# Patient Record
Sex: MALE | Race: WHITE | NOT HISPANIC OR LATINO | Employment: OTHER | URBAN - METROPOLITAN AREA
[De-identification: names, ages, dates, MRNs, and addresses within clinical notes are randomized per-mention and may not be internally consistent; named-entity substitution may affect disease eponyms.]

---

## 2017-01-12 ENCOUNTER — ANTI-COAG VISIT (OUTPATIENT)
Dept: CARDIOLOGY | Facility: CLINIC | Age: 77
End: 2017-01-12
Payer: MEDICARE

## 2017-01-12 DIAGNOSIS — Z79.01 LONG-TERM (CURRENT) USE OF ANTICOAGULANTS: Primary | ICD-10-CM

## 2017-01-12 DIAGNOSIS — I74.10 THROMBUS OF AORTA: ICD-10-CM

## 2017-01-12 LAB
CTP QC/QA: NORMAL
INR PPP: 1.3 (ref 2–3)

## 2017-01-12 PROCEDURE — 85610 PROTHROMBIN TIME: CPT | Mod: QW,S$GLB,,

## 2017-01-12 PROCEDURE — 99211 OFF/OP EST MAY X REQ PHY/QHP: CPT | Mod: 25,S$GLB,,

## 2017-01-12 NOTE — PROGRESS NOTES
INR low. Pt denies changes. INR was trending high so dose was lowered and now INR too low. I suspect overall health improvement and hence needing to increase back to maintenance dose. Recheck INR in 2 weeks

## 2017-01-26 ENCOUNTER — ANTI-COAG VISIT (OUTPATIENT)
Dept: CARDIOLOGY | Facility: CLINIC | Age: 77
End: 2017-01-26
Payer: MEDICARE

## 2017-01-26 DIAGNOSIS — I74.10 THROMBUS OF AORTA: ICD-10-CM

## 2017-01-26 DIAGNOSIS — Z79.01 LONG-TERM (CURRENT) USE OF ANTICOAGULANTS: Primary | ICD-10-CM

## 2017-01-26 LAB
CTP QC/QA: YES
INR PPP: 1.2 (ref 2–3)

## 2017-01-26 PROCEDURE — 85610 PROTHROMBIN TIME: CPT | Mod: QW,S$GLB,,

## 2017-01-26 PROCEDURE — 99211 OFF/OP EST MAY X REQ PHY/QHP: CPT | Mod: 25,S$GLB,,

## 2017-01-26 NOTE — PROGRESS NOTES
INR low again. Pt missed a dose 2 days ago. He possibly missed more doses. He had liver over a week ago. We discussed diet. We will boost dose today then resume maintenance dose. Repeat INR next week

## 2017-02-02 ENCOUNTER — ANTI-COAG VISIT (OUTPATIENT)
Dept: CARDIOLOGY | Facility: CLINIC | Age: 77
End: 2017-02-02
Payer: MEDICARE

## 2017-02-02 DIAGNOSIS — I74.10 THROMBUS OF AORTA: ICD-10-CM

## 2017-02-02 DIAGNOSIS — Z79.01 LONG-TERM (CURRENT) USE OF ANTICOAGULANTS: Primary | ICD-10-CM

## 2017-02-02 LAB — INR PPP: 2.1 (ref 2–3)

## 2017-02-02 PROCEDURE — 85610 PROTHROMBIN TIME: CPT | Mod: QW,S$GLB,,

## 2017-02-02 NOTE — PROGRESS NOTES
INR good. Pt confirmed dose and compliance. INR was low last week due to compliance. He reports he has figured out a plan to keep him on track. No other changes. No s/sx of bleeding. No bruising.  We will continue on maintenance dose. Repeat INR in 2 weeks.

## 2017-02-08 ENCOUNTER — TELEPHONE (OUTPATIENT)
Dept: NEUROLOGY | Facility: CLINIC | Age: 77
End: 2017-02-08

## 2017-02-08 NOTE — TELEPHONE ENCOUNTER
----- Message from Tamiko Alvares sent at 2/8/2017  9:42 AM CST -----  Contact: [phone]  Pt is stating he has a appointment for today but its not scheduled Please call pt at your earliest convenience.  Thanks !

## 2017-02-08 NOTE — TELEPHONE ENCOUNTER
"Called to notify patient that he does not have a follow up appointment scheduled. He was last seen in Nov and needs a 3 Month F/U. I offered to schedule an appointment for him tomorrow as he has an appointment at 2:00 with Dr Mackenzie. He declined the appointment because he stated, "I have to be in [place] tomorrow at 1:00 for a [place] meeting." I will schedule a 3 Month follow up for him for a future date and mail, ok per patient.   "

## 2017-02-09 ENCOUNTER — OFFICE VISIT (OUTPATIENT)
Dept: CARDIOLOGY | Facility: CLINIC | Age: 77
End: 2017-02-09
Payer: MEDICARE

## 2017-02-09 VITALS
HEIGHT: 67 IN | BODY MASS INDEX: 24.48 KG/M2 | DIASTOLIC BLOOD PRESSURE: 59 MMHG | WEIGHT: 156 LBS | OXYGEN SATURATION: 98 % | HEART RATE: 80 BPM | SYSTOLIC BLOOD PRESSURE: 119 MMHG

## 2017-02-09 DIAGNOSIS — G20.A1 PARKINSON'S DISEASE: ICD-10-CM

## 2017-02-09 DIAGNOSIS — Z79.01 ON CONTINUOUS ORAL ANTICOAGULATION: ICD-10-CM

## 2017-02-09 DIAGNOSIS — I73.9 PAD (PERIPHERAL ARTERY DISEASE): Primary | ICD-10-CM

## 2017-02-09 DIAGNOSIS — R53.81 DEBILITY: ICD-10-CM

## 2017-02-09 DIAGNOSIS — I74.10 THROMBUS OF AORTA: ICD-10-CM

## 2017-02-09 DIAGNOSIS — E78.5 DYSLIPIDEMIA: ICD-10-CM

## 2017-02-09 PROCEDURE — 1159F MED LIST DOCD IN RCRD: CPT | Mod: S$GLB,,, | Performed by: INTERNAL MEDICINE

## 2017-02-09 PROCEDURE — 99999 PR PBB SHADOW E&M-EST. PATIENT-LVL III: CPT | Mod: PBBFAC,,, | Performed by: INTERNAL MEDICINE

## 2017-02-09 PROCEDURE — 1160F RVW MEDS BY RX/DR IN RCRD: CPT | Mod: S$GLB,,, | Performed by: INTERNAL MEDICINE

## 2017-02-09 PROCEDURE — 1126F AMNT PAIN NOTED NONE PRSNT: CPT | Mod: S$GLB,,, | Performed by: INTERNAL MEDICINE

## 2017-02-09 PROCEDURE — 99214 OFFICE O/P EST MOD 30 MIN: CPT | Mod: S$GLB,,, | Performed by: INTERNAL MEDICINE

## 2017-02-09 PROCEDURE — 1157F ADVNC CARE PLAN IN RCRD: CPT | Mod: S$GLB,,, | Performed by: INTERNAL MEDICINE

## 2017-02-09 RX ORDER — PRAMIPEXOLE DIHYDROCHLORIDE 1 MG/1
1 TABLET ORAL 4 TIMES DAILY
Status: ON HOLD | COMMUNITY
Start: 2016-11-17 | End: 2017-08-03 | Stop reason: HOSPADM

## 2017-02-09 RX ORDER — METHYLPREDNISOLONE 4 MG/1
TABLET ORAL
Refills: 0 | COMMUNITY
Start: 2016-12-12 | End: 2017-02-23

## 2017-02-09 RX ORDER — MELOXICAM 7.5 MG/1
TABLET ORAL
Refills: 0 | COMMUNITY
Start: 2017-01-20 | End: 2017-04-17

## 2017-02-09 NOTE — PROGRESS NOTES
Subjective:    Patient ID:  Tenzin Ortiz is a 76 y.o. male who presents for follow-up of Follow-up      HPI   previous history:  Here for follow-up of stent of the lower extremity.  Just today he's noticed a significant difference.  He had some mild residual pain post procedure.  He still gets some numbness and tingling through his feet associated with his neuropathy.  He saw neurology yesterday and is being followed up by Dr. Allred.  He denies any worsening cardiopulmonary complaints.  We discussed no restrictions and promoted exercise at this point.  We'll try to get him into cardiac rehabilitation.    Today:  Here for follow-up of peripheral vascular disease.  He says his legs feel good but he still deals with issues from his Parkinson's.  He said he went to one day of cardiac rehabilitation and just felt bad and never returned.  He's amenable to calling him again in trying to reinitiate the program.  He denies any worsening cardiopulmonary complaints.  He's not expressing chest pain, shortness of breath or palpitations.  He denies any PND, orthopnea or lower edema.  He does have follow-up with neurology next month.  Otherwise is been in his usual state of health.      Review of Systems   Constitution: Negative.   HENT: Negative.    Eyes: Negative.    Cardiovascular: Negative for chest pain, dyspnea on exertion, irregular heartbeat, leg swelling, near-syncope, orthopnea, palpitations, paroxysmal nocturnal dyspnea and syncope.   Respiratory: Negative for shortness of breath.    Skin: Negative.    Musculoskeletal: Negative.    Gastrointestinal: Negative for abdominal pain, constipation and diarrhea.   Genitourinary: Negative for dysuria.   Neurological: Positive for disturbances in coordination, focal weakness, loss of balance, numbness and paresthesias. Negative for dizziness.   Psychiatric/Behavioral: Negative.         Objective:    Physical Exam   Constitutional: He is oriented to person, place, and time. He  appears well-developed and well-nourished. No distress.   HENT:   Head: Normocephalic and atraumatic.   Eyes: Conjunctivae and EOM are normal. Pupils are equal, round, and reactive to light.   Neck: Normal range of motion. Neck supple. No thyromegaly present.   Cardiovascular: Normal rate, regular rhythm and normal heart sounds.    No murmur heard.  Pulmonary/Chest: Effort normal and breath sounds normal. No respiratory distress. He has no wheezes. He has no rales. He exhibits no tenderness.   Abdominal: Soft. Bowel sounds are normal.   Musculoskeletal: He exhibits no edema.   Neurological: He is alert and oriented to person, place, and time.   Skin: Skin is warm and dry.   Psychiatric: He has a normal mood and affect. His behavior is normal.       BIA:   ABIs mildly decreased.  Right ankle-brachial index 0.88  Left ankle-brachial index 0.76    Assessment:       1. PAD (peripheral artery disease)    2. Thrombus of aorta    3. Parkinson's disease    4. Dyslipidemia    5. Debility    6. On continuous oral anticoagulation         Plan:       -Continue medical therapy  -Referred to cardiac rehabilitation, states he will try and resume      Return to clinic in 6 months

## 2017-02-09 NOTE — MR AVS SNAPSHOT
[facility]  [address] [facility] [address] [place] [address]  Phone: [phone]                  Tenzin Ortiz   2017 2:00 PM   Office Visit    Description:  Male : 1940   Provider:  Anjel Mackenzie MD   Department:  [facility]           Reason for Visit     Follow-up           Diagnoses this Visit        Comments    PAD (peripheral artery disease)    -  Primary     Thrombus of aorta         Parkinson's disease         Dyslipidemia         Debility         On continuous oral anticoagulation                To Do List           Future Appointments        Provider Department Dept Phone    2017 1:30 PM Eva Waldron, PharmD [facility] [phone]    3/15/2017 3:40 PM Bhargav Allred MD [facility] [place] [facility] [phone]      Goals (5 Years of Data)     None      Follow-Up and Disposition     Return in about 6 months (around 2017).    Follow-up and Disposition History      [facility]     [facility] Care [facility] -  Assistance  Registered nurses in the [facility] provide clinical advisement, health education, appointment booking, and other advisory services.  Call for this free service at [phone].             Medications           Message regarding Medications     Verify the changes and/or additions to your medication regime listed below are the same as discussed with your clinician today.  If any of these changes or additions are incorrect, please notify your healthcare provider.        STOP taking these medications     cephALEXin (KEFLEX) 500 MG capsule TK 1 C PO Q 8 H FOR 7 DAYS           Verify that the below list of medications is an accurate representation of the medications you are currently taking.  If none reported, the list may be blank. If incorrect, please contact your healthcare provider. Carry this list with you in case of emergency.           Current Medications     acetaminophen (TYLENOL) 325 MG tablet Take 650 mg  "by mouth every 6 (six) hours as needed for Pain.    amlodipine (NORVASC) 10 MG tablet TAKE 1 TABLET BY MOUTH EVERY DAY    atorvastatin (LIPITOR) 10 MG tablet Take 10 mg by mouth once daily.    carbidopa-levodopa (PARCOPA)  mg per disintegrating tablet DISSOLVE 2 TS PO QID PRN    carbidopa-levodopa  mg (SINEMET CR)  mg TbSR Take 1 tablet by mouth 5 (five) times daily.    clobetasol 0.05% (TEMOVATE) 0.05 % Oint POWER EXT AA BID    clopidogrel (PLAVIX) 75 mg tablet TAKE 1 TABLET BY MOUTH ONCE DAILY    gabapentin (NEURONTIN) 300 MG capsule TAKE 1 CAPSULE BY MOUTH AT NIGHT FOR 1 WEEK, INCREASE 1 CAPSULE A WEEK TILL TAKING 1 CAPSULE THREE TIMES DAILY THEREAFTER    pramipexole (MIRAPEX) 1 MG tablet     trazodone (DESYREL) 50 MG tablet TK 1/2 T PO HS  U PRN TO REST ATN    warfarin (COUMADIN) 3 MG tablet TAKE ONE BY MOUTH EVERY DAY OR AS DIRECTED BY COUMADIN CLINIC    meloxicam (MOBIC) 7.5 MG tablet TK 1 T PO D    methylPREDNISolone (MEDROL DOSEPACK) 4 mg tablet TK UTD           Clinical Reference Information           Your Vitals Were     BP Pulse Height Weight SpO2 BMI    119/59 (BP Location: Left arm, Patient Position: Sitting, BP Method: Automatic) 80 5' 7" (1.702 m) 70.8 kg (156 lb) 98% 24.43 kg/m2      Blood Pressure          Most Recent Value    BP  (!)  119/59      Allergies as of 2/9/2017     Amantadine Analogues    Mirapex [Pramipexole]    Neupro [Rotigotine]      Immunizations Administered on Date of Encounter - 2/9/2017     None      [facility] Sign-Up     Activating your [facility] account is as easy as 1-2-3!     1) Visit [email or web address], select Sign Up Now, enter this activation code and your date of birth, then select Next.  [email or web address]  Expires: 3/26/2017  2:09 PM      2) Create a username and password to use when you visit [facility] in the future and select a security question in case you lose your password and select Next.    3) Enter your e-mail address and click Sign Up!    Additional " Information  If you have questions, please e-mail [email or web address] or call [phone] to talk to our [facility] staff. Remember, [facility] is NOT to be used for urgent needs. For medical emergencies, dial 911.         Language Assistance Services     ATTENTION: Language assistance services are available, free of charge. Please call [phone].      ATENCIÓN: Si habla español, tiene a martínez disposición servicios gratuitos de asistencia lingüística. Llame al [phone].     [facility] Ý: N?u b?n nói Ti?ng Vi?t, có các d?ch v? h? tr? ngôn ng? mi?n phí dành cho b?n. G?i s? [phone].         [facility] complies with applicable Federal civil rights laws and does not discriminate on the basis of race, color, national origin, age, disability, or sex.

## 2017-02-16 ENCOUNTER — ANTI-COAG VISIT (OUTPATIENT)
Dept: CARDIOLOGY | Facility: CLINIC | Age: 77
End: 2017-02-16
Payer: MEDICARE

## 2017-02-16 DIAGNOSIS — I74.10 THROMBUS OF AORTA: ICD-10-CM

## 2017-02-16 DIAGNOSIS — Z79.01 LONG-TERM (CURRENT) USE OF ANTICOAGULANTS: Primary | ICD-10-CM

## 2017-02-16 LAB — INR PPP: 2.2 (ref 2–3)

## 2017-02-16 PROCEDURE — 99211 OFF/OP EST MAY X REQ PHY/QHP: CPT | Mod: 25,S$GLB,,

## 2017-02-16 PROCEDURE — 85610 PROTHROMBIN TIME: CPT | Mod: QW,S$GLB,,

## 2017-02-16 NOTE — PROGRESS NOTES
INR is good. Pt confirmed dose and compliance. He now has a routine to make sure he doesn't miss pills. He has a rash and saw dermatology today. He was told he was being called in a steroid. He has not picked it up yet. Pt advised of potential interaction with steroids. He will call today or tomorrow with what he was prescribed so that we can adjust dose if indicated. I tentatively planned for repeat INR next week thinking that it will be an interacting med. If it is not, we can r/s to next month since his INR/dose has been stable.

## 2017-02-20 NOTE — PROGRESS NOTES
Pt called to report that today will be starting Methylprednisolone-4mg #21 tablets (tapering dose: 6 x 1d, 5 x 1d, 4 x 1d, 3 x 1d, 2 x1d, 1 x1d), next INR is due 2/23, cb # [phone]

## 2017-02-20 NOTE — PROGRESS NOTES
See previous note, Patient to start medrol later today. I will begin to make adjustments but further adjustments will be made based on INR already scheduled 2/23

## 2017-02-23 ENCOUNTER — ANTI-COAG VISIT (OUTPATIENT)
Dept: CARDIOLOGY | Facility: CLINIC | Age: 77
End: 2017-02-23
Payer: MEDICARE

## 2017-02-23 DIAGNOSIS — I74.10 THROMBUS OF AORTA: ICD-10-CM

## 2017-02-23 DIAGNOSIS — Z79.01 LONG-TERM (CURRENT) USE OF ANTICOAGULANTS: Primary | ICD-10-CM

## 2017-02-23 LAB — INR PPP: 2 (ref 2–3)

## 2017-02-23 PROCEDURE — 85610 PROTHROMBIN TIME: CPT | Mod: QW,S$GLB,,

## 2017-02-23 PROCEDURE — 99211 OFF/OP EST MAY X REQ PHY/QHP: CPT | Mod: 25,S$GLB,,

## 2017-02-23 NOTE — PROGRESS NOTES
INR good. Pt reports he took 3mg Tu. He was supposed to take 1.5mg Tue for DDI with medrol. However, he forgot and took his normal dose. Pt also reports that he did not start the medrol until yesterday. Hence, we are not seeing the interaction today. Plus, its good that he took his normal dose 2 days ago. We will plan for interaction and have him hold a dose on Saturday. Otherwise, pt will continue on maintenance dose. Repeat INR in 3 weeks.

## 2017-03-16 ENCOUNTER — ANTI-COAG VISIT (OUTPATIENT)
Dept: CARDIOLOGY | Facility: CLINIC | Age: 77
End: 2017-03-16
Payer: MEDICARE

## 2017-03-16 DIAGNOSIS — I74.10 THROMBUS OF AORTA: ICD-10-CM

## 2017-03-16 DIAGNOSIS — Z79.01 LONG-TERM (CURRENT) USE OF ANTICOAGULANTS: Primary | ICD-10-CM

## 2017-03-16 LAB — INR PPP: 2.6 (ref 2–3)

## 2017-03-16 PROCEDURE — 85610 PROTHROMBIN TIME: CPT | Mod: QW,S$GLB,,

## 2017-03-16 NOTE — PROGRESS NOTES
INR good. Pt confirmed dose and compliance. He is having sciatic nerve pain. No new meds. No plans for any procedures. No s/sx of bleeding. Maintain current dose and repeat INR next month. Pt advised to call with any changes or procedures.

## 2017-04-03 ENCOUNTER — OFFICE VISIT (OUTPATIENT)
Dept: FAMILY MEDICINE | Facility: CLINIC | Age: 77
End: 2017-04-03
Payer: MEDICARE

## 2017-04-03 VITALS
TEMPERATURE: 97 F | HEART RATE: 82 BPM | DIASTOLIC BLOOD PRESSURE: 62 MMHG | OXYGEN SATURATION: 97 % | WEIGHT: 158.75 LBS | RESPIRATION RATE: 18 BRPM | SYSTOLIC BLOOD PRESSURE: 120 MMHG | HEIGHT: 67 IN | BODY MASS INDEX: 24.92 KG/M2

## 2017-04-03 DIAGNOSIS — M54.32 LEFT SIDED SCIATICA: ICD-10-CM

## 2017-04-03 DIAGNOSIS — D63.8 ANEMIA OF CHRONIC DISEASE: ICD-10-CM

## 2017-04-03 DIAGNOSIS — I74.10 THROMBUS OF AORTA: ICD-10-CM

## 2017-04-03 DIAGNOSIS — E44.1 MILD MALNUTRITION: Chronic | ICD-10-CM

## 2017-04-03 DIAGNOSIS — Z00.00 VISIT FOR WELL MAN HEALTH CHECK: Primary | ICD-10-CM

## 2017-04-03 DIAGNOSIS — G20.A1 PD (PARKINSON'S DISEASE): ICD-10-CM

## 2017-04-03 DIAGNOSIS — I73.9 PAD (PERIPHERAL ARTERY DISEASE): ICD-10-CM

## 2017-04-03 PROCEDURE — 99397 PER PM REEVAL EST PAT 65+ YR: CPT | Mod: S$GLB,,, | Performed by: FAMILY MEDICINE

## 2017-04-03 PROCEDURE — 99999 PR PBB SHADOW E&M-EST. PATIENT-LVL III: CPT | Mod: PBBFAC,,, | Performed by: FAMILY MEDICINE

## 2017-04-03 RX ORDER — FLUOCINONIDE 0.5 MG/G
OINTMENT TOPICAL
Refills: 1 | Status: ON HOLD | COMMUNITY
Start: 2017-02-16 | End: 2017-08-03 | Stop reason: HOSPADM

## 2017-04-03 RX ORDER — TRAMADOL HYDROCHLORIDE 50 MG/1
TABLET ORAL
Refills: 0 | COMMUNITY
Start: 2017-03-30 | End: 2017-04-10

## 2017-04-03 RX ORDER — METHYLPREDNISOLONE 4 MG/1
TABLET ORAL
Qty: 1 PACKAGE | Refills: 0 | Status: SHIPPED | OUTPATIENT
Start: 2017-04-03 | End: 2017-04-10

## 2017-04-03 NOTE — MR AVS SNAPSHOT
[facility]  [address]  [place] [address]  Phone: [phone]                  Tenzin Ortiz   4/3/2017 11:00 AM   Office Visit    Description:  Male : 1940   Provider:  Efrain Chavez MD   Department:  [facility]           Reason for Visit     Establish Care     Hip Pain     Leg Pain           Diagnoses this Visit        Comments    Visit for well man health check    -  Primary     Mild malnutrition         Anemia of chronic disease         PD (Parkinson's disease)         PAD (peripheral artery disease)         Left sided sciatica         Thrombus of aorta                To Do List           Future Appointments        Provider Department Dept Phone    2017 1:30 PM Eva Waldron, PharmD [facility] [phone]      Goals (5 Years of Data)     None      Follow-Up and Disposition     Return in about 3 months (around 7/3/2017), or if symptoms worsen or fail to improve.       These Medications        Disp Refills Start End    methylPREDNISolone (MEDROL DOSEPACK) 4 mg tablet 1 Package 0 4/3/2017     use as directed    Pharmacy: [facility] [address] [place] [address] [address] AT [facility] [address] Ph #: [phone]         [facility] On Call     [facility] On Call Nurse Care Line -  Assistance  Unless otherwise directed by your provider, please contact [facility] On[facility], our nurse care line that is available for  assistance.     Registered nurses in the [facility] On Call Center provide: appointment scheduling, clinical advisement, health education, and other advisory services.  Call: [phone] (toll free)               Medications           Message regarding Medications     Verify the changes and/or additions to your medication regime listed below are the same as discussed with your clinician today.  If any of these changes or additions are incorrect, please notify your healthcare provider.        START taking  "these NEW medications        Refills    methylPREDNISolone (MEDROL DOSEPACK) 4 mg tablet 0    Sig: use as directed    Class: Normal           Verify that the below list of medications is an accurate representation of the medications you are currently taking.  If none reported, the list may be blank. If incorrect, please contact your healthcare provider. Carry this list with you in case of emergency.           Current Medications     amlodipine (NORVASC) 10 MG tablet TAKE 1 TABLET BY MOUTH EVERY DAY    atorvastatin (LIPITOR) 10 MG tablet Take 10 mg by mouth once daily.    carbidopa-levodopa (PARCOPA)  mg per disintegrating tablet DISSOLVE 2 TS PO QID PRN    carbidopa-levodopa  mg (SINEMET CR)  mg TbSR Take 1 tablet by mouth 5 (five) times daily.    clopidogrel (PLAVIX) 75 mg tablet TAKE 1 TABLET BY MOUTH ONCE DAILY    fluocinonide (LIDEX) 0.05 % ointment POWER AA BID    gabapentin (NEURONTIN) 300 MG capsule TAKE 1 CAPSULE BY MOUTH AT NIGHT FOR 1 WEEK, INCREASE 1 CAPSULE A WEEK TILL TAKING 1 CAPSULE THREE TIMES DAILY THEREAFTER    meloxicam (MOBIC) 7.5 MG tablet TK 1 T PO D    pramipexole (MIRAPEX) 1 MG tablet     tramadol (ULTRAM) 50 mg tablet TK ONE T  PO Q 6 H PRN    trazodone (DESYREL) 50 MG tablet TK 1/2 T PO HS  U PRN TO REST ATN    warfarin (COUMADIN) 3 MG tablet TAKE ONE BY MOUTH EVERY DAY OR AS DIRECTED BY COUMADIN CLINIC    methylPREDNISolone (MEDROL DOSEPACK) 4 mg tablet use as directed           Clinical Reference Information           Your Vitals Were     BP Pulse Temp Resp Height Weight    120/62 (BP Location: Left arm, Patient Position: Sitting, BP Method: Manual) 82 97.4 °F (36.3 °C) (Oral) 18 5' 7" (1.702 m) 72 kg (158 lb 11.7 oz)    SpO2 BMI             97% 24.86 kg/m2         Blood Pressure          Most Recent Value    BP  120/62      Allergies as of 4/3/2017     Amantadine Analogues    Mirapex [Pramipexole]    Neupro [Rotigotine]      Immunizations Administered on Date of Encounter " - 4/3/2017     None      Orders Placed During Today's Visit     Future Labs/Procedures Expected by Expires    CBC auto differential  4/3/2017 4/3/2018    Comprehensive metabolic panel  4/3/2017 6/2/2018    Lipid panel  4/3/2017 6/2/2018    Urinalysis  4/3/2017 6/2/2018      [facility] Sign-Up     Activating your [facility] account is as easy as 1-2-3!     1) Visit [email or web address], select Sign Up Now, enter this activation code and your date of birth, then select Next.  [email or web address]  Expires: 5/18/2017 11:18 AM      2) Create a username and password to use when you visit [facility] in the future and select a security question in case you lose your password and select Next.    3) Enter your e-mail address and click Sign Up!    Additional Information  If you have questions, please e-mail [email or web address] or call [phone] to talk to our [facility] staff. Remember, [facility] is NOT to be used for urgent needs. For medical emergencies, dial 911.         Language Assistance Services     ATTENTION: Language assistance services are available, free of charge. Please call [phone].      ATENCIÓN: Si habla español, tiene a martínez disposición servicios gratuitos de asistencia lingüística. Llame al [phone].     [facility] Ý: N?u b?n nói Ti?ng Vi?t, có các d?ch v? h? tr? ngôn ng? mi?n phí dành cho b?n. G?i s? [phone].         [facility] complies with applicable Federal civil rights laws and does not discriminate on the basis of race, color, national origin, age, disability, or sex.

## 2017-04-03 NOTE — PROGRESS NOTES
"Well Man VISIT      CHIEF COMPLAINT  Chief Complaint   Patient presents with    Establish Care    Hip Pain     left    Leg Pain     left       HPI  Tenzin Ortiz is a 76 y.o. male who presents for physical and left leg pain.     Social Factors  Tobacco use: No  Ready to Quit: No  Alcohol: No  Intimate partner violence screening  "Do you feel safe in your current relationship?" Yes   "Have you ever been in a relationship in which your partner frightened you or hurt you?" No  Living Will/POA: No  Regular Exercise: No    Depression  Over the past two weeks, have you felt down, depressed, or hopeless? No  Over the past two weeks, have you felt little interest or pleasure in doing things? No    Reproductive Health  STD screening in last year: declined  HIV screening: declined    Screen for Chronic Disease  CHD Risk Factors: dyslipidemia and male gender  Estimated body mass index is 24.86 kg/(m^2) as calculated from the following:    Height as of this encounter: 5' 7" (1.702 m).    Weight as of this encounter: 72 kg (158 lb 11.7 oz).  Dyslipidemia screening needed: order 4/3/17  T2DM screening needed: order 4/3/17  Colonoscopy needed: declined  PSA needed: n/a  AAA screening needed:n/a  Screen men 35 years and older, and men 20 to 34 years of age who have cardiovascular risk factors for dyslipidemia  Begin screening colonoscopies at 50 years of age in men of average risk, and continue until 75 years of age; offer fecal occult blood testing every year, flexible sigmoidoscopy every five years combined with fecal occult blood testing every three years, or colonoscopy every 10 years   The American Urological Association recommends offering PSA testing and digital rectal examination to well-informed men beginning at 40 years of age and continuing until life expectancy is less than 10 years  Screen once with ultrasonography in men 65 to 75 years of age if they have a family history or have smoked at [id number] cigarettes " "in their lifetime  Screen men with a sustained blood pressure greater than 135/80 mm Hg for T2DM      Immunizations  delayed    ALLERGIES and MEDS were verified.   PMHx, PSHx, FHx, SOCIALHx were updated as pertinent.    REVIEW OF SYSTEMS  Review of Systems   Constitutional: Negative.    HENT: Negative.    Eyes: Negative.    Respiratory: Negative.    Cardiovascular: Negative.    Gastrointestinal: Negative.    Genitourinary: Negative.    Musculoskeletal: Positive for joint pain and myalgias. Negative for back pain and neck pain.   Skin: Negative.    Neurological:        +tremors         PHYSICAL EXAM  VITAL SIGNS: /62 (BP Location: Left arm, Patient Position: Sitting, BP Method: Manual)  Pulse 82  Temp 97.4 °F (36.3 °C) (Oral)   Resp 18  Ht 5' 7" (1.702 m)  Wt 72 kg (158 lb 11.7 oz)  SpO2 97%  BMI 24.86 kg/m2  GEN: Well developed, Well nourished, No acute distress.  HENT: Normocephalic, Atraumatic, Bilateral external ears normal, Nose normal, Oropharynx moist, No oral exudates.   Eyes: PERRL, EOMI, Conjunctiva normal, No discharge.   Neck: Supple, No tenderness.  Lymphatic: No cervical or supraclavicular lymphadenopathy noted.   Cardiovascular: Normal heart rate, Normal rhythm, No murmurs, No rubs, No gallops.   Thorax & Lungs: Normal breath sounds, No respiratory distress, No wheezing.  Abdomen: Soft, No tenderness, Bowel sounds normal.  Genital: deferred  Skin: Warm, Dry, No erythema, No rash.   Extremities: No edema, No tenderness.       ASSESSMENT/PLAN    Tenzin was seen today for establish care, hip pain and leg pain.    Diagnoses and all orders for this visit:    Visit for well man health check  -     Urinalysis; Future    Mild malnutrition  -     Comprehensive metabolic panel; Future  -     CBC auto differential; Future    Anemia of chronic disease  -     CBC auto differential; Future    PD (Parkinson's disease)    PAD (peripheral artery disease)  -     Lipid panel; Future    Left sided sciatica  -   "   Comprehensive metabolic panel; Future  -     methylPREDNISolone (MEDROL DOSEPACK) 4 mg tablet; use as directed    Thrombus of aorta  -     Lipid panel; Future    Other orders  -     Cancel: PSA, Screening; Future         FOLLOW UP: 3 months or sooner if needed    Medrol dose pack for sciatica symptoms as pain is burning/stabbing in nature   Patient to call if symptoms fail to improve    Efrain Chavez MD

## 2017-04-04 NOTE — PROGRESS NOTES
Pt called today 4/4/17 to inform us that on today he will start (Methylprednisone 4mg dose romelia) 4/4/17(6 tablets), 4/5 (5 tablets), 4/6/(4tablets), 4/7/(3 tablets), 4/8 (2 tablets), 4/9/(1) tablet. Please advise.

## 2017-04-05 ENCOUNTER — LAB VISIT (OUTPATIENT)
Dept: LAB | Facility: HOSPITAL | Age: 77
End: 2017-04-05
Attending: FAMILY MEDICINE
Payer: MEDICARE

## 2017-04-05 DIAGNOSIS — Z00.00 VISIT FOR WELL MAN HEALTH CHECK: ICD-10-CM

## 2017-04-05 LAB
BILIRUB UR QL STRIP: NEGATIVE
CLARITY UR: CLEAR
COLOR UR: YELLOW
GLUCOSE UR QL STRIP: NEGATIVE
HGB UR QL STRIP: NEGATIVE
KETONES UR QL STRIP: ABNORMAL
LEUKOCYTE ESTERASE UR QL STRIP: NEGATIVE
MICROSCOPIC COMMENT: NORMAL
NITRITE UR QL STRIP: NEGATIVE
PH UR STRIP: 6 [PH] (ref 5–8)
PROT UR QL STRIP: NEGATIVE
RBC #/AREA URNS HPF: 2 /HPF (ref 0–4)
SP GR UR STRIP: 1.02 (ref 1–1.03)
URN SPEC COLLECT METH UR: ABNORMAL
UROBILINOGEN UR STRIP-ACNC: NEGATIVE EU/DL

## 2017-04-05 PROCEDURE — 81000 URINALYSIS NONAUTO W/SCOPE: CPT

## 2017-04-06 ENCOUNTER — ANTI-COAG VISIT (OUTPATIENT)
Dept: CARDIOLOGY | Facility: CLINIC | Age: 77
End: 2017-04-06
Payer: MEDICARE

## 2017-04-06 ENCOUNTER — TELEPHONE (OUTPATIENT)
Dept: FAMILY MEDICINE | Facility: CLINIC | Age: 77
End: 2017-04-06

## 2017-04-06 DIAGNOSIS — I74.10 THROMBUS OF AORTA: ICD-10-CM

## 2017-04-06 DIAGNOSIS — Z79.01 LONG-TERM (CURRENT) USE OF ANTICOAGULANTS: Primary | ICD-10-CM

## 2017-04-06 LAB — INR PPP: 2.3 (ref 2–3)

## 2017-04-06 PROCEDURE — 99211 OFF/OP EST MAY X REQ PHY/QHP: CPT | Mod: 25,S$GLB,,

## 2017-04-06 PROCEDURE — 85610 PROTHROMBIN TIME: CPT | Mod: QW,S$GLB,,

## 2017-04-06 NOTE — TELEPHONE ENCOUNTER
----- Message from Efrain Chavez MD sent at 4/6/2017  8:49 AM CDT -----  Please let patient know that his labs look good.  His potassium is mildly elevated, but not significantly.  Otherwise he is doing well.    Thanks,  Dr. Chavez

## 2017-04-06 NOTE — PROGRESS NOTES
INR ok. Pt started medrol 2 days ago. We expect interaction after 3 days of DDI. We will plan for pt to hold a dose tomorrow to keep INR from increasing. Otherwise, pt will continue on maintenance dose. Repeat INR in 3 weeks. Pt advised to call if meds change again.

## 2017-04-10 ENCOUNTER — OFFICE VISIT (OUTPATIENT)
Dept: FAMILY MEDICINE | Facility: CLINIC | Age: 77
End: 2017-04-10
Payer: MEDICARE

## 2017-04-10 ENCOUNTER — APPOINTMENT (OUTPATIENT)
Dept: RADIOLOGY | Facility: HOSPITAL | Age: 77
End: 2017-04-10
Attending: FAMILY MEDICINE
Payer: MEDICARE

## 2017-04-10 VITALS
BODY MASS INDEX: 24.92 KG/M2 | HEIGHT: 67 IN | WEIGHT: 158.75 LBS | DIASTOLIC BLOOD PRESSURE: 58 MMHG | RESPIRATION RATE: 20 BRPM | TEMPERATURE: 98 F | HEART RATE: 74 BPM | SYSTOLIC BLOOD PRESSURE: 114 MMHG | OXYGEN SATURATION: 99 %

## 2017-04-10 DIAGNOSIS — M54.32 LEFT SIDED SCIATICA: Primary | ICD-10-CM

## 2017-04-10 DIAGNOSIS — M54.32 LEFT SIDED SCIATICA: ICD-10-CM

## 2017-04-10 PROCEDURE — 1160F RVW MEDS BY RX/DR IN RCRD: CPT | Mod: S$GLB,,, | Performed by: FAMILY MEDICINE

## 2017-04-10 PROCEDURE — 99999 PR PBB SHADOW E&M-EST. PATIENT-LVL IV: CPT | Mod: PBBFAC,,, | Performed by: FAMILY MEDICINE

## 2017-04-10 PROCEDURE — 72100 X-RAY EXAM L-S SPINE 2/3 VWS: CPT | Mod: TC,PN

## 2017-04-10 PROCEDURE — 99214 OFFICE O/P EST MOD 30 MIN: CPT | Mod: 25,S$GLB,, | Performed by: FAMILY MEDICINE

## 2017-04-10 PROCEDURE — 72100 X-RAY EXAM L-S SPINE 2/3 VWS: CPT | Mod: 26,,, | Performed by: RADIOLOGY

## 2017-04-10 PROCEDURE — 1159F MED LIST DOCD IN RCRD: CPT | Mod: S$GLB,,, | Performed by: FAMILY MEDICINE

## 2017-04-10 PROCEDURE — 96372 THER/PROPH/DIAG INJ SC/IM: CPT | Mod: S$GLB,,, | Performed by: FAMILY MEDICINE

## 2017-04-10 PROCEDURE — 1157F ADVNC CARE PLAN IN RCRD: CPT | Mod: S$GLB,,, | Performed by: FAMILY MEDICINE

## 2017-04-10 PROCEDURE — 1125F AMNT PAIN NOTED PAIN PRSNT: CPT | Mod: S$GLB,,, | Performed by: FAMILY MEDICINE

## 2017-04-10 RX ORDER — DEXAMETHASONE SODIUM PHOSPHATE 4 MG/ML
4 INJECTION, SOLUTION INTRA-ARTICULAR; INTRALESIONAL; INTRAMUSCULAR; INTRAVENOUS; SOFT TISSUE
Status: DISCONTINUED | OUTPATIENT
Start: 2017-04-10 | End: 2017-04-10

## 2017-04-10 RX ORDER — DEXAMETHASONE SODIUM PHOSPHATE 4 MG/ML
8 INJECTION, SOLUTION INTRA-ARTICULAR; INTRALESIONAL; INTRAMUSCULAR; INTRAVENOUS; SOFT TISSUE
Status: COMPLETED | OUTPATIENT
Start: 2017-04-10 | End: 2017-04-10

## 2017-04-10 RX ORDER — TRAMADOL HYDROCHLORIDE 50 MG/1
50 TABLET ORAL
Qty: 10 TABLET | Refills: 0 | Status: SHIPPED | OUTPATIENT
Start: 2017-04-10 | End: 2017-04-17

## 2017-04-10 RX ADMIN — DEXAMETHASONE SODIUM PHOSPHATE 8 MG: 4 INJECTION, SOLUTION INTRA-ARTICULAR; INTRALESIONAL; INTRAMUSCULAR; INTRAVENOUS; SOFT TISSUE at 10:04

## 2017-04-10 NOTE — MR AVS SNAPSHOT
[facility]  [address]  [place] [address]  Phone: [phone]                  Tenzin Ortiz   4/10/2017 9:00 AM   Office Visit    Description:  Male : 1940   Provider:  Efrain Chavez MD   Department:  [facility]           Reason for Visit     Hip Pain           Diagnoses this Visit        Comments    Left sided sciatica    -  Primary            To Do List           Future Appointments        Provider Department Dept Phone    2017 10:15 AM Efrain Chavez MD [facility] [phone]    2017 1:30 PM Eva Waldron, PharmD [place] [facility] [phone]      Goals (5 Years of Data)     None      Follow-Up and Disposition     Return if symptoms worsen or fail to improve.       These Medications        Disp Refills Start End    tramadol (ULTRAM) 50 mg tablet 10 tablet 0 4/10/2017 2017    Take 1 tablet (50 mg total) by mouth every 24 hours as needed for Pain. - Oral    Pharmacy: [facility] [address] [facility] AT [facility] [address] Ph #: [phone]         [facility] On Call     [facility] On Call Nurse Care Line -  Assistance  Unless otherwise directed by your provider, please contact [facility], our nurse care line that is available for  assistance.     Registered nurses in the [facility] On Call Center provide: appointment scheduling, clinical advisement, health education, and other advisory services.  Call: [phone] (toll free)               Medications           Message regarding Medications     Verify the changes and/or additions to your medication regime listed below are the same as discussed with your clinician today.  If any of these changes or additions are incorrect, please notify your healthcare provider.        START taking these NEW medications        Refills    tramadol (ULTRAM) 50 mg tablet 0    Sig: Take 1 tablet (50 mg total) by mouth every 24 hours as  "needed for Pain.    Class: Print    Route: Oral      These medications were administered today        Dose Freq    dexamethasone injection 4 mg 4 mg Clinic/HOD 1 time    Sig: Inject 1 mL (4 mg total) into the vein one time.    Class: Normal    Route: Intravenous      STOP taking these medications     methylPREDNISolone (MEDROL DOSEPACK) 4 mg tablet use as directed           Verify that the below list of medications is an accurate representation of the medications you are currently taking.  If none reported, the list may be blank. If incorrect, please contact your healthcare provider. Carry this list with you in case of emergency.           Current Medications     amlodipine (NORVASC) 10 MG tablet TAKE 1 TABLET BY MOUTH EVERY DAY    atorvastatin (LIPITOR) 10 MG tablet Take 10 mg by mouth once daily.    carbidopa-levodopa (PARCOPA)  mg per disintegrating tablet DISSOLVE 2 TS PO QID PRN    carbidopa-levodopa  mg (SINEMET CR)  mg TbSR Take 1 tablet by mouth 5 (five) times daily.    clopidogrel (PLAVIX) 75 mg tablet TAKE 1 TABLET BY MOUTH ONCE DAILY    fluocinonide (LIDEX) 0.05 % ointment POWER AA BID    gabapentin (NEURONTIN) 300 MG capsule TAKE 1 CAPSULE BY MOUTH AT NIGHT FOR 1 WEEK, INCREASE 1 CAPSULE A WEEK TILL TAKING 1 CAPSULE THREE TIMES DAILY THEREAFTER    meloxicam (MOBIC) 7.5 MG tablet TK 1 T PO D    pramipexole (MIRAPEX) 1 MG tablet     trazodone (DESYREL) 50 MG tablet TK 1/2 T PO HS  U PRN TO REST ATN    warfarin (COUMADIN) 3 MG tablet TAKE ONE BY MOUTH EVERY DAY OR AS DIRECTED BY COUMADIN CLINIC    tramadol (ULTRAM) 50 mg tablet Take 1 tablet (50 mg total) by mouth every 24 hours as needed for Pain.           Clinical Reference Information           Your Vitals Were     BP Pulse Temp Resp Height Weight    114/58 (BP Location: Left arm, Patient Position: Sitting, BP Method: Manual) 74 97.6 °F (36.4 °C) (Oral) 20 5' 7" (1.702 m) 72 kg (158 lb 11.7 oz)    SpO2 BMI             99% 24.86 kg/m2    "      Blood Pressure          Most Recent Value    BP  (!)  114/58      Allergies as of 4/10/2017     Amantadine Analogues    Mirapex [Pramipexole]    Neupro [Rotigotine]      Immunizations Administered on Date of Encounter - 4/10/2017     None      [facility] Sign-Up     Activating your [facility] account is as easy as 1-2-3!     1) Visit [email or web address], select Sign Up Now, enter this activation code and your date of birth, then select Next.  [email or web address]  Expires: 5/18/2017 11:18 AM      2) Create a username and password to use when you visit [facility] in the future and select a security question in case you lose your password and select Next.    3) Enter your e-mail address and click Sign Up!    Additional Information  If you have questions, please e-mail [email or web address] or call [phone] to talk to our [facility] staff. Remember, [facility] is NOT to be used for urgent needs. For medical emergencies, dial 911.         Language Assistance Services     ATTENTION: Language assistance services are available, free of charge. Please call [phone].      ATENCIÓN: Si habla jenniferañol, tiene a martínez disposición servicios gratuitos de asistencia lingüística. Llame al [phone].     CHÚ Ý: N?u b?n nói Ti?ng Vi?t, có các d?ch v? h? tr? ngôn ng? mi?n phí dành cho b?n. G?i s? [phone].         [facility] complies with applicable Federal civil rights laws and does not discriminate on the basis of race, color, national origin, age, disability, or sex.

## 2017-04-10 NOTE — PROGRESS NOTES
Patient tolerate Decadron 8 mg IM injection . Patient advise to wait 15 min after injection  for assessment of any posssible side effects.

## 2017-04-10 NOTE — PROGRESS NOTES
Routine Office Visit    Patient Name: Tenzin Ortiz    : 1940  MRN: [id number]    Subjective:  Tenzin is a 76 y.o. male who presents today for:    1. Left leg pain  Patient presenting with persistent left leg pain that radiates from the hip to the ankle.  He states that he cannot really describe the pain, but that it gets so intense he cannot walk.  There have been no falls.  He denies any history of back pain.  He states that he was told at [facility] told him his pain was from arthritis of the back.  The pain is not worsened or improved with any changes in position or movements.  This pain has been off and on for several months.     Past Medical History  Past Medical History:   Diagnosis Date    Anticoagulant long-term use     Hypertension     Impulse control disorder     gambling on mirapex; also possible dopamine dysregulation syndrome    PD (Parkinson's disease)     tremor-predominant    PVD (peripheral vascular disease) with claudication     poor circulation both legs       Past Surgical History  History reviewed. No pertinent surgical history.    Family History  Family History   Problem Relation Age of Onset    No Known Problems Mother     No Known Problems Father     Parkinsonism Neg Hx        Social History  Social History     Social History    Marital status: Single     Spouse name: N/A    Number of children: N/A    Years of education: N/A     Occupational History    Not on file.     Social History Main Topics    Smoking status: Former Smoker     Years: 10.00    Smokeless tobacco: Never Used      Comment: Quit 40 years ago    Alcohol use No    Drug use: No    Sexual activity: Not Currently     Other Topics Concern    Not on file     Social History Narrative       Current Medications  Current Outpatient Prescriptions on File Prior to Visit   Medication Sig Dispense Refill    amlodipine (NORVASC) 10 MG tablet TAKE 1 TABLET BY MOUTH EVERY DAY 90 tablet 11     "atorvastatin (LIPITOR) 10 MG tablet Take 10 mg by mouth once daily.      carbidopa-levodopa (PARCOPA)  mg per disintegrating tablet DISSOLVE 2 TS PO QID PRN  2    carbidopa-levodopa  mg (SINEMET CR)  mg TbSR Take 1 tablet by mouth 5 (five) times daily.      clopidogrel (PLAVIX) 75 mg tablet TAKE 1 TABLET BY MOUTH ONCE DAILY 90 tablet 11    fluocinonide (LIDEX) 0.05 % ointment POWER AA BID  1    gabapentin (NEURONTIN) 300 MG capsule TAKE 1 CAPSULE BY MOUTH AT NIGHT FOR 1 WEEK, INCREASE 1 CAPSULE A WEEK TILL TAKING 1 CAPSULE THREE TIMES DAILY THEREAFTER 270 capsule 11    meloxicam (MOBIC) 7.5 MG tablet TK 1 T PO D  0    pramipexole (MIRAPEX) 1 MG tablet       trazodone (DESYREL) 50 MG tablet TK 1/2 T PO HS  U PRN TO REST ATN  4    warfarin (COUMADIN) 3 MG tablet TAKE ONE BY MOUTH EVERY DAY OR AS DIRECTED BY COUMADIN CLINIC 90 tablet 11    [DISCONTINUED] tramadol (ULTRAM) 50 mg tablet TK ONE T  PO Q 6 H PRN  0    [DISCONTINUED] methylPREDNISolone (MEDROL DOSEPACK) 4 mg tablet use as directed 1 Package 0     No current facility-administered medications on file prior to visit.        Allergies   Review of patient's allergies indicates:   Allergen Reactions    Amantadine analogues      Caused confusion    Mirapex [pramipexole]      Pathologic gambling    Neupro [rotigotine]      Pathologic gambling         Review of Systems (Pertinent positives)  Review of Systems   Constitutional: Negative.    HENT: Negative.    Eyes: Negative.    Respiratory: Negative.    Cardiovascular: Negative.    Gastrointestinal: Negative.    Musculoskeletal: Positive for myalgias. Negative for back pain.   Skin: Negative.    Neurological: Positive for tremors.         BP (!) 114/58 (BP Location: Left arm, Patient Position: Sitting, BP Method: Manual)  Pulse 74  Temp 97.6 °F (36.4 °C) (Oral)   Resp 20  Ht 5' 7" (1.702 m)  Wt 72 kg (158 lb 11.7 oz)  SpO2 99%  BMI 24.86 kg/m2    GENERAL APPEARANCE: in no apparent " distress and well developed and well nourished  HEENT: PERRL, EOMI, Sclera clear, anicteric, Oropharynx clear, no lesions, Neck supple with midline trachea  NECK: normal, supple, no adenopathy, thyroid normal in size  RESPIRATORY: appears well, vitals normal, no respiratory distress, acyanotic, normal RR, chest clear, no wheezing, crepitations, rhonchi, normal symmetric air entry  HEART: regular rate and rhythm, S1, S2 normal, no murmur, click, rub or gallop.    ABDOMEN: abdomen is soft without tenderness, no masses, no hernias, no organomegaly, no rebound, no guarding. Suprapubic tenderness absent. No CVA tenderness.  MS:  No pain on palpation of left hip or lower back; no pain with extension or flexion of hip  SKIN: no rashes, no wounds, no other lesions  PSYCH: Alert, oriented x 3, thought content appropriate, speech normal, pleasant and cooperative, good eye contact, well groomed    Assessment/Plan:  Tenzin Ortiz is a 76 y.o. male who presents today for :    Tenzin was seen today for hip pain.    Diagnoses and all orders for this visit:    Left sided sciatica  -     Discontinue: dexamethasone injection 4 mg; Inject 1 mL (4 mg total) into the vein one time.  -     tramadol (ULTRAM) 50 mg tablet; Take 1 tablet (50 mg total) by mouth every 24 hours as needed for Pain.  -     X-Ray Lumbar Spine Ap And Lateral; Future  -     Ambulatory Referral to Pain Clinic  -     dexamethasone injection 8 mg; Inject 2 mLs (8 mg total) into the muscle one time.        1.  Patient reports steroid injection made pain go away for 30 days, so will do another  2.  Refer to pain management for eval  3.  Tramadol at night for pain control  4.  Patient told not to drive until he knows how medicaiton will effect him  5.  Follow up as needed    Efrain Chavez MD

## 2017-04-12 ENCOUNTER — OFFICE VISIT (OUTPATIENT)
Dept: FAMILY MEDICINE | Facility: CLINIC | Age: 77
End: 2017-04-12
Payer: MEDICARE

## 2017-04-12 ENCOUNTER — TELEPHONE (OUTPATIENT)
Dept: FAMILY MEDICINE | Facility: CLINIC | Age: 77
End: 2017-04-12

## 2017-04-12 VITALS
BODY MASS INDEX: 25.12 KG/M2 | TEMPERATURE: 97 F | HEART RATE: 67 BPM | DIASTOLIC BLOOD PRESSURE: 50 MMHG | OXYGEN SATURATION: 98 % | SYSTOLIC BLOOD PRESSURE: 100 MMHG | HEIGHT: 67 IN | WEIGHT: 160.06 LBS

## 2017-04-12 DIAGNOSIS — M54.32 LEFT SIDED SCIATICA: Primary | ICD-10-CM

## 2017-04-12 PROCEDURE — 99999 PR PBB SHADOW E&M-EST. PATIENT-LVL III: CPT | Mod: PBBFAC,,, | Performed by: FAMILY MEDICINE

## 2017-04-12 PROCEDURE — 1160F RVW MEDS BY RX/DR IN RCRD: CPT | Mod: S$GLB,,, | Performed by: FAMILY MEDICINE

## 2017-04-12 PROCEDURE — 1125F AMNT PAIN NOTED PAIN PRSNT: CPT | Mod: S$GLB,,, | Performed by: FAMILY MEDICINE

## 2017-04-12 PROCEDURE — 1159F MED LIST DOCD IN RCRD: CPT | Mod: S$GLB,,, | Performed by: FAMILY MEDICINE

## 2017-04-12 PROCEDURE — 99214 OFFICE O/P EST MOD 30 MIN: CPT | Mod: S$GLB,,, | Performed by: FAMILY MEDICINE

## 2017-04-12 PROCEDURE — 1157F ADVNC CARE PLAN IN RCRD: CPT | Mod: S$GLB,,, | Performed by: FAMILY MEDICINE

## 2017-04-12 RX ORDER — CYCLOBENZAPRINE HCL 10 MG
10 TABLET ORAL NIGHTLY
Qty: 15 TABLET | Refills: 0 | Status: SHIPPED | OUTPATIENT
Start: 2017-04-12 | End: 2017-04-17

## 2017-04-12 NOTE — PROGRESS NOTES
"Routine Office Visit    Patient Name: Tenzin Ortiz    : 1940  MRN: [id number]    Subjective:  Tenzin is a 76 y.o. male who presents today for:    1. Left leg pain  Patient is complaining of recurring left leg pain that did not resolve with steroid injection like it did before.  The pain is sharp and occurs in various areas of the left leg.  There has been no weakness in the legs.  He states that he is having spasms in various muscles and feels like the muscles are going to "explode".  He states that the pain is burning on occasion as well.      Past Medical History  Past Medical History:   Diagnosis Date    Anticoagulant long-term use     Hypertension     Impulse control disorder     gambling on mirapex; also possible dopamine dysregulation syndrome    PD (Parkinson's disease)     tremor-predominant    PVD (peripheral vascular disease) with claudication     poor circulation both legs       Past Surgical History  History reviewed. No pertinent surgical history.    Family History  Family History   Problem Relation Age of Onset    No Known Problems Mother     No Known Problems Father     Parkinsonism Neg Hx        Social History  Social History     Social History    Marital status: Single     Spouse name: N/A    Number of children: N/A    Years of education: N/A     Occupational History    Not on file.     Social History Main Topics    Smoking status: Former Smoker     Years: 10.00    Smokeless tobacco: Never Used      Comment: Quit 40 years ago    Alcohol use No    Drug use: No    Sexual activity: Not Currently     Other Topics Concern    Not on file     Social History Narrative       Current Medications  Current Outpatient Prescriptions on File Prior to Visit   Medication Sig Dispense Refill    amlodipine (NORVASC) 10 MG tablet TAKE 1 TABLET BY MOUTH EVERY DAY 90 tablet 11    atorvastatin (LIPITOR) 10 MG tablet Take 10 mg by mouth once daily.      carbidopa-levodopa (PARCOPA)  " "mg per disintegrating tablet DISSOLVE 2 TS PO QID PRN  2    carbidopa-levodopa  mg (SINEMET CR)  mg TbSR Take 1 tablet by mouth 5 (five) times daily.      clopidogrel (PLAVIX) 75 mg tablet TAKE 1 TABLET BY MOUTH ONCE DAILY 90 tablet 11    fluocinonide (LIDEX) 0.05 % ointment POWER AA BID  1    gabapentin (NEURONTIN) 300 MG capsule TAKE 1 CAPSULE BY MOUTH AT NIGHT FOR 1 WEEK, INCREASE 1 CAPSULE A WEEK TILL TAKING 1 CAPSULE THREE TIMES DAILY THEREAFTER 270 capsule 11    meloxicam (MOBIC) 7.5 MG tablet TK 1 T PO D  0    pramipexole (MIRAPEX) 1 MG tablet       tramadol (ULTRAM) 50 mg tablet Take 1 tablet (50 mg total) by mouth every 24 hours as needed for Pain. 10 tablet 0    trazodone (DESYREL) 50 MG tablet TK 1/2 T PO HS  U PRN TO REST ATN  4    warfarin (COUMADIN) 3 MG tablet TAKE ONE BY MOUTH EVERY DAY OR AS DIRECTED BY COUMADIN CLINIC 90 tablet 11     No current facility-administered medications on file prior to visit.        Allergies   Review of patient's allergies indicates:   Allergen Reactions    Amantadine analogues      Caused confusion    Mirapex [pramipexole]      Pathologic gambling    Neupro [rotigotine]      Pathologic gambling         Review of Systems (Pertinent positives)  Review of Systems   Constitutional: Negative.    HENT: Negative.    Eyes: Negative.    Respiratory: Negative.    Cardiovascular: Negative.    Gastrointestinal: Negative.    Genitourinary: Negative.    Musculoskeletal: Positive for back pain and myalgias.   Skin: Negative.    Neurological: Positive for tremors. Negative for focal weakness.         BP (!) 100/50 (BP Location: Left arm, Patient Position: Sitting, BP Method: Manual)  Pulse 67  Temp 97.3 °F (36.3 °C) (Oral)   Ht 5' 7" (1.702 m)  Wt 72.6 kg (160 lb 0.9 oz)  SpO2 98%  BMI 25.07 kg/m2    GENERAL APPEARANCE: in no apparent distress and well developed and well nourished  HEENT: PERRL, EOMI, Sclera clear, anicteric, Oropharynx clear, no lesions, " Neck supple with midline trachea  NECK: normal, supple, no adenopathy, thyroid normal in size  RESPIRATORY: appears well, vitals normal, no respiratory distress, acyanotic, normal RR, chest clear, no wheezing, crepitations, rhonchi, normal symmetric air entry  HEART: regular rate and rhythm, S1, S2 normal, no murmur, click, rub or gallop.    ABDOMEN: abdomen is soft without tenderness, no masses, no hernias, no organomegaly, no rebound, no guarding. Suprapubic tenderness absent. No CVA tenderness.  NEUROLOGIC: normal without focal findings, CN II-XII are intact.  Resting tremor present; +straight leg raise on left  Extremities: warm/well perfused.  No abnormal hair patterns.  No clubbing, cyanosis or edema.  no muscular tenderness noted, full range of motion without pain.  no joint tenderness, deformity or swelling noted.    SKIN: no rashes, no wounds, no other lesions  PSYCH: Alert, oriented x 3, thought content appropriate, speech normal, pleasant and cooperative, good eye contact, well groomed    Assessment/Plan:  Tenzin Ortiz is a 76 y.o. male who presents today for :    Tenzin was seen today for leg pain.    Diagnoses and all orders for this visit:    Left sided sciatica  -     cyclobenzaprine (FLEXERIL) 10 MG tablet; Take 1 tablet (10 mg total) by mouth every evening.  -     Ambulatory referral to Pain Clinic    1.  Flexeril at night for spasms  2.  No NSAIDS due to being on coumadin and hx of heart disease  3.  Refer to pain management for eval  4.  Due to Parkinsons, MRI is not an option  5.  Patient to follow up as needed    Efrain Chavez MD

## 2017-04-12 NOTE — TELEPHONE ENCOUNTER
----- Message from Efrain Chavez MD sent at 4/12/2017  7:35 AM CDT -----  Please let patient know that he does have some arthritis in the lower spine/pelvic area that could be causing the pain.  If no improvement soon, will likely need to see pain management to discuss alternatives.    Thanks,  Dr. Chavez

## 2017-04-12 NOTE — MR AVS SNAPSHOT
[facility]  [address]  [place] [address]  Phone: [phone]                  Tenzin Ortiz   2017 11:30 AM   Office Visit    Description:  Male : 1940   Provider:  Efrain Chavez MD   Department:  [facility]           Reason for Visit     Leg Pain           Diagnoses this Visit        Comments    Left sided sciatica    -  Primary            To Do List           Future Appointments        Provider Department Dept Phone    2017 11:30 AM Efrain Chavez MD [facility] [phone]    2017 10:15 AM Efrain Chavez MD [facility] [phone]    2017 1:30 PM Shayna RuckerD [place] - Coumadin [phone]      Goals (5 Years of Data)     None      Follow-Up and Disposition     Return if symptoms worsen or fail to improve.       These Medications        Disp Refills Start End    cyclobenzaprine (FLEXERIL) 10 MG tablet 15 tablet 0 2017    Take 1 tablet (10 mg total) by mouth every evening. - Oral    [facility] [address] [place] [address] [address] [facility] Ph #: [phone]         [facility] On Call     [facility] On Call Nurse Care Line - 24 Assistance  Unless otherwise directed by your provider, please contact [facility], our nurse care line that is available for  assistance.     Registered nurses in the [facility] On Call Center provide: appointment scheduling, clinical advisement, health education, and other advisory services.  Call: [phone] (toll free)               Medications           Message regarding Medications     Verify the changes and/or additions to your medication regime listed below are the same as discussed with your clinician today.  If any of these changes or additions are incorrect, please notify your healthcare provider.        START taking these NEW medications        Refills     "cyclobenzaprine (FLEXERIL) 10 MG tablet 0    Sig: Take 1 tablet (10 mg total) by mouth every evening.    Class: Normal    Route: Oral           Verify that the below list of medications is an accurate representation of the medications you are currently taking.  If none reported, the list may be blank. If incorrect, please contact your healthcare provider. Carry this list with you in case of emergency.           Current Medications     amlodipine (NORVASC) 10 MG tablet TAKE 1 TABLET BY MOUTH EVERY DAY    atorvastatin (LIPITOR) 10 MG tablet Take 10 mg by mouth once daily.    carbidopa-levodopa (PARCOPA)  mg per disintegrating tablet DISSOLVE 2 TS PO QID PRN    carbidopa-levodopa  mg (SINEMET CR)  mg TbSR Take 1 tablet by mouth 5 (five) times daily.    clopidogrel (PLAVIX) 75 mg tablet TAKE 1 TABLET BY MOUTH ONCE DAILY    fluocinonide (LIDEX) 0.05 % ointment POWER AA BID    gabapentin (NEURONTIN) 300 MG capsule TAKE 1 CAPSULE BY MOUTH AT NIGHT FOR 1 WEEK, INCREASE 1 CAPSULE A WEEK TILL TAKING 1 CAPSULE THREE TIMES DAILY THEREAFTER    meloxicam (MOBIC) 7.5 MG tablet TK 1 T PO D    pramipexole (MIRAPEX) 1 MG tablet     tramadol (ULTRAM) 50 mg tablet Take 1 tablet (50 mg total) by mouth every 24 hours as needed for Pain.    trazodone (DESYREL) 50 MG tablet TK 1/2 T PO HS  U PRN TO REST ATN    warfarin (COUMADIN) 3 MG tablet TAKE ONE BY MOUTH EVERY DAY OR AS DIRECTED BY COUMADIN CLINIC    cyclobenzaprine (FLEXERIL) 10 MG tablet Take 1 tablet (10 mg total) by mouth every evening.           Clinical Reference Information           Your Vitals Were     BP Pulse Temp Height Weight SpO2    100/50 (BP Location: Left arm, Patient Position: Sitting, BP Method: Manual) 67 97.3 °F (36.3 °C) (Oral) 5' 7" (1.702 m) 72.6 kg (160 lb 0.9 oz) 98%    BMI                25.07 kg/m2          Blood Pressure          Most Recent Value    BP  (!)  100/50      Allergies as of 4/12/2017     Amantadine Analogues    Mirapex " [Pramipexole]    Neupro [Rotigotine]      Immunizations Administered on Date of Encounter - 4/12/2017     None      Orders Placed During Today's Visit      Normal Orders This Visit    Ambulatory referral to Pain Clinic       [facility] Sign[facility]Up     Activating your [facility] account is as easy as 1-2-3!     1) Visit [email or web address], select Sign Up Now, enter this activation code and your date of birth, then select Next.  [email or web address]  Expires: 5/18/2017 11:18 AM      2) Create a username and password to use when you visit [facility] in the future and select a security question in case you lose your password and select Next.    3) Enter your e-mail address and click Sign Up!    Additional Information  If you have questions, please e-mail [email or web address] or call [phone] to talk to our [facility] staff. Remember, [facility] is NOT to be used for urgent needs. For medical emergencies, dial 911.         Language Assistance Services     ATTENTION: Language assistance services are available, free of charge. Please call [phone].      ATENCIÓN: Si habla español, tiene a martínez disposición servicios gratuitos de asistencia lingüística. Llame al [phone].     CHÚ Ý: N?u b?n nói Ti?ng Vi?t, có các d?ch v? h? tr? ngôn ng? mi?n phí dành cho b?n. G?i s? [phone].         [facility] complies with applicable Federal civil rights laws and does not discriminate on the basis of race, color, national origin, age, disability, or sex.

## 2017-04-13 NOTE — TELEPHONE ENCOUNTER
----- Message from Raffi Zamora sent at 4/13/2017 10:12 AM CDT -----  Contact: Self  Pt returned call. Pt can be reached @ [phone].

## 2017-04-13 NOTE — TELEPHONE ENCOUNTER
"Mr. Ortiz states " I am in more pain than yesterday, today is worse- I've taken the muscle relaxer, it just made me sleep and I woke up in worst pain."     Patient states pain is a 10 - would like to know what else does MD.     Please advise.   "

## 2017-04-13 NOTE — TELEPHONE ENCOUNTER
----- Message from Maddison Carrizales sent at 4/13/2017  8:14 AM CDT -----  Contact: self  Pt was told per Dr Philip to call office if leg pain worsen. Pt states leg hurts worst than yesterday. Please call [phone]. Thanks

## 2017-04-13 NOTE — TELEPHONE ENCOUNTER
Called pt to confirm appt. For Mon.pt.states he was told he could be seen today. I informed the pt. didn't have anything open and offered him the walk-in clinic on [address]. Pt was upset because he could not see  today, i explained to him again that he had nothing open and he could go to the urgent care if he was having pain, he refused urgent care after I had given him the address and time. He states he will keep his appt.for Monday.

## 2017-04-17 ENCOUNTER — OFFICE VISIT (OUTPATIENT)
Dept: FAMILY MEDICINE | Facility: CLINIC | Age: 77
End: 2017-04-17
Payer: MEDICARE

## 2017-04-17 VITALS
HEIGHT: 67 IN | SYSTOLIC BLOOD PRESSURE: 106 MMHG | WEIGHT: 164 LBS | OXYGEN SATURATION: 96 % | TEMPERATURE: 98 F | BODY MASS INDEX: 25.74 KG/M2 | RESPIRATION RATE: 20 BRPM | DIASTOLIC BLOOD PRESSURE: 54 MMHG | HEART RATE: 77 BPM

## 2017-04-17 DIAGNOSIS — G20.A1 PD (PARKINSON'S DISEASE): ICD-10-CM

## 2017-04-17 DIAGNOSIS — M54.32 SCIATICA, LEFT SIDE: Primary | ICD-10-CM

## 2017-04-17 PROCEDURE — 1126F AMNT PAIN NOTED NONE PRSNT: CPT | Mod: S$GLB,,, | Performed by: FAMILY MEDICINE

## 2017-04-17 PROCEDURE — 1159F MED LIST DOCD IN RCRD: CPT | Mod: S$GLB,,, | Performed by: FAMILY MEDICINE

## 2017-04-17 PROCEDURE — 99214 OFFICE O/P EST MOD 30 MIN: CPT | Mod: 25,S$GLB,, | Performed by: FAMILY MEDICINE

## 2017-04-17 PROCEDURE — 96372 THER/PROPH/DIAG INJ SC/IM: CPT | Mod: S$GLB,,, | Performed by: FAMILY MEDICINE

## 2017-04-17 PROCEDURE — 1160F RVW MEDS BY RX/DR IN RCRD: CPT | Mod: S$GLB,,, | Performed by: FAMILY MEDICINE

## 2017-04-17 PROCEDURE — 99999 PR PBB SHADOW E&M-EST. PATIENT-LVL III: CPT | Mod: PBBFAC,,, | Performed by: FAMILY MEDICINE

## 2017-04-17 RX ORDER — KETOROLAC TROMETHAMINE 30 MG/ML
30 INJECTION, SOLUTION INTRAMUSCULAR; INTRAVENOUS
Status: DISCONTINUED | OUTPATIENT
Start: 2017-04-17 | End: 2017-04-17

## 2017-04-17 RX ORDER — DEXTROMETHORPHAN HYDROBROMIDE, GUAIFENESIN 5; 100 MG/5ML; MG/5ML
650 LIQUID ORAL EVERY 8 HOURS
COMMUNITY
End: 2017-08-31 | Stop reason: ALTCHOICE

## 2017-04-17 RX ADMIN — KETOROLAC TROMETHAMINE 30 MG: 30 INJECTION, SOLUTION INTRAMUSCULAR; INTRAVENOUS at 11:04

## 2017-04-17 NOTE — PROGRESS NOTES
Pt tolerated injection of toradol 30mg to left ventrogluteal without difficulty; no adverse reaction noted

## 2017-04-17 NOTE — MR AVS SNAPSHOT
[facility]  [address]  [place] [address]  Phone: [phone]                  Tenzin Ortiz   2017 10:15 AM   Office Visit    Description:  Male : 1940   Provider:  Efrain Chavez MD   Department:  [facility]           Reason for Visit     Follow-up           Diagnoses this Visit        Comments    Sciatica, left side    -  Primary     PD (Parkinson's disease)                To Do List           Future Appointments        Provider Department Dept Phone    2017 8:40 AM Bhargav Allred MD [facility] [place] [facility] [phone]    2017 1:30 PM Eva Waldron, PharmD [place] - Coumadin [phone]      Goals (5 Years of Data)     None      Follow-Up and Disposition     Return if symptoms worsen or fail to improve.      [facility] On Call     [facility] On Call Nurse Care Line -  Assistance  Unless otherwise directed by your provider, please contact [facility], our nurse care line that is available for  assistance.     Registered nurses in the [facility] On Call Center provide: appointment scheduling, clinical advisement, health education, and other advisory services.  Call: [phone] (toll free)               Medications           Message regarding Medications     Verify the changes and/or additions to your medication regime listed below are the same as discussed with your clinician today.  If any of these changes or additions are incorrect, please notify your healthcare provider.        These medications were administered today        Dose Freq    ketorolac injection 30 mg 30 mg Clinic/HOD 1 time    Sig: Inject 30 mg into the muscle one time.    Class: Normal    Route: Intramuscular      STOP taking these medications     tramadol (ULTRAM) 50 mg tablet Take 1 tablet (50 mg total) by mouth every 24 hours as needed for Pain.    cyclobenzaprine (FLEXERIL) 10 MG tablet Take 1 tablet (10 mg total) by mouth every evening.    meloxicam (MOBIC)  "7.5 MG tablet TK 1 T PO D           Verify that the below list of medications is an accurate representation of the medications you are currently taking.  If none reported, the list may be blank. If incorrect, please contact your healthcare provider. Carry this list with you in case of emergency.           Current Medications     acetaminophen (TYLENOL ARTHRITIS PAIN) 650 MG TbSR Take 650 mg by mouth every 8 (eight) hours.    amlodipine (NORVASC) 10 MG tablet TAKE 1 TABLET BY MOUTH EVERY DAY    atorvastatin (LIPITOR) 10 MG tablet Take 10 mg by mouth once daily.    carbidopa-levodopa (PARCOPA)  mg per disintegrating tablet DISSOLVE 2 TS PO QID PRN    carbidopa-levodopa  mg (SINEMET CR)  mg TbSR Take 1 tablet by mouth 5 (five) times daily.    clopidogrel (PLAVIX) 75 mg tablet TAKE 1 TABLET BY MOUTH ONCE DAILY    fluocinonide (LIDEX) 0.05 % ointment POWER AA BID    pramipexole (MIRAPEX) 1 MG tablet     trazodone (DESYREL) 50 MG tablet TK 1/2 T PO HS  U PRN TO REST ATN    warfarin (COUMADIN) 3 MG tablet TAKE ONE BY MOUTH EVERY DAY OR AS DIRECTED BY COUMADIN CLINIC    gabapentin (NEURONTIN) 300 MG capsule TAKE 1 CAPSULE BY MOUTH AT NIGHT FOR 1 WEEK, INCREASE 1 CAPSULE A WEEK TILL TAKING 1 CAPSULE THREE TIMES DAILY THEREAFTER           Clinical Reference Information           Your Vitals Were     BP Pulse Temp Resp Height Weight    106/54 (BP Location: Right arm, Patient Position: Sitting, BP Method: Manual) 77 97.6 °F (36.4 °C) (Oral) 20 5' 7" (1.702 m) 74.4 kg (164 lb)    SpO2 BMI             96% 25.69 kg/m2         Blood Pressure          Most Recent Value    BP  (!)  106/54      Allergies as of 4/17/2017     Amantadine Analogues    Mirapex [Pramipexole]    Neupro [Rotigotine]      Immunizations Administered on Date of Encounter - 4/17/2017     None      [facility] Sign-Up     Activating your [facility] account is as easy as 1-2-3!     1) Visit [email or web address], select Sign Up Now, enter this activation code " and your date of birth, then select Next.  [email or web address]  Expires: 5/18/2017 11:18 AM      2) Create a username and password to use when you visit [facility] in the future and select a security question in case you lose your password and select Next.    3) Enter your e-mail address and click Sign Up!    Additional Information  If you have questions, please e-mail [email or web address] or call [phone] to talk to our [facility] staff. Remember, [facility] is NOT to be used for urgent needs. For medical emergencies, dial 911.         Instructions    1.  No meloxicam today  2.  Restart meloxicam tomorrow  3.  Keep appointment with neurology at 840 tomorrow       Language Assistance Services     ATTENTION: Language assistance services are available, free of charge. Please call [phone].      ATENCIÓN: Si habla español, tiene a martínez disposición servicios gratuitos de asistencia lingüística. Llame al [phone].     [facility] Ý: N?u b?n nói Ti?ng Vi?t, có các d?ch v? h? tr? ngôn ng? mi?n phí dành cho b?n. G?i s? [phone].         [facility] complies with applicable Federal civil rights laws and does not discriminate on the basis of race, color, national origin, age, disability, or sex.

## 2017-04-17 NOTE — PATIENT INSTRUCTIONS
1.  No meloxicam today  2.  Restart meloxicam tomorrow  3.  Keep appointment with neurology at 840 tomorrow

## 2017-04-18 ENCOUNTER — OFFICE VISIT (OUTPATIENT)
Dept: NEUROLOGY | Facility: CLINIC | Age: 77
End: 2017-04-18
Payer: MEDICARE

## 2017-04-18 VITALS
HEIGHT: 67 IN | DIASTOLIC BLOOD PRESSURE: 60 MMHG | SYSTOLIC BLOOD PRESSURE: 140 MMHG | HEART RATE: 92 BPM | WEIGHT: 160.69 LBS | BODY MASS INDEX: 25.22 KG/M2

## 2017-04-18 DIAGNOSIS — M54.17 LUMBOSACRAL RADICULOPATHY: Primary | ICD-10-CM

## 2017-04-18 PROCEDURE — 99999 PR PBB SHADOW E&M-EST. PATIENT-LVL III: CPT | Mod: PBBFAC,,, | Performed by: NEUROLOGICAL SURGERY

## 2017-04-18 PROCEDURE — 99215 OFFICE O/P EST HI 40 MIN: CPT | Mod: S$GLB,,, | Performed by: NEUROLOGICAL SURGERY

## 2017-04-18 PROCEDURE — 1159F MED LIST DOCD IN RCRD: CPT | Mod: S$GLB,,, | Performed by: NEUROLOGICAL SURGERY

## 2017-04-18 PROCEDURE — 1160F RVW MEDS BY RX/DR IN RCRD: CPT | Mod: S$GLB,,, | Performed by: NEUROLOGICAL SURGERY

## 2017-04-18 PROCEDURE — 1126F AMNT PAIN NOTED NONE PRSNT: CPT | Mod: S$GLB,,, | Performed by: NEUROLOGICAL SURGERY

## 2017-04-18 RX ORDER — CYCLOBENZAPRINE HCL 10 MG
10 TABLET ORAL 3 TIMES DAILY PRN
Status: ON HOLD | COMMUNITY
End: 2017-08-02

## 2017-04-18 RX ORDER — GABAPENTIN 300 MG/1
600 CAPSULE ORAL NIGHTLY
Qty: 60 CAPSULE | Refills: 11 | Status: ON HOLD | OUTPATIENT
Start: 2017-04-18 | End: 2017-08-02

## 2017-04-18 RX ORDER — TRAMADOL HYDROCHLORIDE 50 MG/1
50 TABLET ORAL EVERY 6 HOURS PRN
Status: ON HOLD | COMMUNITY
End: 2017-08-03 | Stop reason: HOSPADM

## 2017-04-18 NOTE — PROGRESS NOTES
Chief Complaint   Patient presents with    Neurologic Problem     Parkinsonlaina Ortiz is a 76 y.o. male with a history of multiple medical diagnoses as listed below that presents for evaluation of pain in the feet. He has a history of PAD that has been evaluated and treated by Dr. Mackenzie. He has had stents placed in his legs to help with his blood flow. Since the stents were played he says that he has not longer been experiencing the cramping that is in the lower legs and has been able to do better with his walking. He says that he has continued to have pins and needles sensations along the bottoms of his feet that begin immediately when he stands ups and places pressure on his feet in the morning. He says that physical activity has made his symptoms much more prominent and after her tries to get some rest the sensations tend to subside. When he is walking the pains extend from the soles of the feet to the hips on both sides however when he rests it is isolated to his feet. He has not found that anything has made the symptoms better that he has tried. He has never tried gabapentin. He has been stable with his PD saying that he take carbidopa about eight times per day as he feels that he needs it. He feels that he has been stable with regards to his PD.    Interval History  04/18/2017  Since he was last seen in clinic his biggest complaint has been his pain level in his leg. He says that he has been having a sharp and shooting pain form the level of the back, down through his buttock, and into the leg and foot on the left side. He has been taking muscle relaxants and pain medications but has not found that either have been very helpful in reducing his level of pain. He has not had any difficulty with his arms. He has not had the same degree of pain in the other leg. He has been taking all his medications as directed. There is nothing that he can think of that would lead to him having this pain. His PD is  stable with the exception of some visual hallucinations from Mirapex.    PAST MEDICAL HISTORY:  Past Medical History:   Diagnosis Date    Anticoagulant long-term use     Hypertension     Impulse control disorder 2012    gambling on mirapex; also possible dopamine dysregulation syndrome    PD (Parkinson's disease) 1999    tremor-predominant    PVD (peripheral vascular disease) with claudication     poor circulation both legs       PAST SURGICAL HISTORY:  History reviewed. No pertinent surgical history.    SOCIAL HISTORY:  Social History     Social History    Marital status: Single     Spouse name: N/A    Number of children: N/A    Years of education: N/A     Occupational History    Not on file.     Social History Main Topics    Smoking status: Former Smoker     Years: 10.00    Smokeless tobacco: Never Used      Comment: Quit 40 years ago    Alcohol use No    Drug use: No    Sexual activity: Not Currently     Other Topics Concern    Not on file     Social History Narrative       FAMILY HISTORY:  Family History   Problem Relation Age of Onset    No Known Problems Mother     No Known Problems Father     Parkinsonism Neg Hx        ALLERGIES AND MEDICATIONS: updated and reviewed.  Review of patient's allergies indicates:   Allergen Reactions    Amantadine analogues      Caused confusion    Mirapex [pramipexole]      Pathologic gambling    Neupro [rotigotine]      Pathologic gambling       Current Outpatient Prescriptions   Medication Sig Dispense Refill    cyclobenzaprine (FLEXERIL) 10 MG tablet Take 10 mg by mouth 3 (three) times daily as needed for Muscle spasms.      tramadol (ULTRAM) 50 mg tablet Take 50 mg by mouth every 6 (six) hours as needed for Pain.      acetaminophen (TYLENOL ARTHRITIS PAIN) 650 MG TbSR Take 650 mg by mouth every 8 (eight) hours.      amlodipine (NORVASC) 10 MG tablet TAKE 1 TABLET BY MOUTH EVERY DAY 90 tablet 11    atorvastatin (LIPITOR) 10 MG tablet Take 10 mg by  mouth once daily.      carbidopa-levodopa (PARCOPA)  mg per disintegrating tablet DISSOLVE 2 TS PO QID PRN  2    carbidopa-levodopa  mg (SINEMET CR)  mg TbSR Take 1 tablet by mouth 5 (five) times daily.      clopidogrel (PLAVIX) 75 mg tablet TAKE 1 TABLET BY MOUTH ONCE DAILY 90 tablet 11    fluocinonide (LIDEX) 0.05 % ointment POWER AA BID  1    gabapentin (NEURONTIN) 300 MG capsule Take 2 capsules (600 mg total) by mouth every evening. 60 capsule 11    pramipexole (MIRAPEX) 1 MG tablet       trazodone (DESYREL) 50 MG tablet TK 1/2 T PO HS  U PRN TO REST ATN  4    warfarin (COUMADIN) 3 MG tablet TAKE ONE BY MOUTH EVERY DAY OR AS DIRECTED BY COUMADIN CLINIC 90 tablet 11     No current facility-administered medications for this visit.        Review of Systems   Constitutional: Negative for activity change, fatigue and unexpected weight change.   HENT: Negative for trouble swallowing and voice change.    Eyes: Negative for photophobia, pain and visual disturbance.   Respiratory: Negative for apnea and shortness of breath.    Cardiovascular: Negative for chest pain and palpitations.   Gastrointestinal: Negative for constipation, nausea and vomiting.   Genitourinary: Negative for difficulty urinating.   Musculoskeletal: Positive for gait problem and myalgias. Negative for arthralgias, back pain and neck pain.   Skin: Negative for color change and rash.   Neurological: Positive for tremors and numbness. Negative for dizziness, seizures, syncope, speech difficulty, weakness, light-headedness and headaches.   Psychiatric/Behavioral: Negative for agitation, behavioral problems and confusion.       Neurologic Exam     Mental Status   Oriented to person, place, and time.   Registration: recalls 3 of 3 objects.   Attention: normal. Concentration: normal.   Speech: speech is normal   Level of consciousness: alert  Knowledge: good.     Cranial Nerves     CN II   Visual fields full to confrontation.   Right  visual field deficit: none  Left visual field deficit: none     CN III, IV, VI   Pupils are equal, round, and reactive to light.  Extraocular motions are normal.   Right pupil: Size: 3 mm. Shape: regular. Accommodation: intact.   Left pupil: Size: 3 mm. Shape: regular. Accommodation: intact.   CN III: no CN III palsy  CN VI: no CN VI palsy  Nystagmus: none   Diplopia: none  Ophthalmoparesis: none  Upgaze: normal  Downgaze: normal  Conjugate gaze: present    CN V   Facial sensation intact.   Right facial sensation deficit: none  Left facial sensation deficit: none    CN VII   Facial expression full, symmetric.   Right facial weakness: none  Left facial weakness: none    CN VIII   CN VIII normal.     CN IX, X   CN IX normal.   CN X normal.   Palate: symmetric    CN XI   CN XI normal.   Right sternocleidomastoid strength: normal  Left sternocleidomastoid strength: normal  Right trapezius strength: normal  Left trapezius strength: normal    CN XII   CN XII normal.   Tongue deviation: none    Motor Exam   Muscle bulk: normal  Overall muscle tone: normal  Right arm tone: normal  Left arm tone: normal  Right leg tone: normal  Left leg tone: normal    Strength   Strength 5/5 throughout.     Sensory Exam   Right arm light touch: normal  Left arm light touch: normal  Right leg light touch: normal  Left leg light touch: normal  Right arm vibration: normal  Left arm vibration: normal  Right leg vibration: normal  Left leg vibration: normal  Right arm proprioception: normal  Left arm proprioception: normal  Right leg proprioception: normal  Left leg proprioception: normal  Right arm pinprick: normal  Left arm pinprick: normal  Right leg pinprick: normal  Left leg pinprick: normal    Gait, Coordination, and Reflexes     Gait  Gait: normal    Coordination   Romberg: negative  Finger to nose coordination: normal  Heel to shin coordination: normal  Tandem walking coordination: normal    Tremor   Resting tremor: absent    Reflexes  "  Right brachioradialis: 2+  Left brachioradialis: 2+  Right biceps: 2+  Left biceps: 2+  Right triceps: 2+  Left triceps: 2+  Right patellar: 2+  Left patellar: 2+  Right achilles: 2+  Left achilles: 2+  Right plantar: normal  Left plantar: normal      Physical Exam   Constitutional: He is oriented to person, place, and time.   Eyes: EOM are normal. Pupils are equal, round, and reactive to light.   Neurological: He is oriented to person, place, and time. He has normal strength. He has a normal Finger-Nose-Finger Test, a normal Heel to Shin Test, a normal Romberg Test and a normal Tandem Gait Test. Gait normal.   Reflex Scores:       Tricep reflexes are 2+ on the right side and 2+ on the left side.       Bicep reflexes are 2+ on the right side and 2+ on the left side.       Brachioradialis reflexes are 2+ on the right side and 2+ on the left side.       Patellar reflexes are 2+ on the right side and 2+ on the left side.       Achilles reflexes are 2+ on the right side and 2+ on the left side.  Psychiatric: His speech is normal.       Vitals:    04/18/17 0856   BP: (!) 140/60   BP Location: Left arm   Patient Position: Sitting   BP Method: Manual   Pulse: 92   Weight: 72.9 kg (160 lb 11.5 oz)   Height: 5' 7" (1.702 m)       Assessment & Plan:  Lumbosacral radiculopathy  -     Ambulatory referral to Pain Clinic  -     CT Lumbar Spine Without Contrast; Future; Expected date: 4/18/17  -     gabapentin (NEURONTIN) 300 MG capsule; Take 2 capsules (600 mg total) by mouth every evening.  Dispense: 60 capsule; Refill: 11        Health Maintenance reviewed.    Follow-up: Return in about 3 months (around 7/18/2017).  More than 50% of this 45 minute encounter was spent in counseling and coordinating care.      "

## 2017-04-18 NOTE — LETTER
April 18, 2017      Efrain Chavez MD  [address] [facility] [address]           [place]- Neurology [address] [facility] [address]  [facility] [address]  Phone: [phone]  Fax: [phone]          Patient: Tenzin Ortiz   MR Number: [id number]   YOB: 1940   Date of Visit: 4/18/2017       Dear Dr. Efrain URIARTE Page:    Thank you for referring Tenzin Ortiz to me for evaluation. Attached you will find relevant portions of my assessment and plan of care.    If you have questions, please do not hesitate to call me. I look forward to following Tenzin Ortiz along with you.    Sincerely,    Bhargav Allred MD    Enclosure  CC:  No Recipients    If you would like to receive this communication electronically, please contact [email or web address] or [phone] to request more information on GET [employer] Mobile Link access.    For providers and/or their staff who would like to refer a patient to [facility], please contact us through our one-stop-shop provider referral line, [facility], at [phone].    If you feel you have received this communication in error or would no longer like to receive these types of communications, please e-mail [email or web address]

## 2017-04-18 NOTE — MR AVS SNAPSHOT
[facility]  [address] [facility] [address]  [place] [address]  Phone: [phone]  Fax: [phone]                  Tenzin Ortiz   2017 8:40 AM   Office Visit    Description:  Male : 1940   Provider:  Bhargav Allred MD   Department:  [facility] [place] Neurology           Reason for Visit     Neurologic Problem           Diagnoses this Visit        Comments    Lumbosacral radiculopathy    -  Primary            To Do List           Future Appointments        Provider Department Dept Phone    2017 1:30 PM Eva Waldron, PharmD [place] - Coumadin [phone]      Goals (5 Years of Data)     None       These Medications        Disp Refills Start End    gabapentin (NEURONTIN) 300 MG capsule 60 capsule 11 2017    Take 2 capsules (600 mg total) by mouth every evening. - Oral    Pharmacy: [facility] [id number] [place], [place] [address] AT [facility] Ph #: [phone]         [facility]     [facility] Nurse Care Line -  Assistance  Unless otherwise directed by your provider, please contact [facility], our nurse care line that is available for  assistance.     Registered nurses in the [facility] Center provide: appointment scheduling, clinical advisement, health education, and other advisory services.  Call: [phone] (toll free)               Medications           Message regarding Medications     Verify the changes and/or additions to your medication regime listed below are the same as discussed with your clinician today.  If any of these changes or additions are incorrect, please notify your healthcare provider.        START taking these NEW medications        Refills    gabapentin (NEURONTIN) 300 MG capsule 11    Sig: Take 2 capsules (600 mg total) by mouth every evening.    Class: Normal    Route: Oral      STOP taking these medications     ketorolac injection 30 mg            Verify that the below list  "of medications is an accurate representation of the medications you are currently taking.  If none reported, the list may be blank. If incorrect, please contact your healthcare provider. Carry this list with you in case of emergency.           Current Medications     cyclobenzaprine (FLEXERIL) 10 MG tablet Take 10 mg by mouth 3 (three) times daily as needed for Muscle spasms.    tramadol (ULTRAM) 50 mg tablet Take 50 mg by mouth every 6 (six) hours as needed for Pain.    acetaminophen (TYLENOL ARTHRITIS PAIN) 650 MG TbSR Take 650 mg by mouth every 8 (eight) hours.    amlodipine (NORVASC) 10 MG tablet TAKE 1 TABLET BY MOUTH EVERY DAY    atorvastatin (LIPITOR) 10 MG tablet Take 10 mg by mouth once daily.    carbidopa-levodopa (PARCOPA)  mg per disintegrating tablet DISSOLVE 2 TS PO QID PRN    carbidopa-levodopa  mg (SINEMET CR)  mg TbSR Take 1 tablet by mouth 5 (five) times daily.    clopidogrel (PLAVIX) 75 mg tablet TAKE 1 TABLET BY MOUTH ONCE DAILY    fluocinonide (LIDEX) 0.05 % ointment POWER AA BID    gabapentin (NEURONTIN) 300 MG capsule Take 2 capsules (600 mg total) by mouth every evening.    pramipexole (MIRAPEX) 1 MG tablet     trazodone (DESYREL) 50 MG tablet TK 1/2 T PO HS  U PRN TO REST ATN    warfarin (COUMADIN) 3 MG tablet TAKE ONE BY MOUTH EVERY DAY OR AS DIRECTED BY COUMADIN CLINIC           Clinical Reference Information           Your Vitals Were     BP Pulse Height Weight BMI    140/60 (BP Location: Left arm, Patient Position: Sitting, BP Method: Manual) 92 5' 7" (1.702 m) 72.9 kg (160 lb 11.5 oz) 25.17 kg/m2      Blood Pressure          Most Recent Value    BP  (!)  140/60      Allergies as of 4/18/2017     Amantadine Analogues    Mirapex [Pramipexole]    Neupro [Rotigotine]      Immunizations Administered on Date of Encounter - 4/18/2017     None      Orders Placed During Today's Visit      Normal Orders This Visit    Ambulatory referral to Pain Clinic     Future Labs/Procedures " Expected by Expires    CT Lumbar Spine Without Contrast  4/18/2017 4/19/2018      [facility] Sign-Up     Activating your [facility] account is as easy as 1-2-3!     1) Visit [email or web address], select Sign Up Now, enter this activation code and your date of birth, then select Next.  [email or web address]  Expires: 5/18/2017 11:18 AM      2) Create a username and password to use when you visit [facility] in the future and select a security question in case you lose your password and select Next.    3) Enter your e-mail address and click Sign Up!    Additional Information  If you have questions, please e-mail [email or web address] or call [phone] to talk to our [facility] staff. Remember, [facility] is NOT to be used for urgent needs. For medical emergencies, dial 911.         Language Assistance Services     ATTENTION: Language assistance services are available, free of charge. Please call [phone].      ATENCIÓN: Si habla español, tiene a martíenz disposición servicios gratuitos de asistencia lingüística. Llame al [phone].     CHÚ Ý: N?u b?n nói Ti?ng Vi?t, có các d?ch v? h? tr? ngôn ng? mi?n phí dành cho b?n. G?i s? [phone].         [facility] complies with applicable Federal civil rights laws and does not discriminate on the basis of race, color, national origin, age, disability, or sex.

## 2017-04-20 ENCOUNTER — HOSPITAL ENCOUNTER (OUTPATIENT)
Dept: RADIOLOGY | Facility: HOSPITAL | Age: 77
Discharge: HOME | End: 2017-04-20
Attending: NEUROLOGICAL SURGERY
Payer: MEDICARE

## 2017-04-20 DIAGNOSIS — M54.17 LUMBOSACRAL RADICULOPATHY: ICD-10-CM

## 2017-04-20 PROCEDURE — 72131 CT LUMBAR SPINE W/O DYE: CPT | Mod: TC

## 2017-04-20 PROCEDURE — 72131 CT LUMBAR SPINE W/O DYE: CPT | Mod: 26,,, | Performed by: RADIOLOGY

## 2017-04-20 NOTE — PROGRESS NOTES
Routine Office Visit    Patient Name: Tenzin Ortiz    : 1940  MRN: [id number]    Subjective:  Tenzin is a 76 y.o. male who presents today for:    1. Left leg/hip pain  Patient presenting today with continued left leg/hip pain that is not improved with the use of tramadol or flexeril.  He states that he is not sleeping at night due to the pain.  He has been taking his PD medication, but fort he past 2 days his dyskinensia has been increased and his friend has been reporting him to have hallucinations where he thinks people are there that are not.  Patient admits that this is true. Patient also states that he sees fire hydrants changing colors when no one else does.  He states that he was suppose to see his neurologist last week, but the appointment was canceled by the provider for an unknown reason.  Patient states that his main concern is the pain in his leg.      Past Medical History  Past Medical History:   Diagnosis Date    Anticoagulant long-term use     Hypertension     Impulse control disorder     gambling on mirapex; also possible dopamine dysregulation syndrome    PD (Parkinson's disease)     tremor-predominant    PVD (peripheral vascular disease) with claudication     poor circulation both legs       Past Surgical History  History reviewed. No pertinent surgical history.    Family History  Family History   Problem Relation Age of Onset    No Known Problems Mother     No Known Problems Father     Parkinsonism Neg Hx        Social History  Social History     Social History    Marital status: Single     Spouse name: N/A    Number of children: N/A    Years of education: N/A     Occupational History    Not on file.     Social History Main Topics    Smoking status: Former Smoker     Years: 10.00    Smokeless tobacco: Never Used      Comment: Quit 40 years ago    Alcohol use No    Drug use: No    Sexual activity: Not Currently     Other Topics Concern    Not on file     Social History  "Narrative       Current Medications  Current Outpatient Prescriptions on File Prior to Visit   Medication Sig Dispense Refill    amlodipine (NORVASC) 10 MG tablet TAKE 1 TABLET BY MOUTH EVERY DAY 90 tablet 11    atorvastatin (LIPITOR) 10 MG tablet Take 10 mg by mouth once daily.      carbidopa-levodopa (PARCOPA)  mg per disintegrating tablet DISSOLVE 2 TS PO QID PRN  2    carbidopa-levodopa  mg (SINEMET CR)  mg TbSR Take 1 tablet by mouth 5 (five) times daily.      clopidogrel (PLAVIX) 75 mg tablet TAKE 1 TABLET BY MOUTH ONCE DAILY 90 tablet 11    fluocinonide (LIDEX) 0.05 % ointment POWER AA BID  1    pramipexole (MIRAPEX) 1 MG tablet       trazodone (DESYREL) 50 MG tablet TK 1/2 T PO HS  U PRN TO REST ATN  4    warfarin (COUMADIN) 3 MG tablet TAKE ONE BY MOUTH EVERY DAY OR AS DIRECTED BY COUMADIN CLINIC 90 tablet 11     No current facility-administered medications on file prior to visit.        Allergies   Review of patient's allergies indicates:   Allergen Reactions    Amantadine analogues      Caused confusion    Mirapex [pramipexole]      Pathologic gambling    Neupro [rotigotine]      Pathologic gambling         Review of Systems (Pertinent positives)  Review of Systems   Constitutional: Negative.    HENT: Negative.    Eyes: Negative.    Respiratory: Negative.    Cardiovascular: Negative.    Gastrointestinal: Negative.    Genitourinary: Negative.    Musculoskeletal: Positive for joint pain and myalgias.   Skin: Negative.    Neurological: Positive for tremors.   Psychiatric/Behavioral: Positive for hallucinations.         BP (!) 106/54 (BP Location: Right arm, Patient Position: Sitting, BP Method: Manual)  Pulse 77  Temp 97.6 °F (36.4 °C) (Oral)   Resp 20  Ht 5' 7" (1.702 m)  Wt 74.4 kg (164 lb)  SpO2 96%  BMI 25.69 kg/m2    GENERAL APPEARANCE: in no apparent distress and well developed and well nourished  HEENT: PERRL, EOMI, Sclera clear, anicteric, Oropharynx clear, no " lesions, Neck supple with midline trachea  NECK: normal, supple, no adenopathy, thyroid normal in size  RESPIRATORY: appears well, vitals normal, no respiratory distress, acyanotic, normal RR, chest clear, no wheezing, crepitations, rhonchi, normal symmetric air entry  HEART: regular rate and rhythm, S1, S2 normal, no murmur, click, rub or gallop.    ABDOMEN: abdomen is soft without tenderness, no masses, no hernias, no organomegaly, no rebound, no guarding. Suprapubic tenderness absent. No CVA tenderness.  NEUROLOGIC: normal without focal findings, CN II-XII are intact.  +dyskinesia that is worse today than at previous visits.  Extremities: warm/well perfused.  No abnormal hair patterns.  No pain on palpation of LLE today.  He states that the pain has improved as of now, but is intermittent and severe at its worse  SKIN: no rashes, no wounds, no other lesions  PSYCH: Alert, oriented x 3, thought content appropriate, speech normal, pleasant and cooperative, good eye contact, well groomed,     Assessment/Plan:  Tenzin Ortiz is a 76 y.o. male who presents today for :    Tenzin was seen today for follow-up.    Diagnoses and all orders for this visit:    Sciatica, left side  -     Discontinue: ketorolac injection 30 mg; Inject 30 mg into the muscle one time.    PD (Parkinson's disease)      1.  toradol injection for pain  2.  He is to see neurology tomorrow morning for evaluation of PD  3.  Patients friend is staying with him and was told to call 911 or take him to the ED should symptoms worsen  4.  Patient to hold this afternoons dose of PD medication as hallucinations could be caused by too much dopamine  5.  Patient not to drive until sympoms have improved  6.  Follow up as needed    Efrain Chavez MD

## 2017-04-25 ENCOUNTER — OFFICE VISIT (OUTPATIENT)
Dept: NEUROLOGY | Facility: CLINIC | Age: 77
End: 2017-04-25
Payer: MEDICARE

## 2017-04-25 VITALS
HEART RATE: 71 BPM | BODY MASS INDEX: 25.96 KG/M2 | SYSTOLIC BLOOD PRESSURE: 121 MMHG | WEIGHT: 165.38 LBS | HEIGHT: 67 IN | DIASTOLIC BLOOD PRESSURE: 58 MMHG

## 2017-04-25 DIAGNOSIS — M51.26 HERNIATED LUMBAR DISC WITHOUT MYELOPATHY: ICD-10-CM

## 2017-04-25 DIAGNOSIS — G20.A1 PD (PARKINSON'S DISEASE): Primary | ICD-10-CM

## 2017-04-25 PROCEDURE — 1160F RVW MEDS BY RX/DR IN RCRD: CPT | Mod: S$GLB,,, | Performed by: NEUROLOGICAL SURGERY

## 2017-04-25 PROCEDURE — 99999 PR PBB SHADOW E&M-EST. PATIENT-LVL III: CPT | Mod: PBBFAC,,, | Performed by: NEUROLOGICAL SURGERY

## 2017-04-25 PROCEDURE — 1126F AMNT PAIN NOTED NONE PRSNT: CPT | Mod: S$GLB,,, | Performed by: NEUROLOGICAL SURGERY

## 2017-04-25 PROCEDURE — 1159F MED LIST DOCD IN RCRD: CPT | Mod: S$GLB,,, | Performed by: NEUROLOGICAL SURGERY

## 2017-04-25 PROCEDURE — 99214 OFFICE O/P EST MOD 30 MIN: CPT | Mod: S$GLB,,, | Performed by: NEUROLOGICAL SURGERY

## 2017-04-25 NOTE — PROGRESS NOTES
Chief Complaint   Patient presents with    CT results        Tenzin Ortiz is a 76 y.o. male with a history of multiple medical diagnoses as listed below that presents for evaluation of pain in the feet. He has a history of PAD that has been evaluated and treated by Dr. Mackenzie. He has had stents placed in his legs to help with his blood flow. Since the stents were played he says that he has not longer been experiencing the cramping that is in the lower legs and has been able to do better with his walking. He says that he has continued to have pins and needles sensations along the bottoms of his feet that begin immediately when he stands ups and places pressure on his feet in the morning. He says that physical activity has made his symptoms much more prominent and after her tries to get some rest the sensations tend to subside. When he is walking the pains extend from the soles of the feet to the hips on both sides however when he rests it is isolated to his feet. He has not found that anything has made the symptoms better that he has tried. He has never tried gabapentin. He has been stable with his PD saying that he take carbidopa about eight times per day as he feels that he needs it. He feels that he has been stable with regards to his PD.    Interval History  04/18/2017  Since he was last seen in clinic his biggest complaint has been his pain level in his leg. He says that he has been having a sharp and shooting pain form the level of the back, down through his buttock, and into the leg and foot on the left side. He has been taking muscle relaxants and pain medications but has not found that either have been very helpful in reducing his level of pain. He has not had any difficulty with his arms. He has not had the same degree of pain in the other leg. He has been taking all his medications as directed. There is nothing that he can think of that would lead to him having this pain. His PD is stable with the  exception of some visual hallucinations from Mirapex.    04/25/2017  His pain in the back and into the leg has been about the same since he was last seen in clinic. He says that he has been having good and bad days with regards to the pain and he has been taking tylenol as needed to help him to control the pain. He feels that tylenol has been effective in treating the pain.    PAST MEDICAL HISTORY:  Past Medical History:   Diagnosis Date    Anticoagulant long-term use     Hypertension     Impulse control disorder 2012    gambling on mirapex; also possible dopamine dysregulation syndrome    PD (Parkinson's disease) 1999    tremor-predominant    PVD (peripheral vascular disease) with claudication     poor circulation both legs       PAST SURGICAL HISTORY:  History reviewed. No pertinent surgical history.    SOCIAL HISTORY:  Social History     Social History    Marital status: Single     Spouse name: N/A    Number of children: N/A    Years of education: N/A     Occupational History    Not on file.     Social History Main Topics    Smoking status: Former Smoker     Years: 10.00    Smokeless tobacco: Never Used      Comment: Quit 40 years ago    Alcohol use No    Drug use: No    Sexual activity: Not Currently     Other Topics Concern    Not on file     Social History Narrative       FAMILY HISTORY:  Family History   Problem Relation Age of Onset    No Known Problems Mother     No Known Problems Father     Parkinsonism Neg Hx        ALLERGIES AND MEDICATIONS: updated and reviewed.  Review of patient's allergies indicates:   Allergen Reactions    Amantadine analogues      Caused confusion    Mirapex [pramipexole]      Pathologic gambling    Neupro [rotigotine]      Pathologic gambling       Current Outpatient Prescriptions   Medication Sig Dispense Refill    acetaminophen (TYLENOL ARTHRITIS PAIN) 650 MG TbSR Take 650 mg by mouth every 8 (eight) hours.      amlodipine (NORVASC) 10 MG tablet TAKE 1  TABLET BY MOUTH EVERY DAY 90 tablet 11    atorvastatin (LIPITOR) 10 MG tablet Take 10 mg by mouth once daily.      carbidopa-levodopa (PARCOPA)  mg per disintegrating tablet DISSOLVE 2 TS PO QID PRN  2    carbidopa-levodopa  mg (SINEMET CR)  mg TbSR Take 1 tablet by mouth 5 (five) times daily.      clopidogrel (PLAVIX) 75 mg tablet TAKE 1 TABLET BY MOUTH ONCE DAILY 90 tablet 11    cyclobenzaprine (FLEXERIL) 10 MG tablet Take 10 mg by mouth 3 (three) times daily as needed for Muscle spasms.      fluocinonide (LIDEX) 0.05 % ointment POWER AA BID  1    gabapentin (NEURONTIN) 300 MG capsule Take 2 capsules (600 mg total) by mouth every evening. 60 capsule 11    pramipexole (MIRAPEX) 1 MG tablet       tramadol (ULTRAM) 50 mg tablet Take 50 mg by mouth every 6 (six) hours as needed for Pain.      trazodone (DESYREL) 50 MG tablet TK 1/2 T PO HS  U PRN TO REST ATN  4    warfarin (COUMADIN) 3 MG tablet TAKE ONE BY MOUTH EVERY DAY OR AS DIRECTED BY COUMADIN CLINIC 90 tablet 11     No current facility-administered medications for this visit.        Review of Systems   Constitutional: Negative for activity change, fatigue and unexpected weight change.   HENT: Negative for trouble swallowing and voice change.    Eyes: Negative for photophobia, pain and visual disturbance.   Respiratory: Negative for apnea and shortness of breath.    Cardiovascular: Negative for chest pain and palpitations.   Gastrointestinal: Negative for constipation, nausea and vomiting.   Genitourinary: Negative for difficulty urinating.   Musculoskeletal: Positive for gait problem and myalgias. Negative for arthralgias, back pain and neck pain.   Skin: Negative for color change and rash.   Neurological: Positive for tremors and numbness. Negative for dizziness, seizures, syncope, speech difficulty, weakness, light-headedness and headaches.   Psychiatric/Behavioral: Negative for agitation, behavioral problems and confusion.        Neurologic Exam     Mental Status   Oriented to person, place, and time.   Registration: recalls 3 of 3 objects.   Attention: normal. Concentration: normal.   Speech: speech is normal   Level of consciousness: alert  Knowledge: good.     Cranial Nerves     CN II   Visual fields full to confrontation.   Right visual field deficit: none  Left visual field deficit: none     CN III, IV, VI   Pupils are equal, round, and reactive to light.  Extraocular motions are normal.   Right pupil: Size: 3 mm. Shape: regular. Accommodation: intact.   Left pupil: Size: 3 mm. Shape: regular. Accommodation: intact.   CN III: no CN III palsy  CN VI: no CN VI palsy  Nystagmus: none   Diplopia: none  Ophthalmoparesis: none  Upgaze: normal  Downgaze: normal  Conjugate gaze: present    CN V   Facial sensation intact.   Right facial sensation deficit: none  Left facial sensation deficit: none    CN VII   Facial expression full, symmetric.   Right facial weakness: none  Left facial weakness: none    CN VIII   CN VIII normal.     CN IX, X   CN IX normal.   CN X normal.   Palate: symmetric    CN XI   CN XI normal.   Right sternocleidomastoid strength: normal  Left sternocleidomastoid strength: normal  Right trapezius strength: normal  Left trapezius strength: normal    CN XII   CN XII normal.   Tongue deviation: none    Motor Exam   Muscle bulk: normal  Overall muscle tone: normal  Right arm tone: normal  Left arm tone: normal  Right leg tone: normal  Left leg tone: normal    Strength   Strength 5/5 throughout.     Sensory Exam   Right arm light touch: normal  Left arm light touch: normal  Right leg light touch: normal  Left leg light touch: normal  Right arm vibration: normal  Left arm vibration: normal  Right leg vibration: normal  Left leg vibration: normal  Right arm proprioception: normal  Left arm proprioception: normal  Right leg proprioception: normal  Left leg proprioception: normal  Right arm pinprick: normal  Left arm pinprick:  "normal  Right leg pinprick: normal  Left leg pinprick: normal    Gait, Coordination, and Reflexes     Gait  Gait: normal    Coordination   Romberg: negative  Finger to nose coordination: normal  Heel to shin coordination: normal  Tandem walking coordination: normal    Tremor   Resting tremor: absent    Reflexes   Right brachioradialis: 2+  Left brachioradialis: 2+  Right biceps: 2+  Left biceps: 2+  Right triceps: 2+  Left triceps: 2+  Right patellar: 2+  Left patellar: 2+  Right achilles: 2+  Left achilles: 2+  Right plantar: normal  Left plantar: normal      Physical Exam   Constitutional: He is oriented to person, place, and time.   Eyes: EOM are normal. Pupils are equal, round, and reactive to light.   Neurological: He is oriented to person, place, and time. He has normal strength. He has a normal Finger-Nose-Finger Test, a normal Heel to Shin Test, a normal Romberg Test and a normal Tandem Gait Test. Gait normal.   Reflex Scores:       Tricep reflexes are 2+ on the right side and 2+ on the left side.       Bicep reflexes are 2+ on the right side and 2+ on the left side.       Brachioradialis reflexes are 2+ on the right side and 2+ on the left side.       Patellar reflexes are 2+ on the right side and 2+ on the left side.       Achilles reflexes are 2+ on the right side and 2+ on the left side.  Psychiatric: His speech is normal.       Vitals:    04/25/17 1137   BP: (!) 121/58   BP Location: Left arm   Patient Position: Sitting   BP Method: Manual   Pulse: 71   Weight: 75 kg (165 lb 5.5 oz)   Height: 5' 7" (1.702 m)     CT lumbar spine personally reviewed: degenerative changes in the lumbar spine with narrowing near the exit of the L5 nerve root    Assessment & Plan:  Problem List Items Addressed This Visit     PD (Parkinson's disease) - Primary    Overview     Right tremor-predominant classic type.         Herniated lumbar disc without myelopathy    Current Assessment & Plan     Continue symptomatic treatment " with Tylenol               Health Maintenance reviewed.    Follow-up: Return in about 3 months (around 7/25/2017).  More than 50% of this 25 minute encounter was spent in counseling and coordinating care.

## 2017-04-27 ENCOUNTER — ANTI-COAG VISIT (OUTPATIENT)
Dept: CARDIOLOGY | Facility: CLINIC | Age: 77
End: 2017-04-27
Payer: MEDICARE

## 2017-04-27 DIAGNOSIS — I74.10 THROMBUS OF AORTA: ICD-10-CM

## 2017-04-27 DIAGNOSIS — Z79.01 LONG-TERM (CURRENT) USE OF ANTICOAGULANTS: Primary | ICD-10-CM

## 2017-04-27 LAB — INR PPP: 1.5 (ref 2–3)

## 2017-04-27 PROCEDURE — 99211 OFF/OP EST MAY X REQ PHY/QHP: CPT | Mod: 25,S$GLB,,

## 2017-04-27 PROCEDURE — 85610 PROTHROMBIN TIME: CPT | Mod: QW,S$GLB,,

## 2017-04-27 NOTE — PROGRESS NOTES
INR is low. Pt confirmed dose. He possibly missed a dose recently. Denies any other changes. We will boost dose today then resume maintenance dose. repeat INR in 2 weeks.

## 2017-05-11 ENCOUNTER — ANTI-COAG VISIT (OUTPATIENT)
Dept: CARDIOLOGY | Facility: CLINIC | Age: 77
End: 2017-05-11
Payer: MEDICARE

## 2017-05-11 DIAGNOSIS — I74.10 THROMBUS OF AORTA: ICD-10-CM

## 2017-05-11 DIAGNOSIS — Z79.01 LONG-TERM (CURRENT) USE OF ANTICOAGULANTS: Primary | ICD-10-CM

## 2017-05-11 LAB — INR PPP: 1.4 (ref 2–3)

## 2017-05-11 PROCEDURE — 99211 OFF/OP EST MAY X REQ PHY/QHP: CPT | Mod: 25,S$GLB,,

## 2017-05-11 PROCEDURE — 85610 PROTHROMBIN TIME: CPT | Mod: QW,S$GLB,,

## 2017-05-11 NOTE — PROGRESS NOTES
INR is low again. Pt does not think he missed any doses recently. No changes in diet, meds, or health. No s/sx of bleeding. Increase dose and repeat INR next week.

## 2017-05-15 ENCOUNTER — TELEPHONE (OUTPATIENT)
Dept: NEUROLOGY | Facility: CLINIC | Age: 77
End: 2017-05-15

## 2017-05-15 NOTE — TELEPHONE ENCOUNTER
----- Message from Reema Carmichael sent at 5/15/2017  8:26 AM CDT -----  Contact: Self  Pt states he's having trouble walking and would like to speak with someone.      [phone]

## 2017-05-15 NOTE — TELEPHONE ENCOUNTER
Patient states his feet feel like they are planted to the floor. He woke up at 7:30 went to the kitchen got a glass of orange juice and could walk back to the living room then all of a sudden he can't walk again. This has been going on all weekend. I suggested he go to the ER and he asked what good is that going to do. I explained to him that they could run the proper tests to see what's going on  But he wants to speak to Dr Allred first.

## 2017-05-17 ENCOUNTER — TELEPHONE (OUTPATIENT)
Dept: CARDIOLOGY | Facility: CLINIC | Age: 77
End: 2017-05-17

## 2017-05-18 ENCOUNTER — ANTI-COAG VISIT (OUTPATIENT)
Dept: CARDIOLOGY | Facility: CLINIC | Age: 77
End: 2017-05-18
Payer: MEDICARE

## 2017-05-18 ENCOUNTER — TELEPHONE (OUTPATIENT)
Dept: NEUROLOGY | Facility: CLINIC | Age: 77
End: 2017-05-18

## 2017-05-18 ENCOUNTER — OFFICE VISIT (OUTPATIENT)
Dept: FAMILY MEDICINE | Facility: CLINIC | Age: 77
End: 2017-05-18
Payer: MEDICARE

## 2017-05-18 VITALS
TEMPERATURE: 98 F | SYSTOLIC BLOOD PRESSURE: 126 MMHG | BODY MASS INDEX: 25.85 KG/M2 | RESPIRATION RATE: 16 BRPM | HEART RATE: 74 BPM | HEIGHT: 67 IN | DIASTOLIC BLOOD PRESSURE: 58 MMHG | WEIGHT: 164.69 LBS | OXYGEN SATURATION: 96 %

## 2017-05-18 DIAGNOSIS — R26.81 GAIT INSTABILITY: Primary | ICD-10-CM

## 2017-05-18 DIAGNOSIS — M79.605 LEFT LEG PAIN: Primary | ICD-10-CM

## 2017-05-18 DIAGNOSIS — Z79.01 LONG-TERM (CURRENT) USE OF ANTICOAGULANTS: Primary | ICD-10-CM

## 2017-05-18 DIAGNOSIS — I73.9 PAD (PERIPHERAL ARTERY DISEASE): ICD-10-CM

## 2017-05-18 DIAGNOSIS — I74.10 THROMBUS OF AORTA: ICD-10-CM

## 2017-05-18 LAB — INR PPP: 1.9 (ref 2–3)

## 2017-05-18 PROCEDURE — 99211 OFF/OP EST MAY X REQ PHY/QHP: CPT | Mod: 25,S$GLB,,

## 2017-05-18 PROCEDURE — 99999 PR PBB SHADOW E&M-EST. PATIENT-LVL III: CPT | Mod: PBBFAC,,, | Performed by: FAMILY MEDICINE

## 2017-05-18 PROCEDURE — 1159F MED LIST DOCD IN RCRD: CPT | Mod: S$GLB,,, | Performed by: FAMILY MEDICINE

## 2017-05-18 PROCEDURE — 1160F RVW MEDS BY RX/DR IN RCRD: CPT | Mod: S$GLB,,, | Performed by: FAMILY MEDICINE

## 2017-05-18 PROCEDURE — 99214 OFFICE O/P EST MOD 30 MIN: CPT | Mod: S$GLB,,, | Performed by: FAMILY MEDICINE

## 2017-05-18 PROCEDURE — 1126F AMNT PAIN NOTED NONE PRSNT: CPT | Mod: S$GLB,,, | Performed by: FAMILY MEDICINE

## 2017-05-18 PROCEDURE — 85610 PROTHROMBIN TIME: CPT | Mod: QW,S$GLB,,

## 2017-05-18 NOTE — PROGRESS NOTES
Patient here for close follow up of recent low INR and dose change. INR improved but is still a little low. He denies missed doses and comfirmed correct dose. He reports gabapentin dose decreased. He is taking an over the counter joint supplement. He cannot recall the name. We will give this dose another week and reevaluate. INR is only slightly below range and he is already due for a bigger dose today.

## 2017-05-18 NOTE — TELEPHONE ENCOUNTER
----- Message from Reema Carmichael sent at 5/18/2017 11:19 AM CDT -----  Contact: Self  Pt states he hasn't heard anything from anyone about him being set up for physical therapy.    [phone]

## 2017-05-18 NOTE — PROGRESS NOTES
"Routine Office Visit    Patient Name: Tenzin Ortiz    : 1940  MRN: [id number]    Subjective:  Tenzin is a 76 y.o. male who presents today for:    1. Left leg pain  Patient presenting with recurring pain in his feet and left leg.  States that the pain is burning in nature and was "terrible" yesterday, but has resolved today.  States he had vascular procedures on both legs several years ago due to pain and that resolved his problem.  He called his cardiologist yesterday to see if this pain was due to the same problem and was told no (per patient) and to follow up with his "general practitioner".  Patient denies any falls or trauma to the feet or left leg.  He does have the diagnosis of neuropathy as well for which he takes neurontin at night.      Past Medical History  Past Medical History:   Diagnosis Date    Anticoagulant long-term use     Hypertension     Impulse control disorder     gambling on mirapex; also possible dopamine dysregulation syndrome    PD (Parkinson's disease)     tremor-predominant    PVD (peripheral vascular disease) with claudication     poor circulation both legs       Past Surgical History  History reviewed. No pertinent surgical history.    Family History  Family History   Problem Relation Age of Onset    No Known Problems Mother     No Known Problems Father     Parkinsonism Neg Hx        Social History  Social History     Social History    Marital status: Single     Spouse name: N/A    Number of children: N/A    Years of education: N/A     Occupational History    Not on file.     Social History Main Topics    Smoking status: Former Smoker     Years: 10.00    Smokeless tobacco: Never Used      Comment: Quit 40 years ago    Alcohol use No    Drug use: No    Sexual activity: Not Currently     Other Topics Concern    Not on file     Social History Narrative       Current Medications  Current Outpatient Prescriptions on File Prior to Visit   Medication Sig Dispense " "Refill    acetaminophen (TYLENOL ARTHRITIS PAIN) 650 MG TbSR Take 650 mg by mouth every 8 (eight) hours.      amlodipine (NORVASC) 10 MG tablet TAKE 1 TABLET BY MOUTH EVERY DAY 90 tablet 11    atorvastatin (LIPITOR) 10 MG tablet Take 10 mg by mouth once daily.      carbidopa-levodopa (PARCOPA)  mg per disintegrating tablet DISSOLVE 2 TS PO QID PRN  2    carbidopa-levodopa  mg (SINEMET CR)  mg TbSR Take 1 tablet by mouth 5 (five) times daily.      clopidogrel (PLAVIX) 75 mg tablet TAKE 1 TABLET BY MOUTH ONCE DAILY 90 tablet 11    cyclobenzaprine (FLEXERIL) 10 MG tablet Take 10 mg by mouth 3 (three) times daily as needed for Muscle spasms.      fluocinonide (LIDEX) 0.05 % ointment POWER AA BID  1    gabapentin (NEURONTIN) 300 MG capsule Take 2 capsules (600 mg total) by mouth every evening. 60 capsule 11    pramipexole (MIRAPEX) 1 MG tablet       tramadol (ULTRAM) 50 mg tablet Take 50 mg by mouth every 6 (six) hours as needed for Pain.      trazodone (DESYREL) 50 MG tablet TK 1/2 T PO HS  U PRN TO REST ATN  4    warfarin (COUMADIN) 3 MG tablet TAKE ONE BY MOUTH EVERY DAY OR AS DIRECTED BY COUMADIN CLINIC 90 tablet 11     No current facility-administered medications on file prior to visit.        Allergies   Review of patient's allergies indicates:   Allergen Reactions    Amantadine analogues      Caused confusion    Mirapex [pramipexole]      Pathologic gambling    Neupro [rotigotine]      Pathologic gambling         Review of Systems (Pertinent positives)  Review of Systems   Constitutional: Negative.    HENT: Negative.    Eyes: Negative.    Respiratory: Negative.    Cardiovascular: Negative.    Gastrointestinal: Negative.    Genitourinary: Negative.    Musculoskeletal: Positive for myalgias.   Skin: Negative.          BP (!) 126/58 (BP Location: Left arm, Patient Position: Sitting, BP Method: Manual)  Pulse 74  Temp 98.1 °F (36.7 °C) (Oral)   Resp 16  Ht 5' 7" (1.702 m)  Wt 74.7 " kg (164 lb 10.9 oz)  SpO2 96%  BMI 25.79 kg/m2    GENERAL APPEARANCE: in no apparent distress and well developed and well nourished  HEENT: PERRL, EOMI, Sclera clear, anicteric, Oropharynx clear, no lesions, Neck supple with midline trachea  NECK: normal, supple, no adenopathy, thyroid normal in size  RESPIRATORY: appears well, vitals normal, no respiratory distress, acyanotic, normal RR, chest clear, no wheezing, crepitations, rhonchi, normal symmetric air entry  HEART: regular rate and rhythm, S1, S2 normal, no murmur, click, rub or gallop.    ABDOMEN: abdomen is soft without tenderness, no masses, no hernias, no organomegaly, no rebound, no guarding. Suprapubic tenderness absent. No CVA tenderness.  NEUROLOGIC: normal without focal findings, CN II-XII are intact. Normal gait; involuntary movements noted throughout body, but mostly in bilateral upper extremities  Extremities: warm/well perfused.  No abnormal hair patterns.  No clubbing, cyanosis or edema.  no muscular tenderness noted, full range of motion without pain.  no joint tenderness, deformity or swelling noted.   SKIN: no rashes, no wounds, no other lesions  PSYCH: Alert, oriented x 3, thought content appropriate, speech normal, pleasant and cooperative, good eye contact, well groomed    Assessment/Plan:  Tenzin Ortiz is a 76 y.o. male who presents today for :    Tenzin was seen today for leg pain.    Diagnoses and all orders for this visit:    Left leg pain    PAD (peripheral artery disease)      1.  Patient to continue all medications as prescribed  2.  Continue neurontin  3.  He is to notify me if pain recurs and will get US with BIA  4.  Patient to follow up as scheduled    Efrain Chavez MD

## 2017-05-18 NOTE — MR AVS SNAPSHOT
[facility]  [address]  [place] [address]  Phone: [phone]                  Tenzin Ortiz   2017 10:00 AM   Office Visit    Description:  Male : 1940   Provider:  Efrain Chavez MD   Department:  [facility]           Reason for Visit     Leg Pain           Diagnoses this Visit        Comments    Left leg pain    -  Primary     PAD (peripheral artery disease)                To Do List           Future Appointments        Provider Department Dept Phone    2017 1:45 PM Eva Waldron, PharmD [facility] [phone]      Goals (5 Years of Data)     None      Follow-Up and Disposition     Return if symptoms worsen or fail to improve.      [facility]     [facility] On Call Nurse Care Line -  Assistance  Unless otherwise directed by your provider, please contact [facility], our nurse care line that is available for  assistance.     Registered nurses in the [facility] On Call Center provide: appointment scheduling, clinical advisement, health education, and other advisory services.  Call: [phone] (toll free)               Medications           Message regarding Medications     Verify the changes and/or additions to your medication regime listed below are the same as discussed with your clinician today.  If any of these changes or additions are incorrect, please notify your healthcare provider.             Verify that the below list of medications is an accurate representation of the medications you are currently taking.  If none reported, the list may be blank. If incorrect, please contact your healthcare provider. Carry this list with you in case of emergency.           Current Medications     acetaminophen (TYLENOL ARTHRITIS PAIN) 650 MG TbSR Take 650 mg by mouth every 8 (eight) hours.    amlodipine (NORVASC) 10 MG tablet TAKE 1 TABLET BY MOUTH EVERY DAY    atorvastatin (LIPITOR) 10 MG tablet Take 10 mg by mouth once daily.  "   carbidopa-levodopa (PARCOPA)  mg per disintegrating tablet DISSOLVE 2 TS PO QID PRN    carbidopa-levodopa  mg (SINEMET CR)  mg TbSR Take 1 tablet by mouth 5 (five) times daily.    clopidogrel (PLAVIX) 75 mg tablet TAKE 1 TABLET BY MOUTH ONCE DAILY    cyclobenzaprine (FLEXERIL) 10 MG tablet Take 10 mg by mouth 3 (three) times daily as needed for Muscle spasms.    fluocinonide (LIDEX) 0.05 % ointment POWER AA BID    gabapentin (NEURONTIN) 300 MG capsule Take 2 capsules (600 mg total) by mouth every evening.    pramipexole (MIRAPEX) 1 MG tablet     tramadol (ULTRAM) 50 mg tablet Take 50 mg by mouth every 6 (six) hours as needed for Pain.    trazodone (DESYREL) 50 MG tablet TK 1/2 T PO HS  U PRN TO REST ATN    warfarin (COUMADIN) 3 MG tablet TAKE ONE BY MOUTH EVERY DAY OR AS DIRECTED BY COUMADIN CLINIC           Clinical Reference Information           Your Vitals Were     BP Pulse Temp Resp Height Weight    126/58 (BP Location: Left arm, Patient Position: Sitting, BP Method: Manual) 74 98.1 °F (36.7 °C) (Oral) 16 5' 7" (1.702 m) 74.7 kg (164 lb 10.9 oz)    SpO2 BMI             96% 25.79 kg/m2         Blood Pressure          Most Recent Value    BP  (!)  126/58      Allergies as of 5/18/2017     Amantadine Analogues    Mirapex [Pramipexole]    Neupro [Rotigotine]      Immunizations Administered on Date of Encounter - 5/18/2017     None      [facility] Sign-Up     Activating your [facility] account is as easy as 1-2-3!     1) Visit [email or web address], select Sign Up Now, enter this activation code and your date of birth, then select Next.  [email or web address]  Expires: 7/2/2017 11:46 AM      2) Create a username and password to use when you visit [facility] in the future and select a security question in case you lose your password and select Next.    3) Enter your e-mail address and click Sign Up!    Additional Information  If you have questions, please e-mail [email or web address] or call [phone] to talk " to our [facility] staff. Remember, [facility] is NOT to be used for urgent needs. For medical emergencies, dial 911.         Language Assistance Services     ATTENTION: Language assistance services are available, free of charge. Please call [phone].      ATENCIÓN: Si habla marylou, tiene a martínez disposición servicios gratuitos de asistencia lingüística. Llame al [phone].     CHÚ Ý: N?u b?n nói Ti?ng Vi?t, có các d?ch v? h? tr? ngôn ng? mi?n phí dành cho b?n. G?i s? [phone].         [facility] complies with applicable Federal civil rights laws and does not discriminate on the basis of race, color, national origin, age, disability, or sex.

## 2017-05-19 RX ORDER — AMLODIPINE BESYLATE 10 MG/1
TABLET ORAL
Qty: 30 TABLET | Refills: 0 | Status: SHIPPED | OUTPATIENT
Start: 2017-05-19 | End: 2017-06-22 | Stop reason: SDUPTHER

## 2017-05-20 ENCOUNTER — HOSPITAL ENCOUNTER (EMERGENCY)
Facility: HOSPITAL | Age: 77
Discharge: HOME | End: 2017-05-20
Attending: EMERGENCY MEDICINE
Payer: MEDICARE

## 2017-05-20 VITALS
OXYGEN SATURATION: 98 % | DIASTOLIC BLOOD PRESSURE: 60 MMHG | RESPIRATION RATE: 18 BRPM | TEMPERATURE: 98 F | HEART RATE: 88 BPM | HEIGHT: 67 IN | WEIGHT: 164 LBS | BODY MASS INDEX: 25.74 KG/M2 | SYSTOLIC BLOOD PRESSURE: 160 MMHG

## 2017-05-20 DIAGNOSIS — R13.19 ESOPHAGEAL DYSPHAGIA: Primary | ICD-10-CM

## 2017-05-20 PROCEDURE — 99283 EMERGENCY DEPT VISIT LOW MDM: CPT

## 2017-05-20 NOTE — ED PROVIDER NOTES
"Encounter Date: 5/20/2017    SCRIBE #1 NOTE: I, VaughnRenata Leach, am scribing for, and in the presence of,  Tenzin Alcala MD. I have scribed the following portions of the note - Other sections scribed: HPI, ROS.       History     Chief Complaint   Patient presents with    Dysphagia     States everything he tries to eat or drink comes back up when it goes down his throat, like there is a blockage. States he was sent here from Urgent Care.     Review of patient's allergies indicates:   Allergen Reactions    Amantadine analogues      Caused confusion    Mirapex [pramipexole]      Pathologic gambling    Neupro [rotigotine]      Pathologic gambling       HPI Comments: CC: Dysphagia    77 y/o male with HTN, Parkinson's Disease, PVD, and impulse control disorder presents to the ED for emergent evaluation of acute onset dysphagia that started at 11AM today. Symptoms are moderate. The patient's wife states that he presented to [facility] for similar symptoms where he was advised to present to the ED. The patient's wife states that he ate a banana, smoothie, and eggs this morning and subsequently experienced the dysphagia. His wife states "saliva kept coming out of his mouth." The patient states that he has blockages in his chest. The patient denies hx of blockages in his esophagus. The patient denies fever, chills, HA, CP, otalgia, cough, or arm or leg trouble. No attempted treatment reported. No alleviating factors or other symptoms reported.    The history is provided by the patient and the spouse. No  was used.     Past Medical History:   Diagnosis Date    Anticoagulant long-term use     Hypertension     Impulse control disorder 2012    gambling on mirapex; also possible dopamine dysregulation syndrome    PD (Parkinson's disease) 1999    tremor-predominant    PVD (peripheral vascular disease) with claudication     poor circulation both legs     History reviewed. No pertinent surgical " history.  Family History   Problem Relation Age of Onset    No Known Problems Mother     No Known Problems Father     Parkinsonism Neg Hx      Social History   Substance Use Topics    Smoking status: Former Smoker     Years: 10.00    Smokeless tobacco: Never Used      Comment: Quit 40 years ago    Alcohol use No     Review of Systems   Constitutional: Negative for chills and fever.   HENT: Positive for trouble swallowing. Negative for ear pain, rhinorrhea and sore throat.    Eyes: Negative for redness.   Respiratory: Negative for cough and shortness of breath.    Cardiovascular: Negative for chest pain.   Gastrointestinal: Negative for abdominal pain, diarrhea, nausea and vomiting.   Genitourinary: Negative for difficulty urinating and dysuria.   Musculoskeletal: Negative for arthralgias and myalgias.   Skin: Negative for rash.   Neurological: Negative for headaches.       Physical Exam   Initial Vitals   BP Pulse Resp Temp SpO2   05/20/17 1551 05/20/17 1551 05/20/17 1551 05/20/17 1551 05/20/17 1551   180/78 91 18 98.2 °F (36.8 °C) 96 %     Physical Exam  The patient was examined specifically for the following:   General:No significant distress, Good color, Warm and dry. Head and neck:Scalp atraumatic, Neck supple. Neurological:Appropriate conversation, Gross motor deficits. Eyes:Conjugate gaze, Clear corneas. ENT: No epistaxis. Cardiac: Regular rate and rhythm, Grossly normal heart tones. Pulmonary: Wheezing, Rales. Gastrointestinal: Abdominal tenderness, Abdominal distention. Musculoskeletal: Extremity deformity, Apparent pain with range of motion of the joints. Skin: Rash.   The findings on examination were normal except for the following: The patient has tremor from Parkinson's disease.  He was able to drink 2 glasses of water, 8 ounces each during the physical examination.  There was no regurgitation.  ED Course   Procedures  Labs Reviewed - No data to display      Medical decision making: Given the  above, this patient complains that he could not swallow or drink anything.  All of this started at 11:00 this morning.  The patient was able to drink water during the physical examination.  I will attempt discharge him to follow-up with gastroenterology on Monday.  I doubt esophageal obstruction at this time.  I've considered that the patient's trouble swallowing may be a dysmotility syndrome possibly related to his Parkinson's disease.  I will discussed case with gastroenterology prior to discharge to arrange hard follow-up for Monday.  The patient appears to be otherwise well.  I discussed this case with , who is in agreement with the disposition.                  Scribe Attestation:   Scribe #1: I performed the above scribed service and the documentation accurately describes the services I performed. I attest to the accuracy of the note.    Attending Attestation:           Physician Attestation for Scribe:  Physician Attestation Statement for Scribe #1: I, Tenzin Alcala MD, reviewed documentation, as scribed by Edis Leach in my presence, and it is both accurate and complete.                 ED Course     Clinical Impression:   The encounter diagnosis was Esophageal dysphagia.          Tenzin Humphrey MD  05/21/17 3187

## 2017-05-20 NOTE — ED TRIAGE NOTES
76 y.o male presents to the ED w/ spouse by personal transport. The patient reports this AM he woke up and had breakfast and shortly after felt like he couldn't swallow anything. Patient reports saliva was difficult to swallow. Patient reports he has had this problem previously, mostly when eating rice. The patient reports he has not eaten or drank anything since. Reports going to urgent care pta, and told to come to ED for further evaluation. AAOX4, no distress noted. Patient is on cardiac monitor, blood pressure cuff and pulse ox.

## 2017-05-20 NOTE — ED AVS SNAPSHOT
[facility]  [address] [place] [address]               Tenzin WALLIS Diana   2017  6:05 PM   ED    Description:  Male : 1940   Department:  [facility]           Your Care was Coordinated By:     Provider Role From To    Tenzin Humphrey MD Attending Provider 17 2080 --      Reason for Visit     Dysphagia           Diagnoses this Visit        Comments    Esophageal dysphagia    -  Primary       ED Disposition     None           To Do List           Follow-up Information     Follow up with Mario Enriquez MD In 1 day.    Specialty:  Gastroenterology    Why:  Call 8:30 AM, Monday morning, tell them that you're in the emergency room.  Your ER doctor spoke with Dr. Enriquez    Contact information:    [address] [place] [address]  [address]  [facility]  [place] [address]  [phone]        [facility]     [facility] Care [facility] - 24/ Assistance  Unless otherwise directed by your provider, please contact [facility], our nurse care line that is available for  assistance.     Registered nurses in the [facility] provide: appointment scheduling, clinical advisement, health education, and other advisory services.  Call: [phone] (toll free)               Medications           Message regarding Medications     Verify the changes and/or additions to your medication regime listed below are the same as discussed with your clinician today.  If any of these changes or additions are incorrect, please notify your healthcare provider.             Verify that the below list of medications is an accurate representation of the medications you are currently taking.  If none reported, the list may be blank. If incorrect, please contact your healthcare provider. Carry this list with you in case of emergency.           Current Medications     acetaminophen (TYLENOL ARTHRITIS PAIN) 650 MG TbSR Take 650 mg  "by mouth every 8 (eight) hours.    amlodipine (NORVASC) 10 MG tablet TAKE 1 TABLET BY MOUTH EVERY DAY    amlodipine (NORVASC) 10 MG tablet TAKE 1 TABLET BY MOUTH EVERY DAY    atorvastatin (LIPITOR) 10 MG tablet Take 10 mg by mouth once daily.    carbidopa-levodopa (PARCOPA)  mg per disintegrating tablet DISSOLVE 2 TS PO QID PRN    carbidopa-levodopa  mg (SINEMET CR)  mg TbSR Take 1 tablet by mouth 5 (five) times daily.    clopidogrel (PLAVIX) 75 mg tablet TAKE 1 TABLET BY MOUTH ONCE DAILY    cyclobenzaprine (FLEXERIL) 10 MG tablet Take 10 mg by mouth 3 (three) times daily as needed for Muscle spasms.    fluocinonide (LIDEX) 0.05 % ointment POWER AA BID    gabapentin (NEURONTIN) 300 MG capsule Take 2 capsules (600 mg total) by mouth every evening.    pramipexole (MIRAPEX) 1 MG tablet     tramadol (ULTRAM) 50 mg tablet Take 50 mg by mouth every 6 (six) hours as needed for Pain.    trazodone (DESYREL) 50 MG tablet TK 1/2 T PO HS  U PRN TO REST ATN    warfarin (COUMADIN) 3 MG tablet TAKE ONE BY MOUTH EVERY DAY OR AS DIRECTED BY COUMADIN CLINIC           Clinical Reference Information           Your Vitals Were     BP Pulse Temp Resp Height Weight    180/78 (BP Location: Left arm, Patient Position: Sitting) 91 98.2 °F (36.8 °C) (Oral) 18 5' 7" (1.702 m) 74.4 kg (164 lb)    SpO2 BMI             96% 25.69 kg/m2         Allergies as of 5/20/2017        Reactions    Amantadine Analogues     Caused confusion    Mirapex [Pramipexole]     Pathologic gambling    Neupro [Rotigotine]     Pathologic gambling      Immunizations Administered on Date of Encounter - 5/20/2017     None      ED Micro, Lab, POCT     None      ED Imaging Orders     None        Discharge Instructions       Liquid diet.  Please follow-up with the gastroenterologist Monday morning at 8:30.  Please call the number above.  [place]  that she were in the emergency room and that you're ER doctor spoke with , about a Monday morning " appointment.  Return immediately if you get worse or if new problems develop.  Rest.    Your Scheduled Appointments     May 25, 2017 10:00 AM CDT   New Physical Therapy Patient with Suzi Pierson, JULY   [facility]    [address] [facility], [address]  [place] [address]   [phone]            May 25, 2017 11:30 AM CDT   Anticoagulation with Eva Waldron, Thomas   [facility] ([facility])    [address]  [place] [address] [phone]              [facility] Sign-Up     Activating your [facility] account is as easy as 1-2-3!     1) Visit [email or web address], select Sign Up Now, enter this activation code and your date of birth, then select Next.  [email or web address]  Expires: 7/2/2017 11:46 AM      2) Create a username and password to use when you visit [facility] in the future and select a security question in case you lose your password and select Next.    3) Enter your e-mail address and click Sign Up!    Additional Information  If you have questions, please e-mail [email or web address] or call [phone] to talk to our [facility] staff. Remember, [facility] is NOT to be used for urgent needs. For medical emergencies, dial 911.         Smoking Cessation     If you would like to quit smoking:   You may be eligible for free services if you are a [place] resident and started smoking cigarettes before September 1, 1988.  Call the Smoking Cessation Trust (SCT) toll free at (652) 371-1863 or (581) 779-5398.   Call 5-924-QUIT-NOW if you do not meet the above criteria.   Contact us via email: [email or web address]   View our website for more information: [email or web address]         [facility] complies with applicable Federal civil rights laws and does not discriminate on the basis of race, color, national origin, age, disability, or sex.        Language Assistance Services     ATTENTION: Language assistance services are  available, free of charge. Please call [phone].      ATENCIÓN: Si habla español, tiene a martínez disposición servicios gratuitos de asistencia lingüística. Llame al [phone].     CHÚ Ý: N?u b?n nói Ti?ng Vi?t, có các d?ch v? h? tr? ngôn ng? mi?n phí dành cho b?n. G?i s? [phone].

## 2017-05-21 NOTE — DISCHARGE INSTRUCTIONS
Liquid diet.  Please follow-up with the gastroenterologist Monday morning at 8:30.  Please call the number above.  [place]  that she were in the emergency room and that you're ER doctor spoke with , about a Monday morning appointment.  Return immediately if you get worse or if new problems develop.  Rest.

## 2017-05-25 ENCOUNTER — CLINICAL SUPPORT (OUTPATIENT)
Dept: REHABILITATION | Facility: HOSPITAL | Age: 77
End: 2017-05-25
Attending: NEUROLOGICAL SURGERY
Payer: MEDICARE

## 2017-05-25 ENCOUNTER — ANTI-COAG VISIT (OUTPATIENT)
Dept: CARDIOLOGY | Facility: CLINIC | Age: 77
End: 2017-05-25
Payer: MEDICARE

## 2017-05-25 DIAGNOSIS — R26.89 IMPAIRMENT OF BALANCE: ICD-10-CM

## 2017-05-25 DIAGNOSIS — I74.10 THROMBUS OF AORTA: ICD-10-CM

## 2017-05-25 DIAGNOSIS — G20.A1 PD (PARKINSON'S DISEASE): Primary | ICD-10-CM

## 2017-05-25 DIAGNOSIS — R26.81 GAIT INSTABILITY: ICD-10-CM

## 2017-05-25 DIAGNOSIS — Z79.01 LONG-TERM (CURRENT) USE OF ANTICOAGULANTS: Primary | ICD-10-CM

## 2017-05-25 LAB — INR PPP: 2.3 (ref 2–3)

## 2017-05-25 PROCEDURE — 99211 OFF/OP EST MAY X REQ PHY/QHP: CPT | Mod: 25,S$GLB,,

## 2017-05-25 PROCEDURE — 85610 PROTHROMBIN TIME: CPT | Mod: QW,S$GLB,,

## 2017-05-25 PROCEDURE — G8978 MOBILITY CURRENT STATUS: HCPCS | Mod: CK,PN | Performed by: PHYSICAL THERAPIST

## 2017-05-25 PROCEDURE — 97110 THERAPEUTIC EXERCISES: CPT | Mod: PN | Performed by: PHYSICAL THERAPIST

## 2017-05-25 PROCEDURE — G8979 MOBILITY GOAL STATUS: HCPCS | Mod: CJ,PN | Performed by: PHYSICAL THERAPIST

## 2017-05-25 PROCEDURE — 97162 PT EVAL MOD COMPLEX 30 MIN: CPT | Mod: PN | Performed by: PHYSICAL THERAPIST

## 2017-05-25 NOTE — PROGRESS NOTES
"  TIME RECORD    Date: 05/25/2017    Start Time:  1000  Stop Time:  1100    OUTPATIENT PHYSICAL THERAPY   PATIENT EVALUATION  Primary Diagnosis: R26.81 (ICD-10-CM) - Gait instability  Treatment Diagnosis: decreased functional mobility, abnormal muscle tone, impairment of balance, muscle weakness  Past Medical History:   Diagnosis Date    Anticoagulant long-term use     Hypertension     Impulse control disorder 2012    gambling on mirapex; also possible dopamine dysregulation syndrome    PD (Parkinson's disease) 1999    tremor-predominant    PVD (peripheral vascular disease) with claudication     poor circulation both legs   No past surgical history on file.    Precautions: falls  Prior Therapy: no  Medications: Tenzin Ortiz has a current medication list which includes the following prescription(s): acetaminophen, amlodipine, amlodipine, atorvastatin, carbidopa-levodopa, carbidopa-levodopa  mg, clopidogrel, cyclobenzaprine, fluocinonide, gabapentin, pramipexole, tramadol, trazodone, and warfarin.  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 Difficulty with walking, turning, stairs  Patient Therapy Goals: to be able to walk better    Subjective     Tenzin Ortiz states difficulty walking. When he gets to a doorway or changing directions, his legs will "just stop". Pt with history of back pain, PAD and PD. He also reports pain in B knees with weight bearing. Pt denies any falls, numbness, tingling. Pt was recommended to have PT by heart doctor 6 months ago. He attempted to start therapy twice, but felt too bad to do it.     Pain:  Location: B knees, feet, and thoracic region   Description: Aching  Activities Which Increase Pain: Sitting and Walking  Activities Which " "Decrease Pain: rest  Pain Scale: 0/10 at best 0/10 now  10/10 at worst    Objective     Posture: forward flexed posture with B knee flexion  Palpation: no TTP  Sensation: BLE light touch sensation grossly intact all dermatomes  DTRs:   patellar 2+ B  Achilles 2+ B  Negative babinski or clonus   Tone:   Hip/knee: 1 L, 1+ R  Ankle: 3 B  Range of Motion/Strength:     Lumbar Spine Active ROM, * Indicates pain with movement    Flexion:50%  Extension:25%  Left Side Bend:50%  Right Side Bend:50%  Rotation B: 50%    MMT   Left  Right    Hip:  Flexion   5/5  5/5  Abduction  4/5  4/5  Adduction  5/5  5/5  External Rotation 5/5  5/5  Internal rotation 4+/5  4/5    Knee:  Flexion   5/5  5/5  Extension  4+/5  4+/5    Ankle:  Dorsiflexion  5/5  5/5  Plantar flexion  5/5  5/5    Neuromuscular/ Balance assessment:  TUG: 10 sec  SLS: 6 sec R, 3 sec L  Tandem stance: 30 sec L, 20 sec R (staggered stance, assistance to get into position)  Gait speed: 3.33 ft/sec (<1.8 ft./sec = risk for recurrent falls)  DGI: 14 (see scanned attachment, < 19/24 is predictive of falls in the elderly)  Romberg balance: negative      Flexibility: Hamstring length at 90/90 R:-40 deg, L: -45 deg  Gastroc flexibility: poor  Gait: Without AD  Analysis: shuffled gait  Bed Mobility:Independent  Transfers: Independent   Special test:  SLR: negative  Other:   FOTO: 59% limited  Treatment: Physical Therapist educated patient in a home exercise program and issued handout. Patient demonstrating good understanding of education provided and able to return demonstration of correct technique. Patient given the following exercises: hamstring stretch in supine with strap 30" x 3, standing gastroc stretch 1 min x 3    Assessment       Initial Assessment: Tenzin is a 76 year old male referred to physical therapy for diagnosis of gait instability with pertinent history of Parkinsons disease. Patient has the following impairments upon initial evaluation: shuffled gait, " decreased static/dynamic balance, high fall risk as indicated by DGI, decreased BLE strength, decreased LE flexibility, increased LE tone, and decreased functional mobility. Patient to benefit from skilled physical therapy to address above deficits and maximize functional independence. Patient appears motivated to improve condition and is a good candidate for physical therapy. Patient has verbalized understanding of plan of care and set goals. Patient has no identified cultural, spiritual, or educational needs that would impair learning.     History  Co-morbidities and personal factors that may impact the plan of care Examination  Body Structures and Functions, activity limitations and participation restrictions that may impact the plan of care Clinical Presentation   Decision Making/ Complexity Score   Co-morbidities:     PD  PAD        Personal Factors:    Body Regions: legs, trunk    Body Systems:     Musculoskeletal:  Gait abnormality   Posture  Decreased ROM  weakness    Neuromuscular:  Impairment of balance  Abnormal tone    Activity limitations: walking      Participation Restrictions:          evolving    Moderate     Current Status: -MJ (mobility 40-60%)    Rehab Potiential: good    Short Term Goals (4 Weeks):   1. Patient to have improved single limb balance of 10 sec or greater for decreased risk for falls  2. Patient's bilateral hamstring flexibility to improve 10 degrees or greater for improved posture and gait  3. Patient's BLE strength to be 5/5 for improved functional ability to lift  Long Term Goals (8 Weeks):   1. Goal Status: -BK (mobility 20-40%)  2. Patient to be independent with home exercise program for improved self management of condition  3. Patient to improve score on DGI by 3 points or greater for decreased risk for falls    Plan     Certification Period: 5/25/2017 to 8/18/2017  Recommended Treatment Plan: 2-3 times per week for 12 weeks: Gait Training, Manual Therapy,  Neuromuscular Re-ed, Patient Education, Self Care, Therapeutic Activites and Therapeutic Exercise  Other Recommendations: Patient to start home program and told to contact therapist if there are any questions or concerns that arise prior to next scheduled visit    Therapist: Suzi Pierson, PT

## 2017-05-25 NOTE — PLAN OF CARE
"  TIME RECORD    Date: 05/25/2017    Start Time:  1000  Stop Time:  1100    OUTPATIENT PHYSICAL THERAPY   PATIENT EVALUATION  Primary Diagnosis: R26.81 (ICD-10-CM) - Gait instability  Treatment Diagnosis: decreased functional mobility, abnormal muscle tone, impairment of balance, muscle weakness  Past Medical History:   Diagnosis Date    Anticoagulant long-term use     Hypertension     Impulse control disorder 2012    gambling on mirapex; also possible dopamine dysregulation syndrome    PD (Parkinson's disease) 1999    tremor-predominant    PVD (peripheral vascular disease) with claudication     poor circulation both legs   No past surgical history on file.    Precautions: falls  Prior Therapy: no  Medications: Tenzin Ortiz has a current medication list which includes the following prescription(s): acetaminophen, amlodipine, amlodipine, atorvastatin, carbidopa-levodopa, carbidopa-levodopa  mg, clopidogrel, cyclobenzaprine, fluocinonide, gabapentin, pramipexole, tramadol, trazodone, and warfarin.  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 Difficulty with walking, turning, stairs  Patient Therapy Goals: to be able to walk better    Subjective     Tenzin Ortiz states difficulty walking. When he gets to a doorway or changing directions, his legs will "just stop". Pt with history of back pain, PAD and PD. He also reports pain in B knees with weight bearing. Pt denies any falls, numbness, tingling. Pt was recommended to have PT by heart doctor 6 months ago. He attempted to start therapy twice, but felt too bad to do it.     Pain:  Location: B knees, feet, and thoracic region   Description: Aching  Activities Which Increase Pain: Sitting and Walking  Activities Which " "Decrease Pain: rest  Pain Scale: 0/10 at best 0/10 now  10/10 at worst    Objective     Posture: forward flexed posture with B knee flexion  Palpation: no TTP  Sensation: BLE light touch sensation grossly intact all dermatomes  DTRs:   patellar 2+ B  Achilles 2+ B  Negative babinski or clonus   Tone:   Hip/knee: 1 L, 1+ R  Ankle: 3 B  Range of Motion/Strength:     Lumbar Spine Active ROM, * Indicates pain with movement    Flexion:50%  Extension:25%  Left Side Bend:50%  Right Side Bend:50%  Rotation B: 50%    MMT   Left  Right    Hip:  Flexion   5/5  5/5  Abduction  4/5  4/5  Adduction  5/5  5/5  External Rotation 5/5  5/5  Internal rotation 4+/5  4/5    Knee:  Flexion   5/5  5/5  Extension  4+/5  4+/5    Ankle:  Dorsiflexion  5/5  5/5  Plantar flexion  5/5  5/5    Neuromuscular/ Balance assessment:  TUG: 10 sec  SLS: 6 sec R, 3 sec L  Tandem stance: 30 sec L, 20 sec R (staggered stance, assistance to get into position)  Gait speed: 3.33 ft/sec (<1.8 ft./sec = risk for recurrent falls)  DGI: 14 (see scanned attachment, < 19/24 is predictive of falls in the elderly)  Romberg balance: negative      Flexibility: Hamstring length at 90/90 R:-40 deg, L: -45 deg  Gastroc flexibility: poor  Gait: Without AD  Analysis: shuffled gait  Bed Mobility:Independent  Transfers: Independent   Special test:  SLR: negative  Other:   FOTO: 59% limited  Treatment: Physical Therapist educated patient in a home exercise program and issued handout. Patient demonstrating good understanding of education provided and able to return demonstration of correct technique. Patient given the following exercises: hamstring stretch in supine with strap 30" x 3, standing gastroc stretch 1 min x 3    Assessment       Initial Assessment: Tenzin is a 76 year old male referred to physical therapy for diagnosis of gait instability with pertinent history of Parkinsons disease. Patient has the following impairments upon initial evaluation: shuffled gait, " decreased static/dynamic balance, high fall risk as indicated by DGI, decreased BLE strength, decreased LE flexibility, increased LE tone, and decreased functional mobility. Patient to benefit from skilled physical therapy to address above deficits and maximize functional independence. Patient appears motivated to improve condition and is a good candidate for physical therapy. Patient has verbalized understanding of plan of care and set goals. Patient has no identified cultural, spiritual, or educational needs that would impair learning.     History  Co-morbidities and personal factors that may impact the plan of care Examination  Body Structures and Functions, activity limitations and participation restrictions that may impact the plan of care Clinical Presentation   Decision Making/ Complexity Score   Co-morbidities:     PD  PAD        Personal Factors:    Body Regions: legs, trunk    Body Systems:     Musculoskeletal:  Gait abnormality   Posture  Decreased ROM  weakness    Neuromuscular:  Impairment of balance  Abnormal tone    Activity limitations: walking      Participation Restrictions:          evolving    Moderate     Current Status: -TN (mobility 40-60%)    Rehab Potiential: good    Short Term Goals (4 Weeks):   1. Patient to have improved single limb balance of 10 sec or greater for decreased risk for falls  2. Patient's bilateral hamstring flexibility to improve 10 degrees or greater for improved posture and gait  3. Patient's BLE strength to be 5/5 for improved functional ability to lift  Long Term Goals (8 Weeks):   1. Goal Status: -AN (mobility 20-40%)  2. Patient to be independent with home exercise program for improved self management of condition  3. Patient to improve score on DGI by 3 points or greater for decreased risk for falls    Plan     Certification Period: 5/25/2017 to 8/18/2017  Recommended Treatment Plan: 2-3 times per week for 12 weeks: Gait Training, Manual Therapy,  Neuromuscular Re-ed, Patient Education, Self Care, Therapeutic Activites and Therapeutic Exercise  Other Recommendations: Patient to start home program and told to contact therapist if there are any questions or concerns that arise prior to next scheduled visit    Therapist: Suzi Pierson, PT

## 2017-05-25 NOTE — PROGRESS NOTES
Patient here for close follow up of recent low INRs and new dose. INR good. Patient confirmed dose and compliance. He denies changes in meds or diet. He has started PT. No signs or symptoms of bleeding. We will continue on current dose. Repeat INR in 2 weeks.

## 2017-05-29 ENCOUNTER — TELEPHONE (OUTPATIENT)
Dept: OBSTETRICS AND GYNECOLOGY | Facility: CLINIC | Age: 77
End: 2017-05-29

## 2017-06-05 ENCOUNTER — CLINICAL SUPPORT (OUTPATIENT)
Dept: REHABILITATION | Facility: HOSPITAL | Age: 77
End: 2017-06-05
Attending: NEUROLOGICAL SURGERY
Payer: MEDICARE

## 2017-06-05 DIAGNOSIS — G20.A1 PD (PARKINSON'S DISEASE): Primary | ICD-10-CM

## 2017-06-05 DIAGNOSIS — R26.89 IMPAIRMENT OF BALANCE: ICD-10-CM

## 2017-06-05 DIAGNOSIS — R26.81 GAIT INSTABILITY: ICD-10-CM

## 2017-06-05 PROCEDURE — 97112 NEUROMUSCULAR REEDUCATION: CPT | Mod: PN

## 2017-06-05 PROCEDURE — 97110 THERAPEUTIC EXERCISES: CPT | Mod: PN

## 2017-06-05 NOTE — PROGRESS NOTES
Name: Tenzin Ortiz  Clinic Number: [id number]  Date of Treatment: 06/05/2017   Diagnosis:   Encounter Diagnoses   Name Primary?    PD (Parkinson's disease) Yes    Gait instability     Impairment of balance        Time in: 0733  Time Out: 0830  Total Treatment Time: 57' ( 1:1 with PTA for 30' of treatment session)     Visit # 2    Subjective:    Tenzin reports that he is doing okay today.  Patient states that his biggest problem is the freezing to the floor he experiences when walking.  Pt states that he can deal with all the other symptoms of parkinson's however that is the one that bothers him the most.  Patient reports their pain to be 0/10 on a 0-10 scale with 0 being no pain and 10 being the worst pain imaginable.    Objective    Tenzin received therapeutic exercises to develop strength, endurance, ROM, flexibility, posture and core stabilization for 57 minutes including:     Nustep x 3' 30''  Supine HSS with Strap  3 x 30''  Bridges 1 x 10 active, 1 x 10 x 3'' hold at top   Hooklying Hip abduction 2' x 3'' hold with GTB     Seated floor touch into BUE OH flexion into pulsing bicep curls( 10 pulses )  1 x 5  Seated ALt UE reach with trunk rotaion 1 x 5 ea side   Seated hip flexion/ abduction with UE Horizontal abd x 10   Seated knee flexion with BUE extension into trunk flexion x 10   Seated alt arm swings trunk rocking with LE in tandem x 10 ea   Seated chair yoga side angle pose x 5         Written Home Exercises Provided: Seated chair yoga side angle pose, Seated ALt UE reach with trunk rotation ,  Seated hip flexion/ abduction with UE Horizontal Abd  Pt demo good understanding of the education provided. Tenzin demonstrated good return demonstration of activities.     Assessment:   Mr. Dave tolerated treatment session well.  Patient with good tolerance to exercises, however due to Parkinson's he has difficulty with neuromuscular control of extremities.  Pt educated on the progression of therapy from seated to  standing exercises as well as incorporating balance and strengthening.  Pt will continue to benefit from skilled PT intervention. Medical Necessity is demonstrated by:  Fall Risk, Pain limits function of effected part for some activities, Unable to participate fully in daily activities, Requires skilled supervision to complete and progress HEP and Weakness.    Patient is making good progress towards established goals.     Short Term Goals (4 Weeks):   1. Patient to have improved single limb balance of 10 sec or greater for decreased risk for falls  2. Patient's bilateral hamstring flexibility to improve 10 degrees or greater for improved posture and gait  3. Patient's BLE strength to be 5/5 for improved functional ability to lift  Long Term Goals (8 Weeks):   1. Goal Status: -GW (mobility 20-40%)  2. Patient to be independent with home exercise program for improved self management of condition  3. Patient to improve score on DGI by 3 points or greater for decreased risk for falls      Plan:  Continue with established Plan of Care towards PT goals.       Morena Wan, PTA

## 2017-06-08 ENCOUNTER — ANTI-COAG VISIT (OUTPATIENT)
Dept: CARDIOLOGY | Facility: CLINIC | Age: 77
End: 2017-06-08
Payer: MEDICARE

## 2017-06-08 ENCOUNTER — CLINICAL SUPPORT (OUTPATIENT)
Dept: REHABILITATION | Facility: HOSPITAL | Age: 77
End: 2017-06-08
Attending: NEUROLOGICAL SURGERY
Payer: MEDICARE

## 2017-06-08 DIAGNOSIS — Z79.01 LONG-TERM (CURRENT) USE OF ANTICOAGULANTS: Primary | ICD-10-CM

## 2017-06-08 DIAGNOSIS — I74.10 THROMBUS OF AORTA: ICD-10-CM

## 2017-06-08 DIAGNOSIS — R26.89 IMPAIRMENT OF BALANCE: ICD-10-CM

## 2017-06-08 DIAGNOSIS — G20.A1 PD (PARKINSON'S DISEASE): Primary | ICD-10-CM

## 2017-06-08 DIAGNOSIS — R26.81 GAIT INSTABILITY: ICD-10-CM

## 2017-06-08 LAB — INR PPP: 1.5 (ref 2–3)

## 2017-06-08 PROCEDURE — 99211 OFF/OP EST MAY X REQ PHY/QHP: CPT | Mod: 25,S$GLB,,

## 2017-06-08 PROCEDURE — 97110 THERAPEUTIC EXERCISES: CPT | Mod: PN

## 2017-06-08 PROCEDURE — 85610 PROTHROMBIN TIME: CPT | Mod: QW,S$GLB,,

## 2017-06-08 PROCEDURE — 97112 NEUROMUSCULAR REEDUCATION: CPT | Mod: PN

## 2017-06-08 NOTE — PROGRESS NOTES
INR low. Patient took dose incorrectly. He took the correct dose for half of the first week then went back to 3mg daily. I explained the dose to patient and he expressed understanding. No other changes reported. No signs or symptoms of bleeding. We will resume correct dose. Repeat INR in 2 weeks.

## 2017-06-08 NOTE — PROGRESS NOTES
"Name: Tenzin Ortiz  [facility] Number: [id number]  Date of Treatment: 06/08/2017   Diagnosis:   Encounter Diagnoses   Name Primary?    PD (Parkinson's disease) Yes    Gait instability     Impairment of balance        Time in: 1200  Time Out: 1252  Total Treatment Time: 52 ( 1:1 with PTA for 42' of treatment session)     Visit # 3    Subjective:    Tenzin reports that he is having increased episodes today. States " I got zapped."  Patient states that he is having increased tremors today.   Patient reports their pain to be 0/10 on a 0-10 scale with 0 being no pain and 10 being the worst pain imaginable.    Objective    Tenzin received therapeutic exercises to develop strength, endurance, ROM, flexibility, posture and core stabilization for 52 minutes including:     Nustep x 4'  Supine HSS with Strap  3 x 30''  Bridges 1 x 10 active, 1 x 10 x 3'' hold at top   Hooklying Hip abduction 2' x 3'' hold with GTB     Seated floor touch into BUE OH flexion into pulsing bicep curls( 5 pulses )  1 x 10  Seated ALt UE reach with trunk rotaion 1 x 10 ea side   Seated hip flexion/ abduction with UE Horizontal abd x 10   Seated knee flexion with BUE extension into trunk flexion x 10   Seated alt arm swings trunk rocking with LE in tandem x 10 ea   Seated chair yoga side angle pose x 10    Standing Fwd Step x 20 ea LE ( cueing to increase step height)   Standing Side step with abduction x 20 ea LE     NBOS with EC 2 x 30''     Written Home Exercises Reviewed.   Pt demo good understanding of the education provided. Tenzin demonstrated good return demonstration of activities.     Assessment:   Mr. Dave tolerated treatment session farily well.  Patient with good tolerance to exercises, however due to Parkinson's he has difficulty with neuromuscular control of extremities particularly on the right side.  Patient with good response to progression of standing exercises, however required CGA to maintain balance with dual task activities.  Pt will " continue to benefit from skilled PT intervention. Medical Necessity is demonstrated by:  Fall Risk, Pain limits function of effected part for some activities, Unable to participate fully in daily activities, Requires skilled supervision to complete and progress HEP and Weakness.    Patient is making good progress towards established goals.     Short Term Goals (4 Weeks):   1. Patient to have improved single limb balance of 10 sec or greater for decreased risk for falls  2. Patient's bilateral hamstring flexibility to improve 10 degrees or greater for improved posture and gait  3. Patient's BLE strength to be 5/5 for improved functional ability to lift  Long Term Goals (8 Weeks):   1. Goal Status: -[place] (mobility 20-40%)  2. Patient to be independent with home exercise program for improved self management of condition  3. Patient to improve score on DGI by 3 points or greater for decreased risk for falls      Plan:  Continue with established Plan of Care towards PT goals.       Morena Wan, PTA

## 2017-06-12 ENCOUNTER — TELEPHONE (OUTPATIENT)
Dept: NEUROLOGY | Facility: CLINIC | Age: 77
End: 2017-06-12

## 2017-06-12 ENCOUNTER — OFFICE VISIT (OUTPATIENT)
Dept: NEUROLOGY | Facility: CLINIC | Age: 77
End: 2017-06-12
Payer: MEDICARE

## 2017-06-12 ENCOUNTER — TELEPHONE (OUTPATIENT)
Dept: REHABILITATION | Facility: HOSPITAL | Age: 77
End: 2017-06-12

## 2017-06-12 VITALS
BODY MASS INDEX: 25.47 KG/M2 | HEIGHT: 67 IN | HEART RATE: 72 BPM | DIASTOLIC BLOOD PRESSURE: 50 MMHG | WEIGHT: 162.25 LBS | SYSTOLIC BLOOD PRESSURE: 100 MMHG

## 2017-06-12 DIAGNOSIS — G24.9 DYSKINESIA: ICD-10-CM

## 2017-06-12 DIAGNOSIS — G20.A1 PD (PARKINSON'S DISEASE): Primary | ICD-10-CM

## 2017-06-12 PROCEDURE — 99999 PR PBB SHADOW E&M-EST. PATIENT-LVL III: CPT | Mod: PBBFAC,,, | Performed by: NEUROLOGICAL SURGERY

## 2017-06-12 PROCEDURE — 99214 OFFICE O/P EST MOD 30 MIN: CPT | Mod: S$GLB,,, | Performed by: NEUROLOGICAL SURGERY

## 2017-06-12 PROCEDURE — 1126F AMNT PAIN NOTED NONE PRSNT: CPT | Mod: S$GLB,,, | Performed by: NEUROLOGICAL SURGERY

## 2017-06-12 PROCEDURE — 1159F MED LIST DOCD IN RCRD: CPT | Mod: S$GLB,,, | Performed by: NEUROLOGICAL SURGERY

## 2017-06-12 NOTE — TELEPHONE ENCOUNTER
----- Message from Chelsey Flores sent at 6/12/2017  6:45 AM CDT -----  Contact: self  Patient states experiencing alot of shaking unable to walk.  Patient states need appointment today   Please call pt at [phone]

## 2017-06-13 PROBLEM — G24.9 DYSKINESIA: Status: ACTIVE | Noted: 2017-06-13

## 2017-06-14 ENCOUNTER — CLINICAL SUPPORT (OUTPATIENT)
Dept: REHABILITATION | Facility: HOSPITAL | Age: 77
End: 2017-06-14
Attending: NEUROLOGICAL SURGERY
Payer: MEDICARE

## 2017-06-14 DIAGNOSIS — G20.A1 PD (PARKINSON'S DISEASE): Primary | ICD-10-CM

## 2017-06-14 DIAGNOSIS — R26.89 IMPAIRMENT OF BALANCE: ICD-10-CM

## 2017-06-14 DIAGNOSIS — R26.81 GAIT INSTABILITY: ICD-10-CM

## 2017-06-14 PROCEDURE — 97110 THERAPEUTIC EXERCISES: CPT | Mod: PN

## 2017-06-14 PROCEDURE — 97112 NEUROMUSCULAR REEDUCATION: CPT | Mod: PN

## 2017-06-14 NOTE — PROGRESS NOTES
"Name: Tenzin Ortiz  Clinic Number: [id number]  Date of Treatment: 06/14/2017   Diagnosis:   Encounter Diagnoses   Name Primary?    PD (Parkinson's disease) Yes    Gait instability     Impairment of balance        Time in: 0810  Time Out: 0905  Total Treatment Time: 55' ( 1:1 with PTA for 55' of treatment session)     Visit # 4    Subjective:    Tenzin reports that he has been having a hard time dealing with "the cloud hanging over my head."   Pt reports that he was not able to come in on Monday because he was having a rough time.  Patient reports their pain to be 0/10 on a 0-10 scale with 0 being no pain and 10 being the worst pain imaginable.    Objective    Tenzin received therapeutic exercises to develop strength, endurance, ROM, flexibility, posture and core stabilization for 55 minutes including:     Nustep x 7'  Supine HSS with Strap  2 x 30''  Bridges 1 x 10 active, 1 x 10 x 3'' hold at top   Hooklying Hip abduction 2' x 3'' hold with GTB     Seated floor touch into BUE OH flexion into pulsing bicep curls( 10 pulses )  1 x 10  ( Floor to ceiling )   Seated ALt UE reach with trunk rotation 1 x 10 ea side   ( side to side)   Seated hip flexion/ abduction with UE Horizontal abd x 10   + Supine Side<>SIde x 10 ea side  + Supine Fwd step and reach x 10 ea side       Seated knee flexion with BUE extension into trunk flexion x 10   Seated alt arm swings trunk rocking with LE in tandem x 10 ea   Seated chair yoga side angle pose x 10    Standing Fwd Step x 10 ea LE ( cueing to increase step height)   Standing Side step with abduction x 20 ea LE   Standing Forward Rock and reach x 15 ea LE    NBOS with EC 2 x 30''     + Tandem stance with HT 2 x 30'' ea LE   + Modified SLS 2 x 30'' ea LE  + NBOS with HT x 2'   + Dynamic gait in // bars , change speed and head turns, abrupt stops  X 5 laps        Written Home Exercises Reviewed.   Pt demo good understanding of the education provided. Tenzin demonstrated good return " demonstration of activities.     Assessment:   Mr. Dave tolerated treatment session well today.  Patient with good tolerance to exercises, however due to Parkinson's he has difficulty with neuromuscular control of extremities particularly on the right side.  Patient with good response to progression of supine and  standing exercises, however required CGA to maintain balance with dual task activities.  Pt demonstrated 2 LOB while performing static and dynamic balance.  Pt displays good balance and ability to change gait speeds in parallel bars, with 1 episode of use bilateral upper extremities to regain balance with turns.  Pt educated on the importance of performing HEP everyday to progress with discharge goals.  Pt will continue to benefit from skilled PT intervention. Medical Necessity is demonstrated by:  Fall Risk, Pain limits function of effected part for some activities, Unable to participate fully in daily activities, Requires skilled supervision to complete and progress HEP and Weakness.    Patient is making good progress towards established goals.     Short Term Goals (4 Weeks):   1. Patient to have improved single limb balance of 10 sec or greater for decreased risk for falls  2. Patient's bilateral hamstring flexibility to improve 10 degrees or greater for improved posture and gait  3. Patient's BLE strength to be 5/5 for improved functional ability to lift  Long Term Goals (8 Weeks):   1. Goal Status: -ZS (mobility 20-40%)  2. Patient to be independent with home exercise program for improved self management of condition  3. Patient to improve score on DGI by 3 points or greater for decreased risk for falls      Plan:  Continue with established Plan of Care towards PT goals.       Morena Wan, PTA

## 2017-06-14 NOTE — PROGRESS NOTES
Chief Complaint   Patient presents with    Tremors        Tenzin Ortiz is a 76 y.o. male with a history of multiple medical diagnoses as listed below that presents for evaluation of pain in the feet. He has a history of PAD that has been evaluated and treated by Dr. Mackenzie. He has had stents placed in his legs to help with his blood flow. Since the stents were played he says that he has not longer been experiencing the cramping that is in the lower legs and has been able to do better with his walking. He says that he has continued to have pins and needles sensations along the bottoms of his feet that begin immediately when he stands ups and places pressure on his feet in the morning. He says that physical activity has made his symptoms much more prominent and after her tries to get some rest the sensations tend to subside. When he is walking the pains extend from the soles of the feet to the hips on both sides however when he rests it is isolated to his feet. He has not found that anything has made the symptoms better that he has tried. He has never tried gabapentin. He has been stable with his PD saying that he take carbidopa about eight times per day as he feels that he needs it. He feels that he has been stable with regards to his PD.    Interval History  04/18/2017  Since he was last seen in clinic his biggest complaint has been his pain level in his leg. He says that he has been having a sharp and shooting pain form the level of the back, down through his buttock, and into the leg and foot on the left side. He has been taking muscle relaxants and pain medications but has not found that either have been very helpful in reducing his level of pain. He has not had any difficulty with his arms. He has not had the same degree of pain in the other leg. He has been taking all his medications as directed. There is nothing that he can think of that would lead to him having this pain. His PD is stable with the exception  of some visual hallucinations from Mirapex.    04/25/2017  His pain in the back and into the leg has been about the same since he was last seen in clinic. He says that he has been having good and bad days with regards to the pain and he has been taking tylenol as needed to help him to control the pain. He feels that tylenol has been effective in treating the pain.    06/12/2017  He presents to clinic for evaluation of uncontrollable movement alternated with periods of feeling frozen. He says that he has been taking carbidopa/levidopa but has been taking the medication every few hours as he felt it was necessary. He admits that he did not adhere to a particular schedule and has been taking more medication at times and at other times he has been taking more of the medication.    PAST MEDICAL HISTORY:  Past Medical History:   Diagnosis Date    Anticoagulant long-term use     Hypertension     Impulse control disorder 2012    gambling on mirapex; also possible dopamine dysregulation syndrome    PD (Parkinson's disease) 1999    tremor-predominant    PVD (peripheral vascular disease) with claudication     poor circulation both legs       PAST SURGICAL HISTORY:  No past surgical history on file.    SOCIAL HISTORY:  Social History     Social History    Marital status: Single     Spouse name: N/A    Number of children: N/A    Years of education: N/A     Occupational History    Not on file.     Social History Main Topics    Smoking status: Former Smoker     Years: 10.00    Smokeless tobacco: Never Used      Comment: Quit 40 years ago    Alcohol use No    Drug use: No    Sexual activity: Not Currently     Other Topics Concern    Not on file     Social History Narrative    No narrative on file       FAMILY HISTORY:  Family History   Problem Relation Age of Onset    No Known Problems Mother     No Known Problems Father     Parkinsonism Neg Hx        ALLERGIES AND MEDICATIONS: updated and reviewed.  Review of  patient's allergies indicates:   Allergen Reactions    Amantadine analogues      Caused confusion    Mirapex [pramipexole]      Pathologic gambling    Neupro [rotigotine]      Pathologic gambling       Current Outpatient Prescriptions   Medication Sig Dispense Refill    acetaminophen (TYLENOL ARTHRITIS PAIN) 650 MG TbSR Take 650 mg by mouth every 8 (eight) hours.      amlodipine (NORVASC) 10 MG tablet TAKE 1 TABLET BY MOUTH EVERY DAY 90 tablet 11    amlodipine (NORVASC) 10 MG tablet TAKE 1 TABLET BY MOUTH EVERY DAY 30 tablet 0    atorvastatin (LIPITOR) 10 MG tablet Take 10 mg by mouth once daily.      carbidopa-levodopa (PARCOPA)  mg per disintegrating tablet DISSOLVE 2 TS PO QID PRN  2    carbidopa-levodopa  mg (SINEMET CR)  mg TbSR Take 1 tablet by mouth 5 (five) times daily.      clopidogrel (PLAVIX) 75 mg tablet TAKE 1 TABLET BY MOUTH ONCE DAILY 90 tablet 11    cyclobenzaprine (FLEXERIL) 10 MG tablet Take 10 mg by mouth 3 (three) times daily as needed for Muscle spasms.      fluocinonide (LIDEX) 0.05 % ointment POWER AA BID  1    gabapentin (NEURONTIN) 300 MG capsule Take 2 capsules (600 mg total) by mouth every evening. 60 capsule 11    pramipexole (MIRAPEX) 1 MG tablet       tramadol (ULTRAM) 50 mg tablet Take 50 mg by mouth every 6 (six) hours as needed for Pain.      trazodone (DESYREL) 50 MG tablet TK 1/2 T PO HS  U PRN TO REST ATN  4    warfarin (COUMADIN) 3 MG tablet TAKE ONE BY MOUTH EVERY DAY OR AS DIRECTED BY COUMADIN CLINIC 90 tablet 11     No current facility-administered medications for this visit.        Review of Systems   Constitutional: Negative for activity change, fatigue and unexpected weight change.   HENT: Negative for trouble swallowing and voice change.    Eyes: Negative for photophobia, pain and visual disturbance.   Respiratory: Negative for apnea and shortness of breath.    Cardiovascular: Negative for chest pain and palpitations.   Gastrointestinal:  Negative for constipation, nausea and vomiting.   Genitourinary: Negative for difficulty urinating.   Musculoskeletal: Positive for gait problem and myalgias. Negative for arthralgias, back pain and neck pain.   Skin: Negative for color change and rash.   Neurological: Positive for tremors and numbness. Negative for dizziness, seizures, syncope, speech difficulty, weakness, light-headedness and headaches.   Psychiatric/Behavioral: Negative for agitation, behavioral problems and confusion.       Neurologic Exam     Mental Status   Oriented to person, place, and time.   Registration: recalls 3 of 3 objects.   Attention: normal. Concentration: normal.   Speech: speech is normal   Level of consciousness: alert  Knowledge: good.     Cranial Nerves     CN II   Visual fields full to confrontation.   Right visual field deficit: none  Left visual field deficit: none     CN III, IV, VI   Pupils are equal, round, and reactive to light.  Extraocular motions are normal.   Right pupil: Size: 3 mm. Shape: regular. Accommodation: intact.   Left pupil: Size: 3 mm. Shape: regular. Accommodation: intact.   CN III: no CN III palsy  CN VI: no CN VI palsy  Nystagmus: none   Diplopia: none  Ophthalmoparesis: none  Upgaze: normal  Downgaze: normal  Conjugate gaze: present    CN V   Facial sensation intact.   Right facial sensation deficit: none  Left facial sensation deficit: none    CN VII   Facial expression full, symmetric.   Right facial weakness: none  Left facial weakness: none    CN VIII   CN VIII normal.     CN IX, X   CN IX normal.   CN X normal.   Palate: symmetric    CN XI   CN XI normal.   Right sternocleidomastoid strength: normal  Left sternocleidomastoid strength: normal  Right trapezius strength: normal  Left trapezius strength: normal    CN XII   CN XII normal.   Tongue deviation: none    Motor Exam   Muscle bulk: normal  Overall muscle tone: normal  Right arm tone: normal  Left arm tone: normal  Right leg tone:  normal  Left leg tone: normal    Strength   Strength 5/5 throughout.     Sensory Exam   Right arm light touch: normal  Left arm light touch: normal  Right leg light touch: normal  Left leg light touch: normal  Right arm vibration: normal  Left arm vibration: normal  Right leg vibration: normal  Left leg vibration: normal  Right arm proprioception: normal  Left arm proprioception: normal  Right leg proprioception: normal  Left leg proprioception: normal  Right arm pinprick: normal  Left arm pinprick: normal  Right leg pinprick: normal  Left leg pinprick: normal    Gait, Coordination, and Reflexes     Gait  Gait: normal    Coordination   Romberg: negative  Finger to nose coordination: normal  Heel to shin coordination: normal  Tandem walking coordination: normal    Tremor   Resting tremor: absent    Reflexes   Right brachioradialis: 2+  Left brachioradialis: 2+  Right biceps: 2+  Left biceps: 2+  Right triceps: 2+  Left triceps: 2+  Right patellar: 2+  Left patellar: 2+  Right achilles: 2+  Left achilles: 2+  Right plantar: normal  Left plantar: normal      Physical Exam   Constitutional: He is oriented to person, place, and time.   Eyes: EOM are normal. Pupils are equal, round, and reactive to light.   Neurological: He is oriented to person, place, and time. He has normal strength. He has a normal Finger-Nose-Finger Test, a normal Heel to Shin Test, a normal Romberg Test and a normal Tandem Gait Test. Gait normal.   Reflex Scores:       Tricep reflexes are 2+ on the right side and 2+ on the left side.       Bicep reflexes are 2+ on the right side and 2+ on the left side.       Brachioradialis reflexes are 2+ on the right side and 2+ on the left side.       Patellar reflexes are 2+ on the right side and 2+ on the left side.       Achilles reflexes are 2+ on the right side and 2+ on the left side.  Psychiatric: His speech is normal.       Vitals:    06/12/17 1338   BP: (!) 100/50   BP Location: Left arm   Patient  "Position: Sitting   BP Method: Manual   Pulse: 72   Weight: 73.6 kg (162 lb 4.1 oz)   Height: 5' 7" (1.702 m)     CT lumbar spine personally reviewed: degenerative changes in the lumbar spine with narrowing near the exit of the L5 nerve root    Assessment & Plan:  Problem List Items Addressed This Visit     PD (Parkinson's disease) - Primary    Overview     Right tremor-predominant classic type.         Dyskinesia      Other Visit Diagnoses    None.       Follow-up: Return in about 3 months (around 9/12/2017).  More than 50% of this 25 minute encounter was spent in counseling and coordinating care.      "

## 2017-06-19 ENCOUNTER — CLINICAL SUPPORT (OUTPATIENT)
Dept: REHABILITATION | Facility: HOSPITAL | Age: 77
End: 2017-06-19
Attending: NEUROLOGICAL SURGERY
Payer: MEDICARE

## 2017-06-19 DIAGNOSIS — G20.A1 PD (PARKINSON'S DISEASE): Primary | ICD-10-CM

## 2017-06-19 DIAGNOSIS — R26.89 IMPAIRMENT OF BALANCE: ICD-10-CM

## 2017-06-19 DIAGNOSIS — R26.81 GAIT INSTABILITY: ICD-10-CM

## 2017-06-19 PROCEDURE — 97110 THERAPEUTIC EXERCISES: CPT | Mod: PN

## 2017-06-19 PROCEDURE — 97112 NEUROMUSCULAR REEDUCATION: CPT | Mod: PN

## 2017-06-19 NOTE — PROGRESS NOTES
Name: Tenzin Ortiz  Clinic Number: [id number]  Date of Treatment: 06/19/2017   Diagnosis:   Encounter Diagnoses   Name Primary?    PD (Parkinson's disease) Yes    Gait instability     Impairment of balance        Time in: 0910  Time Out: 1004  Total Treatment Time: 54' ( 1:1 with PTA for 54' of treatment session)     Visit # 4    Subjective:    Tenzin reports that he is doing better today.  Patient states that he didn't have a good day on Friday.  Pt reports that he is a few minutes late because he fell asleep in his car in the parking lot.  Patient reports their pain to be 0/10 on a 0-10 scale with 0 being no pain and 10 being the worst pain imaginable.  Pt reports minimal compliance with HEP.     Objective    Tenzin received therapeutic exercises to develop strength, endurance, ROM, flexibility, posture and core stabilization for 54 minutes including:     + Upright bike 5'   Nustep x 7'  Supine HSS with Strap  2 x 30''  Bridges  2 x 10 x 3'' hold at top   Hooklying Hip abduction 2' x 3'' hold with GTB     Seated floor touch into BUE OH flexion into pulsing bicep curls ( 10 pulses )  1 x 10  ( Floor to ceiling )   Seated ALt UE reach with trunk rotation 1 x 10 ea side   ( side to side)   Seated hip flexion/ abduction with UE Horizontal abd x 10   Supine Side<>Side x 10 ea side  Supine Fwd step and reach x 10 ea side     Standing Fwd Step x 10 ea LE ( cueing to increase step height)   Standing Side step with abduction x 20 ea LE   Standing Forward Rock and reach x 15 ea LE  Increased difficulty with LLE lead     NBOS with EC 2 x 30''   Tandem stance with HT 2 x 30'' ea LE   Modified SLS 2 x 30'' ea LE  NBOS with HT x 2'   Dynamic gait in // bars , change speed and head turns, abrupt stops  X 2 laps        Written Home Exercises Reviewed.   Pt demo good understanding of the education provided. Tenzin demonstrated good return demonstration of activities.     Assessment:   Mr. Dave tolerated treatment session well today.   Patient with good tolerance to exercises, however due to Parkinson's he has difficulty with neuromuscular control of extremities particularly on the right side.  Patient with improved tolerance to exercises today, being able to demonstrate increased amplitude with seated and supine movements.  Patient with increased difficulty performing standing exercises especially with activities that required the LLE to lead.  Pt re-educated on the importance of performing HEP everyday to progress with discharge goals.  Pt will continue to benefit from skilled PT intervention. Medical Necessity is demonstrated by:  Fall Risk, Pain limits function of effected part for some activities, Unable to participate fully in daily activities, Requires skilled supervision to complete and progress HEP and Weakness.    Patient is making good progress towards established goals.     Short Term Goals (4 Weeks):   1. Patient to have improved single limb balance of 10 sec or greater for decreased risk for falls  2. Patient's bilateral hamstring flexibility to improve 10 degrees or greater for improved posture and gait  3. Patient's BLE strength to be 5/5 for improved functional ability to lift  Long Term Goals (8 Weeks):   1. Goal Status: -HA (mobility 20-40%)  2. Patient to be independent with home exercise program for improved self management of condition  3. Patient to improve score on DGI by 3 points or greater for decreased risk for falls      Plan:  Continue with established Plan of Care towards PT goals.       Morena Wan, PTA

## 2017-06-21 ENCOUNTER — ANTI-COAG VISIT (OUTPATIENT)
Dept: CARDIOLOGY | Facility: CLINIC | Age: 77
End: 2017-06-21
Payer: MEDICARE

## 2017-06-21 ENCOUNTER — CLINICAL SUPPORT (OUTPATIENT)
Dept: REHABILITATION | Facility: HOSPITAL | Age: 77
End: 2017-06-21
Attending: NEUROLOGICAL SURGERY
Payer: MEDICARE

## 2017-06-21 DIAGNOSIS — R26.89 IMPAIRMENT OF BALANCE: ICD-10-CM

## 2017-06-21 DIAGNOSIS — G20.A1 PD (PARKINSON'S DISEASE): Primary | ICD-10-CM

## 2017-06-21 DIAGNOSIS — Z79.01 LONG-TERM (CURRENT) USE OF ANTICOAGULANTS: Primary | ICD-10-CM

## 2017-06-21 DIAGNOSIS — R26.81 GAIT INSTABILITY: ICD-10-CM

## 2017-06-21 DIAGNOSIS — I74.10 THROMBUS OF AORTA: ICD-10-CM

## 2017-06-21 LAB — INR PPP: 1.7 (ref 2–3)

## 2017-06-21 PROCEDURE — 97112 NEUROMUSCULAR REEDUCATION: CPT | Mod: PN

## 2017-06-21 PROCEDURE — 97110 THERAPEUTIC EXERCISES: CPT | Mod: PN

## 2017-06-21 PROCEDURE — 85610 PROTHROMBIN TIME: CPT | Mod: QW,S$GLB,,

## 2017-06-21 PROCEDURE — 99211 OFF/OP EST MAY X REQ PHY/QHP: CPT | Mod: 25,S$GLB,,

## 2017-06-21 NOTE — PROGRESS NOTES
Name: Tenzin Ortiz  Clinic Number: [id number]  Date of Treatment: 06/21/2017   Diagnosis:   Encounter Diagnoses   Name Primary?    PD (Parkinson's disease) Yes    Gait instability     Impairment of balance        Time in: 0800  Time Out: 0900  Total Treatment Time: 60'  ( 1:1 with PTA for duration of treatment session)     Visit # 5    Subjective:    Tenzin reports that he is having increased shoulder and back pain today from picking things up around his house yesterday.  Patient states that he woke up with a 20/10 pain today in his low back.   Patient reports their pain to be 5/10 in his right shoulder and 0/10 pain in his back.     Objective    Tenzin received therapeutic exercises to develop strength, endurance, ROM, flexibility, posture and core stabilization for 60 minutes including:      Upright bike 5'   Nustep x 7'  Supine HSS with Strap  2 x 30''  Bridges  2 x 10 x 3'' hold at top   + LTR x 2'   Hooklying Hip abduction 2' x 3'' hold with BTB     Seated floor touch into BUE OH flexion into pulsing bicep curls ( 10 pulses )  1 x 10  ( Floor to ceiling )   Seated ALt UE reach with trunk rotation 1 x 10 ea side   ( side to side)   Seated hip flexion/ abduction with UE Horizontal abd x 10   Supine Side<>Side x 10 ea side  ( without UE d/t pain)   Supine Fwd step and reach x 10 ea side ( without UE d/t pain)       Standing Fwd Step x 10 ea LE ( cueing to increase step height)   Standing Side step with UE abduction x 20 ea LE   Standing Forward Rock and reach x 15 ea LE  Increased difficulty with LLE lead       + sit<>stand 2 x 10   + Hurdles fwd Side stepping x 3 laps ea   + Cone taps with heels x 15 ea LE   NBOS with EC 1 x 30'' , 1 x 30'' on airex   Tandem stance with HT 2 x 30'' ea LE   Modified SLS 2 x 30'' ea LE  SLS 2 x 15''   NBOS with HT x 2'   Dynamic gait in // bars , change speed and head turns, abrupt stops  X 2 laps        Written Home Exercises Reviewed.   Pt demo good understanding of the education  valeriy Dave demonstrated good return demonstration of activities.     Assessment:   Mr. Dave tolerated treatment session fairly today.  Pt with decreased tolerance to exercises due to complaints of increased pain.  Patient with modified treatment session to just use of lower extremities.  Pt required cueing to increase forward weight shift with sit<>stands.   Patient demonstrated fair<>good balance with standing static balance activities with occasional use of UE to maintain balance.  Patient with improvements in right lower extremity neuromuscular control being able to perform heel taps with cone.  Pt re-educated on the importance of performing HEP everyday to progress with discharge goals.  Pt will continue to benefit from skilled PT intervention. Medical Necessity is demonstrated by:  Fall Risk, Pain limits function of effected part for some activities, Unable to participate fully in daily activities, Requires skilled supervision to complete and progress HEP and Weakness.    Patient is making good progress towards established goals.     Short Term Goals (4 Weeks):   1. Patient to have improved single limb balance of 10 sec or greater for decreased risk for falls  2. Patient's bilateral hamstring flexibility to improve 10 degrees or greater for improved posture and gait  3. Patient's BLE strength to be 5/5 for improved functional ability to lift  Long Term Goals (8 Weeks):   1. Goal Status: -UJ (mobility 20-40%)  2. Patient to be independent with home exercise program for improved self management of condition  3. Patient to improve score on DGI by 3 points or greater for decreased risk for falls      Plan:  Continue with established Plan of Care towards PT goals.       Morena Wan, PTA

## 2017-06-21 NOTE — PROGRESS NOTES
INR low due to missed dose(s). Patient changed his routine and missed a dose or 2. Patient advised to take coumadin in the evening and use a pillbox to aid in compliance. He denies changes in diet,  medications or health. No signs or symptoms of bleeding. Boost dose today then resume prescribed dose. Repeat INR 7/3

## 2017-06-22 DIAGNOSIS — I74.10 THROMBUS OF AORTA: ICD-10-CM

## 2017-06-22 DIAGNOSIS — Z79.01 LONG-TERM (CURRENT) USE OF ANTICOAGULANTS: ICD-10-CM

## 2017-06-22 RX ORDER — AMLODIPINE BESYLATE 10 MG/1
TABLET ORAL
Qty: 30 TABLET | Refills: 0 | Status: SHIPPED | OUTPATIENT
Start: 2017-06-22 | End: 2017-07-27 | Stop reason: SDUPTHER

## 2017-06-22 RX ORDER — WARFARIN 3 MG/1
TABLET ORAL
Qty: 30 TABLET | Refills: 0 | Status: SHIPPED | OUTPATIENT
Start: 2017-06-22 | End: 2017-07-11 | Stop reason: SDUPTHER

## 2017-06-26 ENCOUNTER — CLINICAL SUPPORT (OUTPATIENT)
Dept: REHABILITATION | Facility: HOSPITAL | Age: 77
End: 2017-06-26
Attending: NEUROLOGICAL SURGERY
Payer: MEDICARE

## 2017-06-26 DIAGNOSIS — R26.81 GAIT INSTABILITY: ICD-10-CM

## 2017-06-26 DIAGNOSIS — G20.A1 PD (PARKINSON'S DISEASE): Primary | ICD-10-CM

## 2017-06-26 DIAGNOSIS — R26.89 IMPAIRMENT OF BALANCE: ICD-10-CM

## 2017-06-26 PROCEDURE — G8978 MOBILITY CURRENT STATUS: HCPCS | Mod: CL,PN

## 2017-06-26 PROCEDURE — G8979 MOBILITY GOAL STATUS: HCPCS | Mod: CK,PN

## 2017-06-26 PROCEDURE — 97110 THERAPEUTIC EXERCISES: CPT | Mod: PN

## 2017-06-26 NOTE — PROGRESS NOTES
Name: Tenzin Ortiz  [facility] Number: [id number]  Date of Treatment: 06/26/2017   Diagnosis:   Encounter Diagnoses   Name Primary?    PD (Parkinson's disease) Yes    Gait instability     Impairment of balance        Time in: 0700  Time Out: 0800  Total Treatment Time: 60' (1:1 with PT for 30' of treatment session)     Visit # 6    Subjective:    Tenzin reports no new complaints and that his back is feeling better. He reports his L ankle is swollen today and he isn't feeling great because he has been up since 3 AM. Patient reports their pain to be 0/10 in his right shoulder and 0/10 pain in his back.     Objective    Taken 6/26/17:  Flexibility: Hamstring length at 90/90 R:-20 deg, L: -25 deg  SLS: R 4 sec, L 5 sec  MMT   Left  Right    Hip:  Flexion   4/5  4+/5  Abduction  2+/5  4/5  Adduction  3+/5  4/5  Ext   3/5  3/5    Knee:  Flexion   5/5  5/5  Extension  5/5  5/5    Ankle:  Dorsiflexion  4/5*pain 4/5  Plantar flexion  4-/5*pain 5/5    Tenzin received therapeutic exercises to develop strength, endurance, ROM, flexibility, posture and core stabilization for 60 minutes including:     Upright bike 7'   Nustep x 7'  Supine HSS with Strap  2 x 30''  Bridges  2 x 10 x 3'' hold at top   LTR x 2'   +Supine clams w BTB x 3'  Hooklying Hip abduction 2' x 3'' hold with BTB   HL hip adduction with ball squeeze 2' x 3'' hold     Seated floor touch into BUE OH flexion into pulsing bicep curls ( 10 pulses )  1 x 10  ( Floor to ceiling )   Seated ALt UE reach with trunk rotation 1 x 10 ea side   ( side to side)   Seated hip flexion/ abduction with UE Horizontal abd x 10   Supine Side<>Side x 10 ea side  ( without UE d/t pain)   Supine Fwd step and reach x 10 ea side ( without UE d/t pain)       Standing Fwd Step x 10 ea LE ( cueing to increase step height)   Standing Side step with UE abduction x 20 ea LE   Standing Forward Rock and reach x 15 ea LE  Increased difficulty with LLE lead     sit<>stand 2 x 10   Hurdles fwd Side  stepping x 3 laps ea   Cone taps with heels x 15 ea LE   NBOS with EC 1 x 30'' , 1 x 30'' on airex   Tandem stance with HT 2 x 30'' ea LE   Modified SLS 2 x 30'' ea LE  SLS 2 x 15''   NBOS with HT x 2'   Dynamic gait in // bars , change speed and head turns, abrupt stops  X 2 laps      Written Home Exercises Reviewed.   Pt demo good understanding of the education provided. Tenzin demonstrated good return demonstration of activities.     Functional Limitation Report- G-CODE:  CMS Impairment/Limitation/Restriction for FOTO Lower Leg (w/o Knee) Survey  Status Limitation G-Code CMS Severity Modifier  Intake 41% 59%  Predicted 48% 52% Goal Status+ CK - At least 40 percent but less than 60 percent  6/26/2017 37% 63% Current Status CL - At least 60 percent but less than 80 percent  +Based on FOTO predicted change score     Assessment:   Mr. Dave was re-assessed today with 1/3 STGs being met indicating improvements in B hamstring flexibility since start of care. Pt demonstrates decreased B LE strength compared to initial evaluation but inter-rater reliability and pt's lack of sleep today should be considered. He continues to be challenged with B LE strengthening and demonstrates impaired single leg static balance.     He had fair tolerance to treatment today due tiredness and decreased sleep. Pt with decreased tolerance for balance compared to past treatments. He presents with increase in tremors possibly contributing to balance deficits and fatigue. Will continue to work towards improving strength and balance.     Pt will continue to benefit from skilled PT intervention. Medical Necessity is demonstrated by:  Fall Risk, Pain limits function of effected part for some activities, Unable to participate fully in daily activities, Requires skilled supervision to complete and progress HEP and Weakness.    Patient is making good progress towards established goals.     Short Term Goals (4 Weeks):   1. Patient to have improved single  limb balance of 10 sec or greater for decreased risk for falls- in progress  2. Patient's bilateral hamstring flexibility to improve 10 degrees or greater for improved posture and gait- met 6/26/17  3. Patient's BLE strength to be 5/5 for improved functional ability to lift- in progress  Long Term Goals (8 Weeks):   1. Goal Status: -AY (mobility 20-40%)  2. Patient to be independent with home exercise program for improved self management of condition  3. Patient to improve score on DGI by 3 points or greater for decreased risk for falls    Plan:  Continue with established Plan of Care towards PT goals.

## 2017-06-27 ENCOUNTER — OFFICE VISIT (OUTPATIENT)
Dept: CARDIOLOGY | Facility: CLINIC | Age: 77
End: 2017-06-27
Payer: MEDICARE

## 2017-06-27 VITALS
OXYGEN SATURATION: 97 % | HEIGHT: 67 IN | WEIGHT: 162 LBS | HEART RATE: 79 BPM | SYSTOLIC BLOOD PRESSURE: 115 MMHG | DIASTOLIC BLOOD PRESSURE: 56 MMHG | BODY MASS INDEX: 25.43 KG/M2

## 2017-06-27 DIAGNOSIS — G20.A1 PARKINSON'S DISEASE: ICD-10-CM

## 2017-06-27 DIAGNOSIS — E78.5 DYSLIPIDEMIA: ICD-10-CM

## 2017-06-27 DIAGNOSIS — I73.9 PAD (PERIPHERAL ARTERY DISEASE): Primary | ICD-10-CM

## 2017-06-27 DIAGNOSIS — R53.81 DEBILITY: ICD-10-CM

## 2017-06-27 DIAGNOSIS — Z79.01 LONG-TERM (CURRENT) USE OF ANTICOAGULANTS: ICD-10-CM

## 2017-06-27 DIAGNOSIS — I74.10 THROMBUS OF AORTA: ICD-10-CM

## 2017-06-27 DIAGNOSIS — Z79.01 ON CONTINUOUS ORAL ANTICOAGULATION: ICD-10-CM

## 2017-06-27 PROCEDURE — 99214 OFFICE O/P EST MOD 30 MIN: CPT | Mod: S$GLB,,, | Performed by: INTERNAL MEDICINE

## 2017-06-27 PROCEDURE — 1159F MED LIST DOCD IN RCRD: CPT | Mod: S$GLB,,, | Performed by: INTERNAL MEDICINE

## 2017-06-27 PROCEDURE — 99999 PR PBB SHADOW E&M-EST. PATIENT-LVL III: CPT | Mod: PBBFAC,,, | Performed by: INTERNAL MEDICINE

## 2017-06-27 PROCEDURE — 1125F AMNT PAIN NOTED PAIN PRSNT: CPT | Mod: S$GLB,,, | Performed by: INTERNAL MEDICINE

## 2017-06-27 NOTE — PROGRESS NOTES
Subjective:    Patient ID:  Tenzin Ortiz is a 76 y.o. male who presents for follow-up of Follow-up      HPI   previous history:  Here for follow-up of peripheral vascular disease.  He says his legs feel good but he still deals with issues from his Parkinson's.  He said he went to one day of cardiac rehabilitation and just felt bad and never returned.  He's amenable to calling him again in trying to reinitiate the program.  He denies any worsening cardiopulmonary complaints.  He's not expressing chest pain, shortness of breath or palpitations.  He denies any PND, orthopnea or lower edema.  He does have follow-up with neurology next month.  Otherwise is been in his usual state of health.    Today:  Here for follow-up of peripheral vascular disease.  He says his feet are burning him again.  He has significant signs of worsening Parkinson's in clinic today.  He says he got followed up with neurology locally.  He says he has appointment with neurology at main [place] but not for 2 months.  He's questionably noncompliant with some of his medicines.  He says only taking 2 pills per day.  He denies any worsening cardiopulmonary complaints.  He says his had no acute increase in severity post procedure of his typical claudication.        Review of Systems   Constitution: Negative.   HENT: Negative.    Eyes: Negative.    Cardiovascular: Negative for chest pain, dyspnea on exertion, irregular heartbeat, leg swelling, near-syncope, orthopnea, palpitations, paroxysmal nocturnal dyspnea and syncope.   Respiratory: Negative for shortness of breath.    Skin: Negative.    Musculoskeletal: Negative.    Gastrointestinal: Negative for abdominal pain, constipation and diarrhea.   Genitourinary: Negative for dysuria.   Neurological: Positive for disturbances in coordination, focal weakness, loss of balance, numbness and paresthesias. Negative for dizziness.   Psychiatric/Behavioral: Negative.         Objective:    Physical Exam    Constitutional: He is oriented to person, place, and time. He appears well-developed and well-nourished. No distress.   HENT:   Head: Normocephalic and atraumatic.   Eyes: Conjunctivae and EOM are normal. Pupils are equal, round, and reactive to light.   Neck: Normal range of motion. Neck supple. No thyromegaly present.   Cardiovascular: Normal rate, regular rhythm and normal heart sounds.    No murmur heard.  Pulmonary/Chest: Effort normal and breath sounds normal. No respiratory distress. He has no wheezes. He has no rales. He exhibits no tenderness.   Abdominal: Soft. Bowel sounds are normal.   Musculoskeletal: He exhibits no edema.   Neurological: He is alert and oriented to person, place, and time. He displays abnormal reflex. He exhibits abnormal muscle tone. Coordination abnormal.   Skin: Skin is warm and dry.   Psychiatric: He has a normal mood and affect. His behavior is normal.       BIA:   ABIs mildly decreased.  Right ankle-brachial index 0.88  Left ankle-brachial index 0.76    Assessment:       1. PAD (peripheral artery disease)    2. Parkinson's disease    3. Long-term (current) use of anticoagulants    4. Thrombus of aorta    5. Dyslipidemia    6. Debility    7. On continuous oral anticoagulation         Plan:       -Continue medical therapy  -Patient as well as component of neuropathy post revascularization of aortoiliac bifurcation  -On Coumadin for OAC for aortic thrombus  -Referred to cardiac rehabilitation, states he will try and resume  -We'll try and get him an appointment with neurology locally at [facility]    Return to clinic as scheduled

## 2017-06-29 ENCOUNTER — CLINICAL SUPPORT (OUTPATIENT)
Dept: REHABILITATION | Facility: HOSPITAL | Age: 77
End: 2017-06-29
Attending: NEUROLOGICAL SURGERY
Payer: MEDICARE

## 2017-06-29 DIAGNOSIS — G20.A1 PD (PARKINSON'S DISEASE): Primary | ICD-10-CM

## 2017-06-29 DIAGNOSIS — R26.81 GAIT INSTABILITY: ICD-10-CM

## 2017-06-29 DIAGNOSIS — R26.89 IMPAIRMENT OF BALANCE: ICD-10-CM

## 2017-06-29 PROCEDURE — 97110 THERAPEUTIC EXERCISES: CPT | Mod: PN | Performed by: PHYSICAL THERAPIST

## 2017-06-29 PROCEDURE — 97112 NEUROMUSCULAR REEDUCATION: CPT | Mod: 59,PN | Performed by: PHYSICAL THERAPIST

## 2017-06-29 NOTE — PROGRESS NOTES
Name: Tenzin Ortiz  Clinic Number: [id number]  Date of Treatment: 06/29/2017   Diagnosis:   Encounter Diagnoses   Name Primary?    PD (Parkinson's disease) Yes    Gait instability     Impairment of balance        Time in: 1400  Time Out: 1455  Total Treatment Time: 55' (1:1 with PT for entire duration of treatment session)     Visit # 7    Subjective:    Tenzin reports of no new complaints and that he feels like he is getting more flexible with HS stretches not hurting as bad. Patient reports their pain to be 0/10 in his right shoulder and 0/10 pain in his back.     Objective    Tenzin received therapeutic exercises/neuromuscular re-education to develop strength, endurance, ROM, flexibility, posture and core stabilization for 55 minutes including:     Upright bike 5'   Nustep x 7'  Supine HSS with Strap  2 x 30''  Bridges  2 x 10 x 3'' hold at top   LTR x 2'   +Supine clams w BTB x 3'  Hooklying Hip abduction 2' x 3'' hold with BTB   HL hip adduction with ball squeeze 2' x 3'' hold     Seated floor touch into BUE OH flexion into pulsing bicep curls ( 10 pulses )  1 x 10  ( Floor to ceiling )   Seated ALt UE reach with trunk rotation 1 x 10 ea side   ( side to side)   Seated hip flexion/ abduction with UE Horizontal abd x 10   Supine Side<>Side x 10 ea side  ( without UE d/t pain)   Supine Fwd step and reach x 10 ea side ( without UE d/t pain)   +cross arm punches into dynadiscs       Standing Fwd Step x 10 ea LE ( cueing to increase step height)   Standing Side step with UE abduction x 20 ea LE   Standing Forward Rock and reach x 15 ea LE  Increased difficulty with LLE lead     sit<>stand 2 x 10   Hurdles fwd Side stepping x 3 laps ea   Cone taps with heels x 15 ea LE   NBOS with EC 1 x 30'' , 1 x 30'' on airex   Tandem stance with HT 2 x 30'' ea LE   Modified SLS 2 x 30'' ea LE  SLS 2 x 15''   NBOS with HT x 2'   Dynamic gait in // bars , change speed and head turns, abrupt stops  X 2 laps      Written Home Exercises  Reviewed.   Pt demo good understanding of the education provided. Tenzin demonstrated good return demonstration of activities.     Assessment:   Mr. Dave tolerated treatment session well today. Pt able to progress through session with decreased fatigue compared to last session. Pt with difficulty coordinating alternating upper and lower extremity movements. Pt required verbal cueing through exercises for proper sequencing and form.  Pt demonstrated dynamic gait in parallel bars with minimal episodes of LOB. Pt able to self correct LOB with unilateral UE support.     Pt will continue to benefit from skilled PT intervention. Medical Necessity is demonstrated by:  Fall Risk, Pain limits function of effected part for some activities, Unable to participate fully in daily activities, Requires skilled supervision to complete and progress HEP and Weakness.    Patient is making good progress towards established goals.     Short Term Goals (4 Weeks):   1. Patient to have improved single limb balance of 10 sec or greater for decreased risk for falls- in progress  2. Patient's bilateral hamstring flexibility to improve 10 degrees or greater for improved posture and gait- met 6/26/17  3. Patient's BLE strength to be 5/5 for improved functional ability to lift- in progress  Long Term Goals (8 Weeks):   1. Goal Status: -FR (mobility 20-40%)  2. Patient to be independent with home exercise program for improved self management of condition  3. Patient to improve score on DGI by 3 points or greater for decreased risk for falls    Plan:  Continue with established Plan of Care towards PT goals.     Suzi Pierson, PT

## 2017-07-03 ENCOUNTER — CLINICAL SUPPORT (OUTPATIENT)
Dept: REHABILITATION | Facility: HOSPITAL | Age: 77
End: 2017-07-03
Attending: NEUROLOGICAL SURGERY
Payer: MEDICARE

## 2017-07-03 DIAGNOSIS — G20.A1 PD (PARKINSON'S DISEASE): Primary | ICD-10-CM

## 2017-07-03 DIAGNOSIS — R26.89 IMPAIRMENT OF BALANCE: ICD-10-CM

## 2017-07-03 DIAGNOSIS — R26.81 GAIT INSTABILITY: ICD-10-CM

## 2017-07-03 PROCEDURE — 97110 THERAPEUTIC EXERCISES: CPT | Mod: PN | Performed by: PHYSICAL THERAPIST

## 2017-07-03 PROCEDURE — 97112 NEUROMUSCULAR REEDUCATION: CPT | Mod: PN | Performed by: PHYSICAL THERAPIST

## 2017-07-03 NOTE — PROGRESS NOTES
Name: Tenzin Ortiz  Clinic Number: [id number]  Date of Treatment: 07/03/2017   Diagnosis:   Encounter Diagnoses   Name Primary?    PD (Parkinson's disease) Yes    Gait instability     Impairment of balance        Time in: 1010  Time Out: 1105  Total Treatment Time: 55' (1:1 with PT for 40 minutes of treatment session)     Visit # 8    Subjective:    Tenzin reports increased pain in his legs and not being able to walk following last treatment session. He thinks it might be the hamstring stretch. Patient reports their pain to be 0/10 in his right shoulder and 0/10 pain in his back. Pt reports having to take his carbidopa-levodopa every 3 hours and it has been 4 hours since taking it this morning. Pt has his medication on him today and took it mid treatment session.     Objective    Tenzin received therapeutic exercises/neuromuscular re-education to develop strength, endurance, ROM, flexibility, posture and core stabilization for 55 minutes including:     Upright bike 5'   Nustep x 7'  Supine HSS with Strap  2 x 30''  Bridges  2 x 10 x 3'' hold at top   LTR x 2'   +Supine clams w BTB x 3'  Hooklying Hip abduction 2' x 3'' hold with BTB   HL hip adduction with ball squeeze 2' x 3'' hold     Seated floor touch into BUE OH flexion into pulsing bicep curls ( 10 pulses )  1 x 10  ( Floor to ceiling )   Seated ALt UE reach with trunk rotation 1 x 10 ea side   ( side to side)   Seated hip flexion/ abduction with UE Horizontal abd x 10   Supine Side<>Side x 10 ea side  ( without UE d/t pain)   Supine Fwd step and reach x 10 ea side ( without UE d/t pain)   +cross arm punches into dynadiscs     Standing Fwd Step x 10 ea LE ( cueing to increase step height)   Standing Side step with UE abduction x 20 ea LE   Standing Forward Rock and reach x 15 ea LE  Increased difficulty with LLE lead   Matrix Hip ABD 2x10 35#, 1x10 30# (decreased weight due to fatigue)  Matrix Hip ADD 2x10 25#  Matrix Seated shoulder rows matrix 2x10  10#    sit<>stand 2 x 10   Hurdles fwd Side stepping x 3 laps ea   Cone taps with heels x 15 ea LE   NBOS with EC 1 x 30'' , 1 x 30'' on airex   Tandem stance with HT 2 x 30'' ea LE   Modified SLS 2 x 30'' ea LE  SLS 2 x 15''   NBOS with HT x 2'   Dynamic gait on turf , change speed and turns, abrupt stops  X 2 laps      Written Home Exercises Reviewed.   Pt demo good understanding of the education provided. Tenzin demonstrated good return demonstration of activities.     Assessment:   Mr. Dave tolerated treatment session well today. Pt improved with coordinating contralateral UE swing during forward rocking exercise compared to last session. Pt with improvements in dynamic gait training with abrupt stops without a festinating gait pattern following medication. Pt responded well to introduction of matrix machine exercises requiring minimal verbal cueing for proper form and technique.   Pt will continue to benefit from skilled PT intervention. Medical Necessity is demonstrated by:  Fall Risk, Pain limits function of effected part for some activities, Unable to participate fully in daily activities, Requires skilled supervision to complete and progress HEP and Weakness.    Patient is making good progress towards established goals.     Short Term Goals (4 Weeks):   1. Patient to have improved single limb balance of 10 sec or greater for decreased risk for falls- in progress  2. Patient's bilateral hamstring flexibility to improve 10 degrees or greater for improved posture and gait- met 6/26/17  3. Patient's BLE strength to be 5/5 for improved functional ability to lift- in progress  Long Term Goals (8 Weeks):   1. Goal Status: -EI (mobility 20-40%)  2. Patient to be independent with home exercise program for improved self management of condition  3. Patient to improve score on DGI by 3 points or greater for decreased risk for falls    Plan:  Continue with established Plan of Care towards PT goals.     Suzi  Dangelo, PT

## 2017-07-06 ENCOUNTER — ANTI-COAG VISIT (OUTPATIENT)
Dept: CARDIOLOGY | Facility: CLINIC | Age: 77
End: 2017-07-06
Payer: MEDICARE

## 2017-07-06 DIAGNOSIS — Z79.01 LONG-TERM (CURRENT) USE OF ANTICOAGULANTS: Primary | ICD-10-CM

## 2017-07-06 DIAGNOSIS — I74.10 THROMBUS OF AORTA: ICD-10-CM

## 2017-07-06 LAB — INR PPP: 2 (ref 2–3)

## 2017-07-06 PROCEDURE — 85610 PROTHROMBIN TIME: CPT | Mod: QW,S$GLB,,

## 2017-07-06 PROCEDURE — 99211 OFF/OP EST MAY X REQ PHY/QHP: CPT | Mod: 25,S$GLB,,

## 2017-07-06 NOTE — PROGRESS NOTES
Patient here for follow up of recent low INR. INR good today. He denies changes in meds, health or diet. No signs or symptoms of bleeding. Continue maintenance dose and Recheck INR in 2 weeks

## 2017-07-10 ENCOUNTER — TELEPHONE (OUTPATIENT)
Dept: REHABILITATION | Facility: HOSPITAL | Age: 77
End: 2017-07-10

## 2017-07-11 DIAGNOSIS — I74.10 THROMBUS OF AORTA: ICD-10-CM

## 2017-07-11 DIAGNOSIS — Z79.01 LONG-TERM (CURRENT) USE OF ANTICOAGULANTS: ICD-10-CM

## 2017-07-11 RX ORDER — WARFARIN 3 MG/1
TABLET ORAL
Qty: 30 TABLET | Refills: 0 | Status: SHIPPED | OUTPATIENT
Start: 2017-07-11 | End: 2017-08-11 | Stop reason: SDUPTHER

## 2017-07-12 ENCOUNTER — TELEPHONE (OUTPATIENT)
Dept: REHABILITATION | Facility: HOSPITAL | Age: 77
End: 2017-07-12

## 2017-07-13 ENCOUNTER — HOSPITAL ENCOUNTER (OUTPATIENT)
Dept: RADIOLOGY | Facility: HOSPITAL | Age: 77
Discharge: HOME | End: 2017-07-13
Attending: PODIATRIST
Payer: MEDICARE

## 2017-07-13 ENCOUNTER — OFFICE VISIT (OUTPATIENT)
Dept: PODIATRY | Facility: CLINIC | Age: 77
End: 2017-07-13
Payer: MEDICARE

## 2017-07-13 ENCOUNTER — OFFICE VISIT (OUTPATIENT)
Dept: OPTOMETRY | Facility: CLINIC | Age: 77
End: 2017-07-13
Payer: MEDICARE

## 2017-07-13 VITALS
HEIGHT: 67 IN | DIASTOLIC BLOOD PRESSURE: 52 MMHG | BODY MASS INDEX: 25.43 KG/M2 | SYSTOLIC BLOOD PRESSURE: 120 MMHG | WEIGHT: 162 LBS

## 2017-07-13 DIAGNOSIS — M25.475 SWELLING OF FOOT JOINT, LEFT: ICD-10-CM

## 2017-07-13 DIAGNOSIS — S93.409A INVERSION SPRAIN OF ANKLE, UNSPECIFIED LATERALITY, INITIAL ENCOUNTER: ICD-10-CM

## 2017-07-13 DIAGNOSIS — M79.671 BILATERAL FOOT PAIN: ICD-10-CM

## 2017-07-13 DIAGNOSIS — M79.672 BILATERAL FOOT PAIN: Primary | ICD-10-CM

## 2017-07-13 DIAGNOSIS — S93.602A FOOT SPRAIN, LEFT, INITIAL ENCOUNTER: ICD-10-CM

## 2017-07-13 DIAGNOSIS — H26.493 POSTERIOR CAPSULE OPACIFICATION, BILATERAL: Primary | ICD-10-CM

## 2017-07-13 DIAGNOSIS — M79.671 BILATERAL FOOT PAIN: Primary | ICD-10-CM

## 2017-07-13 DIAGNOSIS — M79.672 BILATERAL FOOT PAIN: ICD-10-CM

## 2017-07-13 DIAGNOSIS — H52.7 REFRACTIVE ERROR: ICD-10-CM

## 2017-07-13 PROCEDURE — 73630 X-RAY EXAM OF FOOT: CPT | Mod: 26,50,, | Performed by: RADIOLOGY

## 2017-07-13 PROCEDURE — 99204 OFFICE O/P NEW MOD 45 MIN: CPT | Mod: S$GLB,,, | Performed by: PODIATRIST

## 2017-07-13 PROCEDURE — 92004 COMPRE OPH EXAM NEW PT 1/>: CPT | Mod: S$GLB,,, | Performed by: OPTOMETRIST

## 2017-07-13 PROCEDURE — 1159F MED LIST DOCD IN RCRD: CPT | Mod: S$GLB,,, | Performed by: PODIATRIST

## 2017-07-13 PROCEDURE — 99999 PR PBB SHADOW E&M-EST. PATIENT-LVL III: CPT | Mod: PBBFAC,,, | Performed by: PODIATRIST

## 2017-07-13 PROCEDURE — 73630 X-RAY EXAM OF FOOT: CPT | Mod: 50,TC,PO

## 2017-07-13 PROCEDURE — 1125F AMNT PAIN NOTED PAIN PRSNT: CPT | Mod: S$GLB,,, | Performed by: PODIATRIST

## 2017-07-13 PROCEDURE — 92015 DETERMINE REFRACTIVE STATE: CPT | Mod: S$GLB,,, | Performed by: OPTOMETRIST

## 2017-07-13 PROCEDURE — 99999 PR PBB SHADOW E&M-EST. PATIENT-LVL II: CPT | Mod: PBBFAC,,, | Performed by: OPTOMETRIST

## 2017-07-13 NOTE — PATIENT INSTRUCTIONS
Foot Sprain    A sprain is a stretching or tearing of the ligaments that hold a joint together. There are no broken bones. Sprains generally take from 3-6 weeks to heal. A sprain may be treated with a splint, walking cast, or special boot. Mild sprains may not need any additional support.  Home care  The following guidelines will help you care for your injury at home:  · Keep your leg elevated when sitting or lying down. This is very important during the first 48 hours to reduce swelling. Stay off the injured foot as much as possible until you can walk on it without pain. If needed, you may use crutches during the first week for this purpose. Crutches can be rented at many pharmacies or surgical/orthopedic supply stores.  · You may be given a cast shoe to wear to prevent movement in your foot. If not, you can use a sandal or any shoe that does not put pressure on the injured area until the swelling and pain go away. If using a sandal, be careful not to hit your foot against anything, since another injury could make the sprain worse.  · Apply an ice pack over the injured area for 15 to 20 minutes every 3 to 6 hours. You should do this for the first 24 to 48 hours. You can make an ice pack by filling a plastic bag that seals at the top with ice cubes and then wrapping it with a thin towel. Continue to use ice packs for relief of pain and swelling as needed. As the ice melts, avoid getting any wrap, splint, or cast wet. After 48 hours, apply heat from a warm shower or bath for 20 minutes several times daily. Alternating ice and heat may also be helpful.  · You may use over-the-counter pain medicine to control pain, unless another medicine was prescribed. If you have chronic liver or kidney disease or ever had a stomach ulcer or GI bleeding, talk with your healthcare provider before using these medicines.  · If you were given a splint or cast, keep it dry. Bathe with your splint or cast well out of the water,  protected with 2 large plastic bags, rubber-banded at the top end. If a fiberglass splint or cast gets wet, you can dry it with a hair dryer.  · You may return to sports after healing, when you can run without pain.  Follow-up care  Follow up with your healthcare provider as directed. Sometimes fractures dont show up on the first X-ray. Bruises and sprains can sometimes hurt as much as a fracture. These injuries can take time to heal completely. If your symptoms dont improve or they get worse, talk with your healthcare provider. You may need a repeat X-ray.  When to seek medical advice  Call your healthcare provider right away if any of these occur:  · The plaster cast or splint gets wet or soft  · The fiberglass cast or splint gets wet and does not dry for 24 hours  · Pain or swelling increases, or redness appears  · A bad odor comes from within the cast  · Fever of 100.4°F (38°C) or above lasting for 24 to 48 hours  · Toes on the injured foot become cold, blue, numb, or tingly  Date Last Reviewed: 11/20/2015  © 6365-8570 [employer]. [address] [place] [address]. All rights reserved. This information is not intended as a substitute for professional medical care. Always follow your healthcare professional's instructions.        Understanding Ankle Sprain    The ankle is the joint where the leg and foot meet. Bones are held in place by connective tissue called ligaments. When ankle ligaments are stretched to the point of pain and injury, it is called an ankle sprain. A sprain can tear the ligaments. These tears can be very small but still cause pain. Ankle sprains can be mild or severe.  What causes an ankle sprain?  A sprain may occur when you twist your ankle or bend it too far. This can happen when you stumble or fall. Things that can make an ankle sprain more likely include:  · Having had an ankle sprain before  · Playing sports that involve running and jumping. Or playing contact  sports such as football or hockey.  · Wearing shoes that dont support your feet and ankles well  · Having ankles with poor strength and flexibility  Symptoms of an ankle sprain  Symptoms may include:  · Pain or soreness in the ankle  · Swelling  · Redness or bruising  · Not being able to walk or put weight on the affected foot  · Reduced range of motion in the ankle  · A popping or tearing feeling at the time the sprain occurs  · An abnormal or dislocated look to the ankle  · Instability or too much range of motion in the ankle  Treatment for an ankle sprain  Treatment focuses on reducing pain and swelling, and avoiding further injury. Treatments may include:  · Resting the ankle. Avoid putting weight on it. This may mean using crutches until the sprain heals.  · Prescription or over-the-counter pain medicines. These help reduce swelling and pain.  · Cold packs. These help reduce pain and swelling.  · Raising your ankle above your heart. This helps reduce swelling.  · Wrapping the ankle with an elastic bandage or ankle brace. This helps reduce swelling and gives some support to the ankle. In rare cases, you may need a cast or boot.  · Stretching and other exercises. These improve flexibility and strength.  · Heat packs. These may be recommended before doing ankle exercises.  Possible complications of an ankle sprain  An ankle that has been weakened by a sprain can be more likely to have repeated sprains afterward. Doing exercises to strengthen your ankle and improve balance can reduce your risk for repeated sprains. Other possible complications are long-term (chronic) pain or an ankle that remains unstable.  When to call your healthcare provider  Call your healthcare provider right away if you have any of these:  · Fever of 100.4°F (38°C) or higher, or as directed  · Pain, numbness, discoloration, or coldness in the foot or toes  · Pain that gets worse  · Symptoms that dont get better, or get worse  · New symptoms    Date Last Reviewed: 3/10/2016  © 7313-6271 [employer], [employer]. [address] [place] [address], [place] [address]. All rights reserved. This information is not intended as a substitute for professional medical care. Always follow your healthcare professional's instructions.        Treating Ankle Sprains  Treatment will depend on how bad your sprain is. For a severe sprain, healing may take 3 months or more.  Right after your injury: Use R.I.C.E.  · Rest: At first, keep weight off the ankle as much as you can. You may be given crutches to help you walk without putting weight on the ankle.  · Ice: Put an ice pack on the ankle for 15 minutes. Remove the pack and wait at least 30 minutes. Repeat for up to 3 days. This helps reduce swelling.  · Compression: To reduce swelling and keep the joint stable, you may need to wrap the ankle with an elastic bandage. For more severe sprains, you may need an ankle brace or a cast.  · Elevation: To reduce swelling, keep your ankle raised above your heart when you sit or lie down.  Medicine  Your healthcare provider may suggest oral non-steroidal anti-inflammatory medicine (NSAIDs), such as ibuprofen. This relieves the pain and helps reduce any swelling. Be sure to take your medicine as directed.  Contrast baths  After 3 days, soak your ankle in warm water for 30 seconds, then in cool water for 30 seconds. Go back and forth for 5 minutes. Doing this every 2 hours will help keep the swelling down.  Exercises    After about 2 to 3 weeks, you may be given exercises to strengthen the ligaments and muscles in the ankle. Doing these exercises will help prevent another ankle sprain. Exercises may include standing on your toes and then on your heels and doing ankle curls.  Ankle curls  · Sit on the edge of a sturdy table or lie on your back.  · Pull your toes toward you. Then point them away from you. Repeat for 2 to 3 minutes.   Date Last Reviewed: 9/28/2015  © 7852-2151 [employer]. [address] [place] [address], [place] [address]. All rights reserved. This information is not intended as a substitute for professional medical care. Always follow your healthcare professional's instructions.

## 2017-07-13 NOTE — PROGRESS NOTES
Subjective:      Patient ID: Tenzin Ortiz is a 76 y.o. male.    Chief Complaint: Foot Pain (both feet pcp  Page 1-23-32)    Tenzin Ortiz is a 76 y.o. male who presents to the podiatry clinic  with complaint of diffuse foot pain, edith. Description: moderate Nature: burning, pins and needles. Onset of the symptoms was several months ago. Precipitating event: none known.  History of injury: no Current symptoms include: ability to bear weight, but with some pain. Aggravating factors: any weight bearing. Alleviating factors: rest Symptoms have gradually worsened. Patient has had prior foot problems. Evaluation to date: none. Treatment to date: none. Patients rates pain 9/10 on pain scale.    Patient relates history of bilateral angiogram    Current shoe gear: supportive sandals      Patient Active Problem List   Diagnosis    PD (Parkinson's disease)    Panic    Thrombus of aorta    Anemia of chronic disease    Microcytic hypochromic anemia    Mild malnutrition    Debility    Current non-adherence to medical treatment    Long-term (current) use of anticoagulants    PAD (peripheral artery disease)    Herniated lumbar disc without myelopathy    Dyskinesia       Current Outpatient Prescriptions on File Prior to Visit   Medication Sig Dispense Refill    acetaminophen (TYLENOL ARTHRITIS PAIN) 650 MG TbSR Take 650 mg by mouth every 8 (eight) hours.      amlodipine (NORVASC) 10 MG tablet TAKE 1 TABLET BY MOUTH EVERY DAY 30 tablet 0    atorvastatin (LIPITOR) 10 MG tablet Take 10 mg by mouth once daily.      carbidopa-levodopa (PARCOPA)  mg per disintegrating tablet DISSOLVE 2 TS PO QID PRN  2    carbidopa-levodopa  mg (SINEMET CR)  mg TbSR Take 1 tablet by mouth 5 (five) times daily.      clopidogrel (PLAVIX) 75 mg tablet TAKE 1 TABLET BY MOUTH ONCE DAILY 90 tablet 11    cyclobenzaprine (FLEXERIL) 10 MG tablet Take 10 mg by mouth 3 (three) times daily as needed for Muscle spasms.       fluocinonide (LIDEX) 0.05 % ointment POWER AA BID  1    gabapentin (NEURONTIN) 300 MG capsule Take 2 capsules (600 mg total) by mouth every evening. 60 capsule 11    pramipexole (MIRAPEX) 1 MG tablet       tramadol (ULTRAM) 50 mg tablet Take 50 mg by mouth every 6 (six) hours as needed for Pain.      trazodone (DESYREL) 50 MG tablet TK 1/2 T PO HS  U PRN TO REST ATN  4    warfarin (COUMADIN) 3 MG tablet TAKE ONE BY MOUTH EVERY DAY OR AS DIRECTED BY COUMADIN CLINIC 90 tablet 11    warfarin (COUMADIN) 3 MG tablet TAKE ONE BY MOUTH EVERY DAY OR AS DIRECTED BY COUMADIN CLINIC 30 tablet 0     No current facility-administered medications on file prior to visit.        Review of patient's allergies indicates:   Allergen Reactions    Amantadine analogues      Caused confusion    Mirapex [pramipexole]      Pathologic gambling    Neupro [rotigotine]      Pathologic gambling         Past Surgical History:   Procedure Laterality Date    CATARACT EXTRACTION Bilateral 1 yr        Family History   Problem Relation Age of Onset    No Known Problems Mother     No Known Problems Father     No Known Problems Sister     No Known Problems Brother     No Known Problems Maternal Aunt     No Known Problems Maternal Uncle     No Known Problems Paternal Aunt     No Known Problems Paternal Uncle     No Known Problems Maternal Grandmother     No Known Problems Maternal Grandfather     No Known Problems Paternal Grandmother     No Known Problems Paternal Grandfather     Parkinsonism Neg Hx     Amblyopia Neg Hx     Blindness Neg Hx     Cancer Neg Hx     Cataracts Neg Hx     Diabetes Neg Hx     Glaucoma Neg Hx     Hypertension Neg Hx     Macular degeneration Neg Hx     Retinal detachment Neg Hx     Strabismus Neg Hx     Stroke Neg Hx     Thyroid disease Neg Hx        Social History     Social History    Marital status: Single     Spouse name: N/A    Number of children: N/A    Years of education: N/A  "    Occupational History    Not on file.     Social History Main Topics    Smoking status: Former Smoker     Years: 10.00    Smokeless tobacco: Never Used      Comment: Quit 40 years ago    Alcohol use No    Drug use: No    Sexual activity: Not Currently     Other Topics Concern    Not on file     Social History Narrative    No narrative on file           Review of Systems   Constitution: Negative for chills, fever and weakness.   Cardiovascular: Negative for claudication and leg swelling.   Respiratory: Negative for cough and shortness of breath.    Skin: Negative for dry skin, itching, nail changes and rash.   Musculoskeletal: Positive for back pain, joint pain and myalgias. Negative for falls, joint swelling and muscle weakness.   Gastrointestinal: Negative for diarrhea, nausea and vomiting.   Neurological: Positive for numbness, paresthesias and tremors.        Parkinsons   Psychiatric/Behavioral: Negative for altered mental status and hallucinations.           Objective:       Vitals:    07/13/17 1539   BP: (!) 120/52   Weight: 73.5 kg (162 lb)   Height: 5' 7" (1.702 m)   PainSc: 10-Worst pain ever   PainLoc: Foot       Physical Exam   Constitutional:  Non-toxic appearance. He does not have a sickly appearance. No distress.   Pt. is well-developed, well-nourished, appears stated age, in no acute distress, alert and oriented x 3. No evidence of depression, anxiety, or agitation. Calm, cooperative, and communicative. Appropriate interactions and affect.   Cardiovascular:   Pulses:       Dorsalis pedis pulses are 1+ on the right side, and 1+ on the left side.        Posterior tibial pulses are 1+ on the right side, and 1+ on the left side.        Pulmonary/Chest: No respiratory distress.   Musculoskeletal:        Right ankle: He exhibits swelling. Tenderness. Lateral malleolus and AITFL tenderness found. No medial malleolus, no CF ligament and no posterior TFL tenderness found. Achilles tendon exhibits no " pain, no defect and normal Chaudhari's test results.        Left ankle: He exhibits swelling. Tenderness. AITFL, CF ligament and posterior TFL tenderness found. No lateral malleolus and no medial malleolus tenderness found. Achilles tendon exhibits no pain, no defect and normal Chaudhari's test results.        Right foot: There is tenderness (dorsal lateral midfoot). There is no bony tenderness.        Left foot: There is tenderness (+++ medial midfoot and sub 2nd MTPJ) and swelling. There is no bony tenderness.   Lymphadenopathy:   No lymphatic streaking    Negative lymphadenopathy bilateral popliteal fossa and tarsal tunnel.     Neurological: He displays tremor. A sensory deficit is present.    [place] 5.07 monofilamant testing is diminished Vaughn feet. Decreased/absent vibratory sensation bilateral feet to 128Hz tuning fork.      Paresthesias, and hyperesthesia bilateral feet with no clearly identified trigger or source.           Skin: Skin is warm, dry and intact. No abrasion, no bruising, no laceration, no lesion and no rash noted. He is not diaphoretic. No cyanosis or erythema. No pallor. Nails show no clubbing.   Psychiatric: His mood appears not anxious. His affect is not inappropriate. His speech is not slurred. He is not combative. He is communicative. He is attentive.   Nursing note reviewed.      Tenderness right lateral ankle and lateral dorsal foot  Tenderness left lateral andkl and sub 2nd MTPJ and ++dorsal laterakl foot; lis franc      Assessment:       Encounter Diagnoses   Name Primary?    Bilateral foot pain Yes    Swelling of foot joint, left     Inversion sprain of ankle, unspecified laterality, initial encounter     Foot sprain, left, initial encounter          Plan:       Tenzin was seen today for foot pain.    Diagnoses and all orders for this visit:    Bilateral foot pain  -     X-Ray Foot Complete Bilateral; Future    Swelling of foot joint, left  -     X-Ray Foot Complete  Bilateral; Future    Inversion sprain of ankle, unspecified laterality, initial encounter  -     X-Ray Foot Complete Bilateral; Future    Foot sprain, left, initial encounter      I counseled the patient on his conditions, their implications and medical management.    Personally interpreted and reviewed x-ray with patient. No fracture noted however I do see diastasis at the lis francs joint to the left foot.    Discussed foot and ankle sprains and importance of immobilization for healing as well as the lengthy course of treatment for complete resolution.    1. CAM boot for immobilization to the LLE recommended OTC ankle brace to the RLE  2. Patient instructed to rest affected limb, avoid excessive WB and use crutch or walker assistance if needed.  3. Instructions on elevation to reduce pain and swelling. When sleeping, place a pillow under the injured leg. When sitting, support the injured leg so it is level with your waist.   4. Patient instructed on adequate icing techniques. Patient should ice the affected area at least once per day x 10 minutes for 10 days . I advised the  patient that extra icing would also be beneficial to ensure adequate anti inflammatory effect   5. Recommended OTC pain cream  6. RTC in 6-9 weeks, sooner PRN

## 2017-07-13 NOTE — LETTER
July 13, 2017      [place] [id number] [facility] [id number]           [facility]  [address] [place]  [place] [address]  Phone: [phone]  Fax: [phone]          Patient: Tenzin Ortiz   MR Number: [id number]   YOB: 1940   Date of Visit: 7/13/2017       Dear [facility]:    Thank you for referring Tenzin Ortiz to me for evaluation. Attached you will find relevant portions of my assessment and plan of care.    If you have questions, please do not hesitate to call me. I look forward to following Tenzin Ortiz along with you.    Sincerely,    Nisha Mtz, OD    Enclosure  CC:  No Recipients    If you would like to receive this communication electronically, please contact [email or web address] or [phone] to request more information on [employer] Link access.    For providers and/or their staff who would like to refer a patient to [facility], please contact us through our one-stop-shop provider referral line, Russell Garcia, at [phone].    If you feel you have received this communication in error or would no longer like to receive these types of communications, please e-mail [email or web address]

## 2017-07-13 NOTE — PROGRESS NOTES
Subjective:       Patient ID: Tenzin Ortiz is a 76 y.o. male      Chief Complaint   Patient presents with    Concerns About Ocular Health     History of Present Illness  HPI     Dls: 1 yr     Pt c/o blurry vision at distance and near ou. Pt wears bifocal glasses. Pt   states no tearing no itching no burning no pain no ha's no floaters.     Eye meds:   otc gtts ou prn        Assessment/Plan:     1. Posterior capsule opacification, bilateral  NVS. Monitor.    2. Refractive error  After further discussion with pt, he is having difficulty seeing up close because his glasses are sitting too low on his face and he is not able to see through the bifocal portion for reading. Discussed with pt that he should have glasses adjusted so that when reading, he is looking below the line and not above where his distance rx is, which makes reading blurry.    Educated patient on refractive error and discussed lens options. Dispensed updated spectacle Rx. Educated about adaptation period to new specs.    Eyeglass Final Rx     Eyeglass Final Rx       Sphere Cylinder Axis Add    Right -0.25 +0.75 105 +2.75    Left +1.00 +0.75 170 +2.75    Type:  Bifocal    Expiration Date:  7/14/2018                  Return in about 1 year (around 7/13/2018) for Annual eye exam.

## 2017-07-18 ENCOUNTER — CLINICAL SUPPORT (OUTPATIENT)
Dept: REHABILITATION | Facility: HOSPITAL | Age: 77
End: 2017-07-18
Attending: NEUROLOGICAL SURGERY
Payer: MEDICARE

## 2017-07-18 DIAGNOSIS — G20.A1 PD (PARKINSON'S DISEASE): Primary | ICD-10-CM

## 2017-07-18 DIAGNOSIS — R26.81 GAIT INSTABILITY: ICD-10-CM

## 2017-07-18 DIAGNOSIS — R26.89 IMPAIRMENT OF BALANCE: ICD-10-CM

## 2017-07-18 PROCEDURE — 97110 THERAPEUTIC EXERCISES: CPT | Mod: PN

## 2017-07-18 PROCEDURE — G8979 MOBILITY GOAL STATUS: HCPCS | Mod: CK,PN | Performed by: PHYSICAL THERAPIST

## 2017-07-18 PROCEDURE — G8978 MOBILITY CURRENT STATUS: HCPCS | Mod: CL,PN | Performed by: PHYSICAL THERAPIST

## 2017-07-18 NOTE — PROGRESS NOTES
Name: Tenzin Ortiz  Clinic Number: [id number]  Date of Treatment: 07/18/2017   Diagnosis:   Encounter Diagnoses   Name Primary?    PD (Parkinson's disease) Yes    Gait instability     Impairment of balance        Time in: 0730  Time Out: 0823  Total Treatment Time: 53'  (1:1 with PTA for 40 minutes of treatment session)     Visit # 10    Subjective:    Tenzin reports that he has a fracture in his left foot.  Patient states that he has had a problem with his foot for about 8 months.  Pt states he has to be in his boot for 6 wks per MD.  Patient reports his pain to be 0/10.        Objective  Pt presents to clinic with an Aircast boot donned.     Tenzin received therapeutic exercises/neuromuscular re-education to develop strength, endurance, ROM, flexibility, posture and core stabilization for 53 minutes including:     Upright bike 8'   Bridges  2 x 10 x 3'' hold at top   LTR x 2'   Supine clams w BTB x 3'  HL hip adduction with ball squeeze 2' x 3'' hold     Seated floor touch into BUE OH flexion into pulsing bicep curls ( 10 pulses )  1 x 10  ( Floor to ceiling )   Seated ALt UE reach with trunk rotation 1 x 10 ea side   ( side to side)   Seated hip flexion/ abduction with UE Horizontal abd x 10   Seated Fwd step and reach x 10     cross arm punches into dynadiscs     Matrix Hip ABD 3 x10 30#,   Matrix Hip ADD 3x10 30#  Matrix Seated shoulder rows matrix 2x10 10#      Not Performed:   Nustep x 7'  Supine HSS with Strap  2 x 30''  Standing Fwd Step x 10 ea LE ( cueing to increase step height)   Standing Side step with UE abduction x 20 ea LE   Standing Forward Rock and reach x 15 ea LE  Increased difficulty with LLE lead   sit<>stand 2 x 10   Hurdles fwd Side stepping x 3 laps ea   Cone taps with heels x 15 ea LE   NBOS with EC 1 x 30'' , 1 x 30'' on airex   Tandem stance with HT 2 x 30'' ea LE   Modified SLS 2 x 30'' ea LE  SLS 2 x 15''   NBOS with HT x 2'   Dynamic gait on turf , change speed and turns, abrupt stops  X  2 laps      Written Home Exercises Reviewed.   Pt demo good understanding of the education provided. Tenzin demonstrated good return demonstration of activities.     Assessment:   Pt treatment session modified to supine and seated exercises per MD request of limited weightbearing due to his foot injury.  Patient tolerated session well with slight improvements noted in his right upper/lower extremity neuromuscular control.  Pt will continue to benefit from skilled PT intervention. Medical Necessity is demonstrated by:  Fall Risk, Pain limits function of effected part for some activities, Unable to participate fully in daily activities, Requires skilled supervision to complete and progress HEP and Weakness.    Addendum by PT:  Intake 41% 59%  Predicted 48% 52% Goal Status+ CK - At least 40 percent but less than 60 percent  6/26/2017 37% 63% Current Status CL - At least 60 percent but less than 80 percent    Patient is making good progress towards established goals.     Short Term Goals (4 Weeks):   1. Patient to have improved single limb balance of 10 sec or greater for decreased risk for falls- in progress  2. Patient's bilateral hamstring flexibility to improve 10 degrees or greater for improved posture and gait- met 6/26/17  3. Patient's BLE strength to be 5/5 for improved functional ability to lift- in progress  Long Term Goals (8 Weeks):   1. Goal Status: -BL (mobility 20-40%)  2. Patient to be independent with home exercise program for improved self management of condition  3. Patient to improve score on DGI by 3 points or greater for decreased risk for falls    Plan:  Continue with established Plan of Care towards PT goals.     Morena Wan, PTA    Suzi Pierson, PT

## 2017-07-20 ENCOUNTER — CLINICAL SUPPORT (OUTPATIENT)
Dept: REHABILITATION | Facility: HOSPITAL | Age: 77
End: 2017-07-20
Attending: NEUROLOGICAL SURGERY
Payer: MEDICARE

## 2017-07-20 DIAGNOSIS — R26.81 GAIT INSTABILITY: ICD-10-CM

## 2017-07-20 DIAGNOSIS — R26.89 IMPAIRMENT OF BALANCE: ICD-10-CM

## 2017-07-20 DIAGNOSIS — G20.A1 PD (PARKINSON'S DISEASE): Primary | ICD-10-CM

## 2017-07-20 PROCEDURE — 97110 THERAPEUTIC EXERCISES: CPT | Mod: PN | Performed by: PHYSICAL THERAPIST

## 2017-07-20 NOTE — PROGRESS NOTES
Name: Tenzin Ortiz  Clinic Number: [id number]  Date of Treatment: 07/20/2017   Diagnosis:   Encounter Diagnoses   Name Primary?    PD (Parkinson's disease) Yes    Gait instability     Impairment of balance      Time in:1000  Time Out: 1050  Total Treatment Time: 50'  (1:1 with PT for 40 minutes of treatment session)     Visit # 11    Subjective:    Tenzin reports no increased pain in foot following last treatment and interested in doing more of the machines.  Patient reports his pain to be 0/10.        Objective  Pt presents to clinic with an Aircast boot donned.     Tenzin received therapeutic exercises/neuromuscular re-education to develop strength, endurance, ROM, flexibility, posture and core stabilization for 50 minutes including:     Upright bike 8'   Bridges  2 x 10 x 3'' hold at top   LTR x 2'   Supine clams w BTB x 3'  HL hip adduction with ball squeeze 2' x 3'' hold   Seated biceps curls #4 3 x 10    Seated floor touch into BUE OH flexion into pulsing bicep curls ( 10 pulses )  1 x 10  ( Floor to ceiling )   Seated ALt UE reach with trunk rotation 1 x 10 ea side   ( side to side)   Seated hip flexion/ abduction with UE Horizontal abd x 10   Seated Fwd step and reach x 10   Seated alt shoulder flexion and knee extension 20 x ea    cross arm punches into dynadiscs     Matrix Hip ABD 3 x10 30#,   Matrix Hip ADD 3x10 30#  Matrix Seated shoulder rows matrix 2x10 10#  Matrix Seated back extensions 2 x 10 20#  Matrix Seated Trunk Rotation 2 x 10 20#    Not Performed:   Nustep x 7'  Supine HSS with Strap  2 x 30''    Written Home Exercises Reviewed.   Pt demo good understanding of the education provided. Tenzin demonstrated good return demonstration of activities.     Assessment:   Tenzin tolerated treatment session well and able to progress resisted core strengthening without provocation of pain. Pt requiring verbal cues for tempo and eccentric control on weight machines. Pt will continue to benefit from skilled PT  intervention. Medical Necessity is demonstrated by:  Fall Risk, Pain limits function of effected part for some activities, Unable to participate fully in daily activities, Requires skilled supervision to complete and progress HEP and Weakness.    Patient is making good progress towards established goals.     Short Term Goals (4 Weeks):   1. Patient to have improved single limb balance of 10 sec or greater for decreased risk for falls- in progress  2. Patient's bilateral hamstring flexibility to improve 10 degrees or greater for improved posture and gait- met 6/26/17  3. Patient's BLE strength to be 5/5 for improved functional ability to lift- in progress  Long Term Goals (8 Weeks):   1. Goal Status: -NV (mobility 20-40%)  2. Patient to be independent with home exercise program for improved self management of condition  3. Patient to improve score on DGI by 3 points or greater for decreased risk for falls    Plan:  Continue with established Plan of Care towards PT goals.     Suzi Pierson, PT

## 2017-07-24 ENCOUNTER — ANTI-COAG VISIT (OUTPATIENT)
Dept: CARDIOLOGY | Facility: CLINIC | Age: 77
End: 2017-07-24
Payer: MEDICARE

## 2017-07-24 DIAGNOSIS — I74.10 THROMBUS OF AORTA: ICD-10-CM

## 2017-07-24 DIAGNOSIS — Z79.01 LONG-TERM (CURRENT) USE OF ANTICOAGULANTS: Primary | ICD-10-CM

## 2017-07-24 LAB
CTP QC/QA: YES
INR PPP: 1.9 (ref 2–3)

## 2017-07-24 PROCEDURE — 85610 PROTHROMBIN TIME: CPT | Mod: QW,S$GLB,,

## 2017-07-24 PROCEDURE — 99211 OFF/OP EST MAY X REQ PHY/QHP: CPT | Mod: 25,S$GLB,,

## 2017-07-24 NOTE — PROGRESS NOTES
INR is trending low. Patient denies changes in meds or diet. He reports being diagnosed with a broken foot. He feels it happened months ago but only recently had the Xray to prove it. He is wearing a boot today. He denies any other changes. No signs or symptoms of bleeding. We will increase dose and Recheck INR in 2 weeks

## 2017-07-25 ENCOUNTER — CLINICAL SUPPORT (OUTPATIENT)
Dept: REHABILITATION | Facility: HOSPITAL | Age: 77
End: 2017-07-25
Attending: NEUROLOGICAL SURGERY
Payer: MEDICARE

## 2017-07-25 DIAGNOSIS — R26.81 GAIT INSTABILITY: ICD-10-CM

## 2017-07-25 DIAGNOSIS — G20.A1 PD (PARKINSON'S DISEASE): Primary | ICD-10-CM

## 2017-07-25 DIAGNOSIS — R26.89 IMPAIRMENT OF BALANCE: ICD-10-CM

## 2017-07-25 PROCEDURE — 97110 THERAPEUTIC EXERCISES: CPT | Mod: PN | Performed by: PHYSICAL THERAPIST

## 2017-07-25 NOTE — PROGRESS NOTES
Name: Tenzin Ortiz  Clinic Number: [id number]  Date of Treatment: 07/25/2017   Diagnosis:   Encounter Diagnoses   Name Primary?    PD (Parkinson's disease) Yes    Gait instability     Impairment of balance      Time in:1000  Time Out: 1050  Total Treatment Time: 50'  (1:1 with PT for 40 minutes of treatment session)     Visit # 12    Subjective:    Tenzin reports that he likes doing the machines and has no pain following treatment. Patient reports his pain to be 0/10.      FOTO: 45 (41 on eval)    Objective    SLS: unable to test 2/2 walking boot and WB'ing precautions     MMT   Left  Right    Hip:  Flexion   4/5  4+/5  Abduction  3+/5  4/5  Adduction  3+/5  4/5  Ext   3+/5  4-/5    Knee:  Flexion   5/5  5/5  Extension  5/5  5/5    Ankle:  Dorsiflexion  NT  4/5  Plantar flexion  NT  5/5    PT/PTA face to face conference performed during today's treatment session. Patient's goals and POC updated today.     Pt presents to clinic with an Aircast boot floresita    Tenzin received therapeutic exercises/neuromuscular re-education to develop strength, endurance, ROM, flexibility, posture and core stabilization for 50 minutes including:     Upright bike 8'   Bridges  2 x 10 x 3'' hold at top   LTR x 2'   Supine clams w BTB x 3'  HL hip adduction with ball squeeze 2' x 3'' hold   Seated biceps curls #4 3 x 10    Seated floor touch into BUE OH flexion into pulsing bicep curls ( 10 pulses )  1 x 10  ( Floor to ceiling )   Seated ALt UE reach with trunk rotation 1 x 10 ea side   ( side to side)   Seated hip flexion/ abduction with UE Horizontal abd x 10   Seated Fwd step and reach x 10   Seated alt shoulder flexion and knee extension 20 x ea    cross arm punches into dynadiscs     Matrix Hip ABD 3 x10 30#,   Matrix Hip ADD 3x10 30#  Matrix Seated shoulder rows matrix 2x10 10#  Matrix Seated back extensions 2 x 10 30#  Matrix Seated Trunk Rotation 2 x 10 20#    Not Performed:   Nustep x 7'  Supine HSS with Strap  2 x 30''    Written  Home Exercises Reviewed.   Pt demo good understanding of the education provided. Tenzin demonstrated good return demonstration of activities.     Assessment:   Tenzin tolerated treatment well with month reassessment performed this session. Pt reporting decreased disability as noted by recent FOTO. Pt continues to have decreased strength in B hips since eval and performance variable depending on when pt last took his medication. Pt's progress also limited 2/2 recent L foot injury and decreased weight bearing recommended by MD. Pt will continue to benefit from skilled PT intervention including resisted strengthening BLE to progress towards remaining goals. Medical Necessity is demonstrated by: Fall Risk, Pain limits function of effected part for some activities, Unable to participate fully in daily activities, Requires skilled supervision to complete and progress HEP and Weakness.     Patient is making good progress towards established goals.     Short Term Goals (4 Weeks):   1. Patient to have improved single limb balance of 10 sec or greater for decreased risk for falls- in progress  2. Patient's bilateral hamstring flexibility to improve 10 degrees or greater for improved posture and gait- met 6/26/17  3. Patient's BLE strength to be 5/5 for improved functional ability to lift- in progress  Long Term Goals (8 Weeks):   1. Goal Status: -EP (mobility 20-40%)  2. Patient to be independent with home exercise program for improved self management of condition  3. Patient to improve score on DGI by 3 points or greater for decreased risk for falls    Plan:  Certification Period: 5/25/2017 to 8/18/2017  Continue with established Plan of Care towards PT goals.     Suzi Pierson, PT

## 2017-07-27 ENCOUNTER — CLINICAL SUPPORT (OUTPATIENT)
Dept: REHABILITATION | Facility: HOSPITAL | Age: 77
End: 2017-07-27
Attending: NEUROLOGICAL SURGERY
Payer: MEDICARE

## 2017-07-27 DIAGNOSIS — R26.89 IMPAIRMENT OF BALANCE: ICD-10-CM

## 2017-07-27 DIAGNOSIS — R26.81 GAIT INSTABILITY: ICD-10-CM

## 2017-07-27 DIAGNOSIS — G20.A1 PD (PARKINSON'S DISEASE): Primary | ICD-10-CM

## 2017-07-27 PROCEDURE — 97110 THERAPEUTIC EXERCISES: CPT | Mod: PN | Performed by: PHYSICAL THERAPIST

## 2017-07-27 RX ORDER — AMLODIPINE BESYLATE 10 MG/1
TABLET ORAL
Qty: 30 TABLET | Refills: 0 | Status: SHIPPED | OUTPATIENT
Start: 2017-07-27 | End: 2017-08-31 | Stop reason: SDUPTHER

## 2017-07-27 NOTE — PROGRESS NOTES
"Name: Tenzin Ortiz  Clinic Number: [id number]  Date of Treatment: 07/27/2017   Diagnosis:   Encounter Diagnoses   Name Primary?    PD (Parkinson's disease) Yes    Gait instability     Impairment of balance      Time in:1030  Time Out: 1040  Total Treatment Time: 40'  (1:1 with PT for 25 minutes of treatment session)     Visit # 13    Subjective:    Tenzin reports being due to take his medication soon. Patient reports his pain to be 0/10.      Objective    Pt presents to clinic with an Aircast boot donned    Tenzin received therapeutic exercises/neuromuscular re-education to develop strength, endurance, ROM, flexibility, posture and core stabilization for 50 minutes including:     Upright bike 8'   Bridges  2 x 10 x 3'' hold at top   LTR x 2'   Supine clams w BTB x 3'  HL hip adduction with ball squeeze 2' x 3'' hold   Seated biceps curls #4 3 x 10    Seated floor touch into BUE OH flexion into pulsing bicep curls ( 10 pulses )  1 x 10  ( Floor to ceiling )   Seated ALt UE reach with trunk rotation 1 x 10 ea side   ( side to side)   Seated hip flexion/ abduction with UE Horizontal abd x 10   Seated Fwd step and reach x 10   Seated alt shoulder flexion and knee extension 20 x ea    cross arm punches into dynadiscs     Matrix Hip ABD 3 x10 35#   Matrix Hip ADD 3x10 35#  Matrix Seated shoulder rows matrix 2x10 25#  Matrix Seated back extensions 2 x 10 34#  Matrix Seated Trunk Rotation 2 x 10 22#    Written Home Exercises Reviewed.   Pt demo good understanding of the education provided. Tenzin demonstrated good return demonstration of activities.     Assessment:   Tenzin tolerated treatment session fairly today. He was able to increase resistance with Matrix machines with minimal difficulty. However, pt requested to end treatment session early reporting being "pooped" and increased tremors noted. Pt will continue to benefit from skilled PT intervention as tolerated to progress towards remaining goals. Medical Necessity is " demonstrated by: Fall Risk, Pain limits function of effected part for some activities, Unable to participate fully in daily activities, Requires skilled supervision to complete and progress HEP and Weakness.     Patient is making good progress towards established goals.     Short Term Goals (4 Weeks):   1. Patient to have improved single limb balance of 10 sec or greater for decreased risk for falls- in progress  2. Patient's bilateral hamstring flexibility to improve 10 degrees or greater for improved posture and gait- met 6/26/17  3. Patient's BLE strength to be 5/5 for improved functional ability to lift- in progress  Long Term Goals (8 Weeks):   1. Goal Status: -BC (mobility 20-40%)  2. Patient to be independent with home exercise program for improved self management of condition  3. Patient to improve score on DGI by 3 points or greater for decreased risk for falls    Plan:  Certification Period: 5/25/2017 to 8/18/2017  Continue with established Plan of Care towards PT goals.     Suzi Pierson, PT

## 2017-08-01 ENCOUNTER — HOSPITAL ENCOUNTER (INPATIENT)
Facility: HOSPITAL | Age: 77
LOS: 8 days | Discharge: NURSING, REHAB OR LONG-TERM CARE | DRG: 917 | End: 2017-08-09
Attending: EMERGENCY MEDICINE | Admitting: INTERNAL MEDICINE
Payer: MEDICARE

## 2017-08-01 DIAGNOSIS — I21.3 ST ELEVATION MYOCARDIAL INFARCTION (STEMI), UNSPECIFIED ARTERY: ICD-10-CM

## 2017-08-01 DIAGNOSIS — R53.81 DEBILITY: Chronic | ICD-10-CM

## 2017-08-01 DIAGNOSIS — I10 ESSENTIAL HYPERTENSION: Chronic | ICD-10-CM

## 2017-08-01 DIAGNOSIS — G93.40 ACUTE ENCEPHALOPATHY: Primary | ICD-10-CM

## 2017-08-01 DIAGNOSIS — Z91.199 CURRENT NON-ADHERENCE TO MEDICAL TREATMENT: Chronic | ICD-10-CM

## 2017-08-01 DIAGNOSIS — I25.10 CAD (CORONARY ARTERY DISEASE): ICD-10-CM

## 2017-08-01 DIAGNOSIS — R53.1 WEAKNESS: ICD-10-CM

## 2017-08-01 DIAGNOSIS — R07.9 CHEST PAIN: ICD-10-CM

## 2017-08-01 DIAGNOSIS — G20.A1 PARKINSON DISEASE: Chronic | ICD-10-CM

## 2017-08-01 DIAGNOSIS — R94.31 ECG ABNORMALITY: ICD-10-CM

## 2017-08-01 DIAGNOSIS — Z79.01 CHRONIC ANTICOAGULATION: Chronic | ICD-10-CM

## 2017-08-01 DIAGNOSIS — I73.9 PAD (PERIPHERAL ARTERY DISEASE): Chronic | ICD-10-CM

## 2017-08-01 DIAGNOSIS — E44.1 MILD MALNUTRITION: Chronic | ICD-10-CM

## 2017-08-01 DIAGNOSIS — E78.5 HYPERLIPIDEMIA, UNSPECIFIED HYPERLIPIDEMIA TYPE: Chronic | ICD-10-CM

## 2017-08-01 DIAGNOSIS — R41.82 ALTERED MENTAL STATUS, UNSPECIFIED ALTERED MENTAL STATUS TYPE: ICD-10-CM

## 2017-08-01 DIAGNOSIS — I74.10 THROMBUS OF AORTA: Chronic | ICD-10-CM

## 2017-08-01 DIAGNOSIS — D63.8 ANEMIA OF CHRONIC DISEASE: ICD-10-CM

## 2017-08-01 DIAGNOSIS — Z79.01 ANTICOAGULATED ON COUMADIN: ICD-10-CM

## 2017-08-01 LAB
ALBUMIN SERPL BCP-MCNC: 3.9 G/DL
ALP SERPL-CCNC: 65 U/L
ALT SERPL W/O P-5'-P-CCNC: 11 U/L
AMMONIA PLAS-SCNC: 29 UMOL/L
AMPHET+METHAMPHET UR QL: NORMAL
ANION GAP SERPL CALC-SCNC: 12 MMOL/L
AST SERPL-CCNC: 15 U/L
BACTERIA #/AREA URNS HPF: NORMAL /HPF
BARBITURATES UR QL SCN>200 NG/ML: NEGATIVE
BASOPHILS # BLD AUTO: 0 K/UL
BASOPHILS NFR BLD: 0 %
BENZODIAZ UR QL SCN>200 NG/ML: NEGATIVE
BILIRUB SERPL-MCNC: 0.4 MG/DL
BILIRUB UR QL STRIP: NEGATIVE
BNP SERPL-MCNC: 18 PG/ML
BUN SERPL-MCNC: 27 MG/DL
BZE UR QL SCN: NEGATIVE
CALCIUM SERPL-MCNC: 9.6 MG/DL
CANNABINOIDS UR QL SCN: NEGATIVE
CHLORIDE SERPL-SCNC: 107 MMOL/L
CLARITY UR: ABNORMAL
CO2 SERPL-SCNC: 21 MMOL/L
COLOR UR: YELLOW
CREAT SERPL-MCNC: 1.5 MG/DL
CREAT UR-MCNC: 197.9 MG/DL
DIFFERENTIAL METHOD: ABNORMAL
EOSINOPHIL # BLD AUTO: 0 K/UL
EOSINOPHIL NFR BLD: 0.1 %
ERYTHROCYTE [DISTWIDTH] IN BLOOD BY AUTOMATED COUNT: 15.6 %
EST. GFR  (AFRICAN AMERICAN): 52 ML/MIN/1.73 M^2
EST. GFR  (NON AFRICAN AMERICAN): 45 ML/MIN/1.73 M^2
GLUCOSE SERPL-MCNC: 125 MG/DL
GLUCOSE UR QL STRIP: NEGATIVE
HCT VFR BLD AUTO: 45.4 %
HGB BLD-MCNC: 14.5 G/DL
HGB UR QL STRIP: NEGATIVE
HYALINE CASTS #/AREA URNS LPF: 0 /LPF
INR PPP: 2.2
KETONES UR QL STRIP: ABNORMAL
LACTATE SERPL-SCNC: 2.4 MMOL/L
LEUKOCYTE ESTERASE UR QL STRIP: NEGATIVE
LYMPHOCYTES # BLD AUTO: 0.7 K/UL
LYMPHOCYTES NFR BLD: 8.4 %
MAGNESIUM SERPL-MCNC: 2.2 MG/DL
MCH RBC QN AUTO: 26.3 PG
MCHC RBC AUTO-ENTMCNC: 31.9 G/DL
MCV RBC AUTO: 82 FL
METHADONE UR QL SCN>300 NG/ML: NEGATIVE
MICROSCOPIC COMMENT: NORMAL
MONOCYTES # BLD AUTO: 0.4 K/UL
MONOCYTES NFR BLD: 4.1 %
NEUTROPHILS # BLD AUTO: 7.4 K/UL
NEUTROPHILS NFR BLD: 87.4 %
NITRITE UR QL STRIP: NEGATIVE
OPIATES UR QL SCN: NEGATIVE
PCP UR QL SCN>25 NG/ML: NEGATIVE
PH UR STRIP: 5 [PH] (ref 5–8)
PLATELET # BLD AUTO: 241 K/UL
PMV BLD AUTO: 9.3 FL
POTASSIUM SERPL-SCNC: 4.8 MMOL/L
PROT SERPL-MCNC: 7.3 G/DL
PROT UR QL STRIP: ABNORMAL
PROTHROMBIN TIME: 22.1 SEC
RBC # BLD AUTO: 5.51 M/UL
RBC #/AREA URNS HPF: 2 /HPF (ref 0–4)
SODIUM SERPL-SCNC: 140 MMOL/L
SP GR UR STRIP: 1.02 (ref 1–1.03)
SQUAMOUS #/AREA URNS HPF: 2 /HPF
TOXICOLOGY INFORMATION: NORMAL
TROPONIN I SERPL DL<=0.01 NG/ML-MCNC: <0.006 NG/ML
URN SPEC COLLECT METH UR: ABNORMAL
UROBILINOGEN UR STRIP-ACNC: ABNORMAL EU/DL
WBC # BLD AUTO: 8.49 K/UL
WBC #/AREA URNS HPF: 1 /HPF (ref 0–5)

## 2017-08-01 PROCEDURE — 83605 ASSAY OF LACTIC ACID: CPT

## 2017-08-01 PROCEDURE — 85610 PROTHROMBIN TIME: CPT

## 2017-08-01 PROCEDURE — 83880 ASSAY OF NATRIURETIC PEPTIDE: CPT

## 2017-08-01 PROCEDURE — A4216 STERILE WATER/SALINE, 10 ML: HCPCS

## 2017-08-01 PROCEDURE — 99285 EMERGENCY DEPT VISIT HI MDM: CPT | Mod: 25

## 2017-08-01 PROCEDURE — 85025 COMPLETE CBC W/AUTO DIFF WBC: CPT

## 2017-08-01 PROCEDURE — 80307 DRUG TEST PRSMV CHEM ANLYZR: CPT

## 2017-08-01 PROCEDURE — 51702 INSERT TEMP BLADDER CATH: CPT

## 2017-08-01 PROCEDURE — 63600175 PHARM REV CODE 636 W HCPCS

## 2017-08-01 PROCEDURE — 25500020 PHARM REV CODE 255: Performed by: EMERGENCY MEDICINE

## 2017-08-01 PROCEDURE — 80053 COMPREHEN METABOLIC PANEL: CPT

## 2017-08-01 PROCEDURE — 82140 ASSAY OF AMMONIA: CPT

## 2017-08-01 PROCEDURE — 25000003 PHARM REV CODE 250: Performed by: EMERGENCY MEDICINE

## 2017-08-01 PROCEDURE — 83735 ASSAY OF MAGNESIUM: CPT

## 2017-08-01 PROCEDURE — 25000003 PHARM REV CODE 250

## 2017-08-01 PROCEDURE — 20000000 HC ICU ROOM

## 2017-08-01 PROCEDURE — 84484 ASSAY OF TROPONIN QUANT: CPT

## 2017-08-01 PROCEDURE — 93005 ELECTROCARDIOGRAM TRACING: CPT

## 2017-08-01 PROCEDURE — 81000 URINALYSIS NONAUTO W/SCOPE: CPT

## 2017-08-01 RX ORDER — RASAGILINE 1 MG/1
1 TABLET ORAL DAILY
Status: ON HOLD | COMMUNITY
End: 2017-08-03 | Stop reason: HOSPADM

## 2017-08-01 RX ORDER — SODIUM CHLORIDE 9 MG/ML
INJECTION, SOLUTION INTRAVENOUS CONTINUOUS
Status: DISCONTINUED | OUTPATIENT
Start: 2017-08-01 | End: 2017-08-02

## 2017-08-01 RX ADMIN — IOHEXOL 100 ML: 350 INJECTION, SOLUTION INTRAVENOUS at 08:08

## 2017-08-01 RX ADMIN — SODIUM CHLORIDE: 0.9 INJECTION, SOLUTION INTRAVENOUS at 11:08

## 2017-08-01 NOTE — LETTER
August 3, 2017    Tenzin Ortiz  [address] [place] [address] [address] [place] [address]  Phone: [phone] To whom it may concern:    Mr. Tenzin Ortiz was admitted to the ICU on 8/01/2017 and still remains there. Please excuse Ms. Citlaly Seaman ([phone]) as she is his primary caregiver and has been at his bedside since. If you have any questions or concerns, please don't hesitate to call.    Sincerely,        Melisa Doyle RN

## 2017-08-01 NOTE — ED PROVIDER NOTES
"Encounter Date: 8/1/2017    SCRIBE #1 NOTE: I, Joaquin Fritz, am scribing for, and in the presence of,  Ismael Huerta MD. I have scribed the following portions of the note - Other sections scribed: HPI and ROS.       History     Chief Complaint   Patient presents with    Altered Mental Status     Pt brought to ED for evaluation of change in mental status. Girlfriend spoke to him this morning. Now pt is moaning and responds to tactile stimulation. No verbal responses.     CC: Altered Mental Status    HPI: Pt is a 76 y.o. M with hx of Parkinson's, impulse control disorder, anticoagulant long-term use, HTN, and PVD who presents to ED for evaluation of altered mental status with associated nightly frequency. Per EMS, pt's girlfriend noticed that pt looked "sick" for past 2 days. Today at 2 pm, pt stopped answering phone calls. Girlfriend returned home and noted that pt was moaning and verbally non-responsive. EMS also reports that pt has purposeful movement (e.g. grabbing at his penis) and dislikes abdominal/chest palpation. En route, EMS gave pt 0.5 mg of Narcan "just to be safe." They noticed what looked like dried Pepto Bismol on his lips. EMS also reports that pt repeatedly makes "gargling" noises but has yet to vomit. EMS denies further symptoms or any alleviating factors. Hx is otherwise limited due to acuity of condition.      The history is provided by the EMS personnel. No  was used.     Review of patient's allergies indicates:   Allergen Reactions    Amantadine analogues      Caused confusion    Mirapex [pramipexole]      Pathologic gambling    Neupro [rotigotine]      Pathologic gambling       Past Medical History:   Diagnosis Date    Anticoagulant long-term use     Hypertension     Impulse control disorder 2012    gambling on mirapex; also possible dopamine dysregulation syndrome    PD (Parkinson's disease) 1999    tremor-predominant    PVD (peripheral vascular disease) with " claudication     poor circulation both legs     Past Surgical History:   Procedure Laterality Date    CATARACT EXTRACTION Bilateral 1 yr      Family History   Problem Relation Age of Onset    No Known Problems Mother     No Known Problems Father     No Known Problems Sister     No Known Problems Brother     No Known Problems Maternal Aunt     No Known Problems Maternal Uncle     No Known Problems Paternal Aunt     No Known Problems Paternal Uncle     No Known Problems Maternal Grandmother     No Known Problems Maternal Grandfather     No Known Problems Paternal Grandmother     No Known Problems Paternal Grandfather     Parkinsonism Neg Hx     Amblyopia Neg Hx     Blindness Neg Hx     Cancer Neg Hx     Cataracts Neg Hx     Diabetes Neg Hx     Glaucoma Neg Hx     Hypertension Neg Hx     Macular degeneration Neg Hx     Retinal detachment Neg Hx     Strabismus Neg Hx     Stroke Neg Hx     Thyroid disease Neg Hx      Social History   Substance Use Topics    Smoking status: Former Smoker     Years: 10.00    Smokeless tobacco: Never Used      Comment: Quit 40 years ago    Alcohol use No     Review of Systems   Constitutional: Negative for chills and fever.   HENT: Negative for sore throat.    Respiratory: Negative for shortness of breath.    Cardiovascular: Negative for chest pain.   Gastrointestinal: Negative for nausea.   Genitourinary: Negative for dysuria.   Musculoskeletal: Negative for back pain.   Skin: Negative for rash.   Neurological: Negative for weakness and headaches.   Hematological: Does not bruise/bleed easily.   Psychiatric/Behavioral:        (+) verbally non-responsive  (+) moaning       Physical Exam     Initial Vitals [08/01/17 1842]   BP Pulse Resp Temp SpO2   139/61 74 18 -- 97 %      MAP       87         Physical Exam    Nursing note and vitals reviewed.  HENT:   Head: Atraumatic.   Eyes:   Eyes closed   Neck: Normal range of motion.   Cardiovascular: Exam reveals no  gallop and no friction rub.    No murmur heard.  Pulmonary/Chest: Breath sounds normal. No respiratory distress. He has no wheezes. He has no rales.   Abdominal: Soft. Bowel sounds are normal. He exhibits no distension. There is no tenderness.   Musculoskeletal: Normal range of motion. He exhibits no edema.   Neurological:   Minimally responsive, moving extremities spontaneously.  Localizes to painful stimuli.  Moaning incomprehensible sounds.  GCS 8   Skin: Skin is warm and dry.   Psychiatric: He has a normal mood and affect.         ED Course   Critical Care  Date/Time: 8/1/2017 10:03 PM  Performed by: MALLORY WHITTEN.  Authorized by: MALLORY WHITTEN   Direct patient critical care time: 55 minutes  Ordering / reviewing critical care time: 10 minutes  Documentation critical care time: 10 minutes  Consulting other physicians critical care time: 15 minutes  Total critical care time (exclusive of procedural time) : 90 minutes  Critical care was necessary to treat or prevent imminent or life-threatening deterioration of the following conditions: circulatory failure, cardiac failure, CNS failure or compromise, respiratory failure, shock, metabolic crisis, renal failure and dehydration.  Critical care was time spent personally by me on the following activities: development of treatment plan with patient or surrogate, discussions with consultants, evaluation of patient's response to treatment, examination of patient, ordering and performing treatments and interventions, ordering and review of laboratory studies, ordering and review of radiographic studies, obtaining history from patient or surrogate, re-evaluation of patient's condition, pulse oximetry and review of old charts.        Labs Reviewed   CBC W/ AUTO DIFFERENTIAL - Abnormal; Notable for the following:        Result Value    MCH 26.3 (*)     MCHC 31.9 (*)     RDW 15.6 (*)     Lymph # 0.7 (*)     Gran% 87.4 (*)     Lymph% 8.4 (*)     All other components within  normal limits   COMPREHENSIVE METABOLIC PANEL - Abnormal; Notable for the following:     CO2 21 (*)     Glucose 125 (*)     BUN, Bld 27 (*)     Creatinine 1.5 (*)     eGFR if  52 (*)     eGFR if non  45 (*)     All other components within normal limits   PROTIME-INR - Abnormal; Notable for the following:     Prothrombin Time 22.1 (*)     INR 2.2 (*)     All other components within normal limits   URINALYSIS - Abnormal; Notable for the following:     Appearance, UA Cloudy (*)     Protein, UA 2+ (*)     Ketones, UA Trace (*)     Urobilinogen, UA 4.0-6.0 (*)     All other components within normal limits   LACTIC ACID, PLASMA - Abnormal; Notable for the following:     Lactate (Lactic Acid) 2.4 (*)     All other components within normal limits   TROPONIN I   B-TYPE NATRIURETIC PEPTIDE   MAGNESIUM   AMMONIA   DRUG SCREEN PANEL, URINE EMERGENCY   URINALYSIS MICROSCOPIC             Medical Decision Making:   Initial Assessment:   76 year old male brought in by EMS for altered mental status.  Apparently he has been not feeling well over the past few days and then today his girlfriend noted that his mental status was significantly altered.  Upon arrival, he is minimally responsive.  He does respond to painful stimuli.  His speech is very slurred and garbled.  When you touch his abdomen or chest, he complains of pain.  Initial EKG performed in the ED reviewed and interpreted myself showed no evidence of acute ischemia.  While patient was having labs drawn and chest x-ray performed, I noticed on his telemetry see elevation in his inferior leads.  Repeat EKG reviewed and interpreted myself shows evidence of inferior STEMI.  Emergent code STEMI activated.  Approximately 1-2 minutes after STEMI was seen on telemetry, repeat EKG was performed and his ST segments have normalized.  His INR is 2.2.  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  His cranium is 1.5.  Head CT shows no acute  intracranial process.  Abdominal CT shows no acute intracranial process. Dr. Sevilla is at the bedside evaluating patient.  At this time there is no immediate plan to take patient to the Cath Lab given uncertainty of ischemic changes, per cardiology.  CTA of the chest abdomen pelvis shows no evidence of dissection.  Etiology of his altered mental status at this time is not clear.  Discussed case again with cardiology,  Dr. Sevilla.  He will follow along.  Troponins trended.                  Scribe Attestation:   Scribe #1: I performed the above scribed service and the documentation accurately describes the services I performed. I attest to the accuracy of the note.    Attending Attestation:           Physician Attestation for Scribe:  Physician Attestation Statement for Scribe #1: I, Ismael Huerta MD, reviewed documentation, as scribed by Joaquin Fritz in my presence, and it is both accurate and complete.                 ED Course     Clinical Impression:   The primary encounter diagnosis was ST elevation myocardial infarction (STEMI), unspecified artery. Diagnoses of Chest pain, Weakness, Altered mental status, unspecified altered mental status type, and Anticoagulated on Coumadin were also pertinent to this visit.                           Ismael Huerta MD  08/01/17 9912

## 2017-08-01 NOTE — ED TRIAGE NOTES
Pt here via [facility] for confusion and decreased LOC. Pt just mumbling in bed. VSS, . When EMS arrived they said pt had scant dark vomit on pt's shoulder.

## 2017-08-02 PROBLEM — Z79.01 CHRONIC ANTICOAGULATION: Chronic | Status: ACTIVE | Noted: 2017-08-02

## 2017-08-02 PROBLEM — R94.31 ECG ABNORMALITY: Chronic | Status: ACTIVE | Noted: 2017-08-01

## 2017-08-02 LAB
ALBUMIN SERPL BCP-MCNC: 3.5 G/DL
ALP SERPL-CCNC: 59 U/L
ALT SERPL W/O P-5'-P-CCNC: 12 U/L
ANION GAP SERPL CALC-SCNC: 10 MMOL/L
AST SERPL-CCNC: 17 U/L
BASOPHILS # BLD AUTO: 0 K/UL
BASOPHILS NFR BLD: 0 %
BILIRUB SERPL-MCNC: 0.3 MG/DL
BUN SERPL-MCNC: 27 MG/DL
CALCIUM SERPL-MCNC: 9.1 MG/DL
CHLORIDE SERPL-SCNC: 108 MMOL/L
CO2 SERPL-SCNC: 24 MMOL/L
CREAT SERPL-MCNC: 1.4 MG/DL
DIASTOLIC DYSFUNCTION: NO
DIFFERENTIAL METHOD: ABNORMAL
EOSINOPHIL # BLD AUTO: 0 K/UL
EOSINOPHIL NFR BLD: 0.1 %
ERYTHROCYTE [DISTWIDTH] IN BLOOD BY AUTOMATED COUNT: 15.4 %
EST. GFR  (AFRICAN AMERICAN): 56 ML/MIN/1.73 M^2
EST. GFR  (NON AFRICAN AMERICAN): 49 ML/MIN/1.73 M^2
ESTIMATED PA SYSTOLIC PRESSURE: 12
GLOBAL PERICARDIAL EFFUSION: NORMAL
GLUCOSE SERPL-MCNC: 124 MG/DL
HCT VFR BLD AUTO: 41.6 %
HGB BLD-MCNC: 13.6 G/DL
LYMPHOCYTES # BLD AUTO: 1.1 K/UL
LYMPHOCYTES NFR BLD: 12.8 %
MCH RBC QN AUTO: 26.7 PG
MCHC RBC AUTO-ENTMCNC: 32.7 G/DL
MCV RBC AUTO: 82 FL
MONOCYTES # BLD AUTO: 0.6 K/UL
MONOCYTES NFR BLD: 7.2 %
NEUTROPHILS # BLD AUTO: 6.6 K/UL
NEUTROPHILS NFR BLD: 79.9 %
PLATELET # BLD AUTO: 218 K/UL
PMV BLD AUTO: 8.9 FL
POTASSIUM SERPL-SCNC: 5.1 MMOL/L
PROT SERPL-MCNC: 6.5 G/DL
RBC # BLD AUTO: 5.1 M/UL
RETIRED EF AND QEF - SEE NOTES: 55 (ref 55–65)
SODIUM SERPL-SCNC: 142 MMOL/L
TRICUSPID VALVE REGURGITATION: NORMAL
TROPONIN I SERPL DL<=0.01 NG/ML-MCNC: 0.01 NG/ML
TROPONIN I SERPL DL<=0.01 NG/ML-MCNC: 0.02 NG/ML
WBC # BLD AUTO: 8.31 K/UL

## 2017-08-02 PROCEDURE — 63600175 PHARM REV CODE 636 W HCPCS: Performed by: INTERNAL MEDICINE

## 2017-08-02 PROCEDURE — 84484 ASSAY OF TROPONIN QUANT: CPT

## 2017-08-02 PROCEDURE — 27000221 HC OXYGEN, UP TO 24 HOURS

## 2017-08-02 PROCEDURE — 25000003 PHARM REV CODE 250: Performed by: INTERNAL MEDICINE

## 2017-08-02 PROCEDURE — 99291 CRITICAL CARE FIRST HOUR: CPT | Mod: ,,, | Performed by: INTERNAL MEDICINE

## 2017-08-02 PROCEDURE — 20000000 HC ICU ROOM

## 2017-08-02 PROCEDURE — 93306 TTE W/DOPPLER COMPLETE: CPT | Mod: 26,,, | Performed by: INTERNAL MEDICINE

## 2017-08-02 PROCEDURE — 84484 ASSAY OF TROPONIN QUANT: CPT | Mod: 91

## 2017-08-02 PROCEDURE — 85025 COMPLETE CBC W/AUTO DIFF WBC: CPT

## 2017-08-02 PROCEDURE — 80053 COMPREHEN METABOLIC PANEL: CPT

## 2017-08-02 PROCEDURE — 93306 TTE W/DOPPLER COMPLETE: CPT

## 2017-08-02 PROCEDURE — 36415 COLL VENOUS BLD VENIPUNCTURE: CPT

## 2017-08-02 PROCEDURE — 99222 1ST HOSP IP/OBS MODERATE 55: CPT | Mod: ,,, | Performed by: PSYCHIATRY & NEUROLOGY

## 2017-08-02 RX ORDER — CYCLOBENZAPRINE HCL 10 MG
10 TABLET ORAL 3 TIMES DAILY PRN
Status: DISCONTINUED | OUTPATIENT
Start: 2017-08-02 | End: 2017-08-10 | Stop reason: HOSPADM

## 2017-08-02 RX ORDER — NALOXONE HCL 0.4 MG/ML
0.4 VIAL (ML) INJECTION ONCE
Status: COMPLETED | OUTPATIENT
Start: 2017-08-02 | End: 2017-08-02

## 2017-08-02 RX ORDER — QUETIAPINE FUMARATE 25 MG/1
25 TABLET, FILM COATED ORAL NIGHTLY
Status: DISCONTINUED | OUTPATIENT
Start: 2017-08-02 | End: 2017-08-02

## 2017-08-02 RX ORDER — CARBIDOPA AND LEVODOPA 25; 100 MG/1; MG/1
1 TABLET ORAL 4 TIMES DAILY
Status: DISCONTINUED | OUTPATIENT
Start: 2017-08-02 | End: 2017-08-10 | Stop reason: HOSPADM

## 2017-08-02 RX ORDER — GABAPENTIN 600 MG/1
600 TABLET ORAL 2 TIMES DAILY
Status: ON HOLD | COMMUNITY
End: 2017-08-03 | Stop reason: HOSPADM

## 2017-08-02 RX ORDER — ACETAMINOPHEN 325 MG/1
650 TABLET ORAL EVERY 4 HOURS PRN
Status: DISCONTINUED | OUTPATIENT
Start: 2017-08-02 | End: 2017-08-10 | Stop reason: HOSPADM

## 2017-08-02 RX ORDER — GABAPENTIN 300 MG/1
600 CAPSULE ORAL NIGHTLY
Status: DISCONTINUED | OUTPATIENT
Start: 2017-08-02 | End: 2017-08-10 | Stop reason: HOSPADM

## 2017-08-02 RX ORDER — PRAMIPEXOLE DIHYDROCHLORIDE 1 MG/1
1 TABLET ORAL 2 TIMES DAILY
Status: DISCONTINUED | OUTPATIENT
Start: 2017-08-02 | End: 2017-08-02

## 2017-08-02 RX ORDER — ATORVASTATIN CALCIUM 10 MG/1
10 TABLET, FILM COATED ORAL DAILY
Status: DISCONTINUED | OUTPATIENT
Start: 2017-08-02 | End: 2017-08-10 | Stop reason: HOSPADM

## 2017-08-02 RX ORDER — AMLODIPINE BESYLATE 5 MG/1
10 TABLET ORAL DAILY
Status: DISCONTINUED | OUTPATIENT
Start: 2017-08-02 | End: 2017-08-10 | Stop reason: HOSPADM

## 2017-08-02 RX ORDER — WARFARIN 3 MG/1
3 TABLET ORAL DAILY
Status: DISCONTINUED | OUTPATIENT
Start: 2017-08-02 | End: 2017-08-03 | Stop reason: SDUPTHER

## 2017-08-02 RX ORDER — OLANZAPINE 10 MG/2ML
5 INJECTION, POWDER, FOR SOLUTION INTRAMUSCULAR EVERY 8 HOURS PRN
Status: DISCONTINUED | OUTPATIENT
Start: 2017-08-02 | End: 2017-08-02

## 2017-08-02 RX ORDER — HYDRALAZINE HYDROCHLORIDE 20 MG/ML
10 INJECTION INTRAMUSCULAR; INTRAVENOUS EVERY 6 HOURS PRN
Status: COMPLETED | OUTPATIENT
Start: 2017-08-02 | End: 2017-08-06

## 2017-08-02 RX ORDER — CLOPIDOGREL BISULFATE 75 MG/1
75 TABLET ORAL DAILY
Status: DISCONTINUED | OUTPATIENT
Start: 2017-08-02 | End: 2017-08-02

## 2017-08-02 RX ADMIN — HYDRALAZINE HYDROCHLORIDE 10 MG: 20 INJECTION INTRAMUSCULAR; INTRAVENOUS at 04:08

## 2017-08-02 RX ADMIN — CARBIDOPA AND LEVODOPA 1 TABLET: 25; 100 TABLET ORAL at 05:08

## 2017-08-02 RX ADMIN — WARFARIN SODIUM 3 MG: 3 TABLET ORAL at 04:08

## 2017-08-02 RX ADMIN — AMLODIPINE BESYLATE 10 MG: 5 TABLET ORAL at 12:08

## 2017-08-02 RX ADMIN — ATORVASTATIN CALCIUM 10 MG: 10 TABLET, FILM COATED ORAL at 11:08

## 2017-08-02 RX ADMIN — CARBIDOPA AND LEVODOPA 1 TABLET: 25; 100 TABLET ORAL at 11:08

## 2017-08-02 RX ADMIN — NALOXONE HYDROCHLORIDE 0.4 MG: 0.4 INJECTION, SOLUTION INTRAMUSCULAR; INTRAVENOUS; SUBCUTANEOUS at 01:08

## 2017-08-02 RX ADMIN — GABAPENTIN 600 MG: 300 CAPSULE ORAL at 10:08

## 2017-08-02 RX ADMIN — CARBIDOPA AND LEVODOPA 1 TABLET: 25; 100 TABLET ORAL at 12:08

## 2017-08-02 NOTE — PLAN OF CARE
Problem: Fall Risk (Adult)  Intervention: Monitor/Assist with Self Care   17   Activity   Activity Assistance Provided assistance, 1 person     Intervention: Reduce Risk/Promote Restraint Free Environment   17   Safety Interventions   Environmental Safety Modification --  clutter free environment maintained;lighting adjusted;room near unit station;room organization consistent;assistive device/personal items within reach   Prevent  Drop/Fall   Safety/Security Measures --  bed alarm set   Safety Interventions   Safety Precautions emergency equipment at bedside --        Goal: Identify Related Risk Factors and Signs and Symptoms  Related risk factors and signs and symptoms are identified upon initiation of Human Response Clinical Practice Guideline (CPG)   Outcome: Ongoing (interventions implemented as appropriate)   17   Fall Risk   Related Risk Factors (Fall Risk) confusion/agitation   Signs and Symptoms (Fall Risk) presence of risk factors     Goal: Absence of Falls  Patient will demonstrate the desired outcomes by discharge/transition of care.   Outcome: Ongoing (interventions implemented as appropriate)   17   Fall Risk (Adult)   Absence of Falls making progress toward outcome       Problem: Patient Care Overview  Goal: Plan of Care Review   17   Coping/Psychosocial   Plan Of Care Reviewed With patient

## 2017-08-02 NOTE — PLAN OF CARE
Problem: Patient Care Overview  Goal: Plan of Care Review  Outcome: Ongoing (interventions implemented as appropriate)  Pt has remained afebrile today. Pt has been awaiting for transfer to tele. Pt oriented at times and then confused at times. Pt with SR. Pt has no skin breakdown noted. Pt has remained safe and free of injury today.

## 2017-08-02 NOTE — ASSESSMENT & PLAN NOTE
Etiology is yet to be determined.  CT-head was without acute intracranial abnormalities.  I have reviewed the chest X-ray and it reveals no focal infiltrates or pleural effusions.  Patient is without fevers and meningismus.  Urinalysis was benign; serum glucose was 125 mg/dL; electrolytes are stable; there is no evidence of uremia; and ammonia level was normal.  There is no evidence of thyroidal disease; skin is intact; and there are no rashes.  Patient is hemodynamically-stable and there has not been recent medication changes.  Clinical suspicion of CVA or subclinical seizures are low.  Will plan to perform neurological testing every 4 hours with frequent re-orientation.  Will also minimize medications and place fall precautions.  I'm curious if this is medication induced.

## 2017-08-02 NOTE — CONSULTS
[facility]  Adult Nutrition  Consult Note    SUMMARY     Recommendations  Recommendation/Intervention:   1.) When appropriate per MD, recommend Cardiac diet, texture per SLP    2.) If enteral nutrition required recommend Isosource 1.5 @ 20 mls/hr advancing by 10 mls Q6 hrs to goal rate 50 mls/hr continuous providing 1800 kcals/day, 82 g protein/day, 211 g CHO/day, and 934 mls water/day. Hold TF x4 hrs for residuals >250 mls. Flush 150 mls free water Q4 hrs or per MD.     Goals: 1.) diet will advance within 48 hrs.   Nutrition Goal Status: new  Communication of RD Recs:  (sticky note)    1. ST elevation myocardial infarction (STEMI), unspecified artery    2. Chest pain    3. Weakness    4. Altered mental status, unspecified altered mental status type    5. Anticoagulated on Coumadin    6. CAD (coronary artery disease)      Past Medical History:   Diagnosis Date    Anticoagulant long-term use     Hypertension     Impulse control disorder 2012    gambling on mirapex; also possible dopamine dysregulation syndrome    PD (Parkinson's disease) 1999    tremor-predominant    PVD (peripheral vascular disease) with claudication     poor circulation both legs       Reason for Assessment  Reason for Assessment: nurse/nurse practitioner consult  Interdisciplinary Rounds: attended  General Information Comments: Admits with acute encephalopathy. Spoke with RN, patient not alert enought for RD visit. Remains NPO. Per chart review, weight loss noted since May of this year.       Nutrition Prescription Ordered  Current Diet Order: NPO      Evaluation of Received Nutrients/Fluid Intake  IV Fluid (mL): 1800  % Intake of Estimated Energy Needs: 0 - 25 %  % Meal Intake: NPO     Nutrition Risk Screen  Nutrition Risk Screen: no indicators present    Nutrition/Diet History  Food Preferences: TAWANDA. NKFA.   Factors Affecting Nutritional Intake: NPO, impaired cognitive status/motor  "control    Labs/Tests/Procedures/Meds  Diagnostic Test/Procedure Review: reviewed, pertinent  Pertinent Labs Reviewed: reviewed, pertinent  BMP  Lab Results   Component Value Date     2017    K 5.1 2017     2017    CO2 24 2017    BUN 27 (H) 2017    CREATININE 1.4 2017    CALCIUM 9.1 2017    ANIONGAP 10 2017    ESTGFRAFRICA 56 (A) 2017    EGFRNONAA 49 (A) 2017     Lab Results   Component Value Date    ALBUMIN 3.5 2017     Lab Results   Component Value Date    CALCIUM 9.1 2017     No results for input(s): POCTGLUCOSE in the last 24 hours.  Lab Results   Component Value Date    CHOL 150 2017     Lab Results   Component Value Date    HDL 37 (L) 2017     Lab Results   Component Value Date    LDLCALC 93.2 2017     Lab Results   Component Value Date    TRIG 99 2017     Lab Results   Component Value Date    CHOLHDL 24.7 2017         Pertinent Medications Reviewed: reviewed  Scheduled Meds:   Continuous Infusions:   sodium chloride 0.9% 75 mL/hr at 17 0600         Physical Findings  Overall Physical Appearance: lethargic (MST score: 2)  Skin: intact (Mike score 17)    Anthropometrics  Temp: 98 °F (36.7 °C)  Height: 5' 7"  Weight Method: Bed Scale  Weight: 68.7 kg (151 lb 7.3 oz)  Ideal Body Weight (IBW), Male: 148 lb  % Ideal Body Weight, Male (lb): 102.34 lb  BMI (Calculated): 23.8  BMI Grade: 18.5-24.9 - normal  Usual Body Weight (UBW), k.5 kg  % Usual Body Weight: 92.41  % Weight Change From Usual Weight: 7.59 %    Estimated/Assessed Needs  Weight Used For Calorie Calculations: 68.7 kg (151 lb 7.3 oz)   Energy Calorie Requirements (kcal): 7239-4171 kcals/day  Energy Need Method: [place]-St Jeor (x1.2-1.3)  RMR ([place]-St. Jeor Equation): 1380.63  Weight Used For Protein Calculations: 68.7 kg (151 lb 7.3 oz)  1.2 gm Protein (gm): 82.61 and 2.0 gm Protein (gm): 137.69  Fluid Need Method: RDA " Method (or per MD)  RDA Method (mL): 4763      Assessment and Plan    Mild malnutrition    Related to (etiology):   Decreased ability to consume sufficient energy    Signs and Symptoms (as evidenced by):   1.) 7% weight loss in 2 months     Interventions/Recommendations (treatment strategy):  1.) Advance diet when appropriate per SLP/MD    Nutrition Diagnosis Status:   New              Monitor and Evaluation  Food and Nutrient Intake: energy intake, food and beverage intake, enteral nutrition intake  Food and Nutrient Adminstration: diet order, enteral and parenteral nutrition administration  Anthropometric Measurements: weight, weight change, body mass index  Biochemical Data, Medical Tests and Procedures: electrolyte and renal panel, glucose/endocrine profile, lipid profile  Nutrition-Focused Physical Findings: overall appearance    Nutrition Risk  Level of Risk:  (x2 weekly)    Nutrition Follow-Up  RD Follow-up?: Yes    Discharge Planning: unable to determine at this time

## 2017-08-02 NOTE — CONSULTS
[facility] [place]  [place]urology  Consult Note    Patient Name: Tenzin Ortiz  MRN: [id number]  Admission Date: 8/1/2017  Hospital Length of Stay: 1 days  Code Status: Full Code   Attending Provider: Cami Crow MD   Consulting Provider: Johny Aldrich MD  Primary Care Physician: Efrain Chavez MD  Principal Problem:ECG abnormality    Consults  Subjective:     Chief Complaint/Reason for consult     HPI: 77 y/o male with medical Hx as listed below brought to ED for AMS. Pt tells me he doesn't know what happened but per notes his significant other has been calling him and he wouldn't answer. When she arrived pt was moaning, not responding to her. Overnight pt remained non-verbal; not following commands.     Past Medical History:   Diagnosis Date    Anticoagulant long-term use     Hypertension     Impulse control disorder 2012    gambling on mirapex; also possible dopamine dysregulation syndrome    PD (Parkinson's disease) 1999    tremor-predominant    PVD (peripheral vascular disease) with claudication     poor circulation both legs       Past Surgical History:   Procedure Laterality Date    CATARACT EXTRACTION Bilateral 1 yr        Review of patient's allergies indicates:   Allergen Reactions    Amantadine analogues      Caused confusion    Mirapex [pramipexole]      Pathologic gambling    Neupro [rotigotine]      Pathologic gambling         Current Neurological Medications:    No current facility-administered medications on file prior to encounter.      Current Outpatient Prescriptions on File Prior to Encounter   Medication Sig    acetaminophen (TYLENOL ARTHRITIS PAIN) 650 MG TbSR Take 650 mg by mouth every 8 (eight) hours.    amlodipine (NORVASC) 10 MG tablet TAKE 1 TABLET BY MOUTH EVERY DAY    atorvastatin (LIPITOR) 10 MG tablet Take 10 mg by mouth once daily.    carbidopa-levodopa (PARCOPA)  mg per disintegrating tablet DISSOLVE 2 TS PO QID PRN    clopidogrel (PLAVIX) 75 mg  tablet TAKE 1 TABLET BY MOUTH ONCE DAILY    cyclobenzaprine (FLEXERIL) 10 MG tablet Take 10 mg by mouth 3 (three) times daily as needed for Muscle spasms.    fluocinonide (LIDEX) 0.05 % ointment POWER AA BID    gabapentin (NEURONTIN) 300 MG capsule Take 2 capsules (600 mg total) by mouth every evening.    pramipexole (MIRAPEX) 1 MG tablet     tramadol (ULTRAM) 50 mg tablet Take 50 mg by mouth every 6 (six) hours as needed for Pain.    trazodone (DESYREL) 50 MG tablet TK 1/2 T PO HS  U PRN TO REST ATN    warfarin (COUMADIN) 3 MG tablet TAKE ONE BY MOUTH EVERY DAY OR AS DIRECTED BY COUMADIN CLINIC      Family History     Problem Relation (Age of Onset)    No Known Problems Mother, Father, Sister, Brother, Maternal Aunt, Maternal Uncle, Paternal Aunt, Paternal Uncle, Maternal Grandmother, Maternal Grandfather, Paternal Grandmother, Paternal Grandfather        Social History Main Topics    Smoking status: Former Smoker     Years: 10.00    Smokeless tobacco: Never Used      Comment: Quit 40 years ago    Alcohol use No    Drug use: No    Sexual activity: Not Currently     Review of Systems   Constitutional: Negative for fever.   Gastrointestinal: Negative for abdominal pain.   Neurological: Negative for headaches.     ROS is limited due to somnolence      Objective:     Vital Signs (Most Recent):  Temp: 98.3 °F (36.8 °C) (08/02/17 0315)  Pulse: 78 (08/02/17 0600)  Resp: 16 (08/02/17 0600)  BP: (!) 156/69 (08/02/17 0600)  SpO2: 96 % (08/02/17 0600) Vital Signs (24h Range):  Temp:  [98.3 °F (36.8 °C)-98.8 °F (37.1 °C)] 98.3 °F (36.8 °C)  Pulse:  [58-84] 78  Resp:  [14-25] 16  SpO2:  [94 %-99 %] 96 %  BP: (130-163)/() 156/69     Weight: 68.7 kg (151 lb 7.3 oz)  Body mass index is 23.72 kg/m².    Physical Exam  Constitutional: well-developed; no distress  Head: normocephalic  Eyes: conjunctivae/corneas clear.  Nose: no discharge, no epistaxis  Heart: regular rate and rhythm.  Abdomen: no pain to  palpation  Extremities: no edema  Neurologic: Mental status: somnolent; initially didn't arouse to verbal stimuli but upon noxious stimulation of extremities pt arouses  Follow commands; oriented to person, place (knows he is in a hospital), tells me today is 2017; knows his   Cranial nerves: pupils are round at 3 mm and reactive to light; no nystagmus; no facial weakness; tongue protrudes midline SCM/trap 5/5  Strength: elevates UE/LE's against gravity; no drift  Tremor in UE's worse on action       Significant Labs:   CBC:   Recent Labs  Lab 178 17  0115   WBC 8.49 8.31   HGB 14.5 13.6*   HCT 45.4 41.6    218     CMP:   Recent Labs  Lab 17  0114   * 124*    142   K 4.8 5.1    108   CO2 21* 24   BUN 27* 27*   CREATININE 1.5* 1.4   CALCIUM 9.6 9.1   MG 2.2  --    PROT 7.3 6.5   ALBUMIN 3.9 3.5   BILITOT 0.4 0.3   ALKPHOS 65 59   AST 15 17   ALT 11 12   ANIONGAP 12 10   EGFRNONAA 45* 49*       Significant Imaging:  CT head  Impression        No acute intracranial abnormality.      Electronically signed by: DANNY HENNING MD  Date: 17  Time: 19:38        Assessment and Plan:     77 y/o male consulted for AMS    1. AMS: acute encephalopathy that could be related to medication given his multiple reported PD meds as is home meds lists Sinemet, Azilect, Mirapex; also gabapentin, tramadol, and trazodone.   Event though he is lethargic he is oriented and following commands which seems to be an improvement. Head CT shows no acute abnormalities. Except for abnroaml renal function no other metabolic abnormalities that may be causing his state.   -Will hold PD and potentially sedating meds meds for now and ask his significant other to bring the bottles of his current meds to have the correct doses.        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 I will follow-up with patient. Please contact us if you have any additional questions.    Johny Aldrich MD  Neurology  [facility]

## 2017-08-02 NOTE — SUBJECTIVE & OBJECTIVE
Past Medical History:   Diagnosis Date    Anticoagulant long-term use     Hypertension     Impulse control disorder 2012    gambling on mirapex; also possible dopamine dysregulation syndrome    PD (Parkinson's disease) 1999    tremor-predominant    PVD (peripheral vascular disease) with claudication     poor circulation both legs       Past Surgical History:   Procedure Laterality Date    CATARACT EXTRACTION Bilateral 1 yr        Review of patient's allergies indicates:   Allergen Reactions    Amantadine analogues      Caused confusion    Mirapex [pramipexole]      Pathologic gambling    Neupro [rotigotine]      Pathologic gambling         No current facility-administered medications on file prior to encounter.      Current Outpatient Prescriptions on File Prior to Encounter   Medication Sig    acetaminophen (TYLENOL ARTHRITIS PAIN) 650 MG TbSR Take 650 mg by mouth every 8 (eight) hours.    amlodipine (NORVASC) 10 MG tablet TAKE 1 TABLET BY MOUTH EVERY DAY    atorvastatin (LIPITOR) 10 MG tablet Take 10 mg by mouth once daily.    carbidopa-levodopa (PARCOPA)  mg per disintegrating tablet DISSOLVE 2 TS PO QID PRN    clopidogrel (PLAVIX) 75 mg tablet TAKE 1 TABLET BY MOUTH ONCE DAILY    cyclobenzaprine (FLEXERIL) 10 MG tablet Take 10 mg by mouth 3 (three) times daily as needed for Muscle spasms.    fluocinonide (LIDEX) 0.05 % ointment POWER AA BID    gabapentin (NEURONTIN) 300 MG capsule Take 2 capsules (600 mg total) by mouth every evening.    pramipexole (MIRAPEX) 1 MG tablet     tramadol (ULTRAM) 50 mg tablet Take 50 mg by mouth every 6 (six) hours as needed for Pain.    trazodone (DESYREL) 50 MG tablet TK 1/2 T PO HS  U PRN TO REST ATN    warfarin (COUMADIN) 3 MG tablet TAKE ONE BY MOUTH EVERY DAY OR AS DIRECTED BY COUMADIN CLINIC     Family History     Problem Relation (Age of Onset)    No Known Problems Mother, Father, Sister, Brother, Maternal Aunt, Maternal Uncle, Paternal Aunt,  Paternal Uncle, Maternal Grandmother, Maternal Grandfather, Paternal Grandmother, Paternal Grandfather        Social History Main Topics    Smoking status: Former Smoker     Years: 10.00    Smokeless tobacco: Never Used      Comment: Quit 40 years ago    Alcohol use No    Drug use: No    Sexual activity: Not Currently     Review of Systems   Unable to perform ROS: mental status change     Objective:     Vital Signs (Most Recent):  Temp: 98.3 °F (36.8 °C) (08/02/17 0315)  Pulse: 78 (08/02/17 0600)  Resp: 16 (08/02/17 0600)  BP: (!) 156/69 (08/02/17 0600)  SpO2: 96 % (08/02/17 0600) Vital Signs (24h Range):  Temp:  [98.3 °F (36.8 °C)-98.8 °F (37.1 °C)] 98.3 °F (36.8 °C)  Pulse:  [58-84] 78  Resp:  [14-25] 16  SpO2:  [94 %-99 %] 96 %  BP: (130-163)/() 156/69     Weight: 68.7 kg (151 lb 7.3 oz)  Body mass index is 23.72 kg/m².    SpO2: 96 %  O2 Device (Oxygen Therapy): nasal cannula      Intake/Output Summary (Last 24 hours) at 08/02/17 0854  Last data filed at 08/02/17 0600   Gross per 24 hour   Intake           466.25 ml   Output              345 ml   Net           121.25 ml       Lines/Drains/Airways     Drain                 Urethral Catheter 08/01/17 1920 Latex 16 Fr. less than 1 day          Peripheral Intravenous Line                 Peripheral IV - Single Lumen 08/01/17 1843 Right Forearm less than 1 day         Peripheral IV - Single Lumen 08/01/17 2257 Left Hand less than 1 day                Physical Exam   Constitutional: He appears well-developed and well-nourished. No distress.   Currently resting comfortably but nonresponsive to verbal and tactile stimulation   HENT:   Head: Normocephalic and atraumatic.   Eyes: Conjunctivae and EOM are normal. Pupils are equal, round, and reactive to light.   Neck: Normal range of motion. Neck supple. No thyromegaly present.   Cardiovascular: Normal rate, regular rhythm and normal heart sounds.    No murmur heard.  Pulmonary/Chest: Effort normal and breath  sounds normal. No respiratory distress. He has no wheezes. He has no rales. He exhibits no tenderness.   Abdominal: Soft. Bowel sounds are normal.   Musculoskeletal: He exhibits no edema.   Neurological:   Unable to assess   Skin: Skin is warm and dry.   Psychiatric:   Unable to assess       Significant Labs:   CMP   Recent Labs  Lab 08/01/17 1908 08/02/17 0114    142   K 4.8 5.1    108   CO2 21* 24   * 124*   BUN 27* 27*   CREATININE 1.5* 1.4   CALCIUM 9.6 9.1   PROT 7.3 6.5   ALBUMIN 3.9 3.5   BILITOT 0.4 0.3   ALKPHOS 65 59   AST 15 17   ALT 11 12   ANIONGAP 12 10   ESTGFRAFRICA 52* 56*   EGFRNONAA 45* 49*   , CBC   Recent Labs  Lab 08/01/17 1908 08/02/17 0115   WBC 8.49 8.31   HGB 14.5 13.6*   HCT 45.4 41.6    218   , INR   Recent Labs  Lab 08/01/17 1908   INR 2.2*   , Lipid Panel No results for input(s): CHOL, HDL, LDLCALC, TRIG, CHOLHDL in the last 48 hours. and Troponin   Recent Labs  Lab 08/01/17 1908 08/02/17 0115 08/02/17  0708   TROPONINI <0.006 0.013 0.024       Significant Imaging: Echocardiogram:   2D echo with color flow doppler:   Results for orders placed or performed during the hospital encounter of 04/02/16   2D echo with color flow doppler   Result Value Ref Range    EF 55 55 - 65    Mitral Valve Regurgitation TRIVIAL     Diastolic Dysfunction No     Est. PA Systolic Pressure 37.16     Pericardial Effusion NONE     Mitral Valve Mobility NORMAL     Tricuspid Valve Regurgitation TRIVIAL      NST: 4-16  Impression: NORMAL MYOCARDIAL PERFUSION  1. The perfusion scan is free of evidence for myocardial ischemia or injury.   2. There is a mild intensity fixed defect in the inferobasilar wall of the left ventricle, secondary to diaphragm attenuation.   3. Resting wall motion is physiologic.   4. Resting LV function is normal.   5. The ventricular volumes are normal at rest and stress.   6. The extracardiac distribution of radioactivity is normal.

## 2017-08-02 NOTE — PLAN OF CARE
Problem: Nutrition, Imbalanced: Inadequate Oral Intake (Adult)  Goal: Identify Related Risk Factors and Signs and Symptoms  Related risk factors and signs and symptoms are identified upon initiation of Human Response Clinical Practice Guideline (CPG)  Outcome: Ongoing (interventions implemented as appropriate)  Recommendations  Recommendation/Intervention:   1.) When appropriate per MD, recommend Cardiac diet, texture per SLP     2.) If enteral nutrition required recommend Isosource 1.5 @ 20 mls/hr advancing by 10 mls Q6 hrs to goal rate 50 mls/hr continuous providing 1800 kcals/day, 82 g protein/day, 211 g CHO/day, and 934 mls water/day. Hold TF x4 hrs for residuals >250 mls. Flush 150 mls free water Q4 hrs or per MD.      Goals: 1.) diet will advance within 48 hrs.   Nutrition Goal Status: new  Communication of AERLY Recs:  (sticky note)

## 2017-08-02 NOTE — PROGRESS NOTES
2315: Patient arrive to ICU via stretcher with KARINA Rendon. Patient is placed on continuous cardiac monitoring and pulse ox, 2L NC with oxygen saturation of 96% on the monitor, and vital signs obtained. Patient is disoriented x4, only response is moaning, unable to follow commands. Dr. Leach notified of patient's arrival to unit.  No distress or discomfort noted. Admission complete. Safety precautions maintained. Will continue to monitor.     0407: Spoke with Dr. Leach, inform MD that patient has elevated BP of 163/109. No PRN medications order for elevated BP. MD verbalize understanding and acknowledgment, see new orders.

## 2017-08-02 NOTE — ASSESSMENT & PLAN NOTE
Stable; CT-A chest with resolution of the clot; will continue his home regimen of warfarin with daily INR.

## 2017-08-02 NOTE — HPI
"76 y.o. M with hx of Parkinson's, impulse control disorder, anticoagulant long-term use, HTN, and PVD who presents to ED for evaluation of altered mental status with associated nightly frequency. Per EMS, pt's girlfriend noticed that pt looked "sick" for past 2 days. Today at 2 pm, pt stopped answering phone calls. Girlfriend returned home and noted that pt was moaning and verbally non-responsive. EMS also reports that pt has purposeful movement (e.g. grabbing at his penis) and dislikes abdominal/chest palpation. En route, EMS gave pt 0.5 mg of Narcan "just to be safe." They noticed what looked like dried Pepto Bismol on his lips. EMS also reports that pt repeatedly makes "gargling" noises but has yet to vomit. EMS denies further symptoms or any alleviating factors. Hx is otherwise limited due to acuity of condition.    Patient is known to me from previous PAD intervention.  He was recently seen in clinic for follow-up and it stopped taking all his Parkinson meds.  He was having severe repercussions from this.  He was barely functional at the time that I saw him.  He presented to the ED last night altered mental status.  He had some equivocal EKG changes and STEMI activation was performed.  He was seen by Dr. Lowe who declined to take him to the catheter lab.  He previously had ischemic workup April 2016 which was within normal limits.  He currently is nonresponsive and unable to provide any adequate history.  "

## 2017-08-02 NOTE — ASSESSMENT & PLAN NOTE
Related to (etiology):   Decreased ability to consume sufficient energy    Signs and Symptoms (as evidenced by):   1.) 7% weight loss in 2 months     Interventions/Recommendations (treatment strategy):  1.) Advance diet when appropriate per SLP/MD    Nutrition Diagnosis Status:   New

## 2017-08-02 NOTE — HPI
Mr. Tenzin Ortiz is a 77 yo man with essential hypertension, hyperlipidemia (LDL 93.2 Apr 2017), peripheral vascular disease, Parkinson disease, and history of aortic thrombus on chronic anticoagulation who presented to [facility] ED with complaints of confusion for two days.  His girlfriend notes that he appeared ill for the past two days and has been mumbling at home.  She attempted to call him today but he wasn't answering his phone calls.  When she arrived to his home he was nonverbal and was moaning.  EMS was activated and noted that he had purposeful movement.  He was administered some naloxone without improvement of his mentation.  Further history is limited at this time.

## 2017-08-02 NOTE — CONSULTS
Consult Note  Cardiology    Reason for Consult: abnormal EKG    SUBJECTIVE:     History of Present Illness:  Patient is a 76 y.o. male with a history of HTN, Dyslipidemia, Thrombus of the aorta on long term anticoagulation.  He has extensive Peripheral vascular disease with high grade stenosis of the mid and distal abdominal aorta extending into the bilateral iliac arteries and celiac trunk.  The left SFA is totally occluded with arterial supply from the left profunda. Patient had echo showing normal left ventricular systolic function with ef 55-60% and nuclear stress test without evidence of ischemia or infarct.  He presents after 4 days of feeling unwell with weakness and nausea.  His girlfriend states she spoke with him on the phone this morning and he wasn't going to leave the house.  She later states he didn't answer the phone and when she checked on him he was in an altered state.  She states that he was nonverbal and lethargic with only occasional moaning . He responds to painful stimuli only and is unable to give history or specific complaints.  His EKG shows NSR.  During his evaluation he was found to have transient ST elevation on telemetry.  Findings corrected on repeat evaluation within 1 min.  Hemodynamically he has remained stable.  He has remained in a NSR without tachycardia or reciprocal changes.  No changes in respiratory status.  Cardiac enzymes negative.      moaning  altered    Review of patient's allergies indicates:   Allergen Reactions    Amantadine analogues      Caused confusion    Mirapex [pramipexole]      Pathologic gambling    Neupro [rotigotine]      Pathologic gambling         Past Medical History:   Diagnosis Date    Anticoagulant long-term use     Hypertension     Impulse control disorder 2012    gambling on mirapex; also possible dopamine dysregulation syndrome    PD (Parkinson's disease) 1999    tremor-predominant    PVD (peripheral vascular disease) with claudication      poor circulation both legs     Past Surgical History:   Procedure Laterality Date    CATARACT EXTRACTION Bilateral 1 yr      Family History   Problem Relation Age of Onset    No Known Problems Mother     No Known Problems Father     No Known Problems Sister     No Known Problems Brother     No Known Problems Maternal Aunt     No Known Problems Maternal Uncle     No Known Problems Paternal Aunt     No Known Problems Paternal Uncle     No Known Problems Maternal Grandmother     No Known Problems Maternal Grandfather     No Known Problems Paternal Grandmother     No Known Problems Paternal Grandfather     Parkinsonism Neg Hx     Amblyopia Neg Hx     Blindness Neg Hx     Cancer Neg Hx     Cataracts Neg Hx     Diabetes Neg Hx     Glaucoma Neg Hx     Hypertension Neg Hx     Macular degeneration Neg Hx     Retinal detachment Neg Hx     Strabismus Neg Hx     Stroke Neg Hx     Thyroid disease Neg Hx      Social History   Substance Use Topics    Smoking status: Former Smoker     Years: 10.00    Smokeless tobacco: Never Used      Comment: Quit 40 years ago    Alcohol use No      OBJECTIVE:     Vital Signs (Most Recent)  Temp: 98.8 °F (37.1 °C) (08/01/17 1904)  Pulse: 80 (08/01/17 1922)  Resp: 20 (08/01/17 1922)  BP: (!) 140/70 (08/01/17 1922)  SpO2: 99 % (08/01/17 1922)    Vital Signs Range (Last 24H):  Temp:  [98.8 °F (37.1 °C)]   Pulse:  [74-82]   Resp:  [18-20]   BP: (139-151)/(61-74)   SpO2:  [95 %-99 %]     Physical Exam:  Lungs:  diminished breath sounds bibasilar  Heart: regular rate and rhythm  Extremities: extremities normal, atraumatic, no cyanosis or edema    Laboratory:  Chemistry:  Lab Results   Component Value Date     08/01/2017    K 4.8 08/01/2017     08/01/2017    CO2 21 (L) 08/01/2017    BUN 27 (H) 08/01/2017    CREATININE 1.5 (H) 08/01/2017    CALCIUM 9.6 08/01/2017    BNP 18 08/01/2017     Cardiac Markers (Last 3):  Lab Results   Component Value Date    TROPONINI  <0.006 08/01/2017    TROPONINI <0.006 11/28/2015     CBC:   Lab Results   Component Value Date    WBC 8.49 08/01/2017    HGB 14.5 08/01/2017    HCT 45.4 08/01/2017    MCV 82 08/01/2017     08/01/2017     Lipids:  Lab Results   Component Value Date    CHOL 150 04/05/2017    TRIG 99 04/05/2017    HDL 37 (L) 04/05/2017     Coagulation:   Lab Results   Component Value Date    INR 2.2 (H) 08/01/2017    APTT 43.6 (H) 11/30/2015       ASSESSMENT/PLAN:     Patient Active Problem List   Diagnosis    PD (Parkinson's disease)    Panic    Thrombus of aorta    Anemia of chronic disease    Microcytic hypochromic anemia    Mild malnutrition    Debility    Current non-adherence to medical treatment    Long-term (current) use of anticoagulants    PAD (peripheral artery disease)    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  Initial cenz negative despite 4 days of symptoms. Changes lasting less than 1 min without reciprocal changes or associated changes in heart rate.  Patient on full dose anticoagulation with therapeutic INR.   At this time given his elevated INR on coumadin,elevated Crt,  extensive PVD and altered mental status believe risk of further investigation outweighs benefits. No ongoing evidence of ACS, continue observation and medical therapy.  Etiology of Altered mental status remains to be clarified. Discussed treatment plan with girlfriend and sister.

## 2017-08-02 NOTE — ASSESSMENT & PLAN NOTE
He was initially with a normal cardiac telemetry strip but noted to have ST-elevation soon after.  His initial EKG was significant for normal sinus rhythm with a 1st degree AV block.  The second EMG revealed inferior ST-segment elevations.  A repeat immediately after that showed that it had resolved back to normal.  A Code STEMI was initially activated but was cancelled given the normalization.  Cardiology, Dr. Leonid Sevilla, was consulted and recommended against emergent Cath Lab activation given the normalization and therapeutic INR.  Will monitor in the ICU on cardiac monitor and await further recommendations from Cardiology.

## 2017-08-02 NOTE — H&P
[facility] Hospital Medicine  History & Physical    Patient Name: Tenzin Ortiz  MRN: [id number]  Admission Date: 8/1/2017  Attending Physician: Cami Crow MD   Primary Care Provider: Efrain Chavez MD         Patient information was obtained from ER records.     Subjective:     Principal Problem:ECG abnormality    Chief Complaint: Confusion today.    HPI: Mr. Tenzin Ortiz is a 76 y.o. 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  His girlfriend notes that he appeared ill for the past two days and has been mumbling at home.  She attempted to call him today but he wasn't answering his phone calls.  When she arrived to his home he was nonverbal and was moaning.  EMS was activated and noted that he had purposeful movement.  He was administered some naloxone without improvement of his mentation.  Further history is limited at this time.    Chart Review:  Previous Hospitalizations  Date Hospital Diagnosis   Nov 2016 [facility]- Right lower extremity angiogram with stent placement   Sept 16, 2016 [facility] Left SFA occlusion s/p stent   Aug 2016 [facility]- Aortofemoral runoff with stent of right common iliac artery   Apr 2016 C- Warfarin toxicity    Nov 2015 [facility]- Sepsis due to UTI     Outpatient Follow-Up  Date of Visit Physician Service   Jul 2017 Asya Hodges DPM Podiatry    Jun 2017 Anjel Mackenzie MD Cardiology    Jun 2017 Bhargav Allred MD Neurology    May 2017 Efrain Chavez MD Primary Care   Nov 2016 Lalita Doyle NP Urology      Past Medical History:   Diagnosis Date    Anticoagulant long-term use     Hypertension     Impulse control disorder 2012    gambling on mirapex; also possible dopamine dysregulation syndrome    PD (Parkinson's disease) 1999    tremor-predominant    PVD (peripheral  vascular disease) with claudication     poor circulation both legs       Past Surgical History:   Procedure Laterality Date    CATARACT EXTRACTION Bilateral 1 yr        Review of patient's allergies indicates:   Allergen Reactions    Amantadine analogues      Caused confusion    Mirapex [pramipexole]      Pathologic gambling    Neupro [rotigotine]      Pathologic gambling         No current facility-administered medications on file prior to encounter.      Current Outpatient Prescriptions on File Prior to Encounter   Medication Sig    acetaminophen (TYLENOL ARTHRITIS PAIN) 650 MG TbSR Take 650 mg by mouth every 8 (eight) hours.    amlodipine (NORVASC) 10 MG tablet TAKE 1 TABLET BY MOUTH EVERY DAY    atorvastatin (LIPITOR) 10 MG tablet Take 10 mg by mouth once daily.    carbidopa-levodopa (PARCOPA)  mg per disintegrating tablet DISSOLVE 2 TS PO QID PRN    clopidogrel (PLAVIX) 75 mg tablet TAKE 1 TABLET BY MOUTH ONCE DAILY    cyclobenzaprine (FLEXERIL) 10 MG tablet Take 10 mg by mouth 3 (three) times daily as needed for Muscle spasms.    fluocinonide (LIDEX) 0.05 % ointment POWER AA BID    gabapentin (NEURONTIN) 300 MG capsule Take 2 capsules (600 mg total) by mouth every evening.    pramipexole (MIRAPEX) 1 MG tablet     tramadol (ULTRAM) 50 mg tablet Take 50 mg by mouth every 6 (six) hours as needed for Pain.    trazodone (DESYREL) 50 MG tablet TK 1/2 T PO HS  U PRN TO REST ATN    warfarin (COUMADIN) 3 MG tablet TAKE ONE BY MOUTH EVERY DAY OR AS DIRECTED BY COUMADIN CLINIC     Family History     Problem Relation (Age of Onset)    No Known Problems Mother, Father, Sister, Brother, Maternal Aunt, Maternal Uncle, Paternal Aunt, Paternal Uncle, Maternal Grandmother, Maternal Grandfather, Paternal Grandmother, Paternal Grandfather        Social History Main Topics    Smoking status: Former Smoker     Years: 10.00    Smokeless tobacco: Never Used      Comment: Quit 40 years ago    Alcohol use No     Drug use: No    Sexual activity: Not Currently     Review of Systems   Unable to perform ROS: Patient nonverbal     Objective:     Vital Signs (Most Recent):  Temp: 98.3 °F (36.8 °C) (08/01/17 2315)  Pulse: 73 (08/02/17 0000)  Resp: 14 (08/02/17 0000)  BP: 139/64 (08/02/17 0000)  SpO2: 96 % (08/02/17 0000) Vital Signs (24h Range):  Temp:  [98.3 °F (36.8 °C)-98.8 °F (37.1 °C)] 98.3 °F (36.8 °C)  Pulse:  [58-84] 73  Resp:  [14-20] 14  SpO2:  [95 %-99 %] 96 %  BP: (130-160)/(61-80) 139/64     Weight: 68.7 kg (151 lb 7.3 oz)  Body mass index is 23.72 kg/m².    Physical Exam   Constitutional: He appears well-developed and well-nourished. No distress.   HENT:   Head: Normocephalic and atraumatic.   Right Ear: External ear normal.   Left Ear: External ear normal.   Nose: Nose normal.   Eyes: Pupils are equal, round, and reactive to light. Right eye exhibits no discharge. Left eye exhibits no discharge.   Neck: Normal range of motion.   Cardiovascular: Normal rate, regular rhythm, normal heart sounds and intact distal pulses.  Exam reveals no gallop and no friction rub.    No murmur heard.  Pulmonary/Chest: Effort normal and breath sounds normal. No respiratory distress. He has no wheezes. He has no rales. He exhibits no tenderness.   Abdominal: Soft. Bowel sounds are normal. He exhibits no distension. There is no tenderness. There is no rebound and no guarding.   Musculoskeletal: Normal range of motion. He exhibits no edema.   Neurological:   Patient is protecting airway but does not open eyes, does withdraw appropriately to pain   Skin: Skin is warm and dry. Capillary refill takes less than 2 seconds. He is not diaphoretic. No erythema.   Nursing note and vitals reviewed.       Significant Labs: All pertinent labs within the past 24 hours have been reviewed.    Significant Imaging: I have reviewed and interpreted all pertinent imaging results/findings within the past 24 hours.    Assessment/Plan:     * ECG abnormality     He was initially with a normal cardiac telemetry strip but noted to have ST-elevation soon after.  His initial EKG was significant for normal sinus rhythm with a 1st degree AV block.  The second EMG revealed inferior ST-segment elevations.  A repeat immediately after that showed that it had resolved back to normal.  A Code STEMI was initially activated but was cancelled given the normalization.  Cardiology, Dr. Leonid Sevilla, was consulted and recommended against emergent Cath Lab activation given the normalization and therapeutic INR.  Will monitor in the ICU on cardiac monitor and await further recommendations from Cardiology.        Acute encephalopathy    Etiology is yet to be determined.  CT-head was without acute intracranial abnormalities.  I have reviewed the chest X-ray and it reveals no focal infiltrates or pleural effusions.  Patient is without fevers and meningismus.  Urinalysis was benign; serum glucose was 125 mg/dL; electrolytes are stable; there is no evidence of uremia; and ammonia level was normal.  There is no evidence of thyroidal disease; skin is intact; and there are no rashes.  Patient is hemodynamically-stable and there has not been recent medication changes.  Clinical suspicion of CVA or subclinical seizures are low.  Will plan to perform neurological testing every 4 hours with frequent re-orientation.  Will also minimize medications and place fall precautions.  I'm curious if this is medication induced.        Essential hypertension    Patient's blood pressure is poorly-controlled; will continue home regimen of amlodipine, and provide as-needed clonidine.        Hyperlipidemia    Poorly-controlled; will continue patient's home regimen of atorvastatin.        PAD (peripheral artery disease)    Will continue his home regimen of atorvastatin and clopidogrel.        Parkinson disease    Stable; will continue his home regimen of carbidopa/levodopa.        History of aortic thrombus     Stable; CT-A chest with resolution of the clot; will continue his home regimen of warfarin with daily INR.        Chronic anticoagulation    As addressed above.          VTE Risk Mitigation     None            Critical Care Time: 60 minutes.        Saul Leach M.D.  Staff Nocturnist  Department of Hospital Medicine  [facility]  Pager: [phone]

## 2017-08-02 NOTE — CONSULTS
"  [facility]  Cardiology  Consult Note    Patient Name: Tenzin Ortiz  MRN: [id number]  Admission Date: 8/1/2017  Hospital Length of Stay: 1 days  Code Status: Full Code   Attending Provider: Cami Crow MD   Consulting Provider: Anjel Jacques MD  Primary Care Physician: Efrain Chavez MD  Principal Problem:ECG abnormality    Patient information was obtained from patient, past medical records and ER records.     Inpatient consult to Cardiology  Consult performed by: ANJEL JACQUES  Consult ordered by: JACLYN ZAMBRANO  Reason for consult: EKG changes: Patient known to me         Subjective:     Chief Complaint:  STEMI activation     HPI:   76 y.o. M with hx of Parkinson's, impulse control disorder, anticoagulant long-term use, HTN, and PVD who presents to ED for evaluation of altered mental status with associated nightly frequency. Per EMS, pt's girlfriend noticed that pt looked "sick" for past 2 days. Today at 2 pm, pt stopped answering phone calls. Girlfriend returned home and noted that pt was moaning and verbally non-responsive. EMS also reports that pt has purposeful movement (e.g. grabbing at his penis) and dislikes abdominal/chest palpation. En route, EMS gave pt 0.5 mg of Narcan "just to be safe." They noticed what looked like dried Pepto Bismol on his lips. EMS also reports that pt repeatedly makes "gargling" noises but has yet to vomit. EMS denies further symptoms or any alleviating factors. Hx is otherwise limited due to acuity of condition.    Patient is known to me from previous PAD intervention.  He was recently seen in clinic for follow-up and it stopped taking all his Parkinson meds.  He was having severe repercussions from this.  He was barely functional at the time that I saw him.  He presented to the ED last night altered mental status.  He had some equivocal EKG changes and STEMI activation was performed.  He was seen by Dr. Lowe who declined to take him to " the catheter lab.  He previously had ischemic workup April 2016 which was within normal limits.  He currently is nonresponsive and unable to provide any adequate history.    Past Medical History:   Diagnosis Date    Anticoagulant long-term use     Hypertension     Impulse control disorder 2012    gambling on mirapex; also possible dopamine dysregulation syndrome    PD (Parkinson's disease) 1999    tremor-predominant    PVD (peripheral vascular disease) with claudication     poor circulation both legs       Past Surgical History:   Procedure Laterality Date    CATARACT EXTRACTION Bilateral 1 yr        Review of patient's allergies indicates:   Allergen Reactions    Amantadine analogues      Caused confusion    Mirapex [pramipexole]      Pathologic gambling    Neupro [rotigotine]      Pathologic gambling         No current facility-administered medications on file prior to encounter.      Current Outpatient Prescriptions on File Prior to Encounter   Medication Sig    acetaminophen (TYLENOL ARTHRITIS PAIN) 650 MG TbSR Take 650 mg by mouth every 8 (eight) hours.    amlodipine (NORVASC) 10 MG tablet TAKE 1 TABLET BY MOUTH EVERY DAY    atorvastatin (LIPITOR) 10 MG tablet Take 10 mg by mouth once daily.    carbidopa-levodopa (PARCOPA)  mg per disintegrating tablet DISSOLVE 2 TS PO QID PRN    clopidogrel (PLAVIX) 75 mg tablet TAKE 1 TABLET BY MOUTH ONCE DAILY    cyclobenzaprine (FLEXERIL) 10 MG tablet Take 10 mg by mouth 3 (three) times daily as needed for Muscle spasms.    fluocinonide (LIDEX) 0.05 % ointment POWER AA BID    gabapentin (NEURONTIN) 300 MG capsule Take 2 capsules (600 mg total) by mouth every evening.    pramipexole (MIRAPEX) 1 MG tablet     tramadol (ULTRAM) 50 mg tablet Take 50 mg by mouth every 6 (six) hours as needed for Pain.    trazodone (DESYREL) 50 MG tablet TK 1/2 T PO HS  U PRN TO REST ATN    warfarin (COUMADIN) 3 MG tablet TAKE ONE BY MOUTH EVERY DAY OR AS DIRECTED BY  COUMADIN CLINIC     Family History     Problem Relation (Age of Onset)    No Known Problems Mother, Father, Sister, Brother, Maternal Aunt, Maternal Uncle, Paternal Aunt, Paternal Uncle, Maternal Grandmother, Maternal Grandfather, Paternal Grandmother, Paternal Grandfather        Social History Main Topics    Smoking status: Former Smoker     Years: 10.00    Smokeless tobacco: Never Used      Comment: Quit 40 years ago    Alcohol use No    Drug use: No    Sexual activity: Not Currently     Review of Systems   Unable to perform ROS: mental status change     Objective:     Vital Signs (Most Recent):  Temp: 98.3 °F (36.8 °C) (08/02/17 0315)  Pulse: 78 (08/02/17 0600)  Resp: 16 (08/02/17 0600)  BP: (!) 156/69 (08/02/17 0600)  SpO2: 96 % (08/02/17 0600) Vital Signs (24h Range):  Temp:  [98.3 °F (36.8 °C)-98.8 °F (37.1 °C)] 98.3 °F (36.8 °C)  Pulse:  [58-84] 78  Resp:  [14-25] 16  SpO2:  [94 %-99 %] 96 %  BP: (130-163)/() 156/69     Weight: 68.7 kg (151 lb 7.3 oz)  Body mass index is 23.72 kg/m².    SpO2: 96 %  O2 Device (Oxygen Therapy): nasal cannula      Intake/Output Summary (Last 24 hours) at 08/02/17 0854  Last data filed at 08/02/17 0600   Gross per 24 hour   Intake           466.25 ml   Output              345 ml   Net           121.25 ml       Lines/Drains/Airways     Drain                 Urethral Catheter 08/01/17 1920 Latex 16 Fr. less than 1 day          Peripheral Intravenous Line                 Peripheral IV - Single Lumen 08/01/17 1843 Right Forearm less than 1 day         Peripheral IV - Single Lumen 08/01/17 2257 Left Hand less than 1 day                Physical Exam   Constitutional: He appears well-developed and well-nourished. No distress.   Currently resting comfortably but nonresponsive to verbal and tactile stimulation   HENT:   Head: Normocephalic and atraumatic.   Eyes: Conjunctivae and EOM are normal. Pupils are equal, round, and reactive to light.   Neck: Normal range of motion.  Neck supple. No thyromegaly present.   Cardiovascular: Normal rate, regular rhythm and normal heart sounds.    No murmur heard.  Pulmonary/Chest: Effort normal and breath sounds normal. No respiratory distress. He has no wheezes. He has no rales. He exhibits no tenderness.   Abdominal: Soft. Bowel sounds are normal.   Musculoskeletal: He exhibits no edema.   Neurological:   Unable to assess   Skin: Skin is warm and dry.   Psychiatric:   Unable to assess       Significant Labs:   CMP   Recent Labs  Lab 08/01/17 1908 08/02/17  0114    142   K 4.8 5.1    108   CO2 21* 24   * 124*   BUN 27* 27*   CREATININE 1.5* 1.4   CALCIUM 9.6 9.1   PROT 7.3 6.5   ALBUMIN 3.9 3.5   BILITOT 0.4 0.3   ALKPHOS 65 59   AST 15 17   ALT 11 12   ANIONGAP 12 10   ESTGFRAFRICA 52* 56*   EGFRNONAA 45* 49*   , CBC   Recent Labs  Lab 08/01/17 1908 08/02/17  0115   WBC 8.49 8.31   HGB 14.5 13.6*   HCT 45.4 41.6    218   , INR   Recent Labs  Lab 08/01/17 1908   INR 2.2*   , Lipid Panel No results for input(s): CHOL, HDL, LDLCALC, TRIG, CHOLHDL in the last 48 hours. and Troponin   Recent Labs  Lab 08/01/17 1908 08/02/17  0115 08/02/17  0708   TROPONINI <0.006 0.013 0.024       Significant Imaging: Echocardiogram:   2D echo with color flow doppler:   Results for orders placed or performed during the hospital encounter of 04/02/16   2D echo with color flow doppler   Result Value Ref Range    EF 55 55 - 65    Mitral Valve Regurgitation TRIVIAL     Diastolic Dysfunction No     Est. PA Systolic Pressure 37.16     Pericardial Effusion NONE     Mitral Valve Mobility NORMAL     Tricuspid Valve Regurgitation TRIVIAL      NST: 4-16  Impression: NORMAL MYOCARDIAL PERFUSION  1. The perfusion scan is free of evidence for myocardial ischemia or injury.   2. There is a mild intensity fixed defect in the inferobasilar wall of the left ventricle, secondary to diaphragm attenuation.   3. Resting wall motion is physiologic.   4. Resting  LV function is normal.   5. The ventricular volumes are normal at rest and stress.   6. The extracardiac distribution of radioactivity is normal.     Assessment and Plan:     Acute encephalopathy    Workup per primary        * ECG abnormality    Nonspecific and unlikely related ACS  Check echocardiogram  Consider ischemic workup when stable if mental status changes correct        PAD (peripheral artery disease)    Stable post remote revascularizations  On Coumadin for history of aortic thrombus        Parkinson disease    Recently self DC'd meds and not followed up with neurology        Essential hypertension             Hyperlipidemia    On statin        Chronic anticoagulation    INR was therapeutic        Current non-adherence to medical treatment    Previously counseled            VTE Risk Mitigation     None        ICU time spent on case greater than 35 minutes    Thank you for your consult. I will follow-up with patient. Please contact us if you have any additional questions.    Anjel Mackenzie MD  Cardiology   [facility]

## 2017-08-02 NOTE — SUBJECTIVE & OBJECTIVE
Past Medical History:   Diagnosis Date    Anticoagulant long-term use     Hypertension     Impulse control disorder 2012    gambling on mirapex; also possible dopamine dysregulation syndrome    PD (Parkinson's disease) 1999    tremor-predominant    PVD (peripheral vascular disease) with claudication     poor circulation both legs       Past Surgical History:   Procedure Laterality Date    CATARACT EXTRACTION Bilateral 1 yr        Review of patient's allergies indicates:   Allergen Reactions    Amantadine analogues      Caused confusion    Mirapex [pramipexole]      Pathologic gambling    Neupro [rotigotine]      Pathologic gambling         No current facility-administered medications on file prior to encounter.      Current Outpatient Prescriptions on File Prior to Encounter   Medication Sig    acetaminophen (TYLENOL ARTHRITIS PAIN) 650 MG TbSR Take 650 mg by mouth every 8 (eight) hours.    amlodipine (NORVASC) 10 MG tablet TAKE 1 TABLET BY MOUTH EVERY DAY    atorvastatin (LIPITOR) 10 MG tablet Take 10 mg by mouth once daily.    carbidopa-levodopa (PARCOPA)  mg per disintegrating tablet DISSOLVE 2 TS PO QID PRN    clopidogrel (PLAVIX) 75 mg tablet TAKE 1 TABLET BY MOUTH ONCE DAILY    cyclobenzaprine (FLEXERIL) 10 MG tablet Take 10 mg by mouth 3 (three) times daily as needed for Muscle spasms.    fluocinonide (LIDEX) 0.05 % ointment POWER AA BID    gabapentin (NEURONTIN) 300 MG capsule Take 2 capsules (600 mg total) by mouth every evening.    pramipexole (MIRAPEX) 1 MG tablet     tramadol (ULTRAM) 50 mg tablet Take 50 mg by mouth every 6 (six) hours as needed for Pain.    trazodone (DESYREL) 50 MG tablet TK 1/2 T PO HS  U PRN TO REST ATN    warfarin (COUMADIN) 3 MG tablet TAKE ONE BY MOUTH EVERY DAY OR AS DIRECTED BY COUMADIN CLINIC     Family History     Problem Relation (Age of Onset)    No Known Problems Mother, Father, Sister, Brother, Maternal Aunt, Maternal Uncle, Paternal Aunt,  Paternal Uncle, Maternal Grandmother, Maternal Grandfather, Paternal Grandmother, Paternal Grandfather        Social History Main Topics    Smoking status: Former Smoker     Years: 10.00    Smokeless tobacco: Never Used      Comment: Quit 40 years ago    Alcohol use No    Drug use: No    Sexual activity: Not Currently     Review of Systems   Unable to perform ROS: Patient nonverbal     Objective:     Vital Signs (Most Recent):  Temp: 98.3 °F (36.8 °C) (08/01/17 2315)  Pulse: 73 (08/02/17 0000)  Resp: 14 (08/02/17 0000)  BP: 139/64 (08/02/17 0000)  SpO2: 96 % (08/02/17 0000) Vital Signs (24h Range):  Temp:  [98.3 °F (36.8 °C)-98.8 °F (37.1 °C)] 98.3 °F (36.8 °C)  Pulse:  [58-84] 73  Resp:  [14-20] 14  SpO2:  [95 %-99 %] 96 %  BP: (130-160)/(61-80) 139/64     Weight: 68.7 kg (151 lb 7.3 oz)  Body mass index is 23.72 kg/m².    Physical Exam   Constitutional: He appears well-developed and well-nourished. No distress.   HENT:   Head: Normocephalic and atraumatic.   Right Ear: External ear normal.   Left Ear: External ear normal.   Nose: Nose normal.   Eyes: Pupils are equal, round, and reactive to light. Right eye exhibits no discharge. Left eye exhibits no discharge.   Neck: Normal range of motion.   Cardiovascular: Normal rate, regular rhythm, normal heart sounds and intact distal pulses.  Exam reveals no gallop and no friction rub.    No murmur heard.  Pulmonary/Chest: Effort normal and breath sounds normal. No respiratory distress. He has no wheezes. He has no rales. He exhibits no tenderness.   Abdominal: Soft. Bowel sounds are normal. He exhibits no distension. There is no tenderness. There is no rebound and no guarding.   Musculoskeletal: Normal range of motion. He exhibits no edema.   Neurological:   Patient is protecting airway but does not open eyes, does withdraw appropriately to pain   Skin: Skin is warm and dry. Capillary refill takes less than 2 seconds. He is not diaphoretic. No erythema.   Nursing  note and vitals reviewed.       Significant Labs: All pertinent labs within the past 24 hours have been reviewed.    Significant Imaging: I have reviewed and interpreted all pertinent imaging results/findings within the past 24 hours.

## 2017-08-02 NOTE — ASSESSMENT & PLAN NOTE
Nonspecific and unlikely related ACS  Check echocardiogram  Consider ischemic workup when stable if mental status changes correct

## 2017-08-02 NOTE — PROVIDER TRANSFER
Mr. 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    In the ER initial EKG showed no evidence of acute ischemia.  He was then noted to have what appeared to be ST elevations on his telemetry.  Repeat EKG in the ER thought to be consistent withSTEMI.  Emergent code STEMI activated.  Approximately 1-2 minutes after STEMI was seen on telemetry, repeat EKG was performed and his ST segments normalized. Cardiology did not feel emergent LHC was warranted given normalization. Troponin trended and remained unremarkable. CTA chest w/o dissection. Echo pending.    His work up for encephalopathy was unrevealing. CT head, infectious work up, electrolytes, and ammonia level normal. His encephalopathy improved spontaneously the morning following admission. He was alert and oriented to place, date, and identifies his brother who called to check on him. He does however keep asking when were going to take him for his operation despite being told several times he's not getting an operation. Utox positive for amphetamines which may be a false positive (trazodone on home med list is known to cause this). He is non-compliant with his Parkinson's medication sometimes taking more than prescribed. His girlfriend brought in his medications and nursing rec'ed in the computer. He was apparently taking Mirapex which he has a history of hallucinations 2/2 Mirapex. She also brought CBD oil, a cannabinoid oil, that he has apparently been using and I suspect could be the reason for his bizarre symptoms. I suspect he could have also taken too much trazodone and or tramadol. Continue to monitor and consult PT/OT.    Stable for transfer to telemetry 8/2/2017.

## 2017-08-03 LAB
ANION GAP SERPL CALC-SCNC: 6 MMOL/L
BUN SERPL-MCNC: 25 MG/DL
CALCIUM SERPL-MCNC: 9.1 MG/DL
CHLORIDE SERPL-SCNC: 107 MMOL/L
CO2 SERPL-SCNC: 25 MMOL/L
CREAT SERPL-MCNC: 1.2 MG/DL
EST. GFR  (AFRICAN AMERICAN): >60 ML/MIN/1.73 M^2
EST. GFR  (NON AFRICAN AMERICAN): 59 ML/MIN/1.73 M^2
GLUCOSE SERPL-MCNC: 126 MG/DL
INR PPP: 3.3
POTASSIUM SERPL-SCNC: 4.1 MMOL/L
PROTHROMBIN TIME: 33.3 SEC
SODIUM SERPL-SCNC: 138 MMOL/L

## 2017-08-03 PROCEDURE — G8978 MOBILITY CURRENT STATUS: HCPCS | Mod: CK

## 2017-08-03 PROCEDURE — 94761 N-INVAS EAR/PLS OXIMETRY MLT: CPT

## 2017-08-03 PROCEDURE — 97162 PT EVAL MOD COMPLEX 30 MIN: CPT

## 2017-08-03 PROCEDURE — G8988 SELF CARE GOAL STATUS: HCPCS | Mod: CJ

## 2017-08-03 PROCEDURE — 99232 SBSQ HOSP IP/OBS MODERATE 35: CPT | Mod: ,,, | Performed by: PSYCHIATRY & NEUROLOGY

## 2017-08-03 PROCEDURE — 25000003 PHARM REV CODE 250: Performed by: INTERNAL MEDICINE

## 2017-08-03 PROCEDURE — G8979 MOBILITY GOAL STATUS: HCPCS | Mod: CI

## 2017-08-03 PROCEDURE — 85610 PROTHROMBIN TIME: CPT

## 2017-08-03 PROCEDURE — 97165 OT EVAL LOW COMPLEX 30 MIN: CPT

## 2017-08-03 PROCEDURE — 21400001 HC TELEMETRY ROOM

## 2017-08-03 PROCEDURE — 97535 SELF CARE MNGMENT TRAINING: CPT

## 2017-08-03 PROCEDURE — 36415 COLL VENOUS BLD VENIPUNCTURE: CPT

## 2017-08-03 PROCEDURE — G8987 SELF CARE CURRENT STATUS: HCPCS | Mod: CK

## 2017-08-03 PROCEDURE — 99232 SBSQ HOSP IP/OBS MODERATE 35: CPT | Mod: ,,, | Performed by: INTERNAL MEDICINE

## 2017-08-03 PROCEDURE — 80048 BASIC METABOLIC PNL TOTAL CA: CPT

## 2017-08-03 RX ORDER — FAMOTIDINE 20 MG/1
20 TABLET, FILM COATED ORAL 2 TIMES DAILY
Status: DISCONTINUED | OUTPATIENT
Start: 2017-08-03 | End: 2017-08-10 | Stop reason: HOSPADM

## 2017-08-03 RX ORDER — FAMOTIDINE 20 MG/1
20 TABLET, FILM COATED ORAL 2 TIMES DAILY
Qty: 60 TABLET | Refills: 11 | Status: SHIPPED | OUTPATIENT
Start: 2017-08-03 | End: 2017-08-31 | Stop reason: ALTCHOICE

## 2017-08-03 RX ORDER — WARFARIN 3 MG/1
3 TABLET ORAL DAILY
Status: DISCONTINUED | OUTPATIENT
Start: 2017-08-04 | End: 2017-08-09

## 2017-08-03 RX ADMIN — CARBIDOPA AND LEVODOPA 1 TABLET: 25; 100 TABLET ORAL at 05:08

## 2017-08-03 RX ADMIN — FAMOTIDINE 20 MG: 20 TABLET, FILM COATED ORAL at 09:08

## 2017-08-03 RX ADMIN — AMLODIPINE BESYLATE 10 MG: 5 TABLET ORAL at 08:08

## 2017-08-03 RX ADMIN — ATORVASTATIN CALCIUM 10 MG: 10 TABLET, FILM COATED ORAL at 08:08

## 2017-08-03 RX ADMIN — CARBIDOPA AND LEVODOPA 1 TABLET: 25; 100 TABLET ORAL at 12:08

## 2017-08-03 RX ADMIN — FAMOTIDINE 20 MG: 20 TABLET, FILM COATED ORAL at 08:08

## 2017-08-03 RX ADMIN — CARBIDOPA AND LEVODOPA 1 TABLET: 25; 100 TABLET ORAL at 07:08

## 2017-08-03 RX ADMIN — CYCLOBENZAPRINE HYDROCHLORIDE 10 MG: 10 TABLET, FILM COATED ORAL at 08:08

## 2017-08-03 RX ADMIN — GABAPENTIN 600 MG: 300 CAPSULE ORAL at 08:08

## 2017-08-03 NOTE — PLAN OF CARE
Problem: Occupational Therapy Goal  Goal: Occupational Therapy Goal  Goals to be met by: 8/10/2017     Patient will increase functional independence with ADLs by performing:    Feeding with Set-up Assistance and AE if available.  UE Dressing with Set-up Assistance.  LE Dressing with Minimal Assistance.  Grooming while seated with Supervision.  Upper extremity exercise program with assistance as needed.    Outcome: Ongoing (interventions implemented as appropriate)  Patient tolerated evaluation well, good participation.  Patient will benefit from skilled OT services to address the above mentioned goals. ELBA Tran, MS

## 2017-08-03 NOTE — NURSING TRANSFER
Nursing Transfer Note      8/3/2017     Transfer To: 338B    Transfer via bed    Transfer with cardiac monitoring    Transported by transporter &  RN.    Medicines sent: n/a    Chart send with patient: Yes    Notified: Significant other, Hany.  States will contact pt's daughter.    Patient reassessed at: 8/3/17 1600    Upon arrival to floor: cardiac monitor applied, patient oriented to room, call bell in reach and bed in lowest position, & bed alarms on.  Reported off to & received per ADONAY Sandoval RN.

## 2017-08-03 NOTE — PLAN OF CARE
Problem: Patient Care Overview  Goal: Plan of Care Review  Outcome: Ongoing (interventions implemented as appropriate)  Patient received on room air. No distress noted.

## 2017-08-03 NOTE — PLAN OF CARE
Problem: Patient Care Overview  Goal: Plan of Care Review  Outcome: Ongoing (interventions implemented as appropriate)  Patient remains in ICU with transfer orders for telemetry. Patient remains calm and is oriented at times and becomes confused at times. VSS. NSR. Afebrile. Guzmán intact with adequate output. Remains free from falls and skin breakdown this shift.

## 2017-08-03 NOTE — ASSESSMENT & PLAN NOTE
Nonspecific and unlikely related ACS  Check echocardiogram  Consider ischemic workup as an outpatient

## 2017-08-03 NOTE — HOSPITAL COURSE
Mr. Ortiz was admitted with possible STEMI. The initial EKG showed no evidence of acute ischemia. However, he was then noted to have what appeared to be ST elevations on his telemetry.  Repeat EKG in the ER thought to be consistent with STEMI. Emergent code STEMI activated.  Approximately 1-2 minutes after STEMI was seen on telemetry, repeat EKG was performed and his ST segments normalized. Cardiology did not feel emergent LHC was warranted given normalization. Troponin trended and remained unremarkable. He was further worked up with CTA chest w/o dissection. Echo w/ EF of 55%, no diastolic dysfunction, no reported wall motion abnormality. The patient was confused and his work up for encephalopathy was mostly unremarkable. CT head, infectious work up, electrolytes, and ammonia level were all normal. His encephalopathy improved spontaneously the morning following admission. He was alert and oriented to place, date, and identifies his brother who called to check on him. He did however keep asking when he's going for his operation despite being told several times he's not getting an operation. Utox positive for amphetamines which may be a false positive (trazodone on home med list is known to cause this). He is non-compliant with his Parkinson's medication and sometimes taking more than prescribed. His girlfriend brought in his medications and nursing rec'ed in the computer. He was apparently taking Mirapex; he has a history of hallucinations due to Mirapex. She also brought CBD oil, a cannabinoid oil, that he took a few days prior to presentation. It was suspected this could be the reason for his bizarre symptoms and it was suspected that could have also taken too much trazodone and/or tramadol. The other likely etiology was advancement in his Parkinson's and he had some features of Lewy bodies dementia given his hallucinations that persisted.  Neurology continued to follow the patient and adjusted meds. The patient  was initially sent to the ICU but transferred to the floor on 8/3 since he did not have a STEMI.  He was stabilized on his medication regimen but still with some hallucinations due to his Parkinsons with Lewy body dementia features. Pt worked with PT/OT and was arranged for SNF.

## 2017-08-03 NOTE — PLAN OF CARE
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        Resolved Hospital Problems    Diagnosis Date Resolved POA   No resolved problems to display.       Allergies:  Review of patient's allergies indicates:   Allergen Reactions    Amantadine analogues      Caused confusion    Mirapex [pramipexole]      Pathologic gambling    Neupro [rotigotine]      Pathologic gambling         Vitals: Once per shift     Diet: Cardiac diet    Activity:    - Up in a chair each morning as tolerated   - Ambulate with assistance to bathroom     Nursing Precautions:   - Aspiration precautions:             - Total assistance with meals            -  Upright 90 degrees befor during and after meals    - Fall precautions   - Decubitus precautions:        -  for positioning   - Pressure reducing foam mattress   - Turn patient every two hours. Use wedge pillows to anchor patient      CONSULTS:        PT to evaluate and treat - five times a week     OT to evaluate and treat - five times a week      Medications: Discontinue all previous medication orders, if any. See new list below.   Tenzin Ortiz   Home Medication Instructions MICHAEL:[id number]    Printed on:08/03/17 0936   Medication Information                      acetaminophen (TYLENOL ARTHRITIS PAIN) 650 MG TbSR  Take 650 mg by mouth every 8  (eight) hours.             amlodipine (NORVASC) 10 MG tablet  TAKE 1 TABLET BY MOUTH EVERY DAY             carbidopa-levodopa (PARCOPA)  mg per disintegrating tablet  DISSOLVE 2 TS PO QID PRN             clopidogrel (PLAVIX) 75 mg tablet  TAKE 1 TABLET BY MOUTH ONCE DAILY             famotidine (PEPCID) 20 MG tablet  Take 1 tablet (20 mg total) by mouth 2 (two) times daily.             warfarin (COUMADIN) 3 MG tablet  TAKE ONE BY MOUTH EVERY DAY OR AS DIRECTED BY COUMADIN CLINIC                       _________________________________  Cami Crow MD  08/03/2017

## 2017-08-03 NOTE — PLAN OF CARE
08/03/17 0946   Discharge Assessment   Assessment Type Discharge Planning Assessment   Confirmed/corrected address and phone number on facesheet? Yes   Assessment information obtained from? Patient;Caregiver   Expected Length of Stay (days) 2   Communicated expected length of stay with patient/caregiver yes   Type of Healthcare Directive Received (unknown)   Prior to hospitilization cognitive status: Unable to Assess  (gets confused, possibly medication related)   Prior to hospitalization functional status: Assistive Equipment   Current cognitive status: Not Oriented to Time   Current Functional Status: Needs Assistance  (to be assessed)   Arrived From home or self-care   Lives With significant other   Able to Return to Prior Arrangements unable to determine at this time (comments)   Is patient able to care for self after discharge? Unable to determine at this time (comments)   How many people do you have in your home that can help with your care after discharge? 1   Who are your caregiver(s) and their phone number(s)? Juárez Seaman [phone]   Patient's perception of discharge disposition home or selfcare  (care giver wants SNF)   Readmission Within The Last 30 Days no previous admission in last 30 days   Patient currently being followed by outpatient case management? No   Patient currently receives home health services? No   Does the patient currently use HME? Yes   Patient currently receives private duty nursing? No   Patient currently receives any other outside agency services? No   Equipment Currently Used at Home rollator;cane, straight   Do you have any problems affording any of your prescribed medications? No   Is the patient taking medications as prescribed? no   If no, which medications is patient not taking? sig other believes gets mixed up   Do you have any financial concerns preventing you from receiving the healthcare you need? Other (comments)  (sig other does not know)   Does the patient have  "transportation to healthcare appointments? Yes   Transportation Available car;family or friend will provide  (most of time sig other drives)   On Dialysis? No   Does the patient receive services at the Coumadin Clinic? Yes   Are there any open cases? No   Discharge Plan A Skilled Nursing Facility   Discharge Plan B Home;Home Health  (has been too weak to go to OP PT last few visits)   Patient/Family In Agreement With Plan yes   SW met with patient and sig other in ICU, explained role of SW/CM with treatment team, provided contact information with the "discharge planning begins on admission" checklist handout. Confirmed information in demographics: updated. SW reviewed the discharge planning folder, explained to leave in room with patient so that team/patient can all written discharge information through out hospital stay.  SW will follow in ICU and assist as needed.    Sig thomas Seaman does not have HCPOA or POA. Patient has been managing his own affairs. He has adult son (Tenzin Chen) who is currently estranged and daughter Carole [phone]. KIMO plans to contact daughter for additional information/planning.  "

## 2017-08-03 NOTE — PROGRESS NOTES
[facility]  Cardiology  Progress Note    Patient Name: Tenzin Ortiz  MRN: [id number]  Admission Date: 8/1/2017  Hospital Length of Stay: 2 days  Code Status: Full Code   Attending Physician: Cami Crow MD   Primary Care Physician: Efrain Chavez MD  Expected Discharge Date: 8/2/2017  Principal Problem:Acute encephalopathy    Subjective:     Hospital Course:   8/1: Altered mental status    Interval History: Much more alert today.  He is conversing and recognized me.  He denies any complaints leading up to the episode.  He doesn't understand what happened to him other than he was found down.    Review of Systems   All other systems reviewed and are negative.    Objective:     Vital Signs (Most Recent):  Temp: 97.7 °F (36.5 °C) (08/03/17 0800)  Pulse: 84 (08/03/17 1054)  Resp: 20 (08/03/17 1054)  BP: 131/65 (08/03/17 1020)  SpO2: 99 % (08/03/17 1054) Vital Signs (24h Range):  Temp:  [97.2 °F (36.2 °C)-98.2 °F (36.8 °C)] 97.7 °F (36.5 °C)  Pulse:  [63-92] 84  Resp:  [10-29] 20  SpO2:  [92 %-99 %] 99 %  BP: (115-166)/(58-95) 131/65     Weight: 68.7 kg (151 lb 7.3 oz)  Body mass index is 23.72 kg/m².     SpO2: 99 %  O2 Device (Oxygen Therapy): room air      Intake/Output Summary (Last 24 hours) at 08/03/17 1220  Last data filed at 08/03/17 1040   Gross per 24 hour   Intake             1160 ml   Output              895 ml   Net              265 ml       Lines/Drains/Airways     Drain                 Urethral Catheter 08/01/17 1920 Latex 16 Fr. 1 day          Peripheral Intravenous Line                 Peripheral IV - Single Lumen 08/01/17 1843 Right Forearm 1 day         Peripheral IV - Single Lumen 08/01/17 2257 Left Hand 1 day                Physical Exam   Constitutional: He is oriented to person, place, and time. He appears well-developed and well-nourished. No distress.   Cardiovascular: Normal rate and regular rhythm.    Pulmonary/Chest: Effort normal and breath sounds normal.    Musculoskeletal: He exhibits no edema.   Neurological: He is alert and oriented to person, place, and time.       Significant Labs:   BMP:   Recent Labs  Lab 08/01/17  1908 08/02/17  0114 08/03/17  1141   * 124* 126*    142 138   K 4.8 5.1 4.1    108 107   CO2 21* 24 25   BUN 27* 27* 25*   CREATININE 1.5* 1.4 1.2   CALCIUM 9.6 9.1 9.1   MG 2.2  --   --     and CBC   Recent Labs  Lab 08/01/17  1908 08/02/17  0115   WBC 8.49 8.31   HGB 14.5 13.6*   HCT 45.4 41.6    218       Significant Imaging: Echocardiogram:   2D echo with color flow doppler:   Results for orders placed or performed during the hospital encounter of 08/01/17   2D echo with color flow doppler   Result Value Ref Range    EF 55 55 - 65    Diastolic Dysfunction No     Est. PA Systolic Pressure 12     Pericardial Effusion NONE     Tricuspid Valve Regurgitation TRIVIAL TO MILD      Assessment and Plan:     Brief HPI:     * Acute encephalopathy    Workup per primary  Currently resolving and back to baseline        ECG abnormality    Nonspecific and unlikely related ACS  Check echocardiogram  Consider ischemic workup as an outpatient        PAD (peripheral artery disease)    Stable post remote revascularizations  On Coumadin for history of aortic thrombus        Parkinson disease    Recently self DC'd meds and not followed up with neurology        Essential hypertension             Hyperlipidemia    On statin        Chronic anticoagulation    INR was therapeutic        Current non-adherence to medical treatment    Previously counseled            VTE Risk Mitigation         Ordered     warfarin (COUMADIN) tablet 3 mg  Daily     Route:  Oral        08/03/17 1217        No further workup planned from CV standpoint.  We'll see as needed.  Okay for telemetry from CV standpoint    Anjel Mackenzie MD  Cardiology  [facility]

## 2017-08-03 NOTE — CONSULTS
Consult for SNF --KIMO provided written list from [employer] web site to sig other. KIMO reviewed therapy evaluations, explained to Dr Crow that patient may not qualify. She would like to pursue due to family request and patient recent health problems, weakness that lead to this hospital stay.   KIMO explained to sig thomas that will call daughter and send referrals to all local SNF in patient network. Preference verbalized for [facility] if available due to location for visits. KIMO did leave message for daughter Carole [phone], awaiting return call. KIMO called [phone]. Also requested priority for Adult Day Health Care in event patient/sig other would like this plan.   Used [facility] to send referral to [facility], [facility], [place] and [facility].  SW/CM will follow, assist as needed.     5:40   KIMO spoke with sig thomas Juárez to remind that SNF is unlikely, to prepare for patient to be discharged home, possibly with home health. Also explained that KIMO applied for Adult Day Care priority, that patient will receive information in mail.    Daughter Carole called. KIMO reviewed options of SNF, HH and Adult Day Care. KIMO explained patient progress today with mobility and continued confusion; that SNF unlikely; may qualify for HH and KIMO suggestion to Franck regarding hiring some help at home.  KIMO also explained the nursing home pay process for ICF admission (private until use resources then medicaid). She is somewhat aware of this.   KIMO inquired regarding POA/HCPOA. Patient has none, declined in recent past to do so. He has an  so KIMO also encouraged daughter to have patient speak with his  regarding need for these documents due to illness, need for others to assist.   KIMO explained that since patient has transferred to another floor, another SW/RN CM will continue to assist with discharge, as soon as tomorrow.

## 2017-08-03 NOTE — PROGRESS NOTES
[facility] Hospital Medicine  Progress Note    Patient Name: Tenzin Ortiz  MRN: [id number]  Patient Class: IP- Inpatient   Admission Date: 8/1/2017  Length of Stay: 2 days  Attending Physician: Cami Crow MD  Primary Care Provider: Efrain Chavez MD        Subjective:     Principal Problem:Acute encephalopathy    HPI:  Mr. Tenzin Ortiz is a 77 yo man with essential hypertension, hyperlipidemia (LDL 93.2 Apr 2017), peripheral vascular disease, Parkinson disease, and history of aortic thrombus on chronic anticoagulation who presented to [facility] ED with complaints of confusion for two days.  His girlfriend notes that he appeared ill for the past two days and has been mumbling at home.  She attempted to call him today but he wasn't answering his phone calls.  When she arrived to his home he was nonverbal and was moaning.  EMS was activated and noted that he had purposeful movement.  He was administered some naloxone without improvement of his mentation.  Further history is limited at this time.    Hospital Course:  In the ER initial EKG showed no evidence of acute ischemia.  He was then noted to have what appeared to be ST elevations on his telemetry.  Repeat EKG in the ER thought to be consistent with STEMI.  Emergent code STEMI activated.  Approximately 1-2 minutes after STEMI was seen on telemetry, repeat EKG was performed and his ST segments normalized. Cardiology did not feel emergent LHC was warranted given normalization. Troponin trended and remained unremarkable. CTA chest w/o dissection. Echo w/ EF 55%, no DD, no reported wall motion abnormality.     His work up for encephalopathy was mostly unremarkable. CT head, infectious work up, electrolytes, and ammonia level normal. His encephalopathy improved spontaneously the morning following admission. He was alert and oriented to place, date, and identifies his brother who called to check on him. He did however keep asking when he's going  for his operation despite being told several times he's not getting an operation. Utox positive for amphetamines which may be a false positive (trazodone on home med list is known to cause this). He is non-compliant with his Parkinson's medication and sometimes taking more than prescribed. His girlfriend brought in his medications and nursing rec'ed in the computer. He was apparently taking Mirapex; he has a history of hallucinations 2/2 Mirapex. She also brought CBD oil, a cannabinoid oil, that he took a few days prior to presentation. I suspect this could be the reason for his bizarre symptoms. I  also suspect he could have also taken too much trazodone and or tramadol. The other likely etiology is advancement in his Parkinson's and now with some features of Lewy bodies dementia given his hallucinations.     His symptoms improved but I still do not think he is safe to go home as no one can be there to assist him 24 h per day. Consult PT/OT and case management. SNF vs half-way bed.    Review of Systems   Constitutional: Negative for chills and fever.   HENT: Negative for congestion and rhinorrhea.    Eyes: Negative for photophobia and visual disturbance.   Respiratory: Negative for cough and shortness of breath.    Cardiovascular: Negative for chest pain and leg swelling.   Gastrointestinal: Negative for abdominal pain, constipation, diarrhea and nausea.   Genitourinary: Negative for difficulty urinating and dysuria.   Musculoskeletal: Negative for gait problem and joint swelling.   Neurological: Negative for dizziness and light-headedness.   Psychiatric/Behavioral: Negative for confusion and dysphoric mood.     Objective:     Vital Signs (Most Recent):  Temp: 97.7 °F (36.5 °C) (08/03/17 0800)  Pulse: 81 (08/03/17 0900)  Resp: 17 (08/03/17 0900)  BP: (!) 115/58 (08/03/17 0900)  SpO2: 95 % (08/03/17 0900) Vital Signs (24h Range):  Temp:  [97.2 °F (36.2 °C)-98.2 °F (36.8 °C)] 97.7 °F (36.5 °C)  Pulse:  [63-92]  81  Resp:  [10-29] 17  SpO2:  [92 %-98 %] 95 %  BP: (115-166)/(58-95) 115/58     Weight: 68.7 kg (151 lb 7.3 oz)  Body mass index is 23.72 kg/m².    Intake/Output Summary (Last 24 hours) at 08/03/17 0945  Last data filed at 08/03/17 0803   Gross per 24 hour   Intake          1063.75 ml   Output              990 ml   Net            73.75 ml      Physical Exam   Constitutional: He is oriented to person, place, and time. He appears well-developed and well-nourished. No distress.   Elderly and frail appearing   HENT:   Right Ear: External ear normal.   Left Ear: External ear normal.   Nose: Nose normal.   Eyes: EOM are normal. Pupils are equal, round, and reactive to light. No scleral icterus.   Neck: Normal range of motion. Neck supple. No thyromegaly present.   Cardiovascular: Normal rate and normal heart sounds.  Exam reveals no friction rub.    No murmur heard.  Pulmonary/Chest: Effort normal and breath sounds normal. No respiratory distress. He has no wheezes. He has no rales.   Abdominal: Soft. Bowel sounds are normal. He exhibits no mass. There is no tenderness.   Musculoskeletal: Normal range of motion. He exhibits no edema or deformity.   Neurological: He is alert and oriented to person, place, and time. No cranial nerve deficit.   Pill roll tremor, cogwheel rigidity, masked facies. Can tell me he is in [place], in the hospital, and what day it is (was reoriented ~10 minutes ago and did not know date)   Skin: Skin is warm and dry. No pallor.       Significant Labs:   CBC:   Recent Labs  Lab 08/01/17 1908 08/02/17  0115   WBC 8.49 8.31   HGB 14.5 13.6*   HCT 45.4 41.6    218     CMP:   Recent Labs  Lab 08/01/17 1908 08/02/17  0114    142   K 4.8 5.1    108   CO2 21* 24   * 124*   BUN 27* 27*   CREATININE 1.5* 1.4   CALCIUM 9.6 9.1   PROT 7.3 6.5   ALBUMIN 3.9 3.5   BILITOT 0.4 0.3   ALKPHOS 65 59   AST 15 17   ALT 11 12   ANIONGAP 12 10   EGFRNONAA 45* 49*       Significant Imaging: I  have reviewed and interpreted all pertinent imaging results/findings within the past 24 hours.    Assessment/Plan:      * Acute encephalopathy    Likely medicine related given multiple Parkinson's drugs, CBD oil, trazadone, and tramadol. Also suspect he has progression of his Parkinson's with likely Lewy bodies dementia.        ECG abnormality    See hospital course. Appreciate cardiology recs.        Parkinson disease    Stable; will continue his home regimen of carbidopa/levodopa.        Chronic anticoagulation    As addressed above. Stress ulcer PPx.        Hyperlipidemia    Poorly-controlled; will continue patient's home regimen of atorvastatin.        Essential hypertension    Continued home regimen of amlodipine with improved control.        PAD (peripheral artery disease)    Will continue his home regimen of atorvastatin and clopidogrel.        History of aortic thrombus    Stable; CTA chest with resolution of the clot; will continue his home regimen of warfarin with daily INR.          VTE Risk Mitigation         Ordered     warfarin (COUMADIN) tablet 3 mg  Daily     Route:  Oral        08/02/17 [facility]          Critical care time spent on the evaluation and treatment of severe organ dysfunction, review of pertinent labs and imaging studies, discussions with consulting providers and discussions with patient/family: 75 minutes.    Cami Crow MD  Department of Hospital Medicine   [facility]

## 2017-08-03 NOTE — PLAN OF CARE
Problem: Physical Therapy Goal  Goal: Physical Therapy Goal  Goals to be met by: 17     Patient will increase functional independence with mobility by performin. Supine to sit with Supervision  2. Rolling to Left and Right with Supervision  3. Sit to stand transfer with Supervision using RW  4. Bed to chair transfer with Supervision using Rolling Walker  5. Gait 500 feet with Supervision using Rolling Walker   6. Lower extremity exercise program x20 reps per handout, with supervision    Please assist pt OOB>chair for all meals.  Pt requires min A and ambulates with a RW.

## 2017-08-03 NOTE — SUBJECTIVE & OBJECTIVE
Review of Systems   Constitutional: Negative for chills and fever.   HENT: Negative for congestion and rhinorrhea.    Eyes: Negative for photophobia and visual disturbance.   Respiratory: Negative for cough and shortness of breath.    Cardiovascular: Negative for chest pain and leg swelling.   Gastrointestinal: Negative for abdominal pain, constipation, diarrhea and nausea.   Genitourinary: Negative for difficulty urinating and dysuria.   Musculoskeletal: Negative for gait problem and joint swelling.   Neurological: Negative for dizziness and light-headedness.   Psychiatric/Behavioral: Negative for confusion and dysphoric mood.     Objective:     Vital Signs (Most Recent):  Temp: 97.7 °F (36.5 °C) (08/03/17 0800)  Pulse: 81 (08/03/17 0900)  Resp: 17 (08/03/17 0900)  BP: (!) 115/58 (08/03/17 0900)  SpO2: 95 % (08/03/17 0900) Vital Signs (24h Range):  Temp:  [97.2 °F (36.2 °C)-98.2 °F (36.8 °C)] 97.7 °F (36.5 °C)  Pulse:  [63-92] 81  Resp:  [10-29] 17  SpO2:  [92 %-98 %] 95 %  BP: (115-166)/(58-95) 115/58     Weight: 68.7 kg (151 lb 7.3 oz)  Body mass index is 23.72 kg/m².    Intake/Output Summary (Last 24 hours) at 08/03/17 0945  Last data filed at 08/03/17 0803   Gross per 24 hour   Intake          1063.75 ml   Output              990 ml   Net            73.75 ml      Physical Exam   Constitutional: He is oriented to person, place, and time. He appears well-developed and well-nourished. No distress.   Elderly and frail appearing   HENT:   Right Ear: External ear normal.   Left Ear: External ear normal.   Nose: Nose normal.   Eyes: EOM are normal. Pupils are equal, round, and reactive to light. No scleral icterus.   Neck: Normal range of motion. Neck supple. No thyromegaly present.   Cardiovascular: Normal rate and normal heart sounds.  Exam reveals no friction rub.    No murmur heard.  Pulmonary/Chest: Effort normal and breath sounds normal. No respiratory distress. He has no wheezes. He has no rales.   Abdominal:  Soft. Bowel sounds are normal. He exhibits no mass. There is no tenderness.   Musculoskeletal: Normal range of motion. He exhibits no edema or deformity.   Neurological: He is alert and oriented to person, place, and time. No cranial nerve deficit.   Pill roll tremor, cogwheel rigidity, masked facies. Can tell me he is in [place], in the hospital, and what day it is (was reoriented ~10 minutes ago and did not know date)   Skin: Skin is warm and dry. No pallor.       Significant Labs:   CBC:   Recent Labs  Lab 08/01/17 1908 08/02/17  0115   WBC 8.49 8.31   HGB 14.5 13.6*   HCT 45.4 41.6    218     CMP:   Recent Labs  Lab 08/01/17 1908 08/02/17  0114    142   K 4.8 5.1    108   CO2 21* 24   * 124*   BUN 27* 27*   CREATININE 1.5* 1.4   CALCIUM 9.6 9.1   PROT 7.3 6.5   ALBUMIN 3.9 3.5   BILITOT 0.4 0.3   ALKPHOS 65 59   AST 15 17   ALT 11 12   ANIONGAP 12 10   EGFRNONAA 45* 49*       Significant Imaging: I have reviewed and interpreted all pertinent imaging results/findings within the past 24 hours.

## 2017-08-03 NOTE — PT/OT/SLP EVAL
"Occupational Therapy  EvaluationTreatment    Tenzin Ortiz   MRN: [id number]   Admitting Diagnosis: Acute encephalopathy    OT Date of Treatment: 08/03/17   OT Start Time: 0948  OT Stop Time: 1018  OT Total Time (min): 30 min    Billable Minutes:  Evaluation 20 minutes (co-eval with PT)  Self Care/Home Management 10 minutes    Diagnosis: Acute encephalopathy       Past Medical History:   Diagnosis Date    Anticoagulant long-term use     Hypertension     Impulse control disorder 2012    gambling on mirapex; also possible dopamine dysregulation syndrome    PD (Parkinson's disease) 1999    tremor-predominant    PVD (peripheral vascular disease) with claudication     poor circulation both legs      Past Surgical History:   Procedure Laterality Date    CATARACT EXTRACTION Bilateral 1 yr        Referring physician: Dr. Crow  Date referred to OT: 8/2/2017    General Precautions: Standard, fall, vision impaired  Orthopedic Precautions: N/A  Braces: N/A    Do you have any cultural, spiritual, [employer] conflicts, given your current situation?: none     Patient History:  Living Environment  Lives With: significant other  Living Arrangements: house (no concerns)  Transportation Available: family or friend will provide  Equipment Currently Used at Home: rollator    Prior level of function:   Bed Mobility/Transfers: independent  Grooming: needs assist  Bathing: needs assist  Upper Body Dressing: independent  Lower Body Dressing: independent  Toileting: independent        Dominant hand: right    Subjective:  Communicated with nurse Singh prior to session.    Chief Complaint: "I can't see as well without my glasses."  Patient/Family stated goals: to return home    Pain/Comfort  Pain Rating 1: 0/10    Objective:  Patient found with: blood pressure cuff, tellez catheter, pulse ox (continuous), telemetry, peripheral IV    Cognitive Exam:  Oriented to: Person and Situation  Follows Commands/attention: Follows one-step " commands  Communication: clear/fluent  Memory:  Poor immediate recall  Safety awareness/insight to disability: intact  Coping skills/emotional control: Appropriate to situation    Visual/perceptual:  Intact (wears glasses and does not have them on)    Physical Exam:  Postural examination/scapula alignment: Rounded shoulder  Skin integrity: Visible skin intact  Edema: None noted     Sensation:   Intact    Upper Extremity Range of Motion:  Right Upper Extremity: WFL  Left Upper Extremity: WFL    Upper Extremity Strength:  Right Upper Extremity: WFL  Left Upper Extremity: WFL   Strength: WFL    Fine motor coordination: Impaired secondary to tremors    Gross motor coordination: WFL    Functional Mobility:  Bed Mobility:  Rolling/Turning to Left: Moderate assistance  Scooting/Bridging: Maximum Assistance  Supine to Sit: Moderate Assistance    Transfers:  Sit <> Stand Assistance: Moderate Assistance (decreased initiation)  Sit <> Stand Assistive Device: No Assistive Device    Functional Ambulation: ambulated 200' with PT and RW CGA    Activities of Daily Living:  Feeding Level of Assistance: Stand by assistance  UE Dressing Level of Assistance: Minimum assistance  LE Dressing Level of Assistance: Maximum assistance  Grooming Position: Seated  Grooming Level of Assistance: Stand by assistance    Balance:   Static Sit: FAIR+: Able to take MINIMAL challenges from all directions  Dynamic Sit: FAIR+: Maintains balance through MINIMAL excursions of active trunk motion  Static Stand: FAIR+: Takes MINIMAL challenges from all directions  Dynamic stand: FAIR: Needs CONTACT GUARD during gait      AM-PAC 6 CLICK ADL  How much help from another person does this patient currently need?  1 = Unable, Total/Dependent Assistance  2 = A lot, Maximum/Moderate Assistance  3 = A little, Minimum/Contact Guard/Supervision  4 = None, Modified [place]/Independent    Putting on and taking off regular lower body clothing? : 3  Bathing  "(including washing, rinsing, drying)?: 3  Toileting, which includes using toilet, bedpan, or urinal? : 3  Putting on and taking off regular upper body clothing?: 3  Taking care of personal grooming such as brushing teeth?: 3  Eating meals?: 3  Total Score: 18    AM-PAC Raw Score CMS "G-Code Modifier Level of Impairment Assistance   6 % Total / Unable   7 - 9 CM 80 - 100% Maximal Assist   10-14 CL 60 - 80% Moderate Assist   15 - 19 CK 40 - 60% Moderate Assist   20 - 22 CJ 20 - 40% Minimal Assist   23 CI 1-20% SBA / CGA   24 CH 0% Independent/ Mod I       Patient left up in chair with all lines intact, call button in reach and nurse Samantha notified    Assessment:  Tenzin Ortiz is a 76 y.o. male with a medical diagnosis of Acute encephalopathy and presents with  independence for self-care and functional transfers.    Rehab identified problem list/impairments: Rehab identified problem list/impairments: weakness, impaired endurance, impaired self care skills, impaired cognition, decreased upper extremity function, decreased safety awareness, impaired coordination, impaired fine motor (tremors)    Rehab potential is good.    Activity tolerance: Good    Discharge recommendations: Discharge Facility/Level Of Care Needs: home health OT     Barriers to discharge: Barriers to Discharge: Decreased caregiver support (no one available )    Equipment recommendations: bedside commode, bath bench     GOALS:    Occupational Therapy Goals        Problem: Occupational Therapy Goal    Goal Priority Disciplines Outcome Interventions   Occupational Therapy Goal     OT, PT/OT Ongoing (interventions implemented as appropriate)    Description:  Goals to be met by: 8/10/2017     Patient will increase functional independence with ADLs by performing:    Feeding with Set-up Assistance and AE if available.  UE Dressing with Set-up Assistance.  LE Dressing with Minimal Assistance.  Grooming while seated with Supervision.  Upper " extremity exercise program with assistance as needed.                      PLAN:  Patient to be seen 3 x/week to address the above listed problems via self-care/home management, therapeutic activities, therapeutic exercises  Plan of Care expires: 08/10/17  Plan of Care reviewed with: patient (Discussed with damion Singh and Dr. Crow)    OT G-codes  Functional Assessment Tool Used: [facility]  Score: 18  Functional Limitation: Self care  Self Care Current Status (): CK  Self Care Goal Status (): ELBA Whitney, MS  08/03/2017

## 2017-08-03 NOTE — ASSESSMENT & PLAN NOTE
Likely medicine related given multiple Parkinson's drugs, CBD oil, trazadone, and tramadol. Also suspect he has progression of his Parkinson's with likely Lewy bodies dementia.

## 2017-08-03 NOTE — SUBJECTIVE & OBJECTIVE
Interval History: Much more alert today.  He is conversing and recognized me.  He denies any complaints leading up to the episode.  He doesn't understand what happened to him other than he was found down.    Review of Systems   All other systems reviewed and are negative.    Objective:     Vital Signs (Most Recent):  Temp: 97.7 °F (36.5 °C) (08/03/17 0800)  Pulse: 84 (08/03/17 1054)  Resp: 20 (08/03/17 1054)  BP: 131/65 (08/03/17 1020)  SpO2: 99 % (08/03/17 1054) Vital Signs (24h Range):  Temp:  [97.2 °F (36.2 °C)-98.2 °F (36.8 °C)] 97.7 °F (36.5 °C)  Pulse:  [63-92] 84  Resp:  [10-29] 20  SpO2:  [92 %-99 %] 99 %  BP: (115-166)/(58-95) 131/65     Weight: 68.7 kg (151 lb 7.3 oz)  Body mass index is 23.72 kg/m².     SpO2: 99 %  O2 Device (Oxygen Therapy): room air      Intake/Output Summary (Last 24 hours) at 08/03/17 1220  Last data filed at 08/03/17 1040   Gross per 24 hour   Intake             1160 ml   Output              895 ml   Net              265 ml       Lines/Drains/Airways     Drain                 Urethral Catheter 08/01/17 1920 Latex 16 Fr. 1 day          Peripheral Intravenous Line                 Peripheral IV - Single Lumen 08/01/17 1843 Right Forearm 1 day         Peripheral IV - Single Lumen 08/01/17 2257 Left Hand 1 day                Physical Exam   Constitutional: He is oriented to person, place, and time. He appears well-developed and well-nourished. No distress.   Cardiovascular: Normal rate and regular rhythm.    Pulmonary/Chest: Effort normal and breath sounds normal.   Musculoskeletal: He exhibits no edema.   Neurological: He is alert and oriented to person, place, and time.       Significant Labs:   BMP:   Recent Labs  Lab 08/01/17  1908 08/02/17  0114 08/03/17  1141   * 124* 126*    142 138   K 4.8 5.1 4.1    108 107   CO2 21* 24 25   BUN 27* 27* 25*   CREATININE 1.5* 1.4 1.2   CALCIUM 9.6 9.1 9.1   MG 2.2  --   --     and CBC   Recent Labs  Lab 08/01/17  190  08/02/17  0115   WBC 8.49 8.31   HGB 14.5 13.6*   HCT 45.4 41.6    218       Significant Imaging: Echocardiogram:   2D echo with color flow doppler:   Results for orders placed or performed during the hospital encounter of 08/01/17   2D echo with color flow doppler   Result Value Ref Range    EF 55 55 - 65    Diastolic Dysfunction No     Est. PA Systolic Pressure 12     Pericardial Effusion NONE     Tricuspid Valve Regurgitation TRIVIAL TO MILD

## 2017-08-03 NOTE — ASSESSMENT & PLAN NOTE
Stable; CTA chest with resolution of the clot; will continue his home regimen of warfarin with daily INR.

## 2017-08-03 NOTE — PROGRESS NOTES
[facility] [place]  [place]    Patient Name: Tenzin Ortiz  MRN: [id number]  Admission Date: 8/1/2017  Hospital Length of Stay: 2 days  Code Status: Full Code   Attending Provider: Cami Crow MD  Primary Care Physician: Efrain Chavez MD   Principal Problem:Acute encephalopathy    Subjective:     Interval History: 75 y/o male with medical Hx as listed below brought to ED for AMS. Pt tells me he doesn't know what happened but per notes his significant other has been calling him and he wouldn't answer. When she arrived pt was moaning, not responding to her. Overnight pt remained non-verbal; not following commands.     -8/3/17: Mr. Ortiz with no acute issues overnight. Family reports that at times pt confused and even hallucinates.    Current Neurological Medications:     Current Facility-Administered Medications   Medication Dose Route Frequency Provider Last Rate Last Dose    acetaminophen tablet 650 mg  650 mg Oral Q4H PRN Cami Crow MD        amlodipine tablet 10 mg  10 mg Oral Daily Cami Crow MD   10 mg at 08/03/17 0803    atorvastatin tablet 10 mg  10 mg Oral Daily Cami Crow MD   10 mg at 08/03/17 0803    carbidopa-levodopa  mg per tablet 1 tablet  1 tablet Oral QID Cami Crow MD   1 tablet at 08/03/17 0557    cyclobenzaprine tablet 10 mg  10 mg Oral TID PRN Cami Crow MD        famotidine tablet 20 mg  20 mg Oral BID Cami Crow MD   20 mg at 08/03/17 0940    gabapentin capsule 600 mg  600 mg Oral QHS Cami Crow MD   600 mg at 08/02/17 2206    hydrALAZINE injection 10 mg  10 mg Intravenous Q6H PRN Saul Leach MD   10 mg at 08/02/17 0415    warfarin (COUMADIN) tablet 3 mg  3 mg Oral Daily Cami Crow MD   3 mg at 08/02/17 1642       Review of Systems   Constitutional: Negative for fever.   HENT: Negative for trouble swallowing.    Eyes: Negative for photophobia.   Respiratory: Negative for  shortness of breath.    Cardiovascular: Negative for chest pain.   Gastrointestinal: Negative for abdominal pain.   Genitourinary: Negative for dysuria.   Neurological: Positive for tremors. Negative for dizziness, facial asymmetry, speech difficulty, light-headedness, numbness and headaches.          Objective:     Vital Signs (Most Recent):  Temp: 97.7 °F (36.5 °C) (08/03/17 0800)  Pulse: 85 (08/03/17 1020)  Resp: (!) 22 (08/03/17 1020)  BP: 131/65 (08/03/17 1020)  SpO2: 97 % (08/03/17 1020) Vital Signs (24h Range):  Temp:  [97.2 °F (36.2 °C)-98.2 °F (36.8 °C)] 97.7 °F (36.5 °C)  Pulse:  [63-92] 85  Resp:  [10-29] 22  SpO2:  [92 %-98 %] 97 %  BP: (115-166)/(58-95) 131/65     Weight: 68.7 kg (151 lb 7.3 oz)  Body mass index is 23.72 kg/m².    Physical Exam  Constitutional: well-developed; no distress  Head: normocephalic  Eyes: conjunctivae/corneas clear.  Nose: no discharge, no epistaxis  Heart: regular rate and rhythm.  Abdomen: no pain to palpation  Extremities: no edema  Neurologic: Mental status: alert  Follow commands; oriented to person, place, time; hypophonic  Cranial nerves: pupils are round at 3 mm and reactive to light; no nystagmus; no facial weakness; tongue protrudes midline SCM/trap 5/5  Strength: elevates UE/LE's against gravity; no drift; 4+/5 effort in UE and LE's  Resting remor in UE's worse on action       Significant Labs:   CBC:   Recent Labs  Lab 08/01/17 1908 08/02/17  0115   WBC 8.49 8.31   HGB 14.5 13.6*   HCT 45.4 41.6    218     CMP:   Recent Labs  Lab 08/01/17 1908 08/02/17  0114   * 124*    142   K 4.8 5.1    108   CO2 21* 24   BUN 27* 27*   CREATININE 1.5* 1.4   CALCIUM 9.6 9.1   MG 2.2  --    PROT 7.3 6.5   ALBUMIN 3.9 3.5   BILITOT 0.4 0.3   ALKPHOS 65 59   AST 15 17   ALT 11 12   ANIONGAP 12 10   EGFRNONAA 45* 49*         Assessment and Plan:     77 y/o male consulted for AMS     1. AMS: Pt has been taking medications intermittently and apparently he  takes too much of them if he needs to, per family.                     -Resume Sinemet 25/100 mg four times daily. Pt tells me her refused DBS in the past. Will hold Azilect and Pramipexole for now.    -Follow up with neurology upon D/C home. Also, make appointment with movement disorder specialist.    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

## 2017-08-03 NOTE — PT/OT/SLP EVAL
Physical Therapy  Evaluation    Tenzin Ortiz   MRN: [id number]   Admitting Diagnosis: Acute encephalopathy    PT Received On: 08/03/17  PT Start Time: 0948     PT Stop Time: 1008    PT Total Time (min): 20 min       Billable Minutes:  Evaluation 20 min co-eval with OT    Diagnosis: Acute encephalopathy      Past Medical History:   Diagnosis Date    Anticoagulant long-term use     Hypertension     Impulse control disorder 2012    gambling on mirapex; also possible dopamine dysregulation syndrome    PD (Parkinson's disease) 1999    tremor-predominant    PVD (peripheral vascular disease) with claudication     poor circulation both legs      Past Surgical History:   Procedure Laterality Date    CATARACT EXTRACTION Bilateral 1 yr        General Precautions: Standard, fall, vision impaired (Pt reported wearing eyeglasses.)    Patient History:  Lives With: significant other  Living Arrangements: house (no concerns)  Transportation Available: family or friend will provide  Equipment Currently Used at Home: rollator    Previous Level of Function:  Ambulation Skills: needs device (rollator)    Subjective:  Communicated with nurse Samantha prior to session.    Pt reported no dizziness during ambulation.    Chief Complaint: N/A  Patient goals: did not state    Pain/Comfort  Pain Rating 1: 0/10      Objective:   Patient found with: blood pressure cuff, tellez catheter, peripheral IV, pulse ox (continuous), telemetry     Cognitive Exam:  Oriented to: Person, Place and Time (month)    Follows Commands/attention: Follows two-step commands  Communication: clear/fluent  Safety awareness/insight to disability: impaired    Physical Exam:  Postural examination/scapula alignment: Rounded shoulder and Head forward; resting tremors to RUE    Skin integrity: Visible skin intact  Edema: None noted BLE    Lower Extremity Range of Motion:  Right Lower Extremity: WFL  Left Lower Extremity: WFL    Lower Extremity Strength:  Right Lower Extremity:  WFL  Left Lower Extremity: WFL     Gross motor coordination: impaired    Functional Mobility: Pt with delayed initiation.    Bed Mobility:  Scooting/Bridging: Minimum Assistance  Supine to Sit: Minimum Assistance    Transfers:  Sit <> Stand Assistance: Minimum Assistance; Pt with initial posterior trunk lean upon standing.    Sit <> Stand Assistive Device: Rolling Walker    Gait:   Gait Distance: 200 ft  Assistance 1: Contact Guard Assistance  Gait Assistive Device: Rolling walker  Gait Pattern: swing-through gait  Gait Deviation(s): decreased toe-to-floor clearance, decreased step length, decreased velocity of limb motion, decreased nneka      Balance:   Static Sit: FAIR: Maintains without assist, but unable to take any challenges   Dynamic Sit: FAIR: Cannot move trunk without losing balance  Static Stand: POOR+: Needs MINIMAL assist to maintain; Upon standing, pt with posterior trunk lean.  Pt required extra time to correct with min A.   Dynamic stand: FAIR: Needs CONTACT GUARD during gait    AM-PAC 6 CLICK MOBILITY  How much help from another person does this patient currently need?   1 = Unable, Total/Dependent Assistance  2 = A lot, Maximum/Moderate Assistance  3 = A little, Minimum/Contact Guard/Supervision  4 = None, Modified [place]/Independent    Turning over in bed (including adjusting bedclothes, sheets and blankets)?: 3  Sitting down on and standing up from a chair with arms (e.g., wheelchair, bedside commode, etc.): 3  Moving from lying on back to sitting on the side of the bed?: 3  Moving to and from a bed to a chair (including a wheelchair)?: 3  Need to walk in hospital room?: 3  Climbing 3-5 steps with a railing?: 3  Total Score: 18     AM-PAC Raw Score CMS G-Code Modifier Level of Impairment Assistance   6 % Total / Unable   7 - 9 CM 80 - 100% Maximal Assist   10 - 14 CL 60 - 80% Moderate Assist   15 - 19 CK 40 - 60% Moderate Assist   20 - 22 CJ 20 - 40% Minimal Assist   23 CI 1-20%  SBA / CGA   24 CH 0% Independent/ Mod I     Patient left up in chair with all lines intact, call button in reach and nurse Samantha notified.    Assessment:   Pt with confusion, however able to answer orientation questions correctly.  Social h/o provided by pt was questionable.  Pt with delayed initiation, required min A for functional mobility.  Pt will benefit from skilled PT services.     Rehab identified problem list/impairments: Rehab identified problem list/impairments: weakness, impaired self care skills, impaired functional mobilty, gait instability, impaired balance, visual deficits, impaired cognition, decreased coordination, decreased upper extremity function, decreased lower extremity function, decreased safety awareness    Rehab potential is fair+    Activity tolerance: Fair    Discharge recommendations: Discharge Facility/Level Of Care Needs: home health PT (with 24 hour supervision/assistance vs NH placement)     Barriers to discharge: Barriers to Discharge: Decreased caregiver support, Other (Comment) (confused with decreased safety awareness)    Equipment recommendations: Equipment Needed After Discharge: bedside commode, bath bench     GOALS:    Physical Therapy Goals        Problem: Physical Therapy Goal    Goal Priority Disciplines Outcome Goal Variances Interventions   Physical Therapy Goal     PT/OT, PT      Description:  Goals to be met by: 17     Patient will increase functional independence with mobility by performin. Supine to sit with Supervision  2. Rolling to Left and Right with Supervision  3. Sit to stand transfer with Supervision using RW  4. Bed to chair transfer with Supervision using Rolling Walker  5. Gait 500 feet with Supervision using Rolling Walker   6. Lower extremity exercise program x20 reps per handout, with supervision                      PLAN:    Patient to be seen 5 x/week (Mon-Fri) to address the above listed problems via gait training, therapeutic activities,  therapeutic exercises  Plan of Care expires: 08/17/17  Plan of Care reviewed with: patient    Functional Assessment Tool Used: AM-PAC  Score: 18  Functional Limitation: Mobility: Walking and moving around  Mobility: Walking and Moving Around Current Status (): CK  Mobility: Walking and Moving Around Goal Status (): WILLIE Seaman, PT  08/03/2017

## 2017-08-04 LAB
ANION GAP SERPL CALC-SCNC: 11 MMOL/L
BUN SERPL-MCNC: 26 MG/DL
CALCIUM SERPL-MCNC: 9.5 MG/DL
CHLORIDE SERPL-SCNC: 108 MMOL/L
CO2 SERPL-SCNC: 21 MMOL/L
CREAT SERPL-MCNC: 1.2 MG/DL
EST. GFR  (AFRICAN AMERICAN): >60 ML/MIN/1.73 M^2
EST. GFR  (NON AFRICAN AMERICAN): 59 ML/MIN/1.73 M^2
GLUCOSE SERPL-MCNC: 156 MG/DL
INR PPP: 1.9
POTASSIUM SERPL-SCNC: 4 MMOL/L
PROTHROMBIN TIME: 19.2 SEC
SODIUM SERPL-SCNC: 140 MMOL/L

## 2017-08-04 PROCEDURE — 25000003 PHARM REV CODE 250: Performed by: PSYCHIATRY & NEUROLOGY

## 2017-08-04 PROCEDURE — 25000003 PHARM REV CODE 250: Performed by: INTERNAL MEDICINE

## 2017-08-04 PROCEDURE — 99232 SBSQ HOSP IP/OBS MODERATE 35: CPT | Mod: ,,, | Performed by: PSYCHIATRY & NEUROLOGY

## 2017-08-04 PROCEDURE — 94761 N-INVAS EAR/PLS OXIMETRY MLT: CPT

## 2017-08-04 PROCEDURE — 80048 BASIC METABOLIC PNL TOTAL CA: CPT

## 2017-08-04 PROCEDURE — 63600175 PHARM REV CODE 636 W HCPCS: Performed by: INTERNAL MEDICINE

## 2017-08-04 PROCEDURE — 21400001 HC TELEMETRY ROOM

## 2017-08-04 PROCEDURE — 85610 PROTHROMBIN TIME: CPT

## 2017-08-04 PROCEDURE — 36415 COLL VENOUS BLD VENIPUNCTURE: CPT

## 2017-08-04 RX ORDER — RIVASTIGMINE 4.6 MG/24H
1 PATCH, EXTENDED RELEASE TRANSDERMAL DAILY
Status: DISCONTINUED | OUTPATIENT
Start: 2017-08-04 | End: 2017-08-10 | Stop reason: HOSPADM

## 2017-08-04 RX ORDER — RAMELTEON 8 MG/1
8 TABLET ORAL NIGHTLY
Status: DISCONTINUED | OUTPATIENT
Start: 2017-08-04 | End: 2017-08-10 | Stop reason: HOSPADM

## 2017-08-04 RX ADMIN — CARBIDOPA AND LEVODOPA 1 TABLET: 25; 100 TABLET ORAL at 05:08

## 2017-08-04 RX ADMIN — HYDRALAZINE HYDROCHLORIDE 10 MG: 20 INJECTION INTRAMUSCULAR; INTRAVENOUS at 01:08

## 2017-08-04 RX ADMIN — ACETAMINOPHEN 650 MG: 325 TABLET, FILM COATED ORAL at 09:08

## 2017-08-04 RX ADMIN — RIVASTIGMINE TRANSDERMAL SYSTEM 1 PATCH: 4.6 PATCH, EXTENDED RELEASE TRANSDERMAL at 02:08

## 2017-08-04 RX ADMIN — FAMOTIDINE 20 MG: 20 TABLET, FILM COATED ORAL at 09:08

## 2017-08-04 RX ADMIN — ATORVASTATIN CALCIUM 10 MG: 10 TABLET, FILM COATED ORAL at 09:08

## 2017-08-04 RX ADMIN — CARBIDOPA AND LEVODOPA 1 TABLET: 25; 100 TABLET ORAL at 01:08

## 2017-08-04 RX ADMIN — AMLODIPINE BESYLATE 10 MG: 5 TABLET ORAL at 09:08

## 2017-08-04 RX ADMIN — WARFARIN SODIUM 3 MG: 3 TABLET ORAL at 05:08

## 2017-08-04 RX ADMIN — CARBIDOPA AND LEVODOPA 1 TABLET: 25; 100 TABLET ORAL at 12:08

## 2017-08-04 NOTE — PHYSICIAN QUERY
"PT Name: Tenzin Ortiz  MR #: [id number]  Physician Query Form - Renal Clarification     CDS/: Ivory Blanc RN, CCDS               Contact information: [phone]    This form is a permanent document in the medical record.     QueryDate: August 4, 2017    By submitting this query, we are merely seeking further clarification of documentation. Please utilize your independent clinical judgment when addressing the question(s) below.    The Medical record contains the following:   Indicator Supporting Clinical Findings Location in Medical Record    Kidney (Renal) Insufficiency      Kidney (Renal) Failure / Injury      Nephrotoxic Agents     x BUN/Creatinine GFR x   8/1/2017 19:08 8/2/2017 01:14 8/3/2017 11:41 8/4/2017 08:32   BUN  27 (H) 27 (H) 25 (H) 26 (H)   Cr  1.5 (H) 1.4 1.2 1.2   gfr 45 (A) 49 (A) 59 (A) 59 (A)          Lab    Urine: Casts         Eosinophils      Dehydration      Nausea/Vomiting      Dialysis/CRRT     x Treatment: Except for abnroaml renal function no other metabolic abnormalities that may be causing his state.    IVF: NS @ 75 ml/hr 8/1-2    Lytes + 8/1,2    Daily metabolic panel   Neuro CN 8/2      MAR    Labs    Orders   x Other:  HTN  Acute Encephalopathy  Hyperlipidemia  PAD  Chronic anticoagulation  Parkinson's dz   Neuro CN 8/2        Provider, please specify the diagnosis or diagnoses associated with above clinical findings.        Please further specify "abnormal renal function".      [  ] Other Acute Kidney Failure/Injury (please specify): ____________     [  ] Unspecified Acute Kidney Failure/Injury     [x  ] Acute Renal Insufficiency    [  ] Acute on Chronic Renal Insufficiency (please specify stage*): _____________    [  ] Acute on Chronic Renal Failure (please specify stage*): _____________    [  ] Chronic Renal Insufficiency (please specify stage*): _____________    [  ] Chronic Kidney Disease (CKD) (please specify stage* below):      *[employer] " Definitions:   Stage Description      eGFR (mL/min)   [ ] I  Slight kidney damage with normal or increased filtration  90+   [ ] II  Mildly reduced kidney function     60-89   [ ] III  Moderately reduced kidney function    30-59   [ ] IV  Severly reduced kidney function     15-29   [ ] V  Kidney failure, requiring transplant or dialysis   < 15    [  ] Other (please specify): _______________________________    [  ] Clinically Undetermined    Please document in your progress notes daily for the duration of treatment, until resolved, and include in your discharge summary.

## 2017-08-04 NOTE — PHYSICIAN QUERY
"PT Name: Tenzin Ortiz  MR #: [id number]     Physician Query Form - Diagnosis Clarification      CDS/: Ivory Blanc RN, CCDS               Contact information: [phone]    This form is a permanent document in the medical record.     Query Date: August 4, 2017    By submitting this query, we are merely seeking further clarification of documentation.  Please utilize your independent clinical judgment when addressing the question(s) below.     The medical record contains the following:      Findings Supporting Clinical Information Location in Medical Record    Repeat EKG reviewed and interpreted myself shows evidence of inferior STEMI.      --- per ED MD Note    STEMI  -- admission orders Hospital Course:  In the ER initial EKG showed no evidence of acute ischemia.      He was then noted to have what appeared to be ST elevations on his telemetry.      Repeat EKG in the ER thought to be consistent with STEMI.      Emergent code STEMI activated.      Approximately 1-2 minutes after STEMI was seen on telemetry, repeat EKG was performed and his ST segments normalized.     Cardiology did not feel emergent LHC was warranted given normalization.     Troponin trended and remained unremarkable.     CTA chest w/o dissection.     Echo w/ EF 55%, no DD, no reported wall motion abnormality.    Anticoagulated on Coumadin      8/1/2017 19:08 8/2/2017 01:15 8/2/2017 07:08   Troponin I <0.006 0.013 0.024     ECG abnormality     Nonspecific and unlikely related ACS  Check echocardiogram  Consider ischemic workup as an outpatient        Prim Pn 8/1                                                  ED MD Note      Lab        Cards PN 8/3     Please clarify if the "STEMI"  diagnosis has been:    [  ] Ruled In  [  ] Ruled In, Now Resolved  [  ] Resolved Prior to My Assessment  [ x ] Ruled Out  [  ] Clinically insignificant  [  ] Clinically undetermined  [  ] Other/Clarification of findings (please " specify)_______________________________    Please document in your progress notes daily for the duration of treatment, until resolved, and include in your discharge summary.

## 2017-08-04 NOTE — ASSESSMENT & PLAN NOTE
Likely medicine related given multiple Parkinson's drugs, CBD oil, trazadone, and tramadol. Also suspect he has progression of his Parkinson's with likely Lewy bodies dementia.  Patient is not safe to go home. Needs placement. Neuro consulted.

## 2017-08-04 NOTE — NURSING
"Patient awake and alert with wife at bedside. Patient has parkinson's and shaking quite a bit. Tellez bag in place, urine clear and yellow. Bed in lowest position, call light in reach, communication board up to date. Patient denies any pain, but wife had some concerns about his medication. Patient PERRLA equal and reactive, pulses palpable, abdominal sounds heard in all four quadrants, mild swelling, will continue to monitor.     Patient very confused and disoriented. Thinks I am "the ". Hallucinates and thinks that someone is next to him, there are pills in his hand, and shoes on his feet. AVASYS activated at bedside. Bed alarm on as well.     0100 patient talking to himself, hanging out the bed, pulling at IV. Rewrapped IV and reoriented patient.     0300 patient trying to get out of bed, able to tell you what his name is, his birthdate, and where he is, but still talking to people not there.     0500 removed tellez catheter.   "

## 2017-08-04 NOTE — SUBJECTIVE & OBJECTIVE
Interval History: Patient completely confused.    Review of Systems   Unable to perform secondary to dementia.   Objective:     Vital Signs (Most Recent):  Temp: 98.2 °F (36.8 °C) (08/04/17 0838)  Pulse: 74 (08/04/17 0838)  Resp: 18 (08/04/17 0838)  BP: (!) 147/68 (08/04/17 0838)  SpO2: 100 % (08/04/17 0838) Vital Signs (24h Range):  Temp:  [97.7 °F (36.5 °C)-99.8 °F (37.7 °C)] 98.2 °F (36.8 °C)  Pulse:  [74-99] 74  Resp:  [16-26] 18  SpO2:  [92 %-100 %] 100 %  BP: (108-174)/(60-81) 147/68     Weight: 68.5 kg (151 lb)  Body mass index is 23.65 kg/m².    Intake/Output Summary (Last 24 hours) at 08/04/17 1019  Last data filed at 08/04/17 0400   Gross per 24 hour   Intake              600 ml   Output             1665 ml   Net            -1065 ml      Physical Exam   Vitals reviewed  Shaking.   CV RRR  Lungs clear.  Alert- not at all oriented.     Significant Labs:   CBC: No results for input(s): WBC, HGB, HCT, PLT in the last 48 hours.  CMP:   Recent Labs  Lab 08/03/17  1141 08/04/17  0832    140   K 4.1 4.0    108   CO2 25 21*   * 156*   BUN 25* 26*   CREATININE 1.2 1.2   CALCIUM 9.1 9.5   ANIONGAP 6* 11   EGFRNONAA 59* 59*       Significant Imaging:

## 2017-08-04 NOTE — PT/OT/SLP PROGRESS
"Occupational Therapy      Tenzin Ortiz  MRN: [id number]    Patient not seen today secondary to secondary to patient stating to OT, "get the hell outta my room!". Advised nurse Diaz of patient's uncooperativeness. Will follow-up at a later date.    ELBA Tran, MS  8/4/2017  "

## 2017-08-04 NOTE — PLAN OF CARE
Problem: Fall Risk (Adult)  Intervention: Monitor/Assist with Self Care   17   Activity   Activity Assistance Provided assistance, 1 person   Daily Care Interventions   Self-Care Promotion independence encouraged   Functional Level Current   Ambulation 2 - assistive person   Transferring 2 - assistive person   Toileting 2 - assistive person   Bathing 2 - assistive person   Dressing 2 - assistive person   Eating 2 - assistive person   Communication 0 - understands/communicates without difficulty   Swallowing 0 - swallows foods/liquids without difficulty     Intervention: Reduce Risk/Promote Restraint Free Environment   17   Safety Interventions   Environmental Safety Modification clutter free environment maintained   Prevent  Drop/Fall   Safety/Security Measures bed alarm set   Safety Interventions   Safety Precautions emergency equipment at bedside     Intervention: Review Medications/Identify Contributors to Fall Risk   17   Safety Interventions   Medication Review/Management medications reviewed     Intervention: Patient Rounds   17 0000   Safety Interventions   Patient Rounds bed in low position;bed wheels locked;call light in reach;clutter free environment maintained;placement of personal items at bedside;ID band on;toileting offered;visualized patient     Intervention: Safety Promotion/Fall Prevention   17   Safety Interventions   Safety Promotion/Fall Prevention Fall Risk signage in place;Fall Risk reviewed with patient/family;lighting adjusted;/camera at bedside     Intervention: Safety Precautions   17   Safety Interventions   Safety Precautions emergency equipment at bedside       Goal: Identify Related Risk Factors and Signs and Symptoms  Related risk factors and signs and symptoms are identified upon initiation of Human Response Clinical Practice Guideline (CPG)   Outcome: Ongoing (interventions implemented as  appropriate)   08/04/17 0122   Fall Risk   Related Risk Factors (Fall Risk) age-related changes   Signs and Symptoms (Fall Risk) presence of risk factors     Goal: Absence of Falls  Patient will demonstrate the desired outcomes by discharge/transition of care.   Outcome: Ongoing (interventions implemented as appropriate)   08/04/17 0122   Fall Risk (Adult)   Absence of Falls making progress toward outcome       Problem: Infection, Risk/Actual (Adult)  Intervention: Manage Suspected/Actual Infection   08/04/17 0122   Safety Interventions   Isolation Precautions environmental surveillance   Infection Management aseptic technique maintained   Prevent/Manage Colorectal Surgical Infection   Fever Reduction/Comfort Measures lightweight bedding;lightweight clothing;medication administered       Goal: Identify Related Risk Factors and Signs and Symptoms  Related risk factors and signs and symptoms are identified upon initiation of Human Response Clinical Practice Guideline (CPG)   Outcome: Ongoing (interventions implemented as appropriate)   08/04/17 0122   Infection, Risk/Actual   Related Risk Factors (Infection, Risk/Actual) age extremes;medication effects   Signs and Symptoms (Infection, Risk/Actual) blood glucose changes;mental/behavioral changes     Goal: Infection Prevention/Resolution  Patient will demonstrate the desired outcomes by discharge/transition of care.   Outcome: Ongoing (interventions implemented as appropriate)   08/04/17 0122   Infection, Risk/Actual (Adult)   Infection Prevention/Resolution making progress toward outcome

## 2017-08-04 NOTE — PT/OT/SLP PROGRESS
"Physical Therapy  Not Seen      Tenzin Ortiz  MRN: [id number]    Patient not seen today secondary to Patient unwilling to participate.  Pt with increased confusion and AMS. Pt with difficulty answering questions and following commands. Therapist strongly encouraged pt to participate in OOB activity, however, pt declined stating " I feel like I will drop dead of a heart attack."  However, pt agreed to EOB sitting with exercises.  When therapist attempted to assist pt EOB, pt started kicking and swinging.  Pt stated " What are you doing to me?  Leave me alone you son of a bxxxx.  Next time you touch me I will hit you."  Therapist placed pt's LEs back in bed with BLEs propped and LUE propped. Bed alarm on, call light in reach, and AVASYS present.  Pt's nurse, Emily notified. Will follow-up as able.    Citlali Dennis, PTA    "

## 2017-08-04 NOTE — PROGRESS NOTES
[facility] Hospital Medicine  Progress Note    Patient Name: Tenzin Ortiz  MRN: [id number]  Patient Class: IP- Inpatient   Admission Date: 8/1/2017  Length of Stay: 3 days  Attending Physician: Pietro Paige MD  Primary Care Provider: Efrain Chavez MD        Subjective:     Principal Problem:Acute encephalopathy    HPI:  Mr. 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  His girlfriend notes that he appeared ill for the past two days and has been mumbling at home.  She attempted to call him today but he wasn't answering his phone calls.  When she arrived to his home he was nonverbal and was moaning.  EMS was activated and noted that he had purposeful movement.  He was administered some naloxone without improvement of his mentation.  Further history is limited at this time.    Hospital Course:  In the ER initial EKG showed no evidence of acute ischemia.  He was then noted to have what appeared to be ST elevations on his telemetry.  Repeat EKG in the ER thought to be consistent with STEMI.  Emergent code STEMI activated.  Approximately 1-2 minutes after STEMI was seen on telemetry, repeat EKG was performed and his ST segments normalized. Cardiology did not feel emergent LHC was warranted given normalization. Troponin trended and remained unremarkable. CTA chest w/o dissection. Echo w/ EF 55%, no DD, no reported wall motion abnormality.     His work up for encephalopathy was mostly unremarkable. CT head, infectious work up, electrolytes, and ammonia level normal. His encephalopathy improved spontaneously the morning following admission. He was alert and oriented to place, date, and identifies his brother who called to check on him. He did however keep asking when he's  going for his operation despite being told several times he's not getting an operation. Utox positive for amphetamines which may be a false positive (trazodone on home med list is known to cause this). He is non-compliant with his Parkinson's medication and sometimes taking more than prescribed. His girlfriend brought in his medications and nursing rec'ed in the computer. He was apparently taking Mirapex; he has a history of hallucinations 2/2 Mirapex. She also brought CBD oil, a cannabinoid oil, that he took a few days prior to presentation. I suspect this could be the reason for his bizarre symptoms. I  also suspect he could have also taken too much trazodone and or tramadol. The other likely etiology is advancement in his Parkinson's and now with some features of Lewy bodies dementia given his hallucinations.     His symptoms improved but I still do not think he is safe to go home as no one can be there to assist him 24 h per day. Consult PT/OT and case management. SNF vs [facility] bed.    Interval History: Patient completely confused.    Review of Systems   Unable to perform secondary to dementia.   Objective:     Vital Signs (Most Recent):  Temp: 98.2 °F (36.8 °C) (08/04/17 0838)  Pulse: 74 (08/04/17 0838)  Resp: 18 (08/04/17 0838)  BP: (!) 147/68 (08/04/17 0838)  SpO2: 100 % (08/04/17 0838) Vital Signs (24h Range):  Temp:  [97.7 °F (36.5 °C)-99.8 °F (37.7 °C)] 98.2 °F (36.8 °C)  Pulse:  [74-99] 74  Resp:  [16-26] 18  SpO2:  [92 %-100 %] 100 %  BP: (108-174)/(60-81) 147/68     Weight: 68.5 kg (151 lb)  Body mass index is 23.65 kg/m².    Intake/Output Summary (Last 24 hours) at 08/04/17 1019  Last data filed at 08/04/17 0400   Gross per 24 hour   Intake              600 ml   Output             1665 ml   Net            -1065 ml      Physical Exam   Vitals reviewed  Shaking.   CV RRR  Lungs clear.  Alert- not at all oriented.     Significant Labs:   CBC: No results for input(s): WBC, HGB, HCT, PLT in the last 48  hours.  CMP:   Recent Labs  Lab 08/03/17  1141 08/04/17  0832    140   K 4.1 4.0    108   CO2 25 21*   * 156*   BUN 25* 26*   CREATININE 1.2 1.2   CALCIUM 9.1 9.5   ANIONGAP 6* 11   EGFRNONAA 59* 59*       Significant Imaging:     Assessment/Plan:      Chronic anticoagulation    As addressed above. Stress ulcer PPx.        Hyperlipidemia    Poorly-controlled; will continue patient's home regimen of atorvastatin.        Essential hypertension    Continued home regimen of amlodipine with improved control.        ECG abnormality    See hospital course. Appreciate cardiology recs.        PAD (peripheral artery disease)    Will continue his home regimen of atorvastatin and clopidogrel.        Mild malnutrition              History of aortic thrombus    Stable; CTA chest with resolution of the clot; will continue his home regimen of warfarin with daily INR.        Parkinson disease    Stable; will continue his home regimen of carbidopa/levodopa.        * Acute encephalopathy    Likely medicine related given multiple Parkinson's drugs, CBD oil, trazadone, and tramadol. Also suspect he has progression of his Parkinson's with likely Lewy bodies dementia.  Patient is not safe to go home. Needs placement. Neuro consulted.           VTE Risk Mitigation         Ordered     warfarin (COUMADIN) tablet 3 mg  Daily     Route:  Oral        08/03/17 1217        Will discuss with neuro today.          Pietro Trinh MD  Department of Hospital Medicine   [facility]

## 2017-08-04 NOTE — PROGRESS NOTES
"[facility]  Neurology  Progress Note    Patient Name: Tenzin Ortiz  MRN: [id number]  Admission Date: 8/1/2017  Hospital Length of Stay: 3 days  Code Status: Full Code   Attending Provider: Pietro Paige MD  Primary Care Physician: Efrain Chavez MD   Principal Problem:Acute encephalopathy    Subjective:     Interval History: 77 y/o male with medical Hx as listed below brought to ED for AMS. Pt tells me he doesn't know what happened but per notes his significant other has been calling him and he wouldn't answer. When she arrived pt was moaning, not responding to her. Overnight pt remained non-verbal; not following commands.      -8/3/17: Mr. Ortiz with no acute issues overnight. Family reports that at times pt confused and even hallucinates.    -8/4/17: Pt been confused since yesterday evening. Hallucinating. Not cooperating. Says: "get outta here"    Current Neurological Medications:     Current Facility-Administered Medications   Medication Dose Route Frequency Provider Last Rate Last Dose    acetaminophen tablet 650 mg  650 mg Oral Q4H PRN Cami Crow MD   650 mg at 08/04/17 0901    amlodipine tablet 10 mg  10 mg Oral Daily Cami Crow MD   10 mg at 08/04/17 0900    atorvastatin tablet 10 mg  10 mg Oral Daily Cami Crow MD   10 mg at 08/04/17 0900    carbidopa-levodopa  mg per tablet 1 tablet  1 tablet Oral QID Cami Crow MD   1 tablet at 08/04/17 1212    cyclobenzaprine tablet 10 mg  10 mg Oral TID PRN Cami Crow MD   10 mg at 08/03/17 2054    famotidine tablet 20 mg  20 mg Oral BID Cami Crwo MD   20 mg at 08/04/17 0900    gabapentin capsule 600 mg  600 mg Oral QHS Cami Crow MD   600 mg at 08/03/17 2054    hydrALAZINE injection 10 mg  10 mg Intravenous Q6H PRN Saul Leach MD   10 mg at 08/04/17 0108    warfarin (COUMADIN) tablet 3 mg  3 mg Oral Daily Cami Crow MD           Review of Systems   Unable " to obtain due AMS    Objective:     Vital Signs (Most Recent):  Temp: 98.4 °F (36.9 °C) (08/04/17 1218)  Pulse: 94 (08/04/17 1218)  Resp: 18 (08/04/17 1218)  BP: (!) 177/72 (08/04/17 1218)  SpO2: 97 % (08/04/17 1218) Vital Signs (24h Range):  Temp:  [97.9 °F (36.6 °C)-99.8 °F (37.7 °C)] 98.4 °F (36.9 °C)  Pulse:  [74-99] 94  Resp:  [16-22] 18  SpO2:  [92 %-100 %] 97 %  BP: (139-177)/(60-76) 177/72     Weight: 68.5 kg (151 lb)  Body mass index is 23.65 kg/m².    Physical Exam  Constitutional: well-developed; no distress  Head: normocephalic  Eyes: conjunctivae/corneas clear.  Nose: no discharge, no epistaxis  Heart: regular rate and rhythm.  Abdomen: no pain to palpation  Extremities: no edema  Neurologic: Mental status: somnolent  Follow commands; oriented to person  Cranial nerves: pupils are round at 3 mm and reactive to light; no nystagmus; no facial weakness; tongue protrudes midline SCM/trap 5/5  Strength: elevates UE/LE's against gravity  Resting remor in UE's worse on action  Exam limited due to confusion       Significant Labs:   CBC: No results for input(s): WBC, HGB, HCT, PLT in the last 48 hours.  CMP:   Recent Labs  Lab 08/03/17  1141 08/04/17  0832   * 156*    140   K 4.1 4.0    108   CO2 25 21*   BUN 25* 26*   CREATININE 1.2 1.2   CALCIUM 9.1 9.5   ANIONGAP 6* 11   EGFRNONAA 59* 59*         Assessment and Plan:     75 y/o male consulted for AMS     1. AMS: Pt is presenting fluctuating mental status with hallucinations. This is concerning for dementia with Lewy Bodies. He could also be experiencing hospital delirium.   -Pt also reported taking only Azilect but not the rest of his medications. In the hospital  Sinemet 25/100 mg four times daily was resumed. Dopamine agonists such as pramipexole can cause symptoms and signs that can mimic NMS when abruptly stopped (although pt states that he has not been taking it). Note also that dopaminergic agent may cause dyskinesias,  hallucinations, compulsions.   -Will start rivastigmine 4.6 mg transdermal daily.    -Ramelteon 8 mg nightly.   -Follow up with neurology upon D/C home. Also, make appointment with movement disorder specialist.    Active Diagnoses:    Diagnosis Date Noted POA    PRINCIPAL PROBLEM:  Acute encephalopathy [G93.40] 08/01/2017 Yes    Chronic anticoagulation [Z79.01] 08/02/2017 Not Applicable     Chronic    ECG abnormality [R94.31] 08/01/2017 Yes    Essential hypertension [I10] 08/01/2017 Yes     Chronic    Hyperlipidemia [E78.5] 08/01/2017 Yes     Chronic    PAD (peripheral artery disease) [I73.9] 08/22/2016 Yes     Chronic    Mild malnutrition [E44.1] 04/02/2016 Yes     Chronic    History of aortic thrombus [I74.10] 11/28/2015 Yes     Chronic    Parkinson disease [G20]  Yes     Chronic      Problems Resolved During this Admission:    Diagnosis Date Noted Date Resolved POA       VTE Risk Mitigation         Ordered     warfarin (COUMADIN) tablet 3 mg  Daily     Route:  Oral        08/03/17 1217          Johny Aldrich MD  Neurology  [facility]

## 2017-08-05 LAB
ANION GAP SERPL CALC-SCNC: 13 MMOL/L
BUN SERPL-MCNC: 31 MG/DL
CALCIUM SERPL-MCNC: 9.5 MG/DL
CHLORIDE SERPL-SCNC: 110 MMOL/L
CO2 SERPL-SCNC: 18 MMOL/L
CREAT SERPL-MCNC: 1.2 MG/DL
EST. GFR  (AFRICAN AMERICAN): >60 ML/MIN/1.73 M^2
EST. GFR  (NON AFRICAN AMERICAN): 59 ML/MIN/1.73 M^2
GLUCOSE SERPL-MCNC: 96 MG/DL
INR PPP: 1.8
POTASSIUM SERPL-SCNC: 4.1 MMOL/L
PROTHROMBIN TIME: 18.4 SEC
SODIUM SERPL-SCNC: 141 MMOL/L

## 2017-08-05 PROCEDURE — 25000003 PHARM REV CODE 250: Performed by: PSYCHIATRY & NEUROLOGY

## 2017-08-05 PROCEDURE — 80048 BASIC METABOLIC PNL TOTAL CA: CPT

## 2017-08-05 PROCEDURE — 99232 SBSQ HOSP IP/OBS MODERATE 35: CPT | Mod: ,,, | Performed by: PSYCHIATRY & NEUROLOGY

## 2017-08-05 PROCEDURE — 21400001 HC TELEMETRY ROOM

## 2017-08-05 PROCEDURE — 25000003 PHARM REV CODE 250: Performed by: INTERNAL MEDICINE

## 2017-08-05 PROCEDURE — 85610 PROTHROMBIN TIME: CPT

## 2017-08-05 PROCEDURE — 36415 COLL VENOUS BLD VENIPUNCTURE: CPT

## 2017-08-05 RX ADMIN — FAMOTIDINE 20 MG: 20 TABLET, FILM COATED ORAL at 09:08

## 2017-08-05 RX ADMIN — ATORVASTATIN CALCIUM 10 MG: 10 TABLET, FILM COATED ORAL at 09:08

## 2017-08-05 RX ADMIN — CARBIDOPA AND LEVODOPA 1 TABLET: 25; 100 TABLET ORAL at 06:08

## 2017-08-05 RX ADMIN — AMLODIPINE BESYLATE 10 MG: 5 TABLET ORAL at 09:08

## 2017-08-05 RX ADMIN — GABAPENTIN 600 MG: 300 CAPSULE ORAL at 08:08

## 2017-08-05 RX ADMIN — RIVASTIGMINE TRANSDERMAL SYSTEM 1 PATCH: 4.6 PATCH, EXTENDED RELEASE TRANSDERMAL at 09:08

## 2017-08-05 RX ADMIN — CARBIDOPA AND LEVODOPA 1 TABLET: 25; 100 TABLET ORAL at 01:08

## 2017-08-05 RX ADMIN — CYCLOBENZAPRINE HYDROCHLORIDE 10 MG: 10 TABLET, FILM COATED ORAL at 08:08

## 2017-08-05 RX ADMIN — FAMOTIDINE 20 MG: 20 TABLET, FILM COATED ORAL at 08:08

## 2017-08-05 RX ADMIN — RAMELTEON 8 MG: 8 TABLET, FILM COATED ORAL at 08:08

## 2017-08-05 RX ADMIN — WARFARIN SODIUM 3 MG: 3 TABLET ORAL at 06:08

## 2017-08-05 NOTE — PROGRESS NOTES
"[facility]  Neurology  Progress Note    Patient Name: Tenzin Ortiz  MRN: [id number]  Admission Date: 8/1/2017  Hospital Length of Stay: 4 days  Code Status: Full Code   Attending Provider: Pietro Paige MD  Primary Care Physician: Efrain Chavez MD   Principal Problem:Acute encephalopathy    Subjective:     Interval History: 75 y/o male with medical Hx as listed below brought to ED for AMS. Pt tells me he doesn't know what happened but per notes his significant other has been calling him and he wouldn't answer. When she arrived pt was moaning, not responding to her. Overnight pt remained non-verbal; not following commands.      -8/3/17: Mr. Ortiz with no acute issues overnight. Family reports that at times pt confused and even hallucinates.     -8/4/17: Pt been confused since yesterday evening. Hallucinating. Not cooperating. Says: "get outta here".    -8/5/17: Pt refusing medications. Confused.    Current Neurological Medications:     Current Facility-Administered Medications   Medication Dose Route Frequency Provider Last Rate Last Dose    acetaminophen tablet 650 mg  650 mg Oral Q4H PRN Cami Crow MD   650 mg at 08/04/17 0901    amlodipine tablet 10 mg  10 mg Oral Daily Cami Crow MD   10 mg at 08/05/17 0941    atorvastatin tablet 10 mg  10 mg Oral Daily Cami Crow MD   10 mg at 08/05/17 0941    carbidopa-levodopa  mg per tablet 1 tablet  1 tablet Oral QID Cami Crow MD   1 tablet at 08/05/17 0611    cyclobenzaprine tablet 10 mg  10 mg Oral TID PRN Cami Crow MD   10 mg at 08/03/17 2054    famotidine tablet 20 mg  20 mg Oral BID Cami Crow MD   20 mg at 08/05/17 0941    gabapentin capsule 600 mg  600 mg Oral QHS Cami Crow MD   600 mg at 08/03/17 2054    hydrALAZINE injection 10 mg  10 mg Intravenous Q6H PRN Saul Leach MD   10 mg at 08/04/17 0108    pneumoc 13-bryon conj-dip cr(PF) 0.5 mL  0.5 mL Intramuscular " vaccine x 1 dose Pietro Paige MD        ramelteon tablet 8 mg  8 mg Oral QHS Johny Aldrich MD        rivastigmine 4.6 mg/24 hr 1 patch  1 patch Transdermal Daily Johny Aldrich MD   1 patch at 08/05/17 0941    warfarin (COUMADIN) tablet 3 mg  3 mg Oral Daily Cami Crow MD   3 mg at 08/04/17 1711       Review of Systems   Unable to obtain due to AMS    Objective:     Vital Signs (Most Recent):  Temp: 97.9 °F (36.6 °C) (08/05/17 0801)  Pulse: 99 (08/05/17 0801)  Resp: 18 (08/05/17 0801)  BP: (!) 156/70 (08/05/17 0801)  SpO2: (!) 92 % (08/05/17 0801) Vital Signs (24h Range):  Temp:  [97.9 °F (36.6 °C)-99.9 °F (37.7 °C)] 97.9 °F (36.6 °C)  Pulse:  [89-99] 99  Resp:  [18-21] 18  SpO2:  [92 %-97 %] 92 %  BP: ()/(46-72) 156/70     Weight: 68.5 kg (151 lb)  Body mass index is 23.65 kg/m².    Physical Exam  Constitutional: well-developed; no distress  Head: normocephalic  Eyes: conjunctivae/corneas clear.  Nose: no discharge, no epistaxis  Heart: regular rate and rhythm.  Abdomen: no pain to palpation  Extremities: no edema  Neurologic: Mental status: somnolent  Follow commands; oriented to person  Cranial nerves: pupils are round at 3 mm and reactive to light; no nystagmus; no facial weakness; tongue protrudes midline SCM/trap 5/5  Strength: elevates UE/LE's against gravity  Resting remor in UE's worse on action  Exam limited due to confusion       Significant Labs:   CBC: No results for input(s): WBC, HGB, HCT, PLT in the last 48 hours.  CMP:   Recent Labs  Lab 08/04/17  0832 08/05/17  0643   * 96    141   K 4.0 4.1    110   CO2 21* 18*   BUN 26* 31*   CREATININE 1.2 1.2   CALCIUM 9.5 9.5   ANIONGAP 11 13   EGFRNONAA 59* 59*         Assessment and Plan:     77 y/o male consulted for AMS     1. AMS: Pt is presenting fluctuating mental status with hallucinations. This is concerning for dementia with Lewy Bodies. He could also be experiencing hospital delirium. He is refusing  medications for which his tremor is going to worsen.                     -Pt also reported taking only Azilect but not the rest of his medications. In the hospital  Sinemet 25/100 mg four times daily was resumed. Dopamine agonists such as pramipexole can cause symptoms and signs that can mimic NMS when abruptly stopped (although pt states that he has not been taking it). Note also that dopaminergic agents in excess may cause dyskinesias, hallucinations, compulsions.   -Rivastigmine 4.6 mg transdermal daily.    -Ramelteon 8 mg nightly.   -If needed use olanzapine 5 mg PRN or quetiapine 25 mg twice daily as needed. Would avoid benzodiazepines as they may worsen delirium but if needed consider diazepam 5-10 mg PRN.   -At this point pt is unable to take care of himself and his significant other and family are not available during the day to supervise him. Leaning towards ursing home placement.   -Follow up with neurology upon D/C home. Also, make appointment with movement disorder specialist.   -Daughter informed about pt status and plan.    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

## 2017-08-05 NOTE — ASSESSMENT & PLAN NOTE
Likely medicine related given multiple Parkinson's drugs, CBD oil, trazadone, and tramadol. Also suspect he has progression of his Parkinson's with likely Lewy bodies dementia.  Patient is not safe to go home. Needs placement. Neuro consulted- made adjustments in meds. Remains confused.

## 2017-08-05 NOTE — PLAN OF CARE
Problem: Patient Care Overview  Goal: Plan of Care Review  Outcome: Ongoing (interventions implemented as appropriate)  Avasys per bedside, No falls this shift bed kept in low position call light and phone remain within reach bed alarm remain active. Patient able to reposition self in bed without assist. PT/OT ordered.  No evidence of skin breakdown noted.      Admit AMS, Acute encephalopathy    AAO x 2 this shift patient drowsy this shift.   Neurology consulted  8/1/17 CT of head: No acute intracranial abnormality.  8/1/17 ammonia 29  8/1/17 ct of abdomen: No acute findings to explain patient's abdominal pain.    Cardiology consulted and signed off 8/3/17  CTA of chest/abdomen: No evidence of acute aortic dissection. Findings of improved caliber in the infrarenal abdominal aorta likely related to prior intervention/angioplasty.   8/2/17 EF 55%   Highest troponin 0.024  Code STEMI altered in ED intervention held r/t resolved symptoms and admit 8/1/17 INR of 2.2  Patient currently on Coumadin   Today INR 1.8    Patient refuse to eat food provided by hospital  Patient has drink home made smoothie and soup from home today.

## 2017-08-05 NOTE — SUBJECTIVE & OBJECTIVE
Interval History: No new issues. Remains confused with tremor.     Review of Systems   Unable to perform ROS: Dementia   Constitutional: Negative for activity change.     Objective:     Vital Signs (Most Recent):  Temp: 99.6 °F (37.6 °C) (08/05/17 0000)  Pulse: 91 (08/05/17 0000)  Resp: 20 (08/05/17 0000)  BP: (!) 90/46 (08/05/17 0000)  SpO2: 96 % (08/04/17 2100) Vital Signs (24h Range):  Temp:  [98.2 °F (36.8 °C)-99.6 °F (37.6 °C)] 99.6 °F (37.6 °C)  Pulse:  [74-96] 91  Resp:  [18-20] 20  SpO2:  [92 %-100 %] 96 %  BP: ()/(46-72) 90/46     Weight: 68.5 kg (151 lb)  Body mass index is 23.65 kg/m².    Intake/Output Summary (Last 24 hours) at 08/05/17 0635  Last data filed at 08/05/17 0000   Gross per 24 hour   Intake              250 ml   Output                0 ml   Net              250 ml      Physical Exam   Constitutional: He appears well-nourished.   Cardiovascular: Normal rate.    Neurological: He is alert.   Skin: Skin is warm and dry.   Psychiatric:   Confused.  Mumbling.   Vitals reviewed.      Significant Labs:   BMP:   Recent Labs  Lab 08/04/17  0832   *      K 4.0      CO2 21*   BUN 26*   CREATININE 1.2   CALCIUM 9.5     CBC: No results for input(s): WBC, HGB, HCT, PLT in the last 48 hours.    Significant Imaging:

## 2017-08-05 NOTE — PT/OT/SLP PROGRESS
Physical Therapy      Tenzin Ortiz  MRN: [id number]    Patient not seen today secondary to unable to keep pt aroused. Will follow-up as able.    Christopher Martinez, PTA

## 2017-08-05 NOTE — PROGRESS NOTES
James per bedside. Head to toe assessment performed no complaints of pain or SOB noted bed in low position call light and phone within reach bed alarm active will continue to monitor.

## 2017-08-05 NOTE — PLAN OF CARE
Problem: Patient Care Overview  Goal: Plan of Care Review  08/04/2017    Recommendations    Recommendation/Intervention: 1.) Continue Cardiac Diet 2.) If po intake < 50%, recommend Boost Plus BID 3) RD to monitor po intake/tolerance  Goals: 1.) Patient will consume >=85% of EEN   Nutrition Goal Status: new   Communication of RD Recs:  (sticky note)    Candie Trejo, MPH, RD, LDN

## 2017-08-05 NOTE — PROGRESS NOTES
[facility]  Adult Nutrition  Progress Note    SUMMARY     Recommendations    Recommendation/Intervention: 1.) Continue Cardiac Diet 2.) If po intake < 50%, recommend Boost Plus BID 3) RD to monitor po intake/tolerance  Goals: 1.) Patient will consume >=85% of EEN   Nutrition Goal Status: new   Communication of RD Recs:  (sticky note)    Continuum of Care Plan     D/C Planning:  Patient to discharge on a Cardiac Diet.     Diagnosis  1. ST elevation myocardial infarction (STEMI), unspecified artery    2. Chest pain    3. Weakness    4. Altered mental status, unspecified altered mental status type    5. Anticoagulated on Coumadin    6. CAD (coronary artery disease)      Past Medical History:   Diagnosis Date    Anticoagulant long-term use     Hypertension     Impulse control disorder 2012    gambling on mirapex; also possible dopamine dysregulation syndrome    PD (Parkinson's disease) 1999    tremor-predominant    PVD (peripheral vascular disease) with claudication     poor circulation both legs       Reason for Assessment    Reason for Assessment: RD follow-up  General Information Comments: Diet advanced. Patient lethargic. Per nsg, patient consuming 50- 75% of meals with fair tolerance. Slurred speech.     Nutrition Prescription Ordered    Current Diet Order: Cardiac    Evaluation of Received Nutrients/Fluid Intake    Energy Calories Required: not meeting needs  Protein Required: not meeting needs  IV Fluid (mL): 1800  Fluid Required: other (see comments) (Net I/O -860)     Tolerance:  (fair tolerance per nsg)  % Intake of Estimated Energy Needs: 50 - 75 %  % Meal Intake: 75%     Nutrition Risk Screen     Nutrition Risk Screen: no indicators present    Nutrition/Diet History    Food Preferences: TAWANDA - patient lethargic, slurred speech.     Factors Affecting Nutritional Intake: impaired cognitive status/motor control      Labs/Tests/Procedures/Meds    Diagnostic Test/Procedure Review: reviewed,  "pertinent  Pertinent Labs Reviewed: reviewed     Pertinent Medications Reviewed: reviewed       Physical Findings    Overall Physical Appearance: lethargic  Skin: intact    Anthropometrics    Temp: 98.4 °F (36.9 °C)     Height: 5' 7" (170.2 cm)  Weight Method: Bed Scale  Weight: 68.5 kg (151 lb)  Ideal Body Weight (IBW), Male: 148 lb     % Ideal Body Weight, Male (lb): 102.34 lb     BMI (Calculated): 23.8  BMI Grade: 18.5-24.9 - normal     Usual Body Weight (UBW), k.5 kg     % Usual Body Weight: 92.41  % Weight Change From Usual Weight: 7.59 %             Estimated/Assessed Needs    Weight Used For Calorie Calculations: 68.7 kg (151 lb 7.3 oz)      Energy Calorie Requirements (kcal): 4843-9929 kcals/day  Energy Need Method: [place]-St Jeor (x1.2-1.3) ( 1658 -  1794)   RMR ([place]-St. Jeor Equation): 1380.63  Weight Used For Protein Calculations: 68.7 kg (151 lb 7.3 oz)  Protein Requirements:  82 - 137  Fluid Need Method: RDA Method (or per MD)          Assessment and Plan    Mild malnutrition    Related to (etiology):   Decreased ability to consume sufficient energy    Signs and Symptoms (as evidenced by):   1.) 7% weight loss in 2 months     Interventions/Recommendations (treatment strategy):  1.) Advance diet when appropriate per SLP/MD    Nutrition Diagnosis Status:   improving              Monitor and Evaluation    Food and Nutrient Intake: energy intake, food and beverage intake, enteral nutrition intake  Food and Nutrient Adminstration: diet order, enteral and parenteral nutrition administration  Anthropometric Measurements: weight, weight change, body mass index  Biochemical Data, Medical Tests and Procedures: electrolyte and renal panel, glucose/endocrine profile, lipid profile  Nutrition-Focused Physical Findings: overall appearance    Nutrition Risk    Level of Risk: other (see comments) (f/u 2x weekly)    Nutrition Follow-Up    RD Follow-up?: Yes    "

## 2017-08-05 NOTE — PLAN OF CARE
Problem: Patient Care Overview  Goal: Plan of Care Review  Outcome: Ongoing (interventions implemented as appropriate)   08/05/17 0703   Coping/Psychosocial   Plan Of Care Reviewed With patient     Patient noted to be restless this shift. Patient noted to remain free from falls and trauma this shift. Purposeful hourly rounding in progress.  Patient noted to refuse medication administration throughout this shift. Call bell within reach. Will continue to monitor.

## 2017-08-05 NOTE — NURSING
Patient spit medication and water into writer's face.  Patient noted to refuse medication administration at this time.

## 2017-08-05 NOTE — PROGRESS NOTES
[facility] Hospital Medicine  Progress Note    Patient Name: Tenzin Ortiz  MRN: [id number]  Patient Class: IP- Inpatient   Admission Date: 8/1/2017  Length of Stay: 4 days  Attending Physician: Pietro Paige MD  Primary Care Provider: Efrain Chavez MD        Subjective:     Principal Problem:Acute encephalopathy    HPI:  Mr. Tenzin Ortiz is a 77 yo man with essential hypertension, hyperlipidemia (LDL 93.2 Apr 2017), peripheral vascular disease, Parkinson disease, and history of aortic thrombus on chronic anticoagulation who presented to [facility] ED with complaints of confusion for two days.  His girlfriend notes that he appeared ill for the past two days and has been mumbling at home.  She attempted to call him today but he wasn't answering his phone calls.  When she arrived to his home he was nonverbal and was moaning.  EMS was activated and noted that he had purposeful movement.  He was administered some naloxone without improvement of his mentation.  Further history is limited at this time.    Hospital Course:  In the ER initial EKG showed no evidence of acute ischemia.  He was then noted to have what appeared to be ST elevations on his telemetry.  Repeat EKG in the ER thought to be consistent with STEMI.  Emergent code STEMI activated.  Approximately 1-2 minutes after STEMI was seen on telemetry, repeat EKG was performed and his ST segments normalized. Cardiology did not feel emergent LHC was warranted given normalization. Troponin trended and remained unremarkable. CTA chest w/o dissection. Echo w/ EF 55%, no DD, no reported wall motion abnormality.     His work up for encephalopathy was mostly unremarkable. CT head, infectious work up, electrolytes, and ammonia level normal. His encephalopathy improved spontaneously the morning following admission. He was alert and oriented to place, date, and identifies his brother who called to check on him. He did however keep asking when he's  going for his operation despite being told several times he's not getting an operation. Utox positive for amphetamines which may be a false positive (trazodone on home med list is known to cause this). He is non-compliant with his Parkinson's medication and sometimes taking more than prescribed. His girlfriend brought in his medications and nursing rec'ed in the computer. He was apparently taking Mirapex; he has a history of hallucinations 2/2 Mirapex. She also brought CBD oil, a cannabinoid oil, that he took a few days prior to presentation. I suspect this could be the reason for his bizarre symptoms. I  also suspect he could have also taken too much trazodone and or tramadol. The other likely etiology is advancement in his Parkinson's and now with some features of Lewy bodies dementia given his hallucinations.  Neurology continued to follow the patient and adjusted meds. The patient was initially sent to the ICU but transferred to the floor on 8/3.  He will likely need NH placement.         Interval History: No new issues. Remains confused with tremor.     Review of Systems   Unable to perform ROS: Dementia   Constitutional: Negative for activity change.     Objective:     Vital Signs (Most Recent):  Temp: 99.6 °F (37.6 °C) (08/05/17 0000)  Pulse: 91 (08/05/17 0000)  Resp: 20 (08/05/17 0000)  BP: (!) 90/46 (08/05/17 0000)  SpO2: 96 % (08/04/17 2100) Vital Signs (24h Range):  Temp:  [98.2 °F (36.8 °C)-99.6 °F (37.6 °C)] 99.6 °F (37.6 °C)  Pulse:  [74-96] 91  Resp:  [18-20] 20  SpO2:  [92 %-100 %] 96 %  BP: ()/(46-72) 90/46     Weight: 68.5 kg (151 lb)  Body mass index is 23.65 kg/m².    Intake/Output Summary (Last 24 hours) at 08/05/17 0635  Last data filed at 08/05/17 0000   Gross per 24 hour   Intake              250 ml   Output                0 ml   Net              250 ml      Physical Exam   Constitutional: He appears well-nourished.   Cardiovascular: Normal rate.    Neurological: He is alert.   Skin:  Skin is warm and dry.   Psychiatric:   Confused.  Mumbling.   Vitals reviewed.      Significant Labs:   BMP:   Recent Labs  Lab 08/04/17  0832   *      K 4.0      CO2 21*   BUN 26*   CREATININE 1.2   CALCIUM 9.5     CBC: No results for input(s): WBC, HGB, HCT, PLT in the last 48 hours.    Significant Imaging:     Assessment/Plan:      Chronic anticoagulation    As addressed- however, may not be the best candidate for anti-coagulation         Hyperlipidemia    Poorly-controlled; will continue patient's home regimen of atorvastatin.        Essential hypertension    Continued home regimen of amlodipine with improved control.        ECG abnormality    See hospital course. Appreciate cardiology recs.        PAD (peripheral artery disease)    Will continue his home regimen of atorvastatin and clopidogrel.        Mild malnutrition    No acute issues.         History of aortic thrombus    Stable; CTA chest with resolution of the clot; will continue his home regimen of warfarin with daily INR.        Parkinson disease    Stable; will continue his home regimen of carbidopa/levodopa.        * Acute encephalopathy    Likely medicine related given multiple Parkinson's drugs, CBD oil, trazadone, and tramadol. Also suspect he has progression of his Parkinson's with likely Lewy bodies dementia.  Patient is not safe to go home. Needs placement. Neuro consulted- made adjustments in meds. Remains confused.           VTE Risk Mitigation         Ordered     warfarin (COUMADIN) tablet 3 mg  Daily     Route:  Oral        08/03/17 1217        Continue to follow neuro recs. Will attempt to talk to family. Needs nursing home placement.          Pietro Trinh MD  Department of Hospital Medicine   [facility]

## 2017-08-06 LAB
ANION GAP SERPL CALC-SCNC: 11 MMOL/L
BUN SERPL-MCNC: 37 MG/DL
CALCIUM SERPL-MCNC: 9.7 MG/DL
CHLORIDE SERPL-SCNC: 110 MMOL/L
CO2 SERPL-SCNC: 20 MMOL/L
CREAT SERPL-MCNC: 1.3 MG/DL
EST. GFR  (AFRICAN AMERICAN): >60 ML/MIN/1.73 M^2
EST. GFR  (NON AFRICAN AMERICAN): 53 ML/MIN/1.73 M^2
GLUCOSE SERPL-MCNC: 100 MG/DL
INR PPP: 1.8
POTASSIUM SERPL-SCNC: 4.3 MMOL/L
PROTHROMBIN TIME: 18.7 SEC
SODIUM SERPL-SCNC: 141 MMOL/L

## 2017-08-06 PROCEDURE — 25000003 PHARM REV CODE 250: Performed by: PSYCHIATRY & NEUROLOGY

## 2017-08-06 PROCEDURE — 97110 THERAPEUTIC EXERCISES: CPT

## 2017-08-06 PROCEDURE — 85610 PROTHROMBIN TIME: CPT

## 2017-08-06 PROCEDURE — 36415 COLL VENOUS BLD VENIPUNCTURE: CPT

## 2017-08-06 PROCEDURE — 80048 BASIC METABOLIC PNL TOTAL CA: CPT

## 2017-08-06 PROCEDURE — 25000003 PHARM REV CODE 250: Performed by: INTERNAL MEDICINE

## 2017-08-06 PROCEDURE — 21400001 HC TELEMETRY ROOM

## 2017-08-06 PROCEDURE — 99232 SBSQ HOSP IP/OBS MODERATE 35: CPT | Mod: ,,, | Performed by: PSYCHIATRY & NEUROLOGY

## 2017-08-06 PROCEDURE — 63600175 PHARM REV CODE 636 W HCPCS: Performed by: INTERNAL MEDICINE

## 2017-08-06 PROCEDURE — 97116 GAIT TRAINING THERAPY: CPT

## 2017-08-06 RX ORDER — DOCUSATE SODIUM 100 MG/1
100 CAPSULE, LIQUID FILLED ORAL 2 TIMES DAILY
Status: DISCONTINUED | OUTPATIENT
Start: 2017-08-06 | End: 2017-08-10 | Stop reason: HOSPADM

## 2017-08-06 RX ADMIN — CARBIDOPA AND LEVODOPA 1 TABLET: 25; 100 TABLET ORAL at 06:08

## 2017-08-06 RX ADMIN — FAMOTIDINE 20 MG: 20 TABLET, FILM COATED ORAL at 10:08

## 2017-08-06 RX ADMIN — AMLODIPINE BESYLATE 10 MG: 5 TABLET ORAL at 10:08

## 2017-08-06 RX ADMIN — ATORVASTATIN CALCIUM 10 MG: 10 TABLET, FILM COATED ORAL at 10:08

## 2017-08-06 RX ADMIN — CARBIDOPA AND LEVODOPA 1 TABLET: 25; 100 TABLET ORAL at 12:08

## 2017-08-06 RX ADMIN — CARBIDOPA AND LEVODOPA 1 TABLET: 25; 100 TABLET ORAL at 01:08

## 2017-08-06 RX ADMIN — WARFARIN SODIUM 3 MG: 3 TABLET ORAL at 06:08

## 2017-08-06 RX ADMIN — DOCUSATE SODIUM 100 MG: 100 CAPSULE, LIQUID FILLED ORAL at 08:08

## 2017-08-06 RX ADMIN — DOCUSATE SODIUM 100 MG: 100 CAPSULE, LIQUID FILLED ORAL at 01:08

## 2017-08-06 RX ADMIN — RAMELTEON 8 MG: 8 TABLET, FILM COATED ORAL at 08:08

## 2017-08-06 RX ADMIN — CYCLOBENZAPRINE HYDROCHLORIDE 10 MG: 10 TABLET, FILM COATED ORAL at 08:08

## 2017-08-06 RX ADMIN — GABAPENTIN 600 MG: 300 CAPSULE ORAL at 08:08

## 2017-08-06 RX ADMIN — RIVASTIGMINE TRANSDERMAL SYSTEM 1 PATCH: 4.6 PATCH, EXTENDED RELEASE TRANSDERMAL at 10:08

## 2017-08-06 RX ADMIN — CARBIDOPA AND LEVODOPA 1 TABLET: 25; 100 TABLET ORAL at 04:08

## 2017-08-06 RX ADMIN — FAMOTIDINE 20 MG: 20 TABLET, FILM COATED ORAL at 08:08

## 2017-08-06 RX ADMIN — HYDRALAZINE HYDROCHLORIDE 10 MG: 20 INJECTION INTRAMUSCULAR; INTRAVENOUS at 12:08

## 2017-08-06 RX ADMIN — CARBIDOPA AND LEVODOPA 1 TABLET: 25; 100 TABLET ORAL at 11:08

## 2017-08-06 NOTE — PROGRESS NOTES
James per bedside, Head to toe assessment performed no complaints of pain or SOB noted bed in low position call light and phone within reach bed alarm active will continue to monitor.

## 2017-08-06 NOTE — PT/OT/SLP PROGRESS
Physical Therapy  Treatment    Tenzin Ortiz   MRN: [id number]   Admitting Diagnosis: Acute encephalopathy    PT Received On: 08/06/17  PT Start Time: 1135     PT Stop Time: 1200    PT Total Time (min): 25 min       Billable Minutes:  Gait [id number] and Therapeutic Exercise 10    Treatment Type: Treatment  PT/PTA: PT     PTA Visit Number: 0       General Precautions: Standard, fall, vision impaired  Orthopedic Precautions: N/A   Braces: N/A         Subjective:  Communicated with nsg prior to session.  Pt agreeable to treatment    Pain/Comfort  Pain Rating 1: 0/10    Objective:   Patient found with: telemetry, bed alarm    Functional Mobility:  Bed Mobility:   Scooting/Bridging: Minimum Assistance  Supine to Sit: Maximum Assistance  Sit to Supine: Maximum Assistance    Transfers:  Sit <> Stand Assistance: Moderate Assistance (VC for hand placemnet and safety)  Sit <> Stand Assistive Device: Rolling Walker    Gait:   Gait Distance: 10' with max  VC/TC for sequencing, max A for weight shifting, VC and Min A for walker management/safety, VC/TC for increased step length  Assistance 1: Maximum assistance  Gait Assistive Device: Rolling walker  Gait Pattern: 3-point gait  Gait Deviation(s): decreased nneka, increased time in double stance, decreased step length, decreased stride length, decreased toe-to-floor clearance, decreased weight-shifting ability (perseveration)    Stairs:  N/T    Balance:   Static Sit: FAIR: Maintains without assist, but unable to take any challenges   Dynamic Sit: FAIR: Cannot move trunk without losing balance  Static Stand: POOR+: Needs MINIMAL assist to maintain  Dynamic stand: POOR: N/A     Therapeutic Activities and Exercises:  Pt sat at EOB and performed B AP's, Hip ABd/ADd, marching, and FAQ's x 10 reps.  Pt required Max VC and demonstration to perform correctly.  Pt received B HCS/HSS [id number]     AM-PAC 6 CLICK MOBILITY  How much help from another person does this patient currently need?    1 = Unable, Total/Dependent Assistance  2 = A lot, Maximum/Moderate Assistance  3 = A little, Minimum/Contact Guard/Supervision  4 = None, Modified [place]/Independent    Turning over in bed (including adjusting bedclothes, sheets and blankets)?: 2  Sitting down on and standing up from a chair with arms (e.g., wheelchair, bedside commode, etc.): 2  Moving from lying on back to sitting on the side of the bed?: 2  Moving to and from a bed to a chair (including a wheelchair)?: 2  Need to walk in hospital room?: 2  Climbing 3-5 steps with a railing?: 1  Total Score: 11    AM-PAC Raw Score CMS G-Code Modifier Level of Impairment Assistance   6 % Total / Unable   7 - 9 CM 80 - 100% Maximal Assist   10 - 14 CL 60 - 80% Moderate Assist   15 - 19 CK 40 - 60% Moderate Assist   20 - 22 CJ 20 - 40% Minimal Assist   23 CI 1-20% SBA / CGA   24 CH 0% Independent/ Mod I     Patient left HOB elevated with all lines intact, call button in reach, bed alarm on and nsg notified.    Assessment:  Tenzin Ortiz is a 76 y.o. male with a medical diagnosis of Acute encephalopathy and presents with decreased functional independence 2/2 weakness, deconditioning, and parkinson's.  Pt limited today by lethargy and parkinson's symptoms. .    Rehab identified problem list/impairments: Rehab identified problem list/impairments: weakness, impaired endurance, impaired self care skills, gait instability, impaired balance, impaired functional mobilty, impaired cognition, decreased coordination, decreased upper extremity function, decreased lower extremity function, decreased safety awareness, impaired coordination, impaired fine motor    Rehab potential is fair.    Activity tolerance: Fair    Discharge recommendations: Discharge Facility/Level Of Care Needs: nursing facility, skilled     Barriers to discharge: Barriers to Discharge: Decreased caregiver support    Equipment recommendations: Equipment Needed After Discharge: none     GOALS:     Physical Therapy Goals        Problem: Physical Therapy Goal    Goal Priority Disciplines Outcome Goal Variances Interventions   Physical Therapy Goal     PT/OT, PT Ongoing (interventions implemented as appropriate)     Description:  Goals to be met by: 17     Patient will increase functional independence with mobility by performin. Supine to sit with Supervision  2. Rolling to Left and Right with Supervision  3. Sit to stand transfer with Supervision using RW  4. Bed to chair transfer with Supervision using Rolling Walker  5. Gait 500 feet with Supervision using Rolling Walker   6. Lower extremity exercise program x20 reps per handout, with supervision                      PLAN:    Patient to be seen 5 x/week  to address the above listed problems via gait training, therapeutic activities, therapeutic exercises  Plan of Care expires: 17  Plan of Care reviewed with: patient         Tari Angela, PT  2017

## 2017-08-06 NOTE — SUBJECTIVE & OBJECTIVE
Interval History: More alert this am and somewhat appropriate. Best I have seen him.     Review of Systems   Unable to perform ROS: Dementia     Objective:     Vital Signs (Most Recent):  Temp: 98.5 °F (36.9 °C) (08/06/17 0753)  Pulse: 85 (08/06/17 0753)  Resp: 18 (08/06/17 0753)  BP: (!) 160/71 (08/06/17 0753)  SpO2: 95 % (08/06/17 0753) Vital Signs (24h Range):  Temp:  [97.4 °F (36.3 °C)-98.5 °F (36.9 °C)] 98.5 °F (36.9 °C)  Pulse:  [82-94] 85  Resp:  [18] 18  SpO2:  [93 %-95 %] 95 %  BP: (128-166)/() 160/71     Weight: 68.4 kg (150 lb 14.4 oz)  Body mass index is 23.63 kg/m².    Intake/Output Summary (Last 24 hours) at 08/06/17 0959  Last data filed at 08/06/17 0839   Gross per 24 hour   Intake              680 ml   Output                0 ml   Net              680 ml      Physical Exam   Constitutional: He appears well-nourished.   HENT:   Head: Normocephalic.   Neurological: He is alert.   Psychiatric: He has a normal mood and affect.   Vitals reviewed.      Significant Labs:   BMP:   Recent Labs  Lab 08/06/17  0735         K 4.3      CO2 20*   BUN 37*   CREATININE 1.3   CALCIUM 9.7     CBC: No results for input(s): WBC, HGB, HCT, PLT in the last 48 hours.    Significant Imaging:

## 2017-08-06 NOTE — PROGRESS NOTES
Dr. Paige informed patient did not have a BM since admit 8/1/17. Dr. aPige order colace 100mg BID first dose now.

## 2017-08-06 NOTE — PROGRESS NOTES
[facility] Hospital Medicine  Progress Note    Patient Name: Tenzin Ortiz  MRN: [id number]  Patient Class: IP- Inpatient   Admission Date: 8/1/2017  Length of Stay: 5 days  Attending Physician: Pietro Paige MD  Primary Care Provider: Efrain Chavez MD        Subjective:     Principal Problem:Acute encephalopathy    HPI:  Mr. 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  His girlfriend notes that he appeared ill for the past two days and has been mumbling at home.  She attempted to call him today but he wasn't answering his phone calls.  When she arrived to his home he was nonverbal and was moaning.  EMS was activated and noted that he had purposeful movement.  He was administered some naloxone without improvement of his mentation.  Further history is limited at this time.    Hospital Course:  In the ER initial EKG showed no evidence of acute ischemia.  He was then noted to have what appeared to be ST elevations on his telemetry.  Repeat EKG in the ER thought to be consistent with STEMI.  Emergent code STEMI activated.  Approximately 1-2 minutes after STEMI was seen on telemetry, repeat EKG was performed and his ST segments normalized. Cardiology did not feel emergent LHC was warranted given normalization. Troponin trended and remained unremarkable. CTA chest w/o dissection. Echo w/ EF 55%, no DD, no reported wall motion abnormality.     His work up for encephalopathy was mostly unremarkable. CT head, infectious work up, electrolytes, and ammonia level normal. His encephalopathy improved spontaneously the morning following admission. He was alert and oriented to place, date, and identifies his brother who called to check on him. He did however keep asking when he's  going for his operation despite being told several times he's not getting an operation. Utox positive for amphetamines which may be a false positive (trazodone on home med list is known to cause this). He is non-compliant with his Parkinson's medication and sometimes taking more than prescribed. His girlfriend brought in his medications and nursing rec'ed in the computer. He was apparently taking Mirapex; he has a history of hallucinations 2/2 Mirapex. She also brought CBD oil, a cannabinoid oil, that he took a few days prior to presentation. I suspect this could be the reason for his bizarre symptoms. I  also suspect he could have also taken too much trazodone and or tramadol. The other likely etiology is advancement in his Parkinson's and now with some features of Lewy bodies dementia given his hallucinations.  Neurology continued to follow the patient and adjusted meds. The patient was initially sent to the ICU but transferred to the floor on 8/3.  He will likely need NH placement.         Interval History: More alert this am and somewhat appropriate. Best I have seen him.     Review of Systems   Unable to perform ROS: Dementia     Objective:     Vital Signs (Most Recent):  Temp: 98.5 °F (36.9 °C) (08/06/17 0753)  Pulse: 85 (08/06/17 0753)  Resp: 18 (08/06/17 0753)  BP: (!) 160/71 (08/06/17 0753)  SpO2: 95 % (08/06/17 0753) Vital Signs (24h Range):  Temp:  [97.4 °F (36.3 °C)-98.5 °F (36.9 °C)] 98.5 °F (36.9 °C)  Pulse:  [82-94] 85  Resp:  [18] 18  SpO2:  [93 %-95 %] 95 %  BP: (128-166)/() 160/71     Weight: 68.4 kg (150 lb 14.4 oz)  Body mass index is 23.63 kg/m².    Intake/Output Summary (Last 24 hours) at 08/06/17 0959  Last data filed at 08/06/17 0839   Gross per 24 hour   Intake              680 ml   Output                0 ml   Net              680 ml      Physical Exam   Constitutional: He appears well-nourished.   HENT:   Head: Normocephalic.   Neurological: He is alert.   Psychiatric: He has a  normal mood and affect.   Vitals reviewed.      Significant Labs:   BMP:   Recent Labs  Lab 08/06/17  0735         K 4.3      CO2 20*   BUN 37*   CREATININE 1.3   CALCIUM 9.7     CBC: No results for input(s): WBC, HGB, HCT, PLT in the last 48 hours.    Significant Imaging:    Assessment/Plan:      Chronic anticoagulation    As addressed- however, may not be the best candidate for anti-coagulation         Hyperlipidemia    Poorly-controlled; will continue patient's home regimen of atorvastatin.        Essential hypertension    Continued home regimen of amlodipine with improved control.        ECG abnormality    See hospital course. Appreciate cardiology recs.        PAD (peripheral artery disease)    Will continue his home regimen of atorvastatin and clopidogrel.        Mild malnutrition    No acute issues.         History of aortic thrombus    Stable; CTA chest with resolution of the clot; will continue his home regimen of warfarin with daily INR.        Parkinson disease    Stable; will continue his home regimen of carbidopa/levodopa.        * Acute encephalopathy    Likely medicine related given multiple Parkinson's drugs, CBD oil, trazadone, and tramadol. Also suspect he has progression of his Parkinson's with likely Lewy bodies dementia.  Patient is not safe to go home. Needs placement. Neuro consulted- made adjustments in meds. Remains confused- but improved.  Will discuss with  in am.           VTE Risk Mitigation         Ordered     warfarin (COUMADIN) tablet 3 mg  Daily     Route:  Oral        08/03/17 1217        will discuss with  in am. Will need NH placement.  Will consult PT/OT for any needs.         Pietro Trinh MD  Department of Hospital Medicine   [facility]

## 2017-08-06 NOTE — PROGRESS NOTES
"[facility]  Neurology  Progress Note    Patient Name: Tenzin Ortiz  MRN: [id number]  Admission Date: 8/1/2017  Hospital Length of Stay: 5 days  Code Status: Full Code   Attending Provider: Pietro Paige MD  Primary Care Physician: Efrain Chavez MD   Principal Problem:Acute encephalopathy    Subjective:     Interval History: 75 y/o male with medical Hx as listed below brought to ED for AMS. Pt tells me he doesn't know what happened but per notes his significant other has been calling him and he wouldn't answer. When she arrived pt was moaning, not responding to her. Overnight pt remained non-verbal; not following commands.      -8/3/17: Mr. Ortiz with no acute issues overnight. Family reports that at times pt confused and even hallucinates.     -8/4/17: Pt been confused since yesterday evening. Hallucinating. Not cooperating. Says: "get outta here".     -8/5/17: Pt refusing medications. Confused.    -8/6/17: Today pt is calm. Able to take oral medications.    Current Neurological Medications:     Current Facility-Administered Medications   Medication Dose Route Frequency Provider Last Rate Last Dose    acetaminophen tablet 650 mg  650 mg Oral Q4H PRN Cami Crow MD   650 mg at 08/04/17 0901    amlodipine tablet 10 mg  10 mg Oral Daily Cami Crow MD   10 mg at 08/06/17 1034    atorvastatin tablet 10 mg  10 mg Oral Daily Cami Crow MD   10 mg at 08/06/17 1034    carbidopa-levodopa  mg per tablet 1 tablet  1 tablet Oral QID Cami Crow MD   1 tablet at 08/06/17 0449    cyclobenzaprine tablet 10 mg  10 mg Oral TID PRN Cami Crow MD   10 mg at 08/05/17 2029    docusate sodium capsule 100 mg  100 mg Oral BID Pietro Paige MD        famotidine tablet 20 mg  20 mg Oral BID Cami Crow MD   20 mg at 08/06/17 1034    gabapentin capsule 600 mg  600 mg Oral QHS Cami Crow MD   600 mg at 08/05/17 2029    pneumoc 13-bryon " conj-dip cr(PF) 0.5 mL  0.5 mL Intramuscular vaccine x 1 dose Pietro Paige MD        ramelteon tablet 8 mg  8 mg Oral QHS Johny Aldrich MD   8 mg at 08/05/17 2028    rivastigmine 4.6 mg/24 hr 1 patch  1 patch Transdermal Daily Johny Aldrich MD   1 patch at 08/06/17 1034    warfarin (COUMADIN) tablet 3 mg  3 mg Oral Daily Cami Crow MD   3 mg at 08/05/17 1841       Review of Systems   HENT: Negative for trouble swallowing.    Eyes: Negative for photophobia.   Respiratory: Negative for shortness of breath.    Cardiovascular: Negative for chest pain.   Gastrointestinal: Negative for abdominal pain.   Neurological: Negative for headaches.          Objective:     Vital Signs (Most Recent):  Temp: 97.8 °F (36.6 °C) (08/06/17 1136)  Pulse: 79 (08/06/17 1136)  Resp: 18 (08/06/17 1136)  BP: (!) 156/70 (08/06/17 1136)  SpO2: (!) 94 % (08/06/17 1136) Vital Signs (24h Range):  Temp:  [97.4 °F (36.3 °C)-98.5 °F (36.9 °C)] 97.8 °F (36.6 °C)  Pulse:  [79-94] 79  Resp:  [18] 18  SpO2:  [93 %-95 %] 94 %  BP: (128-166)/() 156/70     Weight: 68.4 kg (150 lb 14.4 oz)  Body mass index is 23.63 kg/m².    Physical Exam  Constitutional: well-developed; no distress  Head: normocephalic  Eyes: conjunctivae/corneas clear.  Nose: no discharge, no epistaxis  Heart: regular rate and rhythm.  Abdomen: no pain to palpation  Extremities: no edema  Neurologic: Mental status: alert  Follow commands; oriented to person  Cranial nerves: pupils are round at 3 mm and reactive to light; no nystagmus; no facial weakness; tongue protrudes midline SCM/trap 5/5  Strength: elevates UE/LE's against gravity  Resting tremor          Significant Labs:   CBC: No results for input(s): WBC, HGB, HCT, PLT in the last 48 hours.  CMP:   Recent Labs  Lab 08/05/17  0643 08/06/17  0735   GLU 96 100    141   K 4.1 4.3    110   CO2 18* 20*   BUN 31* 37*   CREATININE 1.2 1.3   CALCIUM 9.5 9.7   ANIONGAP 13 11   EGFRNONAA 59* 53*          Assessment and Plan:     77 y/o male consulted for AMS     1. AMS: Pt is presenting fluctuating mental status with hallucinations. This is concerning for dementia with Lewy Bodies. He could also be experiencing hospital delirium. He is refusing medications for which his tremor is going to worsen.                     -Pt also reported taking only Azilect but not the rest of his medications. In the hospital  Sinemet 25/100 mg four times daily was resumed. Dopamine agonists such as pramipexole can cause symptoms and signs that can mimic NMS when abruptly stopped (although pt states that he has not been taking it). Note also that dopaminergic agents in excess may cause dyskinesias, hallucinations, compulsions.                     -Rivastigmine 4.6 mg transdermal daily.                      -Ramelteon 8 mg nightly.                     -If needed use olanzapine 5 mg PRN or quetiapine 25 mg twice daily as needed. Would avoid benzodiazepines as they may worsen delirium but if needed consider diazepam 5-10 mg PRN.                     -At this point pt is unable to take care of himself and his significant other and family are not available during the day to supervise him. Leaning towards ursing home placement as discussed with daughter and significant other                     -Follow up with neurology upon D/C home. Also, make appointment with movement disorder specialist.    Active Diagnoses:    Diagnosis Date Noted POA    PRINCIPAL PROBLEM:  Acute encephalopathy [G93.40] 08/01/2017 Yes    Chronic anticoagulation [Z79.01] 08/02/2017 Not Applicable     Chronic    ECG abnormality [R94.31] 08/01/2017 Yes    Essential hypertension [I10] 08/01/2017 Yes     Chronic    Hyperlipidemia [E78.5] 08/01/2017 Yes     Chronic    PAD (peripheral artery disease) [I73.9] 08/22/2016 Yes     Chronic    Mild malnutrition [E44.1] 04/02/2016 Yes     Chronic    History of aortic thrombus [I74.10] 11/28/2015 Yes     Chronic     Parkinson disease [G20]  Yes     Chronic      Problems Resolved During this Admission:    Diagnosis Date Noted Date Resolved POA       VTE Risk Mitigation         Ordered     warfarin (COUMADIN) tablet 3 mg  Daily     Route:  Oral        08/03/17 1217          Johny Aldrich MD  Neurology  [facility]

## 2017-08-06 NOTE — PT/OT/SLP PROGRESS
Physical Therapy  Treatment    Tenzin Ortiz   MRN: [id number]   Admitting Diagnosis: Acute encephalopathy    PT Received On: 08/06/17  PT Start Time: 1135     PT Stop Time: 1200    PT Total Time (min): 25 min       Billable Minutes:  Gait [id number] and Therapeutic Exercise 10    Treatment Type: Treatment  PT/PTA: PT     PTA Visit Number: 0       General Precautions: Standard, fall, vision impaired  Orthopedic Precautions: N/A   Braces: N/A         Subjective:  Communicated with nsg prior to session.  Pt agreeable to treatment    Pain/Comfort  Pain Rating 1: 0/10    Objective:   Patient found with: telemetry, bed alarm    Functional Mobility:  Bed Mobility:   Scooting/Bridging: Minimum Assistance  Supine to Sit: Maximum Assistance  Sit to Supine: Maximum Assistance    Transfers:  Sit <> Stand Assistance: Moderate Assistance (VC for hand placemnet and safety)  Sit <> Stand Assistive Device: Rolling Walker    Gait:   Gait Distance: 10' with max  VC/TC for sequencing, max A for weight shifting, VC and Min A for walker management/safety, VC/TC for increased step length  Assistance 1: Maximum assistance  Gait Assistive Device: Rolling walker  Gait Pattern: 3-point gait  Gait Deviation(s): decreased nneka, increased time in double stance, decreased step length, decreased stride length, decreased toe-to-floor clearance, decreased weight-shifting ability (perseveration)    Stairs:  N/T    Balance:   Static Sit: FAIR: Maintains without assist, but unable to take any challenges   Dynamic Sit: FAIR: Cannot move trunk without losing balance  Static Stand: POOR+: Needs MINIMAL assist to maintain  Dynamic stand: POOR: N/A     Therapeutic Activities and Exercises:  Pt sat at EOB and performed B AP's, Hip ABd/ADd, marching, and FAQ's x 10 reps.  Pt required Max VC and demonstration to perform correctly.  Pt received B HCS/HSS [id number]     AM-PAC 6 CLICK MOBILITY  How much help from another person does this patient currently need?    1 = Unable, Total/Dependent Assistance  2 = A lot, Maximum/Moderate Assistance  3 = A little, Minimum/Contact Guard/Supervision  4 = None, Modified [place]/Independent    Turning over in bed (including adjusting bedclothes, sheets and blankets)?: 2  Sitting down on and standing up from a chair with arms (e.g., wheelchair, bedside commode, etc.): 2  Moving from lying on back to sitting on the side of the bed?: 2  Moving to and from a bed to a chair (including a wheelchair)?: 2  Need to walk in hospital room?: 2  Climbing 3-5 steps with a railing?: 1  Total Score: 11    AM-PAC Raw Score CMS G-Code Modifier Level of Impairment Assistance   6 % Total / Unable   7 - 9 CM 80 - 100% Maximal Assist   10 - 14 CL 60 - 80% Moderate Assist   15 - 19 CK 40 - 60% Moderate Assist   20 - 22 CJ 20 - 40% Minimal Assist   23 CI 1-20% SBA / CGA   24 CH 0% Independent/ Mod I     Patient left HOB elevated with all lines intact, call button in reach, bed alarm on and nsg notified.    Assessment:  Tenzin Ortiz is a 76 y.o. male with a medical diagnosis of Acute encephalopathy and presents with decreased functional independence 2/2 weakness, deconditioning, and parkinson's.  Pt limited today by lethargy and parkinson's symptoms. .    Rehab identified problem list/impairments: Rehab identified problem list/impairments: weakness, impaired endurance, impaired self care skills, gait instability, impaired balance, impaired functional mobilty, impaired cognition, decreased coordination, decreased upper extremity function, decreased lower extremity function, decreased safety awareness, impaired coordination, impaired fine motor    Rehab potential is fair.    Activity tolerance: Fair    Discharge recommendations: Discharge Facility/Level Of Care Needs: nursing facility, skilled     Barriers to discharge: Barriers to Discharge: Decreased caregiver support    Equipment recommendations: Equipment Needed After Discharge: none     GOALS:     Physical Therapy Goals        Problem: Physical Therapy Goal    Goal Priority Disciplines Outcome Goal Variances Interventions   Physical Therapy Goal     PT/OT, PT Ongoing (interventions implemented as appropriate)     Description:  Goals to be met by: 17     Patient will increase functional independence with mobility by performin. Supine to sit with Supervision  2. Rolling to Left and Right with Supervision  3. Sit to stand transfer with Supervision using RW  4. Bed to chair transfer with Supervision using Rolling Walker  5. Gait 500 feet with Supervision using Rolling Walker   6. Lower extremity exercise program x20 reps per handout, with supervision                      PLAN:    Patient to be seen 5 x/week  to address the above listed problems via gait training, therapeutic activities, therapeutic exercises  Plan of Care expires: 17  Plan of Care reviewed with: patient         Tari Angela, PT  2017

## 2017-08-06 NOTE — ASSESSMENT & PLAN NOTE
Likely medicine related given multiple Parkinson's drugs, CBD oil, trazadone, and tramadol. Also suspect he has progression of his Parkinson's with likely Lewy bodies dementia.  Patient is not safe to go home. Needs placement. Neuro consulted- made adjustments in meds. Remains confused- but improved.  Will discuss with  in am.

## 2017-08-06 NOTE — PLAN OF CARE
Problem: Physical Therapy Goal  Goal: Physical Therapy Goal  Goals to be met by: 17     Patient will increase functional independence with mobility by performin. Supine to sit with Supervision  2. Rolling to Left and Right with Supervision  3. Sit to stand transfer with Supervision using RW  4. Bed to chair transfer with Supervision using Rolling Walker  5. Gait 500 feet with Supervision using Rolling Walker   6. Lower extremity exercise program x20 reps per handout, with supervision     Outcome: Ongoing (interventions implemented as appropriate)  Pt limited by lethargy and parkinson's symptoms.  SNF recommended

## 2017-08-07 LAB
INR PPP: 1.8
PROTHROMBIN TIME: 18.7 SEC

## 2017-08-07 PROCEDURE — 63600175 PHARM REV CODE 636 W HCPCS: Performed by: INTERNAL MEDICINE

## 2017-08-07 PROCEDURE — 97116 GAIT TRAINING THERAPY: CPT

## 2017-08-07 PROCEDURE — 99232 SBSQ HOSP IP/OBS MODERATE 35: CPT | Mod: ,,, | Performed by: PSYCHIATRY & NEUROLOGY

## 2017-08-07 PROCEDURE — 25000003 PHARM REV CODE 250: Performed by: INTERNAL MEDICINE

## 2017-08-07 PROCEDURE — 36415 COLL VENOUS BLD VENIPUNCTURE: CPT

## 2017-08-07 PROCEDURE — 85610 PROTHROMBIN TIME: CPT

## 2017-08-07 PROCEDURE — 97535 SELF CARE MNGMENT TRAINING: CPT

## 2017-08-07 PROCEDURE — 25000003 PHARM REV CODE 250: Performed by: PSYCHIATRY & NEUROLOGY

## 2017-08-07 PROCEDURE — 86580 TB INTRADERMAL TEST: CPT | Performed by: INTERNAL MEDICINE

## 2017-08-07 PROCEDURE — 21400001 HC TELEMETRY ROOM

## 2017-08-07 RX ORDER — ENOXAPARIN SODIUM 100 MG/ML
40 INJECTION SUBCUTANEOUS EVERY 24 HOURS
Status: DISCONTINUED | OUTPATIENT
Start: 2017-08-07 | End: 2017-08-07

## 2017-08-07 RX ADMIN — CYCLOBENZAPRINE HYDROCHLORIDE 10 MG: 10 TABLET, FILM COATED ORAL at 11:08

## 2017-08-07 RX ADMIN — CARBIDOPA AND LEVODOPA 1 TABLET: 25; 100 TABLET ORAL at 01:08

## 2017-08-07 RX ADMIN — RAMELTEON 8 MG: 8 TABLET, FILM COATED ORAL at 09:08

## 2017-08-07 RX ADMIN — ATORVASTATIN CALCIUM 10 MG: 10 TABLET, FILM COATED ORAL at 09:08

## 2017-08-07 RX ADMIN — DOCUSATE SODIUM 100 MG: 100 CAPSULE, LIQUID FILLED ORAL at 09:08

## 2017-08-07 RX ADMIN — WARFARIN SODIUM 3 MG: 3 TABLET ORAL at 04:08

## 2017-08-07 RX ADMIN — CARBIDOPA AND LEVODOPA 1 TABLET: 25; 100 TABLET ORAL at 11:08

## 2017-08-07 RX ADMIN — AMLODIPINE BESYLATE 10 MG: 5 TABLET ORAL at 09:08

## 2017-08-07 RX ADMIN — GABAPENTIN 600 MG: 300 CAPSULE ORAL at 09:08

## 2017-08-07 RX ADMIN — CARBIDOPA AND LEVODOPA 1 TABLET: 25; 100 TABLET ORAL at 05:08

## 2017-08-07 RX ADMIN — FAMOTIDINE 20 MG: 20 TABLET, FILM COATED ORAL at 09:08

## 2017-08-07 RX ADMIN — Medication 5 UNITS: at 04:08

## 2017-08-07 NOTE — PROGRESS NOTES
[facility] [place]  Adult Nutrition  Progress Note    SUMMARY     Recommendations    Recommendation/Intervention: 1) Provide total feeding assistance during meal time 2) If po intake <=50%, recommend Boost Plus Chocolate BID 3) RD to monitor po intake/tolerance  Goals: 1) Patient to meet >=85% of EEN  Nutrition Goal Status: new  Communication of RD Recs: reviewed with RN     Reason for Assessment    Reason for Assessment: RD follow-up  General Information Comments: Patient more awake/alert today. Patient states he is eating 75% of Breakfast and 50% of Lunch, tolerating well. Patient has Parkinsons, has trouble feeding himself. Stated wife fed him breakfast. Spoke with RN concerning his self-feeding difficulties secondary to Parkinson's disease. Stated she will contact patient's tech to provide total feeding assistance.     Nutrition Discharge Planning: D/C Planning:  Patient to discharge on a Cardiac Diet with oral supplements, feeding assistance as needed.     Nutrition Prescription Ordered    Current Diet Order: Cardiac    Evaluation of Received Nutrients/Fluid Intake    Energy Calories Required: not meeting needs  Protein Required: not meeting needs  IV Fluid (mL): 1800  Fluid Required: other (see comments) (Net I/O +360)     Tolerance: tolerating  % Intake of Estimated Energy Needs: 50 - 75 %  % Meal Intake: 75%     Nutrition Risk Screen     Nutrition Risk Screen: no indicators present    Nutrition/Diet History    Patient Reported Diet/Restrictions/Preferences: general     Food Preferences: No cultural, Druze or ethnic food preferences.     Factors Affecting Nutritional Intake: impaired cognitive status/motor control, inability to feed self    Labs/Tests/Procedures/Meds    Pertinent Labs Reviewed: reviewed  Pertinent Medications Reviewed: reviewed       Physical Findings    Overall Physical Appearance: other (see comments) (shaking hands)  Tubes:  (-)  Oral/Mouth Cavity: WDL  Skin: intact (Mike  "Score 15)    Anthropometrics    Temp: 97.6 °F (36.4 °C)     Height: 5' 7" (170.2 cm)  Weight Method: Bed Scale  Weight: 66.1 kg (145 lb 11.6 oz)  Ideal Body Weight (IBW), Male: 148 lb     % Ideal Body Weight, Male (lb): 98.47 lb     BMI (Calculated): 22.9  BMI Grade: 18.5-24.9 - normal     Usual Body Weight (UBW), k.5 kg     % Usual Body Weight: 92.41  % Weight Change From Usual Weight: 7.59 %      Estimated/Assessed Needs    Weight Used For Calorie Calculations: 68.7 kg (151 lb 7.3 oz)      Energy Calorie Requirements (kcal): 6039-0560 kcals/day  Energy Need Method:  (1658 - 1794)  RMR (Haines-St. Jeor Equation): 1380.63  Weight Used For Protein Calculations: 68.7 kg (151 lb 7.3 oz)  Protein Requirements: 82 - 96 g  Fluid Need Method: RDA Method (or per MD)     RDA Method (mL): 1656    Assessment and Plan    Mild malnutrition    Related to (etiology):   Decreased ability to consume sufficient energy    Signs and Symptoms (as evidenced by):   1.) 7% weight loss in 2 months     Interventions/Recommendations (treatment strategy):  1.) Advance diet when appropriate per SLP/MD    Nutrition Diagnosis Status:   Improving (tech to provide total feeding assistance, family feeding patient)              Monitor and Evaluation    Food and Nutrient Intake: energy intake, food and beverage intake  Food and Nutrient Adminstration: diet order, other (specify) (SUPPLEMENT ORDER)  Knowledge/Beliefs/Attitudes: food and nutrition knowledge/skill, beliefs and attitudes  Anthropometric Measurements: weight, weight change, body mass index  Biochemical Data, Medical Tests and Procedures: electrolyte and renal panel, glucose/endocrine profile, lipid profile  Nutrition-Focused Physical Findings: overall appearance    Nutrition Risk    Level of Risk: other (see comments) (f/u 2x weekly)    Nutrition Follow-Up    RD Follow-up?: Yes    "

## 2017-08-07 NOTE — PLAN OF CARE
Problem: Patient Care Overview  Goal: Plan of Care Review  Outcome: Ongoing (interventions implemented as appropriate)  Avasys per bedside, No falls this shift bed kept in low position call light and phone remain within reach bed alarm remain active. Patient able to reposition self in bed without assist and ambulated in room with walker and PT today 8/6/17.  No evidence of skin breakdown noted.       Admit AMS, Acute encephalopathy    Plan to d/c to NH     AAO x 4 this shift patient drowsy but easy to arousal.   Neurology consulted  8/1/17 CT of head: No acute intracranial abnormality.  8/1/17 ammonia 29  8/1/17 ct of abdomen: No acute findings to explain patient's abdominal pain.     Cardiology consulted and signed off 8/3/17  CTA of chest/abdomen: No evidence of acute aortic dissection. Findings of improved caliber in the infrarenal abdominal aorta likely related to prior intervention/angioplasty.   8/2/17 EF 55%   Highest troponin 0.024  Code STEMI altered in ED intervention held r/t resolved symptoms and admit 8/1/17 INR of 2.2  Patient currently on Coumadin   8/6/17 INR 1.8      Patient ate breakfast provided by hospital and lunch from home today.      As of 8/6/17 No BM recorded this hospital stay therefore, colace order BID.

## 2017-08-07 NOTE — PROGRESS NOTES
"[facility] [place]  Neurology  Progress Note    Patient Name: Tenzin Ortiz  MRN: [id number]  Admission Date: 8/1/2017  Hospital Length of Stay: 6 days  Code Status: Full Code   Attending Provider: Pietro Paige MD  Primary Care Physician: Efrain Chavez MD   Principal Problem:Acute encephalopathy    Subjective:     Interval History: 75 y/o male with medical Hx as listed below brought to ED for AMS. Pt tells me he doesn't know what happened but per notes his significant other has been calling him and he wouldn't answer. When she arrived pt was moaning, not responding to her. Overnight pt remained non-verbal; not following commands.      -8/3/17: Mr. Ortiz with no acute issues overnight. Family reports that at times pt confused and even hallucinates.     -8/4/17: Pt been confused since yesterday evening. Hallucinating. Not cooperating. Says: "get outta here".     -8/5/17: Pt refusing medications. Confused.     -8/6/17: Today pt is calm. Able to take oral medications.    -8/7/17: Pt is able to converse with clinician. His significant other states that he has been refusing to go to nursing home.    Current Neurological Medications:     Current Facility-Administered Medications   Medication Dose Route Frequency Provider Last Rate Last Dose    acetaminophen tablet 650 mg  650 mg Oral Q4H PRN Cami Crow MD   650 mg at 08/04/17 0901    amlodipine tablet 10 mg  10 mg Oral Daily Cami Crow MD   10 mg at 08/07/17 0927    atorvastatin tablet 10 mg  10 mg Oral Daily Cami Crow MD   10 mg at 08/07/17 0927    carbidopa-levodopa  mg per tablet 1 tablet  1 tablet Oral QID Cami Crow MD   1 tablet at 08/07/17 0500    cyclobenzaprine tablet 10 mg  10 mg Oral TID PRN Cami Crow MD   10 mg at 08/06/17 2038    docusate sodium capsule 100 mg  100 mg Oral BID Pietro Paige MD   100 mg at 08/07/17 0927    famotidine tablet 20 mg  20 mg Oral BID Cami" MD Tristin   20 mg at 08/07/17 0927    gabapentin capsule 600 mg  600 mg Oral QHS Cami Crow MD   600 mg at 08/06/17 2038    pneumoc 13-bryon conj-dip cr(PF) 0.5 mL  0.5 mL Intramuscular vaccine x 1 dose Pietro Paige MD        ramelteon tablet 8 mg  8 mg Oral QHS Johny Aldrich MD   8 mg at 08/06/17 2038    rivastigmine 4.6 mg/24 hr 1 patch  1 patch Transdermal Daily Johny Aldrich MD   1 patch at 08/06/17 1034    tuberculin injection 5 Units  5 Units Intradermal Once Pietro Paige MD        warfarin (COUMADIN) tablet 3 mg  3 mg Oral Daily Cami Crow MD   3 mg at 08/06/17 1833       Review of Systems   Constitutional: Negative for fever.   HENT: Negative for trouble swallowing.    Eyes: Negative for photophobia.   Respiratory: Negative for shortness of breath.    Cardiovascular: Negative for chest pain.   Gastrointestinal: Negative for abdominal pain.   Genitourinary: Negative for dysuria.   Musculoskeletal: Negative for back pain.   Neurological: Positive for tremors. Negative for dizziness and headaches.          Objective:     Vital Signs (Most Recent):  Temp: 98.4 °F (36.9 °C) (08/07/17 0845)  Pulse: 81 (08/07/17 0845)  Resp: 19 (08/07/17 0845)  BP: 139/63 (08/07/17 0845)  SpO2: 97 % (08/07/17 0845) Vital Signs (24h Range):  Temp:  [97.4 °F (36.3 °C)-98.4 °F (36.9 °C)] 98.4 °F (36.9 °C)  Pulse:  [75-85] 81  Resp:  [18-19] 19  SpO2:  [94 %-97 %] 97 %  BP: (133-159)/(63-73) 139/63     Weight: 66.1 kg (145 lb 11.2 oz)  Body mass index is 22.82 kg/m².    Physical Exam  Constitutional: well-developed; no distress  Head: normocephalic  Eyes: conjunctivae/corneas clear.  Nose: no discharge, no epistaxis  Heart: regular rate and rhythm.  Abdomen: no pain to palpation  Extremities: no edema  Neurologic: Mental status: alert  Follow commands; oriented to person  Cranial nerves: pupils are round at 3 mm and reactive to light; no nystagmus; no facial weakness; tongue protrudes  midline SCM/trap 5/5  Strength: elevates UE/LE's against gravity  Resting tremor        Significant Labs:   CBC: No results for input(s): WBC, HGB, HCT, PLT in the last 48 hours.  CMP:   Recent Labs  Lab 08/06/17  0735         K 4.3      CO2 20*   BUN 37*   CREATININE 1.3   CALCIUM 9.7   ANIONGAP 11   EGFRNONAA 53*         Assessment and Plan:     75 y/o male consulted for AMS     1. AMS: Pt is presenting fluctuating mental status with hallucinations.                      -He has been taking PD meds medication but seems to be confused on which ones to take; he also repoerted to be taking too much of these meds at times. Has been taking Azilect but not Carbi/Levo. Not taking pramipexole due to side effects.                     -Rivastigmine 4.6 mg transdermal daily.                      -Ramelteon 8 mg nightly.    -Carbi/Levo 25/100 mg four times daily.                     -At this point pt is unable to take care of himself and his significant other and family are not available during the day to supervise him. Nursing home placement pending.                     -Follow up with neurology upon D/C home. Also, make appointment with movement disorder specialist. In hte past he has followed at [facility] [place]. 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

## 2017-08-07 NOTE — PLAN OF CARE
Problem: Physical Therapy Goal  Goal: Physical Therapy Goal  Goals to be met by: 17     Patient will increase functional independence with mobility by performin. Supine to sit with Supervision  2. Rolling to Left and Right with Supervision  3. Sit to stand transfer with Supervision using RW  4. Bed to chair transfer with Supervision using Rolling Walker  5. Gait 500 feet with Supervision using Rolling Walker   6. Lower extremity exercise program x20 reps per handout, with supervision     Outcome: Ongoing (interventions implemented as appropriate)  Pt ambulated with therapy today with RW, Mod A requiring verbal and tactile cues for BLE advancement.  Pt is a fall risk and requires assistance for all OOB mobility.

## 2017-08-07 NOTE — PLAN OF CARE
Problem: Fall Risk (Adult)  Goal: Identify Related Risk Factors and Signs and Symptoms  Related risk factors and signs and symptoms are identified upon initiation of Human Response Clinical Practice Guideline (CPG)   Outcome: Ongoing (interventions implemented as appropriate)   08/07/17 0035   Fall Risk   Related Risk Factors (Fall Risk) polypharmacy;age-related changes;gait/mobility problems   Signs and Symptoms (Fall Risk) presence of risk factors     Goal: Absence of Falls  Patient will demonstrate the desired outcomes by discharge/transition of care.   Outcome: Ongoing (interventions implemented as appropriate)   08/07/17 0035   Fall Risk (Adult)   Absence of Falls making progress toward outcome       Problem: Infection, Risk/Actual (Adult)  Goal: Identify Related Risk Factors and Signs and Symptoms  Related risk factors and signs and symptoms are identified upon initiation of Human Response Clinical Practice Guideline (CPG)   Outcome: Ongoing (interventions implemented as appropriate)   08/07/17 0035   Infection, Risk/Actual   Related Risk Factors (Infection, Risk/Actual) chronic illness/condition;age extremes   Signs and Symptoms (Infection, Risk/Actual) blood glucose changes;weakness;mental/behavioral changes;pain     Goal: Infection Prevention/Resolution  Patient will demonstrate the desired outcomes by discharge/transition of care.   Outcome: Ongoing (interventions implemented as appropriate)   08/07/17 0035   Infection, Risk/Actual (Adult)   Infection Prevention/Resolution making progress toward outcome       Problem: Patient Care Overview  Goal: Plan of Care Review  Outcome: Ongoing (interventions implemented as appropriate)   08/07/17 0035   Coping/Psychosocial   Plan Of Care Reviewed With patient;significant other       Problem: Pressure Ulcer Risk (Mike Scale) (Adult,Obstetrics,Pediatric)  Goal: Identify Related Risk Factors and Signs and Symptoms  Related risk factors and signs and symptoms are  identified upon initiation of Human Response Clinical Practice Guideline (CPG)   Outcome: Ongoing (interventions implemented as appropriate)   08/07/17 0035   Pressure Ulcer Risk (Mike Scale)   Related Risk Factors (Pressure Ulcer Risk (Mike Scale)) age extremes     Goal: Skin Integrity  Patient will demonstrate the desired outcomes by discharge/transition of care.   Outcome: Ongoing (interventions implemented as appropriate)   08/07/17 0035   Pressure Ulcer Risk (Mike Scale) (Adult,Obstetrics,Pediatric)   Skin Integrity making progress toward outcome       Problem: Nutrition, Imbalanced: Inadequate Oral Intake (Adult)  Goal: Identify Related Risk Factors and Signs and Symptoms  Related risk factors and signs and symptoms are identified upon initiation of Human Response Clinical Practice Guideline (CPG)   Outcome: Ongoing (interventions implemented as appropriate)   08/03/17 0432 08/07/17 0035   Nutrition, Imbalanced: Inadequate Oral Intake   Related Risk Factors (Nutrition Imbalance, Inadequate Oral Intake) --  chronic illness/infection   Signs and Symptoms (Nutrition Imbalance, Inadequate Oral Intake: Signs and Symptoms) irritability/confusion;weakness/lethargy --

## 2017-08-07 NOTE — PROGRESS NOTES
Discharge planning: Arabella Esteves called for update on progress. SW provided contact information for CM/SW team now assisting patient.

## 2017-08-07 NOTE — PLAN OF CARE
Problem: Patient Care Overview  Goal: Plan of Care Review  08/07/2017    Recommendations    Recommendation/Intervention: 1) Provide total feeding assistance during meal time 2) If po intake <=50%, recommend Boost Plus Chocolate BID 3) RD to monitor po intake/tolerance  Goals: 1) Patient to meet >=85% of EEN  Nutrition Goal Status: new  Communication of RD Recs: reviewed with KARINA Trejo, MPH, RD, LDN

## 2017-08-07 NOTE — PT/OT/SLP PROGRESS
"Occupational Therapy  Treatment    Tenzin Ortiz   MRN: [id number]   Admitting Diagnosis: Acute encephalopathy    OT Date of Treatment: 08/07/17   OT Start Time: 1217  OT Stop Time: 1247  OT Total Time (min): 30 min    Billable Minutes:  Self Care/Home Management 15 minutes (co-treat with PTA)    General Precautions: Standard, fall, vision impaired  Orthopedic Precautions: N/A  Braces: N/A    Do you have any cultural, spiritual, Jehovah's witness conflicts, given your current situation?: none    Subjective:  Communicated with nurse prior to session.  "ok how do you want me?" when asked if he was ready to get up and walk  Pain/Comfort  Pain Rating 1:  (no number available)  Location - Side 1: Right  Location 1: foot  Pain Addressed 1: Nurse notified, Other (see comments) (provided bilateral prevalon boots)    Objective:  Patient found with: telemetry, bed alarm, peripheral IV     Functional Mobility:  Bed Mobility:  Rolling/Turning to Left: Moderate assistance  Scooting/Bridging: Minimum Assistance  Supine to Sit: Moderate Assistance  Sit to Supine: Maximum Assistance    Transfers:   Sit <> Stand Assistance: Moderate Assistance  Sit <> Stand Assistive Device: Rolling Walker  Bed <> Chair Technique: Stand Pivot  Bed <> Chair Transfer Assistance: Maximum Assistance  Chair <>Mat Technique:  (HHA x2 people)    Functional Ambulation: ambulated in the bustamante with PTA and RW requiring moderate assistance and verbal cues to remain ambulating    Activities of Daily Living:  Feeding Level of Assistance: Minimum assistance  UE Dressing Level of Assistance: Minimum assistance  LE Dressing Level of Assistance: Maximum assistance  Toileting Where Assessed:  (bedpan in bed)  Toileting Level of Assistance: Total assistance    Balance:   Static Sit: FAIR: Maintains without assist, but unable to take any challenges   Dynamic Sit: FAIR+: Maintains balance through MINIMAL excursions of active trunk motion  Static Stand: FAIR: Maintains without " "assist but unable to take challenges  Dynamic stand: POOR: N/A    AM-PAC 6 CLICK ADL   How much help from another person does this patient currently need?   1 = Unable, Total/Dependent Assistance  2 = A lot, Maximum/Moderate Assistance  3 = A little, Minimum/Contact Guard/Supervision  4 = None, Modified [place]/Independent    Putting on and taking off regular lower body clothing? : 2  Bathing (including washing, rinsing, drying)?: 2  Toileting, which includes using toilet, bedpan, or urinal? : 2  Putting on and taking off regular upper body clothing?: 3  Taking care of personal grooming such as brushing teeth?: 3  Eating meals?: 2  Total Score: 14     AM-PAC Raw Score CMS "G-Code Modifier Level of Impairment Assistance   6 % Total / Unable   7 - 8 CM 80 - 100% Maximal Assist   9-13 CL 60 - 80% Moderate Assist   14 - 19 CK 40 - 60% Moderate Assist   20 - 22 CJ 20 - 40% Minimal Assist   23 CI 1-20% SBA / CGA   24 CH 0% Independent/ Mod I       Patient left supine with all lines intact, call button in reach, nurse notified, family present and patient left on the bedpan    ASSESSMENT:  Tenzin Ortiz is a 76 y.o. male with a medical diagnosis of Acute encephalopathy and presents with  independence for self-care and functional transfers.    Rehab identified problem list/impairments: Rehab identified problem list/impairments: weakness, impaired endurance, impaired self care skills, impaired functional mobilty, impaired balance, visual deficits, impaired cognition, decreased upper extremity function, decreased safety awareness, impaired coordination, impaired fine motor, impaired cardiopulmonary response to activity (Parkinsons tremors)    Rehab potential is fair.    Activity tolerance: Fair    Discharge recommendations: Discharge Facility/Level Of Care Needs: nursing facility, skilled, nursing facility, basic pending patient progress and insurance approval    Barriers to discharge: Barriers to " Discharge: Decreased caregiver support    Equipment recommendations: none     GOALS:    Occupational Therapy Goals        Problem: Occupational Therapy Goal    Goal Priority Disciplines Outcome Interventions   Occupational Therapy Goal     OT, PT/OT Ongoing (interventions implemented as appropriate)    Description:  Goals to be met by: 8/10/2017     Patient will increase functional independence with ADLs by performing:    Feeding with Set-up Assistance and AE if available.  UE Dressing with Set-up Assistance.  LE Dressing with Minimal Assistance.  Grooming while seated with Supervision.  Upper extremity exercise program with assistance as needed.                      Plan:  Patient to be seen 3 x/week to address the above listed problems via self-care/home management, therapeutic activities, therapeutic exercises  Plan of Care expires: 08/10/17  Plan of Care reviewed with: patient, sibling    Nissa ELBA Niño, MS  08/07/2017

## 2017-08-07 NOTE — SUBJECTIVE & OBJECTIVE
Interval History: More alert this am and somewhat appropriate. Best I have seen him.     Review of Systems   Unable to perform ROS: Dementia     Objective:     Vital Signs (Most Recent):  Temp: 98.4 °F (36.9 °C) (08/07/17 0845)  Pulse: 81 (08/07/17 0845)  Resp: 19 (08/07/17 0845)  BP: 139/63 (08/07/17 0845)  SpO2: 97 % (08/07/17 0845) Vital Signs (24h Range):  Temp:  [97.4 °F (36.3 °C)-98.4 °F (36.9 °C)] 98.4 °F (36.9 °C)  Pulse:  [75-85] 81  Resp:  [18-19] 19  SpO2:  [94 %-97 %] 97 %  BP: (133-159)/(63-73) 139/63     Weight: 66.1 kg (145 lb 11.2 oz)  Body mass index is 22.82 kg/m².    Intake/Output Summary (Last 24 hours) at 08/07/17 0951  Last data filed at 08/06/17 1700   Gross per 24 hour   Intake              240 ml   Output                0 ml   Net              240 ml      Physical Exam   Constitutional: He appears well-nourished.   HENT:   Head: Normocephalic.   Neurological: He is alert.   More oriented today.    Psychiatric: He has a normal mood and affect.   Vitals reviewed.      Significant Labs:   BMP:     Recent Labs  Lab 08/06/17  0735         K 4.3      CO2 20*   BUN 37*   CREATININE 1.3   CALCIUM 9.7     CBC: No results for input(s): WBC, HGB, HCT, PLT in the last 48 hours.    Significant Imaging:

## 2017-08-07 NOTE — PLAN OF CARE
Problem: Fall Risk (Adult)  Goal: Identify Related Risk Factors and Signs and Symptoms  Related risk factors and signs and symptoms are identified upon initiation of Human Response Clinical Practice Guideline (CPG)   Outcome: Ongoing (interventions implemented as appropriate)   08/07/17 0035   Fall Risk   Related Risk Factors (Fall Risk) polypharmacy;age-related changes;gait/mobility problems   Signs and Symptoms (Fall Risk) presence of risk factors     Goal: Absence of Falls  Patient will demonstrate the desired outcomes by discharge/transition of care.   Outcome: Ongoing (interventions implemented as appropriate)   08/07/17 0035   Fall Risk (Adult)   Absence of Falls making progress toward outcome       Problem: Infection, Risk/Actual (Adult)  Goal: Identify Related Risk Factors and Signs and Symptoms  Related risk factors and signs and symptoms are identified upon initiation of Human Response Clinical Practice Guideline (CPG)   Outcome: Ongoing (interventions implemented as appropriate)   08/07/17 0035   Infection, Risk/Actual   Related Risk Factors (Infection, Risk/Actual) chronic illness/condition;age extremes   Signs and Symptoms (Infection, Risk/Actual) blood glucose changes;weakness;mental/behavioral changes;pain       Problem: Patient Care Overview  Goal: Plan of Care Review  Outcome: Ongoing (interventions implemented as appropriate)   08/07/17 2058   Coping/Psychosocial   Plan Of Care Reviewed With patient       Problem: Pressure Ulcer Risk (Mike Scale) (Adult,Obstetrics,Pediatric)  Goal: Identify Related Risk Factors and Signs and Symptoms  Related risk factors and signs and symptoms are identified upon initiation of Human Response Clinical Practice Guideline (CPG)   Outcome: Ongoing (interventions implemented as appropriate)   08/07/17 0035   Pressure Ulcer Risk (Mike Scale)   Related Risk Factors (Pressure Ulcer Risk (Mike Scale)) age extremes     Goal: Skin Integrity  Patient will demonstrate  the desired outcomes by discharge/transition of care.   Outcome: Ongoing (interventions implemented as appropriate)   08/07/17 1558   Pressure Ulcer Risk (Mike Scale) (Adult,Obstetrics,Pediatric)   Skin Integrity making progress toward outcome       Problem: Nutrition, Imbalanced: Inadequate Oral Intake (Adult)  Goal: Identify Related Risk Factors and Signs and Symptoms  Related risk factors and signs and symptoms are identified upon initiation of Human Response Clinical Practice Guideline (CPG)    08/03/17 0432 08/07/17 0035   Nutrition, Imbalanced: Inadequate Oral Intake   Related Risk Factors (Nutrition Imbalance, Inadequate Oral Intake) --  chronic illness/infection   Signs and Symptoms (Nutrition Imbalance, Inadequate Oral Intake: Signs and Symptoms) irritability/confusion;weakness/lethargy --

## 2017-08-07 NOTE — PROGRESS NOTES
[facility] Hospital Medicine  Progress Note    Patient Name: Tenzin Ortiz  MRN: [id number]  Patient Class: IP- Inpatient   Admission Date: 8/1/2017  Length of Stay: 6 days  Attending Physician: Pietro Paige MD  Primary Care Provider: Efrain Chavez MD        Subjective:     Principal Problem:Acute encephalopathy    HPI:  Mr. 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  His girlfriend notes that he appeared ill for the past two days and has been mumbling at home.  She attempted to call him today but he wasn't answering his phone calls.  When she arrived to his home he was nonverbal and was moaning.  EMS was activated and noted that he had purposeful movement.  He was administered some naloxone without improvement of his mentation.  Further history is limited at this time.    Hospital Course:  In the ER initial EKG showed no evidence of acute ischemia.  He was then noted to have what appeared to be ST elevations on his telemetry.  Repeat EKG in the ER thought to be consistent with STEMI.  Emergent code STEMI activated.  Approximately 1-2 minutes after STEMI was seen on telemetry, repeat EKG was performed and his ST segments normalized. Cardiology did not feel emergent LHC was warranted given normalization. Troponin trended and remained unremarkable. CTA chest w/o dissection. Echo w/ EF 55%, no DD, no reported wall motion abnormality.     His work up for encephalopathy was mostly unremarkable. CT head, infectious work up, electrolytes, and ammonia level normal. His encephalopathy improved spontaneously the morning following admission. He was alert and oriented to place, date, and identifies his brother who called to check on him. He did however keep asking when he's  going for his operation despite being told several times he's not getting an operation. Utox positive for amphetamines which may be a false positive (trazodone on home med list is known to cause this). He is non-compliant with his Parkinson's medication and sometimes taking more than prescribed. His girlfriend brought in his medications and nursing rec'ed in the computer. He was apparently taking Mirapex; he has a history of hallucinations 2/2 Mirapex. She also brought CBD oil, a cannabinoid oil, that he took a few days prior to presentation. I suspect this could be the reason for his bizarre symptoms. I  also suspect he could have also taken too much trazodone and or tramadol. The other likely etiology is advancement in his Parkinson's and now with some features of Lewy bodies dementia given his hallucinations.  Neurology continued to follow the patient and adjusted meds. The patient was initially sent to the ICU but transferred to the floor on 8/3.  He will need NH placement. Neuro continued to follow the patient and adjusted Parkinson's meds.       Interval History: More alert this am and somewhat appropriate. Best I have seen him.     Review of Systems   Unable to perform ROS: Dementia     Objective:     Vital Signs (Most Recent):  Temp: 98.4 °F (36.9 °C) (08/07/17 0845)  Pulse: 81 (08/07/17 0845)  Resp: 19 (08/07/17 0845)  BP: 139/63 (08/07/17 0845)  SpO2: 97 % (08/07/17 0845) Vital Signs (24h Range):  Temp:  [97.4 °F (36.3 °C)-98.4 °F (36.9 °C)] 98.4 °F (36.9 °C)  Pulse:  [75-85] 81  Resp:  [18-19] 19  SpO2:  [94 %-97 %] 97 %  BP: (133-159)/(63-73) 139/63     Weight: 66.1 kg (145 lb 11.2 oz)  Body mass index is 22.82 kg/m².    Intake/Output Summary (Last 24 hours) at 08/07/17 0951  Last data filed at 08/06/17 1700   Gross per 24 hour   Intake              240 ml   Output                0 ml   Net              240 ml      Physical Exam   Constitutional: He appears well-nourished.   HENT:   Head: Normocephalic.    Neurological: He is alert.   More oriented today.    Psychiatric: He has a normal mood and affect.   Vitals reviewed.      Significant Labs:   BMP:     Recent Labs  Lab 08/06/17  0735         K 4.3      CO2 20*   BUN 37*   CREATININE 1.3   CALCIUM 9.7     CBC: No results for input(s): WBC, HGB, HCT, PLT in the last 48 hours.    Significant Imaging:    Assessment/Plan:      Chronic anticoagulation    As addressed- however, may not be the best candidate for anti-coagulation         Hyperlipidemia    Poorly-controlled; will continue patient's home regimen of atorvastatin.        Essential hypertension    Continued home regimen of amlodipine with improved control.        ECG abnormality    See hospital course. Appreciate cardiology recs.        PAD (peripheral artery disease)    Will continue his home regimen of atorvastatin and clopidogrel.        Mild malnutrition    No acute issues.         History of aortic thrombus    Stable; CTA chest with resolution of the clot; will continue his home regimen of warfarin with daily INR.        Parkinson disease    Stable; will continue his home regimen of carbidopa/levodopa.        * Acute encephalopathy    Likely medicine related given multiple Parkinson's drugs, CBD oil, trazadone, and tramadol. Also suspect he has progression of his Parkinson's with likely Lewy bodies dementia.  Patient is not safe to go home. Needs placement. Neuro consulted- made adjustments in meds. Remains confused- but improved.  Working on NH placement.         weakness- PT/OT rec. SNF?   VTE Risk Mitigation         Ordered     warfarin (COUMADIN) tablet 3 mg  Daily     Route:  Oral        08/03/17 1217        Working on NH placement vs. SNF.       Pietro Trinh MD  Department of Hospital Medicine   [facility]

## 2017-08-07 NOTE — PT/OT/SLP PROGRESS
"Physical Therapy  Treatment    Tenzin Ortiz   MRN: [id number]   Admitting Diagnosis: Acute encephalopathy    PT Received On: 08/07/17  PT Start Time: 1215     PT Stop Time: 1245    PT Total Time (min): 30 min   (Co-tx with OT)    Billable Minutes:  Gait [id number]    Treatment Type: Treatment  PT/PTA: PTA     PTA Visit Number: 1       General Precautions: Standard, fall, vision impaired  Orthopedic Precautions: N/A   Braces:           Subjective:  Communicated with pt's daughter prior to session.  Pt's daughter standing outside of room and stated " Pt would like to be repositioned."  Pt states " I will go for a walk."  Pt with improved cognition today, however, continues to have confusion.    Pain/Comfort  Pain Rating 1:  (yes but did not rate)  Location - Side 1: Bilateral (Right>left)  Location 1: heel  Pain Addressed 1: Nurse notified, Reposition    Objective:   Patient found with: telemetry, bed alarm (AVASYS)    Functional Mobility:  Bed Mobility:   Scooting/Bridging: Minimum Assistance (to scoot to EOB)  Supine to Sit: Moderate Assistance  Sit to Supine: Maximum Assistance    Transfers: Pt with decreased safety awareness and sits without warning.  Sit <> Stand Assistance: Moderate Assistance (from EOB.  with verbal and tactile cues for hand placement)  Sit <> Stand Assistive Device: Rolling Walker  Bed <> Chair Technique: Stand Pivot  Bed <> Chair Assistance: Maximum Assistance (x2)  Bed <> Chair Assistive Device: No Assistive Device    Gait: Pt with tremors while ambulating.  Gait Distance: 64ft with max verbal and tactile cues for initiation of BLEs.  Pt requires min A for RW management and safety. (decreased safety awareness).  LOB x1 with Mod a for recovery.  Assistance 1: Moderate assistance  Gait Assistive Device: Rolling walker  Gait Pattern: 3-point gait  Gait Deviation(s): decreased nneka, increased time in double stance, decreased velocity of limb motion, decreased step length, decreased " weight-shifting ability      Balance:   Static Sit: FAIR: Maintains without assist, but unable to take any challenges   Dynamic Sit: FAIR: Cannot move trunk without losing balance  Static Stand: POOR+: Needs MINIMAL assist to maintain  Dynamic stand: POOR: N/A     Therapeutic Activities and Exercises:  Pt performed seated therex to BLEs x10 reps LAQs and hip flexion.      AM-PAC 6 CLICK MOBILITY  How much help from another person does this patient currently need?   1 = Unable, Total/Dependent Assistance  2 = A lot, Maximum/Moderate Assistance  3 = A little, Minimum/Contact Guard/Supervision  4 = None, Modified Elliott/Independent         AM-PAC Raw Score CMS G-Code Modifier Level of Impairment Assistance   6 % Total / Unable   7 - 9 CM 80 - 100% Maximal Assist   10 - 14 CL 60 - 80% Moderate Assist   15 - 19 CK 40 - 60% Moderate Assist   20 - 22 CJ 20 - 40% Minimal Assist   23 CI 1-20% SBA / CGA   24 CH 0% Independent/ Mod I     Patient left supine with prevalon boots to hospitals with all lines intact, call button in reach, bed alarm on, pt's nurse, Jacquelyn, and PCT' notified and daughter and son-in-law present.  Pt supine in bed and had to have a bowel movement.  Pt placed on bed and pt's nurse and PCT notified.  Both verbalized understanding.    Assessment:  Tenzin Ortiz is a 76 y.o. male with a medical diagnosis of Acute encephalopathy and presents with decreased cognition and safety awareness with OOB mobility.  Pt is unable to ambulate without assistance due to increased fall risk.    Rehab identified problem list/impairments: Rehab identified problem list/impairments: weakness, impaired endurance, impaired cognition, impaired balance, impaired functional mobilty, impaired self care skills, gait instability, decreased coordination, decreased lower extremity function, decreased upper extremity function, decreased safety awareness, impaired coordination    Rehab potential is fair.    Activity tolerance:  Fair    Discharge recommendations: Discharge Facility/Level Of Care Needs: nursing facility, skilled (nursing facility, skilled)     Barriers to discharge: Barriers to Discharge: Decreased caregiver support (confused)    Equipment recommendations: Equipment Needed After Discharge: none     GOALS:    Physical Therapy Goals        Problem: Physical Therapy Goal    Goal Priority Disciplines Outcome Goal Variances Interventions   Physical Therapy Goal     PT/OT, PT Ongoing (interventions implemented as appropriate)     Description:  Goals to be met by: 17     Patient will increase functional independence with mobility by performin. Supine to sit with Supervision  2. Rolling to Left and Right with Supervision  3. Sit to stand transfer with Supervision using RW  4. Bed to chair transfer with Supervision using Rolling Walker  5. Gait 500 feet with Supervision using Rolling Walker   6. Lower extremity exercise program x20 reps per handout, with supervision                      PLAN:    Patient to be seen 5 x/week  to address the above listed problems via gait training, therapeutic activities, therapeutic exercises  Plan of Care expires: 17  Plan of Care reviewed with: patient         Citlali HOLLI Dennis  2017

## 2017-08-07 NOTE — ASSESSMENT & PLAN NOTE
Likely medicine related given multiple Parkinson's drugs, CBD oil, trazadone, and tramadol. Also suspect he has progression of his Parkinson's with likely Lewy bodies dementia.  Patient is not safe to go home. Needs placement. Neuro consulted- made adjustments in meds. Remains confused- but improved.  Working on NH placement.

## 2017-08-07 NOTE — PLAN OF CARE
Problem: Occupational Therapy Goal  Goal: Occupational Therapy Goal  Goals to be met by: 8/10/2017     Patient will increase functional independence with ADLs by performing:    Feeding with Set-up Assistance and AE if available.  UE Dressing with Set-up Assistance.  LE Dressing with Minimal Assistance.  Grooming while seated with Supervision.  Upper extremity exercise program with assistance as needed.     Outcome: Ongoing (interventions implemented as appropriate)  Patient with increased cognition, still not with good safety awareness.  Patient requires max verb cues to remain ambulating while up with PTA using a RW. ELBA Tran, MS

## 2017-08-08 LAB
INR PPP: 1.6
PROTHROMBIN TIME: 16.2 SEC

## 2017-08-08 PROCEDURE — 21400001 HC TELEMETRY ROOM

## 2017-08-08 PROCEDURE — 36415 COLL VENOUS BLD VENIPUNCTURE: CPT

## 2017-08-08 PROCEDURE — 99232 SBSQ HOSP IP/OBS MODERATE 35: CPT | Mod: ,,, | Performed by: PSYCHIATRY & NEUROLOGY

## 2017-08-08 PROCEDURE — 25000003 PHARM REV CODE 250: Performed by: INTERNAL MEDICINE

## 2017-08-08 PROCEDURE — 85610 PROTHROMBIN TIME: CPT

## 2017-08-08 PROCEDURE — 25000003 PHARM REV CODE 250: Performed by: PSYCHIATRY & NEUROLOGY

## 2017-08-08 RX ORDER — DOCUSATE SODIUM 100 MG/1
100 CAPSULE, LIQUID FILLED ORAL 2 TIMES DAILY
Refills: 0 | COMMUNITY
Start: 2017-08-08

## 2017-08-08 RX ORDER — RAMELTEON 8 MG/1
8 TABLET ORAL NIGHTLY
Start: 2017-08-08 | End: 2017-08-31 | Stop reason: SDUPTHER

## 2017-08-08 RX ORDER — RIVASTIGMINE 4.6 MG/24H
1 PATCH, EXTENDED RELEASE TRANSDERMAL DAILY
Start: 2017-08-08 | End: 2017-08-31 | Stop reason: SDUPTHER

## 2017-08-08 RX ORDER — ATORVASTATIN CALCIUM 10 MG/1
10 TABLET, FILM COATED ORAL DAILY
Start: 2017-08-08 | End: 2017-08-31 | Stop reason: SDUPTHER

## 2017-08-08 RX ORDER — CYCLOBENZAPRINE HCL 10 MG
10 TABLET ORAL 3 TIMES DAILY PRN
Start: 2017-08-08 | End: 2017-08-18

## 2017-08-08 RX ORDER — CARBIDOPA AND LEVODOPA 25; 100 MG/1; MG/1
1 TABLET ORAL 4 TIMES DAILY
Start: 2017-08-08 | End: 2017-08-31 | Stop reason: SDUPTHER

## 2017-08-08 RX ORDER — GABAPENTIN 300 MG/1
600 CAPSULE ORAL NIGHTLY
Start: 2017-08-08 | End: 2017-08-31 | Stop reason: SDUPTHER

## 2017-08-08 RX ADMIN — WARFARIN SODIUM 3 MG: 3 TABLET ORAL at 05:08

## 2017-08-08 RX ADMIN — FAMOTIDINE 20 MG: 20 TABLET, FILM COATED ORAL at 09:08

## 2017-08-08 RX ADMIN — CYCLOBENZAPRINE HYDROCHLORIDE 10 MG: 10 TABLET, FILM COATED ORAL at 09:08

## 2017-08-08 RX ADMIN — CYCLOBENZAPRINE HYDROCHLORIDE 10 MG: 10 TABLET, FILM COATED ORAL at 05:08

## 2017-08-08 RX ADMIN — CARBIDOPA AND LEVODOPA 1 TABLET: 25; 100 TABLET ORAL at 05:08

## 2017-08-08 RX ADMIN — ATORVASTATIN CALCIUM 10 MG: 10 TABLET, FILM COATED ORAL at 09:08

## 2017-08-08 RX ADMIN — CARBIDOPA AND LEVODOPA 1 TABLET: 25; 100 TABLET ORAL at 12:08

## 2017-08-08 RX ADMIN — DOCUSATE SODIUM 100 MG: 100 CAPSULE, LIQUID FILLED ORAL at 09:08

## 2017-08-08 RX ADMIN — RAMELTEON 8 MG: 8 TABLET, FILM COATED ORAL at 09:08

## 2017-08-08 RX ADMIN — AMLODIPINE BESYLATE 10 MG: 5 TABLET ORAL at 09:08

## 2017-08-08 RX ADMIN — GABAPENTIN 600 MG: 300 CAPSULE ORAL at 09:08

## 2017-08-08 NOTE — PLAN OF CARE
[facility]     Department of Hospital Medicine     [address] [place] [address]     [phone] after hours  [phone] fax       NURSING HOME ORDERS    08/09/2017    Admit to Nursing Home: Skilled Bed                                                 Diagnoses:  Active Hospital Problems    Diagnosis  POA    *Acute encephalopathy [G93.40]  Yes    Chronic anticoagulation [Z79.01]  Not Applicable     Chronic    ECG abnormality [R94.31]  Yes    Essential hypertension [I10]  Yes     Chronic    Hyperlipidemia [E78.5]  Yes     Chronic    PAD (peripheral artery disease) [I73.9]  Yes     Chronic    Mild malnutrition [E44.1]  Yes     Chronic    History of aortic thrombus [I74.10]  Yes     Chronic    Parkinson disease [G20]  Yes     Chronic     Right tremor-predominant classic type.        Resolved Hospital Problems    Diagnosis Date Resolved POA   No resolved problems to display.       Patient is homebound due to:  Acute encephalopathy    Allergies:  Review of patient's allergies indicates:   Allergen Reactions    Amantadine analogues      Caused confusion    Mirapex [pramipexole]      Pathologic gambling    Neupro [rotigotine]      Pathologic gambling         Vitals:  As per facility protocol    Diet: Cardiac    Acitivities:  As tolerated    LABS: PT-INR each week for 1 month then monthly    Nursing Precautions:    - Aspiration precautions per nursing home protocol      - Fall precautions per nursing home protocol   - Seizure precaution per alf protocol   - Decubitus precautions:        -  for positioning   - Pressure reducing foam mattress   - Turn patient every two hours. Use wedge pillows to anchor patient    CONSULTS:     Physical Therapy to evaluate and treat     Occupational Therapy to evaluate and treat     Nutrition to evaluate and recommend diet    MISCELLANEOUS CARE:     Routine Skin for Bedridden Patients:  Apply moisture barrier  cream to all    skin folds and wet areas in perineal area daily and after baths and                           all bowel movements    Medications: Discontinue all previous medication orders, if any. See new list below.     Tenzin Ortiz   Home Medication Instructions MICHAEL:[id number]    Printed on:08/08/17 3986   Medication Information                      acetaminophen (TYLENOL ARTHRITIS PAIN) 650 MG TbSR  Take 650 mg by mouth every 8 (eight) hours.             amlodipine (NORVASC) 10 MG tablet  TAKE 1 TABLET BY MOUTH EVERY DAY             atorvastatin (LIPITOR) 10 MG tablet  Take 1 tablet (10 mg total) by mouth once daily.             carbidopa-levodopa  mg (SINEMET)  mg per tablet  Take 1 tablet by mouth 4 (four) times daily.             cyclobenzaprine (FLEXERIL) 10 MG tablet  Take 1 tablet (10 mg total) by mouth 3 (three) times daily as needed for Muscle spasms.             docusate sodium (COLACE) 100 MG capsule  Take 1 capsule (100 mg total) by mouth 2 (two) times daily.             famotidine (PEPCID) 20 MG tablet  Take 1 tablet (20 mg total) by mouth 2 (two) times daily.             gabapentin (NEURONTIN) 300 MG capsule  Take 2 capsules (600 mg total) by mouth every evening.             ramelteon (ROZEREM) 8 mg tablet  Take 1 tablet (8 mg total) by mouth every evening.             rivastigmine (EXELON) 4.6 mg/24 hr PT24  Place 1 patch onto the skin once daily.             warfarin (COUMADIN) 3 MG tablet  TAKE ONE BY MOUTH EVERY DAY OR AS DIRECTED BY COUMADIN CLINIC                       _________________________________  Kassandra Alonso MD  08/09/2017

## 2017-08-08 NOTE — PROGRESS NOTES
"[facility]  Neurology  Progress Note    Patient Name: Tenzin Ortiz  MRN: [id number]  Admission Date: 8/1/2017  Hospital Length of Stay: 7 days  Code Status: Full Code   Attending Provider: Kassandra Alonso MD  Primary Care Physician: Efrain Chavez MD   Principal Problem:Acute encephalopathy    Subjective:     Interval History: 75 y/o male with medical Hx as listed below brought to ED for AMS. Pt tells me he doesn't know what happened but per notes his significant other has been calling him and he wouldn't answer. When she arrived pt was moaning, not responding to her. Overnight pt remained non-verbal; not following commands.      -8/3/17: Mr. Ortiz with no acute issues overnight. Family reports that at times pt confused and even hallucinates.     -8/4/17: Pt been confused since yesterday evening. Hallucinating. Not cooperating. Says: "get outta here".     -8/5/17: Pt refusing medications. Confused.     -8/6/17: Today pt is calm. Able to take oral medications.     -8/7/17: Pt is able to converse with clinician. His significant other states that he has been refusing to go to nursing home.    -8/8/17: Tremors better this morning. No acute issues reported overnight.    Current Neurological Medications:     Current Facility-Administered Medications   Medication Dose Route Frequency Provider Last Rate Last Dose    acetaminophen tablet 650 mg  650 mg Oral Q4H PRN Cami Crow MD   650 mg at 08/04/17 0901    amlodipine tablet 10 mg  10 mg Oral Daily Cami Crow MD   10 mg at 08/08/17 0949    atorvastatin tablet 10 mg  10 mg Oral Daily Cami Crow MD   10 mg at 08/08/17 0949    carbidopa-levodopa  mg per tablet 1 tablet  1 tablet Oral QID Cami Crow MD   1 tablet at 08/08/17 0517    cyclobenzaprine tablet 10 mg  10 mg Oral TID PRN Cami Crow MD   10 mg at 08/08/17 0517    docusate sodium capsule 100 mg  100 mg Oral BID Pietro Paige MD   100 mg at " 08/08/17 0950    famotidine tablet 20 mg  20 mg Oral BID Cami Crow MD   20 mg at 08/08/17 0949    gabapentin capsule 600 mg  600 mg Oral QHS Cami Crow MD   600 mg at 08/07/17 2120    pneumoc 13-bryon conj-dip cr(PF) 0.5 mL  0.5 mL Intramuscular vaccine x 1 dose Pietro Paige MD        ramelteon tablet 8 mg  8 mg Oral QHS Johny Aldrich MD   8 mg at 08/07/17 2120    rivastigmine 4.6 mg/24 hr 1 patch  1 patch Transdermal Daily Johny Aldrich MD   1 patch at 08/06/17 1034    warfarin (COUMADIN) tablet 3 mg  3 mg Oral Daily Cami Crow MD   3 mg at 08/07/17 1650       Review of Systems   Constitutional: Negative for fever.   HENT: Negative for trouble swallowing.    Eyes: Negative for photophobia.   Respiratory: Negative for shortness of breath.    Cardiovascular: Negative for chest pain.   Gastrointestinal: Negative for abdominal pain.   Genitourinary: Negative for dysuria.   Musculoskeletal: Negative for back pain.   Neurological: Positive for tremors. Negative for dizziness and headaches, weakness, numbness, or vertigo.    Objective:     Vital Signs (Most Recent):  Temp: 97.4 °F (36.3 °C) (08/08/17 0850)  Pulse: 96 (08/08/17 0850)  Resp: 18 (08/08/17 0850)  BP: (!) 142/65 (08/08/17 0850)  SpO2: 97 % (08/08/17 0850) Vital Signs (24h Range):  Temp:  [97.2 °F (36.2 °C)-98.7 °F (37.1 °C)] 97.4 °F (36.3 °C)  Pulse:  [81-96] 96  Resp:  [18-19] 18  SpO2:  [96 %-98 %] 97 %  BP: (142-167)/(65-77) 142/65     Weight: 66.1 kg (145 lb 11.6 oz)  Body mass index is 22.82 kg/m².    Physical Exam  Constitutional: well-developed; no distress  Head: normocephalic  Eyes: conjunctivae/corneas clear.  Nose: no discharge, no epistaxis  Heart: regular rate and rhythm.  Abdomen: no pain to palpation  Extremities: no edema  Neurologic: Mental status: alert; oriented to person, place  Cranial nerves: pupils are round at 3 mm and reactive to light; no nystagmus; no facial weakness; tongue protrudes  midline   Strength: elevates UE/LE's against gravity; offers 4+/5 effort in UE/LE's  Resting tremor in UE's right more than left       Significant Labs: CBC: No results for input(s): WBC, HGB, HCT, PLT in the last 48 hours.  CMP: No results for input(s): GLU, NA, K, CL, CO2, BUN, CREATININE, CALCIUM, MG, PROT, ALBUMIN, BILITOT, ALKPHOS, AST, ALT, ANIONGAP, EGFRNONAA in the last 48 hours.      Assessment and Plan:     77 y/o male consulted for AMS     1. AMS: Pt is presenting fluctuating mental status with hallucinations.                      -He has been taking PD meds medication but seems to be confused on which ones to take; he also repoerted to be taking too much of these meds at times. Has been taking Azilect but not Carbi/Levo. Not taking pramipexole due to side effects.                     -Rivastigmine 4.6 mg transdermal daily.                      -Ramelteon 8 mg nightly.                                        -Carbi/Levo 25/100 mg four times daily if medication is wearing off quickly will add entacapone 200 mg with each dose of Sinemet.                     -At this point pt is unable to take care of himself and his significant other and family are not available during the day to supervise him. Nursing home placement pending.                     -Follow up with neurology upon D/C home. Also, make appointment with movement disorder specialist. [place] the [place] [facility].    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

## 2017-08-08 NOTE — PROGRESS NOTES
TN called daughter discussed NH placement.  Will fax NH list to requested fax #[phone].  Carole plans to check with [facility].  She says she has spoken with patient's girlfriend and she agrees with NH placement..Ivory Barton RN, BSN, [facility]  8/8/2017

## 2017-08-08 NOTE — PT/OT/SLP PROGRESS
Physical Therapy      Tenzin Ortiz  MRN: [id number]    Patient not seen today secondary to  (pt very lethargic and confused).  Pt was difficult to arouse and unable to make eye contact, follow commands, or answer questions.  Pt's nurse, Jacquelyn notified.  Pt started on muscle relaxer today, per nursing. Will follow-up as able.    Citlali Dennis, PTA

## 2017-08-08 NOTE — PLAN OF CARE
Problem: Fall Risk (Adult)  Intervention: Patient Rounds   08/08/17 0113   Safety Interventions   Patient Rounds bed in low position;bed wheels locked;call light in reach;clutter free environment maintained;ID band on;placement of personal items at bedside;toileting offered;visualized patient     Intervention: Safety Promotion/Fall Prevention   08/08/17 0113   Safety Interventions   Safety Promotion/Fall Prevention assistive device/personal item within reach;bed alarm set;commode/urinal/bedpan at bedside;diversional activities provided;Fall Risk reviewed with patient/family;Fall Risk signage in place;lighting adjusted;medications reviewed;nonskid shoes/socks when out of bed;side rails raised x 3;/camera at bedside     Intervention: Safety Precautions   08/08/17 0113   Safety Interventions   Safety Precautions emergency equipment at bedside       Goal: Absence of Falls  Patient will demonstrate the desired outcomes by discharge/transition of care.   Outcome: Ongoing (interventions implemented as appropriate)   08/08/17 0113   Fall Risk (Adult)   Absence of Falls making progress toward outcome       Problem: Infection, Risk/Actual (Adult)  Intervention: Manage Suspected/Actual Infection   08/08/17 0113   Safety Interventions   Isolation Precautions environmental surveillance     Intervention: Prevent Infection/Maximize Resistance   08/08/17 0113   Pain/Comfort/Sleep Interventions   Sleep/Rest Enhancement regular sleep/rest pattern promoted   Hygiene Care   Bathing/Skin Care incontinence care   Respiratory Interventions   Airway/Ventilation Management airway patency maintained;calming measures promoted       Goal: Infection Prevention/Resolution  Patient will demonstrate the desired outcomes by discharge/transition of care.   Outcome: Ongoing (interventions implemented as appropriate)   08/08/17 0113   Infection, Risk/Actual (Adult)   Infection Prevention/Resolution making progress toward outcome        Problem: Pressure Ulcer Risk (Mike Scale) (Adult,Obstetrics,Pediatric)  Intervention: Prevent/Minimize Sheer/Friction Injuries   08/08/17 0113   Positioning   Positioning/Transfer Devices pillows;in use   Skin Interventions   Pressure Reduction Devices pressure-redistributing mattress utilized   Pressure Reduction Techniques frequent weight shift encouraged;heels elevated off bed;weight shift assistance provided     Intervention: Turn/Reposition Often   08/08/17 0113   Positioning   Body Position legs elevated;side-lying, left   Skin Interventions   Pressure Reduction Techniques frequent weight shift encouraged;heels elevated off bed;weight shift assistance provided       Goal: Skin Integrity  Patient will demonstrate the desired outcomes by discharge/transition of care.   Outcome: Ongoing (interventions implemented as appropriate)   08/08/17 0113   Pressure Ulcer Risk (Mike Scale) (Adult,Obstetrics,Pediatric)   Skin Integrity making progress toward outcome

## 2017-08-08 NOTE — PROGRESS NOTES
Eric from [facility] says he has availability. TN will talk with dtr first..Ivory Barton RN, BSN, STN [facility]  8/8/2017

## 2017-08-08 NOTE — PROGRESS NOTES
Order for NH placement, patient's daughter calling pt's  regarding  Finances..Ivory Barton RN, BSN, [facility]  8/8/2017    2:40pm TN called daughter..Ivory Barton RN, BSN, [facility]  8/8/2017  '

## 2017-08-08 NOTE — PROGRESS NOTES
Discharge planning: call from Eric at [facility] [phone] inquiring regarding progress. SW provided him the CM/SW team members assisting.

## 2017-08-08 NOTE — PLAN OF CARE
TN spoke to to patient, stated name, day and year of birth.  Wasn't sure where he was.  Patient is refusing to go to SNF.  PT is recommending NH or home with home health.  TN spoke to dtr, Carole Lam [phone], discussed d/c planning, says she does not have POA.  Discussed pt's refusal of NH or snf.  PT recs NH or hhc.  Patient is accepting of HHC and dtr.  Daughter and TN discussed private sitters, dtr believes to be $20 /hr and is considering.  Carole plans to call pt's  and to talk to her  Father regarding NH placement.  ..Ivory Barton RN, BSN, [facility]  8/8/2017    Justina Joan MALIN obtained pasrr and [address] in [facility] and placed in  Blue folder.Ivory Barton RN, BSN, [facility]  8/8/2017

## 2017-08-08 NOTE — PROGRESS NOTES
Discharge planning--142 received in [facility] mail box, attached now to [facility] and forwarded to RN EDGAR Dodson to continue to assist.

## 2017-08-09 VITALS
BODY MASS INDEX: 22.88 KG/M2 | DIASTOLIC BLOOD PRESSURE: 81 MMHG | OXYGEN SATURATION: 96 % | SYSTOLIC BLOOD PRESSURE: 158 MMHG | RESPIRATION RATE: 18 BRPM | WEIGHT: 145.75 LBS | HEART RATE: 86 BPM | TEMPERATURE: 98 F | HEIGHT: 67 IN

## 2017-08-09 PROBLEM — F02.80 LEWY BODY DEMENTIA: Status: ACTIVE | Noted: 2017-08-09

## 2017-08-09 PROBLEM — G31.83 LEWY BODY DEMENTIA: Status: ACTIVE | Noted: 2017-08-09

## 2017-08-09 LAB
INR PPP: 1.4
PROTHROMBIN TIME: 14.7 SEC

## 2017-08-09 PROCEDURE — G8988 SELF CARE GOAL STATUS: HCPCS | Mod: CJ

## 2017-08-09 PROCEDURE — 36415 COLL VENOUS BLD VENIPUNCTURE: CPT

## 2017-08-09 PROCEDURE — 97110 THERAPEUTIC EXERCISES: CPT

## 2017-08-09 PROCEDURE — G8989 SELF CARE D/C STATUS: HCPCS | Mod: CJ

## 2017-08-09 PROCEDURE — 21400001 HC TELEMETRY ROOM

## 2017-08-09 PROCEDURE — 25000003 PHARM REV CODE 250: Performed by: HOSPITALIST

## 2017-08-09 PROCEDURE — G8987 SELF CARE CURRENT STATUS: HCPCS | Mod: CK

## 2017-08-09 PROCEDURE — 97530 THERAPEUTIC ACTIVITIES: CPT

## 2017-08-09 PROCEDURE — 25000003 PHARM REV CODE 250: Performed by: INTERNAL MEDICINE

## 2017-08-09 PROCEDURE — G8978 MOBILITY CURRENT STATUS: HCPCS | Mod: CM

## 2017-08-09 PROCEDURE — 99232 SBSQ HOSP IP/OBS MODERATE 35: CPT | Mod: ,,, | Performed by: PSYCHIATRY & NEUROLOGY

## 2017-08-09 PROCEDURE — 25000003 PHARM REV CODE 250: Performed by: PSYCHIATRY & NEUROLOGY

## 2017-08-09 PROCEDURE — 94761 N-INVAS EAR/PLS OXIMETRY MLT: CPT

## 2017-08-09 PROCEDURE — G8980 MOBILITY D/C STATUS: HCPCS | Mod: CM

## 2017-08-09 PROCEDURE — 85610 PROTHROMBIN TIME: CPT

## 2017-08-09 PROCEDURE — G8979 MOBILITY GOAL STATUS: HCPCS | Mod: CI

## 2017-08-09 RX ORDER — WARFARIN SODIUM 5 MG/1
5 TABLET ORAL DAILY
Status: DISCONTINUED | OUTPATIENT
Start: 2017-08-09 | End: 2017-08-10 | Stop reason: HOSPADM

## 2017-08-09 RX ADMIN — CARBIDOPA AND LEVODOPA 1 TABLET: 25; 100 TABLET ORAL at 12:08

## 2017-08-09 RX ADMIN — CYCLOBENZAPRINE HYDROCHLORIDE 10 MG: 10 TABLET, FILM COATED ORAL at 05:08

## 2017-08-09 RX ADMIN — CARBIDOPA AND LEVODOPA 1 TABLET: 25; 100 TABLET ORAL at 05:08

## 2017-08-09 RX ADMIN — WARFARIN SODIUM 5 MG: 5 TABLET ORAL at 05:08

## 2017-08-09 RX ADMIN — ATORVASTATIN CALCIUM 10 MG: 10 TABLET, FILM COATED ORAL at 09:08

## 2017-08-09 RX ADMIN — AMLODIPINE BESYLATE 10 MG: 5 TABLET ORAL at 09:08

## 2017-08-09 RX ADMIN — CYCLOBENZAPRINE HYDROCHLORIDE 10 MG: 10 TABLET, FILM COATED ORAL at 09:08

## 2017-08-09 RX ADMIN — FAMOTIDINE 20 MG: 20 TABLET, FILM COATED ORAL at 09:08

## 2017-08-09 RX ADMIN — DOCUSATE SODIUM 100 MG: 100 CAPSULE, LIQUID FILLED ORAL at 09:08

## 2017-08-09 RX ADMIN — RIVASTIGMINE TRANSDERMAL SYSTEM 1 PATCH: 4.6 PATCH, EXTENDED RELEASE TRANSDERMAL at 09:08

## 2017-08-09 NOTE — PROGRESS NOTES
Call report information given to telemetry nurse: Call [facility] [phone] nurse Luanne.  Patient to room 1355 Bed B on the first floor..Ivory Barton RN, BSN, [facility]  8/9/2017    TN called Shilpa CAMACHO [phone] w/c van, requested safety belt for patient, provided insurance information and destination.  To  in an  Hour to 2 hours, Shilpa said..Ivory Barton RN, BSN, [facility]  8/9/2017

## 2017-08-09 NOTE — PT/OT/SLP PROGRESS
"Occupational Therapy  Treatment    Tenzin Ortiz   MRN: [id number]   Admitting Diagnosis: Acute encephalopathy    OT Date of Treatment: 08/09/17   OT Start Time: 1502  OT Stop Time: 1539  OT Total Time (min): 37 min    Billable Minutes:  Therapeutic Activity 18 minutes (co-treat with PTA)    General Precautions: Standard, fall, vision impaired  Orthopedic Precautions: N/A  Braces: N/A    Do you have any cultural, spiritual, Sikhism conflicts, given your current situation?: none    Subjective:  Communicated with nurse Meka prior to session.  "Can I pay you for the comb?"  Pain/Comfort  Pain Rating 1: 0/10    Objective:  Patient found with: telemetry, bed alarm, peripheral IV     Functional Mobility:  Bed Mobility:  Rolling/Turning to Left: Moderate assistance  Rolling/Turning Right: Moderate assistance  Supine to Sit: Maximum Assistance  Sit to Supine: Maximum Assistance    Transfers:   Sit <> Stand Assistance: Maximum Assistance  Sit <> Stand Assistive Device: Rolling Walker    Functional Ambulation: abke to ambulate a short distance with max assist x2 people with a RW    Activities of Daily Living:  Feeding Level of Assistance: Moderate assistance  UE Dressing Level of Assistance: Moderate assistance  LE Dressing Level of Assistance: Total assistance  Toileting Level of Assistance: Total assistance       Balance:   Static Sit: FAIR+: Able to take MINIMAL challenges from all directions  Dynamic Sit: FAIR: Cannot move trunk without losing balance  Static Stand: POOR: Needs MODERATE assist to maintain  Dynamic stand: POOR: N/A    AM-PAC 6 CLICK ADL   How much help from another person does this patient currently need?   1 = Unable, Total/Dependent Assistance  2 = A lot, Maximum/Moderate Assistance  3 = A little, Minimum/Contact Guard/Supervision  4 = None, Modified [place]/Independent    Putting on and taking off regular lower body clothing? : 1  Bathing (including washing, rinsing, drying)?: 1  Toileting, which " "includes using toilet, bedpan, or urinal? : 1  Putting on and taking off regular upper body clothing?: 2  Taking care of personal grooming such as brushing teeth?: 2  Eating meals?: 2  Total Score: 9     AM-PAC Raw Score CMS "G-Code Modifier Level of Impairment Assistance   6 % Total / Unable   7 - 8 CM 80 - 100% Maximal Assist   9-13 CL 60 - 80% Moderate Assist   14 - 19 CK 40 - 60% Moderate Assist   20 - 22 CJ 20 - 40% Minimal Assist   23 CI 1-20% SBA / CGA   24 CH 0% Independent/ Mod I       Patient left supine with all lines intact, call button in reach, bed alarm on, nurse Meka notified and B prevalon boots in place    ASSESSMENT:  Tenzin Ortiz is a 76 y.o. male with a medical diagnosis of Acute encephalopathy and presents with  independence for self-care and functional transfers.    Rehab identified problem list/impairments: Rehab identified problem list/impairments: weakness, impaired endurance, impaired self care skills, impaired functional mobilty, impaired balance, visual deficits, impaired cognition, decreased upper extremity function, decreased lower extremity function, decreased safety awareness, decreased ROM, impaired coordination, impaired fine motor, impaired cardiopulmonary response to activity    Rehab potential is good.    Activity tolerance: Good    Discharge recommendations: Discharge Facility/Level Of Care Needs: nursing facility, skilled     Barriers to discharge: Barriers to Discharge: Decreased caregiver support    Equipment recommendations: none     GOALS:    Occupational Therapy Goals     Not on file          Multidisciplinary Problems (Resolved)        Problem: Occupational Therapy Goal    Goal Priority Disciplines Outcome Interventions   Occupational Therapy Goal   (Resolved)     OT, PT/OT Outcome(s) achieved    Description:  Goals to be met by: 8/10/2017     Patient will increase functional independence with ADLs by performing:    Feeding with Set-up Assistance and " AE if available.  UE Dressing with Set-up Assistance.  LE Dressing with Minimal Assistance.  Grooming while seated with Supervision.  Upper extremity exercise program with assistance as needed.                      Plan:  Patient to be seen 3 x/week to address the above listed problems via self-care/home management, therapeutic activities, therapeutic exercises  Plan of Care expires: 08/10/17  Plan of Care reviewed with: patient    OT G-codes  Self Care Current Status (): AMIRAH  Self Care Goal Status (): MICHAEL  Self Care Discharge Status (): ELBA Whitney, MS  08/09/2017

## 2017-08-09 NOTE — PLAN OF CARE
08/09/17 1720   Final Note   Assessment Type Final Discharge Note   Discharge Disposition SNF   What phone number can be called within the next 1-3 days to see how you are doing after discharge? ([facility] [phone])   Discharge/Hospital Encounter Summary to ([facility]) PCP No   Referral to Outpatient Case Management complete? n/a   Referral to / orders for Home Health Complete? No   30 day supply of medicines given at discharge, if documented non-compliance / non-adherence? No   Any social issues identified prior to discharge? No   Did you assess the readiness or willingness of the family or caregiver to support self management of care? Yes   Right Care Referral Info   Post Acute Recommendation SNF   Ivory Barton, RN, BSN, STN [facility]  8/9/2017

## 2017-08-09 NOTE — PROGRESS NOTES
TN spoke to amando Lam regarding d/c planning, To snf if approved, to [facility] if approved, and if not approved for the above options home with home health with sitters.  Girlfriend present at night but can not lift pt...Ivory Barton RN, BSN, [facility]  8/9/2017    '   Inquired about psy consult..Ivory Barton RN, BSN, [facility]  8/9/2017

## 2017-08-09 NOTE — PT/OT/SLP PROGRESS
Physical Therapy  Treatment    Tenzin Ortiz   MRN: [id number]   Admitting Diagnosis: Acute encephalopathy    PT Received On: 08/09/17  PT Start Time: 1505     PT Stop Time: 1539    PT Total Time (min): 34 min   Co-tx with OT    Billable Minutes:  Therapeutic Exercise 15    Treatment Type: Treatment  PT/PTA: PTA     PTA Visit Number: 2       General Precautions: Standard, fall, vision impaired  Orthopedic Precautions: N/A   Braces:           Subjective:  Communicated with pt's nurse, Meka, prior to session.  Pt supine in bed playing with brief upon entering room.    Pain/Comfort  Pain Rating 1: 0/10    Objective:   Patient found with: telemetry, bed alarm, peripheral IV (AVASYS)    Functional Mobility:  Bed Mobility:   Rolling to the Right: Maximum Assistance  Rolling to Left: Maximum Assistance  Supine to Sit: Maximum Assistance  Sit to Supine: Maximum Assistance    Transfers:  Sit <> Stand Assistance: Maximum Assistance (to stand from EOB; max vc's for hand placement)  Sit <> Stand Assistive Device: Rolling Walker    Gait:   Gait Distance: ~6ft with pt ambulating on toes. Pt with decreased knee extension noted.  Pt with increased posterior leaning and difficulty initiating movement.    Assistance 1: Maximum assistance  Gait Assistive Device: Rolling walker  Gait Pattern: 3-point gait  Gait Deviation(s): decreased nneka, increased time in double stance, decreased velocity of limb motion, decreased step length, decreased weight-shifting ability, decreased stride length      Balance:   Static Sit: POOR+: Needs MINIMAL assist to maintain  Dynamic Sit: FAIR: Cannot move trunk without losing balance  Static Stand: 0: Needs MAXIMAL assist to maintain   Dynamic stand: POOR: N/A     Therapeutic Activities and Exercises:  Pt performed seated therex to BLEs x15 reps including hip flexion, LAQs, APs, knee flexion, hip abduction, hip adduction  Pt requires max verbal and tactile cues to complete exercises.  Pt is easily  distracted and redirection.     Therapist assisted pt with brief change.  Pt rolled R/L for doffing and donning of brief as well as perineal care.  Redness to pt's scrotum noted.  Pt's nurse notified.  AM-PAC 6 CLICK MOBILITY  How much help from another person does this patient currently need?   1 = Unable, Total/Dependent Assistance  2 = A lot, Maximum/Moderate Assistance  3 = A little, Minimum/Contact Guard/Supervision  4 = None, Modified [place]/Independent         AM-PAC Raw Score CMS G-Code Modifier Level of Impairment Assistance   6 % Total / Unable   7 - 9 CM 80 - 100% Maximal Assist   10 - 14 CL 60 - 80% Moderate Assist   15 - 19 CK 40 - 60% Moderate Assist   20 - 22 CJ 20 - 40% Minimal Assist   23 CI 1-20% SBA / CGA   24 CH 0% Independent/ Mod I     Patient left supine in bed with prevalon boots with all lines intact, call button in reach, bed alarm on and pt's nurse, Meka, notified.    Assessment:  Tenzin Ortiz is a 76 y.o. male with a medical diagnosis of Acute encephalopathy and presents with decreased cognition limiting pt's ability with ambulation.  Pt with difficulty initiating movement gait today.      Rehab identified problem list/impairments: Rehab identified problem list/impairments: weakness, impaired endurance, impaired functional mobilty, impaired balance, impaired cognition, decreased upper extremity function, decreased safety awareness, impaired skin, impaired coordination, decreased lower extremity function, decreased coordination, visual deficits, gait instability, impaired self care skills, decreased ROM, impaired joint extensibility    Rehab potential is fair.    Activity tolerance: Fair    Discharge recommendations: Discharge Facility/Level Of Care Needs:  (nursing facility, skilled)     Barriers to discharge: Barriers to Discharge: Decreased caregiver support (decreased cognition and safety)    Equipment recommendations: Equipment Needed After Discharge: none     GOALS:     Physical Therapy Goals     Not on file          Multidisciplinary Problems (Resolved)        Problem: Physical Therapy Goal    Goal Priority Disciplines Outcome Goal Variances Interventions   Physical Therapy Goal   (Resolved)     PT/OT, PT Outcome(s) achieved     Description:  Goals to be met by: 17     Patient will increase functional independence with mobility by performin. Supine to sit with Supervision  2. Rolling to Left and Right with Supervision  3. Sit to stand transfer with Supervision using RW  4. Bed to chair transfer with Supervision using Rolling Walker  5. Gait 500 feet with Supervision using Rolling Walker   6. Lower extremity exercise program x20 reps per handout, with supervision                      PLAN:    Patient to be seen 5 x/week  to address the above listed problems via gait training, therapeutic activities, therapeutic exercises  Plan of Care expires: 17  Plan of Care reviewed with: patient         Citlali Jeannie, PTA  2017

## 2017-08-09 NOTE — PLAN OF CARE
Problem: Fall Risk (Adult)  Intervention: Patient Rounds   08/09/17 0209   Safety Interventions   Patient Rounds bed in low position;bed wheels locked;call light in reach;clutter free environment maintained;ID band on;placement of personal items at bedside;toileting offered;visualized patient     Intervention: Safety Promotion/Fall Prevention   08/09/17 0209   Safety Interventions   Safety Promotion/Fall Prevention assistive device/personal item within reach;bed alarm set;diversional activities provided;Fall Risk reviewed with patient/family;Fall Risk signage in place;lighting adjusted;medications reviewed;nonskid shoes/socks when out of bed;side rails raised x 3;/camera at bedside     Intervention: Safety Precautions   08/09/17 0209   Safety Interventions   Safety Precautions emergency equipment at bedside       Goal: Absence of Falls  Patient will demonstrate the desired outcomes by discharge/transition of care.   Outcome: Ongoing (interventions implemented as appropriate)   08/09/17 0209   Fall Risk (Adult)   Absence of Falls making progress toward outcome

## 2017-08-09 NOTE — PROGRESS NOTES
Carole Lam [phone], says she will be here  At noon to see her father.  TN informed awaiting [facility] authorization..Ivory Barton RN, BSN, [facility]  8/9/2017

## 2017-08-09 NOTE — PROGRESS NOTES
TN sent updated orders to Eric at [facility] at [phone], cxr, pasrr and 142 as requested..Ivory Barton RN, BSN, [facility]  8/9/2017    Awaiting ppd to send..Ivory Barton RN, BSN, [facility]  8/9/2017

## 2017-08-09 NOTE — PROGRESS NOTES
"[facility]  Neurology  Progress Note    Patient Name: Tenzin Ortiz  MRN: [id number]  Admission Date: 8/1/2017  Hospital Length of Stay: 8 days  Code Status: Full Code   Attending Provider: Kassandra Alonso MD  Primary Care Physician: Efrain Chavez MD   Principal Problem:Acute encephalopathy    Subjective:     Interval History: 77 y/o male with medical Hx as listed below brought to ED for AMS. Pt tells me he doesn't know what happened but per notes his significant other has been calling him and he wouldn't answer. When she arrived pt was moaning, not responding to her. Overnight pt remained non-verbal; not following commands.      -8/3/17: Mr. Ortiz with no acute issues overnight. Family reports that at times pt confused and even hallucinates.     -8/4/17: Pt been confused since yesterday evening. Hallucinating. Not cooperating. Says: "get outta here".     -8/5/17: Pt refusing medications. Confused.     -8/6/17: Today pt is calm. Able to take oral medications.     -8/7/17: Pt is able to converse with clinician. His significant other states that he has been refusing to go to nursing home.     -8/8/17: Tremors better this morning. No acute issues reported overnight.    -8/9/17: No agitation reported.     Current Neurological Medications:     Current Facility-Administered Medications   Medication Dose Route Frequency Provider Last Rate Last Dose    acetaminophen tablet 650 mg  650 mg Oral Q4H PRN Cami Crow MD   650 mg at 08/04/17 0901    amlodipine tablet 10 mg  10 mg Oral Daily Cami Crow MD   10 mg at 08/09/17 0920    atorvastatin tablet 10 mg  10 mg Oral Daily Cami Crow MD   10 mg at 08/09/17 0920    carbidopa-levodopa  mg per tablet 1 tablet  1 tablet Oral QID Cami Crow MD   1 tablet at 08/09/17 0531    cyclobenzaprine tablet 10 mg  10 mg Oral TID PRN Cami Crow MD   10 mg at 08/09/17 0921    docusate sodium capsule 100 mg  100 mg Oral BID " Pietro Paige MD   100 mg at 08/09/17 0920    famotidine tablet 20 mg  20 mg Oral BID Cami Crow MD   20 mg at 08/09/17 0921    gabapentin capsule 600 mg  600 mg Oral QHS Cami Crow MD   600 mg at 08/08/17 2119    pneumoc 13-bryon conj-dip cr(PF) 0.5 mL  0.5 mL Intramuscular vaccine x 1 dose Pietro Paige MD        ramelteon tablet 8 mg  8 mg Oral QHS Johny Aldrich MD   8 mg at 08/08/17 2119    rivastigmine 4.6 mg/24 hr 1 patch  1 patch Transdermal Daily Johny Aldrich MD   1 patch at 08/09/17 0921    warfarin (COUMADIN) tablet 3 mg  3 mg Oral Daily Cami Crow MD   3 mg at 08/08/17 1746       Review of Systems   Constitutional: Negative for fever.   HENT: Negative for trouble swallowing.    Eyes: Negative for photophobia.   Respiratory: Negative for shortness of breath.    Cardiovascular: Negative for chest pain.   Gastrointestinal: Negative for abdominal pain.   Genitourinary: Negative for dysuria.   Musculoskeletal: Negative for back pain.   Neurological: Positive for tremors. Negative for dizziness and headaches, weakness, numbness, or vertigo.    Objective:     Vital Signs (Most Recent):  Temp: 99 °F (37.2 °C) (08/09/17 0700)  Pulse: 85 (08/09/17 0700)  Resp: 18 (08/09/17 0700)  BP: (!) 146/67 (08/09/17 0700)  SpO2: 98 % (08/09/17 0837) Vital Signs (24h Range):  Temp:  [97.2 °F (36.2 °C)-99 °F (37.2 °C)] 99 °F (37.2 °C)  Pulse:  [58-94] 85  Resp:  [18-19] 18  SpO2:  [95 %-98 %] 98 %  BP: (123-157)/(67-85) 146/67     Weight: 66.1 kg (145 lb 11.6 oz)  Body mass index is 22.82 kg/m².    Physical Exam  Constitutional: well-developed; no distress  Head: normocephalic  Eyes: conjunctivae/corneas clear.  Nose: no discharge, no epistaxis  Heart: regular rate and rhythm.  Abdomen: no pain to palpation  Extremities: no edema  Neurologic: Mental status: alert; oriented to person, place  Cranial nerves: pupils are round at 3 mm and reactive to light; no nystagmus; no facial  weakness; tongue protrudes midline   Strength: elevates UE/LE's against gravity; offers 4+/5 effort in UE/LE's  Resting tremor in UE's right more than left       Significant Labs: CBC: No results for input(s): WBC, HGB, HCT, PLT in the last 48 hours.  CMP: No results for input(s): GLU, NA, K, CL, CO2, BUN, CREATININE, CALCIUM, MG, PROT, ALBUMIN, BILITOT, ALKPHOS, AST, ALT, ANIONGAP, EGFRNONAA in the last 48 hours.        Assessment and Plan:     77 y/o male consulted for AMS     1. AMS: Pt is presenting fluctuating mental status with hallucinations.                      -He has been taking PD meds medication but seems to be confused on which ones to take; he also repoerted to be taking too much of these meds at times. Has been taking Azilect but not Carbi/Levo. Not taking pramipexole due to side effects.                     -Rivastigmine 4.6 mg transdermal daily.                      -Ramelteon 8 mg nightly.                                        -Carbi/Levo 25/100 mg four times daily if medication is wearing off quickly will add entacapone 200 mg with each dose of Sinemet. For now tremors are better controlled                     -Nursing home placement pending.                     -Follow up with neurology upon D/C home.    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

## 2017-08-09 NOTE — PLAN OF CARE
Problem: Fall Risk (Adult)  Goal: Absence of Falls  Patient will demonstrate the desired outcomes by discharge/transition of care.   Outcome: Ongoing (interventions implemented as appropriate)   08/09/17 1634   Fall Risk (Adult)   Absence of Falls making progress toward outcome       Problem: Infection, Risk/Actual (Adult)  Goal: Infection Prevention/Resolution  Patient will demonstrate the desired outcomes by discharge/transition of care.   Outcome: Ongoing (interventions implemented as appropriate)   08/09/17 1634   Infection, Risk/Actual (Adult)   Infection Prevention/Resolution making progress toward outcome       Problem: Patient Care Overview  Goal: Plan of Care Review  Outcome: Ongoing (interventions implemented as appropriate)   08/09/17 1634   Coping/Psychosocial   Plan Of Care Reviewed With patient   Patient remains alert with confusion noted. NAD noted. Worked with physical therapy today. Tolerated well. Patient being discharged to [facility]. Report called to facility. Awaiting transport. Safety precautions in place.     Problem: Pressure Ulcer Risk (Mike Scale) (Adult,Obstetrics,Pediatric)  Goal: Skin Integrity  Patient will demonstrate the desired outcomes by discharge/transition of care.   Outcome: Ongoing (interventions implemented as appropriate)   08/09/17 1634   Pressure Ulcer Risk (Mike Scale) (Adult,Obstetrics,Pediatric)   Skin Integrity making progress toward outcome

## 2017-08-09 NOTE — PROGRESS NOTES
TN called  dtr Carole to inform of pt discharge to [facility] as authorization has been approved by insurance.  Carole says Eric from [facility] admission had called her and she was still in agreement with her father's d/c to snf..Ivory Barton RN, BSN, [facility]  08/08/2017

## 2017-08-09 NOTE — PROGRESS NOTES
Eric with [facility] says awaiting for SNF authrorization..Ivory Barton RN, BSN, [facility]  8/9/2017

## 2017-08-09 NOTE — PLAN OF CARE
Problem: Physical Therapy Goal  Goal: Physical Therapy Goal  Goals to be met by: 17     Patient will increase functional independence with mobility by performin. Supine to sit with Supervision  2. Rolling to Left and Right with Supervision  3. Sit to stand transfer with Supervision using RW  4. Bed to chair transfer with Supervision using Rolling Walker  5. Gait 500 feet with Supervision using Rolling Walker   6. Lower extremity exercise program x20 reps per handout, with supervision     Outcome: Ongoing (interventions implemented as appropriate)  Pt displays difficulty with initiating movement with ambulation today.  Pt limited 2/2 cognition.

## 2017-08-09 NOTE — PROGRESS NOTES
Eric from [facility] says authorization for snf by [employer] approved..Ivory Barton RN, BSN, [facility]  8/9/2017

## 2017-08-09 NOTE — PROGRESS NOTES
TN awaiting ppd form to send as requested by Eric from [facility]..Ivory Barton RN, BSN, [facility]  8/9/2017    PPd  Was sent to [facility]..Ivory Barton RN, BSN, [facility]  8/9/2017

## 2017-08-10 ENCOUNTER — PATIENT OUTREACH (OUTPATIENT)
Dept: ADMINISTRATIVE | Facility: CLINIC | Age: 77
End: 2017-08-10

## 2017-08-10 NOTE — NURSING
Report received from WM Justin RN. Pt is due to be discharge to SNF ([facility]). Report has already been call for pt. Pt is no longer on telemetry and PIV has been discontinued. Pt has no distress noted. Will continue to monitor until transport comes to bring pt to [facility].

## 2017-08-10 NOTE — NURSING
Transport personnel here to bring pt to [facility]. Assisted pt with getting into wheelchair. Pt has distress noted. Pt escorted downstairs by transport personnel and PCT.

## 2017-08-10 NOTE — PT/OT/SLP DISCHARGE
Physical Therapy Discharge Summary    Tenzin Ortiz  MRN: [id number]   Acute encephalopathy   Patient Discharged from acute Physical Therapy on 17.  Please refer to prior PT notes for functional status.     Assessment:   Patient has not met goals.  GOALS:    Physical Therapy Goals        Problem: Physical Therapy Goal    Goal Priority Disciplines Outcome Goal Variances Interventions   Physical Therapy Goal     PT/OT, PT Ongoing (interventions implemented as appropriate)     Description:  Goals to be met by: 17     Patient will increase functional independence with mobility by performin. Supine to sit with Supervision  2. Rolling to Left and Right with Supervision  3. Sit to stand transfer with Supervision using RW  4. Bed to chair transfer with Supervision using Rolling Walker  5. Gait 500 feet with Supervision using Rolling Walker   6. Lower extremity exercise program x20 reps per handout, with supervision                     Reasons for Discontinuation of Therapy Services  Transfer to alternate level of care.      Plan:  Patient Discharged to: Skilled Nursing Facility.

## 2017-08-10 NOTE — PROGRESS NOTES
Daughter expressed to TN prior to girlfriend Ms. Juárez arrival to pt room, that she would advise girlfriend on a need to know basis.  Dtr says she is still trying to contact pt's .    Daughter, Craole and patient's girlfriend/ Ms. Juárez present in pt room.  Patient is in and out of confusion.  Daughter and girlfriend agrees that tp would benefit from snf.  The discharge plans that were discussed were:A.  SNF at [facility], B. NH placement, C. Home with home health and around the clock sitters.  Daughter and girlfriend informed that [facility] snf was awaiting authorization and could be discharged from hospital at anytime, since a discharge order had been placed on the chart since yesterday. Both the dtr and the girlfriend agrees that pt should be placed in snf or [facility] for now.    When the patient was asked if he could afford around the clock sitters pt did not know.  He said he receives $6,000/month and lives with Ms. Juárez...Ivory Barton RN, BSN, [facility]  8/9/2017

## 2017-08-11 DIAGNOSIS — Z79.01 LONG-TERM (CURRENT) USE OF ANTICOAGULANTS: ICD-10-CM

## 2017-08-11 DIAGNOSIS — I74.10 THROMBUS OF AORTA: ICD-10-CM

## 2017-08-11 NOTE — SUBJECTIVE & OBJECTIVE
Interval History: Pt without new c/o, feeling okay.    Review of Systems   Constitutional: Negative for fever.   Respiratory: Negative for shortness of breath.    Cardiovascular: Negative for chest pain.     Objective:     Vital signs: Reviewed    Body mass index is 22.82 kg/m².  No intake or output data in the 24 hours: Reviewed    Physical Exam   Constitutional: He appears well-nourished.   HENT:   Head: Normocephalic.   Eyes: Pupils are equal, round, and reactive to light.   Neck: Normal range of motion. Neck supple.   Cardiovascular: Normal rate and regular rhythm.    Pulmonary/Chest: Effort normal and breath sounds normal.   Abdominal: Soft. Bowel sounds are normal.   Musculoskeletal: Normal range of motion.   Neurological: He is alert.   Oriented x 2.   Skin: Skin is warm and dry.   Psychiatric: He has a normal mood and affect.   Vitals reviewed.      Significant Labs: All pertinent labs within the past 24 hours have been reviewed.    Significant Imaging: I have reviewed and interpreted all pertinent imaging results/findings within the past 24 hours.

## 2017-08-11 NOTE — DISCHARGE SUMMARY
[facility] Hospital Medicine  Discharge Summary      Patient Name: Tenzin Ortiz  MRN: [id number]  Admission Date: 8/1/2017  Hospital Length of Stay: 8 days  Discharge Date and Time: 8/9/2017 11:00 PM  Attending Physician: Kassandra Alonso MD  Discharging Provider: Kassandra Alonso MD  Primary Care Provider: Efrain Chavez MD      HPI:   Mr. Tenzin Ortiz is a 75 yo man with essential hypertension, hyperlipidemia (LDL 93.2 Apr 2017), peripheral vascular disease, Parkinson disease, and history of aortic thrombus on chronic anticoagulation who presented to [facility] ED with complaints of confusion for two days.  His girlfriend notes that he appeared ill for the past two days and has been mumbling at home.  She attempted to call him today but he wasn't answering his phone calls.  When she arrived to his home he was nonverbal and was moaning.  EMS was activated and noted that he had purposeful movement.  He was administered some naloxone without improvement of his mentation.  Further history is limited at this time.    * No surgery found *      Indwelling Lines/Drains at time of discharge:   Lines/Drains/Airways          No matching active lines, drains, or airways        Hospital Course:   Mr. Ortiz was admitted with possible STEMI. The initial EKG showed no evidence of acute ischemia. However, he was then noted to have what appeared to be ST elevations on his telemetry.  Repeat EKG in the ER thought to be consistent with STEMI. Emergent code STEMI activated.  Approximately 1-2 minutes after STEMI was seen on telemetry, repeat EKG was performed and his ST segments normalized. Cardiology did not feel emergent LHC was warranted given normalization. Troponin trended and remained unremarkable. He was further worked up with CTA chest w/o dissection. Echo w/ EF of 55%, no diastolic dysfunction, no reported wall motion abnormality. The patient was confused and his work up for encephalopathy was mostly unremarkable.  CT head, infectious work up, electrolytes, and ammonia level were all normal. His encephalopathy improved spontaneously the morning following admission. He was alert and oriented to place, date, and identifies his brother who called to check on him. He did however keep asking when he's going for his operation despite being told several times he's not getting an operation. Utox positive for amphetamines which may be a false positive (trazodone on home med list is known to cause this). He is non-compliant with his Parkinson's medication and sometimes taking more than prescribed. His girlfriend brought in his medications and nursing rec'ed in the computer. He was apparently taking Mirapex; he has a history of hallucinations due to Mirapex. She also brought CBD oil, a cannabinoid oil, that he took a few days prior to presentation. It was suspected this could be the reason for his bizarre symptoms and it was suspected that could have also taken too much trazodone and/or tramadol. The other likely etiology was advancement in his Parkinson's and he had some features of Lewy bodies dementia given his hallucinations that persisted.  Neurology continued to follow the patient and adjusted meds. The patient was initially sent to the ICU but transferred to the floor on 8/3 since he did not have a STEMI.  He was stabilized on his medication regimen but still with some hallucinations due to his Parkinsons with Lewy body dementia features. Pt worked with PT/OT and was arranged for SNF.        Consults:   Consults         Status Ordering Provider     Inpatient consult to Cardiology  Once     Provider:  Anjel Mackenzie MD    Completed JACLYN ZAMBRANO     Inpatient consult to Neurology  Once     Provider:  Johny Aldrich MD    Completed MALLORY WHITTEN     Inpatient consult to SNF  Once     Provider:  (Not yet assigned)    Completed MARINO MATA     IP consult to dietary  Once     Provider:  (Not yet assigned)    Completed  NAVDEEP LAMA     Nutrition Services Referral  Once     Provider:  (Not yet assigned)    Completed NAVDEEP LAMA          Significant Diagnostic Studies:   CTA of chest/abd/pelvis (8/1/17):  No evidence of acute aortic dissection. Findings of improved caliber in the infrarenal abdominal aorta likely related to prior intervention/angioplasty.   Previously visualized abnormality in the aortic arch is not present on today's exam.  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  Small hiatal hernia and fluid within the esophagus.    Head CT (8/1/17):  No acute intracranial abnormality.    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           [facility].    Specialties:  Nursing Home Agency, SNF Agency  Why:  SNF  Contact information:  [address] [place] [address] [address]  [place] [id number] [phone]                 Patient Instructions:     Diet general   Order Comments: Cardiac.     Activity as tolerated       Medications:  Reconciled Home Medications:   Discharge Medication List as of 8/10/2017  5:48 AM      START taking these medications     Details   carbidopa-levodopa  mg (SINEMET)  mg per tablet Take 1 tablet by mouth 4 (four) times daily., Starting Tue 8/8/2017, Until Wed 8/8/2018, No Print      docusate sodium (COLACE) 100 MG capsule Take 1 capsule (100 mg total) by mouth 2 (two) times daily., Starting Tue 8/8/2017, OTC      famotidine (PEPCID) 20 MG tablet Take 1 tablet (20 mg total) by mouth 2 (two) times daily., Starting Thu 8/3/2017, Until Fri 8/3/2018, Normal      ramelteon (ROZEREM) 8 mg tablet Take 1 tablet (8 mg total) by mouth every evening., Starting Tue 8/8/2017, No Print      rivastigmine (EXELON) 4.6 mg/24 hr PT24 Place 1 patch onto the skin once daily., Starting Tue 8/8/2017, Until Wed 8/8/2018, No Print         CONTINUE these medications which have CHANGED    Details   atorvastatin (LIPITOR) 10 MG tablet Take 1 tablet (10 mg total) by mouth once daily., Starting Tue 8/8/2017, Until Wed 8/8/2018, No Print      cyclobenzaprine (FLEXERIL) 10 MG tablet Take 1 tablet (10 mg total) by mouth 3 (three) times daily as needed for Muscle spasms., Starting Tue 8/8/2017, Until Fri 8/18/2017, No Print      gabapentin (NEURONTIN) 300 MG capsule Take 2 capsules (600 mg total) by mouth every evening., Starting Tue 8/8/2017, Until Wed 8/8/2018, No Print         CONTINUE these medications which have NOT CHANGED    Details   acetaminophen (TYLENOL ARTHRITIS PAIN) 650 MG TbSR Take 650 mg by mouth every 8 (eight) hours., Until Discontinued, Historical Med      amlodipine (NORVASC) 10 MG tablet TAKE 1 TABLET BY MOUTH EVERY DAY, Normal      warfarin (COUMADIN) 3 MG tablet TAKE ONE BY MOUTH EVERY DAY OR AS DIRECTED BY COUMADIN CLINIC, Normal         STOP taking these medications       carbidopa-levodopa (PARCOPA)  mg per disintegrating tablet Comments:   Reason for Stopping:         clopidogrel (PLAVIX) 75 mg tablet Comments:   Reason for Stopping:         fluocinonide (LIDEX) 0.05 % ointment Comments:   Reason for Stopping:          gabapentin (NEURONTIN) 600 MG tablet Comments:   Reason for Stopping:         pramipexole (MIRAPEX) 1 MG tablet Comments:   Reason for Stopping:         rasagiline (AZILECT) 1 mg Tab Comments:   Reason for Stopping:         tramadol (ULTRAM) 50 mg tablet Comments:   Reason for Stopping:         trazodone (DESYREL) 50 MG tablet Comments:   Reason for Stopping:             Time spent on the discharge of patient: 45 minutes    HOS POC IP DISCHARGE SUMMARY    Kassandra Alonso MD  Department of Hospital Medicine  [facility]

## 2017-08-11 NOTE — PROGRESS NOTES
[facility] Hospital Medicine  Progress Note    Patient Name: Tenzin Ortiz  MRN: [id number]  Patient Class: IP- Inpatient   Admission Date: 8/1/2017  Attending Physician: No att. providers found  Primary Care Provider: Efrain Chavez MD        Subjective:     Principal Problem:Acute encephalopathy    HPI:  Mr. 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  His girlfriend notes that he appeared ill for the past two days and has been mumbling at home.  She attempted to call him today but he wasn't answering his phone calls.  When she arrived to his home he was nonverbal and was moaning.  EMS was activated and noted that he had purposeful movement.  He was administered some naloxone without improvement of his mentation.  Further history is limited at this time.    Hospital Course:  Mr. Ortiz was admitted with possible STEMI. The initial EKG showed no evidence of acute ischemia. However, he was then noted to have what appeared to be ST elevations on his telemetry.  Repeat EKG in the ER thought to be consistent with STEMI. Emergent code STEMI activated.  Approximately 1-2 minutes after STEMI was seen on telemetry, repeat EKG was performed and his ST segments normalized. Cardiology did not feel emergent LHC was warranted given normalization. Troponin trended and remained unremarkable. He was further worked up with CTA chest w/o dissection. Echo w/ EF of 55%, no diastolic dysfunction, no reported wall motion abnormality. The patient was confused and his work up for encephalopathy was mostly unremarkable. CT head, infectious work up, electrolytes, and ammonia level were all normal. His encephalopathy improved spontaneously the morning following admission. He was alert and oriented to  place, date, and identifies his brother who called to check on him. He did however keep asking when he's going for his operation despite being told several times he's not getting an operation. Utox positive for amphetamines which may be a false positive (trazodone on home med list is known to cause this). He is non-compliant with his Parkinson's medication and sometimes taking more than prescribed. His girlfriend brought in his medications and nursing rec'ed in the computer. He was apparently taking Mirapex; he has a history of hallucinations due to Mirapex. She also brought CBD oil, a cannabinoid oil, that he took a few days prior to presentation. It was suspected this could be the reason for his bizarre symptoms and it was suspected that could have also taken too much trazodone and/or tramadol. The other likely etiology was advancement in his Parkinson's and he had some features of Lewy bodies dementia given his hallucinations that persisted.  Neurology continued to follow the patient and adjusted meds. The patient was initially sent to the ICU but transferred to the floor on 8/3 since he did not have a STEMI.  He was stabilized on his medication regimen but still with some hallucinations due to his Parkinsons with Lewy body dementia features. Pt worked with PT/OT and was arranged for SNF.       Interval History: Pt without new c/o, feeling okay.    Review of Systems   Constitutional: Negative for fever.   Respiratory: Negative for shortness of breath.    Cardiovascular: Negative for chest pain.     Objective:     Vital signs: Reviewed    Body mass index is 22.82 kg/m².  No intake or output data in the 24 hours: Reviewed    Physical Exam   Constitutional: He appears well-nourished.   HENT:   Head: Normocephalic.   Eyes: Pupils are equal, round, and reactive to light.   Neck: Normal range of motion. Neck supple.   Cardiovascular: Normal rate and regular rhythm.    Pulmonary/Chest: Effort normal and breath sounds  normal.   Abdominal: Soft. Bowel sounds are normal.   Musculoskeletal: Normal range of motion.   Neurological: He is alert.   Oriented x 2.   Skin: Skin is warm and dry.   Psychiatric: He has a normal mood and affect.   Vitals reviewed.      Significant Labs: All pertinent labs within the past 24 hours have been reviewed.    Significant Imaging: I have reviewed and interpreted all pertinent imaging results/findings within the past 24 hours.    Assessment/Plan:      Chronic anticoagulation    As addressed- however, may not be the best candidate for anti-coagulation         Hyperlipidemia    Poorly-controlled; will continue patient's home regimen of atorvastatin.        Essential hypertension    Continued home regimen of amlodipine with improved control.        ECG abnormality    See hospital course. Appreciate cardiology recs.        PAD (peripheral artery disease)    Will continue his home regimen of atorvastatin and clopidogrel.        Mild malnutrition    No acute issues.         History of aortic thrombus    Stable; CTA chest with resolution of the clot; will continue his home regimen of warfarin with daily INR.        Parkinson disease    Stable; will continue his home regimen of carbidopa/levodopa.        * Acute encephalopathy    Likely medicine related given multiple Parkinson's drugs, CBD oil, trazadone, and tramadol. Also suspect he has progression of his Parkinson's with likely Lewy bodies dementia.  Patient is not safe to go home. Needs placement. Neuro consulted- made adjustments in meds. Remains confused intermittently which is likely his baseline.  Working on NH placement.           VTE Risk Mitigation     None          Kassandra Alonso MD  Department of Hospital Medicine   [facility]

## 2017-08-14 ENCOUNTER — DOCUMENTATION ONLY (OUTPATIENT)
Dept: REHABILITATION | Facility: HOSPITAL | Age: 77
End: 2017-08-14

## 2017-08-14 RX ORDER — WARFARIN 3 MG/1
TABLET ORAL
Qty: 30 TABLET | Refills: 0 | Status: SHIPPED | OUTPATIENT
Start: 2017-08-14 | End: 2017-08-17 | Stop reason: SDUPTHER

## 2017-08-14 NOTE — PROGRESS NOTES
Outpatient Physical Therapy Discharge Note    Name: Tenzin Ortiz   [facility] Number: [id number]   Age: 76 y.o.   Diagnosis: Parkinson's  Physician: Bhargav Allred MD  Original Orders : PT eval and treat  Initial visit: 5/25/2017  Date of Last visit: 7/27/2017  Date of Discharge Note:  8/14/2017  Total Visits Received: 13  Missed Visits: 6    Objective:  Treatment :    Included:Therapeutic exercise, Stretching, Balance and Coordination ex and gait training and education in a home exercise program       Please see progress note on 7/25/2017 for status at last reassessment     Assessment: Pt presented to ED on 8/2/17 with altered mental status and was admitted to Hospital.     Goals Not achieved and why: Change in status    Discharge reason : Hospital admission    Discharge plan :Pt to follow-up with MD as planned    Plan:  This patient is discharged from Outpatient Physical Therapy Services.     Suzi Pierson, PT

## 2017-08-17 DIAGNOSIS — I74.10 THROMBUS OF AORTA: ICD-10-CM

## 2017-08-17 DIAGNOSIS — Z79.01 LONG-TERM (CURRENT) USE OF ANTICOAGULANTS: ICD-10-CM

## 2017-08-17 RX ORDER — WARFARIN 3 MG/1
TABLET ORAL
Qty: 30 TABLET | Refills: 0 | Status: SHIPPED | OUTPATIENT
Start: 2017-08-17 | End: 2017-08-17 | Stop reason: SDUPTHER

## 2017-08-17 RX ORDER — WARFARIN 3 MG/1
TABLET ORAL
Qty: 90 TABLET | Refills: 0 | Status: SHIPPED | OUTPATIENT
Start: 2017-08-17 | End: 2017-08-31 | Stop reason: SDUPTHER

## 2017-08-25 ENCOUNTER — TELEPHONE (OUTPATIENT)
Dept: FAMILY MEDICINE | Facility: CLINIC | Age: 77
End: 2017-08-25

## 2017-08-25 DIAGNOSIS — Z12.11 COLON CANCER SCREENING: ICD-10-CM

## 2017-08-25 NOTE — TELEPHONE ENCOUNTER
----- Message from Olivia Ball sent at 8/25/2017 11:10 AM CDT -----  Contact: SELF  Pt calling to see if he can get something called in to help him stop urinating on himself at night. Pt uses [facility] on [address]. Pt can be reached at [phone]

## 2017-08-28 NOTE — PROGRESS NOTES
Patient has been discharged from SNF 8/25 Friday . Patient stated he has not taken any coumadin or started any new medications.

## 2017-08-28 NOTE — PROGRESS NOTES
The pt was admitted to [facility] on 8/1 through 8/10 for possible STEMI.  He had been discharged to [facility] SNF.  I was able to reach the pt today.  He will resume his coumadin therapy today and will take the past recommended dosing we have in the computer.  He couldn't confirm his  agency name, but gave me his nurse's number - Madya ([phone]).  I called and [facility] to find out the agency name and fax over an order.

## 2017-08-29 ENCOUNTER — TELEPHONE (OUTPATIENT)
Dept: FAMILY MEDICINE | Facility: CLINIC | Age: 77
End: 2017-08-29

## 2017-08-29 DIAGNOSIS — G20.A1 PARKINSON DISEASE: Primary | Chronic | ICD-10-CM

## 2017-08-29 NOTE — TELEPHONE ENCOUNTER
----- Message from Fabiola Gillis sent at 8/29/2017  2:13 PM CDT -----  Contact: Leatha//[phone]  Leatha is requesting patient's clinic notes and orders to be sent to [facility] regarding a nurse home visit. She did not leave a fax or telephone number. Thank you.

## 2017-08-29 NOTE — PROGRESS NOTES
I was able to find out that the pt is currently receiving PT through [facility].  I called and spoke with Lazara in that office.  She let me know that the pt is only receiving PT and does not have orders for nursing.  They will not be able to get an INR for us.    The pt told me that he is homebound and cannot get to the lab.  He has already contacted his PCP to add nursing to his home health orders.  I have also sent Dr. Cahvez a message in regards to this and will f/u tomorrow.

## 2017-08-29 NOTE — TELEPHONE ENCOUNTER
Patient was d/c from the hospital with PT/OT & Speech orders however when one of therapist arrived Mr. Ortiz was very confused about his medications and it was suggested that he may needs to have home health evaluation per patient's bao Zhang. Orders pended please sign if appropriate.   [facility]   [address]   [address]  [place]   Ph. [phone]  Fx. [phone]

## 2017-08-30 ENCOUNTER — TELEPHONE (OUTPATIENT)
Dept: FAMILY MEDICINE | Facility: CLINIC | Age: 77
End: 2017-08-30

## 2017-08-30 DIAGNOSIS — R32 URINARY INCONTINENCE, UNSPECIFIED TYPE: Primary | ICD-10-CM

## 2017-08-30 NOTE — TELEPHONE ENCOUNTER
Patient care giver called and said that patient is having urine incontinent at night and is requesting a script to be faxed to [facility] at [phone] for condom catheters.

## 2017-08-30 NOTE — TELEPHONE ENCOUNTER
----- Message from Suri Ansari sent at 8/30/2017 11:14 AM CDT -----  Contact: isaías Yang is requesting orders for the pt to receive nursing [facility]. please advise.    [phone]   [phone] fx

## 2017-08-31 ENCOUNTER — OFFICE VISIT (OUTPATIENT)
Dept: FAMILY MEDICINE | Facility: CLINIC | Age: 77
End: 2017-08-31
Payer: MEDICARE

## 2017-08-31 VITALS
HEART RATE: 87 BPM | HEIGHT: 67 IN | OXYGEN SATURATION: 97 % | RESPIRATION RATE: 18 BRPM | SYSTOLIC BLOOD PRESSURE: 108 MMHG | TEMPERATURE: 99 F | DIASTOLIC BLOOD PRESSURE: 72 MMHG

## 2017-08-31 DIAGNOSIS — I10 ESSENTIAL HYPERTENSION: Chronic | ICD-10-CM

## 2017-08-31 DIAGNOSIS — E78.5 HYPERLIPIDEMIA, UNSPECIFIED HYPERLIPIDEMIA TYPE: Chronic | ICD-10-CM

## 2017-08-31 DIAGNOSIS — G47.9 SLEEP DISTURBANCE: ICD-10-CM

## 2017-08-31 DIAGNOSIS — G31.83 LEWY BODY DEMENTIA WITHOUT BEHAVIORAL DISTURBANCE: ICD-10-CM

## 2017-08-31 DIAGNOSIS — G20.A1 PARKINSON DISEASE: Chronic | ICD-10-CM

## 2017-08-31 DIAGNOSIS — I74.10 THROMBUS OF AORTA: ICD-10-CM

## 2017-08-31 DIAGNOSIS — G93.40 ACUTE ENCEPHALOPATHY: Primary | ICD-10-CM

## 2017-08-31 DIAGNOSIS — F02.80 LEWY BODY DEMENTIA WITHOUT BEHAVIORAL DISTURBANCE: ICD-10-CM

## 2017-08-31 DIAGNOSIS — Z79.01 LONG-TERM (CURRENT) USE OF ANTICOAGULANTS: ICD-10-CM

## 2017-08-31 LAB — INR PPP: 1.9

## 2017-08-31 PROCEDURE — 99999 PR PBB SHADOW E&M-EST. PATIENT-LVL III: CPT | Mod: PBBFAC,,, | Performed by: INTERNAL MEDICINE

## 2017-08-31 PROCEDURE — 1159F MED LIST DOCD IN RCRD: CPT | Mod: S$GLB,,, | Performed by: INTERNAL MEDICINE

## 2017-08-31 PROCEDURE — 1126F AMNT PAIN NOTED NONE PRSNT: CPT | Mod: S$GLB,,, | Performed by: INTERNAL MEDICINE

## 2017-08-31 PROCEDURE — 3008F BODY MASS INDEX DOCD: CPT | Mod: S$GLB,,, | Performed by: INTERNAL MEDICINE

## 2017-08-31 PROCEDURE — 99214 OFFICE O/P EST MOD 30 MIN: CPT | Mod: S$GLB,,, | Performed by: INTERNAL MEDICINE

## 2017-08-31 RX ORDER — GABAPENTIN 300 MG/1
CAPSULE ORAL
Qty: 180 CAPSULE | Refills: 1 | Status: ON HOLD | OUTPATIENT
Start: 2017-08-31 | End: 2018-05-03 | Stop reason: HOSPADM

## 2017-08-31 RX ORDER — RAMELTEON 8 MG/1
8 TABLET ORAL NIGHTLY
Qty: 30 TABLET | Refills: 1 | Status: SHIPPED | OUTPATIENT
Start: 2017-08-31 | End: 2017-10-20 | Stop reason: CLARIF

## 2017-08-31 RX ORDER — ATORVASTATIN CALCIUM 10 MG/1
TABLET, FILM COATED ORAL
Qty: 90 TABLET | Refills: 1 | Status: ON HOLD | OUTPATIENT
Start: 2017-08-31 | End: 2018-05-03 | Stop reason: HOSPADM

## 2017-08-31 RX ORDER — TRIAMCINOLONE ACETONIDE 1 MG/G
OINTMENT TOPICAL
Refills: 0 | COMMUNITY
Start: 2017-07-05 | End: 2017-08-31 | Stop reason: ALTCHOICE

## 2017-08-31 RX ORDER — CARBIDOPA AND LEVODOPA 25; 100 MG/1; MG/1
TABLET ORAL
Qty: 360 TABLET | Refills: 1 | Status: SHIPPED | OUTPATIENT
Start: 2017-08-31 | End: 2017-11-10 | Stop reason: SDUPTHER

## 2017-08-31 RX ORDER — GABAPENTIN 300 MG/1
600 CAPSULE ORAL NIGHTLY
Qty: 60 CAPSULE | Refills: 1 | Status: SHIPPED | OUTPATIENT
Start: 2017-08-31 | End: 2017-08-31 | Stop reason: SDUPTHER

## 2017-08-31 RX ORDER — CLOPIDOGREL BISULFATE 75 MG/1
75 TABLET ORAL DAILY
COMMUNITY
End: 2017-08-31 | Stop reason: ALTCHOICE

## 2017-08-31 RX ORDER — ATORVASTATIN CALCIUM 10 MG/1
10 TABLET, FILM COATED ORAL DAILY
Qty: 30 TABLET | Refills: 1 | Status: SHIPPED | OUTPATIENT
Start: 2017-08-31 | End: 2017-08-31 | Stop reason: SDUPTHER

## 2017-08-31 RX ORDER — WARFARIN 3 MG/1
TABLET ORAL
Qty: 90 TABLET | Refills: 0 | Status: SHIPPED | OUTPATIENT
Start: 2017-08-31 | End: 2018-01-18 | Stop reason: SDUPTHER

## 2017-08-31 RX ORDER — AMLODIPINE BESYLATE 10 MG/1
10 TABLET ORAL DAILY
Qty: 30 TABLET | Refills: 5 | Status: SHIPPED | OUTPATIENT
Start: 2017-08-31

## 2017-08-31 RX ORDER — CARBIDOPA AND LEVODOPA 25; 100 MG/1; MG/1
1 TABLET ORAL 4 TIMES DAILY
Qty: 120 TABLET | Refills: 1 | Status: SHIPPED | OUTPATIENT
Start: 2017-08-31 | End: 2017-08-31 | Stop reason: SDUPTHER

## 2017-08-31 RX ORDER — PRAMIPEXOLE DIHYDROCHLORIDE 1 MG/1
1 TABLET ORAL 4 TIMES DAILY
COMMUNITY
End: 2017-08-31 | Stop reason: ALTCHOICE

## 2017-08-31 RX ORDER — CYCLOBENZAPRINE HCL 10 MG
10 TABLET ORAL 3 TIMES DAILY PRN
COMMUNITY
End: 2017-08-31 | Stop reason: ALTCHOICE

## 2017-08-31 RX ORDER — RIVASTIGMINE 4.6 MG/24H
1 PATCH, EXTENDED RELEASE TRANSDERMAL DAILY
Qty: 30 PATCH | Refills: 1 | Status: SHIPPED | OUTPATIENT
Start: 2017-08-31 | End: 2017-09-01 | Stop reason: SDUPTHER

## 2017-08-31 NOTE — PROGRESS NOTES
Subjective:       Patient ID: Tenzin Ortiz is a 76 y.o. male.    Chief Complaint: Medication Management    He presents with his caregiver, Leatha, today to follow up after recent discharge from [facility].  He reports that he was discharged 6 days ago.  The primary concern is his medications.  He reports that he does not know what he should be taken and what not stated.  He has several medications from the nursing home.  Since discharge she has been eating well having regular bowel movements.  No falls.  He has had some incontinence at night and is now using condom catheters.  He does complain of stuttering since discharge and increased salivation.  He is in his usual health otherwise.      Review of Systems   Constitutional: Negative for appetite change and fever.   Cardiovascular: Negative for leg swelling.   Gastrointestinal: Negative for constipation and diarrhea.   Genitourinary: Negative for difficulty urinating.   Skin: Negative for wound.   Neurological: Positive for speech difficulty.       Objective:      Physical Exam   Constitutional: He is oriented to person, place, and time. He appears well-developed and well-nourished. No distress.   HENT:   Head: Normocephalic and atraumatic.   Right Ear: External ear normal.   Left Ear: External ear normal.   Eyes: Conjunctivae are normal. No scleral icterus.   Cardiovascular: Normal rate, regular rhythm and normal heart sounds.  Exam reveals no gallop and no friction rub.    No murmur heard.  Pulmonary/Chest: Effort normal and breath sounds normal. No respiratory distress. He has no wheezes. He has no rales.   Musculoskeletal: He exhibits no edema or tenderness.   Neurological: He is alert and oriented to person, place, and time. He displays tremor. No cranial nerve deficit. Coordination abnormal.   Skin: Skin is warm and dry. No rash noted.   Psychiatric: He has a normal mood and affect.   Vitals reviewed.      Assessment:       1. Acute  encephalopathy    2. Long-term (current) use of anticoagulants    3. Thrombus of aorta    4. Parkinson disease    5. Lewy body dementia without behavioral disturbance    6. Essential hypertension    7. Sleep disturbance    8. Hyperlipidemia, unspecified hyperlipidemia type        Plan:       Tenzin was seen today for medication management.    Diagnoses and all orders for this visit:    Acute encephalopathy - Discharge to SNF for PT.  Discharged home 1 week ago.  Now with home health and PT.  Inpatient work up negative and acute symptoms felt to be likely due to medications.  There has been some confusion about his medications since discharge.  We have reviewed his medications thoroughly.  We have given him a new medication list and documented the diagnosis for each medication.  He does complain of stuttering since discharge.      Long-term (current) use of anticoagulants - followed by coag clinic.  -     warfarin (COUMADIN) 3 MG tablet; TAKE 1 TABLET BY MOUTH EVERY DAY AS DIRECTED BY COUMADIN CLINIC    Thrombus of aorta  -     warfarin (COUMADIN) 3 MG tablet; TAKE 1 TABLET BY MOUTH EVERY DAY AS DIRECTED BY COUMADIN CLINIC  -     Discontinue: atorvastatin (LIPITOR) 10 MG tablet; Take 1 tablet (10 mg total) by mouth once daily.    Parkinson disease - reschedule with neurology.  He does complain of stuttering since discharge  -      gabapentin (NEURONTIN) 300 MG capsule; Take 2 capsules (600 mg total) by mouth every evening.  -     carbidopa-levodopa  mg (SINEMET)  mg per tablet; Take 1 tablet by mouth 4 (four) times daily.    Lewy body dementia without behavioral disturbance  -     rivastigmine (EXELON) 4.6 mg/24 hr PT24; Place 1 patch onto the skin once daily.    Essential hypertension - BP controlled   -     amlodipine (NORVASC) 10 MG tablet; Take 1 tablet (10 mg total) by mouth once daily.    Sleep disturbance  -     ramelteon (ROZEREM) 8 mg tablet; Take 1 tablet (8 mg total) by mouth every  evening.    Hyperlipidemia, unspecified hyperlipidemia type  -     Discontinue: atorvastatin (LIPITOR) 10 MG tablet; Take 1 tablet (10 mg total) by mouth once daily.       follow-up with PCP in one month

## 2017-09-01 ENCOUNTER — ANTI-COAG VISIT (OUTPATIENT)
Dept: CARDIOLOGY | Facility: CLINIC | Age: 77
End: 2017-09-01

## 2017-09-01 DIAGNOSIS — I74.10 THROMBUS OF AORTA: ICD-10-CM

## 2017-09-01 DIAGNOSIS — G31.83 LEWY BODY DEMENTIA WITHOUT BEHAVIORAL DISTURBANCE: ICD-10-CM

## 2017-09-01 DIAGNOSIS — Z79.01 LONG-TERM (CURRENT) USE OF ANTICOAGULANTS: ICD-10-CM

## 2017-09-01 DIAGNOSIS — F02.80 LEWY BODY DEMENTIA WITHOUT BEHAVIORAL DISTURBANCE: ICD-10-CM

## 2017-09-01 RX ORDER — RIVASTIGMINE 4.6 MG/24H
PATCH, EXTENDED RELEASE TRANSDERMAL
Qty: 90 PATCH | Refills: 1 | Status: SHIPPED | OUTPATIENT
Start: 2017-09-01 | End: 2017-09-07

## 2017-09-01 NOTE — PROGRESS NOTES
Verbal result taken from __Lazara_case manger ______. PT/INR 22/1.9 _______ Date drawn_8/31_______ Hardcopy to be faxed.

## 2017-09-03 ENCOUNTER — HOSPITAL ENCOUNTER (OUTPATIENT)
Facility: HOSPITAL | Age: 77
LOS: 1 days | Discharge: HOME | End: 2017-09-06
Attending: EMERGENCY MEDICINE | Admitting: INTERNAL MEDICINE
Payer: MEDICARE

## 2017-09-03 DIAGNOSIS — R53.81 DEBILITY: Chronic | ICD-10-CM

## 2017-09-03 DIAGNOSIS — R31.9 URINARY TRACT INFECTION WITH HEMATURIA, SITE UNSPECIFIED: ICD-10-CM

## 2017-09-03 DIAGNOSIS — G20.A1 PARKINSON DISEASE: Chronic | ICD-10-CM

## 2017-09-03 DIAGNOSIS — F02.80 LEWY BODY DEMENTIA WITHOUT BEHAVIORAL DISTURBANCE: Chronic | ICD-10-CM

## 2017-09-03 DIAGNOSIS — A41.9 SEPSIS, DUE TO UNSPECIFIED ORGANISM: ICD-10-CM

## 2017-09-03 DIAGNOSIS — G31.83 LEWY BODY DEMENTIA WITHOUT BEHAVIORAL DISTURBANCE: Chronic | ICD-10-CM

## 2017-09-03 DIAGNOSIS — I10 ESSENTIAL HYPERTENSION: Chronic | ICD-10-CM

## 2017-09-03 DIAGNOSIS — R00.0 TACHYCARDIA: ICD-10-CM

## 2017-09-03 DIAGNOSIS — N30.00 ACUTE CYSTITIS WITHOUT HEMATURIA: ICD-10-CM

## 2017-09-03 DIAGNOSIS — N39.0 URINARY TRACT INFECTION WITH HEMATURIA, SITE UNSPECIFIED: ICD-10-CM

## 2017-09-03 DIAGNOSIS — Z79.01 CHRONIC ANTICOAGULATION: Chronic | ICD-10-CM

## 2017-09-03 DIAGNOSIS — A41.51 SEPSIS DUE TO ESCHERICHIA COLI: Primary | ICD-10-CM

## 2017-09-03 DIAGNOSIS — E78.5 HYPERLIPIDEMIA, UNSPECIFIED HYPERLIPIDEMIA TYPE: Chronic | ICD-10-CM

## 2017-09-03 LAB
ALBUMIN SERPL BCP-MCNC: 3.4 G/DL
ALP SERPL-CCNC: 66 U/L
ALT SERPL W/O P-5'-P-CCNC: 5 U/L
ANION GAP SERPL CALC-SCNC: 8 MMOL/L
APTT BLDCRRT: 40.5 SEC
AST SERPL-CCNC: 13 U/L
BASOPHILS # BLD AUTO: 0.01 K/UL
BASOPHILS NFR BLD: 0.1 %
BILIRUB SERPL-MCNC: 0.4 MG/DL
BNP SERPL-MCNC: 22 PG/ML
BUN SERPL-MCNC: 20 MG/DL
CALCIUM SERPL-MCNC: 9 MG/DL
CHLORIDE SERPL-SCNC: 101 MMOL/L
CO2 SERPL-SCNC: 26 MMOL/L
CREAT SERPL-MCNC: 1 MG/DL
DIFFERENTIAL METHOD: ABNORMAL
EOSINOPHIL # BLD AUTO: 0.1 K/UL
EOSINOPHIL NFR BLD: 0.7 %
ERYTHROCYTE [DISTWIDTH] IN BLOOD BY AUTOMATED COUNT: 16.2 %
EST. GFR  (AFRICAN AMERICAN): >60 ML/MIN/1.73 M^2
EST. GFR  (NON AFRICAN AMERICAN): >60 ML/MIN/1.73 M^2
GLUCOSE SERPL-MCNC: 115 MG/DL
HCT VFR BLD AUTO: 37.5 %
HGB BLD-MCNC: 12.8 G/DL
INR PPP: 2.4
LACTATE SERPL-SCNC: 1 MMOL/L
LIPASE SERPL-CCNC: 14 U/L
LYMPHOCYTES # BLD AUTO: 1.8 K/UL
LYMPHOCYTES NFR BLD: 11.4 %
MAGNESIUM SERPL-MCNC: 1.9 MG/DL
MCH RBC QN AUTO: 27.2 PG
MCHC RBC AUTO-ENTMCNC: 34.1 G/DL
MCV RBC AUTO: 80 FL
MONOCYTES # BLD AUTO: 1.2 K/UL
MONOCYTES NFR BLD: 7.5 %
NEUTROPHILS # BLD AUTO: 12.6 K/UL
NEUTROPHILS NFR BLD: 80.3 %
PHOSPHATE SERPL-MCNC: 2.1 MG/DL
PLATELET # BLD AUTO: 151 K/UL
PMV BLD AUTO: 9.1 FL
POTASSIUM SERPL-SCNC: 4.2 MMOL/L
PROT SERPL-MCNC: 6.2 G/DL
PROTHROMBIN TIME: 24.5 SEC
RBC # BLD AUTO: 4.71 M/UL
SODIUM SERPL-SCNC: 135 MMOL/L
TROPONIN I SERPL DL<=0.01 NG/ML-MCNC: 0.01 NG/ML
TSH SERPL DL<=0.005 MIU/L-ACNC: 1.21 UIU/ML
WBC # BLD AUTO: 15.64 K/UL

## 2017-09-03 PROCEDURE — 87077 CULTURE AEROBIC IDENTIFY: CPT

## 2017-09-03 PROCEDURE — 84484 ASSAY OF TROPONIN QUANT: CPT

## 2017-09-03 PROCEDURE — 85025 COMPLETE CBC W/AUTO DIFF WBC: CPT

## 2017-09-03 PROCEDURE — 83605 ASSAY OF LACTIC ACID: CPT

## 2017-09-03 PROCEDURE — 85610 PROTHROMBIN TIME: CPT

## 2017-09-03 PROCEDURE — 83735 ASSAY OF MAGNESIUM: CPT

## 2017-09-03 PROCEDURE — 83880 ASSAY OF NATRIURETIC PEPTIDE: CPT

## 2017-09-03 PROCEDURE — 87040 BLOOD CULTURE FOR BACTERIA: CPT | Mod: 59

## 2017-09-03 PROCEDURE — 80053 COMPREHEN METABOLIC PANEL: CPT

## 2017-09-03 PROCEDURE — 87186 SC STD MICRODIL/AGAR DIL: CPT

## 2017-09-03 PROCEDURE — 99285 EMERGENCY DEPT VISIT HI MDM: CPT | Mod: 25

## 2017-09-03 PROCEDURE — 84443 ASSAY THYROID STIM HORMONE: CPT

## 2017-09-03 PROCEDURE — 81000 URINALYSIS NONAUTO W/SCOPE: CPT

## 2017-09-03 PROCEDURE — 25000003 PHARM REV CODE 250: Performed by: EMERGENCY MEDICINE

## 2017-09-03 PROCEDURE — 96360 HYDRATION IV INFUSION INIT: CPT

## 2017-09-03 PROCEDURE — 84100 ASSAY OF PHOSPHORUS: CPT

## 2017-09-03 PROCEDURE — 85730 THROMBOPLASTIN TIME PARTIAL: CPT

## 2017-09-03 PROCEDURE — 87088 URINE BACTERIA CULTURE: CPT

## 2017-09-03 PROCEDURE — 83690 ASSAY OF LIPASE: CPT

## 2017-09-03 PROCEDURE — 87086 URINE CULTURE/COLONY COUNT: CPT

## 2017-09-03 PROCEDURE — 96361 HYDRATE IV INFUSION ADD-ON: CPT

## 2017-09-03 RX ADMIN — SODIUM CHLORIDE 1974 ML: 0.9 INJECTION, SOLUTION INTRAVENOUS at 11:09

## 2017-09-04 PROBLEM — F02.80 LEWY BODY DEMENTIA: Chronic | Status: ACTIVE | Noted: 2017-08-09

## 2017-09-04 PROBLEM — A41.9 SEPSIS: Status: ACTIVE | Noted: 2017-09-04

## 2017-09-04 PROBLEM — N30.01 ACUTE CYSTITIS WITH HEMATURIA: Status: ACTIVE | Noted: 2017-09-04

## 2017-09-04 PROBLEM — G31.83 LEWY BODY DEMENTIA: Chronic | Status: ACTIVE | Noted: 2017-08-09

## 2017-09-04 LAB
BACTERIA #/AREA URNS HPF: ABNORMAL /HPF
BILIRUB UR QL STRIP: NEGATIVE
CLARITY UR: CLEAR
COLOR UR: ABNORMAL
GLUCOSE UR QL STRIP: NEGATIVE
HGB UR QL STRIP: ABNORMAL
KETONES UR QL STRIP: NEGATIVE
LACTATE SERPL-SCNC: 0.9 MMOL/L
LEUKOCYTE ESTERASE UR QL STRIP: ABNORMAL
MICROSCOPIC COMMENT: ABNORMAL
NITRITE UR QL STRIP: NEGATIVE
PH UR STRIP: 7 [PH] (ref 5–8)
PROT UR QL STRIP: NEGATIVE
RBC #/AREA URNS HPF: 30 /HPF (ref 0–4)
SP GR UR STRIP: 1.01 (ref 1–1.03)
SQUAMOUS #/AREA URNS HPF: ABNORMAL /HPF
URN SPEC COLLECT METH UR: ABNORMAL
UROBILINOGEN UR STRIP-ACNC: NEGATIVE EU/DL
WBC #/AREA URNS HPF: >100 /HPF (ref 0–5)
WBC CLUMPS URNS QL MICRO: ABNORMAL

## 2017-09-04 PROCEDURE — 12000002 HC ACUTE/MED SURGE SEMI-PRIVATE ROOM

## 2017-09-04 PROCEDURE — 83605 ASSAY OF LACTIC ACID: CPT

## 2017-09-04 PROCEDURE — 63600175 PHARM REV CODE 636 W HCPCS: Performed by: EMERGENCY MEDICINE

## 2017-09-04 PROCEDURE — G0378 HOSPITAL OBSERVATION PER HR: HCPCS

## 2017-09-04 PROCEDURE — 25000003 PHARM REV CODE 250: Performed by: EMERGENCY MEDICINE

## 2017-09-04 PROCEDURE — 25000003 PHARM REV CODE 250: Performed by: INTERNAL MEDICINE

## 2017-09-04 RX ORDER — RIVASTIGMINE 4.6 MG/24H
1 PATCH, EXTENDED RELEASE TRANSDERMAL DAILY
Status: DISCONTINUED | OUTPATIENT
Start: 2017-09-04 | End: 2017-09-06 | Stop reason: HOSPADM

## 2017-09-04 RX ORDER — ATORVASTATIN CALCIUM 10 MG/1
10 TABLET, FILM COATED ORAL DAILY
Status: DISCONTINUED | OUTPATIENT
Start: 2017-09-04 | End: 2017-09-06 | Stop reason: HOSPADM

## 2017-09-04 RX ORDER — CARBIDOPA AND LEVODOPA 25; 100 MG/1; MG/1
1 TABLET ORAL 4 TIMES DAILY
Status: DISCONTINUED | OUTPATIENT
Start: 2017-09-04 | End: 2017-09-06 | Stop reason: HOSPADM

## 2017-09-04 RX ORDER — AMLODIPINE BESYLATE 5 MG/1
10 TABLET ORAL DAILY
Status: DISCONTINUED | OUTPATIENT
Start: 2017-09-04 | End: 2017-09-06 | Stop reason: HOSPADM

## 2017-09-04 RX ORDER — DOCUSATE SODIUM 100 MG/1
100 CAPSULE, LIQUID FILLED ORAL 2 TIMES DAILY
Status: DISCONTINUED | OUTPATIENT
Start: 2017-09-04 | End: 2017-09-06 | Stop reason: HOSPADM

## 2017-09-04 RX ORDER — RAMELTEON 8 MG/1
8 TABLET ORAL NIGHTLY
Status: DISCONTINUED | OUTPATIENT
Start: 2017-09-04 | End: 2017-09-06 | Stop reason: HOSPADM

## 2017-09-04 RX ORDER — WARFARIN 3 MG/1
3 TABLET ORAL DAILY
Status: DISCONTINUED | OUTPATIENT
Start: 2017-09-04 | End: 2017-09-06 | Stop reason: HOSPADM

## 2017-09-04 RX ORDER — SODIUM CHLORIDE, SODIUM LACTATE, POTASSIUM CHLORIDE, CALCIUM CHLORIDE 600; 310; 30; 20 MG/100ML; MG/100ML; MG/100ML; MG/100ML
INJECTION, SOLUTION INTRAVENOUS CONTINUOUS
Status: DISCONTINUED | OUTPATIENT
Start: 2017-09-04 | End: 2017-09-06 | Stop reason: HOSPADM

## 2017-09-04 RX ORDER — LABETALOL HYDROCHLORIDE 5 MG/ML
10 INJECTION, SOLUTION INTRAVENOUS
Status: ACTIVE | OUTPATIENT
Start: 2017-09-04 | End: 2017-09-04

## 2017-09-04 RX ADMIN — PIPERACILLIN, TAZOBACTAM 4.5 G: 4; .5 INJECTION, POWDER, LYOPHILIZED, FOR SOLUTION INTRAVENOUS at 02:09

## 2017-09-04 RX ADMIN — AMLODIPINE BESYLATE 10 MG: 5 TABLET ORAL at 08:09

## 2017-09-04 RX ADMIN — SODIUM CHLORIDE, SODIUM LACTATE, POTASSIUM CHLORIDE, AND CALCIUM CHLORIDE: .6; .31; .03; .02 INJECTION, SOLUTION INTRAVENOUS at 03:09

## 2017-09-04 RX ADMIN — WARFARIN SODIUM 3 MG: 3 TABLET ORAL at 05:09

## 2017-09-04 RX ADMIN — ATORVASTATIN CALCIUM 10 MG: 10 TABLET, FILM COATED ORAL at 08:09

## 2017-09-04 RX ADMIN — CARBIDOPA AND LEVODOPA 1 TABLET: 25; 100 TABLET ORAL at 05:09

## 2017-09-04 RX ADMIN — RIVASTIGMINE TRANSDERMAL SYSTEM 1 PATCH: 4.6 PATCH, EXTENDED RELEASE TRANSDERMAL at 08:09

## 2017-09-04 RX ADMIN — DOCUSATE SODIUM 100 MG: 100 CAPSULE, LIQUID FILLED ORAL at 08:09

## 2017-09-04 RX ADMIN — PIPERACILLIN, TAZOBACTAM 4.5 G: 4; .5 INJECTION, POWDER, LYOPHILIZED, FOR SOLUTION INTRAVENOUS at 10:09

## 2017-09-04 RX ADMIN — SODIUM CHLORIDE, SODIUM LACTATE, POTASSIUM CHLORIDE, AND CALCIUM CHLORIDE: .6; .31; .03; .02 INJECTION, SOLUTION INTRAVENOUS at 05:09

## 2017-09-04 RX ADMIN — CARBIDOPA AND LEVODOPA 1 TABLET: 25; 100 TABLET ORAL at 12:09

## 2017-09-04 RX ADMIN — CARBIDOPA AND LEVODOPA 1 TABLET: 25; 100 TABLET ORAL at 11:09

## 2017-09-04 RX ADMIN — PIPERACILLIN, TAZOBACTAM 4.5 G: 4; .5 INJECTION, POWDER, LYOPHILIZED, FOR SOLUTION INTRAVENOUS at 05:09

## 2017-09-04 RX ADMIN — RAMELTEON 8 MG: 8 TABLET, FILM COATED ORAL at 08:09

## 2017-09-04 RX ADMIN — CARBIDOPA AND LEVODOPA 1 TABLET: 25; 100 TABLET ORAL at 06:09

## 2017-09-04 NOTE — CONSULTS
"    [facility]  Adult Nutrition  Consult Note    SUMMARY     Recommendations    Recommendation/Intervention:   1. RD to f/u with interview and assess education needs   2. If po intake <50% of meals, rec add boost plus bid   3. Assistance with meals as appropriate.   4. RD to monitor    Goals: Meet 85% EEN  Nutrition Goal Status: new  Communication of RD Recs: reviewed with RN    Continuum of Care Plan    Referral to Outpatient Services:  (D/C plannin gm sodium diet)    Reason for Assessment    Reason for Assessment: nurse/nurse practitioner consult  Diagnosis:  (chest pain, fever)  Relevent Medical History: Parkinsons, HTN, PVD   Interdisciplinary Rounds: did not attend     General Information Comments: Patient asleep at time of visit-unable to interview. Noted prev weight adm: weight up from  (66.1 kg), but overall down from 17 (75 kg). Normal weights trend 70-75 kg. Noted in chart review: patient doesn't walk and has been having a lot of shaking.        Nutrition Prescription Ordered    Current Diet Order: 2 gm sodium     Evaluation of Received Nutrients/Fluid Intake     IV Fluid (mL): 3000 (on adm. d/c at this time. )   Comments:  (I/O: )  Tolerance: tolerating  % Intake of Estimated Energy Needs: 50-75%  % Meal intake: 50% x 1 meal    Nutrition Risk Screen     Nutrition Risk Screen: no indicators present    Nutrition/Diet History     Food Preferences: TAWANDA-patient asleep     Labs/Tests/Procedures/Meds     Pertinent Labs Reviewed: reviewed  Pertinent Labs Comments: Phos 2.1  Pertinent Medications Reviewed: reviewed  Pertinent Medications Comments: statin    Physical Findings    Overall Physical Appearance:  (WDL)   Oral/Mouth Cavity: WDL  Skin: intact (Mike 13; redness with heels)    Anthropometrics    Temp: 98.5 °F (36.9 °C)     Height: 5' 7" (170.2 cm)  Weight Method: Bed Scale  Weight: 68.8 kg (151 lb 10.8 oz)  Ideal Body Weight (IBW), Male: 148 lb     % Ideal Body " Weight, Male (lb): 102.49 lb     BMI (Calculated): 23.8  BMI Grade: 18.5-24.9 - normal     Estimated/Assessed Needs    Weight Used For Calorie Calculations: 68.8 kg (151 lb 10.8 oz)       Energy Need Method: [place]-St Jeor (4002-8035 kcal)     RMR ([place]-St. Jeor Equation): 1381.62      Weight Used For Protein Calculations: 68.8 kg (151 lb 10.8 oz)  Protein Requirements: 68-82 g     Fluid Need Method: RDA Method      CHO Requirement: n/a     Assessment and Plan    Nutrition Diagnosis  No nutrition related issues at this time. RD to f/u with interview and reassess as appropriate.    Monitor and Evaluation    Food and Nutrient Intake: energy intake  Food and Nutrient Adminstration: diet order  Knowledge/Beliefs/Attitudes: food and nutrition knowledge/skill  Physical Activity and Function: nutrition-related ADLs and IADLs  Anthropometric Measurements: weight, weight change  Biochemical Data, Medical Tests and Procedures: electrolyte and renal panel  Nutrition-Focused Physical Findings: overall appearance    Nutrition Risk    Level of Risk:  (1 x week)    Nutrition Follow-Up    RD Follow-up?: Yes

## 2017-09-04 NOTE — PLAN OF CARE
"   09/04/17 1301   Discharge Assessment   Assessment Type Discharge Planning Assessment   Confirmed/corrected address and phone number on facesheet? Yes   Assessment information obtained from? Patient   Prior to hospitilization cognitive status: Alert/Oriented   Prior to hospitalization functional status: Assistive Equipment   Current cognitive status: Alert/Oriented   Current Functional Status: Assistive Equipment   Lives With friend(s)   Able to Return to Prior Arrangements yes   Is patient able to care for self after discharge? Yes   Who are your caregiver(s) and their phone number(s)? Ruslan pt friend [phone]   Patient's perception of discharge disposition admitted as an inpatient;home or selfcare   Readmission Within The Last 30 Days current reason for admission unrelated to previous admission   If yes, most recent facility name: [facility]   Patient currently being followed by outpatient case management? No   Patient currently receives any other outside agency services? No   Equipment Currently Used at Home wheelchair;walker, rolling;cane, straight;bedside commode;bath bench   Do you have any problems affording any of your prescribed medications? No   Is the patient taking medications as prescribed? yes   Does the patient have transportation home? Yes   Transportation Available family or friend will provide   Does the patient receive services at the Coumadin Clinic? Yes   Discharge Plan A Home with family;Home Health   Discharge Plan B Home with family;Home Health     SW to patient's room to discuss Helping the patient manage care at home.   KIMO roll explained to pt.  Teach back method used.  SW's name and contact info placed on white board.  Pt/family encouraged to call for any problems/concerns with DC. "Discharge planning begins on Admission" pamphlet discussed and placed in "My Health Packet" and placed at bedside.   "

## 2017-09-04 NOTE — NURSING
Report received and patient care assumed.Telemetry and monitor is 8630. Checked with off going nurse. Good waveform on monitor. No acute distress noted. Sundance boot applied. Refused Scds and Teds. States, that they are too tight.Informed patient that they are needed to prevent DVT. No success to get them on. He continue to refuse TEDs and SCDS

## 2017-09-04 NOTE — HPI
Mr. 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 He had hematuria and increased urinary frequency that began earlier on the day of presentation. He was admitted a month ago after a syncopal episode, and a catheter was placed during that admission and he has been using condom caths at home. He was discharged to [facility] for rehab last admission and was recently discharged from [facility]. Pt denies cough, SOB, abdominal pain, N/V.  In the ER he was found to have a fever, tachycardia, and leukocytosis. UA was consistent with UTI. He was admitted for sepsis 2/2 UTI and started on Zosyn.

## 2017-09-04 NOTE — NURSING
Patient refuses for staff to feed him. He states that he prefers to wait on his girlfriend and he is not hungry at this time

## 2017-09-04 NOTE — ASSESSMENT & PLAN NOTE
UA consistent with UTI. Sepsis on admission. Started on empiric Zosyn. Last catheter was over a month ago.

## 2017-09-04 NOTE — H&P
[facility] Medicine  History & Physical    Patient Name: Tenzin Ortiz  MRN: [id number]   Admission Date: 9/3/2017  Attending Physician: Cami Crow MD  Primary Care Provider: Efrain Chavez MD         Patient information was obtained from patient, past medical records and ER records.     Subjective:     Principal Problem:Acute cystitis with hematuria    Chief Complaint:   Chief Complaint   Patient presents with    Chest Pain     x 1 day    Fever     101.3 F        HPI: Mr. 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 He had hematuria and increased urinary frequency that began earlier on the day of presentation. He was admitted a month ago after a syncopal episode, and a catheter was placed during that admission and he has been using condom caths at home. He was discharged to [facility] for rehab last admission and was recently discharged from [facility]. Pt denies cough, SOB, abdominal pain, N/V.  In the ER he was found to have a fever, tachycardia, and leukocytosis. UA was consistent with UTI. He was admitted for sepsis 2/2 UTI and started on Zosyn.    Past Medical History:   Diagnosis Date    Anticoagulant long-term use     Hypertension     Impulse control disorder 2012    gambling on mirapex; also possible dopamine dysregulation syndrome    PD (Parkinson's disease) 1999    tremor-predominant    PVD (peripheral vascular disease) with claudication     poor circulation both legs       Past Surgical History:   Procedure Laterality Date    CATARACT EXTRACTION Bilateral 1 yr        Review of patient's allergies indicates:   Allergen Reactions    Amantadine analogues      Caused confusion    Mirapex [pramipexole]      Pathologic gambling    Neupro  [rotigotine]      Pathologic gambling         No current facility-administered medications on file prior to encounter.      Current Outpatient Prescriptions on File Prior to Encounter   Medication Sig    amlodipine (NORVASC) 10 MG tablet Take 1 tablet (10 mg total) by mouth once daily.    atorvastatin (LIPITOR) 10 MG tablet TAKE 1 TABLET(10 MG) BY MOUTH EVERY DAY    carbidopa-levodopa  mg (SINEMET)  mg per tablet TAKE 1 TABLET BY MOUTH FOUR TIMES DAILY    docusate sodium (COLACE) 100 MG capsule Take 1 capsule (100 mg total) by mouth 2 (two) times daily.    gabapentin (NEURONTIN) 300 MG capsule TAKE 2 CAPSULES(600 MG) BY MOUTH EVERY EVENING    ramelteon (ROZEREM) 8 mg tablet Take 1 tablet (8 mg total) by mouth every evening.    rivastigmine (EXELON) 4.6 mg/24 hr PT24 APPLY 1 PATCH EXTERNALLY TO THE SKIN EVERY DAY    warfarin (COUMADIN) 3 MG tablet TAKE 1 TABLET BY MOUTH EVERY DAY AS DIRECTED BY COUMADIN CLINIC     Family History     Problem Relation (Age of Onset)    No Known Problems Mother, Father, Sister, Brother, Maternal Aunt, Maternal Uncle, Paternal Aunt, Paternal Uncle, Maternal Grandmother, Maternal Grandfather, Paternal Grandmother, Paternal Grandfather        Social History Main Topics    Smoking status: Former Smoker     Years: 10.00    Smokeless tobacco: Never Used      Comment: Quit 40 years ago    Alcohol use No    Drug use: No    Sexual activity: Not Currently     Review of Systems   Constitutional: Positive for activity change and fever.   HENT: Negative for facial swelling and sore throat.    Eyes: Negative for photophobia and visual disturbance.   Respiratory: Negative for cough and shortness of breath.    Cardiovascular: Negative for chest pain and leg swelling.   Gastrointestinal: Negative for abdominal pain, constipation and diarrhea.   Genitourinary: Positive for frequency and hematuria.   Musculoskeletal: Positive for gait problem. Negative for neck stiffness.   Skin:  Negative for rash and wound.   Neurological: Positive for tremors and weakness.   Psychiatric/Behavioral: Negative for agitation and behavioral problems.     Objective:     Vital Signs (Most Recent):  Temp: 98.5 °F (36.9 °C) (09/04/17 1230)  Pulse: 93 (09/04/17 1230)  Resp: 18 (09/04/17 1230)  BP: (!) 158/72 (09/04/17 1230)  SpO2: (!) 94 % (09/04/17 1230) Vital Signs (24h Range):  Temp:  [97.4 °F (36.3 °C)-101.3 °F (38.5 °C)] 98.5 °F (36.9 °C)  Pulse:  [] 93  Resp:  [16-18] 18  SpO2:  [93 %-97 %] 94 %  BP: (130-180)/(65-74) 158/72     Weight: 68.8 kg (151 lb 10.8 oz)  Body mass index is 23.76 kg/m².    Physical Exam   Constitutional: He is oriented to person, place, and time. He appears well-developed and well-nourished. No distress.   HENT:   Right Ear: External ear normal.   Left Ear: External ear normal.   Nose: Nose normal.   Eyes: EOM are normal. Pupils are equal, round, and reactive to light. No scleral icterus.   Neck: Normal range of motion. Neck supple. No thyromegaly present.   Cardiovascular: Normal rate and normal heart sounds.  Exam reveals no friction rub.    No murmur heard.  Pulmonary/Chest: Effort normal and breath sounds normal. No respiratory distress. He has no wheezes. He has no rales.   Abdominal: Soft. Bowel sounds are normal. He exhibits no mass. There is no tenderness.   No hepatosplenomegaly   Musculoskeletal: Normal range of motion. He exhibits no edema or deformity.   Neurological: He is alert and oriented to person, place, and time. No cranial nerve deficit.   Resting pill roll tremor, masked facies, cogwheel rigidity   Skin: Skin is warm and dry. No pallor.        Significant Labs: All pertinent labs within the past 24 hours have been reviewed.    Significant Imaging: I have reviewed and interpreted all pertinent imaging results/findings within the past 24 hours.    Assessment/Plan:     * Acute cystitis with hematuria    UA consistent with UTI. Sepsis on admission. Started on  empiric Zosyn. Last catheter was over a month ago.        Sepsis    2/2 UTI. Tx as above.        Parkinson disease    Followed by Dr. Allred. Cont home meds.        Lewy body dementia    Confusion intermittently. Reported hallucinations in the past.         Chronic anticoagulation    For aortic atherosclerosis. Continue home meds.        Hyperlipidemia    Cont statin        Essential hypertension    Continue home meds. Has not been hypotensive.        PAD (peripheral artery disease)    Cont home meds. Coumadin and monitor INR for goal 2.0-3.0        Current non-adherence to medical treatment    In the past he admitted to intermittently taking too much carbadopa/levadopa or not taking it at all when he was at home. At his PCP appointment 4 days prior to admission he reported confusion about his med list and this was addressed at that appointment.        Debility    PT/OT consulted.          VTE Risk Mitigation         Ordered     warfarin (COUMADIN) tablet 3 mg  Daily     Route:  Oral        09/04/17 1630     Medium Risk of VTE  Once      09/04/17 0455     Place JOSEFINA hose  Until discontinued      09/04/17 0455     Place sequential compression device  Until discontinued      09/04/17 0455             Cami Crow MD  Department of Hospital Medicine   [facility]

## 2017-09-04 NOTE — SUBJECTIVE & OBJECTIVE
Past Medical History:   Diagnosis Date    Anticoagulant long-term use     Hypertension     Impulse control disorder 2012    gambling on mirapex; also possible dopamine dysregulation syndrome    PD (Parkinson's disease) 1999    tremor-predominant    PVD (peripheral vascular disease) with claudication     poor circulation both legs       Past Surgical History:   Procedure Laterality Date    CATARACT EXTRACTION Bilateral 1 yr        Review of patient's allergies indicates:   Allergen Reactions    Amantadine analogues      Caused confusion    Mirapex [pramipexole]      Pathologic gambling    Neupro [rotigotine]      Pathologic gambling         No current facility-administered medications on file prior to encounter.      Current Outpatient Prescriptions on File Prior to Encounter   Medication Sig    amlodipine (NORVASC) 10 MG tablet Take 1 tablet (10 mg total) by mouth once daily.    atorvastatin (LIPITOR) 10 MG tablet TAKE 1 TABLET(10 MG) BY MOUTH EVERY DAY    carbidopa-levodopa  mg (SINEMET)  mg per tablet TAKE 1 TABLET BY MOUTH FOUR TIMES DAILY    docusate sodium (COLACE) 100 MG capsule Take 1 capsule (100 mg total) by mouth 2 (two) times daily.    gabapentin (NEURONTIN) 300 MG capsule TAKE 2 CAPSULES(600 MG) BY MOUTH EVERY EVENING    ramelteon (ROZEREM) 8 mg tablet Take 1 tablet (8 mg total) by mouth every evening.    rivastigmine (EXELON) 4.6 mg/24 hr PT24 APPLY 1 PATCH EXTERNALLY TO THE SKIN EVERY DAY    warfarin (COUMADIN) 3 MG tablet TAKE 1 TABLET BY MOUTH EVERY DAY AS DIRECTED BY COUMADIN CLINIC     Family History     Problem Relation (Age of Onset)    No Known Problems Mother, Father, Sister, Brother, Maternal Aunt, Maternal Uncle, Paternal Aunt, Paternal Uncle, Maternal Grandmother, Maternal Grandfather, Paternal Grandmother, Paternal Grandfather        Social History Main Topics    Smoking status: Former Smoker     Years: 10.00    Smokeless tobacco: Never Used      Comment: Quit  40 years ago    Alcohol use No    Drug use: No    Sexual activity: Not Currently     Review of Systems   Constitutional: Positive for activity change and fever.   HENT: Negative for facial swelling and sore throat.    Eyes: Negative for photophobia and visual disturbance.   Respiratory: Negative for cough and shortness of breath.    Cardiovascular: Negative for chest pain and leg swelling.   Gastrointestinal: Negative for abdominal pain, constipation and diarrhea.   Genitourinary: Positive for frequency and hematuria.   Musculoskeletal: Positive for gait problem. Negative for neck stiffness.   Skin: Negative for rash and wound.   Neurological: Positive for tremors and weakness.   Psychiatric/Behavioral: Negative for agitation and behavioral problems.     Objective:     Vital Signs (Most Recent):  Temp: 98.5 °F (36.9 °C) (09/04/17 1230)  Pulse: 93 (09/04/17 1230)  Resp: 18 (09/04/17 1230)  BP: (!) 158/72 (09/04/17 1230)  SpO2: (!) 94 % (09/04/17 1230) Vital Signs (24h Range):  Temp:  [97.4 °F (36.3 °C)-101.3 °F (38.5 °C)] 98.5 °F (36.9 °C)  Pulse:  [] 93  Resp:  [16-18] 18  SpO2:  [93 %-97 %] 94 %  BP: (130-180)/(65-74) 158/72     Weight: 68.8 kg (151 lb 10.8 oz)  Body mass index is 23.76 kg/m².    Physical Exam   Constitutional: He is oriented to person, place, and time. He appears well-developed and well-nourished. No distress.   HENT:   Right Ear: External ear normal.   Left Ear: External ear normal.   Nose: Nose normal.   Eyes: EOM are normal. Pupils are equal, round, and reactive to light. No scleral icterus.   Neck: Normal range of motion. Neck supple. No thyromegaly present.   Cardiovascular: Normal rate and normal heart sounds.  Exam reveals no friction rub.    No murmur heard.  Pulmonary/Chest: Effort normal and breath sounds normal. No respiratory distress. He has no wheezes. He has no rales.   Abdominal: Soft. Bowel sounds are normal. He exhibits no mass. There is no tenderness.   No  hepatosplenomegaly   Musculoskeletal: Normal range of motion. He exhibits no edema or deformity.   Neurological: He is alert and oriented to person, place, and time. No cranial nerve deficit.   Resting pill roll tremor, masked facies, cogwheel rigidity   Skin: Skin is warm and dry. No pallor.        Significant Labs: All pertinent labs within the past 24 hours have been reviewed.    Significant Imaging: I have reviewed and interpreted all pertinent imaging results/findings within the past 24 hours.

## 2017-09-04 NOTE — ED TRIAGE NOTES
Patient reports he did not feel good tonight. Had a funny feeling in his chest. Denies any N/V/D and diaphoresis. No pain at this time. No acute distress at this time. Just feels weak and states he is unable to walk. Was recently admitted into the hospital and sent to [facility] for rehab.

## 2017-09-04 NOTE — ED PROVIDER NOTES
Encounter Date: 9/3/2017    SCRIBE #1 NOTE: I, Leonid Medina, am scribing for, and in the presence of, FER Harding MD. Other sections scribed: HPI, ROS.       History     Chief Complaint   Patient presents with    Chest Pain     x 1 day    Fever     101.3 F     CC: Hematuria  HPI: This 76 y.o. male with Parkinson's disease. Impulse control disorder, HTN, PVD, long-term anticoagulation on and Coumadin presents to the ED c/o hematuria and increased urinary frequency that began earlier today. Family member reports pt was admitted her nearly a month ago after a syncopal episode, but pt does having a catheter in place during his stay. She states is in physical rehab at the nursing home, but notes that pt no longer walks. Pt denies cough, SOB, abdominal pain, N/V, penile pain, testicular pain.        The history is provided by the patient and a relative.     Review of patient's allergies indicates:   Allergen Reactions    Amantadine analogues      Caused confusion    Mirapex [pramipexole]      Pathologic gambling    Neupro [rotigotine]      Pathologic gambling       Past Medical History:   Diagnosis Date    Anticoagulant long-term use     Hypertension     Impulse control disorder 2012    gambling on mirapex; also possible dopamine dysregulation syndrome    PD (Parkinson's disease) 1999    tremor-predominant    PVD (peripheral vascular disease) with claudication     poor circulation both legs     Past Surgical History:   Procedure Laterality Date    CATARACT EXTRACTION Bilateral 1 yr      Family History   Problem Relation Age of Onset    No Known Problems Mother     No Known Problems Father     No Known Problems Sister     No Known Problems Brother     No Known Problems Maternal Aunt     No Known Problems Maternal Uncle     No Known Problems Paternal Aunt     No Known Problems Paternal Uncle     No Known Problems Maternal Grandmother     No Known Problems Maternal Grandfather     No Known  Problems Paternal Grandmother     No Known Problems Paternal Grandfather     Parkinsonism Neg Hx     Amblyopia Neg Hx     Blindness Neg Hx     Cancer Neg Hx     Cataracts Neg Hx     Diabetes Neg Hx     Glaucoma Neg Hx     Hypertension Neg Hx     Macular degeneration Neg Hx     Retinal detachment Neg Hx     Strabismus Neg Hx     Stroke Neg Hx     Thyroid disease Neg Hx      Social History   Substance Use Topics    Smoking status: Former Smoker     Years: 10.00    Smokeless tobacco: Never Used      Comment: Quit 40 years ago    Alcohol use No     Review of Systems   Constitutional: Positive for fever.   HENT: Negative for rhinorrhea and sore throat.    Eyes: Negative for pain and visual disturbance.   Respiratory: Negative for cough and shortness of breath.    Cardiovascular: Negative for chest pain.   Gastrointestinal: Negative for abdominal pain, nausea and vomiting.   Genitourinary: Positive for frequency and hematuria. Negative for penile pain and testicular pain.   Musculoskeletal: Negative for arthralgias and myalgias.   Skin: Negative for rash.   Neurological: Negative for syncope.       Physical Exam     Initial Vitals [09/03/17 2023]   BP Pulse Resp Temp SpO2   (!) 141/67 103 16 (!) 101.3 °F (38.5 °C) (!) 94 %      MAP       91.67         Physical Exam    Vitals reviewed.  Constitutional: He appears well-developed and well-nourished.   HENT:   Head: Normocephalic and atraumatic.   Eyes: EOM are normal. Pupils are equal, round, and reactive to light.   Neck: Normal range of motion. Neck supple.   Cardiovascular: Normal rate, regular rhythm, normal heart sounds and intact distal pulses.   Pulmonary/Chest: Breath sounds normal. No respiratory distress. He has no wheezes. He has no rhonchi. He has no rales.   Abdominal: Soft. Bowel sounds are normal.   Musculoskeletal: Normal range of motion.   Neurological: He is alert and oriented to person, place, and time. He displays tremor. GCS eye subscore  is 4. GCS verbal subscore is 5. GCS motor subscore is 6.   Skin: Skin is warm and dry.   Psychiatric: He has a normal mood and affect.         ED Course   Procedures  Labs Reviewed   CBC W/ AUTO DIFFERENTIAL - Abnormal; Notable for the following:        Result Value    WBC 15.64 (*)     Hemoglobin 12.8 (*)     Hematocrit 37.5 (*)     MCV 80 (*)     RDW 16.2 (*)     MPV 9.1 (*)     Gran # 12.6 (*)     Mono # 1.2 (*)     Gran% 80.3 (*)     Lymph% 11.4 (*)     All other components within normal limits   COMPREHENSIVE METABOLIC PANEL - Abnormal; Notable for the following:     Sodium 135 (*)     Glucose 115 (*)     Albumin 3.4 (*)     ALT 5 (*)     All other components within normal limits   PROTIME-INR - Abnormal; Notable for the following:     Prothrombin Time 24.5 (*)     INR 2.4 (*)     All other components within normal limits   APTT - Abnormal; Notable for the following:     aPTT 40.5 (*)     All other components within normal limits   PHOSPHORUS - Abnormal; Notable for the following:     Phosphorus 2.1 (*)     All other components within normal limits   URINALYSIS - Abnormal; Notable for the following:     Occult Blood UA 2+ (*)     Leukocytes, UA 2+ (*)     All other components within normal limits   URINALYSIS MICROSCOPIC - Abnormal; Notable for the following:     RBC, UA 30 (*)     WBC, UA >100 (*)     WBC Clumps, UA Few (*)     Bacteria, UA Moderate (*)     All other components within normal limits   CULTURE, BLOOD   CULTURE, BLOOD   CULTURE, URINE   B-TYPE NATRIURETIC PEPTIDE   TROPONIN I   LACTIC ACID, PLASMA   LIPASE   MAGNESIUM   TSH   LACTIC ACID, PLASMA   LACTIC ACID, PLASMA          X-Rays:   Independently Interpreted Readings:   Chest X-Ray: Normal heart size.  No infiltrates.  No acute abnormalities.     Medical Decision Making:   History:   Old Medical Records: I decided to obtain old medical records.  Initial Assessment:   Medical decision-making:    The patient received a medical screening exam. If  performed, the EKG was independently evaluated by me and is pending final cardiology evaluation.  If performed, all radiographic studies were independently evaluated by me and are pending final radiology evaluation. If labs were ordered, they were reviewed. Vital signs are independently assessed by me.  If performed, the pulse oximetry was independently evaluated by me.  I decided to obtain the patient's past medical record.  If available, I reviewed the patient's past medical record, including most recent labs and radiology reports.    Differential Diagnosis:   Pneumonia, bacteremia, UTI, sepsis, metabolic disorder, renal failure  ED Management:  Patient presents to emergency department for evaluation.  He is febrile and tachycardic.  He has an elevated white blood cell count.  He does support some increased urination and pain with urination and hematuria.  Patient has a urinary tract infection.  Patient meets sepsis criteria.  He has remained tachycardic despite IV fluids.  He is not hypotensive and does not need pressor support.  He has a normal lactic acid.  Will admit the patient for IV antibiotics and continue monitoring of his tachycardia and vital signs.  Case was discussed with hospital medicine and temporary admission orders were placed.  Chest x-ray does not reveal any acute abnormality.  There is no evidence of pneumonia.  Blood cultures were drawn and are pending.    I discussed the patient's presentation and workup with the hospitalist who agrees with placing the patient in the hospital.  I have placed orders for the hospitalist.   The results and physical exam findings were reviewed with the patient. Pt agrees with assessment, disposition and treatment plan and has no further questions or complaints at this time.    FER Harding M.D. 1:26 AM 9/4/2017      Additional MDM:   Sepsis - SIRS Criteria: Temperature: Temp > 38.3 C. Heart Rate: HR > 90. Tachypnea: RR Normal. White Count: WBC > 12,000.  .  Sepsis: Airway: The patient's airway was good (normal gag reflex and breathing). The lactic acid was: normal (<3.0). Antibiotic selection is designed to cover the patient at risk for MRSA and pseudomonas. The patient was treated with Zosyn 4.5 g IVPB. The patient was treated with the following IV Fluids to maximize the BP and the patient's CVP: NS 1L bolus. Consultants: Internal Medicine. stable          Scribe Attestation:   Scribe #1: I performed the above scribed service and the documentation accurately describes the services I performed. I attest to the accuracy of the note.    Attending Attestation:           Physician Attestation for Scribe:  Physician Attestation Statement for Scribe #1: I, MONICA. Ismael Harding MD, reviewed documentation, as scribed by Leonid Medina in my presence, and it is both accurate and complete.                 ED Course      Clinical Impression:   The primary encounter diagnosis was Sepsis, due to unspecified organism. Diagnoses of Urinary tract infection with hematuria, site unspecified and Tachycardia were also pertinent to this visit.                           Mando Harding MD  09/04/17 0127

## 2017-09-04 NOTE — NURSING
External catheter applied per patient request. Extreme shaking remains present. Max assist required to meet adls

## 2017-09-04 NOTE — PLAN OF CARE
Recommendations     Recommendation/Intervention:   1. RD to f/u with interview and assess education needs   2. If po intake <50% of meals, rec add boost plus bid   3. Assistance with meals as appropriate.   4. RD to monitor     Goals: Meet 85% EEN  Nutrition Goal Status: new  Communication of RD Recs: reviewed with RN     Continuum of Care Plan     Referral to Outpatient Services:  (D/C plannin gm sodium diet)

## 2017-09-05 LAB
ANION GAP SERPL CALC-SCNC: 7 MMOL/L
BASOPHILS # BLD AUTO: 0.01 K/UL
BASOPHILS NFR BLD: 0.1 %
BUN SERPL-MCNC: 14 MG/DL
CALCIUM SERPL-MCNC: 8.7 MG/DL
CHLORIDE SERPL-SCNC: 107 MMOL/L
CO2 SERPL-SCNC: 22 MMOL/L
CREAT SERPL-MCNC: 0.9 MG/DL
DIFFERENTIAL METHOD: ABNORMAL
EOSINOPHIL # BLD AUTO: 0.2 K/UL
EOSINOPHIL NFR BLD: 1.5 %
ERYTHROCYTE [DISTWIDTH] IN BLOOD BY AUTOMATED COUNT: 16.6 %
EST. GFR  (AFRICAN AMERICAN): >60 ML/MIN/1.73 M^2
EST. GFR  (NON AFRICAN AMERICAN): >60 ML/MIN/1.73 M^2
GLUCOSE SERPL-MCNC: 115 MG/DL
HCT VFR BLD AUTO: 35.6 %
HGB BLD-MCNC: 11.9 G/DL
INR PPP: 1.3
LYMPHOCYTES # BLD AUTO: 1.3 K/UL
LYMPHOCYTES NFR BLD: 9.4 %
MCH RBC QN AUTO: 26.4 PG
MCHC RBC AUTO-ENTMCNC: 33.4 G/DL
MCV RBC AUTO: 79 FL
MONOCYTES # BLD AUTO: 1.2 K/UL
MONOCYTES NFR BLD: 9.2 %
NEUTROPHILS # BLD AUTO: 10.8 K/UL
NEUTROPHILS NFR BLD: 79.8 %
PLATELET # BLD AUTO: 144 K/UL
PMV BLD AUTO: 9.7 FL
POTASSIUM SERPL-SCNC: 3.7 MMOL/L
PROTHROMBIN TIME: 13.1 SEC
RBC # BLD AUTO: 4.5 M/UL
SODIUM SERPL-SCNC: 136 MMOL/L
WBC # BLD AUTO: 13.55 K/UL

## 2017-09-05 PROCEDURE — 25000003 PHARM REV CODE 250: Performed by: INTERNAL MEDICINE

## 2017-09-05 PROCEDURE — 85610 PROTHROMBIN TIME: CPT

## 2017-09-05 PROCEDURE — 97535 SELF CARE MNGMENT TRAINING: CPT

## 2017-09-05 PROCEDURE — G8980 MOBILITY D/C STATUS: HCPCS | Mod: CK

## 2017-09-05 PROCEDURE — G8987 SELF CARE CURRENT STATUS: HCPCS | Mod: CK

## 2017-09-05 PROCEDURE — 97161 PT EVAL LOW COMPLEX 20 MIN: CPT

## 2017-09-05 PROCEDURE — 25000003 PHARM REV CODE 250: Performed by: EMERGENCY MEDICINE

## 2017-09-05 PROCEDURE — G8988 SELF CARE GOAL STATUS: HCPCS | Mod: CJ

## 2017-09-05 PROCEDURE — G0378 HOSPITAL OBSERVATION PER HR: HCPCS

## 2017-09-05 PROCEDURE — 25000003 PHARM REV CODE 250: Performed by: HOSPITALIST

## 2017-09-05 PROCEDURE — G8978 MOBILITY CURRENT STATUS: HCPCS | Mod: CK

## 2017-09-05 PROCEDURE — 36415 COLL VENOUS BLD VENIPUNCTURE: CPT

## 2017-09-05 PROCEDURE — 63600175 PHARM REV CODE 636 W HCPCS: Performed by: EMERGENCY MEDICINE

## 2017-09-05 PROCEDURE — 85025 COMPLETE CBC W/AUTO DIFF WBC: CPT

## 2017-09-05 PROCEDURE — 97165 OT EVAL LOW COMPLEX 30 MIN: CPT

## 2017-09-05 PROCEDURE — G8979 MOBILITY GOAL STATUS: HCPCS | Mod: CK

## 2017-09-05 PROCEDURE — 80048 BASIC METABOLIC PNL TOTAL CA: CPT

## 2017-09-05 RX ORDER — GABAPENTIN 300 MG/1
600 CAPSULE ORAL NIGHTLY
Status: DISCONTINUED | OUTPATIENT
Start: 2017-09-05 | End: 2017-09-06 | Stop reason: HOSPADM

## 2017-09-05 RX ADMIN — AMLODIPINE BESYLATE 10 MG: 5 TABLET ORAL at 08:09

## 2017-09-05 RX ADMIN — CARBIDOPA AND LEVODOPA 1 TABLET: 25; 100 TABLET ORAL at 11:09

## 2017-09-05 RX ADMIN — PIPERACILLIN, TAZOBACTAM 4.5 G: 4; .5 INJECTION, POWDER, LYOPHILIZED, FOR SOLUTION INTRAVENOUS at 12:09

## 2017-09-05 RX ADMIN — CARBIDOPA AND LEVODOPA 1 TABLET: 25; 100 TABLET ORAL at 05:09

## 2017-09-05 RX ADMIN — DOCUSATE SODIUM 100 MG: 100 CAPSULE, LIQUID FILLED ORAL at 08:09

## 2017-09-05 RX ADMIN — GABAPENTIN 600 MG: 300 CAPSULE ORAL at 09:09

## 2017-09-05 RX ADMIN — PIPERACILLIN, TAZOBACTAM 4.5 G: 4; .5 INJECTION, POWDER, LYOPHILIZED, FOR SOLUTION INTRAVENOUS at 01:09

## 2017-09-05 RX ADMIN — ATORVASTATIN CALCIUM 10 MG: 10 TABLET, FILM COATED ORAL at 08:09

## 2017-09-05 RX ADMIN — CARBIDOPA AND LEVODOPA 1 TABLET: 25; 100 TABLET ORAL at 12:09

## 2017-09-05 RX ADMIN — WARFARIN SODIUM 3 MG: 3 TABLET ORAL at 05:09

## 2017-09-05 RX ADMIN — RAMELTEON 8 MG: 8 TABLET, FILM COATED ORAL at 09:09

## 2017-09-05 RX ADMIN — SODIUM CHLORIDE, SODIUM LACTATE, POTASSIUM CHLORIDE, AND CALCIUM CHLORIDE: .6; .31; .03; .02 INJECTION, SOLUTION INTRAVENOUS at 03:09

## 2017-09-05 RX ADMIN — PIPERACILLIN, TAZOBACTAM 4.5 G: 4; .5 INJECTION, POWDER, LYOPHILIZED, FOR SOLUTION INTRAVENOUS at 08:09

## 2017-09-05 RX ADMIN — RIVASTIGMINE TRANSDERMAL SYSTEM 1 PATCH: 4.6 PATCH, EXTENDED RELEASE TRANSDERMAL at 08:09

## 2017-09-05 RX ADMIN — SODIUM CHLORIDE, SODIUM LACTATE, POTASSIUM CHLORIDE, AND CALCIUM CHLORIDE: .6; .31; .03; .02 INJECTION, SOLUTION INTRAVENOUS at 04:09

## 2017-09-05 NOTE — PLAN OF CARE
09/05/17 1620   Discharge Assessment   Assessment Type Discharge Planning Assessment   Confirmed/corrected address and phone number on facesheet? Yes   Assessment information obtained from? Patient   Expected Length of Stay (days) 3   Communicated expected length of stay with patient/caregiver yes   Prior to hospitilization cognitive status: Alert/Oriented   Prior to hospitalization functional status: Assistive Equipment   Current cognitive status: Alert/Oriented   Current Functional Status: Assistive Equipment   Lives With significant other   Able to Return to Prior Arrangements yes   Is patient able to care for self after discharge? Unable to determine at this time (comments)   Who are your caregiver(s) and their phone number(s)? MAZIN Mckeon   Readmission Within The Last 30 Days current reason for admission unrelated to previous admission   Patient currently being followed by outpatient case management? No   Patient currently receives any other outside agency services? No   Equipment Currently Used at Home walker, standard   Do you have any problems affording any of your prescribed medications? No   Is the patient taking medications as prescribed? yes   Does the patient have transportation home? Yes   Transportation Available family or friend will provide   Does the patient receive services at the Coumadin Clinic? No   Discharge Plan A Home   Discharge Plan B (tbd)   Patient/Family In Agreement With Plan yes   Readmission Questionnaire   At the time of your discharge, did someone talk to you about what your health problems were? Yes   At the time of discharge, did someone talk to you about what to watch out for regarding worsening of your health problem? Yes   At the time of discharge, did someone talk to you about what to do if you experienced worsening of your health problem? Yes   At the time of discharge, did someone talk to you about which medication to take when you left the hospital and which ones to  "stop taking? Yes   At the time of discharge, did someone talk to you about when and where to follow up with a doctor after you left the hospital? Yes   What do you believe caused you to be sick enough to be re-admitted? Different illness   How often do you need to have someone help you when you read instructions, pamphlets, or other written material from your doctor or pharmacy? Rarely   Do you have problems taking your medications as prescribed? No   Do you have any problems affording any of  your prescribed medications? No   Do you have problems obtaining/receiving your medications? No   Does the patient have transportation to healthcare appointments? Yes   Living Arrangements house   Does the patient have family/friends to help with healtcare needs after discharge? yes   Does your caregiver provide all the help you need? Yes   Are you currently feeling confused? No   Are you currently having problems thinking? No   Are you currently having memory problems? No   Have you felt down, depressed, or hopeless? 0   Have you felt little interest or pleasure in doing things? 0   In the last 7 days, my sleep quality was: good     [employer] [employer] [address] [place], [place] - [address] [facility] [address]  [address] [address]  Phone: [phone] Fax: [phone]    [employer] [id number] [place] [address] [address] [facility] [address] [place] [address] [address]  [facility] [address]  Phone: [phone] Fax: [phone]      TN to patient's room to discuss Helping the patient manage care at home.   TN/SW roll explained to pt.  Teach back method used.  TN's name and contact info placed on white board.  Pt/family encouraged to call for any problems/concerns with DC. "Discharge planning begins on Admission" pamphlet discussed and placed in "My Health Packet" and placed at bedside.   "

## 2017-09-05 NOTE — PT/OT/SLP EVAL
Physical Therapy  Evaluation/Discharge summary    Tenzin Ortiz   MRN: [id number]   Admitting Diagnosis: Acute cystitis with hematuria    PT Received On: 09/05/17  PT Start Time: 1350     PT Stop Time: 1415    PT Total Time (min): 25 min       Billable Minutes:  Evaluation 15 co-treat with OT    Diagnosis: Acute cystitis with hematuria      Past Medical History:   Diagnosis Date    Anticoagulant long-term use     Hypertension     Impulse control disorder 2012    gambling on mirapex; also possible dopamine dysregulation syndrome    PD (Parkinson's disease) 1999    tremor-predominant    PVD (peripheral vascular disease) with claudication     poor circulation both legs      Past Surgical History:   Procedure Laterality Date    CATARACT EXTRACTION Bilateral 1 yr          General Precautions: Standard, fall  Orthopedic Precautions: N/A   Braces: N/A       Do you have any cultural, spiritual, Restorationism conflicts, given your current situation?: none    Patient History:  Lives With: significant other  Living Arrangements: house  Home Accessibility: stairs to enter home  Number of Stairs to Enter Home: 1  Stair Railings at Home: none  Transportation Available: family or friend will provide  Living Environment Comment: Yoselyn M-F, 9-5  Equipment Currently Used at Home: walker, rolling, rollator, cane, straight, bath bench (states that SNF was supposed to order him a W/C and a BSC, but he has not recieved them)  DME owned (not currently used): none    Previous Level of Function:  Ambulation Skills: needs device  Transfer Skills: needs device  ADL Skills: needs assist    Subjective:  Communicated with nsg prior to session.  Pt agreeable to eval  Chief Complaint: no c/o  Patient goals: to go home    Pain/Comfort  Pain Rating 1: 0/10      Objective:   Patient found with: peripheral IV     Cognitive Exam:  Oriented to: Person, Place, Time and Situation    Follows Commands/attention: Follows one-step commands  Communication:  clear/fluent  Safety awareness/insight to disability: impaired    Physical Exam:  Postural examination/scapula alignment: Rounded shoulder and Head forward    Skin integrity: Visible skin intact  Edema: None noted     Sensation:   Intact  light/touch B LE's    Upper Extremity Range of Motion:  Lower Extremity Range of Motion:  Right Lower Extremity: WFL except dflx  Left Lower Extremity: WFL except dflx    Lower Extremity Strength:  Right Lower Extremity: WFL  Left Lower Extremity: WFL     Fine motor coordination:  Impaired for rapid alt toe tap    Gross motor coordination: impaired 2/2 tremors    Functional Mobility:  Bed Mobility:  Scooting/Bridging: Stand by Assistance  Supine to Sit: Contact Guard Assistance    Transfers:  Sit <> Stand Assistance: Contact Guard Assistance  Sit <> Stand Assistive Device: Rolling Walker    Gait:   Gait Distance: 250  Assistance 1: Stand by Assistance, Contact Guard Assistance  Gait Assistive Device: Rolling walker  Gait Pattern: reciprocal  Gait Deviation(s):  (increased perseveration with pt fatigue)    Stairs:  N/T    Balance:   Static Sit: FAIR+: Able to take MINIMAL challenges from all directions  Dynamic Sit: FAIR+: Maintains balance through MINIMAL excursions of active trunk motion  Static Stand: FAIR: Maintains without assist but unable to take challenges  Dynamic stand: FAIR: Needs CONTACT GUARD during gait    Therapeutic Activities and Exercises:  Pt stood with RW and CGA approx 2m for diaper change and cleaning    AM-PAC 6 CLICK MOBILITY  How much help from another person does this patient currently need?   1 = Unable, Total/Dependent Assistance  2 = A lot, Maximum/Moderate Assistance  3 = A little, Minimum/Contact Guard/Supervision  4 = None, Modified [place]/Independent    Turning over in bed (including adjusting bedclothes, sheets and blankets)?: 3  Sitting down on and standing up from a chair with arms (e.g., wheelchair, bedside commode, etc.): 3  Moving from  lying on back to sitting on the side of the bed?: 3  Moving to and from a bed to a chair (including a wheelchair)?: 3  Need to walk in hospital room?: 3  Climbing 3-5 steps with a railing?: 2  Total Score: 17     AM-PAC Raw Score CMS G-Code Modifier Level of Impairment Assistance   6 % Total / Unable   7 - 9 CM 80 - 100% Maximal Assist   10 - 14 CL 60 - 80% Moderate Assist   15 - 19 CK 40 - 60% Moderate Assist   20 - 22 CJ 20 - 40% Minimal Assist   23 CI 1-20% SBA / CGA   24 CH 0% Independent/ Mod I     Patient left up in chair with all lines intact, nsg notified and OT present.    Assessment:   Tenzin Ortiz is a 76 y.o. male with a medical diagnosis of Acute cystitis with hematuria and presents with no significant deficits warranting skilled PT services at this level of care.  Pt is functioning at prior level.  OK fo rD/C home with t/f of care to HHPT/OT and sitters/SO    Rehab identified problem list/impairments: Rehab identified problem list/impairments: impaired functional mobilty, impaired balance (Parkinson's dizease, tremors, gait disturbances)    Rehab potential is fair.    Activity tolerance: Fair    Discharge recommendations: Discharge Facility/Level Of Care Needs: home health PT, home health OT     Barriers to discharge: Barriers to Discharge: None    Equipment recommendations: Equipment Needed After Discharge: bedside commode, wheelchair     GOALS:    Physical Therapy Goals     Not on file          Multidisciplinary Problems (Resolved)        Problem: Physical Therapy Goal    Goal Priority Disciplines Outcome Goal Variances Interventions   Physical Therapy Goal   (Resolved)     PT/OT, PT Outcome(s) achieved                     PLAN:    Patient to be seen  (N/A) to address the above listed problems via  (N/A)  Plan of Care expires:  (N/A)  Plan of Care reviewed with: patient    Functional Assessment Tool Used: AM-PAC  Score: 17  Functional Limitation: Mobility: Walking and moving  around  Mobility: Walking and Moving Around Current Status (): CK  Mobility: Walking and Moving Around Goal Status (): CK  Mobility: Walking and Moving Around Discharge Status (): AMIRAH Miller, PT  09/05/2017

## 2017-09-05 NOTE — PLAN OF CARE
Problem: Infection, Risk/Actual (Adult)  Goal: Identify Related Risk Factors and Signs and Symptoms  Related risk factors and signs and symptoms are identified upon initiation of Human Response Clinical Practice Guideline (CPG)   Outcome: Ongoing (interventions implemented as appropriate)   09/05/17 0401   Infection, Risk/Actual   Related Risk Factors (Infection, Risk/Actual) exposure to microbes   Signs and Symptoms (Infection, Risk/Actual) malaise;lab value changes;weakness     Goal: Infection Prevention/Resolution  Patient will demonstrate the desired outcomes by discharge/transition of care.   Outcome: Ongoing (interventions implemented as appropriate)   09/05/17 0401   Infection, Risk/Actual (Adult)   Infection Prevention/Resolution making progress toward outcome

## 2017-09-05 NOTE — PLAN OF CARE
Problem: Occupational Therapy Goal  Goal: Occupational Therapy Goal  Goals to be met by: 9/12/17     Patient will increase functional independence with ADLs by performing:    Feeding with Set-up Assistance and Stand-by Assistance.  UE Dressing with Stand-by Assistance.  LE Dressing with Stand-by Assistance.  Grooming while standing at sink with Stand-by Assistance.  Supine to sit with Stand-by Assistance.  Stand pivot transfers with Stand-by Assistance.  Upper extremity exercise program x10 reps per handout, with assistance as needed.    Outcome: Ongoing (interventions implemented as appropriate)  Patient will benefit from OT to address functional deficits.

## 2017-09-05 NOTE — PLAN OF CARE
Problem: Patient Care Overview  Goal: Plan of Care Review  09/05/2017    Recommendations    Recommendation/Intervention: 1) Continue Low Sodium, 2gm diet 2) Boost Plus chocolate TID 3) Educate patient on a low-sodium diet and Coumadin Nutrition Therapy  Goals: 1) Patient to meet >=85% of EEN  Nutrition Goal Status: new  Communication of RD Recs: reviewed with KARINA Trejo, MPH, RD, LDN

## 2017-09-05 NOTE — PT/OT/SLP EVAL
Occupational Therapy  Evaluation/Treatment    Tenzin Ortiz   MRN: [id number]   Admitting Diagnosis: Acute cystitis with hematuria    OT Date of Treatment: 09/05/17   OT Start Time: 1347  OT Stop Time: 1417  OT Total Time (min): 30 min    Billable Minutes:  Evaluation 20 (co-tx with PT)  Self Care/Home Management 10  Total Time 30    Diagnosis: Acute cystitis with hematuria       Past Medical History:   Diagnosis Date    Anticoagulant long-term use     Hypertension     Impulse control disorder 2012    gambling on mirapex; also possible dopamine dysregulation syndrome    PD (Parkinson's disease) 1999    tremor-predominant    PVD (peripheral vascular disease) with claudication     poor circulation both legs      Past Surgical History:   Procedure Laterality Date    CATARACT EXTRACTION Bilateral 1 yr        Referring physician: Tristin  Date referred to OT: 9/4/17    General Precautions: Standard, fall  Orthopedic Precautions: N/A  Braces: N/A    Do you have any cultural, spiritual, Catholic conflicts, given your current situation?: none     Patient History:  Living Environment  Lives With: significant other  Living Arrangements: house  Home Accessibility: stairs to enter home  Number of Stairs to Enter Home: 1  Stair Railings at Home: none  Transportation Available: family or friend will provide  Living Environment Comment: Patient states he is waiting to get a BSC and a W/C but it never came  Equipment Currently Used at Home: walker, standard    Prior level of function:   Bed Mobility/Transfers: needs device  Grooming: independent  Bathing: needs assist  Upper Body Dressing: independent  Lower Body Dressing: independent  Toileting: independent  Home Management Skills: unable to perform  Homemaking Responsibilities: No  Driving License: No  IADL Comments: The patient states he has a sitter  from 9-5 (mon-fri) to assist with bathing and dressing while his girlfriend is at work.      Dominant hand:  right    Subjective:  Communicated with nurseLeora prior to session.    Chief Complaint: wants to get OOB  Patient/Family stated goals: go home    Pain/Comfort  Pain Rating 1: 0/10    Objective:  Patient found with: peripheral IV, tellez catheter    Cognitive Exam:  Oriented to: Person, Place and Situation  Follows Commands/attention: Follows two-step commands  Communication: clear/fluent  Memory:  No Deficits noted  Safety awareness/insight to disability: intact  Coping skills/emotional control: Appropriate to situation    Visual/perceptual:  Intact    Physical Exam:  Postural examination/scapula alignment: No postural abnormalities identified  Skin integrity: Visible skin intact  Edema: None noted     Sensation:   Intact    Upper Extremity Range of Motion:  Right Upper Extremity: WFL  Left Upper Extremity: WFL    Upper Extremity Strength:  Right Upper Extremity: WFL  Left Upper Extremity: WFL   Strength: WFL    Fine motor coordination:   Intact    Gross motor coordination: WFL    Functional Mobility:  Bed Mobility:  Scooting/Bridging: Stand by Assistance  Supine to Sit: Contact Guard Assistance    Transfers:  Sit <> Stand Assistance: Contact Guard Assistance  Sit <> Stand Assistive Device: Rolling Walker    Functional Ambulation: Patient amb using a RW with CGA    Activities of Daily Living:  Feeding Level of Assistance:  (The patient reports difficulty feeding himself 2* tremors.)  UE Dressing Level of Assistance: Moderate assistance  LE Dressing Level of Assistance: Contact guard (to don socks while seated EOB)  Grooming Position: Seated  Grooming Level of Assistance: Stand by assistance (to brush his teeth and wash his face)  Toileting Where Assessed: Other (Comment)  Toileting Level of Assistance: Total assistance (The patient was (D) to stand to change his diaper and perform pericare.)     Balance:   Static Sit: FAIR+: Able to take MINIMAL challenges from all directions  Dynamic Sit: FAIR+: Maintains  "balance through MINIMAL excursions of active trunk motion  Static Stand: FAIR+: Takes MINIMAL challenges from all directions  Dynamic stand: FAIR: Needs CONTACT GUARD during gait    Therapeutic Activities and Exercises:  The patient tolerated standing ~5 min with CGA for pericare and diaper change.    AM-PAC 6 CLICK ADL  How much help from another person does this patient currently need?  1 = Unable, Total/Dependent Assistance  2 = A lot, Maximum/Moderate Assistance  3 = A little, Minimum/Contact Guard/Supervision  4 = None, Modified [place]/Independent    Putting on and taking off regular lower body clothing? : 3  Bathing (including washing, rinsing, drying)?: 3  Toileting, which includes using toilet, bedpan, or urinal? : 2  Putting on and taking off regular upper body clothing?: 2  Taking care of personal grooming such as brushing teeth?: 4  Eating meals?: 2  Total Score: 16    AM-PAC Raw Score CMS "G-Code Modifier Level of Impairment Assistance   6 % Total / Unable   7 - 9 CM 80 - 100% Maximal Assist   10-14 CL 60 - 80% Moderate Assist   15 - 19 CK 40 - 60% Moderate Assist   20 - 22 CJ 20 - 40% Minimal Assist   23 CI 1-20% SBA / CGA   24 CH 0% Independent/ Mod I       Patient left up in chair with all lines intact, call button in reach and nurseLeora notified    Assessment:  Tenzin Ortiz is a 76 y.o. male with a medical diagnosis of Acute cystitis with hematuria and presents with self care and functional mobility deficits R/T generalized weakness and UE tremor R/T Parkinson's. .    Rehab identified problem list/impairments: Rehab identified problem list/impairments: impaired self care skills, gait instability, impaired functional mobilty, impaired balance, decreased upper extremity function, decreased lower extremity function, impaired coordination, impaired fine motor    Rehab potential is fair.    Activity tolerance: Good    Discharge recommendations: Discharge Facility/Level Of Care Needs: home " health OT, home health PT     Barriers to discharge: Barriers to Discharge: None    Equipment recommendations: wheelchair, bedside commode     GOALS:    Occupational Therapy Goals        Problem: Occupational Therapy Goal    Goal Priority Disciplines Outcome Interventions   Occupational Therapy Goal     OT, PT/OT Ongoing (interventions implemented as appropriate)    Description:  Goals to be met by: 9/12/17     Patient will increase functional independence with ADLs by performing:    Feeding with Set-up Assistance and Stand-by Assistance.  UE Dressing with Stand-by Assistance.  LE Dressing with Stand-by Assistance.  Grooming while standing at sink with Stand-by Assistance.  Supine to sit with Stand-by Assistance.  Stand pivot transfers with Stand-by Assistance.  Upper extremity exercise program x10 reps per handout, with assistance as needed.                      PLAN:  Patient to be seen 3 x/week to address the above listed problems via self-care/home management, therapeutic activities, therapeutic exercises  Plan of Care expires: 09/12/17  Plan of Care reviewed with: patient    OT G-codes  Functional Assessment Tool Used: AM PAC  Score: 16  Functional Limitation: Self care  Self Care Current Status (): CK  Self Care Goal Status (): MICHAEL Smith OT  09/05/2017

## 2017-09-05 NOTE — PLAN OF CARE
Problem: Patient Care Overview  Goal: Plan of Care Review  Outcome: Ongoing (interventions implemented as appropriate)   09/04/17 1922   Coping/Psychosocial   Plan Of Care Reviewed With patient;significant other   Eating at this time. Denies pain. Turned every 2 hours and prn External cath on to prevent moisture. Rash to both buttocks noted. Cleansed and applied moisture barrier. Antibotic per iv in progress. Extra fluids encouraged. No falls or injuries noted

## 2017-09-05 NOTE — SUBJECTIVE & OBJECTIVE
Interval History: Feeling better.  No new complaints.    Review of Systems   HENT: Negative for ear discharge and ear pain.    Eyes: Negative for pain and itching.   Respiratory: Negative for cough and shortness of breath.    Skin: Negative for rash and wound.     Objective:     Vital Signs (Most Recent):  Temp: 96.9 °F (36.1 °C) (09/05/17 1226)  Pulse: 94 (09/05/17 1226)  Resp: 18 (09/05/17 1226)  BP: (!) 159/67 (09/05/17 1226)  SpO2: (!) 91 % (09/05/17 1226) Vital Signs (24h Range):  Temp:  [96.4 °F (35.8 °C)-98.4 °F (36.9 °C)] 96.9 °F (36.1 °C)  Pulse:  [71-94] 94  Resp:  [18-20] 18  SpO2:  [91 %-98 %] 91 %  BP: (104-159)/(50-75) 159/67     Weight: 68.8 kg (151 lb 10.8 oz)  Body mass index is 23.76 kg/m².    Intake/Output Summary (Last 24 hours) at 09/05/17 1436  Last data filed at 09/05/17 0607   Gross per 24 hour   Intake          3546.67 ml   Output              975 ml   Net          2571.67 ml      Physical Exam   Constitutional: He is oriented to person, place, and time. He appears well-developed and well-nourished. No distress.   HENT:   Right Ear: External ear normal.   Left Ear: External ear normal.   Nose: Nose normal.   Eyes: EOM are normal. Pupils are equal, round, and reactive to light. No scleral icterus.   Neck: Normal range of motion. Neck supple. No thyromegaly present.   Cardiovascular: Normal rate and normal heart sounds.  Exam reveals no friction rub.    No murmur heard.  Pulmonary/Chest: Effort normal and breath sounds normal. No respiratory distress. He has no wheezes. He has no rales.   Abdominal: Soft. Bowel sounds are normal. He exhibits no mass. There is no tenderness.   No hepatosplenomegaly   Musculoskeletal: Normal range of motion. He exhibits no edema or deformity.   Neurological: He is alert and oriented to person, place, and time. No cranial nerve deficit.   Resting pill roll tremor, masked facies, cogwheel rigidity   Skin: Skin is warm and dry. No pallor.       Significant Labs:    BMP:   Recent Labs  Lab 09/03/17 2040 09/05/17  0653   * 115*   * 136   K 4.2 3.7    107   CO2 26 22*   BUN 20 14   CREATININE 1.0 0.9   CALCIUM 9.0 8.7   MG 1.9  --      CBC:   Recent Labs  Lab 09/03/17 2040 09/05/17  0652   WBC 15.64* 13.55*   HGB 12.8* 11.9*   HCT 37.5* 35.6*    144*

## 2017-09-05 NOTE — HOSPITAL COURSE
77 y/o male presented with generalized malaise.  Noted to be febrile and tachycardic with elevated WBC.  Admitted with sepsis secondary to UTI.  UCx growing 100K presumptive E coli.  Started on Zosyn with leukocytosis and fevers resolving. At discharge patient transitioned to oral abx cipro 2/2 C&S report. Patient also with chronic anticoagulation with coumadin with subtherapeutic INRs; provided an additional dose of coumadin prior to discharge. Adjustments not made to coumadin given consideration to starting oral abx which would likely increase coumadin. Patient is stable and will discharge home with HH and nursing to monitor coumadin levels and report to coumadin clinic for further adjustments. He is instructed to follow-up with PCP in 1 week for repeat UA to evaluated for resolution of UTI.  Instructed to resume coumadin consideration cardiac diet. Activity as tolerated.

## 2017-09-05 NOTE — PLAN OF CARE
Problem: Physical Therapy Goal  Goal: Physical Therapy Goal  Outcome: Outcome(s) achieved Date Met: 09/05/17  Pt OK for D/C home from PT standpoint.  Pt functioning at prior level.  Pt has private sitter at home while SO is at work.  Recommend W/C, BSC, and HHPT/OT

## 2017-09-05 NOTE — PROGRESS NOTES
[facility] Hospital Medicine  Progress Note    Patient Name: Tenzin Ortiz  MRN: [id number]  Patient Class: IP- Inpatient   Admission Date: 9/3/2017  Length of Stay: 1 days  Attending Physician: Maykel Stewart MD  Primary Care Provider: Efrain Chavez MD        Subjective:     Principal Problem:Acute cystitis with hematuria    HPI:  Mr. 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 He had hematuria and increased urinary frequency that began earlier on the day of presentation. He was admitted a month ago after a syncopal episode, and a catheter was placed during that admission and he has been using condom caths at home. He was discharged to [facility] for rehab last admission and was recently discharged from [facility]. Pt denies cough, SOB, abdominal pain, N/V.  In the ER he was found to have a fever, tachycardia, and leukocytosis. UA was consistent with UTI. He was admitted for sepsis 2/2 UTI and started on Zosyn.    Hospital Course:  75 y/o male presented with generalized malaise.  Noted to be febrile and tachycardic with elevated WBC.  Admitted with sepsis secondary to UTI.  UCx growing 100K presumptive E coli.  Started on Zosyn.    Interval History: Feeling better.  No new complaints.    Review of Systems   HENT: Negative for ear discharge and ear pain.    Eyes: Negative for pain and itching.   Respiratory: Negative for cough and shortness of breath.    Skin: Negative for rash and wound.     Objective:     Vital Signs (Most Recent):  Temp: 96.9 °F (36.1 °C) (09/05/17 1226)  Pulse: 94 (09/05/17 1226)  Resp: 18 (09/05/17 1226)  BP: (!) 159/67 (09/05/17 1226)  SpO2: (!) 91 % (09/05/17 1226) Vital Signs (24h Range):  Temp:  [96.4 °F (35.8 °C)-98.4 °F (36.9 °C)] 96.9 °F (36.1  °C)  Pulse:  [71-94] 94  Resp:  [18-20] 18  SpO2:  [91 %-98 %] 91 %  BP: (104-159)/(50-75) 159/67     Weight: 68.8 kg (151 lb 10.8 oz)  Body mass index is 23.76 kg/m².    Intake/Output Summary (Last 24 hours) at 09/05/17 1436  Last data filed at 09/05/17 0607   Gross per 24 hour   Intake          3546.67 ml   Output              975 ml   Net          2571.67 ml      Physical Exam   Constitutional: He is oriented to person, place, and time. He appears well-developed and well-nourished. No distress.   HENT:   Right Ear: External ear normal.   Left Ear: External ear normal.   Nose: Nose normal.   Eyes: EOM are normal. Pupils are equal, round, and reactive to light. No scleral icterus.   Neck: Normal range of motion. Neck supple. No thyromegaly present.   Cardiovascular: Normal rate and normal heart sounds.  Exam reveals no friction rub.    No murmur heard.  Pulmonary/Chest: Effort normal and breath sounds normal. No respiratory distress. He has no wheezes. He has no rales.   Abdominal: Soft. Bowel sounds are normal. He exhibits no mass. There is no tenderness.   No hepatosplenomegaly   Musculoskeletal: Normal range of motion. He exhibits no edema or deformity.   Neurological: He is alert and oriented to person, place, and time. No cranial nerve deficit.   Resting pill roll tremor, masked facies, cogwheel rigidity   Skin: Skin is warm and dry. No pallor.       Significant Labs:   BMP:   Recent Labs  Lab 09/03/17 2040 09/05/17  0653   * 115*   * 136   K 4.2 3.7    107   CO2 26 22*   BUN 20 14   CREATININE 1.0 0.9   CALCIUM 9.0 8.7   MG 1.9  --      CBC:   Recent Labs  Lab 09/03/17 2040 09/05/17  0652   WBC 15.64* 13.55*   HGB 12.8* 11.9*   HCT 37.5* 35.6*    144*         Assessment/Plan:      * Acute cystitis with hematuria    UA consistent with UTI. Sepsis on admission. Started on empiric Zosyn. Last catheter was over a month ago.  UTI and Sepsis secondary to presumptive E coli.  Await final  cultures and sensitivities.  Probably home tomorrow on oral ABx's if no complications.        Sepsis    2/2 UTI. Tx as above.        Lewy body dementia    Confusion intermittently. Reported hallucinations in the past.         Chronic anticoagulation    For aortic atherosclerosis. Continue home meds.        Hyperlipidemia    Cont statin        Essential hypertension    Continue current management.        PAD (peripheral artery disease)    Cont home meds. Coumadin and monitor INR for goal 2.0-3.0        Current non-adherence to medical treatment    In the past he admitted to intermittently taking too much carbadopa/levadopa or not taking it at all when he was at home. At his PCP appointment 4 days prior to admission he reported confusion about his med list and this was addressed at that appointment.        Debility    PT/OT consulted.        Parkinson disease    Followed by Dr. Allred. Cont home meds.          VTE Risk Mitigation         Ordered     warfarin (COUMADIN) tablet 3 mg  Daily     Route:  Oral        09/04/17 1630     Medium Risk of VTE  Once      09/04/17 0455     Place JOSEFINA hose  Until discontinued      09/04/17 0455     Place sequential compression device  Until discontinued      09/04/17 0455              Maykel Stewart MD  Department of Hospital Medicine   [facility]

## 2017-09-05 NOTE — PROGRESS NOTES
[facility]  Adult Nutrition  Progress Note    SUMMARY     Recommendations    Recommendation/Intervention: 1) Continue Low Sodium, 2gm diet 2) Boost Plus chocolate TID 3) Educate patient on a low-sodium diet and Coumadin Nutrition Therapy  Goals: 1) Patient to meet >=85% of EEN  Nutrition Goal Status: new  Communication of RD Recs: reviewed with RN    Continuum of Care Plan    Referral to Outpatient Services:  (D/C plannin gm sodium diet)    Reason for Assessment    Reason for Assessment: RD follow-up  Diagnosis:  (chest pain, fever)  Relevent Medical History: Parkinsons, HTN, PVD   General Information Comments: Patient has a fair appetite consuming 50% of meals per patient and RN. Recommend an ONS TID with RN & patient. Patient eager to receive drink it. Educated patient on Coumadin Nutrition Therapy and Hypertension Nutrition Therapy.     Nutrition Discharge Planning: D/C Planning:  Patient to discharge on a low Sodium, 2 gm diet with ONS as needed and follow a Coumadin appropriate diet.     Nutrition Prescription Ordered    Current Diet Order: 2 gm sodium    Evaluation of Received Nutrients/Fluid Intake    Energy Calories Required: not meeting needs  Protein Required: not meeting needs  Fluid Required: other (see comments) (Net I/O +3061)    Tolerance: tolerating  % Meal Intake: 50%     Nutrition Risk Screen     Nutrition Risk Screen: no indicators present    Nutrition/Diet History    Food Preferences: No cultural, Church or ethnic food preferences.    Factors Affecting Nutritional Intake:  (dislikes hospital food)    Labs/Tests/Procedures/Meds       Pertinent Labs Reviewed: reviewed  Protime  13.1  Coumadin Monitoring INR 1.3    BMP  Lab Results   Component Value Date     2017    K 3.7 2017     2017    CO2 22 (L) 2017    BUN 14 2017    CREATININE 0.9 2017    CALCIUM 8.7 2017    ANIONGAP 7 (L) 2017    ESTGFRAFRICA >60 2017  "   EGFRNONAA >60 09/05/2017     Lab Results   Component Value Date    CALCIUM 8.7 09/05/2017    PHOS 2.1 (L) 09/03/2017     Lab Results   Component Value Date    ALBUMIN 3.4 (L) 09/03/2017       Pertinent Medications Reviewed: reviewed  Scheduled Meds:   amlodipine  10 mg Oral Daily    atorvastatin  10 mg Oral Daily    carbidopa-levodopa  mg  1 tablet Oral QID    docusate sodium  100 mg Oral BID    gabapentin  600 mg Oral QHS    piperacillin-tazobactam (ZOSYN) IVPB  4.5 g Intravenous Q8H    ramelteon  8 mg Oral QHS    rivastigmine  1 patch Transdermal Daily    warfarin  3 mg Oral Daily     Continuous Infusions:   lactated Ringers 125 mL/hr at 09/05/17 1534       Physical Findings    Overall Physical Appearance:  (WDL)  Oral/Mouth Cavity: WDL  Skin: intact (Mike Score 15)    Anthropometrics    Temp: 96.9 °F (36.1 °C)     Height: 5' 7" (170.2 cm)  Weight Method: Bed Scale  Weight: 68.8 kg (151 lb 10.8 oz)  Ideal Body Weight (IBW), Male: 148 lb     % Ideal Body Weight, Male (lb): 102.49 lb     BMI (Calculated): 23.8  BMI Grade: 18.5-24.9 - normal    Estimated/Assessed Needs    Weight Used For Calorie Calculations: 68.8 kg (151 lb 10.8 oz)     Energy Need Method: McCone-St Jeor (2070-8662 kcal)  RMR (McCone-St. Jeor Equation): 1381.62  Weight Used For Protein Calculations: 68.8 kg (151 lb 10.8 oz)  Protein Requirements: 68-82 g  Fluid Need Method: RDA Method  CHO Requirement: n/a     Assessment and Plan    Nutrition Diagnosis  Problem:  Inadequate oral intake  Etiology:  Dislike of hospital food  Signs/Symptoms:  50% meal intake  Status:  New    Monitor and Evaluation    Food and Nutrient Intake: energy intake, food and beverage intake  Food and Nutrient Adminstration: diet order, other (specify) (supplement order)  Knowledge/Beliefs/Attitudes: food and nutrition knowledge/skill  Physical Activity and Function: nutrition-related ADLs and IADLs  Anthropometric Measurements: weight, weight " change  Biochemical Data, Medical Tests and Procedures: electrolyte and renal panel, gastrointestinal profile, lipid profile  Nutrition-Focused Physical Findings: overall appearance    Nutrition Risk    Level of Risk: other (see comments) (f/u 1x weekly)    Nutrition Follow-Up    RD Follow-up?: Yes    Wt Readings from Last 10 Encounters:   09/05/17 68.8 kg (151 lb 10.8 oz)   08/07/17 66.1 kg (145 lb 11.6 oz)   07/13/17 73.5 kg (162 lb)   06/27/17 73.5 kg (162 lb)   06/12/17 73.6 kg (162 lb 4.1 oz)   05/20/17 74.4 kg (164 lb)   05/18/17 74.7 kg (164 lb 10.9 oz)   04/25/17 75 kg (165 lb 5.5 oz)   04/18/17 72.9 kg (160 lb 11.5 oz)   04/17/17 74.4 kg (164 lb)   ]

## 2017-09-05 NOTE — ASSESSMENT & PLAN NOTE
In the past he admitted to intermittently taking too much carbadopa/levadopa or not taking it at all when he was at home. At his PCP appointment 4 days prior to admission he reported confusion about his med list and this was addressed at that appointment.

## 2017-09-05 NOTE — ASSESSMENT & PLAN NOTE
UA consistent with UTI. Sepsis on admission. Started on empiric Zosyn. Last catheter was over a month ago.  UTI and Sepsis secondary to presumptive E coli.  Await final cultures and sensitivities.  Probably home tomorrow on oral ABx's if no complications.

## 2017-09-06 VITALS
SYSTOLIC BLOOD PRESSURE: 120 MMHG | TEMPERATURE: 96 F | BODY MASS INDEX: 23.81 KG/M2 | RESPIRATION RATE: 19 BRPM | OXYGEN SATURATION: 99 % | DIASTOLIC BLOOD PRESSURE: 56 MMHG | HEIGHT: 67 IN | HEART RATE: 80 BPM | WEIGHT: 151.69 LBS

## 2017-09-06 PROBLEM — N30.00 ACUTE CYSTITIS WITHOUT HEMATURIA: Status: ACTIVE | Noted: 2017-09-04

## 2017-09-06 LAB
ANION GAP SERPL CALC-SCNC: 11 MMOL/L
BACTERIA UR CULT: NORMAL
BASOPHILS # BLD AUTO: 0.01 K/UL
BASOPHILS NFR BLD: 0.1 %
BUN SERPL-MCNC: 14 MG/DL
CALCIUM SERPL-MCNC: 9.2 MG/DL
CHLORIDE SERPL-SCNC: 106 MMOL/L
CO2 SERPL-SCNC: 22 MMOL/L
CREAT SERPL-MCNC: 0.9 MG/DL
DIFFERENTIAL METHOD: ABNORMAL
EOSINOPHIL # BLD AUTO: 0.3 K/UL
EOSINOPHIL NFR BLD: 4 %
ERYTHROCYTE [DISTWIDTH] IN BLOOD BY AUTOMATED COUNT: 16.7 %
EST. GFR  (AFRICAN AMERICAN): >60 ML/MIN/1.73 M^2
EST. GFR  (NON AFRICAN AMERICAN): >60 ML/MIN/1.73 M^2
GLUCOSE SERPL-MCNC: 92 MG/DL
HCT VFR BLD AUTO: 42.2 %
HGB BLD-MCNC: 13.9 G/DL
INR PPP: 1.3
LYMPHOCYTES # BLD AUTO: 1.1 K/UL
LYMPHOCYTES NFR BLD: 14.6 %
MCH RBC QN AUTO: 26.6 PG
MCHC RBC AUTO-ENTMCNC: 32.9 G/DL
MCV RBC AUTO: 81 FL
MONOCYTES # BLD AUTO: 0.9 K/UL
MONOCYTES NFR BLD: 12.8 %
NEUTROPHILS # BLD AUTO: 5 K/UL
NEUTROPHILS NFR BLD: 68.5 %
PLATELET # BLD AUTO: 148 K/UL
PMV BLD AUTO: 9.8 FL
POTASSIUM SERPL-SCNC: 3.6 MMOL/L
PROTHROMBIN TIME: 13.2 SEC
RBC # BLD AUTO: 5.23 M/UL
SODIUM SERPL-SCNC: 139 MMOL/L
WBC # BLD AUTO: 7.33 K/UL

## 2017-09-06 PROCEDURE — G8988 SELF CARE GOAL STATUS: HCPCS | Mod: CJ

## 2017-09-06 PROCEDURE — 85025 COMPLETE CBC W/AUTO DIFF WBC: CPT

## 2017-09-06 PROCEDURE — 94761 N-INVAS EAR/PLS OXIMETRY MLT: CPT

## 2017-09-06 PROCEDURE — 97535 SELF CARE MNGMENT TRAINING: CPT

## 2017-09-06 PROCEDURE — 97530 THERAPEUTIC ACTIVITIES: CPT

## 2017-09-06 PROCEDURE — G0378 HOSPITAL OBSERVATION PER HR: HCPCS

## 2017-09-06 PROCEDURE — 25000003 PHARM REV CODE 250: Performed by: EMERGENCY MEDICINE

## 2017-09-06 PROCEDURE — G8989 SELF CARE D/C STATUS: HCPCS | Mod: CK

## 2017-09-06 PROCEDURE — 63600175 PHARM REV CODE 636 W HCPCS: Performed by: EMERGENCY MEDICINE

## 2017-09-06 PROCEDURE — 36415 COLL VENOUS BLD VENIPUNCTURE: CPT

## 2017-09-06 PROCEDURE — G8987 SELF CARE CURRENT STATUS: HCPCS | Mod: CK

## 2017-09-06 PROCEDURE — 85610 PROTHROMBIN TIME: CPT

## 2017-09-06 PROCEDURE — 80048 BASIC METABOLIC PNL TOTAL CA: CPT

## 2017-09-06 RX ORDER — CIPROFLOXACIN 500 MG/1
500 TABLET ORAL 2 TIMES DAILY
Qty: 14 TABLET | Refills: 0 | Status: SHIPPED | OUTPATIENT
Start: 2017-09-06 | End: 2017-09-13

## 2017-09-06 RX ADMIN — SODIUM CHLORIDE, SODIUM LACTATE, POTASSIUM CHLORIDE, AND CALCIUM CHLORIDE: .6; .31; .03; .02 INJECTION, SOLUTION INTRAVENOUS at 03:09

## 2017-09-06 RX ADMIN — PIPERACILLIN, TAZOBACTAM 4.5 G: 4; .5 INJECTION, POWDER, LYOPHILIZED, FOR SOLUTION INTRAVENOUS at 03:09

## 2017-09-06 RX ADMIN — CARBIDOPA AND LEVODOPA 1 TABLET: 25; 100 TABLET ORAL at 06:09

## 2017-09-06 RX ADMIN — ATORVASTATIN CALCIUM 10 MG: 10 TABLET, FILM COATED ORAL at 08:09

## 2017-09-06 RX ADMIN — CARBIDOPA AND LEVODOPA 1 TABLET: 25; 100 TABLET ORAL at 11:09

## 2017-09-06 RX ADMIN — RIVASTIGMINE TRANSDERMAL SYSTEM 1 PATCH: 4.6 PATCH, EXTENDED RELEASE TRANSDERMAL at 08:09

## 2017-09-06 NOTE — PLAN OF CARE
09/06/17 1445   Final Note   Assessment Type Final Discharge Note   Discharge Disposition Home-Health   What phone number can be called within the next 1-3 days to see how you are doing after discharge? ([phone])   Hospital Follow Up  Appt(s) scheduled? Yes   Discharge plans and expectations educations in teach back method with documentation complete? Yes   Right Care Referral Info   Post Acute Recommendation Home-care   Referral Type Home Health   Facility Name [facility]   1330:  Nurse Sivan informed by [place] pt ok to DC from a case mgmt standpoint and did not need to wait for a response from [facility].    1433:  Per [facility] accepted pt for services.

## 2017-09-06 NOTE — PLAN OF CARE
Problem: Occupational Therapy Goal  Goal: Occupational Therapy Goal  Goals to be met by: 9/12/17     Patient will increase functional independence with ADLs by performing:    Feeding with Set-up Assistance and Stand-by Assistance.  Not met  UE Dressing with Stand-by Assistance. Not met  LE Dressing with Stand-by Assistance. Not met  Grooming while standing at sink with Stand-by Assistance. Not met  Supine to sit with Stand-by Assistance. Not met  Stand pivot transfers with Stand-by Assistance. Not met  Upper extremity exercise program x10 reps per handout, with assistance as needed. Not met     Outcome: Ongoing (interventions implemented as appropriate)  Patient will benefit from OT to address functional deficits.

## 2017-09-06 NOTE — PROGRESS NOTES
1245:  Order received for HH.  Pt offered list of HH providers.  Patient choice form completed. White (original) copy to Pt's folder, Yellow copy to Pt.  Pt requested    HH.  Orders, facesheet, H&P and DC summary sent via [facility] to:    [facility]  .    Awaiting acceptance to confirm services will be provided.

## 2017-09-06 NOTE — PLAN OF CARE
[facility]    HOME HEALTH ORDERS  FACE TO FACE ENCOUNTER    Patient Name: Tenzin Ortiz  YOB: 1940    PCP: Efrain Chavez MD   PCP Address: [facility] [address] / [address]  PCP Phone Number: [phone]  PCP Fax: [phone]    Encounter Date: 09/06/2017    Admit to Home Health    Diagnoses:  Active Hospital Problems    Diagnosis  POA    *Acute cystitis with hematuria [N30.01]  Yes    Sepsis [A41.9]  Yes    Lewy body dementia [G31.83, F02.80]  Yes     Chronic    Chronic anticoagulation [Z79.01]  Not Applicable     Chronic    Essential hypertension [I10]  Yes     Chronic    Hyperlipidemia [E78.5]  Yes     Chronic    PAD (peripheral artery disease) [I73.9]  Yes     Chronic    Current non-adherence to medical treatment [Z91.19]  Not Applicable     Chronic    Debility [R53.81]  Yes     Chronic    Parkinson disease [G20]  Yes     Chronic     Right tremor-predominant classic type.        Resolved Hospital Problems    Diagnosis Date Resolved POA   No resolved problems to display.       Future Appointments  Date Time Provider Department Center   9/7/2017 11:00 AM Asya Hodges DPM [facility] P[facility] [place]   9/29/2017 1:30 PM Efrain Chavez MD [facility]           I have seen and examined this patient face to face today. My clinical findings that support the need for the home health skilled services and home bound status are the following:  Weakness/numbness causing balance and gait disturbance due to Weakness/Debility and Parkinson Disease making it taxing to leave home.  Requiring assistive device to leave home due to unsteady gait caused by  Weakness/Debility and Parkinson Disease.    Allergies:  Review of patient's allergies indicates:   Allergen Reactions    Amantadine analogues      Caused confusion    Mirapex [pramipexole]      Pathologic gambling    Neupro [rotigotine]      Pathologic gambling         Diet: cardiac diet    Activities: activity as  tolerated    Nursing:   SN to complete comprehensive assessment including routine vital signs. Instruct on disease process and s/s of complications to report to MD. Review/verify medication list sent home with the patient at time of discharge  and instruct patient/caregiver as needed. Frequency may be adjusted depending on start of care date.    Notify MD if SBP > 160 or < 90; DBP > 90 or < 50; HR > 120 or < 50; Temp > 101;     LABS: Please draw coumadin level on Monday, September 11, 2017 and send results to PCP for further management    CONSULTS:    Physical Therapy to evaluate and treat. Evaluate for home safety and equipment needs; Establish/upgrade home exercise program. Perform / instruct on therapeutic exercises, gait training, transfer training, and Range of Motion.  Occupational Therapy to evaluate and treat. Evaluate home environment for safety and equipment needs. Perform/Instruct on transfers, ADL training, ROM, and therapeutic exercises.   to evaluate for community resources/long-range planning.  Aide to provide assistance with personal care, ADLs, and vital signs.        Medications: Review discharge medications with patient and family and provide education.      Current Discharge Medication List      START taking these medications    Details   ciprofloxacin HCl (CIPRO) 500 MG tablet Take 1 tablet (500 mg total) by mouth 2 (two) times daily.  Qty: 14 tablet, Refills: 0         CONTINUE these medications which have NOT CHANGED    Details   amlodipine (NORVASC) 10 MG tablet Take 1 tablet (10 mg total) by mouth once daily.  Qty: 30 tablet, Refills: 5    Associated Diagnoses: Essential hypertension      atorvastatin (LIPITOR) 10 MG tablet TAKE 1 TABLET(10 MG) BY MOUTH EVERY DAY  Qty: 90 tablet, Refills: 1    Comments: **Patient requests 90 days supply**  Associated Diagnoses: Thrombus of aorta; Hyperlipidemia, unspecified hyperlipidemia type      carbidopa-levodopa  mg (SINEMET)   mg per tablet TAKE 1 TABLET BY MOUTH FOUR TIMES DAILY  Qty: 360 tablet, Refills: 1    Comments: **Patient requests 90 days supply**  Associated Diagnoses: Parkinson disease      docusate sodium (COLACE) 100 MG capsule Take 1 capsule (100 mg total) by mouth 2 (two) times daily.  Refills: 0      gabapentin (NEURONTIN) 300 MG capsule TAKE 2 CAPSULES(600 MG) BY MOUTH EVERY EVENING  Qty: 180 capsule, Refills: 1    Comments: **Patient requests 90 days supply**  Associated Diagnoses: Parkinson disease      ramelteon (ROZEREM) 8 mg tablet Take 1 tablet (8 mg total) by mouth every evening.  Qty: 30 tablet, Refills: 1    Associated Diagnoses: Sleep disturbance      rivastigmine (EXELON) 4.6 mg/24 hr PT24 APPLY 1 PATCH EXTERNALLY TO THE SKIN EVERY DAY  Qty: 90 patch, Refills: 1    Comments: **Patient requests 90 days supply**  Associated Diagnoses: Lewy body dementia without behavioral disturbance      warfarin (COUMADIN) 3 MG tablet TAKE 1 TABLET BY MOUTH EVERY DAY AS DIRECTED BY COUMADIN CLINIC  Qty: 90 tablet, Refills: 0    Comments: **Patient requests 90 days supply**  Associated Diagnoses: Long-term (current) use of anticoagulants; Thrombus of aorta             I certify that this patient is confined to his home and needs intermittent skilled nursing care, physical therapy and occupational therapy and [facility].

## 2017-09-06 NOTE — PT/OT/SLP PROGRESS
Occupational Therapy  Treatment    Tenzin Ortiz   MRN: [id number]   Admitting Diagnosis: Acute cystitis without hematuria    OT Date of Treatment: 09/06/17   OT Start Time: 0954  OT Stop Time: 1025  OT Total Time (min): 31 min    Billable Minutes:  Self Care/Home Management 31    General Precautions: Standard, fall  Orthopedic Precautions: N/A  Braces: N/A    Do you have any cultural, spiritual, Anabaptist conflicts, given your current situation?: none    Subjective:  Communicated with nurse prior to session.    Pain/Comfort  Pain Rating 1: 0/10    Objective:  Patient found with: tellez catheter, telemetry     Functional Mobility:  Bed Mobility:  Scooting/Bridging: Moderate Assistance (to scoot while seated EOB)  Sit to Supine: Contact Guard Assistance    Transfers:   Sit <> Stand Assistance: Contact Guard Assistance (from the EOB)  Sit <> Stand Assistive Device: Rolling Walker    Functional Ambulation: The patient amb using a RW from the bed to the sink with CGA. The patient required min assist to manage the RW and min assist for balance while stepping away from the sink and amb to the chair.    Activities of Daily Living:  Feeding Level of Assistance: Activity did not occur  UE Dressing Level of Assistance: Moderate assistance  LE Dressing Level of Assistance: Activity did not occur  Grooming Position: Standing at sink  Grooming Level of Assistance: Contact guard assistance (CGA for standing balamnce while standing at the sink)      Balance:   Static Sit: FAIR+: Able to take MINIMAL challenges from all directions  Dynamic Sit: FAIR+: Maintains balance through MINIMAL excursions of active trunk motion  Static Stand: FAIR: Maintains without assist but unable to take challenges  Dynamic stand: FAIR: Needs CONTACT GUARD during gait    Therapeutic Activities and Exercises:  The patient tolerated standing at the sink with CGA but required min assist for transitional movement with posterior LOB. The patient was educated re:  "need to request assist to amb or transfer for safety.    AM-PAC 6 CLICK ADL   How much help from another person does this patient currently need?   1 = Unable, Total/Dependent Assistance  2 = A lot, Maximum/Moderate Assistance  3 = A little, Minimum/Contact Guard/Supervision  4 = None, Modified [place]/Independent    Putting on and taking off regular lower body clothing? : 3  Bathing (including washing, rinsing, drying)?: 3  Toileting, which includes using toilet, bedpan, or urinal? : 2  Putting on and taking off regular upper body clothing?: 2  Taking care of personal grooming such as brushing teeth?: 4  Eating meals?: 3  Total Score: 17     AM-PAC Raw Score CMS "G-Code Modifier Level of Impairment Assistance   6 % Total / Unable   7 - 8 CM 80 - 100% Maximal Assist   9-13 CL 60 - 80% Moderate Assist   14 - 19 CK 40 - 60% Moderate Assist   20 - 22 CJ 20 - 40% Minimal Assist   23 CI 1-20% SBA / CGA   24 CH 0% Independent/ Mod I       Patient left up in chair with all lines intact, call button in reach and nurseLeora notified    ASSESSMENT:  Tenzin Ortiz is a 76 y.o. male with a medical diagnosis of Acute cystitis without hematuria. UE tremors are present at rest but the patient is able to perform grooming tasks. The patient with LOB while turning with the RW. The patient will require 24* (S) and assist for all mobility.    Rehab identified problem list/impairments: Rehab identified problem list/impairments: impaired self care skills, impaired balance, gait instability, impaired functional mobilty, impaired coordination, decreased upper extremity function, impaired fine motor    Rehab potential is fair.    Activity tolerance: Good    Discharge recommendations: Discharge Facility/Level Of Care Needs: home health PT, home health OT     Barriers to discharge: Barriers to Discharge: None    Equipment recommendations: wheelchair, bedside commode     GOALS:    Occupational Therapy Goals        Problem: " Occupational Therapy Goal    Goal Priority Disciplines Outcome Interventions   Occupational Therapy Goal     OT, PT/OT Ongoing (interventions implemented as appropriate)    Description:  Goals to be met by: 9/12/17     Patient will increase functional independence with ADLs by performing:    Feeding with Set-up Assistance and Stand-by Assistance.  Not met  UE Dressing with Stand-by Assistance. Not met  LE Dressing with Stand-by Assistance. Not met  Grooming while standing at sink with Stand-by Assistance. Not met  Supine to sit with Stand-by Assistance. Not met  Stand pivot transfers with Stand-by Assistance. Not met  Upper extremity exercise program x10 reps per handout, with assistance as needed. Not met                       Plan:  Patient to be seen 3 x/week to address the above listed problems via self-care/home management, therapeutic activities, therapeutic exercises  Plan of Care expires: 09/12/17  Plan of Care reviewed with: patient         Merry NENO Smith  09/06/2017     Addendum: 3511-1465 (1 TA)The patient requested OT to assist him back to the bed. The patient was able to stand from the chair with min assist to scoot in the chair and CGA to stand from the chair.  The patient stepped the the chair with CGA. The patient was left EOB with the nurse.  ELBA Nolen

## 2017-09-06 NOTE — PLAN OF CARE
Problem: Fall Risk (Adult)  Goal: Identify Related Risk Factors and Signs and Symptoms  Related risk factors and signs and symptoms are identified upon initiation of Human Response Clinical Practice Guideline (CPG)   Outcome: Ongoing (interventions implemented as appropriate)   09/05/17 1933   Fall Risk   Related Risk Factors (Fall Risk) age-related changes;bladder function altered;neuro disease/injury;gait/mobility problems;history of falls;fatigue/slow reaction   Signs and Symptoms (Fall Risk) presence of risk factors     Goal: Absence of Falls  Patient will demonstrate the desired outcomes by discharge/transition of care.   Outcome: Ongoing (interventions implemented as appropriate)   09/05/17 1933   Fall Risk (Adult)   Absence of Falls making progress toward outcome       Problem: Patient Care Overview  Goal: Plan of Care Review  Outcome: Ongoing (interventions implemented as appropriate)   09/05/17 1933   Coping/Psychosocial   Plan Of Care Reviewed With patient;significant other   Voiding without any problems.Clear yellow urine noted.Denies pain or discomfort.    Problem: Pressure Ulcer Risk (Mike Scale) (Adult,Obstetrics,Pediatric)  Goal: Identify Related Risk Factors and Signs and Symptoms  Related risk factors and signs and symptoms are identified upon initiation of Human Response Clinical Practice Guideline (CPG)   Outcome: Ongoing (interventions implemented as appropriate)   09/05/17 1933   Pressure Ulcer Risk (Mike Scale)   Related Risk Factors (Pressure Ulcer Risk (Mike Scale)) age extremes;fluid intake inadequate;cognitive impairment;infection;mobility impaired     Goal: Skin Integrity  Patient will demonstrate the desired outcomes by discharge/transition of care.   Outcome: Ongoing (interventions implemented as appropriate)   09/05/17 1933   Pressure Ulcer Risk (Mike Scale) (Adult,Obstetrics,Pediatric)   Skin Integrity making progress toward outcome       Problem: Pain, Acute (Adult)  Goal:  Acceptable Pain Control/Comfort Level  Patient will demonstrate the desired outcomes by discharge/transition of care.   Outcome: Ongoing (interventions implemented as appropriate)   09/05/17 1933   Pain, Acute (Adult)   Acceptable Pain Control/Comfort Level making progress toward outcome       Problem: Infection, Risk/Actual (Adult)  Goal: Identify Related Risk Factors and Signs and Symptoms  Related risk factors and signs and symptoms are identified upon initiation of Human Response Clinical Practice Guideline (CPG)   Outcome: Ongoing (interventions implemented as appropriate)   09/05/17 1933   Infection, Risk/Actual   Related Risk Factors (Infection, Risk/Actual) age extremes;malnutrition;chronic illness/condition   Signs and Symptoms (Infection, Risk/Actual) lab value changes;weakness

## 2017-09-06 NOTE — PROGRESS NOTES
Elena NP visited earlier with new orders noted. Pt.for discharge. Saline locks d/c. Telemetry monitor d/c. Spoke with NP re;sched.med.held & pt. B/p. Pt. has bp machine at home & to take bp every am & hold bp med if sbp<120 as per NP. Pt.on coumadin & NP spoke with pt. Re:having inr watched closely while on antibiotic D/C instr.given,pt.  & fly.member at bedside.Instr.on importance of taking responsiblity & managing care at home. Pt,verbalized understanding of instr.given. Ride at bedside. Discharged home.

## 2017-09-06 NOTE — PLAN OF CARE
Problem: Patient Care Overview  Goal: Plan of Care Review  Outcome: Ongoing (interventions implemented as appropriate)   09/06/17 3590   Coping/Psychosocial   Plan Of Care Reviewed With patient   Patient is awake, oriented x 3. Telebox (#1447) verified. Condom catheter and heel protectors in place. Maintained on IV antibiotic and IV fluid. Afebrile. Denies any pain. Free of fall or injury. Will continue to monitor.

## 2017-09-06 NOTE — PROGRESS NOTES
([facility]/ called 9/6/17 to inform us that pt has been admitted in ([facility]) as of 9/3/17. C/S

## 2017-09-07 ENCOUNTER — TELEPHONE (OUTPATIENT)
Dept: FAMILY MEDICINE | Facility: CLINIC | Age: 77
End: 2017-09-07

## 2017-09-07 RX ORDER — RIVASTIGMINE TARTRATE 1.5 MG/1
1.5 CAPSULE ORAL 2 TIMES DAILY
Qty: 60 CAPSULE | Refills: 1 | Status: SHIPPED | OUTPATIENT
Start: 2017-09-07 | End: 2017-09-11 | Stop reason: ALTCHOICE

## 2017-09-07 NOTE — PROGRESS NOTES
The pt was recently admitted from 9/3 through 9/6 for acute cystitis with hematuria.  See calendar for recent INRs and warfarin doses.  It appears that he was discharged with Cipro 500mg BID x 7 days. LMFCB with pt - we will confirm new meds and warfarin doses post-discharge with his INR due today through .

## 2017-09-07 NOTE — PROGRESS NOTES
Admitted 9/4  , than discharged 9/6.  Medication changes 9/6 started ciprofloxacin HCl (CIPRO) 500 MG tablet one tablet twice daily .  New medication prescribed today 9/7 rivastigmine tartrate (EXELON) 1.5 MG capsule

## 2017-09-07 NOTE — TELEPHONE ENCOUNTER
Per incoming fax from pharmacy, Patient is requesting a change in medication for Rivastigmine patch, states that the patch gives him a rash.     Please advise.     Lov 08/31/17

## 2017-09-08 ENCOUNTER — HOSPITAL ENCOUNTER (EMERGENCY)
Facility: HOSPITAL | Age: 77
Discharge: HOME | End: 2017-09-08
Payer: MEDICARE

## 2017-09-08 ENCOUNTER — ANTI-COAG VISIT (OUTPATIENT)
Dept: CARDIOLOGY | Facility: CLINIC | Age: 77
End: 2017-09-08

## 2017-09-08 VITALS
HEART RATE: 80 BPM | OXYGEN SATURATION: 98 % | WEIGHT: 151 LBS | HEIGHT: 67 IN | RESPIRATION RATE: 16 BRPM | BODY MASS INDEX: 23.7 KG/M2 | DIASTOLIC BLOOD PRESSURE: 71 MMHG | SYSTOLIC BLOOD PRESSURE: 159 MMHG | TEMPERATURE: 98 F
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
## 2017-09-08 DIAGNOSIS — Z79.01 LONG-TERM (CURRENT) USE OF ANTICOAGULANTS: ICD-10-CM

## 2017-09-08 DIAGNOSIS — S50.12XA CONTUSION OF LEFT ELBOW AND FOREARM, INITIAL ENCOUNTER: ICD-10-CM

## 2017-09-08 DIAGNOSIS — S30.0XXA LUMBAR CONTUSION, INITIAL ENCOUNTER: Primary | ICD-10-CM

## 2017-09-08 DIAGNOSIS — I74.10 THROMBUS OF AORTA: ICD-10-CM

## 2017-09-08 DIAGNOSIS — G20.A1 PARKINSON DISEASE: ICD-10-CM

## 2017-09-08 LAB
INR PPP: 1.5
PROTHROMBIN TIME: 16.1 SEC

## 2017-09-08 PROCEDURE — 99284 EMERGENCY DEPT VISIT MOD MDM: CPT

## 2017-09-08 PROCEDURE — 85610 PROTHROMBIN TIME: CPT

## 2017-09-08 PROCEDURE — 25000003 PHARM REV CODE 250

## 2017-09-08 RX ADMIN — BACITRACIN ZINC, NEOMYCIN SULFATE, POLYMYXIN B SULFATE 1 EACH: 3.5; 5000; 4 OINTMENT TOPICAL at 07:09
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
## 2017-09-08 NOTE — ED NOTES
Skin tear noted to left elbow; no active bleeding noted; also, skin tear noted to right wrist area with no active bleeding; pending xray

## 2017-09-08 NOTE — ED NOTES
Abrasion to left forearm, right wrist, and lower back cleaned with NS, dressed with 4x4 gauze and secured with paper tape.

## 2017-09-08 NOTE — PROGRESS NOTES
Fortino was in ER earlier today with back pain following a mechanical slip and fall prior to arrival.  He reports no loss of consciousness or head injury during the fall.  There is an abrasion to the lower back and left elbow. No active bleeding. He reports that he missed his coumadin last night.  CIPRO was started on 9/6-9/13

## 2017-09-08 NOTE — PROVIDER PROGRESS NOTES - EMERGENCY DEPT.
Encounter Date: 9/8/2017    ED Physician Progress Notes        Physician Note:   X-ray of lumbar spine and left elbow no evidence of fracture
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
## 2017-09-08 NOTE — ED NOTES
Report received from KARINA Aburto. Pt lying in bed. Family at bedside. Denies pain. Side rails up. Bed lowered. Call light in reach. Will continue to monitor.

## 2017-09-09 LAB
BACTERIA BLD CULT: NORMAL
BACTERIA BLD CULT: NORMAL

## 2017-09-11 ENCOUNTER — OFFICE VISIT (OUTPATIENT)
Dept: FAMILY MEDICINE | Facility: CLINIC | Age: 77
End: 2017-09-11
Payer: MEDICARE

## 2017-09-11 ENCOUNTER — ANTI-COAG VISIT (OUTPATIENT)
Dept: CARDIOLOGY | Facility: CLINIC | Age: 77
End: 2017-09-11

## 2017-09-11 VITALS
SYSTOLIC BLOOD PRESSURE: 110 MMHG | TEMPERATURE: 98 F | OXYGEN SATURATION: 97 % | RESPIRATION RATE: 17 BRPM | WEIGHT: 148.63 LBS | HEART RATE: 77 BPM | BODY MASS INDEX: 23.33 KG/M2 | HEIGHT: 67 IN | DIASTOLIC BLOOD PRESSURE: 78 MMHG

## 2017-09-11 DIAGNOSIS — M72.2 PLANTAR FASCIITIS, BILATERAL: ICD-10-CM

## 2017-09-11 DIAGNOSIS — A41.51 SEPSIS DUE TO ESCHERICHIA COLI: Primary | ICD-10-CM

## 2017-09-11 DIAGNOSIS — N39.0 URINARY TRACT INFECTION WITH HEMATURIA, SITE UNSPECIFIED: ICD-10-CM

## 2017-09-11 DIAGNOSIS — Z79.01 LONG-TERM (CURRENT) USE OF ANTICOAGULANTS: ICD-10-CM

## 2017-09-11 DIAGNOSIS — F02.80 LEWY BODY DEMENTIA WITHOUT BEHAVIORAL DISTURBANCE: ICD-10-CM

## 2017-09-11 DIAGNOSIS — R31.9 URINARY TRACT INFECTION WITH HEMATURIA, SITE UNSPECIFIED: ICD-10-CM

## 2017-09-11 DIAGNOSIS — G31.83 LEWY BODY DEMENTIA WITHOUT BEHAVIORAL DISTURBANCE: ICD-10-CM

## 2017-09-11 DIAGNOSIS — I74.10 THROMBUS OF AORTA: ICD-10-CM
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
## 2017-09-11 LAB — INR PPP: 1.3

## 2017-09-11 PROCEDURE — 1125F AMNT PAIN NOTED PAIN PRSNT: CPT | Mod: S$GLB,,, | Performed by: INTERNAL MEDICINE

## 2017-09-11 PROCEDURE — 99499 UNLISTED E&M SERVICE: CPT | Mod: S$GLB,,, | Performed by: INTERNAL MEDICINE

## 2017-09-11 PROCEDURE — 99214 OFFICE O/P EST MOD 30 MIN: CPT | Mod: S$GLB,,, | Performed by: INTERNAL MEDICINE

## 2017-09-11 PROCEDURE — 99999 PR PBB SHADOW E&M-EST. PATIENT-LVL III: CPT | Mod: PBBFAC,,, | Performed by: INTERNAL MEDICINE

## 2017-09-11 PROCEDURE — 1159F MED LIST DOCD IN RCRD: CPT | Mod: S$GLB,,, | Performed by: INTERNAL MEDICINE

## 2017-09-11 PROCEDURE — 3008F BODY MASS INDEX DOCD: CPT | Mod: S$GLB,,, | Performed by: INTERNAL MEDICINE

## 2017-09-11 RX ORDER — RIVASTIGMINE 4.6 MG/24H
PATCH, EXTENDED RELEASE TRANSDERMAL
Qty: 90 PATCH | Refills: 1 | COMMUNITY
Start: 2017-09-11

## 2017-09-11 NOTE — PROGRESS NOTES
Subjective:       Patient ID: Tenzin Ortiz is a 76 y.o. male.    Chief Complaint: Hospital Follow Up; Tingling (bilateral feet ); and Foot Pain (bilateral heel pain with burning sensation)    He presents for follow-up after recent hospital admission with sepsis and urinary tract infection and a ER visit with the fall.  He was treated with IV Zosyn.  Negative blood cultures.  Distress culture positive for Escherichia coli.  He is now on Cipro and tolerating this.  He reports that he tosses balance a few days ago, to the emergency room with a few scrapes and bruises.  His x-rays were normal.  He does complain of a burning type pain in the bottom of his feet and heels worse first thing in the morning.  This also associated tingling.  His symptoms aren't resolved currently.  He reports that he is using the Exelon patch and not the capsules.  He did have a Reaction years ago when he initially tried the patch that he believes it was a different patch at that time.      Review of Systems   Cardiovascular: Negative for leg swelling.   Skin: Positive for wound.   Neurological: Positive for weakness.       Objective:      Physical Exam   Constitutional: He is oriented to person, place, and time. He appears well-developed and well-nourished. No distress.   HENT:   Head: Normocephalic and atraumatic.   Right Ear: External ear normal.   Left Ear: External ear normal.   Eyes: Conjunctivae are normal. No scleral icterus.   Cardiovascular: Normal rate, regular rhythm and normal heart sounds.  Exam reveals no gallop and no friction rub.    No murmur heard.  Pulses:       Dorsalis pedis pulses are 2+ on the right side, and 2+ on the left side.   Pulmonary/Chest: Effort normal and breath sounds normal. No respiratory distress. He has no wheezes. He has no rales.   Musculoskeletal: He exhibits no edema or tenderness.   Neurological: He is alert and oriented to person, place, and time. No cranial nerve deficit.   Skin: Skin is warm and  dry. No rash noted.   Psychiatric: He has a normal mood and affect.   Vitals reviewed.      Assessment:       1. Sepsis due to Escherichia coli    2. Lewy body dementia without behavioral disturbance    3. Urinary tract infection with hematuria, site unspecified    4. Fall, subsequent encounter        Plan:       Tenzin was seen today for hospital follow up, tingling and foot pain.    Diagnoses and all orders for this visit:    Sepsis due to Escherichia coli - recent admission with daily tract infection initially treated with Zosyn.  Cultures positive for Escherichia coli.  Negative blood cultures.  He is currently on Cipro will complete this.    Lewy body dementia without behavioral disturbance - continue Exelon patches.  He is tolerating this.  Discontinue capsules    Urinary tract infection with hematuria, site unspecified - complete Cipro.  He does have a catheter in place    Fall, subsequent encounter - recent ER visit with fall.  X-rays negative for fracture.  He has home health with physical therapy.    Plantar fasciitis, bilateral - complains of new onset symptoms worse in the morning.  Symptoms resolved currently.  Discussed exercises to complete at home.  Avoid NSAIDs       follow-up when necessary

## 2017-09-11 NOTE — PROGRESS NOTES
Patient, Tenzin Ortiz (MRN #[id number]), presented with a recent Platelet count less than 150 K/uL consistent with the definition of thrombocytopenia (ICD10 - D69.6).    Platelets   Date Value Ref Range Status   09/06/2017 148 (L) 150 - 350 K/uL Final     The patient's thrombocytopenia was monitored, evaluated, addressed and/or treated. This addendum to the medical record is made on 09/11/2017.

## 2017-09-14 ENCOUNTER — ANTI-COAG VISIT (OUTPATIENT)
Dept: CARDIOLOGY | Facility: CLINIC | Age: 77
End: 2017-09-14

## 2017-09-14 DIAGNOSIS — I74.10 THROMBUS OF AORTA: ICD-10-CM

## 2017-09-14 DIAGNOSIS — Z79.01 LONG-TERM (CURRENT) USE OF ANTICOAGULANTS: ICD-10-CM

## 2017-09-14 LAB — INR PPP: 1.7

## 2017-09-15 ENCOUNTER — TELEPHONE (OUTPATIENT)
Dept: FAMILY MEDICINE | Facility: CLINIC | Age: 77
End: 2017-09-15

## 2017-09-15 NOTE — TELEPHONE ENCOUNTER
----- Message from Olivia Ball sent at 9/15/2017  8:02 AM CDT -----  Contact: self  Pt calling to discuss frequent urination overnight and catheter. Please call [phone].

## 2017-09-15 NOTE — TELEPHONE ENCOUNTER
of Pt. Wishes to speak to a md only about this issue. Mr. Ortiz was notified that Dr. Chavez is out of the office and this message would be forwarded to Dr. Carrasco. The pt. Expressed an understanding.

## 2017-09-15 NOTE — TELEPHONE ENCOUNTER
Patient reports questions regarding the amount of urine in his catheter bag at night.  He denies an increased or decreased amount compared to his normal output.  Urine has no change in odor and does not appear to be cloudy.    Chart reviewed and patient noted to have been recently treated for UTI with Cipro.  Patient counseled that this can be normal.  He may note increased urine output at night due to increased kidney filtration.  Patient also noted to be aware of his fluid and salt intake during the day which may result in changes in output at night.    Patient voiced understanding and had no further questions.

## 2017-09-18 ENCOUNTER — ANTI-COAG VISIT (OUTPATIENT)
Dept: CARDIOLOGY | Facility: CLINIC | Age: 77
End: 2017-09-18

## 2017-09-18 ENCOUNTER — TELEPHONE (OUTPATIENT)
Dept: FAMILY MEDICINE | Facility: CLINIC | Age: 77
End: 2017-09-18

## 2017-09-18 DIAGNOSIS — G20.A1 PARKINSON DISEASE: Primary | Chronic | ICD-10-CM

## 2017-09-18 DIAGNOSIS — I74.10 THROMBUS OF AORTA: ICD-10-CM

## 2017-09-18 DIAGNOSIS — Z79.01 LONG-TERM (CURRENT) USE OF ANTICOAGULANTS: ICD-10-CM

## 2017-09-18 LAB — INR PPP: 1.7

## 2017-09-18 NOTE — TELEPHONE ENCOUNTER
----- Message from Lilliam Cisneros sent at 9/14/2017  3:38 PM CDT -----  Contact: Leatha Nicole would like to speak with the nurse regarding his blood pressure. Please call at [phone].

## 2017-09-18 NOTE — TELEPHONE ENCOUNTER
----- Message from Rosas Early sent at 9/18/2017  9:42 AM CDT -----  Contact: [phone]  Calling to speak with nurse regarding form that's needed for therapy. Pt states he has lft previous msg's regarding this ;but no return phone call.PT states he needs done soon as possible

## 2017-09-18 NOTE — TELEPHONE ENCOUNTER
Per Leatha, patient was informed by [facility] that he would need a referral from PCP for in patient therapy so that he could receive therapy services at that facility.     Also Leatha would like recommendations on what to do regarding patient's low blood pressure.  Stated patient has been experiencing low blood pressure and weakness.  Did not have a log of readings.  Would like to know if medication needs to be adjusted.  Please advise.

## 2017-09-18 NOTE — TELEPHONE ENCOUNTER
----- Message from Funmilayo Richards sent at 9/15/2017  1:57 PM CDT -----  Contact: Yara Alcala [facility]  Home health nurse calling from the patient's home . Patient is asking if there is an inpatient rehab facility he could be admitted to ?     [phone]    LL

## 2017-09-19 NOTE — UM SECONDARY REVIEW
Physician Advisor Internal    IP Extended Stay > 10  PENDING INSURANCE AUTH FOR SNF AT TIME OF REVIEW, ACCEPTED TO [facility]  DISCHARGE ORDER WRITTEN ON 8/8 ONCE ACCEPTED TO SNF.  LOS: approved an agreement with D/C plan

## 2017-09-20 NOTE — DISCHARGE SUMMARY
[facility] Hospital Medicine  Discharge Summary      Patient Name: Tenzin Ortiz  MRN: [id number]  Admission Date: 9/3/2017  Hospital Length of Stay: 1 days  Discharge Date and Time: 9/6/2017  3:12 PM  Attending Physician: aJcqui att. providers found   Discharging Provider: ANA Dobson  Primary Care Provider: Efrain Chavez MD      HPI:   Mr. 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 He had hematuria and increased urinary frequency that began earlier on the day of presentation. He was admitted a month ago after a syncopal episode, and a catheter was placed during that admission and he has been using condom caths at home. He was discharged to [facility] for rehab last admission and was recently discharged from [facility]. Pt denies cough, SOB, abdominal pain, N/V.  In the ER he was found to have a fever, tachycardia, and leukocytosis. UA was consistent with UTI. He was admitted for sepsis 2/2 UTI and started on Zosyn.    * No surgery found *      Indwelling Lines/Drains at time of discharge:   Lines/Drains/Airways          No matching active lines, drains, or airways        Hospital Course:   77 y/o male presented with generalized malaise.  Noted to be febrile and tachycardic with elevated WBC.  Admitted with sepsis secondary to UTI.  UCx growing 100K presumptive E coli.  Started on Zosyn with leukocytosis and fevers resolving. At discharge patient transitioned to oral abx cipro 2/2 C&S report. Patient also with chronic anticoagulation with coumadin with subtherapeutic INRs; provided an additional dose of coumadin prior to discharge. Adjustments not made to coumadin given consideration to starting oral abx which would likely increase coumadin. Patient is stable and will  discharge home with HH and nursing to monitor coumadin levels and report to coumadin clinic for further adjustments. He is instructed to follow-up with PCP in 1 week for repeat UA to evaluated for resolution of UTI.  Instructed to resume coumadin consideration cardiac diet. Activity as tolerated.     Consults:   Consults         Status Ordering Provider     Nutrition Services Referral  Once     Provider:  (Not yet assigned)    Completed MARINO MATA          Significant Diagnostic Studies: Labs: All labs within the past 24 hours have been reviewed    Pending Diagnostic Studies:     None        Final Active Diagnoses:    Diagnosis Date Noted POA    PRINCIPAL PROBLEM:  Acute cystitis without hematuria [N30.00] 09/04/2017 Yes    Sepsis [A41.9] 09/04/2017 Yes    Lewy body dementia [G31.83, F02.80] 08/09/2017 Yes     Chronic    Chronic anticoagulation [Z79.01] 08/02/2017 Not Applicable     Chronic    Essential hypertension [I10] 08/01/2017 Yes     Chronic    Hyperlipidemia [E78.5] 08/01/2017 Yes     Chronic    PAD (peripheral artery disease) [I73.9] 08/22/2016 Yes     Chronic    Current non-adherence to medical treatment [Z91.19] 04/02/2016 Not Applicable     Chronic    Debility [R53.81] 04/02/2016 Yes     Chronic    Urinary tract infection with hematuria [N39.0, R31.9] 12/01/2015 Yes    Parkinson disease [G20]  Yes     Chronic      Problems Resolved During this Admission:    Diagnosis Date Noted Date Resolved POA      No new Assessment & Plan notes have been filed under this hospital service since the last note was generated.  Service: Hospital Medicine      Discharged Condition: stable    Disposition: Home or Self Care    Follow Up:  Follow-up Information     Efrain Chavez MD On 9/11/2017.    Specialty:  Family Medicine  Why:  @11:15am for hospital follow up, Outpatient services  Contact information:  [address] [place] [address] [phone]             [facility].     Specialties:  DME Provider, Home Health Services  Why:  Home Health  Contact information:  [address] [place] [address]  [phone]                 Patient Instructions:     Diet general       Medications:  Reconciled Home Medications:   Discharge Medication List as of 9/6/2017  1:50 PM      START taking these medications    Details   ciprofloxacin HCl (CIPRO) 500 MG tablet Take 1 tablet (500 mg total) by mouth 2 (two) times daily., Starting Wed 9/6/2017, Until Wed 9/13/2017, Normal         CONTINUE these medications which have NOT CHANGED    Details   amlodipine (NORVASC) 10 MG tablet Take 1 tablet (10 mg total) by mouth once daily., Starting Thu 8/31/2017, Normal      atorvastatin (LIPITOR) 10 MG tablet TAKE 1 TABLET(10 MG) BY MOUTH EVERY DAY, Normal      carbidopa-levodopa  mg (SINEMET)  mg per tablet TAKE 1 TABLET BY MOUTH FOUR TIMES DAILY, Normal      docusate sodium (COLACE) 100 MG capsule Take 1 capsule (100 mg total) by mouth 2 (two) times daily., Starting Tue 8/8/2017, OTC      gabapentin (NEURONTIN) 300 MG capsule TAKE 2 CAPSULES(600 MG) BY MOUTH EVERY EVENING, Normal      ramelteon (ROZEREM) 8 mg tablet Take 1 tablet (8 mg total) by mouth every evening., Starting Thu 8/31/2017, Normal      warfarin (COUMADIN) 3 MG tablet TAKE 1 TABLET BY MOUTH EVERY DAY AS DIRECTED BY COUMADIN CLINIC, Normal      rivastigmine (EXELON) 4.6 mg/24 hr PT24 APPLY 1 PATCH EXTERNALLY TO THE SKIN EVERY DAY, Normal           Time spent on the discharge of patient: 30 minutes      ANA Dobson  Department of Hospital Medicine  [facility]

## 2017-09-25 ENCOUNTER — ANTI-COAG VISIT (OUTPATIENT)
Dept: CARDIOLOGY | Facility: CLINIC | Age: 77
End: 2017-09-25

## 2017-09-25 DIAGNOSIS — Z79.01 LONG-TERM (CURRENT) USE OF ANTICOAGULANTS: ICD-10-CM

## 2017-09-25 DIAGNOSIS — I74.10 THROMBUS OF AORTA: ICD-10-CM

## 2017-09-25 LAB — INR PPP: 1.8

## 2017-09-29 ENCOUNTER — OFFICE VISIT (OUTPATIENT)
Dept: FAMILY MEDICINE | Facility: CLINIC | Age: 77
End: 2017-09-29
Payer: MEDICARE

## 2017-09-29 ENCOUNTER — TELEPHONE (OUTPATIENT)
Dept: FAMILY MEDICINE | Facility: CLINIC | Age: 77
End: 2017-09-29

## 2017-09-29 VITALS
OXYGEN SATURATION: 96 % | DIASTOLIC BLOOD PRESSURE: 50 MMHG | TEMPERATURE: 97 F | HEART RATE: 82 BPM | HEIGHT: 67 IN | SYSTOLIC BLOOD PRESSURE: 94 MMHG | RESPIRATION RATE: 20 BRPM

## 2017-09-29 DIAGNOSIS — G20.A1 PARKINSON DISEASE: Chronic | ICD-10-CM

## 2017-09-29 DIAGNOSIS — G24.9 DYSKINESIA: Primary | ICD-10-CM

## 2017-09-29 DIAGNOSIS — G20.A1 PARKINSON DISEASE: Primary | Chronic | ICD-10-CM

## 2017-09-29 PROCEDURE — 1159F MED LIST DOCD IN RCRD: CPT | Mod: S$GLB,,, | Performed by: FAMILY MEDICINE

## 2017-09-29 PROCEDURE — 1126F AMNT PAIN NOTED NONE PRSNT: CPT | Mod: S$GLB,,, | Performed by: FAMILY MEDICINE

## 2017-09-29 PROCEDURE — 99213 OFFICE O/P EST LOW 20 MIN: CPT | Mod: S$GLB,,, | Performed by: FAMILY MEDICINE

## 2017-09-29 PROCEDURE — 3008F BODY MASS INDEX DOCD: CPT | Mod: S$GLB,,, | Performed by: FAMILY MEDICINE

## 2017-09-29 PROCEDURE — 99999 PR PBB SHADOW E&M-EST. PATIENT-LVL III: CPT | Mod: PBBFAC,,, | Performed by: FAMILY MEDICINE

## 2017-09-29 NOTE — PROGRESS NOTES
Routine Office Visit    Patient Name: Tenzin Ortiz    : 1940  MRN: [id number]    Subjective:  Tenzin is a 76 y.o. male who presents today for:    1. parkinsons disease  Patient presenting today for follow up from last visit. Since then he was admitted and has started needing skilled care.  He states that he feels as if his Parkinsons is worsening.  His only complaint today is that feels tired.  He states that it happens around this time every day and has been doing so for a while.  There has been no other new symptoms.  His nurse is with him today and does not voice any concerns.     Past Medical History  Past Medical History:   Diagnosis Date    Anticoagulant long-term use     Hypertension     Impulse control disorder     gambling on mirapex; also possible dopamine dysregulation syndrome    PD (Parkinson's disease)     tremor-predominant    PVD (peripheral vascular disease) with claudication     poor circulation both legs       Past Surgical History  Past Surgical History:   Procedure Laterality Date    CATARACT EXTRACTION Bilateral 1 yr        Family History  Family History   Problem Relation Age of Onset    No Known Problems Mother     No Known Problems Father     No Known Problems Sister     No Known Problems Brother     No Known Problems Maternal Aunt     No Known Problems Maternal Uncle     No Known Problems Paternal Aunt     No Known Problems Paternal Uncle     No Known Problems Maternal Grandmother     No Known Problems Maternal Grandfather     No Known Problems Paternal Grandmother     No Known Problems Paternal Grandfather     Parkinsonism Neg Hx     Amblyopia Neg Hx     Blindness Neg Hx     Cancer Neg Hx     Cataracts Neg Hx     Diabetes Neg Hx     Glaucoma Neg Hx     Hypertension Neg Hx     Macular degeneration Neg Hx     Retinal detachment Neg Hx     Strabismus Neg Hx     Stroke Neg Hx     Thyroid disease Neg Hx        Social History  Social History     Social  History    Marital status: Single     Spouse name: N/A    Number of children: N/A    Years of education: N/A     Occupational History    Not on file.     Social History Main Topics    Smoking status: Former Smoker     Years: 10.00    Smokeless tobacco: Never Used      Comment: Quit 40 years ago    Alcohol use No    Drug use: No    Sexual activity: Not Currently     Other Topics Concern    Not on file     Social History Narrative    No narrative on file       Current Medications  Current Outpatient Prescriptions on File Prior to Visit   Medication Sig Dispense Refill    amlodipine (NORVASC) 10 MG tablet Take 1 tablet (10 mg total) by mouth once daily. 30 tablet 5    atorvastatin (LIPITOR) 10 MG tablet TAKE 1 TABLET(10 MG) BY MOUTH EVERY DAY 90 tablet 1    carbidopa-levodopa  mg (SINEMET)  mg per tablet TAKE 1 TABLET BY MOUTH FOUR TIMES DAILY 360 tablet 1    docusate sodium (COLACE) 100 MG capsule Take 1 capsule (100 mg total) by mouth 2 (two) times daily.  0    gabapentin (NEURONTIN) 300 MG capsule TAKE 2 CAPSULES(600 MG) BY MOUTH EVERY EVENING 180 capsule 1    rivastigmine (EXELON) 4.6 mg/24 hr PT24 APPLY 1 PATCH EXTERNALLY TO THE SKIN EVERY DAY 90 patch 1    ramelteon (ROZEREM) 8 mg tablet Take 1 tablet (8 mg total) by mouth every evening. 30 tablet 1    warfarin (COUMADIN) 3 MG tablet TAKE 1 TABLET BY MOUTH EVERY DAY AS DIRECTED BY COUMADIN CLINIC 90 tablet 0     No current facility-administered medications on file prior to visit.        Allergies   Review of patient's allergies indicates:   Allergen Reactions    Amantadine analogues      Caused confusion    Mirapex [pramipexole]      Pathologic gambling    Neupro [rotigotine]      Pathologic gambling         Review of Systems (Pertinent positives)  Review of Systems   Constitutional: Positive for malaise/fatigue.   HENT: Negative.    Eyes: Negative.    Respiratory: Negative.    Cardiovascular: Negative.    Gastrointestinal:  "Negative.    Musculoskeletal: Positive for myalgias.   Skin: Negative.    Neurological: Positive for tremors.         BP (!) 94/50 (BP Location: Left arm, Patient Position: Sitting, BP Method: Medium (Manual))   Pulse 82   Temp 97.4 °F (36.3 °C) (Oral)   Resp 20   Ht 5' 7" (1.702 m)   SpO2 96%     GENERAL APPEARANCE: in no apparent distress and well developed and well nourished  HEENT: PERRL, EOMI, Sclera clear, anicteric, Oropharynx clear, no lesions, Neck supple with midline trachea  NECK: normal, supple, no adenopathy, thyroid normal in size  RESPIRATORY: appears well, vitals normal, no respiratory distress, acyanotic, normal RR, chest clear, no wheezing, crepitations, rhonchi, normal symmetric air entry  HEART: regular rate and rhythm, S1, S2 normal, no murmur, click, rub or gallop.    ABDOMEN: abdomen is soft without tenderness, no masses, no hernias, no organomegaly, no rebound, no guarding. Suprapubic tenderness absent. No CVA tenderness.  NEUROLOGIC: normal without focal findings, CN II-XII are intact.  Bilateral upper extremity and lower extremity tremor  SKIN: no rashes, no wounds, no other lesions  PSYCH: Alert, oriented x 3, thought content appropriate, speech normal, pleasant and cooperative, good eye contact, well groomed    Assessment/Plan:  Tenzin Ortiz is a 76 y.o. male who presents today for :    Tenzin was seen today for follow-up.    Diagnoses and all orders for this visit:    Dyskinesia    Parkinson disease      1.  Patients wheelchair order has been placed  2.  He is to follow up as needed  3.  Continue all medis as prescribed and keep appointment with neurology as scheduled  4.  He is to be brought to the ED for any changes in LOC      Efrain Chavez MD      "

## 2017-09-29 NOTE — TELEPHONE ENCOUNTER
----- Message from Lilliam Cisneros sent at 9/28/2017  1:13 PM CDT -----  Contact: Self  Patient called to check on orders for a  Wheel chair. Please call at [phone].

## 2017-10-02 ENCOUNTER — TELEPHONE (OUTPATIENT)
Dept: FAMILY MEDICINE | Facility: CLINIC | Age: 77
End: 2017-10-02

## 2017-10-02 ENCOUNTER — ANTI-COAG VISIT (OUTPATIENT)
Dept: CARDIOLOGY | Facility: CLINIC | Age: 77
End: 2017-10-02

## 2017-10-02 DIAGNOSIS — Z79.01 LONG-TERM (CURRENT) USE OF ANTICOAGULANTS: ICD-10-CM

## 2017-10-02 DIAGNOSIS — I74.10 THROMBUS OF AORTA: ICD-10-CM

## 2017-10-02 LAB — INR PPP: 2.4

## 2017-10-04 ENCOUNTER — TELEPHONE (OUTPATIENT)
Dept: FAMILY MEDICINE | Facility: CLINIC | Age: 77
End: 2017-10-04

## 2017-10-04 NOTE — TELEPHONE ENCOUNTER
Yulissa with [facility], called to report patient's caretaker just report to her that patient had a fall last night. She stated pt. Had no injury and did not go to E.R.

## 2017-10-09 ENCOUNTER — TELEPHONE (OUTPATIENT)
Dept: FAMILY MEDICINE | Facility: CLINIC | Age: 77
End: 2017-10-09

## 2017-10-09 DIAGNOSIS — G20.A1 PARKINSON DISEASE: Primary | Chronic | ICD-10-CM

## 2017-10-09 NOTE — TELEPHONE ENCOUNTER
----- Message from Mariah Cisneros sent at 10/9/2017 10:33 AM CDT -----  Patient would like to speak with nurse regarding Home Health being canceled. Dr Chavez has to write new orders to have it reinstated. Please call at [phone] Thank you!

## 2017-10-09 NOTE — TELEPHONE ENCOUNTER
Patient needs orders for him to continue getting home health,PT, OT and speech. Patient needs these orders ASAP

## 2017-10-10 ENCOUNTER — ANTI-COAG VISIT (OUTPATIENT)
Dept: CARDIOLOGY | Facility: CLINIC | Age: 77
End: 2017-10-10

## 2017-10-10 DIAGNOSIS — Z79.01 LONG-TERM (CURRENT) USE OF ANTICOAGULANTS: ICD-10-CM

## 2017-10-10 DIAGNOSIS — I74.10 THROMBUS OF AORTA: ICD-10-CM

## 2017-10-10 LAB — INR PPP: 2.8

## 2017-10-12 ENCOUNTER — TELEPHONE (OUTPATIENT)
Dept: NEUROLOGY | Facility: CLINIC | Age: 77
End: 2017-10-12

## 2017-10-12 NOTE — TELEPHONE ENCOUNTER
----- Message from Nhi Vieyra sent at 10/12/2017  2:17 PM CDT -----  Contact: Self  Pt is calling requesting to speak with Staff regarding his condition.    He can be reached at [phone].    Thank you.

## 2017-10-12 NOTE — TELEPHONE ENCOUNTER
Phone disconnected during conversation. Unable to get back on line. Pt previously saw Dr. Castellanos and Dr. Allred for PD. He would like to re-establish with Dr. Castellanos(last seen 2014) Pt booked as an EP on 11/10/17 @ 8650 (40 min allowed for visit)

## 2017-10-16 ENCOUNTER — TELEPHONE (OUTPATIENT)
Dept: FAMILY MEDICINE | Facility: CLINIC | Age: 77
End: 2017-10-16

## 2017-10-16 ENCOUNTER — ANTI-COAG VISIT (OUTPATIENT)
Dept: CARDIOLOGY | Facility: CLINIC | Age: 77
End: 2017-10-16

## 2017-10-16 DIAGNOSIS — Z79.01 LONG-TERM (CURRENT) USE OF ANTICOAGULANTS: ICD-10-CM

## 2017-10-16 DIAGNOSIS — I74.10 THROMBUS OF AORTA: ICD-10-CM

## 2017-10-16 LAB — INR PPP: 2.8

## 2017-10-16 NOTE — TELEPHONE ENCOUNTER
----- Message from Raffi Zamora sent at 10/16/2017 10:42 AM CDT -----  Contact: [facility] Freya  Called to report pt's recent shaking and frequent urination. Freya also states pt is feeling lots of pressure when urinating. Freya can be reached @ [phone].

## 2017-10-17 ENCOUNTER — OFFICE VISIT (OUTPATIENT)
Dept: FAMILY MEDICINE | Facility: CLINIC | Age: 77
End: 2017-10-17
Payer: MEDICARE

## 2017-10-17 VITALS
RESPIRATION RATE: 17 BRPM | HEART RATE: 85 BPM | TEMPERATURE: 98 F | SYSTOLIC BLOOD PRESSURE: 102 MMHG | BODY MASS INDEX: 22.77 KG/M2 | DIASTOLIC BLOOD PRESSURE: 70 MMHG | WEIGHT: 145.06 LBS | OXYGEN SATURATION: 98 % | HEIGHT: 67 IN

## 2017-10-17 DIAGNOSIS — Z79.01 CHRONIC ANTICOAGULATION: Chronic | ICD-10-CM

## 2017-10-17 DIAGNOSIS — R53.81 DEBILITY: Chronic | ICD-10-CM

## 2017-10-17 DIAGNOSIS — R30.0 DYSURIA: Primary | ICD-10-CM

## 2017-10-17 DIAGNOSIS — G31.83 LEWY BODY DEMENTIA WITHOUT BEHAVIORAL DISTURBANCE: Chronic | ICD-10-CM

## 2017-10-17 DIAGNOSIS — F02.80 LEWY BODY DEMENTIA WITHOUT BEHAVIORAL DISTURBANCE: Chronic | ICD-10-CM

## 2017-10-17 DIAGNOSIS — G20.A1 PARKINSON DISEASE: Chronic | ICD-10-CM

## 2017-10-17 PROCEDURE — 99214 OFFICE O/P EST MOD 30 MIN: CPT | Mod: S$GLB,,, | Performed by: INTERNAL MEDICINE

## 2017-10-17 PROCEDURE — 99999 PR PBB SHADOW E&M-EST. PATIENT-LVL III: CPT | Mod: PBBFAC,,, | Performed by: INTERNAL MEDICINE

## 2017-10-17 NOTE — PROGRESS NOTES
Subjective:       Patient ID: Tenzin Ortiz is a 77 y.o. male.    Chief Complaint: Dysuria    He presents with his caregiver for follow-up.  He reports a two-week history of pressure with urination and decreased volume.  He has some difficulty associated symptoms.  He does report that his home health has been discontinued.  He has difficulty getting to appointments for outpatient physical therapy as well as lab draw as well as Coumadin.      Review of Systems   Constitutional: Negative for fever.   Gastrointestinal: Negative for abdominal pain and nausea.   Genitourinary: Positive for dysuria. Negative for difficulty urinating and hematuria.       Objective:      Physical Exam   Constitutional: He is oriented to person, place, and time. He appears well-developed and well-nourished. No distress.   HENT:   Head: Normocephalic and atraumatic.   Right Ear: External ear normal.   Left Ear: External ear normal.   Eyes: Conjunctivae are normal. No scleral icterus.   Cardiovascular: Normal rate, regular rhythm and normal heart sounds.  Exam reveals no gallop and no friction rub.    No murmur heard.  Pulmonary/Chest: Effort normal and breath sounds normal. No respiratory distress. He has no wheezes. He has no rales.   Abdominal: Soft. He exhibits no distension. There is no tenderness. There is no guarding.   Neurological: He is alert and oriented to person, place, and time. No cranial nerve deficit.   Skin: Skin is warm and dry. No rash noted.   Psychiatric: He has a normal mood and affect.   Vitals reviewed.      Assessment:       1. Dysuria    2. Parkinson disease    3. Debility    4. Lewy body dementia without behavioral disturbance    5. Chronic anticoagulation        Plan:       Tenzin was seen today for dysuria.    Diagnoses and all orders for this visit:    Dysuria - recent admission with sepsis and UTI.  Escherichia coli by culture at that time.  He complains of a two-week history of symptoms currently.  Rule out UTI.   We'll follow-up his culture and treat appropriately.  He was unable to give urine sample in clinic today.  -     POCT URINE DIPSTICK WITHOUT MICROSCOPE  -     Urinalysis; Future  -     Urine culture; Future    Parkinson disease - to follow-up with neurology in 3 weeks.  Continue home PT.  Patient is wheelchair-bound and would have difficulty attending frequent outpatient visits  -     SUBSEQUENT HOME HEALTH ORDERS    Debility  -     SUBSEQUENT HOME HEALTH ORDERS    Lewy body dementia without behavioral disturbance  -     SUBSEQUENT HOME HEALTH ORDERS    Chronic anticoagulation -  Patient is wheelchair-bound and would have difficulty attending frequent outpatient visits  -     SUBSEQUENT HOME HEALTH ORDERS       F/u prn

## 2017-10-18 ENCOUNTER — LAB VISIT (OUTPATIENT)
Dept: LAB | Facility: HOSPITAL | Age: 77
End: 2017-10-18
Attending: INTERNAL MEDICINE
Payer: MEDICARE

## 2017-10-18 DIAGNOSIS — R30.0 DYSURIA: ICD-10-CM

## 2017-10-18 LAB
BACTERIA #/AREA URNS HPF: NORMAL /HPF
BILIRUB UR QL STRIP: NEGATIVE
CLARITY UR: ABNORMAL
COLOR UR: YELLOW
GLUCOSE UR QL STRIP: NEGATIVE
HGB UR QL STRIP: NEGATIVE
KETONES UR QL STRIP: ABNORMAL
LEUKOCYTE ESTERASE UR QL STRIP: NEGATIVE
MICROSCOPIC COMMENT: NORMAL
NITRITE UR QL STRIP: NEGATIVE
PH UR STRIP: 5 [PH] (ref 5–8)
PROT UR QL STRIP: NEGATIVE
RBC #/AREA URNS HPF: 1 /HPF (ref 0–4)
SP GR UR STRIP: 1.02 (ref 1–1.03)
SQUAMOUS #/AREA URNS HPF: 3 /HPF
URN SPEC COLLECT METH UR: ABNORMAL
UROBILINOGEN UR STRIP-ACNC: NEGATIVE EU/DL
WBC #/AREA URNS HPF: 1 /HPF (ref 0–5)

## 2017-10-18 PROCEDURE — 87086 URINE CULTURE/COLONY COUNT: CPT

## 2017-10-18 PROCEDURE — 81000 URINALYSIS NONAUTO W/SCOPE: CPT

## 2017-10-20 ENCOUNTER — TELEPHONE (OUTPATIENT)
Dept: FAMILY MEDICINE | Facility: CLINIC | Age: 77
End: 2017-10-20

## 2017-10-20 LAB — BACTERIA UR CULT: NO GROWTH

## 2017-10-20 RX ORDER — TRAZODONE HYDROCHLORIDE 50 MG/1
50 TABLET ORAL NIGHTLY PRN
Qty: 30 TABLET | Refills: 4 | Status: ON HOLD | OUTPATIENT
Start: 2017-10-20 | End: 2018-05-03 | Stop reason: HOSPADM

## 2017-10-20 NOTE — TELEPHONE ENCOUNTER
----- Message from Raffi Zamora sent at 10/20/2017  9:58 AM CDT -----  Contact: Self  Pt returned call. Please call pt at [phone].

## 2017-10-20 NOTE — TELEPHONE ENCOUNTER
----- Message from Lilliam Cisneros sent at 10/20/2017  2:37 PM CDT -----  Contact: [facility]   Patient need a medication change because his insurance is not covering it. Please call at [phone].      ramelteon (ROZEREM) 8 mg tablet      Clark Cutler

## 2017-10-23 ENCOUNTER — ANTI-COAG VISIT (OUTPATIENT)
Dept: CARDIOLOGY | Facility: CLINIC | Age: 77
End: 2017-10-23

## 2017-10-23 ENCOUNTER — TELEPHONE (OUTPATIENT)
Dept: FAMILY MEDICINE | Facility: CLINIC | Age: 77
End: 2017-10-23

## 2017-10-23 DIAGNOSIS — I74.10 THROMBUS OF AORTA: ICD-10-CM

## 2017-10-23 DIAGNOSIS — Z79.01 LONG-TERM (CURRENT) USE OF ANTICOAGULANTS: ICD-10-CM

## 2017-10-23 LAB — INR PPP: 4.2

## 2017-10-23 NOTE — TELEPHONE ENCOUNTER
----- Message from Imani Springer sent at 10/23/2017  9:49 AM CDT -----  Contact: [facility] called to report patients BP as 88/52. Please contact Cora at [phone].    Thanks!

## 2017-10-23 NOTE — TELEPHONE ENCOUNTER
Have them hold his blood pressure medication today and tomorrow and see how he does.  If he becomes dizzy, light headed or altered he will need to go to the ED.    Thanks,  Dr. Chavez

## 2017-10-23 NOTE — PROGRESS NOTES
Verbal result taken from ____Lazara_____. PT/INR __50.4 / 4.2_____ Date drawn____10/23/2017____ Hardcopy to be faxed.    The pt was not actually due for an INR today.  However, his hh nurse noted that his bp was low and he was pale and fatigued.  Therefore, she yuri a level.

## 2017-10-27 NOTE — PROGRESS NOTES
Lazara with [facility].  called for orders, reports that patient was supposed to be discharged from  but his visits have been extended for another 3 weeks, order faxed to home health office

## 2017-10-31 ENCOUNTER — ANTI-COAG VISIT (OUTPATIENT)
Dept: CARDIOLOGY | Facility: CLINIC | Age: 77
End: 2017-10-31

## 2017-10-31 DIAGNOSIS — I74.10 THROMBUS OF AORTA: ICD-10-CM

## 2017-10-31 DIAGNOSIS — Z79.01 LONG-TERM (CURRENT) USE OF ANTICOAGULANTS: ICD-10-CM

## 2017-10-31 LAB — INR PPP: 2

## 2017-11-06 ENCOUNTER — ANTI-COAG VISIT (OUTPATIENT)
Dept: CARDIOLOGY | Facility: CLINIC | Age: 77
End: 2017-11-06

## 2017-11-06 DIAGNOSIS — I74.10 THROMBUS OF AORTA: ICD-10-CM

## 2017-11-06 DIAGNOSIS — Z79.01 LONG-TERM (CURRENT) USE OF ANTICOAGULANTS: ICD-10-CM

## 2017-11-06 LAB — INR PPP: 1.8

## 2017-11-10 ENCOUNTER — OFFICE VISIT (OUTPATIENT)
Dept: NEUROLOGY | Facility: CLINIC | Age: 77
End: 2017-11-10
Payer: MEDICARE

## 2017-11-10 VITALS
BODY MASS INDEX: 22.66 KG/M2 | HEART RATE: 73 BPM | WEIGHT: 144.38 LBS | SYSTOLIC BLOOD PRESSURE: 117 MMHG | HEIGHT: 67 IN | DIASTOLIC BLOOD PRESSURE: 62 MMHG

## 2017-11-10 DIAGNOSIS — G20.A1 PARKINSON DISEASE: Chronic | ICD-10-CM

## 2017-11-10 DIAGNOSIS — G31.83 LEWY BODY DEMENTIA WITHOUT BEHAVIORAL DISTURBANCE: Chronic | ICD-10-CM

## 2017-11-10 DIAGNOSIS — T42.8X5A LEVODOPA-INDUCED DYSKINESIA: ICD-10-CM

## 2017-11-10 DIAGNOSIS — F63.9 IMPULSE CONTROL DISORDER: ICD-10-CM

## 2017-11-10 DIAGNOSIS — F41.0 PANIC: ICD-10-CM

## 2017-11-10 DIAGNOSIS — G20.A1 PARKINSON DISEASE: Primary | Chronic | ICD-10-CM

## 2017-11-10 DIAGNOSIS — F02.80 LEWY BODY DEMENTIA WITHOUT BEHAVIORAL DISTURBANCE: Chronic | ICD-10-CM

## 2017-11-10 DIAGNOSIS — G24.01 LEVODOPA-INDUCED DYSKINESIA: ICD-10-CM

## 2017-11-10 PROBLEM — N30.00 ACUTE CYSTITIS WITHOUT HEMATURIA: Status: RESOLVED | Noted: 2017-09-04 | Resolved: 2017-11-10

## 2017-11-10 PROBLEM — G93.40 ACUTE ENCEPHALOPATHY: Status: RESOLVED | Noted: 2017-08-01 | Resolved: 2017-11-10

## 2017-11-10 PROBLEM — A41.9 SEPSIS: Status: RESOLVED | Noted: 2017-09-04 | Resolved: 2017-11-10

## 2017-11-10 PROCEDURE — 99999 PR PBB SHADOW E&M-EST. PATIENT-LVL III: CPT | Mod: PBBFAC,,, | Performed by: PSYCHIATRY & NEUROLOGY

## 2017-11-10 PROCEDURE — 99214 OFFICE O/P EST MOD 30 MIN: CPT | Mod: S$GLB,,, | Performed by: PSYCHIATRY & NEUROLOGY

## 2017-11-10 RX ORDER — CARBIDOPA AND LEVODOPA 25; 100 MG/1; MG/1
2 TABLET ORAL 4 TIMES DAILY
Qty: 240 TABLET | Refills: 4 | Status: SHIPPED | OUTPATIENT
Start: 2017-11-10

## 2017-11-10 RX ORDER — CARBIDOPA AND LEVODOPA 25; 100 MG/1; MG/1
TABLET ORAL
Qty: 720 TABLET | Refills: 4 | OUTPATIENT
Start: 2017-11-10

## 2017-11-10 NOTE — ASSESSMENT & PLAN NOTE
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     -> will keep the diagnosis on his list until I learn more about him   -> if NOT LBD, then he may be DBS candidate, again.

## 2017-11-10 NOTE — ASSESSMENT & PLAN NOTE
Currently has no ICD, but he's not on any dopamine agonists.    -> will need to determine who prescribed mirapex to him   -> consider resuming patch, low dose, as he did ok with this in past.

## 2017-11-10 NOTE — ASSESSMENT & PLAN NOTE
"His anxiety and "off" panic was what got him into trouble years ago with the PD medications; I worry we could inadvertently wind up there, again.  However, currently, he denies panic.  "

## 2017-11-10 NOTE — PROGRESS NOTES
"Last Name: Tenzin Ortiz. Chief Complaints during this visit:   f/u visit for PD, dopamine dysregulation syndrome     History of present illness:   77 y.o. M seen in f/u for PD. Accompanied by girlfriend of 7 years.  I have not seen him in 3 years.  He is now living with her since recent discharge from rehab for 3 months.  They have sitters to help.  His PD medications have been dramatically reduced (during hospitalization 8/9/17) as he presented there with confusion.  In his list at that time was mirapex 1mg tid, though who prescribed this is unknown (not on his list in his last visit with Dr. Allred 6/2017).    He has not had any hallucinations outside of illness or sleep medications.      Interval history 7/10/14:  Accompanied by son who is concerned about "all the medication."  Patient has been "off" for past 3-4 days.  Significant tremor, panic.    From my note 8/20/13;  He is wearing off at 2.5 hours.  He restarted neupro patch on, but his urge to nagy is down "80%".  Adamant about not having brain surgery as he is "too scared."    -----------------------------------------------------------   II. Review of systems As in HPI, otherwise, balance 3 systems reviewed and are negative.   III. Past Medical history: PD since ~1999   Family history: No movement disorders   Social history: lives with family, , owns a printing company. No tobacco, EtOH, or illicit drugs   ---------------------------------------------------------   Current medications:    Sinemet 25/100 qid  exelon 4.6 patch  Coumadin  neurontin 300mg    Medications as of 6/12/17:  Sinemet  5x/day  Parcopa , 2 pills qid prn  Pramipexole 1mg qid    From my note 2013:  Sinemet 25/250mg 6x/day (pink, oblong), Parcopa 25/100mg 1 tab 6x/day (blue, disintegrating), neupro patch 2mg    Allergies: Reviewed MEDCARD None   ---------------------------------------------------------------------------------   IV. Physical Exam (2 hours since " "last dose)    Vitals:    11/10/17 0918   BP: 117/62   Pulse: 73   Weight: 65.5 kg (144 lb 6.4 oz)   Height: 5' 7" (1.702 m)     General appearance: Well nourished, well developed, no acute distress.  In wheelchair         Cardiovascular:  pedal pulses 2, no edema or cyanosis, heart regular rate and rhythym, no carotid bruits.         -------------------------------------------------------------  Facial Expression: normal       Affect: full       Orientation to time & place:  Oriented to time, place, person and situation       Attention & concentration:  Normal attention span and concentration       Memory:  Recent and remote memory intact  Language: Spontaneous, fluent; able to repeat and name objects        Fund of knowledge:  Aware of current events        Speech:  2: Mild: Loss of modulation, diction, or volume, with a few words unclear, but the overall  sentences easy to follow   -------------------------------------------------------  Cranial nerves: normal visual acuity, visual fields full, optic discs not well visualized due small pupils, pupils equal round and reactive, extraocular movements intact,       facial sensation intact, face symmetrical, hearing intact to whisper, palate raises midline, shoulder shrug strength normal, tongue protrudes midline.        -------------------------------------------------------  Musculoskeletal  Muscle tone: all 4 extremities have cogwheel rigidity, left > right        Muscle Bulk: all 4 extremities normal        Muscle strength:  5/5 in all 4 extremities        No pronator drift  Sensation: Intact to light touch in all extremities        Deep tendon Reflexes: 1 bilateral biceps, triceps, patella and ankles        --------------------------------------------------------------  Cerebellar and Coordination  Gait:  2: Mild: Independent walking but with substantial gait impairment.         Finger-nose: no dysmetria       Rapid Alternating Movements (pronation/supination): " markedly apraxic bilaterally  --------------------------------------------------------------  MOVEMENT DISORDERS FOCUSED EXAM  Abnormality of movement (bradykinesia, hyperkinesia) present? Yes, 3: Moderate: Moderate global slowness and poverty of spontaneous movements.     Tremor present?   Yes, resting tremors in all four limbs, persistent, low amplitude.  Posture: 3: Moderate: Stooped posture, scoliosis or leaning to one side that cannot be corrected  volitionally to a normal posture by the patient.   Postural stability: 3: Moderate: Stands safely, but with absence of postural response; falls if not caught by  examiner.       V. Laboratory/ Radiological Data: Reviewed: no new data     VI. Medical Decision Making     Problem List Items Addressed This Visit        1 - High    Parkinson disease - Primary (Chronic)    Overview     Right tremor-predominant classic type.         Current Assessment & Plan     He has tremors, bradykinesia and gait disturbance today, but notably is on about 1/4 the amount of levodopa as he was in 2013 or even 6/2017 (1800mg levodopa per day down to 400mg per day).      This does not surprise me as I felt he was markedly over-medicating himself back then and suspected he had a dopamine dysregulation syndrome.  However, he is now undermedicated!  The trick will be trying to bring him up without going too high.   -> increase sinemet to 2 pills qid.   -> girlfriend advised that this could cause hallucinations, confusion, and to back down if these occur.   -> consider stalevo or rytary         Relevant Medications    carbidopa-levodopa  mg (SINEMET)  mg per tablet       2     Lewy body dementia (Chronic)    Overview     Questionable diagnosis as he hallucinates with dopaminergics.         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     -> will keep the diagnosis on his list until I learn more about  "him   -> if NOT LBD, then he may be DBS candidate, again.            3     Levodopa-induced dyskinesia    Current Assessment & Plan     None in recent time because he's undermedicated.            4     Panic    Overview     When levodopa wears off.         Current Assessment & Plan     His anxiety and "off" panic was what got him into trouble years ago with the PD medications; I worry we could inadvertently wind up there, again.  However, currently, he denies panic.            5     Impulse control disorder    Overview     Gambling on dopamine agonists.  Resolved in 2012 with reduction of medications.         Current Assessment & Plan     Currently has no ICD, but he's not on any dopamine agonists.    -> will need to determine who prescribed mirapex to him   -> consider resuming patch, low dose, as he did ok with this in past.               Return in 5 weeks (on 12/13/2017) for PD at 9am.    "

## 2017-11-10 NOTE — ASSESSMENT & PLAN NOTE
He has tremors, bradykinesia and gait disturbance today, but notably is on about 1/4 the amount of levodopa as he was in 2013 or even 6/2017 (1800mg levodopa per day down to 400mg per day).      This does not surprise me as I felt he was markedly over-medicating himself back then and suspected he had a dopamine dysregulation syndrome.  However, he is now undermedicated!  The trick will be trying to bring him up without going too high.   -> increase sinemet to 2 pills qid.   -> girlfriend advised that this could cause hallucinations, confusion, and to back down if these occur.   -> consider stalevo or rytary

## 2017-11-13 ENCOUNTER — OFFICE VISIT (OUTPATIENT)
Dept: FAMILY MEDICINE | Facility: CLINIC | Age: 77
End: 2017-11-13
Payer: MEDICARE

## 2017-11-13 VITALS
SYSTOLIC BLOOD PRESSURE: 108 MMHG | WEIGHT: 146.63 LBS | TEMPERATURE: 97 F | BODY MASS INDEX: 23.01 KG/M2 | HEIGHT: 67 IN | DIASTOLIC BLOOD PRESSURE: 54 MMHG | RESPIRATION RATE: 14 BRPM | HEART RATE: 77 BPM | OXYGEN SATURATION: 98 %

## 2017-11-13 DIAGNOSIS — B35.6 TINEA CRURIS: Primary | ICD-10-CM

## 2017-11-13 DIAGNOSIS — N39.44 NOCTURNAL ENURESIS: ICD-10-CM

## 2017-11-13 DIAGNOSIS — G20.A1 PARKINSON DISEASE: Chronic | ICD-10-CM

## 2017-11-13 PROCEDURE — 99999 PR PBB SHADOW E&M-EST. PATIENT-LVL IV: CPT | Mod: PBBFAC,,, | Performed by: FAMILY MEDICINE

## 2017-11-13 PROCEDURE — 99213 OFFICE O/P EST LOW 20 MIN: CPT | Mod: S$GLB,,, | Performed by: FAMILY MEDICINE

## 2017-11-13 RX ORDER — NYSTATIN 100000 U/G
CREAM TOPICAL 2 TIMES DAILY
Qty: 30 G | Refills: 2 | Status: ON HOLD | OUTPATIENT
Start: 2017-11-13 | End: 2018-05-03 | Stop reason: HOSPADM

## 2017-11-13 NOTE — PATIENT INSTRUCTIONS
Fungal Skin Infection (Tinea)  A fungal infection occurs when too much fungus grows on or in the body. Fungus normally lives on the skin in small amounts and does not cause harm. But when too much grows on the skin, it causes an infection. This is also known as tinea. Fungal skin infections are common and not usually serious.  The infection often starts as a small red area the size of a pea. The skin may turn dry and flaky. The area may itch. As the fungus grows, it spreads out in a red [place]. Because of how it looks, fungal skin infection is often called ringworm, but it is not caused by a worm. Fungal skin infections can occur on many parts of the body. They can grow on the head, chest, arms, or legs. They can occur on the buttocks. On the feet, fungal infection is known as athletes foot. It causes itchy, sometimes painful sores between the toes and the bottom or sides of the feet. In the groin, the rash is called jock itch.  People with weak immune systems can get a fungal infection more easily. This includes people with diabetes or HIV, or who are being treated for cancer. In these cases, the fungal infection can spread and cause severe illness. Fungal infections are also more common in people who are overweight.  In most cases, treatment is done with antifungal cream or ointment. If the infection is on your scalp, you may take oral medicine. In some cases, a tiny piece of the skin (biopsy) may be taken. This is so it can be tested in a lab.  Common fungal infections are treated with creams on the skin or oral medicine.  Home care  Follow all instructions when using antifungal cream or ointment on your skin. Your healthcare provider may advise using cornstarch powder to keep your skin dry or petroleum jelly to provide a barrier.  General care:  · If you were prescribed an oral medicine, read the patient information. Talk with your healthcare provider about the risks and side effects.  · Let your skin dry  completely after bathing. Carefully dry your feet and between your toes.  · Dress in loose cotton clothing.  · Dont scratch the affected area. This can delay healing and may spread the infection. It can also cause a bacterial infection.  · Keep your skin clean, but dont wash the skin too much. This can irritate your skin.  · Keep in mind that it may take a week before the fungus starts to go away. It can take 2 to 4 weeks to fully clear. To prevent it from coming back, use the medicine until the rash is all gone.  Follow-up care  Follow up with your healthcare provider if the rash does not get better after 10 days of treatment. Also follow up if the rash spreads to other parts of your body.  When to seek medical advice  Call your healthcare provider right away if any of these occur:  · Fever of 100.4°F (38°C) or higher  · Redness or swelling that gets worse  · Pain that gets worse  · Foul-smelling fluid leaking from the skin  Date Last Reviewed: 11/1/2016  © 5669-6232 [employer]. [address] [place] [address], [place] [address]. All rights reserved. This information is not intended as a substitute for professional medical care. Always follow your healthcare professional's instructions.

## 2017-11-13 NOTE — PROGRESS NOTES
Chief Complaint   Patient presents with    Rash     around testicles       HPI    Tenzin Ortiz is 77 y.o. male. The primary encounter diagnosis was Tinea cruris. Diagnoses of Nocturnal enuresis and Parkinson disease were also pertinent to this visit.    77 year old male with Parkinson's Disease comes to clinic with complaint of rash present in the groin.  Patient cannot determine when he first noticed the rash but he reports that it has been present for at least several weeks.  He reports wearing Depends for his urinary incontinence.  He denies itching or pain of the area.  He admits some sensitivity of the penis.    He reports using a gel prescribed to him by a Dermatologist in the past, but he cannot recall the name of the medication.  Patient also expresses concern regarding urinary incontinence.  He reports using a tellez catheter only at night.  He denies having urinary retention in the past.    Review of Systems   Constitutional: Negative for activity change.   Respiratory: Negative for shortness of breath.    Cardiovascular: Negative for chest pain.   Musculoskeletal: Negative for gait problem.   Skin: Positive for rash. Negative for wound.   Psychiatric/Behavioral: Negative for suicidal ideas.           Current Outpatient Prescriptions:     amlodipine (NORVASC) 10 MG tablet, Take 1 tablet (10 mg total) by mouth once daily., Disp: 30 tablet, Rfl: 5    atorvastatin (LIPITOR) 10 MG tablet, TAKE 1 TABLET(10 MG) BY MOUTH EVERY DAY, Disp: 90 tablet, Rfl: 1    carbidopa-levodopa  mg (SINEMET)  mg per tablet, Take 2 tablets by mouth 4 (four) times daily. 6am, 10am, 2pm and 6pm, Disp: 240 tablet, Rfl: 4    docusate sodium (COLACE) 100 MG capsule, Take 1 capsule (100 mg total) by mouth 2 (two) times daily., Disp: , Rfl: 0    gabapentin (NEURONTIN) 300 MG capsule, TAKE 2 CAPSULES(600 MG) BY MOUTH EVERY EVENING, Disp: 180 capsule, Rfl: 1    rivastigmine (EXELON) 4.6 mg/24 hr PT24, APPLY 1 PATCH  "EXTERNALLY TO THE SKIN EVERY DAY, Disp: 90 patch, Rfl: 1    trazodone (DESYREL) 50 MG tablet, Take 1 tablet (50 mg total) by mouth nightly as needed for Insomnia., Disp: 30 tablet, Rfl: 4    warfarin (COUMADIN) 3 MG tablet, TAKE 1 TABLET BY MOUTH EVERY DAY AS DIRECTED BY COUMADIN CLINIC, Disp: 90 tablet, Rfl: 0    nystatin (MYCOSTATIN) cream, Apply topically 2 (two) times daily., Disp: 30 g, Rfl: 2      Blood pressure (!) 108/54, pulse 77, temperature 97.4 °F (36.3 °C), temperature source Oral, resp. rate 14, height 5' 7" (1.702 m), weight 66.5 kg (146 lb 9.7 oz), SpO2 98 %.    Physical Exam   Skin: Rash noted.        Denuded, erythematous skin noted in the groin bilaterally, in the area of the Depends underwear.  Erythema of the scrotum and penis also noted.       Anti-coag visit on 11/06/2017   Component Date Value Ref Range Status    INR 11/06/2017 1.8   Final   Anti-coag visit on 10/31/2017   Component Date Value Ref Range Status    INR 10/31/2017 2.0   Final   Anti-coag visit on 10/23/2017   Component Date Value Ref Range Status    INR 10/23/2017 4.2   Final   Lab Visit on 10/18/2017   Component Date Value Ref Range Status    Specimen UA 10/18/2017 Urine, Clean Catch   Final    Color, UA 10/18/2017 Yellow  Yellow, Straw, Wendy Final    Appearance, UA 10/18/2017 Hazy* Clear Final    pH, UA 10/18/2017 5.0  5.0 - 8.0 Final    Specific Gravity, UA 10/18/2017 1.025  1.005 - 1.030 Final    Protein, UA 10/18/2017 Negative  Negative Final    Glucose, UA 10/18/2017 Negative  Negative Final    Ketones, UA 10/18/2017 Trace* Negative Final    Bilirubin (UA) 10/18/2017 Negative  Negative Final    Occult Blood UA 10/18/2017 Negative  Negative Final    Nitrite, UA 10/18/2017 Negative  Negative Final    Urobilinogen, UA 10/18/2017 Negative  <2.0 EU/dL Final    Leukocytes, UA 10/18/2017 Negative  Negative Final    Urine Culture, Routine 10/20/2017 No growth   Final    RBC, UA 10/18/2017 1  0 - 4 /hpf Final "    WBC, UA 10/18/2017 1  0 - 5 /hpf Final    Bacteria, UA 10/18/2017 Rare  None-Occ /hpf Final    Squam Epithel, UA 10/18/2017 3  /hpf Final    Microscopic Comment 10/18/2017 SEE COMMENT   Final   Anti-coag visit on 10/16/2017   Component Date Value Ref Range Status    INR 10/16/2017 2.8   Final   Anti-coag visit on 10/10/2017   Component Date Value Ref Range Status    INR 10/10/2017 2.8   Final   Anti-coag visit on 10/02/2017   Component Date Value Ref Range Status    INR 10/02/2017 2.4   Final   Anti-coag visit on 09/25/2017   Component Date Value Ref Range Status    INR 09/25/2017 1.8   Final   Anti-coag visit on 09/18/2017   Component Date Value Ref Range Status    INR 09/18/2017 1.7   Final   Anti-coag visit on 09/14/2017   Component Date Value Ref Range Status    INR 09/14/2017 1.7   Final   There may be more visits with results that are not included.   ]    Assessment:    1. Tinea cruris    2. Nocturnal enuresis    3. Parkinson disease          Tenzin was seen today for rash.    Diagnoses and all orders for this visit:    Tinea cruris  -     nystatin (MYCOSTATIN) cream; Apply topically 2 (two) times daily.  - New problem. Treat with topical antifungal.  Given instructions to wear cotton underwear with padding nocturnal enuresis and keep skin in groin area as dry as possible.  - Use Nystatin powder after initial treatment.    Nocturnal enuresis  -     Ambulatory referral to Urology  - Unchanged. Discussed potential side effects of anticholinergics and compatibility with Parkinson's disease.  - Referral to urology placed.    Parkinson disease  -     Ambulatory referral to Urology  - Stable. No worsening of symptoms. See plan above.          FOLLOW UP: Return in about 2 weeks (around 11/27/2017), or if symptoms worsen or fail to improve.

## 2017-11-14 ENCOUNTER — ANTI-COAG VISIT (OUTPATIENT)
Dept: CARDIOLOGY | Facility: CLINIC | Age: 77
End: 2017-11-14

## 2017-11-14 DIAGNOSIS — Z79.01 LONG-TERM (CURRENT) USE OF ANTICOAGULANTS: ICD-10-CM

## 2017-11-14 DIAGNOSIS — I74.10 THROMBUS OF AORTA: ICD-10-CM

## 2017-11-14 LAB — INR PPP: 2.7

## 2017-11-20 ENCOUNTER — TELEPHONE (OUTPATIENT)
Dept: FAMILY MEDICINE | Facility: CLINIC | Age: 77
End: 2017-11-20

## 2017-11-20 NOTE — LETTER
November 20, 2017    Tenzin Ortiz  [address] [place] [address]             [facility]  [address]  [place] [address]  Phone: [phone]  Fax: [phone] Dear Mr. Wallser:    Sorry we were unable to contact you to schedule your Urology appointment. Please give the referral department a call at [phone].      If you have any questions or concerns, please don't hesitate to call.    Sincerely,        Debra Blackman MA

## 2017-11-21 ENCOUNTER — ANTI-COAG VISIT (OUTPATIENT)
Dept: CARDIOLOGY | Facility: CLINIC | Age: 77
End: 2017-11-21

## 2017-11-21 DIAGNOSIS — I74.10 THROMBUS OF AORTA: ICD-10-CM

## 2017-11-21 DIAGNOSIS — Z79.01 LONG-TERM (CURRENT) USE OF ANTICOAGULANTS: ICD-10-CM

## 2017-11-21 LAB — INR PPP: 2.9

## 2017-11-22 ENCOUNTER — TELEPHONE (OUTPATIENT)
Dept: NEUROLOGY | Facility: CLINIC | Age: 77
End: 2017-11-22

## 2017-11-22 NOTE — TELEPHONE ENCOUNTER
----- Message from Umesh Smith sent at 11/22/2017 12:04 PM CST -----  Contact: Self @ [phone]  Pt states he is talking to himself while taking carbidopa-levodopa  mg (SINEMET)  mg per tablet and is asking to speak with the doctor about changing the dosage or the medication.

## 2017-11-24 ENCOUNTER — HOSPITAL ENCOUNTER (OUTPATIENT)
Dept: RADIOLOGY | Facility: HOSPITAL | Age: 77
Discharge: HOME | End: 2017-11-24
Attending: NURSE PRACTITIONER
Payer: MEDICARE

## 2017-11-24 ENCOUNTER — OFFICE VISIT (OUTPATIENT)
Dept: FAMILY MEDICINE | Facility: CLINIC | Age: 77
End: 2017-11-24
Payer: MEDICARE

## 2017-11-24 VITALS
HEIGHT: 67 IN | WEIGHT: 151.25 LBS | SYSTOLIC BLOOD PRESSURE: 100 MMHG | BODY MASS INDEX: 23.74 KG/M2 | HEART RATE: 84 BPM | TEMPERATURE: 98 F | OXYGEN SATURATION: 95 % | DIASTOLIC BLOOD PRESSURE: 50 MMHG

## 2017-11-24 DIAGNOSIS — S59.909A ELBOW INJURY, UNSPECIFIED LATERALITY, INITIAL ENCOUNTER: ICD-10-CM
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
## 2017-11-24 DIAGNOSIS — S50.02XA CONTUSION OF LEFT ELBOW, INITIAL ENCOUNTER: ICD-10-CM
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
## 2017-11-24 DIAGNOSIS — M70.21 OLECRANON BURSITIS OF RIGHT ELBOW: ICD-10-CM

## 2017-11-24 PROCEDURE — 99214 OFFICE O/P EST MOD 30 MIN: CPT | Mod: S$GLB,,, | Performed by: NURSE PRACTITIONER

## 2017-11-24 PROCEDURE — 73080 X-RAY EXAM OF ELBOW: CPT | Mod: 26,50,, | Performed by: RADIOLOGY

## 2017-11-24 PROCEDURE — 73080 X-RAY EXAM OF ELBOW: CPT | Mod: 50,TC,PO

## 2017-11-24 PROCEDURE — 99999 PR PBB SHADOW E&M-EST. PATIENT-LVL V: CPT | Mod: PBBFAC,,, | Performed by: NURSE PRACTITIONER

## 2017-11-24 RX ORDER — CIPROFLOXACIN 500 MG/1
TABLET ORAL
Status: ON HOLD | COMMUNITY
Start: 2017-09-06 | End: 2018-05-03 | Stop reason: HOSPADM

## 2017-11-24 NOTE — PATIENT INSTRUCTIONS
Bursitis  You have bursitis. This is an inflammation of the bursa. These are small, fluid-filled sacs that surround the larger joints of the body. The bursa help the muscles and tendons move smoothly over the joints.  Bursitis often happens in the shoulder. But it can also affect the elbows, hips, pelvis, knees, toes, and heels. Bursitis can be caused by injury, overuse of the joint, or infection of the bursa. Symptoms include pain and tenderness over a joint. Symptoms get worse with movement.  Bursitis is treated with an anti-inflammatory medicine and by resting the joint. More severe cases require injection of medicine directly into the bursa.    Home care  · Rest the painful joint and protect it from movement. This will allow the inflammation to heal faster.  · Apply an ice pack over the injured area for no more than 15 to 20 minutes. Do this every 3 to 6 hours for the first 24 to 48 hours. Keep using ice packs 3 to 4 times a day until the pain and swelling improves.   · To make an ice pack, put ice cubes in a sealed plastic zip-lock bag. Wrap the bag in a clean, thin towel or cloth. Never put ice or an ice pack directly on the skin. As the ice melts, be careful to avoid getting any wrap or splint wet.  · You may take over-the-counter pain medicine to treat pain and inflammation, unless another medicine was prescribed. Anti-inflammatory pain medicines may be more effective. Talk with your provider beforeusing these medicines if you have chronic liver or kidney disease, or ever had a stomach ulcer or GI (gastrointestinal) bleeding.  · As your symptoms improve, slowly begin to move the joint. Do not overuse the joint. This may cause the symptoms to flare up again.  When to seek medical advice  Call your healthcare provider right away if any of these occur:  · Redness over the painful area  · Increasing pain or swelling at the joint  · Fever of 100.4°F (38°C) or above lasting for 24 to 48 hours  Date Last  Reviewed: 11/21/2015  © 4635-4840 [employer]. [address] [place] [address], [place] [address]. All rights reserved. This information is not intended as a substitute for professional medical care. Always follow your healthcare professional's instructions.        Elbow Bruise  You have a bruise (contusion) of your elbow. A bruise causes local pain, swelling, and sometimes bruising. There are no broken bones. This injury takes a few days to a few weeks to heal. You may be given a sling for comfort and arm support.  You may notice color changes over the skin. It may change from reddish to bluish to greenish or yellowish before the bruising fades. The skin will then go back to its normal color.  Home care  Follow these guidelines when caring for yourself at home.  · Keep your arm elevated to reduce pain and swelling. This is most important during the first 2 days (48 hours) after the injury.  · Put an ice pack on the injured area. Do this for 20 minutes every 1 to 2 hours the first day. You can make an ice pack by wrapping a plastic bag of ice in a thin towel. You should continue to use the ice pack 3 to 4 times a day for the next 2 days. Then use the ice pack as needed to ease pain and swelling.  · Dont use a heating pad.  · Dont stick a needle into the contusion or bruising to drain it.  · You may use acetaminophen or ibuprofen to control pain, unless another pain medicine was prescribed. If you have chronic liver or kidney disease, talk with your healthcare provider before using these medicines. Also talk with your provider if youve had a stomach ulcer or gastrointestinal bleeding.  · If a sling was provided, you may take it off to shower or bathe. Dont wear it for more than 1 week or it may cause joint stiffness.  Follow-up care  Follow up with your healthcare provider, or as advised, if you are not starting to get better within the next 3 days.  When to seek medical advice  Call your healthcare provider  right away if any of these occur:  · Pain or swelling gets worse  · The back of your elbow becomes very swollen where it almost looks like a gold ball or egg-like mass is growing there. This is a sign of olecrenon bursitis or septic bursitis which may need immediate treatment.  · Redness, red streaks down the arm, warmth, or drainage from the bruise  · Hand or fingers becomes cold, blue, numb, or tingly  · New bruises, and you dont know what caused them  · Contusion doesnt heal  Date Last Reviewed: 2/1/2017  © 7554-4537 [employer]. [address] [place] [address], [place] [id number]. All rights reserved. This information is not intended as a substitute for professional medical care. Always follow your healthcare professional's instructions.

## 2017-11-24 NOTE — PROGRESS NOTES
Subjective:       Patient ID: Tenzin Ortiz is a 77 y.o. male.    Chief Complaint: Elbow Pain    Elbow Injury   This is a new problem. The current episode started yesterday. The problem occurs constantly. The problem has been unchanged. Associated symptoms include arthralgias (bilateral elbows) and joint swelling (right elbow). Pertinent negatives include no chest pain or myalgias. The symptoms are aggravated by bending (movement and palpation). He has tried nothing for the symptoms.     Review of Systems   Respiratory: Negative for chest tightness and shortness of breath.    Cardiovascular: Negative for chest pain, palpitations and leg swelling.   Musculoskeletal: Positive for arthralgias (bilateral elbows) and joint swelling (right elbow). Negative for back pain, gait problem and myalgias.   Hematological: Negative for adenopathy. Bruises/bleeds easily (left elbow bruises ).       Objective:      Physical Exam   Constitutional: He is oriented to person, place, and time. He appears well-developed and well-nourished.   Pulmonary/Chest: Effort normal.   Musculoskeletal:        Right elbow: He exhibits decreased range of motion, swelling and effusion. He exhibits no deformity and no laceration. Tenderness found. Radial head and olecranon process tenderness noted.        Left elbow: He exhibits normal range of motion, no swelling, no effusion, no deformity and no laceration. Tenderness found. Radial head and olecranon process tenderness noted.   Neurological: He is alert and oriented to person, place, and time.   Skin: Skin is warm and dry. Bruising (right elbow) noted.       Assessment:       1. Fall, initial encounter    2. Elbow injury, unspecified laterality, initial encounter    3. Olecranon bursitis of right elbow    4. Contusion of left elbow, initial encounter        Plan:       Tenzin was seen today for elbow pain.    Diagnoses and all orders for this visit:    Fall, initial encounter  -     X-Ray Elbow Complete  Bilateral; Future    Elbow injury, unspecified laterality, initial encounter  -     X-Ray Elbow Complete Bilateral; Future  -     Ambulatory consult to Orthopedics    Olecranon bursitis of right elbow  -     Ambulatory consult to Orthopedics    Contusion of left elbow, initial encounter    Home care  Follow these guidelines when caring for yourself at home.  · Keep your arm elevated to reduce pain and swelling. This is most important during the first 2 days (48 hours) after the injury.  · Put an ice pack on the injured area. Do this for 20 minutes every 1 to 2 hours the first day. You can make an ice pack by wrapping a plastic bag of ice in a thin towel. You should continue to use the ice pack 3 to 4 times a day for the next 2 days. Then use the ice pack as needed to ease pain and swelling.  · Dont use a heating pad.  · Dont stick a needle into the contusion or bruising to drain it.  · You may use acetaminophen or ibuprofen to control pain, unless another pain medicine was prescribed. If you have chronic liver or kidney disease, talk with your healthcare provider before using these medicines. Also talk with your provider if youve had a stomach ulcer or gastrointestinal bleeding.  · If a sling was provided, you may take it off to shower or bathe. Dont wear it for more than 1 week or it may cause joint stiffness.  Follow-up care  Follow up with your healthcare provider, or as advised, if you are not starting to get better within the next 3 days.  When to seek medical advice  Call your healthcare provider right away if any of these occur:  · Pain or swelling gets worse  · The back of your elbow becomes very swollen where it almost looks like a gold ball or egg-like mass is growing there. This is a sign of olecrenon bursitis or septic bursitis which may need immediate treatment.  · Redness, red streaks down the arm, warmth, or drainage from the bruise  · Hand or fingers becomes cold, blue, numb, or tingly  · New  bruises, and you dont know what caused them  · Contusion doesnt heal  Date Last Reviewed: 2/1/2017  © 1446-7612 [employer]. [address] [place] [address], [place] [address]. All rights reserved. This information is not intended as a substitute for professional medical care. Always follow your healthcare professional's instructions.

## 2017-11-28 ENCOUNTER — ANTI-COAG VISIT (OUTPATIENT)
Dept: CARDIOLOGY | Facility: CLINIC | Age: 77
End: 2017-11-28

## 2017-11-28 DIAGNOSIS — I74.10 THROMBUS OF AORTA: ICD-10-CM

## 2017-11-28 DIAGNOSIS — Z79.01 LONG-TERM (CURRENT) USE OF ANTICOAGULANTS: ICD-10-CM

## 2017-11-28 LAB — INR PPP: 2

## 2017-12-06 ENCOUNTER — ANTI-COAG VISIT (OUTPATIENT)
Dept: CARDIOLOGY | Facility: CLINIC | Age: 77
End: 2017-12-06

## 2017-12-06 DIAGNOSIS — I74.10 THROMBUS OF AORTA: ICD-10-CM

## 2017-12-06 DIAGNOSIS — Z79.01 LONG-TERM (CURRENT) USE OF ANTICOAGULANTS: ICD-10-CM

## 2017-12-06 LAB — INR PPP: 2.1

## 2017-12-13 ENCOUNTER — OFFICE VISIT (OUTPATIENT)
Dept: NEUROLOGY | Facility: CLINIC | Age: 77
End: 2017-12-13
Payer: MEDICARE

## 2017-12-13 VITALS
SYSTOLIC BLOOD PRESSURE: 96 MMHG | HEART RATE: 78 BPM | WEIGHT: 152.75 LBS | BODY MASS INDEX: 23.98 KG/M2 | HEIGHT: 67 IN | DIASTOLIC BLOOD PRESSURE: 64 MMHG

## 2017-12-13 DIAGNOSIS — G31.83 LEWY BODY DEMENTIA WITHOUT BEHAVIORAL DISTURBANCE: Chronic | ICD-10-CM

## 2017-12-13 DIAGNOSIS — F02.80 LEWY BODY DEMENTIA WITHOUT BEHAVIORAL DISTURBANCE: Chronic | ICD-10-CM

## 2017-12-13 DIAGNOSIS — G20.A1 PARKINSON DISEASE: Primary | Chronic | ICD-10-CM

## 2017-12-13 DIAGNOSIS — G24.01 LEVODOPA-INDUCED DYSKINESIA: ICD-10-CM

## 2017-12-13 DIAGNOSIS — T42.8X5A LEVODOPA-INDUCED DYSKINESIA: ICD-10-CM

## 2017-12-13 PROCEDURE — 99214 OFFICE O/P EST MOD 30 MIN: CPT | Mod: S$GLB,,, | Performed by: PSYCHIATRY & NEUROLOGY

## 2017-12-13 PROCEDURE — 99999 PR PBB SHADOW E&M-EST. PATIENT-LVL III: CPT | Mod: PBBFAC,,, | Performed by: PSYCHIATRY & NEUROLOGY

## 2017-12-13 RX ORDER — QUETIAPINE FUMARATE 25 MG/1
25 TABLET, FILM COATED ORAL NIGHTLY
Qty: 30 TABLET | Refills: 11 | Status: SHIPPED | OUTPATIENT
Start: 2017-12-13 | End: 2018-12-13

## 2017-12-13 NOTE — ASSESSMENT & PLAN NOTE
Slightly dyskinetic on exam today and reports continued hallucinations.   -> reduce the 6am, 10am and 2pm dose to 1.5 pills

## 2017-12-13 NOTE — PROGRESS NOTES
"Last Name: Tenzin Ortiz. Chief Complaints during this visit:   f/u visit for PD, dopamine dysregulation syndrome     History of present illness:   77 y.o. M seen in f/u for PD. Accompanied by girlfriend of 7 years.  At last visit, I doubled his sinemet from 1 qid to 2 qid (now half of what he was on in 6/2017) due to tremor, bradykinesia (undermedicated).  He is physically much better (walking, doing all his own ADL's again), but having hallucinations.    He is seeing cats, babies in the house.  He believes (has insight) into the lack of reality of them.  Stuttering comes/goes.      Interval history 11/10/17:  I have not seen him in 3 years.  He is now living with her since recent discharge from rehab for 3 months.  They have sitters to help.  His PD medications have been dramatically reduced (during hospitalization 8/9/17) as he presented there with confusion.  In his list at that time was mirapex 1mg tid, though who prescribed this is unknown (not on his list in his last visit with Dr. Allred 6/2017).  He has not had any hallucinations outside of illness or sleep medications.    Interval history 7/10/14:  Accompanied by son who is concerned about "all the medication."  Patient has been "off" for past 3-4 days.  Significant tremor, panic.    From my note 8/20/13;  He is wearing off at 2.5 hours.  He restarted neupro patch on, but his urge to nagy is down "80%".  Adamant about not having brain surgery as he is "too scared."    -----------------------------------------------------------   II. Review of systems As in HPI, otherwise, balance 3 systems reviewed and are negative.   III. Past Medical history: PD since ~1999   Family history: No movement disorders   Social history: lives with family, , owns a printing company. No tobacco, EtOH, or illicit drugs   ---------------------------------------------------------   Current medications:    Sinemet 25/100 2 pills tid (6am, 10am, 2pm and 1 at 6pm)  exelon " "4.6 patch  Coumadin  neurontin 300mg    Medications as of 11/10/17:  Sinemet 25/100 1 pill tid  exelon 4.6 patch  Coumadin  neurontin 300mg    Medications as of 6/12/17:  Sinemet  5x/day  Parcopa , 2 pills qid prn  Pramipexole 1mg qid    From my note 2013:  Sinemet 25/250mg 6x/day (pink, oblong), Parcopa 25/100mg 1 tab 6x/day (blue, disintegrating), neupro patch 2mg    Allergies: Reviewed MEDCARD None   ---------------------------------------------------------------------------------   IV. Physical Exam (2 hours since last dose)    Vitals:    12/13/17 0902   BP: 96/64   Pulse: 78   Weight: 69.3 kg (152 lb 12.5 oz)   Height: 5' 7" (1.702 m)     General appearance: Well nourished, well developed, no acute distress.  Mild dyskinesias.         -------------------------------------------------------------  Facial Expression: normal       Affect: full       Orientation to time & place:  Oriented to time, place, person and situation       Attention & concentration:  Normal attention span and concentration       Memory:  Recent and remote memory intact  Language: Spontaneous, fluent; able to repeat and name objects        Fund of knowledge:  Aware of current events        Speech:  1: Slight: Loss of modulation, diction or volume, but still all words easy to understand.   Gait: 2: Mild: Independent walking but with substantial gait impairment.         V. Laboratory/ Radiological Data: Reviewed: no new data     VI. Medical Decision Making     Problem List Items Addressed This Visit        1 - High    Parkinson disease - Primary (Chronic)    Overview     Right tremor-predominant classic type.         Current Assessment & Plan     Slightly dyskinetic on exam today and reports continued hallucinations.   -> reduce the 6am, 10am and 2pm dose to 1.5 pills            2     Lewy body dementia (Chronic)    Overview     Questionable diagnosis as he hallucinates with dopaminergics.         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  Clearly worsened with dopaminergics including levodopa.   -> will keep the diagnosis on his list until I learn more about him   -> start seroquel 25mg qhs           Relevant Medications    QUEtiapine (SEROQUEL) 25 MG Tab       3     Levodopa-induced dyskinesia          Return in about 2 months (around 2/13/2018) for PD.

## 2017-12-13 NOTE — ASSESSMENT & PLAN NOTE
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  Clearly worsened with dopaminergics including levodopa.   -> will keep the diagnosis on his list until I learn more about him   -> start seroquel 25mg qhs

## 2017-12-13 NOTE — PATIENT INSTRUCTIONS
1.  Reduce the 6am, 10am and 2pm dose of carbidopa-levodopa to 1.5 pills  2.  Start quetiapine 25mg at night.  This medication is used to reduce hallucinations.

## 2018-01-02 ENCOUNTER — TELEPHONE (OUTPATIENT)
Dept: ADMINISTRATIVE | Facility: HOSPITAL | Age: 78
End: 2018-01-02

## 2018-01-09 ENCOUNTER — TELEPHONE (OUTPATIENT)
Dept: NEUROLOGY | Facility: CLINIC | Age: 78
End: 2018-01-09

## 2018-01-09 NOTE — TELEPHONE ENCOUNTER
Called and spoke to  regarding  medications. She stated that he is taking the medication (CARB-LEVO) and she feels that is making things worst far as the shaking. She wants to know what is the next step in his medcations

## 2018-01-09 NOTE — TELEPHONE ENCOUNTER
"----- Message from Antoni Stout sent at 1/9/2018  8:25 AM CST -----  Contact: Pt Wife   Pt Wife would like the nurse to give her  a call back in regards to Pt  doing worst with his parkinson"s .. Contact number [phone] or [phone]..  "

## 2018-01-15 ENCOUNTER — TELEPHONE (OUTPATIENT)
Dept: NEUROLOGY | Facility: CLINIC | Age: 78
End: 2018-01-15

## 2018-01-15 NOTE — TELEPHONE ENCOUNTER
----- Message from Monique Doyle sent at 1/15/2018  8:16 AM CST -----  Contact: pt [phone]  Pt states his medication isn't agreeing with him and he would like to be seen today to discuss the matter with Dr Castellanos.

## 2018-01-17 NOTE — TELEPHONE ENCOUNTER
Unable to reach them at the numbers listed.  I suspect he needs to increase back to 2 pills qid.    My last note:  1.  Reduce the 6am, 10am and 2pm dose of carbidopa-levodopa to 1.5 pills  2.  Start quetiapine 25mg at night.  This medication is used to reduce hallucinations.

## 2018-01-18 ENCOUNTER — NURSE TRIAGE (OUTPATIENT)
Dept: ADMINISTRATIVE | Facility: CLINIC | Age: 78
End: 2018-01-18

## 2018-01-18 DIAGNOSIS — I74.10 THROMBUS OF AORTA: ICD-10-CM

## 2018-01-18 RX ORDER — WARFARIN 3 MG/1
TABLET ORAL
Qty: 90 TABLET | Refills: 0 | Status: SHIPPED | OUTPATIENT
Start: 2018-01-18 | End: 2018-01-22 | Stop reason: SDUPTHER

## 2018-01-19 NOTE — TELEPHONE ENCOUNTER
"  Reason for Disposition   [1] Request for URGENT new prescription or refill of "essential" medication (i.e., likelihood of harm to patient if not taken) AND [2] triager unable to fill per unit policy    Answer Assessment - Initial Assessment Questions  1. SYMPTOMS: "Do you have any symptoms?"      No symptoms. Request Coumadin refill.   2. SEVERITY: If symptoms are present, ask "Are they mild, moderate or severe?"      N/A    Protocols used: ST MEDICATION QUESTION CALL-A-AH    "

## 2018-01-22 ENCOUNTER — LAB VISIT (OUTPATIENT)
Dept: LAB | Facility: HOSPITAL | Age: 78
End: 2018-01-22
Attending: FAMILY MEDICINE
Payer: MEDICARE

## 2018-01-22 DIAGNOSIS — I74.10 THROMBUS OF AORTA: ICD-10-CM

## 2018-01-22 DIAGNOSIS — Z79.01 LONG TERM CURRENT USE OF ANTICOAGULANT THERAPY: ICD-10-CM

## 2018-01-22 LAB
INR PPP: 1
PROTHROMBIN TIME: 10.4 SEC

## 2018-01-22 PROCEDURE — 85610 PROTHROMBIN TIME: CPT

## 2018-01-22 PROCEDURE — 36415 COLL VENOUS BLD VENIPUNCTURE: CPT | Mod: PO

## 2018-01-22 RX ORDER — WARFARIN 3 MG/1
TABLET ORAL
Qty: 90 TABLET | Refills: 0 | Status: ON HOLD | OUTPATIENT
Start: 2018-01-22 | End: 2018-05-03 | Stop reason: HOSPADM

## 2018-01-22 NOTE — TELEPHONE ENCOUNTER
"----- Message from Tavia Mak MA sent at 1/19/2018  2:43 PM CST -----  Contact: self/[phone]      ----- Message -----  From: Michelle Santillan  Sent: 1/19/2018  12:31 PM  To: Guillermina POWELL Staff      RX request - refill or new RX.  Is this a refill or new RX:  Refill  RX name and strength: warfarin (COUMADIN) 3 MG tablet  Directions: TAKE 1 TABLET BY MOUTH EVERY DAY AS DIRECTED BY COUMADIN CLINIC  Is this a 30 day or 90 day RX:    Pharmacy name and phone # (DON'T enter "on file" or "in chart"): [facility] [address] [place] [address] - [address] AT [facility] [address] [phone] (Phone) [phone] (Fax)  Comments:  Patient was told that they need to talk with the pharmacy.Please call and advise.     Thank you        "

## 2018-01-23 ENCOUNTER — ANTI-COAG VISIT (OUTPATIENT)
Dept: CARDIOLOGY | Facility: CLINIC | Age: 78
End: 2018-01-23

## 2018-01-23 DIAGNOSIS — I74.10 THROMBUS OF AORTA: ICD-10-CM

## 2018-01-23 NOTE — PROGRESS NOTES
Patient questioned and initially reports 4.5 mg daily.  Patient then stated he has been  not taking any coumadin . Started back on 1/21.  Due to insurance not wanting to pay for medications .  He was given coumadin 3 mg tab (19 tablets).  Patient stated he should be picking up a 90 day supply on 1/25.  Also stated he is unable to drive . Asked patient if he has a meter ? Patient stated he was suppose to get one put is not sure whats going on.

## 2018-01-30 ENCOUNTER — APPOINTMENT (OUTPATIENT)
Dept: LAB | Facility: HOSPITAL | Age: 78
End: 2018-01-30
Attending: INTERNAL MEDICINE
Payer: MEDICARE

## 2018-01-30 ENCOUNTER — ANTI-COAG VISIT (OUTPATIENT)
Dept: CARDIOLOGY | Facility: CLINIC | Age: 78
End: 2018-01-30

## 2018-01-30 ENCOUNTER — OFFICE VISIT (OUTPATIENT)
Dept: FAMILY MEDICINE | Facility: CLINIC | Age: 78
End: 2018-01-30
Payer: MEDICARE

## 2018-01-30 VITALS
WEIGHT: 155 LBS | BODY MASS INDEX: 24.33 KG/M2 | TEMPERATURE: 98 F | DIASTOLIC BLOOD PRESSURE: 78 MMHG | SYSTOLIC BLOOD PRESSURE: 142 MMHG | HEIGHT: 67 IN | RESPIRATION RATE: 16 BRPM | OXYGEN SATURATION: 88 % | HEART RATE: 81 BPM

## 2018-01-30 DIAGNOSIS — M70.21 OLECRANON BURSITIS OF RIGHT ELBOW: ICD-10-CM

## 2018-01-30 DIAGNOSIS — I74.10 THROMBUS OF AORTA: ICD-10-CM

## 2018-01-30 PROBLEM — M71.9 BURSITIS: Status: ACTIVE | Noted: 2018-01-30

## 2018-01-30 PROCEDURE — 99213 OFFICE O/P EST LOW 20 MIN: CPT | Mod: S$GLB,,, | Performed by: NURSE PRACTITIONER

## 2018-01-30 PROCEDURE — 99999 PR PBB SHADOW E&M-EST. PATIENT-LVL III: CPT | Mod: PBBFAC,,, | Performed by: NURSE PRACTITIONER

## 2018-01-30 PROCEDURE — 3008F BODY MASS INDEX DOCD: CPT | Mod: S$GLB,,, | Performed by: NURSE PRACTITIONER

## 2018-01-30 PROCEDURE — 1125F AMNT PAIN NOTED PAIN PRSNT: CPT | Mod: S$GLB,,, | Performed by: NURSE PRACTITIONER

## 2018-01-30 PROCEDURE — 1159F MED LIST DOCD IN RCRD: CPT | Mod: S$GLB,,, | Performed by: NURSE PRACTITIONER

## 2018-01-30 NOTE — PATIENT INSTRUCTIONS
Follow up with primary care provider if not improved  Go to ER for new, worse or concerning symptoms    RICE     Rest an injury, elevate it, and use ice and compression as directed.   RICE stands for rest, ice, compression, and elevation. These can limit pain and swelling after an injury. RICE may be recommended to help treat fractures, sprains, strains, and bruises or bumps.   Home care  The following explain the details of RICE:  · Rest. Limit the use of the injured body part. This helps prevent further damage to the body part and gives it time to heal. In some cases, you may need a sling, brace, splint, or cast to help keep the body part still until it has healed.  · Ice. Applying ice right after an injury helps relieve pain and swelling. Wrap a bag of ice in a thin towel. Then, place it over the injured area. Do this for 10 to 15 minutes every 3 to 4 hours. Continue for the next 1 to 3 days or until your symptoms improve. Never put ice directly on your skin or ice an area longer than 15 minutes at a time.  · Compression. Putting pressure on an injury helps reduce swelling and provides support. Wrap the injured area firmly with an elastic bandage/wrap. Make sure not to wrap the bandage too tightly or you will cut off blood flow to the injured area. If your bandage loosens, rewrap it.  · Elevation. Keeping an injury raised above the level of your heart reduces swelling, pain, and throbbing. For instance, if you have a broken leg, it may help to rest your leg on several pillows when sitting or lying down. Try to keep the injured area elevated for at least 2 to 3 hours per day.  Follow-up care  Follow up with your healthcare provider, or as advised.  When to seek medical advice  Call your healthcare provider right away if any of these occur:  · Fever of 100.4°F (38°C) or higher, or as directed by your healthcare provider  · Increased pain or swelling in the injured body part  · Injured body part becomes cold, blue,  numb, or tingly  · Signs of infection. These include warmth in the skin, redness, drainage, or bad smell coming from the injured body part.  Date Last Reviewed: 1/18/2016  © 1753-8857 [employer]. [address] [place] [address], [place] [address]. All rights reserved. This information is not intended as a substitute for professional medical care. Always follow your healthcare professional's instructions.

## 2018-01-30 NOTE — PROGRESS NOTES
Subjective:       Patient ID: eTnzin Ortiz is a 77 y.o. male.    Chief Complaint: Elbow Injury (from fall 4 wks ago w/t fluid filled blister )    HPI  Mr Ortiz is here for a 1 month h/o L elbow swelling.  He fell in November and injured it.  It has improved, it is only mildly painful when he hits it.  He has not attempted any treatment.  Review of Systems   Constitutional: Negative for fever.   Respiratory: Negative.    Cardiovascular: Negative.    Musculoskeletal: Positive for joint swelling.       Objective:      Physical Exam   Constitutional: He is oriented to person, place, and time. He appears well-developed and well-nourished. He does not appear ill. No distress.   HENT:   Head: Normocephalic and atraumatic.   Cardiovascular: Normal rate.    Pulses:       Radial pulses are 2+ on the right side.   Pulmonary/Chest: Effort normal.   Musculoskeletal: Normal range of motion. He exhibits edema.        Right elbow: He exhibits swelling. He exhibits normal range of motion. No tenderness found.   R elbow no tenderness, mild swelling noted.  No red flags.   Neurological: He is alert and oriented to person, place, and time.   Skin: Skin is warm and dry.   Vitals reviewed.      Assessment:       1. Olecranon bursitis of right elbow        Plan:       Olecranon bursitis of right elbow    Suggest wrapping with ACE, or sleeve, tylenol prn. He declines ace wrap today  F/u with PCP prn  ER for new worse or concerning symptoms

## 2018-01-30 NOTE — PROGRESS NOTES
Patient, Tenzin Ortiz (MRN #[id number]), presented with a recent Platelet count less than 150 K/uL consistent with the definition of thrombocytopenia (ICD10 - D69.6).    Platelets   Date Value Ref Range Status   09/06/2017 148 (L) 150 - 350 K/uL Final     The patient's thrombocytopenia was monitored, evaluated, addressed and/or treated. This addendum to the medical record is made on 01/30/2018.

## 2018-01-31 NOTE — PROGRESS NOTES
Patient cannot confirm dose or if he missed doses. We will not adjust his coumadin at this time. Compliance emphasized.

## 2018-02-02 ENCOUNTER — TELEPHONE (OUTPATIENT)
Dept: NEUROLOGY | Facility: CLINIC | Age: 78
End: 2018-02-02

## 2018-02-02 NOTE — TELEPHONE ENCOUNTER
----- Message from Umesh Smith sent at 2/1/2018  2:28 PM CST -----  Contact: Atilio Vergara (neighbor) @ [phone]  Atilio states pt has been hallucinating and it's been getting worse for the past month (atilio says pt has times where he can't walk and other times where he's running around. Atilio also says pt has been falling as well), and is asking to speak w/ the doctor regarding this bc he is concerned for pt's welfare. Pls call.

## 2018-02-05 ENCOUNTER — TELEPHONE (OUTPATIENT)
Dept: NEUROLOGY | Facility: CLINIC | Age: 78
End: 2018-02-05

## 2018-02-05 ENCOUNTER — OFFICE VISIT (OUTPATIENT)
Dept: NEUROLOGY | Facility: CLINIC | Age: 78
End: 2018-02-05
Payer: MEDICARE

## 2018-02-05 VITALS
BODY MASS INDEX: 24.19 KG/M2 | WEIGHT: 154.13 LBS | DIASTOLIC BLOOD PRESSURE: 57 MMHG | SYSTOLIC BLOOD PRESSURE: 95 MMHG | HEIGHT: 67 IN | HEART RATE: 77 BPM

## 2018-02-05 DIAGNOSIS — G24.01 LEVODOPA-INDUCED DYSKINESIA: ICD-10-CM

## 2018-02-05 DIAGNOSIS — F29 PSYCHOSIS, UNSPECIFIED PSYCHOSIS TYPE: ICD-10-CM

## 2018-02-05 DIAGNOSIS — T42.8X5A LEVODOPA-INDUCED DYSKINESIA: ICD-10-CM

## 2018-02-05 DIAGNOSIS — R44.3 HALLUCINATIONS: ICD-10-CM

## 2018-02-05 DIAGNOSIS — F41.0 PANIC: ICD-10-CM

## 2018-02-05 DIAGNOSIS — G20.A1 PARKINSON DISEASE: Primary | Chronic | ICD-10-CM

## 2018-02-05 PROCEDURE — 1159F MED LIST DOCD IN RCRD: CPT | Mod: S$GLB,,, | Performed by: PSYCHIATRY & NEUROLOGY

## 2018-02-05 PROCEDURE — 3008F BODY MASS INDEX DOCD: CPT | Mod: S$GLB,,, | Performed by: PSYCHIATRY & NEUROLOGY

## 2018-02-05 PROCEDURE — 1126F AMNT PAIN NOTED NONE PRSNT: CPT | Mod: S$GLB,,, | Performed by: PSYCHIATRY & NEUROLOGY

## 2018-02-05 PROCEDURE — 99999 PR PBB SHADOW E&M-EST. PATIENT-LVL III: CPT | Mod: PBBFAC,,, | Performed by: PSYCHIATRY & NEUROLOGY

## 2018-02-05 PROCEDURE — 99214 OFFICE O/P EST MOD 30 MIN: CPT | Mod: S$GLB,,, | Performed by: PSYCHIATRY & NEUROLOGY

## 2018-02-05 NOTE — LETTER
February 6, 2018      Efrain Chavez MD  [address] [place] [address]           [facility]  [address]  [place] [address]  Phone: [phone]  Fax: [phone]          Patient: Tenzin Ortiz   MR Number: [id number]   YOB: 1940   Date of Visit: 2/5/2018       Dear Dr. Efrain URIARTE Page:    Thank you for referring Tenzin Ortiz to me for evaluation. Attached you will find relevant portions of my assessment and plan of care.    If you have questions, please do not hesitate to call me. I look forward to following Tenzin Ortiz along with you.    Sincerely,    Renetta Castellanos MD    Enclosure  CC:  No Recipients    If you would like to receive this communication electronically, please contact [email or web address] or [phone] to request more information on [facility] Link access.    For providers and/or their staff who would like to refer a patient to [facility], please contact us through our one-stop-shop provider referral line, Critical access hospitalierge, at [phone].    If you feel you have received this communication in error or would no longer like to receive these types of communications, please e-mail [email or web address]

## 2018-02-05 NOTE — PROGRESS NOTES
"Last Name: Tenzin Ortiz. Chief Complaints during this visit:   f/u visit for PD, dopamine dysregulation syndrome     History of present illness:   77 y.o. M seen in f/u for PD. Accompanied by girlfriend of 7 years.      At last visit, I reduced his sinemet to 1.5 pills at a time and added seroquel.  He is taking 1.5, qid.      He is very difficult to move when medications wear off.  Daughter has looking into nursing home, but unable to find a spot.    From phone call 2/1/18:  Atilio Vergara (neighbor) @ [phone]  Atilio states pt has been hallucinating and it's been getting worse for the past month (atilio says pt has times where he can't walk and other times where he's running around. Atilio also says pt has been falling as well), and is asking to speak w/ the doctor regarding this bc he is concerned for pt's welfare. Pls call.    Interval history 12/13/17:  At last visit, I doubled his sinemet from 1 qid to 2 qid (now half of what he was on in 6/2017) due to tremor, bradykinesia (undermedicated).  He is physically much better (walking, doing all his own ADL's again), but having hallucinations.    He is seeing cats, babies in the house.  He believes (has insight) into the lack of reality of them.  Stuttering comes/goes.    Interval history 11/10/17:  I have not seen him in 3 years.  He is now living with her since recent discharge from rehab for 3 months.  They have sitters to help.  His PD medications have been dramatically reduced (during hospitalization 8/9/17) as he presented there with confusion.  In his list at that time was mirapex 1mg tid, though who prescribed this is unknown (not on his list in his last visit with Dr. Allred 6/2017).  He has not had any hallucinations outside of illness or sleep medications.    Interval history 7/10/14:  Accompanied by son who is concerned about "all the medication."  Patient has been "off" for past 3-4 days.  Significant tremor, panic.    From my note " "8/20/13;  He is wearing off at 2.5 hours.  He restarted neupro patch on, but his urge to nagy is down "80%".  Adamant about not having brain surgery as he is "too scared."    -----------------------------------------------------------   II. Review of systems As in HPI, otherwise, balance 3 systems reviewed and are negative.   III. Past Medical history: PD since ~1999   Family history: No movement disorders   Social history: lives with family, , owns a printing company. No tobacco, EtOH, or illicit drugs   ---------------------------------------------------------   Current medications:    Sinemet 25/100 2 pills tid (6am, 10am, 2pm and 1 at 6pm)  exelon 4.6 patch  Coumadin  neurontin 300mg    Medications as of 11/10/17:  Sinemet 25/100 1 pill tid  exelon 4.6 patch  Coumadin  neurontin 300mg    Medications as of 6/12/17:  Sinemet  5x/day  Parcopa , 2 pills qid prn  Pramipexole 1mg qid    From my note 2013:  Sinemet 25/250mg 6x/day (pink, oblong), Parcopa 25/100mg 1 tab 6x/day (blue, disintegrating), neupro patch 2mg    Allergies: Reviewed MEDCARD None   ---------------------------------------------------------------------------------   IV. Physical Exam (2 hours since last dose)    Vitals:    02/05/18 1208   BP: (!) 95/57   Pulse: 77   Weight: 69.9 kg (154 lb 1.6 oz)   Height: 5' 7" (1.702 m)     General appearance: Well nourished, well developed, no acute distress.  Mild dyskinesias.         -------------------------------------------------------------  Facial Expression: normal       Affect: full       Orientation to time & place:  Oriented to time, place, person and situation       Attention & concentration:  Normal attention span and concentration       Memory:  Recent and remote memory intact  Language: Spontaneous, fluent; able to repeat and name objects        Fund of knowledge:  Aware of current events        Speech:  1: Slight: Loss of modulation, diction or volume, but still all words " easy to understand.   Gait: 2: Mild: Independent walking but with substantial gait impairment.         V. Laboratory/ Radiological Data: Reviewed: no new data     VI. Medical Decision Making     Problem List Items Addressed This Visit        1 - High    Parkinson disease - Primary (Chronic)    Overview     Right tremor-predominant classic type.         Current Assessment & Plan     Worsening hallucinations, delusion because of sinemet, but also marked apraxia, inability to walk when off.  Unfortunately, no middle ground.   -> reduce to sinemet to one pill qid.         Relevant Orders    Ambulatory Referral to Home Health    Hallucinations       2     Psychosis    Overview     Hallucinates with the dopaminergics.         Current Assessment & Plan     Unfortunately, the increase in sinemet has worsened his psychosis.  This was not unexpected, but certainly unfortunate as we have no way of keeping him home (girlfriend can't take care of him when he is off medications, but can't supervise him when on meds.)   -> reduce sinemet to 1 qid   -> advised nursing home placement which he is amenable to, but clearly has some expectations about homes that may not be able to be met.   -> I recommended that he be admitted today, so we could expedite placement, but he declined.            3     Levodopa-induced dyskinesia       4     Panic    Overview     When levodopa wears off.               Follow-up in about 3 months (around 5/5/2018).

## 2018-02-05 NOTE — PATIENT INSTRUCTIONS
REDUCE all carbidopa-levodopa to just one pill, four times day.      NEED nursing home.  If you find one that has availability, please let me and your PRIMARY CARE doctor know of this and we'll help you get admitted.    If you come to the hospital (either spontaneously or because of fall or because of hallucinations), then I want you to be admitted to the hospital and they can help you find a nursing home.

## 2018-02-05 NOTE — ASSESSMENT & PLAN NOTE
Worsening hallucinations, delusion because of sinemet, but also marked apraxia, inability to walk when off.  Unfortunately, no middle ground.   -> reduce to sinemet to one pill qid.

## 2018-02-05 NOTE — TELEPHONE ENCOUNTER
Called and left a message of juliet regarding his appt . I stated that we had to reschedule his appt to March 1 at 11:15PM

## 2018-02-06 ENCOUNTER — TELEPHONE (OUTPATIENT)
Dept: NEUROLOGY | Facility: CLINIC | Age: 78
End: 2018-02-06

## 2018-02-06 PROBLEM — F29 PSYCHOSIS: Status: ACTIVE | Noted: 2017-08-09

## 2018-02-06 NOTE — TELEPHONE ENCOUNTER
----- Message from Beryl Boswell sent at 2/6/2018 12:50 PM CST -----  Contact: Carole (daughter) @ [phone]  Pts daughter says they are faxing over some papers from [facility].  Would like to see if Dr Castellanos can fill them out asap so that they can get pt in asap.

## 2018-02-06 NOTE — TELEPHONE ENCOUNTER
Called and left a message for Carole regarding a fax.I stated that if she can fax over the information again so  can fill it out

## 2018-02-06 NOTE — ASSESSMENT & PLAN NOTE
Unfortunately, the increase in sinemet has worsened his psychosis.  This was not unexpected, but certainly unfortunate as we have no way of keeping him home (girlfriend can't take care of him when he is off medications, but can't supervise him when on meds.)   -> reduce sinemet to 1 qid   -> advised nursing home placement which he is amenable to, but clearly has some expectations about homes that may not be able to be met.   -> I recommended that he be admitted today, so we could expedite placement, but he declined.

## 2018-02-07 ENCOUNTER — HOSPITAL ENCOUNTER (INPATIENT)
Facility: HOSPITAL | Age: 78
LOS: 86 days | DRG: 056 | End: 2018-05-04
Attending: EMERGENCY MEDICINE | Admitting: EMERGENCY MEDICINE
Payer: MEDICARE

## 2018-02-07 ENCOUNTER — TELEPHONE (OUTPATIENT)
Dept: FAMILY MEDICINE | Facility: CLINIC | Age: 78
End: 2018-02-07

## 2018-02-07 DIAGNOSIS — E86.0 DEHYDRATION: Primary | ICD-10-CM

## 2018-02-07 DIAGNOSIS — R62.7 FAILURE TO THRIVE IN ADULT: ICD-10-CM

## 2018-02-07 PROCEDURE — 12000002 HC ACUTE/MED SURGE SEMI-PRIVATE ROOM

## 2018-02-07 PROCEDURE — P9612 CATHETERIZE FOR URINE SPEC: HCPCS

## 2018-02-07 PROCEDURE — 93005 ELECTROCARDIOGRAM TRACING: CPT

## 2018-02-07 PROCEDURE — 99285 EMERGENCY DEPT VISIT HI MDM: CPT | Mod: 25

## 2018-02-07 NOTE — TELEPHONE ENCOUNTER
----- Message from Geneva Lenz sent at 2/7/2018  3:24 PM CST -----  Contact: Self [phone]  Pt is requesting a call back to go over information regarding checking into the hospital.  Pt reports that he was to check in and needed the time to report and where to report.    Pt may be reached at [phone].    Thank you.  LC

## 2018-02-07 NOTE — TELEPHONE ENCOUNTER
Patient called re: nursing home paperwork. He wants to know when he needs to be admitted.     I explained to  Patient he called his PCP's office, Dr. Chavez and offer to have Dr. Castellanos's office to call him.     He would appreciate a call back.

## 2018-02-08 PROBLEM — E86.0 DEHYDRATION: Status: ACTIVE | Noted: 2018-02-08

## 2018-02-08 LAB
ALBUMIN SERPL BCP-MCNC: 4 G/DL
ALP SERPL-CCNC: 78 U/L
ALT SERPL W/O P-5'-P-CCNC: <5 U/L
ANION GAP SERPL CALC-SCNC: 13 MMOL/L
AST SERPL-CCNC: 15 U/L
BACTERIA #/AREA URNS HPF: NORMAL /HPF
BASOPHILS # BLD AUTO: 0.01 K/UL
BASOPHILS NFR BLD: 0.1 %
BILIRUB SERPL-MCNC: 0.5 MG/DL
BILIRUB UR QL STRIP: NEGATIVE
BUN SERPL-MCNC: 28 MG/DL
CALCIUM SERPL-MCNC: 9.3 MG/DL
CHLORIDE SERPL-SCNC: 105 MMOL/L
CLARITY UR: CLEAR
CO2 SERPL-SCNC: 19 MMOL/L
COLOR UR: YELLOW
CREAT SERPL-MCNC: 1.1 MG/DL
DIFFERENTIAL METHOD: ABNORMAL
EOSINOPHIL # BLD AUTO: 0.1 K/UL
EOSINOPHIL NFR BLD: 1.6 %
ERYTHROCYTE [DISTWIDTH] IN BLOOD BY AUTOMATED COUNT: 14.8 %
EST. GFR  (AFRICAN AMERICAN): >60 ML/MIN/1.73 M^2
EST. GFR  (NON AFRICAN AMERICAN): >60 ML/MIN/1.73 M^2
GLUCOSE SERPL-MCNC: 118 MG/DL
GLUCOSE UR QL STRIP: NEGATIVE
HCT VFR BLD AUTO: 39.6 %
HGB BLD-MCNC: 13 G/DL
HGB UR QL STRIP: NEGATIVE
INR PPP: 1.3
KETONES UR QL STRIP: ABNORMAL
LEUKOCYTE ESTERASE UR QL STRIP: NEGATIVE
LIPASE SERPL-CCNC: <3 U/L
LYMPHOCYTES # BLD AUTO: 1.8 K/UL
LYMPHOCYTES NFR BLD: 25.8 %
MCH RBC QN AUTO: 28.1 PG
MCHC RBC AUTO-ENTMCNC: 32.8 G/DL
MCV RBC AUTO: 86 FL
MICROSCOPIC COMMENT: NORMAL
MONOCYTES # BLD AUTO: 0.4 K/UL
MONOCYTES NFR BLD: 5.5 %
NEUTROPHILS # BLD AUTO: 4.5 K/UL
NEUTROPHILS NFR BLD: 66 %
NITRITE UR QL STRIP: NEGATIVE
PH UR STRIP: 5 [PH] (ref 5–8)
PLATELET # BLD AUTO: 206 K/UL
PMV BLD AUTO: 9.9 FL
POTASSIUM SERPL-SCNC: 4.1 MMOL/L
PROT SERPL-MCNC: 6.9 G/DL
PROT UR QL STRIP: NEGATIVE
PROTHROMBIN TIME: 13.6 SEC
RBC # BLD AUTO: 4.62 M/UL
SODIUM SERPL-SCNC: 137 MMOL/L
SP GR UR STRIP: 1.03 (ref 1–1.03)
TROPONIN I SERPL DL<=0.01 NG/ML-MCNC: <0.006 NG/ML
URN SPEC COLLECT METH UR: ABNORMAL
UROBILINOGEN UR STRIP-ACNC: NEGATIVE EU/DL
WBC # BLD AUTO: 6.86 K/UL
WBC #/AREA URNS HPF: 1 /HPF (ref 0–5)

## 2018-02-08 PROCEDURE — G8989 SELF CARE D/C STATUS: HCPCS | Mod: CL

## 2018-02-08 PROCEDURE — G0378 HOSPITAL OBSERVATION PER HR: HCPCS

## 2018-02-08 PROCEDURE — 85610 PROTHROMBIN TIME: CPT

## 2018-02-08 PROCEDURE — 85025 COMPLETE CBC W/AUTO DIFF WBC: CPT

## 2018-02-08 PROCEDURE — 97165 OT EVAL LOW COMPLEX 30 MIN: CPT

## 2018-02-08 PROCEDURE — G8987 SELF CARE CURRENT STATUS: HCPCS | Mod: CL

## 2018-02-08 PROCEDURE — 97161 PT EVAL LOW COMPLEX 20 MIN: CPT

## 2018-02-08 PROCEDURE — G8978 MOBILITY CURRENT STATUS: HCPCS | Mod: CL

## 2018-02-08 PROCEDURE — 25000003 PHARM REV CODE 250: Performed by: PHYSICIAN ASSISTANT

## 2018-02-08 PROCEDURE — G8980 MOBILITY D/C STATUS: HCPCS | Mod: CL

## 2018-02-08 PROCEDURE — G8988 SELF CARE GOAL STATUS: HCPCS | Mod: CL

## 2018-02-08 PROCEDURE — 93005 ELECTROCARDIOGRAM TRACING: CPT

## 2018-02-08 PROCEDURE — 86580 TB INTRADERMAL TEST: CPT | Performed by: PHYSICIAN ASSISTANT

## 2018-02-08 PROCEDURE — 84484 ASSAY OF TROPONIN QUANT: CPT

## 2018-02-08 PROCEDURE — 25000003 PHARM REV CODE 250: Performed by: EMERGENCY MEDICINE

## 2018-02-08 PROCEDURE — 63600175 PHARM REV CODE 636 W HCPCS: Performed by: PHYSICIAN ASSISTANT

## 2018-02-08 PROCEDURE — 93010 ELECTROCARDIOGRAM REPORT: CPT | Mod: ,,, | Performed by: INTERNAL MEDICINE

## 2018-02-08 PROCEDURE — 11000001 HC ACUTE MED/SURG PRIVATE ROOM

## 2018-02-08 PROCEDURE — 80053 COMPREHEN METABOLIC PANEL: CPT

## 2018-02-08 PROCEDURE — 83690 ASSAY OF LIPASE: CPT

## 2018-02-08 PROCEDURE — G8979 MOBILITY GOAL STATUS: HCPCS | Mod: CL

## 2018-02-08 PROCEDURE — 81000 URINALYSIS NONAUTO W/SCOPE: CPT

## 2018-02-08 RX ORDER — SODIUM CHLORIDE 9 MG/ML
INJECTION, SOLUTION INTRAVENOUS CONTINUOUS
Status: DISCONTINUED | OUTPATIENT
Start: 2018-02-08 | End: 2018-02-08

## 2018-02-08 RX ORDER — ONDANSETRON 2 MG/ML
4 INJECTION INTRAMUSCULAR; INTRAVENOUS EVERY 8 HOURS PRN
Status: DISCONTINUED | OUTPATIENT
Start: 2018-02-08 | End: 2018-03-05 | Stop reason: SDUPTHER

## 2018-02-08 RX ORDER — DOCUSATE SODIUM 100 MG/1
100 CAPSULE, LIQUID FILLED ORAL 2 TIMES DAILY
Status: DISCONTINUED | OUTPATIENT
Start: 2018-02-08 | End: 2018-03-05 | Stop reason: SDUPTHER

## 2018-02-08 RX ORDER — TRAZODONE HYDROCHLORIDE 50 MG/1
50 TABLET ORAL NIGHTLY PRN
Status: DISCONTINUED | OUTPATIENT
Start: 2018-02-08 | End: 2018-02-13

## 2018-02-08 RX ORDER — CARBIDOPA AND LEVODOPA 25; 100 MG/1; MG/1
1 TABLET ORAL 4 TIMES DAILY
Status: DISCONTINUED | OUTPATIENT
Start: 2018-02-08 | End: 2018-03-03

## 2018-02-08 RX ORDER — QUETIAPINE FUMARATE 25 MG/1
25 TABLET, FILM COATED ORAL NIGHTLY
Status: DISCONTINUED | OUTPATIENT
Start: 2018-02-08 | End: 2018-02-13

## 2018-02-08 RX ORDER — CARBIDOPA AND LEVODOPA 25; 100 MG/1; MG/1
2 TABLET ORAL 4 TIMES DAILY
Status: DISCONTINUED | OUTPATIENT
Start: 2018-02-08 | End: 2018-02-08

## 2018-02-08 RX ORDER — AMLODIPINE BESYLATE 5 MG/1
10 TABLET ORAL DAILY
Status: DISCONTINUED | OUTPATIENT
Start: 2018-02-08 | End: 2018-03-05 | Stop reason: SDUPTHER

## 2018-02-08 RX ADMIN — AMLODIPINE BESYLATE 10 MG: 5 TABLET ORAL at 11:02

## 2018-02-08 RX ADMIN — QUETIAPINE FUMARATE 25 MG: 25 TABLET ORAL at 08:02

## 2018-02-08 RX ADMIN — CARBIDOPA AND LEVODOPA 1 TABLET: 25; 100 TABLET ORAL at 05:02

## 2018-02-08 RX ADMIN — Medication 5 UNITS: at 11:02

## 2018-02-08 RX ADMIN — SODIUM CHLORIDE: 0.9 INJECTION, SOLUTION INTRAVENOUS at 07:02

## 2018-02-08 RX ADMIN — DOCUSATE SODIUM 100 MG: 100 CAPSULE, LIQUID FILLED ORAL at 08:02

## 2018-02-08 RX ADMIN — CARBIDOPA AND LEVODOPA 1 TABLET: 25; 100 TABLET ORAL at 11:02

## 2018-02-08 RX ADMIN — DOCUSATE SODIUM 100 MG: 100 CAPSULE, LIQUID FILLED ORAL at 11:02

## 2018-02-08 NOTE — TELEPHONE ENCOUNTER
I am unequipped to admit to Nursing Home, urgently.      Thus, his current admission to hospital is BEST.  He should be fully ADMITTED (not just observation) as he is unsafe in current living situation.    I haven't had luck admitting patients to snf (variables like TB tests we don't do in neurology), but will fill out the paperwork sent by daughter if he's discharged.

## 2018-02-08 NOTE — PLAN OF CARE
"TN to patient's room to discuss Helping the patient manage care at home.  Patient asleep.  TN spoke with daughterCarole via phone who provided information for assessment.  TN/SW role explained to neftali Carole  TN's name and contact info placed on white board.  Pt/family encouraged to call for any problems/concerns with DC. "Discharge planning begins on Admission" pamphlet discussed and placed in "My Health Packet" and placed at bedside.        02/08/18 1237   Discharge Assessment   Assessment Type Discharge Planning Assessment   Confirmed/corrected address and phone number on facesheet? Yes   Assessment information obtained from? Caregiver  (Carole goel)   Expected Length of Stay (days) 2   Communicated expected length of stay with patient/caregiver yes   Prior to hospitilization cognitive status: Alert/Oriented  (with periods of confusion)   Prior to hospitalization functional status: Assistive Equipment   Current cognitive status: Alert/Oriented   Current Functional Status: Needs Assistance   Lives With significant other   Able to Return to Prior Arrangements yes   Is patient able to care for self after discharge? No   Who are your caregiver(s) and their phone number(s)? edmundo Lam assisting with DC plan [phone] or [phone]   Patient's perception of discharge disposition longterm care facility   Readmission Within The Last 30 Days no previous admission in last 30 days   Patient currently being followed by outpatient case management? No   Patient currently receives any other outside agency services? Yes   Name and contact number of agency or person providing outside services [facility]    Is it the patient/care giver preference to resume care with the current outside agency? Yes   Equipment Currently Used at Home walker, standard   Do you have any problems affording any of your prescribed medications? No   Is the patient taking medications as prescribed? yes   Does the patient have transportation home? " Yes   Transportation Available family or friend will provide   Does the patient receive services at the [facility]? No   Discharge Plan A New Nursing Home placement - [facility] care facility   Discharge Plan B Home with family   Patient/Family In Agreement With Plan yes

## 2018-02-08 NOTE — PLAN OF CARE
[facility]    HOME HEALTH ORDERS  FACE TO FACE ENCOUNTER    Patient Name: Tenzin Ortiz  YOB: 1940    PCP: Efrain Chavez MD   PCP Address: [facility]  PCP Phone Number: [phone]  PCP Fax: [phone]       Encounter Date: 02/08/2018    Admit to Home Health    Diagnoses:  Active Hospital Problems    Diagnosis  POA    *Debility [R53.81]  Yes     Chronic    Dehydration [E86.0]  Yes    Essential hypertension [I10]  Yes     Chronic    Hyperlipidemia [E78.5]  Yes     Chronic    Parkinson disease [G20]  Yes     Chronic     Right tremor-predominant classic type.        Resolved Hospital Problems    Diagnosis Date Resolved POA   No resolved problems to display.       Future Appointments  Date Time Provider Department Center   2/27/2018 10:00 AM HEDY TUTTLE [facility] NOAH Ashraf   3/1/2018 11:15 AM Deneen Petty NP [facility] DARWIN Ashraf           I have seen and examined this patient face to face today. My clinical findings that support the need for the home health skilled services and home bound status are the following:  Weakness/numbness causing balance and gait disturbance due to Weakness/Debility making it taxing to leave home.  Requiring assistive device to leave home due to unsteady gait caused by  Weakness/Debility and parkinsons.    Allergies:  Review of patient's allergies indicates:   Allergen Reactions    Amantadine analogues      Caused confusion    Mirapex [pramipexole]      Pathologic gambling    Neupro [rotigotine]      Pathologic gambling         Diet: cardiac diet    Activities: activity as tolerated    Nursing:   SN to complete comprehensive assessment including routine vital signs. Instruct on disease process and s/s of complications to report to MD. Review/verify medication list sent home with the patient at time of discharge  and instruct patient/caregiver as needed. Frequency may be adjusted depending on start of care  date.    Notify MD if SBP > 160 or < 90; DBP > 90 or < 50; HR > 120 or < 50; Temp > 101      CONSULTS:    Physical Therapy to evaluate and treat. Evaluate for home safety and equipment needs; Establish/upgrade home exercise program. Perform / instruct on therapeutic exercises, gait training, transfer training, and Range of Motion.  Occupational Therapy to evaluate and treat. Evaluate home environment for safety and equipment needs. Perform/Instruct on transfers, ADL training, ROM, and therapeutic exercises.   to evaluate for community resources/long-range planning.  Aide to provide assistance with personal care, ADLs, and vital signs.      Medications: Review discharge medications with patient and family and provide education.      Current Discharge Medication List      CONTINUE these medications which have NOT CHANGED    Details   amlodipine (NORVASC) 10 MG tablet Take 1 tablet (10 mg total) by mouth once daily.  Qty: 30 tablet, Refills: 5    Associated Diagnoses: Essential hypertension      atorvastatin (LIPITOR) 10 MG tablet TAKE 1 TABLET(10 MG) BY MOUTH EVERY DAY  Qty: 90 tablet, Refills: 1    Comments: **Patient requests 90 days supply**  Associated Diagnoses: Thrombus of aorta; Hyperlipidemia, unspecified hyperlipidemia type      carbidopa-levodopa  mg (SINEMET)  mg per tablet Take 2 tablets by mouth 4 (four) times daily. 6am, 10am, 2pm and 6pm  Qty: 240 tablet, Refills: 4    Comments: **Patient requests 90 days supply**  Associated Diagnoses: Parkinson disease      ciprofloxacin HCl (CIPRO) 500 MG tablet       docusate sodium (COLACE) 100 MG capsule Take 1 capsule (100 mg total) by mouth 2 (two) times daily.  Refills: 0      gabapentin (NEURONTIN) 300 MG capsule TAKE 2 CAPSULES(600 MG) BY MOUTH EVERY EVENING  Qty: 180 capsule, Refills: 1    Comments: **Patient requests 90 days supply**  Associated Diagnoses: Parkinson disease      nystatin (MYCOSTATIN) cream Apply topically 2 (two)  times daily.  Qty: 30 g, Refills: 2    Associated Diagnoses: Tinea cruris      QUEtiapine (SEROQUEL) 25 MG Tab Take 1 tablet (25 mg total) by mouth every evening.  Qty: 30 tablet, Refills: 11    Associated Diagnoses: Lewy body dementia without behavioral disturbance      rivastigmine (EXELON) 4.6 mg/24 hr PT24 APPLY 1 PATCH EXTERNALLY TO THE SKIN EVERY DAY  Qty: 90 patch, Refills: 1    Comments: **Patient requests 90 days supply**  Associated Diagnoses: Lewy body dementia without behavioral disturbance      trazodone (DESYREL) 50 MG tablet Take 1 tablet (50 mg total) by mouth nightly as needed for Insomnia.  Qty: 30 tablet, Refills: 4      warfarin (COUMADIN) 3 MG tablet TAKE 1 TABLET BY MOUTH EVERY DAY AS DIRECTED BY COUMADIN CLINIC  Qty: 90 tablet, Refills: 0    Comments: **Patient requests 90 days supply**  Associated Diagnoses: Thrombus of aorta             I certify that this patient is confined to his home and needs physical therapy and occupational therapy.

## 2018-02-08 NOTE — SUBJECTIVE & OBJECTIVE
Past Medical History:   Diagnosis Date    Anticoagulant long-term use     Hypertension     Impulse control disorder 2012    gambling on mirapex; also possible dopamine dysregulation syndrome    PD (Parkinson's disease) 1999    tremor-predominant    PVD (peripheral vascular disease) with claudication     poor circulation both legs       Past Surgical History:   Procedure Laterality Date    CATARACT EXTRACTION Bilateral 1 yr        Review of patient's allergies indicates:   Allergen Reactions    Amantadine analogues      Caused confusion    Mirapex [pramipexole]      Pathologic gambling    Neupro [rotigotine]      Pathologic gambling         No current facility-administered medications on file prior to encounter.      Current Outpatient Prescriptions on File Prior to Encounter   Medication Sig    amlodipine (NORVASC) 10 MG tablet Take 1 tablet (10 mg total) by mouth once daily.    atorvastatin (LIPITOR) 10 MG tablet TAKE 1 TABLET(10 MG) BY MOUTH EVERY DAY    carbidopa-levodopa  mg (SINEMET)  mg per tablet Take 2 tablets by mouth 4 (four) times daily. 6am, 10am, 2pm and 6pm    ciprofloxacin HCl (CIPRO) 500 MG tablet     docusate sodium (COLACE) 100 MG capsule Take 1 capsule (100 mg total) by mouth 2 (two) times daily.    gabapentin (NEURONTIN) 300 MG capsule TAKE 2 CAPSULES(600 MG) BY MOUTH EVERY EVENING    nystatin (MYCOSTATIN) cream Apply topically 2 (two) times daily.    QUEtiapine (SEROQUEL) 25 MG Tab Take 1 tablet (25 mg total) by mouth every evening.    rivastigmine (EXELON) 4.6 mg/24 hr PT24 APPLY 1 PATCH EXTERNALLY TO THE SKIN EVERY DAY    trazodone (DESYREL) 50 MG tablet Take 1 tablet (50 mg total) by mouth nightly as needed for Insomnia.    warfarin (COUMADIN) 3 MG tablet TAKE 1 TABLET BY MOUTH EVERY DAY AS DIRECTED BY COUMADIN CLINIC     Family History     Problem Relation (Age of Onset)    No Known Problems Mother, Father, Sister, Brother, Maternal Aunt, Maternal Uncle,  Paternal Aunt, Paternal Uncle, Maternal Grandmother, Maternal Grandfather, Paternal Grandmother, Paternal Grandfather        Social History Main Topics    Smoking status: Former Smoker     Years: 10.00    Smokeless tobacco: Never Used      Comment: Quit 40 years ago    Alcohol use No    Drug use: No    Sexual activity: Not Currently     Review of Systems   Constitutional: Negative for chills and fever.   HENT: Negative for congestion and sore throat.    Respiratory: Negative for cough and shortness of breath.    Cardiovascular: Negative for chest pain and palpitations.   Gastrointestinal: Negative for abdominal pain and nausea.   Endocrine: Negative for cold intolerance and heat intolerance.   Genitourinary: Negative for dysuria and frequency.   Musculoskeletal: Negative for arthralgias and myalgias.   Skin: Negative for color change and rash.   Neurological: Positive for tremors. Negative for dizziness and headaches.   Psychiatric/Behavioral: Negative for behavioral problems and confusion.     Objective:     Vital Signs (Most Recent):  Temp: 97.9 °F (36.6 °C) (02/08/18 0816)  Pulse: 79 (02/08/18 0816)  Resp: 20 (02/08/18 0816)  BP: (!) 145/65 (02/08/18 0816)  SpO2: 97 % (02/08/18 0816) Vital Signs (24h Range):  Temp:  [97.8 °F (36.6 °C)-98 °F (36.7 °C)] 97.9 °F (36.6 °C)  Pulse:  [56-79] 79  Resp:  [16-20] 20  SpO2:  [97 %-99 %] 97 %  BP: (122-145)/(60-76) 145/65     Weight: 66.6 kg (146 lb 13.2 oz)  Body mass index is 23 kg/m².    Physical Exam   Constitutional: He is oriented to person, place, and time. He appears well-developed and well-nourished. No distress.   HENT:   Head: Normocephalic and atraumatic.   Eyes: EOM are normal. Pupils are equal, round, and reactive to light.   Neck: Normal range of motion. Neck supple.   Cardiovascular: Normal rate and regular rhythm.    No murmur heard.  Pulmonary/Chest: Effort normal and breath sounds normal.   Abdominal: Soft. Bowel sounds are normal. He exhibits no  distension.   Musculoskeletal: Normal range of motion.   Neurological: He is alert and oriented to person, place, and time.   Parkinsonian tremors   Skin: Skin is warm and dry. No rash noted.   Psychiatric: He has a normal mood and affect. His behavior is normal.         CRANIAL NERVES     CN III, IV, VI   Pupils are equal, round, and reactive to light.  Extraocular motions are normal.        Significant Labs:   CBC:   Recent Labs  Lab 02/08/18  0205   WBC 6.86   HGB 13.0*   HCT 39.6*        CMP:   Recent Labs  Lab 02/08/18  0205      K 4.1      CO2 19*   *   BUN 28*   CREATININE 1.1   CALCIUM 9.3   PROT 6.9   ALBUMIN 4.0   BILITOT 0.5   ALKPHOS 78   AST 15   ALT <5*   ANIONGAP 13   EGFRNONAA >60       Significant Imaging:   Imaging Results          CT Head Without Contrast (Final result)  Result time 02/08/18 01:50:57    Final result by Madelin Story MD (02/08/18 01:50:57)                 Impression:        No CT evidence of acute intracranial abnormality.      Electronically signed by: MADELIN STORY  Date:     02/08/18  Time:    01:50              Narrative:    Procedure comments: CT examination of the head was performed from the skull base through the vertex without the use of intravenous contrast using 5-mm axial images.    Comparison: CT head 8/1/2017    Findings:    There is age appropriate generalized cerebral atrophy with compensatory ventricular enlargement and sulcal widening.There are patchy regions of hypoattenuation in the supratentorial white matter likely consistent with chronic microvascular ischemic changes. There is no evidence of acute intracranial hemorrhage. There is no midline shift or mass effect.The basal cisterns are patent. No extra-axial collections are appreciated.The visualized paranasal sinuses and mastoid air cells are clear.There are postoperative changes of the bilateral globes. The calvarium is intact.

## 2018-02-08 NOTE — CONSULTS
"2/8/2018 1251:  TN spoke with daughterCarole via phone who stated that she has already started the process of trying to have the patient admitted to [facility] on the [place].  She state that the patient has been living with his girlfriend who is no longer able to care for him.  TN spoke with F[facility]      2/9/2018:  Per [facility] patient denied by [facility] due to "Unable to meet needs".  Genaro received from daughterCarole stating that the POAPietro refused to sign paperwork.  Carole provided Mr Mensah's contact info: [phone].  TN called and left message.  Message received that he did return call.  TN called back, no answer, message left.  Wendy SUÁREZ, OBS staff, CM mgmt team and  nurse notified.  "

## 2018-02-08 NOTE — ED NOTES
Daughter Carole Carole contact number: [phone]  Girlfriend Hany Shelley contact number: [phone]

## 2018-02-08 NOTE — PT/OT/SLP EVAL
"Occupational Therapy   Evaluation    Name: Tenzin Ortiz  MRN: [id number]  Admitting Diagnosis:  Debility      Recommendations:     Discharge Recommendations:  (Ongoing assessment)  Discharge Equipment Recommendations:   (TBD)  Barriers to discharge:  Decreased caregiver support    History:     Occupational Profile:  Living Environment: Pt reports that he was living "the Mrs. Before coming into hospital   Previous level of function: As peer pt, he was able to ambulate  Roles and Routines: unknown  Equipment Owned:  rollator  Assistance upon Discharge: to be determined by case management    Past Medical History:   Diagnosis Date    Anticoagulant long-term use     Hypertension     Impulse control disorder 2012    gambling on mirapex; also possible dopamine dysregulation syndrome    PD (Parkinson's disease) 1999    tremor-predominant    PVD (peripheral vascular disease) with claudication     poor circulation both legs       Past Surgical History:   Procedure Laterality Date    CATARACT EXTRACTION Bilateral 1 yr        Subjective     Chief Complaint: tremors from Parkinsons  Patient/Family stated goals: to get medications  Communicated with: nurse completed with regarding medication request after/ prior to session.  Pain/Comfort:  · Pain Rating 1: 0/10  · Pain Rating Post-Intervention 1: 0/10    Patients cultural, spiritual, Quaker conflicts given the current situation:      Objective:     Patient found with: peripheral IV    General Precautions: Standard, fall   Orthopedic Precautions:N/A   Braces: N/A     Occupational Performance:      Functional Mobility/Transfers:  · Patient completed Sit <> Stand Transfer with moderate assistance  with  rolling walker   · Functional Mobility: not at this time, Pt able to make 4 steps with moderate assist    Activities of Daily Living:  · UB Dressing: minimum assistance      Cognitive/Visual Perceptual:  Cognitive/Psychosocial Skills:     -       Oriented to: Person, Place, " "Time and Situation   -       Follows Commands/attention:Follows two-step commands  -       Communication: clear/fluent  -       Memory: No Deficits noted  -       Safety awareness/insight to disability: intact   -       Mood/Affect/Coping skills/emotional control: Appropriate to situation  Visual/Perceptual:      -Intact    Physical Exam:  Balance:    -       able to sit edge of bed unsupported  Postural examination/scapula alignment:    -       Rounded shoulders  -       Posterior pelvic tilt  Upper Extremity Range of Motion:     -       Right Upper Extremity: limited with tremors  -       Left Upper Extremity: limited with tremors  Upper Extremity Strength:    -       Right Upper Extremity: fair  -       Left Upper Extremity: fair   Strength:    -       Right Upper Extremity: fair  -       Left Upper Extremity: WFL  Fine Motor Coordination:    -       Impaired     Gross motor coordination:   ataxia    Patient left supine with all lines intact and call button in reach    AMPA 6 Click:  AMPA Total Score: 11    Treatment & Education:  evaluation  Education:    Assessment:     Tenzin Ortiz is a 77 y.o. male with a medical diagnosis of Debility.  He presents with tremors that are limiting functional mobility.  Performance deficits affecting function are weakness, impaired endurance, impaired self care skills, impaired functional mobilty, decreased coordination, decreased upper extremity function, decreased lower extremity function, decreased ROM, impaired fine motor, impaired coordination, impaired joint extensibility, impaired muscle length.      Rehab Prognosis:  fair; patient would benefit from acute skilled OT services to address these deficits and reach maximum level of function.         Clinical Decision Makin.  OT Low:  "Pt evaluation falls under low complexity for evaluation coding due to performance deficits noted in 1-3 areas as stated above and 0 co-morbities affecting current functional " "status. Data obtained from problem focused assessments. No modifications or assistance was required for completion of evaluation. Only brief occupational profile and history review completed."     Plan:     Patient to be seen 4 x/week to address the above listed problems via self-care/home management, therapeutic activities, therapeutic exercises  · Plan of Care Expires: 02/16/18  · Plan of Care Reviewed with: patient    This Plan of care has been discussed with the patient who was involved in its development and understands and is in agreement with the identified goals and treatment plan    GOALS:    Occupational Therapy Goals        Problem: Occupational Therapy Goal    Goal Priority Disciplines Outcome Interventions   Occupational Therapy Goal     OT, PT/OT Ongoing (interventions implemented as appropriate)    Description:  Goals to be met by: 2/17/18     Patient will increase functional independence with ADLs by performing:    Feeding with Set-up Assistance.  UE Dressing with Set-up Assistance.  Grooming while seated with Set-up Assistance.  Stand pivot transfers with Contact Guard Assistance.  Toilet transfer to bedside commode with Minimal Assistance.  Upper extremity exercise program x10 reps , with supervision.                      Time Tracking:     OT Date of Treatment: 02/08/18  OT Start Time: 1055  OT Stop Time: 1110  OT Total Time (min): 15 min    Billable Minutes:Evaluation 15    Becky Bruno OT  2/8/2018    "

## 2018-02-08 NOTE — ED PROVIDER NOTES
"Encounter Date: 2/7/2018    SCRIBE #1 NOTE: I, Mack Evans, am scribing for, and in the presence of,  Mando Harding MD. I have scribed the following portions of the note - Other sections scribed: HPI, ROS.       History     Chief Complaint   Patient presents with    Headache     with nausea and vomiting x 2 days, EMS report family/ girlfriend reports she cannot care for him and needs him      CC: Headache     77 year old male has a past medical history of Anticoagulant long-term use; Hypertension; Impulse control disorder (2012); PD (Parkinson's disease) (1999); and PVD (peripheral vascular disease) with claudication presents to the ED via EMS for evaluation with a headache and mild chest pain. During the exam, pt is unsure as why is he at the ED/who called EMS; he states he "can't describe what I want to say."  He denies fever, chills, vomiting, diarrhea, shortness of breath, dysuria, and other associated symptoms. History otherwise difficult to obtain due to acuity of condition.       The history is provided by the patient. No  was used.     Review of patient's allergies indicates:   Allergen Reactions    Amantadine analogues      Caused confusion    Mirapex [pramipexole]      Pathologic gambling    Neupro [rotigotine]      Pathologic gambling       Past Medical History:   Diagnosis Date    Anticoagulant long-term use     Hypertension     Impulse control disorder 2012    gambling on mirapex; also possible dopamine dysregulation syndrome    PD (Parkinson's disease) 1999    tremor-predominant    PVD (peripheral vascular disease) with claudication     poor circulation both legs     Past Surgical History:   Procedure Laterality Date    CATARACT EXTRACTION Bilateral 1 yr      Family History   Problem Relation Age of Onset    No Known Problems Mother     No Known Problems Father     No Known Problems Sister     No Known Problems Brother     No Known Problems Maternal Aunt "     No Known Problems Maternal Uncle     No Known Problems Paternal Aunt     No Known Problems Paternal Uncle     No Known Problems Maternal Grandmother     No Known Problems Maternal Grandfather     No Known Problems Paternal Grandmother     No Known Problems Paternal Grandfather     Parkinsonism Neg Hx     Amblyopia Neg Hx     Blindness Neg Hx     Cancer Neg Hx     Cataracts Neg Hx     Diabetes Neg Hx     Glaucoma Neg Hx     Hypertension Neg Hx     Macular degeneration Neg Hx     Retinal detachment Neg Hx     Strabismus Neg Hx     Stroke Neg Hx     Thyroid disease Neg Hx      Social History   Substance Use Topics    Smoking status: Former Smoker     Years: 10.00    Smokeless tobacco: Never Used      Comment: Quit 40 years ago    Alcohol use No     Review of Systems   Constitutional: Negative for chills and fever.   HENT: Negative for rhinorrhea and sore throat.    Eyes: Negative for redness.   Respiratory: Negative for cough.    Cardiovascular: Positive for chest pain.   Gastrointestinal: Negative for abdominal pain, diarrhea, nausea and vomiting.   Genitourinary: Negative for dysuria.   Musculoskeletal: Negative for back pain.   Skin: Negative for color change.   Neurological: Positive for headaches. Negative for syncope and weakness.   Psychiatric/Behavioral: Positive for confusion. The patient is not nervous/anxious.        Physical Exam     Initial Vitals [02/07/18 2015]   BP Pulse Resp Temp SpO2   122/60 72 19 97.9 °F (36.6 °C) 98 %      MAP       80.67         Physical Exam    Constitutional: He appears well-developed and well-nourished.   HENT:   Head: Normocephalic and atraumatic.   Mouth/Throat: Mucous membranes are dry.   Eyes: EOM are normal. Pupils are equal, round, and reactive to light.   Neck: Normal range of motion.   Cardiovascular: Regular rhythm and intact distal pulses.   Pulmonary/Chest: Breath sounds normal. No respiratory distress.   Abdominal: Bowel sounds are normal.  He exhibits no distension.   Musculoskeletal: Normal range of motion.   Neurological: He is alert.   Skin: Skin is warm.   Psychiatric: His speech is delayed. He is slowed.         ED Course   Procedures  Labs Reviewed   CBC W/ AUTO DIFFERENTIAL - Abnormal; Notable for the following:        Result Value    Hemoglobin 13.0 (*)     Hematocrit 39.6 (*)     RDW 14.8 (*)     All other components within normal limits   COMPREHENSIVE METABOLIC PANEL - Abnormal; Notable for the following:     CO2 19 (*)     Glucose 118 (*)     BUN, Bld 28 (*)     ALT <5 (*)     All other components within normal limits   URINALYSIS - Abnormal; Notable for the following:     Ketones, UA 1+ (*)     All other components within normal limits   LIPASE - Abnormal; Notable for the following:     Lipase <3 (*)     All other components within normal limits   PROTIME-INR - Abnormal; Notable for the following:     Prothrombin Time 13.6 (*)     INR 1.3 (*)     All other components within normal limits   TROPONIN I   URINALYSIS MICROSCOPIC             Medical Decision Making:   History:   Old Medical Records: I decided to obtain old medical records.  Initial Assessment:   Medical decision-making:    The patient received a medical screening exam. If performed, the EKG was independently evaluated by me and is pending final cardiology evaluation.  If performed, all radiographic studies were independently evaluated by me and are pending final radiology evaluation. If labs were ordered, they were reviewed. Vital signs are independently assessed by me.  If performed, the pulse oximetry was independently evaluated by me.  I decided to obtain the patient's past medical record.  If available, I reviewed the patient's past medical record, including most recent labs and radiology reports.    ED Management:  Pt is failing to thrive and family is unable to care for patient any further. He is dehydrated and requires IVF.  Will consult social work and admit.  FER Guevara  NANO Harding 10:38 PM 2/8/2018              Scribe Attestation:   Scribe #1: I performed the above scribed service and the documentation accurately describes the services I performed. I attest to the accuracy of the note.    Attending Attestation:           Physician Attestation for Scribe:  Physician Attestation Statement for Scribe #1: I, Mando Harding MD, reviewed documentation, as scribed by Mack Evans in my presence, and it is both accurate and complete.                 ED Course      Clinical Impression:   The primary encounter diagnosis was Dehydration. A diagnosis of Failure to thrive in adult was also pertinent to this visit.                           Mando Harding MD  02/08/18 5707

## 2018-02-08 NOTE — PROGRESS NOTES
02/08/18 0705   Vital Signs   Temp 98 °F (36.7 °C)   Temp src Oral   Pulse 78   Heart Rate Source NIBP   Resp 16   SpO2 99 %   Pulse Oximetry Type Intermittent   O2 Device (Oxygen Therapy) room air   /76   BP Location Left arm   Patient Position Lying     Patient arrived to unit via stretcher. Transfered to bed with assistance. AAO x 4. Speech pattern is slow. Tremors noted to upper extremities. Iv is intact. Patient oriented to room, bed is low, alarm set and call light is within reach.     1920 Report given to Dar COLLINS. Patient denies any needs or discomforts at this time.

## 2018-02-08 NOTE — PROGRESS NOTES
Patient not seen this admission.  Last seen in clinic 2/6/18.    Attention providers- please consider full admission and SNF placement for this patient.  He is ambulatory when his sinemet is in effect, but easily impulsive/delusional/psychotic if he takes too much.  He needs 24 hour supervision.    He is not safe to remain home alone and girlfriend is unable to care for him when medications wear off (during night).  I tried to convince him of this at his last appointment 2/6, but he refused admission to [facility] at that time.    Thank you for taking care of him.

## 2018-02-08 NOTE — ASSESSMENT & PLAN NOTE
Per ED patient admitted for dehydration. His BUN is elevated with normal creatinine--may be mild clinical dehydration. Will provide gentle IVF hydration.

## 2018-02-08 NOTE — PLAN OF CARE
Problem: Occupational Therapy Goal  Goal: Occupational Therapy Goal  Goals to be met by: 2/17/18     Patient will increase functional independence with ADLs by performing:    Feeding with Set-up Assistance.  UE Dressing with Set-up Assistance.  Grooming while seated with Set-up Assistance.  Stand pivot transfers with Contact Guard Assistance.  Toilet transfer to bedside commode with Minimal Assistance.  Upper extremity exercise program x10 reps , with supervision.    Outcome: Ongoing (interventions implemented as appropriate)  OT completed evaluation  REC: Ongoing assessment

## 2018-02-08 NOTE — CLINICAL REVIEW
Attention providers- please consider full admission and SNF placement for this patient.  He is ambulatory when his sinemet is in effect, but easily impulsive/delusional/psychotic if he takes too much.  He needs 24 hour supervision.    He is not safe to remain home alone and girlfriend is unable to care for him when medications wear off (during night).  I tried to convince him of this at his last appointment 2/6, but he refused [place] [facility] at [place].    Thank you for taking care of him.

## 2018-02-08 NOTE — HPI
Patient is 78 yo male with HTN, HLD, and Parkinson's who presents to ED reportedly for HA. Per ED note patient was complaining of HA but he denies this on my interview. Seems there was some confusion in ED and he was unclear why he was here. During interview with me, patient reports being here for NH placement although he does not appear to be very happy about this. He has been with some issues caring for himself. Currently lives with his girlfriend who reports she can no longer care for him. He states he currently gets around with a walker but sometimes has difficulty getting up out of his chair. Per chart review patient is with Parkinson's and sees neurology, Dr. Castellanos, lastly seen on 2/6/18. He was with hallucinations which were noted to be secondary to dopaminergic agents thus having to significantly reduce dose--she recommended he be admitted for placement at that time but patient declined. He has been attempting placement as an outpatient through PCP but without much luck. He otherwise denies recent illness, fever/chills, CP, SOB, abdominal pain, N/V/D, dysuria. On presentation patient with grossly normal labs/vitals. Placed in observation for what appears to be solely placement although this is something that may need to be continued as outpatient as is not indication for hospitalization.

## 2018-02-08 NOTE — H&P
[facility] Medicine  History & Physical    Patient Name: Tenzin Ortiz  MRN: [id number]  Admission Date: 2/7/2018  Attending Physician: Rosita Prakash MD   Primary Care Provider: Efrain Chavez MD         Patient information was obtained from patient and ER records.     Subjective:     Principal Problem:Debility    Chief Complaint:   Chief Complaint   Patient presents with    Headache     with nausea and vomiting x 2 days, EMS report family/ girlfriend reports she cannot care for him and needs him         HPI: Patient is 78 yo male with HTN, HLD, and Parkinson's who presents to ED reportedly for HA. Per ED note patient was complaining of HA but he denies this on my interview. Seems there was some confusion in ED and he was unclear why he was here. During interview with me, patient reports being here for NH placement although he does not appear to be very happy about this. He has been with some issues caring for himself. Currently lives with his girlfriend who reports she can no longer care for him. He states he currently gets around with a walker but sometimes has difficulty getting up out of his chair. Per chart review patient is with Parkinson's and sees neurology, Dr. Castellanos, lastly seen on 2/6/18. He was with hallucinations which were noted to be secondary to dopaminergic agents thus having to significantly reduce dose--she recommended he be admitted for placement at that time but patient declined. He has been attempting placement as an outpatient through PCP but without much luck. He otherwise denies recent illness, fever/chills, CP, SOB, abdominal pain, N/V/D, dysuria. On presentation patient with grossly normal labs/vitals. Placed in observation for what appears to be solely placement although this is something that may need to be continued as outpatient as is not indication for hospitalization.    Past Medical History:   Diagnosis Date    Anticoagulant long-term use      Hypertension     Impulse control disorder 2012    gambling on mirapex; also possible dopamine dysregulation syndrome    PD (Parkinson's disease) 1999    tremor-predominant    PVD (peripheral vascular disease) with claudication     poor circulation both legs       Past Surgical History:   Procedure Laterality Date    CATARACT EXTRACTION Bilateral 1 yr        Review of patient's allergies indicates:   Allergen Reactions    Amantadine analogues      Caused confusion    Mirapex [pramipexole]      Pathologic gambling    Neupro [rotigotine]      Pathologic gambling         No current facility-administered medications on file prior to encounter.      Current Outpatient Prescriptions on File Prior to Encounter   Medication Sig    amlodipine (NORVASC) 10 MG tablet Take 1 tablet (10 mg total) by mouth once daily.    atorvastatin (LIPITOR) 10 MG tablet TAKE 1 TABLET(10 MG) BY MOUTH EVERY DAY    carbidopa-levodopa  mg (SINEMET)  mg per tablet Take 2 tablets by mouth 4 (four) times daily. 6am, 10am, 2pm and 6pm    ciprofloxacin HCl (CIPRO) 500 MG tablet     docusate sodium (COLACE) 100 MG capsule Take 1 capsule (100 mg total) by mouth 2 (two) times daily.    gabapentin (NEURONTIN) 300 MG capsule TAKE 2 CAPSULES(600 MG) BY MOUTH EVERY EVENING    nystatin (MYCOSTATIN) cream Apply topically 2 (two) times daily.    QUEtiapine (SEROQUEL) 25 MG Tab Take 1 tablet (25 mg total) by mouth every evening.    rivastigmine (EXELON) 4.6 mg/24 hr PT24 APPLY 1 PATCH EXTERNALLY TO THE SKIN EVERY DAY    trazodone (DESYREL) 50 MG tablet Take 1 tablet (50 mg total) by mouth nightly as needed for Insomnia.    warfarin (COUMADIN) 3 MG tablet TAKE 1 TABLET BY MOUTH EVERY DAY AS DIRECTED BY COUMADIN CLINIC     Family History     Problem Relation (Age of Onset)    No Known Problems Mother, Father, Sister, Brother, Maternal Aunt, Maternal Uncle, Paternal Aunt, Paternal Uncle, Maternal Grandmother, Maternal Grandfather,  Paternal Grandmother, Paternal Grandfather        Social History Main Topics    Smoking status: Former Smoker     Years: 10.00    Smokeless tobacco: Never Used      Comment: Quit 40 years ago    Alcohol use No    Drug use: No    Sexual activity: Not Currently     Review of Systems   Constitutional: Negative for chills and fever.   HENT: Negative for congestion and sore throat.    Respiratory: Negative for cough and shortness of breath.    Cardiovascular: Negative for chest pain and palpitations.   Gastrointestinal: Negative for abdominal pain and nausea.   Endocrine: Negative for cold intolerance and heat intolerance.   Genitourinary: Negative for dysuria and frequency.   Musculoskeletal: Negative for arthralgias and myalgias.   Skin: Negative for color change and rash.   Neurological: Positive for tremors. Negative for dizziness and headaches.   Psychiatric/Behavioral: Negative for behavioral problems and confusion.     Objective:     Vital Signs (Most Recent):  Temp: 97.9 °F (36.6 °C) (02/08/18 0816)  Pulse: 79 (02/08/18 0816)  Resp: 20 (02/08/18 0816)  BP: (!) 145/65 (02/08/18 0816)  SpO2: 97 % (02/08/18 0816) Vital Signs (24h Range):  Temp:  [97.8 °F (36.6 °C)-98 °F (36.7 °C)] 97.9 °F (36.6 °C)  Pulse:  [56-79] 79  Resp:  [16-20] 20  SpO2:  [97 %-99 %] 97 %  BP: (122-145)/(60-76) 145/65     Weight: 66.6 kg (146 lb 13.2 oz)  Body mass index is 23 kg/m².    Physical Exam   Constitutional: He is oriented to person, place, and time. He appears well-developed and well-nourished. No distress.   HENT:   Head: Normocephalic and atraumatic.   Eyes: EOM are normal. Pupils are equal, round, and reactive to light.   Neck: Normal range of motion. Neck supple.   Cardiovascular: Normal rate and regular rhythm.    No murmur heard.  Pulmonary/Chest: Effort normal and breath sounds normal.   Abdominal: Soft. Bowel sounds are normal. He exhibits no distension.   Musculoskeletal: Normal range of motion.   Neurological: He is  alert and oriented to person, place, and time.   Parkinsonian tremors   Skin: Skin is warm and dry. No rash noted.   Psychiatric: He has a normal mood and affect. His behavior is normal.         CRANIAL NERVES     CN III, IV, VI   Pupils are equal, round, and reactive to light.  Extraocular motions are normal.        Significant Labs:   CBC:   Recent Labs  Lab 02/08/18  0205   WBC 6.86   HGB 13.0*   HCT 39.6*        CMP:   Recent Labs  Lab 02/08/18  0205      K 4.1      CO2 19*   *   BUN 28*   CREATININE 1.1   CALCIUM 9.3   PROT 6.9   ALBUMIN 4.0   BILITOT 0.5   ALKPHOS 78   AST 15   ALT <5*   ANIONGAP 13   EGFRNONAA >60       Significant Imaging:   Imaging Results          CT Head Without Contrast (Final result)  Result time 02/08/18 01:50:57    Final result by Madelin Story MD (02/08/18 01:50:57)                 Impression:        No CT evidence of acute intracranial abnormality.      Electronically signed by: MADELIN STORY  Date:     02/08/18  Time:    01:50              Narrative:    Procedure comments: CT examination of the head was performed from the skull base through the vertex without the use of intravenous contrast using 5-mm axial images.    Comparison: CT head 8/1/2017    Findings:    There is age appropriate generalized cerebral atrophy with compensatory ventricular enlargement and sulcal widening.There are patchy regions of hypoattenuation in the supratentorial white matter likely consistent with chronic microvascular ischemic changes. There is no evidence of acute intracranial hemorrhage. There is no midline shift or mass effect.The basal cisterns are patent. No extra-axial collections are appreciated.The visualized paranasal sinuses and mastoid air cells are clear.There are postoperative changes of the bilateral globes. The calvarium is intact.                                Assessment/Plan:     * Debility    Seems patient was admitted for placement despite this being  in process as an outpatient. Consult placed to  for discharge planning. PPD placed. Will have PT/OT evaluate patient.           Dehydration    Per ED patient admitted for dehydration. His BUN is elevated with normal creatinine--may be mild clinical dehydration. Will provide gentle IVF hydration.           Hyperlipidemia    Continue statin.         Essential hypertension    Patient BP well controlled on presentation. Will continue home anti hypertensive regimen and monitor.             Parkinson disease    Currently on sinemet--recently reduced to one tablet QID. Will continue.             VTE Risk Mitigation         Ordered     Medium Risk of VTE  Once      02/08/18 0653     Place JOSEFINA hose  Until discontinued      02/08/18 0653     Place sequential compression device  Until discontinued      02/08/18 0653           Wendy Hernández PA-C  Hospitalist-Department of Hospital Medicine  [address] [place] [address]  Office [phone] Pager [phone]

## 2018-02-08 NOTE — ASSESSMENT & PLAN NOTE
Seems patient was admitted for placement despite this being in process as an outpatient. Consult placed to SW for discharge planning. PPD placed. Will have PT/OT evaluate patient.

## 2018-02-08 NOTE — PLAN OF CARE
Problem: Physical Therapy Goal  Goal: Physical Therapy Goal  Goals to be met by: 18    Patient will increase functional independence with mobility by performin. Supine to sit with Stand-by Assistance  2. Sit to stand transfer with Stand-by Assistance  3. Gait  x 150 feet with Stand-by Assistance using Rolling Walker  4. Lower extremity exercise program x30 reps per handout, with assistance as needed    Patient limited in PT evaluation due to posterior lean and tremors. Patient stated he had not received his medication for his Parkinson's since 4PM yesterday. Nurse notified.

## 2018-02-09 LAB
ANION GAP SERPL CALC-SCNC: 7 MMOL/L
BUN SERPL-MCNC: 18 MG/DL
CALCIUM SERPL-MCNC: 9 MG/DL
CHLORIDE SERPL-SCNC: 109 MMOL/L
CO2 SERPL-SCNC: 23 MMOL/L
CREAT SERPL-MCNC: 1 MG/DL
EST. GFR  (AFRICAN AMERICAN): >60 ML/MIN/1.73 M^2
EST. GFR  (NON AFRICAN AMERICAN): >60 ML/MIN/1.73 M^2
GLUCOSE SERPL-MCNC: 81 MG/DL
POTASSIUM SERPL-SCNC: 3.7 MMOL/L
SODIUM SERPL-SCNC: 139 MMOL/L

## 2018-02-09 PROCEDURE — 36415 COLL VENOUS BLD VENIPUNCTURE: CPT

## 2018-02-09 PROCEDURE — 80048 BASIC METABOLIC PNL TOTAL CA: CPT

## 2018-02-09 PROCEDURE — G0378 HOSPITAL OBSERVATION PER HR: HCPCS

## 2018-02-09 PROCEDURE — 25000003 PHARM REV CODE 250: Performed by: PHYSICIAN ASSISTANT

## 2018-02-09 PROCEDURE — 11000001 HC ACUTE MED/SURG PRIVATE ROOM

## 2018-02-09 PROCEDURE — 25000003 PHARM REV CODE 250: Performed by: EMERGENCY MEDICINE

## 2018-02-09 PROCEDURE — 97116 GAIT TRAINING THERAPY: CPT

## 2018-02-09 RX ADMIN — CARBIDOPA AND LEVODOPA 1 TABLET: 25; 100 TABLET ORAL at 10:02

## 2018-02-09 RX ADMIN — QUETIAPINE FUMARATE 25 MG: 25 TABLET ORAL at 08:02

## 2018-02-09 RX ADMIN — DOCUSATE SODIUM 100 MG: 100 CAPSULE, LIQUID FILLED ORAL at 08:02

## 2018-02-09 RX ADMIN — CARBIDOPA AND LEVODOPA 1 TABLET: 25; 100 TABLET ORAL at 05:02

## 2018-02-09 RX ADMIN — CARBIDOPA AND LEVODOPA 1 TABLET: 25; 100 TABLET ORAL at 12:02

## 2018-02-09 RX ADMIN — DOCUSATE SODIUM 100 MG: 100 CAPSULE, LIQUID FILLED ORAL at 10:02

## 2018-02-09 RX ADMIN — AMLODIPINE BESYLATE 10 MG: 5 TABLET ORAL at 10:02

## 2018-02-09 NOTE — PROGRESS NOTES
[facility] Hospital Medicine  Progress Note    Patient Name: Tenzin Ortiz  MRN: [id number]  Patient Class: OP- Observation   Admission Date: 2/7/2018  Length of Stay: 0 days  Attending Physician: Rosita Prakash MD  Primary Care Provider: Efrain Chavez MD        Subjective:     Principal Problem:Debility    Interval History: No acute issues/complaints.     HPI:  Patient is 78 yo male with HTN, HLD, and Parkinson's who presents to ED reportedly for HA. Per ED note patient was complaining of HA but he denies this on my interview. Seems there was some confusion in ED and he was unclear why he was here. During interview with me, patient reports being here for NH placement although he does not appear to be very happy about this. He has been with some issues caring for himself. Currently lives with his girlfriend who reports she can no longer care for him. He states he currently gets around with a walker but sometimes has difficulty getting up out of his chair. Per chart review patient is with Parkinson's and sees neurology, Dr. Castellanos, lastly seen on 2/6/18. He was with hallucinations which were noted to be secondary to dopaminergic agents thus having to significantly reduce dose--she recommended he be admitted for placement at that time but patient declined. He has been attempting placement as an outpatient through PCP but without much luck. He otherwise denies recent illness, fever/chills, CP, SOB, abdominal pain, N/V/D, dysuria. On presentation patient with grossly normal labs/vitals. Placed in observation for what appears to be solely placement although this is something that may need to be continued as outpatient as is not indication for hospitalization.    Hospital Course:  No notes on file        Review of Systems   Constitutional: Negative for chills and fever.   HENT: Negative for congestion and sore throat.    Respiratory: Negative for cough and shortness of breath.    Cardiovascular:  Negative for chest pain and palpitations.   Gastrointestinal: Negative for abdominal pain and nausea.   Endocrine: Negative for cold intolerance and heat intolerance.   Genitourinary: Negative for dysuria and frequency.   Musculoskeletal: Negative for arthralgias and myalgias.   Skin: Negative for color change and rash.   Neurological: Negative for dizziness and headaches.   Psychiatric/Behavioral: Negative for behavioral problems and confusion.     Objective:     Vital Signs (Most Recent):  Temp: 99.4 °F (37.4 °C) (02/09/18 1138)  Pulse: 64 (02/09/18 1138)  Resp: 18 (02/09/18 1138)  BP: (!) 100/54 (02/09/18 1138)  SpO2: 97 % (02/09/18 1138) Vital Signs (24h Range):  Temp:  [97.6 °F (36.4 °C)-99.4 °F (37.4 °C)] 99.4 °F (37.4 °C)  Pulse:  [64-88] 64  Resp:  [16-20] 18  SpO2:  [95 %-97 %] 97 %  BP: (100-154)/(54-81) 100/54     Weight: 65.1 kg (143 lb 8.3 oz)  Body mass index is 22.48 kg/m².    Intake/Output Summary (Last 24 hours) at 02/09/18 1215  Last data filed at 02/08/18 1800   Gross per 24 hour   Intake              360 ml   Output              600 ml   Net             -240 ml      Physical Exam   Constitutional: He is oriented to person, place, and time. He appears well-developed and well-nourished. No distress.   HENT:   Head: Normocephalic and atraumatic.   Eyes: EOM are normal. Pupils are equal, round, and reactive to light.   Neck: Normal range of motion. Neck supple.   Cardiovascular: Normal rate and regular rhythm.    No murmur heard.  Pulmonary/Chest: Effort normal and breath sounds normal.   Abdominal: Soft. Bowel sounds are normal. He exhibits no distension.   Musculoskeletal: Normal range of motion.   Neurological: He is alert and oriented to person, place, and time.   Parkinsonian tremors   Skin: Skin is warm and dry. No rash noted.   Psychiatric: He has a normal mood and affect. His behavior is normal.       Significant Labs:   CBC:   Recent Labs  Lab 02/08/18  0205   WBC 6.86   HGB 13.0*   HCT  39.6*        CMP:   Recent Labs  Lab 02/08/18  0205 02/09/18  0421    139   K 4.1 3.7    109   CO2 19* 23   * 81   BUN 28* 18   CREATININE 1.1 1.0   CALCIUM 9.3 9.0   PROT 6.9  --    ALBUMIN 4.0  --    BILITOT 0.5  --    ALKPHOS 78  --    AST 15  --    ALT <5*  --    ANIONGAP 13 7*   EGFRNONAA >60 >60           Assessment/Plan:      * Debility    TN consulted to aid in safe discharge planning. Appears patient has been living with a significant other who can no longer care for him and refusing to take patient home. His somewhat estranged daughter has been contacted and is willing to aid in finding NH placement (she had already begun this process as an outpatient). There is an issue, however, in that patient power of  refused to sign consent for NH placement. I do not feel patient able to make his own decision regarding this as he is with intermittent confusion +/- previous unclear dementia diagnosis per daughter. Psychiatry has been consulted to determine capacity and whether or not patient need a healthcare power of  to move forward with placement. He is medically cleared and was initially with plan for home with HH and SW to aid in outpatient placement, however, cannot safely discharge as he does not have a home to return to.           Dehydration    Per ED patient admitted for dehydration. His BUN is elevated with normal creatinine--may be mild clinical dehydration. Will provide gentle IVF hydration.           Hyperlipidemia    Continue statin.         Essential hypertension    Patient BP well controlled on presentation. Will continue home anti hypertensive regimen and monitor.             Parkinson disease    Currently on sinemet--recently reduced to one tablet QID. Will continue.             VTE Risk Mitigation         Ordered     Medium Risk of VTE  Once      02/08/18 0653     Place JOSEFINA hose  Until discontinued      02/08/18 0653     Place sequential compression device   Until discontinued      02/08/18 0653              Wendy Hernández PA-C  Hospitalist-Department of Hospital Medicine  [address] [place] [address]  Office [phone] Pager [phone]

## 2018-02-09 NOTE — PT/OT/SLP EVAL
Physical Therapy Evaluation    Patient Name:  Tenzin Ortiz   MRN:  [id number]    Recommendations:     Discharge Recommendations:  nursing facility, skilled   Discharge Equipment Recommendations:  (TBD)   Barriers to discharge: girlfriend unable to care for patient     Assessment:     Tenzin Ortiz is a 77 y.o. male admitted with a medical diagnosis of Debility.  He presents with the following impairments/functional limitations:  weakness, impaired endurance, impaired functional mobilty, gait instability, impaired balance, decreased lower extremity function, decreased safety awareness, pain, impaired coordination, impaired fine motor. Patient limited in PT evaluation due to posterior lean and tremors. Patient stated he had not received his medication for his Parkinson's since 4PM yesterday. Nurse notified.     Rehab Prognosis:  fair; patient would benefit from acute skilled PT services to address these deficits and reach maximum level of function.      Recent Surgery: * No surgery found *      Plan:     During this hospitalization, patient to be seen 3 x/week to address the above listed problems via gait training, therapeutic activities, therapeutic exercises  · Plan of Care Expires:  02/22/18   Plan of Care Reviewed with: patient    Subjective     Communicated with nurse Stubbs prior to session.  Patient found supine in bed upon PT entry to room, agreeable to evaluation.      Chief Complaint: Has not received his Parkinson's medication since yesterday.   Patient comments/goals: To walk.   Pain/Comfort:  · Pain Rating 1: 0/10    Living Environment:  Patient lives at home with his girlfriend. Prior to admission, patients level of function was ambulatory with rollator but stated he had frequent falls.  Patient has the following equipment: rollator.     Objective:     Patient found with: peripheral IV     General Precautions: Standard, fall   Orthopedic Precautions:N/A   Braces: N/A     Exams:  · Cognitive Exam:   Patient is oriented to Person and Place and follows 75% of simple commands   · Fine Motor Coordination:    · -       Impaired and tremors noted to B UE's  · Gross Motor Coordination:  impaired  · Postural Exam:  Patient presented with the following abnormalities:    · -       Rounded shoulders  · -       Forward head  · -       Abnormal trunk flexion  · RLE ROM: WFL but limited in knee extension  · RLE Strength: 4-/5  · LLE ROM: WFL but limited in knee extension  · LLE Strength: 4-/5    Functional Mobility:  · Bed Mobility:     · Supine to Sit: contact guard assistance  · Sit to Supine: contact guard assistance  · Transfers:     · Sit to Stand:  moderate assistance of 2 persons with rolling walker due to posterior lean and patient pulling up on walker  · Gait:  5 shuffling steps with patient having increased difficulty to advance feet with moderate assistance and rolling walker. Tremors noted.     AM-PAC 6 CLICK MOBILITY  Total Score:13     Patient left supine with all lines intact, call button in reach, bed alarm on and nurse notified.    GOALS:    Physical Therapy Goals        Problem: Physical Therapy Goal    Goal Priority Disciplines Outcome Goal Variances Interventions   Physical Therapy Goal     PT/OT, PT      Description:  Goals to be met by: 18    Patient will increase functional independence with mobility by performin. Supine to sit with Stand-by Assistance  2. Sit to stand transfer with Stand-by Assistance  3. Gait  x 150 feet with Stand-by Assistance using Rolling Walker  4. Lower extremity exercise program x30 reps per handout, with assistance as needed                    History:     Past Medical History:   Diagnosis Date    Anticoagulant long-term use     Hypertension     Impulse control disorder     gambling on mirapex; also possible dopamine dysregulation syndrome    PD (Parkinson's disease)     tremor-predominant    PVD (peripheral vascular disease) with claudication      poor circulation both legs       Past Surgical History:   Procedure Laterality Date    CATARACT EXTRACTION Bilateral 1 yr      Time Tracking:     PT Received On: 02/08/18  PT Start Time: 1056     PT Stop Time: 1110  PT Total Time (min): 14 min     Billable Minutes: Evaluation 14 with OT       Dominga Esquivel, PT  02/08/2018

## 2018-02-09 NOTE — PLAN OF CARE
Problem: Occupational Therapy Goal  Goal: Occupational Therapy Goal  Goals to be met by: 2/17/18     Patient will increase functional independence with ADLs by performing:    Feeding with Set-up Assistance.  UE Dressing with Set-up Assistance.  Grooming while seated with Set-up Assistance.  Stand pivot transfers with Contact Guard Assistance.  Toilet transfer to bedside commode with Minimal Assistance.  Upper extremity exercise program x10 reps , with supervision.     Outcome: Ongoing (interventions implemented as appropriate)  Pt. Needs 24 hour supervision as pt is at risk for falls. Pt is able to ambulate but starts to shuffle with gait and needs verbal/tactile cues to continue.     Pt able to ambulate with nursing to bathroom.

## 2018-02-09 NOTE — PLAN OF CARE
Problem: Physical Therapy Goal  Goal: Physical Therapy Goal  Goals to be met by: 18    Patient will increase functional independence with mobility by performin. Supine to sit with Stand-by Assistance  2. Sit to stand transfer with Stand-by Assistance  3. Gait  x 150 feet with Stand-by Assistance using Rolling Walker  4. Lower extremity exercise program x30 reps per handout, with assistance as needed   Outcome: Ongoing (interventions implemented as appropriate)  Pt able to ambulate in the hallway with min A-CGA/RW.  Pt will need 24 hour supervision/assistance at home.

## 2018-02-09 NOTE — SUBJECTIVE & OBJECTIVE
Review of Systems   Constitutional: Negative for chills and fever.   HENT: Negative for congestion and sore throat.    Respiratory: Negative for cough and shortness of breath.    Cardiovascular: Negative for chest pain and palpitations.   Gastrointestinal: Negative for abdominal pain and nausea.   Endocrine: Negative for cold intolerance and heat intolerance.   Genitourinary: Negative for dysuria and frequency.   Musculoskeletal: Negative for arthralgias and myalgias.   Skin: Negative for color change and rash.   Neurological: Negative for dizziness and headaches.   Psychiatric/Behavioral: Negative for behavioral problems and confusion.     Objective:     Vital Signs (Most Recent):  Temp: 99.4 °F (37.4 °C) (02/09/18 1138)  Pulse: 64 (02/09/18 1138)  Resp: 18 (02/09/18 1138)  BP: (!) 100/54 (02/09/18 1138)  SpO2: 97 % (02/09/18 1138) Vital Signs (24h Range):  Temp:  [97.6 °F (36.4 °C)-99.4 °F (37.4 °C)] 99.4 °F (37.4 °C)  Pulse:  [64-88] 64  Resp:  [16-20] 18  SpO2:  [95 %-97 %] 97 %  BP: (100-154)/(54-81) 100/54     Weight: 65.1 kg (143 lb 8.3 oz)  Body mass index is 22.48 kg/m².    Intake/Output Summary (Last 24 hours) at 02/09/18 1215  Last data filed at 02/08/18 1800   Gross per 24 hour   Intake              360 ml   Output              600 ml   Net             -240 ml      Physical Exam   Constitutional: He is oriented to person, place, and time. He appears well-developed and well-nourished. No distress.   HENT:   Head: Normocephalic and atraumatic.   Eyes: EOM are normal. Pupils are equal, round, and reactive to light.   Neck: Normal range of motion. Neck supple.   Cardiovascular: Normal rate and regular rhythm.    No murmur heard.  Pulmonary/Chest: Effort normal and breath sounds normal.   Abdominal: Soft. Bowel sounds are normal. He exhibits no distension.   Musculoskeletal: Normal range of motion.   Neurological: He is alert and oriented to person, place, and time.   Parkinsonian tremors   Skin: Skin is  warm and dry. No rash noted.   Psychiatric: He has a normal mood and affect. His behavior is normal.       Significant Labs:   CBC:   Recent Labs  Lab 02/08/18  0205   WBC 6.86   HGB 13.0*   HCT 39.6*        CMP:   Recent Labs  Lab 02/08/18 0205 02/09/18  0421    139   K 4.1 3.7    109   CO2 19* 23   * 81   BUN 28* 18   CREATININE 1.1 1.0   CALCIUM 9.3 9.0   PROT 6.9  --    ALBUMIN 4.0  --    BILITOT 0.5  --    ALKPHOS 78  --    AST 15  --    ALT <5*  --    ANIONGAP 13 7*   EGFRNONAA >60 >60

## 2018-02-09 NOTE — PLAN OF CARE
Problem: Patient Care Overview  Goal: Plan of Care Review   02/09/18 0334   Coping/Psychosocial   Plan Of Care Reviewed With patient       Problem: Fall Risk (Adult)  Intervention: Patient Rounds   02/08/18 2025   Safety Interventions   Patient Rounds bed in low position;bed wheels locked;call light in reach       Goal: Absence of Falls  Patient will demonstrate the desired outcomes by discharge/transition of care.    02/09/18 0334   Fall Risk (Adult)   Absence of Falls making progress toward outcome       Problem: Pressure Ulcer Risk (Mike Scale) (Adult,Obstetrics,Pediatric)  Intervention: Promote/Optimize Nutrition   02/09/18 0334   Nutrition Interventions   Oral Nutrition Promotion rest periods promoted     Intervention: Prevent/Manage Excess Moisture   02/09/18 0334   Hygiene Care   Perineal Care diaper changed;perineum cleansed;protective cream applied     Intervention: Maintain Head of Bed Elevation Less Than 30 Degrees as Tolerated   02/09/18 0334   Positioning   Head of Bed (HOB) HOB at 30-45 degrees

## 2018-02-09 NOTE — ASSESSMENT & PLAN NOTE
TN consulted to aid in safe discharge planning. Appears patient has been living with a significant other who can no longer care for him and refusing to take patient home. His somewhat estranged daughter has been contacted and is willing to aid in finding NH placement (she had already begun this process as an outpatient). There is an issue, however, in that patient power of  refused to sign consent for NH placement. I do not feel patient able to make his own decision regarding this as he is with intermittent confusion +/- previous unclear dementia diagnosis per daughter. Psychiatry has been consulted to determine capacity and whether or not patient need a healthcare power of  to move forward with placement. He is medically cleared and was initially with plan for home with HH and SW to aid in outpatient placement, however, cannot safely discharge as he does not have a home to return to.

## 2018-02-09 NOTE — PT/OT/SLP PROGRESS
Physical Therapy Treatment    Patient Name:  Tenzin Ortiz   MRN:  [id number]    Recommendations:     Discharge Recommendations:  home health PT (with 24 hour supervision/care)   Discharge Equipment Recommendations: bedside commode, bath bench   Barriers to discharge: Decreased caregiver support    Assessment:     Tenzin Otriz is a 77 y.o. male admitted with a medical diagnosis of Debility.  He presents with the following impairments/functional limitations:  weakness, impaired endurance, impaired self care skills, impaired functional mobilty, gait instability, impaired balance, impaired cognition, decreased coordination, decreased upper extremity function, decreased lower extremity function, decreased safety awareness, decreased ROM.    Rehab Prognosis:  fair; patient would benefit from acute skilled PT services to address these deficits and reach maximum level of function.      Recent Surgery: * No surgery found *      Plan:     During this hospitalization, patient to be seen 3 x/week to address the above listed problems via gait training, therapeutic activities, therapeutic exercises  · Plan of Care Expires:  02/22/18   Plan of Care Reviewed with: patient    Subjective     Communicated with nurse Stubbs prior to session.  Patient found in bed upon PT entry to room, agreeable to treatment.      Chief Complaint: N/A  Patient comments/goals: Pt reported that he just had a BM.   Pain/Comfort:  · Pain Rating 1: 0/10      Objective:     Patient found with: peripheral IV     General Precautions: Standard, fall   Orthopedic Precautions:N/A     Functional Mobility:  · Bed Mobility:     · Scooting: contact guard assistance to scoot EOB and into bedside chair  · Supine to Sit: contact guard assistance with HOB elevated   · Transfers:     · Sit to Stand:  minimum assistance with rolling walker; Pt with initial posterior trunk lean, required time to correct.   · Bed to Chair: minimum assistance with  rolling walker  using   Step Transfer; Pt required mod VC's for sequencing when shuffling gait increased with delayed initiation of steps.    · Gait: Pt ambulated ~250 ft with CGA/RW.  Pt with narrow ALETHEA, decreased step length, and nneka.  Pt with increased shuffling gait toward the end of ambulation, required increased VC's for sequencing.       · Balance: Pt with fair/fair+ balance.      AM-PAC 6 CLICK MOBILITY  Turning over in bed (including adjusting bedclothes, sheets and blankets)?: 3  Sitting down on and standing up from a chair with arms (e.g., wheelchair, bedside commode, etc.): 3  Moving from lying on back to sitting on the side of the bed?: 3  Moving to and from a bed to a chair (including a wheelchair)?: 3  Need to walk in hospital room?: 3  Climbing 3-5 steps with a railing?: 1  Total Score: 16     Patient left up in chair with all lines intact, call button in reach and nurse Enoc notified.    GOALS:    Physical Therapy Goals        Problem: Physical Therapy Goal    Goal Priority Disciplines Outcome Goal Variances Interventions   Physical Therapy Goal     PT/OT, PT      Description:  Goals to be met by: 18    Patient will increase functional independence with mobility by performin. Supine to sit with Stand-by Assistance  2. Sit to stand transfer with Stand-by Assistance  3. Gait  x 150 feet with Stand-by Assistance using Rolling Walker  4. Lower extremity exercise program x30 reps per handout, with assistance as needed                    Time Tracking:     PT Received On: 18  PT Start Time: 1443     PT Stop Time: 1457  PT Total Time (min): 14 min     Billable Minutes: Gait Training 14 min    Treatment Type: Treatment  PT/PTA: PT     PTA Visit Number: 0     Maye Seaman, PT  2018

## 2018-02-09 NOTE — PT/OT/SLP PROGRESS
Occupational Therapy   Treatment    Name: Tenzin Ortiz  MRN: [id number]  Admitting Diagnosis:  Debility       Recommendations:     Discharge Recommendations: home health OT  Discharge Equipment Recommendations:  bedside commode, bath bench  Barriers to discharge:  Decreased caregiver support    Subjective     Communicated with: nurse prior to session.  Pain/Comfort:  · Pain Rating 1: 0/10  · Pain Rating Post-Intervention 1: 0/10    Patients cultural, spiritual, Yarsani conflicts given the current situation:      Objective:     Patient found with: peripheral IV    General Precautions: Standard, fall   Orthopedic Precautions:N/A   Braces: N/A     Occupational Performance:    Bed Mobility:    · Patient completed Rolling/Turning to Left with  contact guard assistance  · Patient completed Rolling/Turning to Right with contact guard assistance  · Patient completed Supine to Sit with contact guard assistance     Functional Mobility/Transfers:  · Patient completed Sit <> Stand Transfer with minimum assistance  with  rolling walker   · Patient completed Bed <> Chair Transfer using Stand Pivot technique with minimum assistance with rolling walker  · Functional Mobility: Pt needs assist with mobility    Activities of Daily Living:  · Feeding:  supervision v   · UB Dressing: minimum assistance    · Toileting: total assistance      Patient left supine with all lines intact and call button in reach    [facility] 6 Click:  [facility] Total Score: 11    Treatment & Education:  Pt needs 24 hour care and assistance for self care skilss.  Education:    Assessment:     Tenzin Ortiz is a 77 y.o. male with a medical diagnosis of Debility.  He presents with resting tremors  And mobility fluctuates depending on the time of day. Pt will needs 24 care and asistance in the home or nursing home placment  Performance deficits affecting function are weakness, impaired endurance, impaired self care skills, impaired functional mobilty, gait instability,  impaired balance, decreased coordination, decreased lower extremity function, decreased upper extremity function, decreased ROM, impaired coordination, impaired fine motor, impaired joint extensibility, impaired muscle length.      Rehab Prognosis:  poor; patient would benefit from acute skilled OT services to address these deficits and reach maximum level of function.       Plan:     Patient to be seen 3 x/week to address the above listed problems via self-care/home management, therapeutic activities, therapeutic exercises  · Plan of Care Expires: 02/16/18  · Plan of Care Reviewed with: patient    This Plan of care has been discussed with the patient who was involved in its development and understands and is in agreement with the identified goals and treatment plan    GOALS:    Occupational Therapy Goals        Problem: Occupational Therapy Goal    Goal Priority Disciplines Outcome Interventions   Occupational Therapy Goal     OT, PT/OT Ongoing (interventions implemented as appropriate)    Description:  Goals to be met by: 2/17/18     Patient will increase functional independence with ADLs by performing:    Feeding with Set-up Assistance.  UE Dressing with Set-up Assistance.  Grooming while seated with Set-up Assistance.  Stand pivot transfers with Contact Guard Assistance.  Toilet transfer to bedside commode with Minimal Assistance.  Upper extremity exercise program x10 reps , with supervision.                      Time Tracking:     OT Date of Treatment: 02/09/18  OT Start Time: 1543  OT Stop Time: 1557  OT Total Time (min): 14 min    Billable Minutes:Total Time 14 with cotreatment with PT/ no charge    Becky Bruno OT  2/9/2018

## 2018-02-10 LAB
ANION GAP SERPL CALC-SCNC: 7 MMOL/L
BUN SERPL-MCNC: 22 MG/DL
CALCIUM SERPL-MCNC: 9.6 MG/DL
CHLORIDE SERPL-SCNC: 107 MMOL/L
CO2 SERPL-SCNC: 26 MMOL/L
CREAT SERPL-MCNC: 1.1 MG/DL
EST. GFR  (AFRICAN AMERICAN): >60 ML/MIN/1.73 M^2
EST. GFR  (NON AFRICAN AMERICAN): >60 ML/MIN/1.73 M^2
GLUCOSE SERPL-MCNC: 81 MG/DL
POTASSIUM SERPL-SCNC: 4.3 MMOL/L
SODIUM SERPL-SCNC: 140 MMOL/L

## 2018-02-10 PROCEDURE — 80048 BASIC METABOLIC PNL TOTAL CA: CPT

## 2018-02-10 PROCEDURE — G0378 HOSPITAL OBSERVATION PER HR: HCPCS

## 2018-02-10 PROCEDURE — 25000003 PHARM REV CODE 250: Performed by: EMERGENCY MEDICINE

## 2018-02-10 PROCEDURE — 36415 COLL VENOUS BLD VENIPUNCTURE: CPT

## 2018-02-10 PROCEDURE — 11000001 HC ACUTE MED/SURG PRIVATE ROOM

## 2018-02-10 PROCEDURE — 25000003 PHARM REV CODE 250: Performed by: PHYSICIAN ASSISTANT

## 2018-02-10 RX ADMIN — CARBIDOPA AND LEVODOPA 1 TABLET: 25; 100 TABLET ORAL at 06:02

## 2018-02-10 RX ADMIN — CARBIDOPA AND LEVODOPA 1 TABLET: 25; 100 TABLET ORAL at 12:02

## 2018-02-10 RX ADMIN — QUETIAPINE FUMARATE 25 MG: 25 TABLET ORAL at 08:02

## 2018-02-10 RX ADMIN — AMLODIPINE BESYLATE 10 MG: 5 TABLET ORAL at 08:02

## 2018-02-10 RX ADMIN — DOCUSATE SODIUM 100 MG: 100 CAPSULE, LIQUID FILLED ORAL at 08:02

## 2018-02-10 NOTE — PROGRESS NOTES
[facility] Hospital Medicine  Progress Note    Patient Name: Tenzin Ortiz  MRN: [id number]  Patient Class: OP- Observation   Admission Date: 2/7/2018  Length of Stay: 0 days  Attending Physician: Rosita Prakash MD  Primary Care Provider: Efrain Chavez MD        Subjective:     Principal Problem:Debility     Interval History: No acute issues/complaints. Awaiting psych eval for competency and placement.     HPI:  Patient is 76 yo male with HTN, HLD, and Parkinson's who presents to ED reportedly for HA. Per ED note patient was complaining of HA but he denies this on my interview. Seems there was some confusion in ED and he was unclear why he was here. During interview with me, patient reports being here for NH placement although he does not appear to be very happy about this. He has been with some issues caring for himself. Currently lives with his girlfriend who reports she can no longer care for him. He states he currently gets around with a walker but sometimes has difficulty getting up out of his chair. Per chart review patient is with Parkinson's and sees neurology, Dr. Castellanos, lastly seen on 2/6/18. He was with hallucinations which were noted to be secondary to dopaminergic agents thus having to significantly reduce dose--she recommended he be admitted for placement at that time but patient declined. He has been attempting placement as an outpatient through PCP but without much luck. He otherwise denies recent illness, fever/chills, CP, SOB, abdominal pain, N/V/D, dysuria. On presentation patient with grossly normal labs/vitals. Placed in observation for what appears to be solely placement although this is something that may need to be continued as outpatient as is not indication for hospitalization.    Hospital Course:  No notes on file      Review of Systems   Constitutional: Negative for chills and fever.   HENT: Negative for congestion and sore throat.    Respiratory: Negative for cough  and shortness of breath.    Cardiovascular: Negative for chest pain and palpitations.   Gastrointestinal: Negative for abdominal pain and nausea.   Endocrine: Negative for cold intolerance and heat intolerance.   Genitourinary: Negative for dysuria and frequency.   Musculoskeletal: Negative for arthralgias and myalgias.   Skin: Negative for color change and rash.   Neurological: Negative for dizziness and headaches.   Psychiatric/Behavioral: Negative for behavioral problems and confusion.     Objective:     Vital Signs (Most Recent):  Temp: 97.9 °F (36.6 °C) (02/10/18 0747)  Pulse: 70 (02/10/18 0747)  Resp: 18 (02/10/18 0747)  BP: (!) 123/58 (02/10/18 0747)  SpO2: 99 % (02/10/18 0747) Vital Signs (24h Range):  Temp:  [97.9 °F (36.6 °C)-99.5 °F (37.5 °C)] 97.9 °F (36.6 °C)  Pulse:  [64-81] 70  Resp:  [14-20] 18  SpO2:  [94 %-99 %] 99 %  BP: (100-173)/(54-72) 123/58     Weight: 63.9 kg (140 lb 14 oz)  Body mass index is 22.06 kg/m².    Intake/Output Summary (Last 24 hours) at 02/10/18 1040  Last data filed at 02/09/18 2200   Gross per 24 hour   Intake              540 ml   Output              200 ml   Net              340 ml      Physical Exam   Constitutional: He is oriented to person, place, and time. He appears well-developed and well-nourished. No distress.   HENT:   Head: Normocephalic and atraumatic.   Eyes: EOM are normal. Pupils are equal, round, and reactive to light.   Neck: Normal range of motion. Neck supple.   Cardiovascular: Normal rate and regular rhythm.    No murmur heard.  Pulmonary/Chest: Effort normal and breath sounds normal.   Abdominal: Soft. Bowel sounds are normal. He exhibits no distension.   Musculoskeletal: Normal range of motion.   Neurological: He is alert and oriented to person, place, and time.   Parkinsonian tremors   Skin: Skin is warm and dry. No rash noted.   Psychiatric: He has a normal mood and affect. His behavior is normal.       Significant Labs:   BMP:   Recent Labs  Lab  02/10/18  0611   GLU 81      K 4.3      CO2 26   BUN 22   CREATININE 1.1   CALCIUM 9.6         Assessment/Plan:      * Debility    TN consulted to aid in safe discharge planning. Appears patient has been living with a significant other who can no longer care for him and refusing to take patient home. His somewhat estranged daughter has been contacted and is willing to aid in finding NH placement (she had already begun this process as an outpatient). There is an issue, however, in that patient power of  refused to sign consent for NH placement. I do not feel patient able to make his own decision regarding this as he is with intermittent confusion +/- previous unclear dementia diagnosis per daughter. Psychiatry has been consulted to determine capacity and whether or not patient need a healthcare power of  to move forward with placement. He is medically cleared and was initially with plan for home with HH and SW to aid in outpatient placement, however, cannot safely discharge as he does not have a home to return to.           Dehydration    Per ED patient admitted for dehydration. His BUN is elevated with normal creatinine--may be mild clinical dehydration. Will provide gentle IVF hydration.           Hyperlipidemia    Continue statin.         Essential hypertension    Patient BP well controlled on presentation. Will continue home anti hypertensive regimen and monitor.             Parkinson disease    Currently on sinemet--recently reduced to one tablet QID. Will continue.             VTE Risk Mitigation         Ordered     Medium Risk of VTE  Once      02/08/18 0653     Place JOSEFINA hose  Until discontinued      02/08/18 0653     Place sequential compression device  Until discontinued      02/08/18 0653              Wendy Hernández PA-C  Department of Hospital Medicine   [facility]

## 2018-02-10 NOTE — PLAN OF CARE
Problem: Patient Care Overview  Goal: Plan of Care Review   02/09/18 4321   Coping/Psychosocial   Plan Of Care Reviewed With patient   Pt remained free of falls during current shift. Denied pain and did not receive any prn pain medications. Levodopa administered and pt tremors reduced.  consulted and attempting to assist pt with discharge. Psych consulted to evaluate competency. Plan of care and fall precautions reviewed with pt and verbalized understanding. Bed locked, lowered, SR up x2 and call light placed within reach.

## 2018-02-10 NOTE — SUBJECTIVE & OBJECTIVE
Review of Systems   Constitutional: Negative for chills and fever.   HENT: Negative for congestion and sore throat.    Respiratory: Negative for cough and shortness of breath.    Cardiovascular: Negative for chest pain and palpitations.   Gastrointestinal: Negative for abdominal pain and nausea.   Endocrine: Negative for cold intolerance and heat intolerance.   Genitourinary: Negative for dysuria and frequency.   Musculoskeletal: Negative for arthralgias and myalgias.   Skin: Negative for color change and rash.   Neurological: Negative for dizziness and headaches.   Psychiatric/Behavioral: Negative for behavioral problems and confusion.     Objective:     Vital Signs (Most Recent):  Temp: 97.9 °F (36.6 °C) (02/10/18 0747)  Pulse: 70 (02/10/18 0747)  Resp: 18 (02/10/18 0747)  BP: (!) 123/58 (02/10/18 0747)  SpO2: 99 % (02/10/18 0747) Vital Signs (24h Range):  Temp:  [97.9 °F (36.6 °C)-99.5 °F (37.5 °C)] 97.9 °F (36.6 °C)  Pulse:  [64-81] 70  Resp:  [14-20] 18  SpO2:  [94 %-99 %] 99 %  BP: (100-173)/(54-72) 123/58     Weight: 63.9 kg (140 lb 14 oz)  Body mass index is 22.06 kg/m².    Intake/Output Summary (Last 24 hours) at 02/10/18 1040  Last data filed at 02/09/18 2200   Gross per 24 hour   Intake              540 ml   Output              200 ml   Net              340 ml      Physical Exam   Constitutional: He is oriented to person, place, and time. He appears well-developed and well-nourished. No distress.   HENT:   Head: Normocephalic and atraumatic.   Eyes: EOM are normal. Pupils are equal, round, and reactive to light.   Neck: Normal range of motion. Neck supple.   Cardiovascular: Normal rate and regular rhythm.    No murmur heard.  Pulmonary/Chest: Effort normal and breath sounds normal.   Abdominal: Soft. Bowel sounds are normal. He exhibits no distension.   Musculoskeletal: Normal range of motion.   Neurological: He is alert and oriented to person, place, and time.   Parkinsonian tremors   Skin: Skin is  warm and dry. No rash noted.   Psychiatric: He has a normal mood and affect. His behavior is normal.       Significant Labs:   BMP:   Recent Labs  Lab 02/10/18  0611   GLU 81      K 4.3      CO2 26   BUN 22   CREATININE 1.1   CALCIUM 9.6

## 2018-02-11 PROCEDURE — 11000001 HC ACUTE MED/SURG PRIVATE ROOM

## 2018-02-11 PROCEDURE — G0378 HOSPITAL OBSERVATION PER HR: HCPCS

## 2018-02-11 PROCEDURE — 25000003 PHARM REV CODE 250: Performed by: PHYSICIAN ASSISTANT

## 2018-02-11 PROCEDURE — 25000003 PHARM REV CODE 250: Performed by: EMERGENCY MEDICINE

## 2018-02-11 RX ADMIN — CARBIDOPA AND LEVODOPA 1 TABLET: 25; 100 TABLET ORAL at 05:02

## 2018-02-11 RX ADMIN — AMLODIPINE BESYLATE 10 MG: 5 TABLET ORAL at 09:02

## 2018-02-11 RX ADMIN — CARBIDOPA AND LEVODOPA 1 TABLET: 25; 100 TABLET ORAL at 12:02

## 2018-02-11 RX ADMIN — QUETIAPINE FUMARATE 25 MG: 25 TABLET ORAL at 10:02

## 2018-02-11 RX ADMIN — DOCUSATE SODIUM 100 MG: 100 CAPSULE, LIQUID FILLED ORAL at 10:02

## 2018-02-11 RX ADMIN — CARBIDOPA AND LEVODOPA 1 TABLET: 25; 100 TABLET ORAL at 11:02

## 2018-02-11 NOTE — PROGRESS NOTES
[facility] Hospital Medicine  Progress Note    Patient Name: Tenzin Ortiz  MRN: [id number]  Patient Class: OP- Observation   Admission Date: 2/7/2018  Length of Stay: 0 days  Attending Physician: Rosita Prakahs MD  Primary Care Provider: Efrain Chavez MD        Subjective:     Principal Problem:Debility    Interval History: Intermittent confusion but no acute issues/complaints.     HPI:  Patient is 78 yo male with HTN, HLD, and Parkinson's who presents to ED reportedly for HA. Per ED note patient was complaining of HA but he denies this on my interview. Seems there was some confusion in ED and he was unclear why he was here. During interview with me, patient reports being here for NH placement although he does not appear to be very happy about this. He has been with some issues caring for himself. Currently lives with his girlfriend who reports she can no longer care for him. He states he currently gets around with a walker but sometimes has difficulty getting up out of his chair. Per chart review patient is with Parkinson's and sees neurology, Dr. Castellanos, lastly seen on 2/6/18. He was with hallucinations which were noted to be secondary to dopaminergic agents thus having to significantly reduce dose--she recommended he be admitted for placement at that time but patient declined. He has been attempting placement as an outpatient through PCP but without much luck. He otherwise denies recent illness, fever/chills, CP, SOB, abdominal pain, N/V/D, dysuria. On presentation patient with grossly normal labs/vitals. Placed in observation for what appears to be solely placement although this is something that may need to be continued as outpatient as is not indication for hospitalization.    Hospital Course:  No notes on file        Review of Systems   Constitutional: Negative for chills and fever.   HENT: Negative for congestion and sore throat.    Respiratory: Negative for cough and shortness of  breath.    Cardiovascular: Negative for chest pain and palpitations.   Gastrointestinal: Negative for abdominal pain and nausea.   Endocrine: Negative for cold intolerance and heat intolerance.   Genitourinary: Negative for dysuria and frequency.   Musculoskeletal: Negative for arthralgias and myalgias.   Skin: Negative for color change and rash.   Neurological: Negative for dizziness and headaches.   Psychiatric/Behavioral: Negative for behavioral problems and confusion.     Objective:     Vital Signs (Most Recent):  Temp: 97.9 °F (36.6 °C) (02/11/18 0735)  Pulse: 69 (02/11/18 0735)  Resp: 18 (02/11/18 0735)  BP: (!) 140/63 (02/11/18 0735)  SpO2: 97 % (02/11/18 0735) Vital Signs (24h Range):  Temp:  [97.6 °F (36.4 °C)-98.6 °F (37 °C)] 97.9 °F (36.6 °C)  Pulse:  [69-83] 69  Resp:  [18] 18  SpO2:  [97 %-100 %] 97 %  BP: (107-164)/(53-86) 140/63     Weight: 64.6 kg (142 lb 6.6 oz)  Body mass index is 22.31 kg/m².    Intake/Output Summary (Last 24 hours) at 02/11/18 1043  Last data filed at 02/11/18 0600   Gross per 24 hour   Intake              480 ml   Output              900 ml   Net             -420 ml      Physical Exam   Constitutional: He is oriented to person, place, and time. He appears well-developed and well-nourished. No distress.   HENT:   Head: Normocephalic and atraumatic.   Eyes: EOM are normal. Pupils are equal, round, and reactive to light.   Neck: Normal range of motion. Neck supple.   Cardiovascular: Normal rate and regular rhythm.    No murmur heard.  Pulmonary/Chest: Effort normal and breath sounds normal.   Abdominal: Soft. Bowel sounds are normal. He exhibits no distension.   Musculoskeletal: Normal range of motion.   Neurological: He is alert and oriented to person, place, and time.   Parkinsonian tremors; intermittent confusion   Skin: Skin is warm and dry. No rash noted.   Psychiatric: He has a normal mood and affect. His behavior is normal.         Assessment/Plan:      * Debility    TN  consulted to aid in safe discharge planning. Appears patient has been living with a significant other who can no longer care for him and refusing to take patient home. His somewhat estranged daughter has been contacted and is willing to aid in finding NH placement (she had already begun this process as an outpatient). There is an issue, however, in that patient power of  refused to sign consent for NH placement. I do not feel patient able to make his own decision regarding this as he is with intermittent confusion +/- previous unclear dementia diagnosis per daughter. Psychiatry has been consulted to determine capacity and whether or not patient need a healthcare power of  to move forward with placement. He is medically cleared and was initially with plan for home with HH and SW to aid in outpatient placement, however, cannot safely discharge as he does not have a home to return to.           Dehydration    Per ED patient admitted for dehydration. His BUN is elevated with normal creatinine--may be mild clinical dehydration. Will provide gentle IVF hydration.           Hyperlipidemia    Continue statin.         Essential hypertension    Patient BP well controlled on presentation. Will continue home anti hypertensive regimen and monitor.             Parkinson disease    Currently on sinemet--recently reduced to one tablet QID. Will continue.             VTE Risk Mitigation         Ordered     Medium Risk of VTE  Once      02/08/18 0653     Place JOSEFINA hose  Until discontinued      02/08/18 0653     Place sequential compression device  Until discontinued      02/08/18 0653              Wendy Hernández PA-C  Department of Hospital Medicine   [facility]

## 2018-02-11 NOTE — PLAN OF CARE
Problem: Patient Care Overview  Goal: Plan of Care Review   02/11/18 0646   Coping/Psychosocial   Plan Of Care Reviewed With patient       Problem: Pressure Ulcer Risk (Mike Scale) (Adult,Obstetrics,Pediatric)  Goal: Skin Integrity  Patient will demonstrate the desired outcomes by discharge/transition of care.    02/11/18 0646   Pressure Ulcer Risk (Mike Scale) (Adult,Obstetrics,Pediatric)   Skin Integrity making progress toward outcome       Comments: Patient remained free of injury throughout the night. Vital signs remained WNL. Complaints of hip pain noted but with weight shift pain relieved. No signs of respiratory distress noted. Frequent reorientation provided. Patient updated on plan of care. Patient stable and will continue to monitor.

## 2018-02-11 NOTE — NURSING
Report received from KARINA Mora. Patient care assumed and patient noted lying in bed. Patient is AAOx4. No complaints of pain and no signs of respiratory distress noted. Bed alarm activated. Patient stable and will continue to monitor.

## 2018-02-11 NOTE — SUBJECTIVE & OBJECTIVE
Review of Systems   Constitutional: Negative for chills and fever.   HENT: Negative for congestion and sore throat.    Respiratory: Negative for cough and shortness of breath.    Cardiovascular: Negative for chest pain and palpitations.   Gastrointestinal: Negative for abdominal pain and nausea.   Endocrine: Negative for cold intolerance and heat intolerance.   Genitourinary: Negative for dysuria and frequency.   Musculoskeletal: Negative for arthralgias and myalgias.   Skin: Negative for color change and rash.   Neurological: Negative for dizziness and headaches.   Psychiatric/Behavioral: Negative for behavioral problems and confusion.     Objective:     Vital Signs (Most Recent):  Temp: 97.9 °F (36.6 °C) (02/11/18 0735)  Pulse: 69 (02/11/18 0735)  Resp: 18 (02/11/18 0735)  BP: (!) 140/63 (02/11/18 0735)  SpO2: 97 % (02/11/18 0735) Vital Signs (24h Range):  Temp:  [97.6 °F (36.4 °C)-98.6 °F (37 °C)] 97.9 °F (36.6 °C)  Pulse:  [69-83] 69  Resp:  [18] 18  SpO2:  [97 %-100 %] 97 %  BP: (107-164)/(53-86) 140/63     Weight: 64.6 kg (142 lb 6.6 oz)  Body mass index is 22.31 kg/m².    Intake/Output Summary (Last 24 hours) at 02/11/18 1043  Last data filed at 02/11/18 0600   Gross per 24 hour   Intake              480 ml   Output              900 ml   Net             -420 ml      Physical Exam   Constitutional: He is oriented to person, place, and time. He appears well-developed and well-nourished. No distress.   HENT:   Head: Normocephalic and atraumatic.   Eyes: EOM are normal. Pupils are equal, round, and reactive to light.   Neck: Normal range of motion. Neck supple.   Cardiovascular: Normal rate and regular rhythm.    No murmur heard.  Pulmonary/Chest: Effort normal and breath sounds normal.   Abdominal: Soft. Bowel sounds are normal. He exhibits no distension.   Musculoskeletal: Normal range of motion.   Neurological: He is alert and oriented to person, place, and time.   Parkinsonian tremors; intermittent  confusion   Skin: Skin is warm and dry. No rash noted.   Psychiatric: He has a normal mood and affect. His behavior is normal.

## 2018-02-12 PROBLEM — G31.83 LEWY BODY DEMENTIA WITH BEHAVIORAL DISTURBANCE: Status: ACTIVE | Noted: 2018-02-12

## 2018-02-12 PROBLEM — F02.818 LEWY BODY DEMENTIA WITH BEHAVIORAL DISTURBANCE: Status: ACTIVE | Noted: 2018-02-12

## 2018-02-12 LAB — POCT GLUCOSE: 90 MG/DL (ref 70–110)

## 2018-02-12 PROCEDURE — 11000001 HC ACUTE MED/SURG PRIVATE ROOM

## 2018-02-12 PROCEDURE — 90792 PSYCH DIAG EVAL W/MED SRVCS: CPT | Mod: ,,, | Performed by: PSYCHIATRY & NEUROLOGY

## 2018-02-12 PROCEDURE — 25000003 PHARM REV CODE 250: Performed by: PHYSICIAN ASSISTANT

## 2018-02-12 PROCEDURE — 25000003 PHARM REV CODE 250: Performed by: EMERGENCY MEDICINE

## 2018-02-12 PROCEDURE — G0378 HOSPITAL OBSERVATION PER HR: HCPCS

## 2018-02-12 PROCEDURE — G8996 SWALLOW CURRENT STATUS: HCPCS | Mod: CI

## 2018-02-12 PROCEDURE — G8997 SWALLOW GOAL STATUS: HCPCS | Mod: CI

## 2018-02-12 PROCEDURE — 92610 EVALUATE SWALLOWING FUNCTION: CPT

## 2018-02-12 PROCEDURE — G8998 SWALLOW D/C STATUS: HCPCS | Mod: CI

## 2018-02-12 RX ORDER — DIVALPROEX SODIUM 250 MG/1
250 TABLET, DELAYED RELEASE ORAL EVERY 8 HOURS
Status: DISCONTINUED | OUTPATIENT
Start: 2018-02-12 | End: 2018-02-14

## 2018-02-12 RX ADMIN — DOCUSATE SODIUM 100 MG: 100 CAPSULE, LIQUID FILLED ORAL at 09:02

## 2018-02-12 RX ADMIN — TRAZODONE HYDROCHLORIDE 50 MG: 50 TABLET ORAL at 11:02

## 2018-02-12 RX ADMIN — CARBIDOPA AND LEVODOPA 1 TABLET: 25; 100 TABLET ORAL at 01:02

## 2018-02-12 RX ADMIN — CARBIDOPA AND LEVODOPA 1 TABLET: 25; 100 TABLET ORAL at 11:02

## 2018-02-12 RX ADMIN — CARBIDOPA AND LEVODOPA 1 TABLET: 25; 100 TABLET ORAL at 07:02

## 2018-02-12 RX ADMIN — CARBIDOPA AND LEVODOPA 1 TABLET: 25; 100 TABLET ORAL at 05:02

## 2018-02-12 RX ADMIN — DIVALPROEX SODIUM 250 MG: 250 TABLET, DELAYED RELEASE ORAL at 09:02

## 2018-02-12 RX ADMIN — QUETIAPINE FUMARATE 25 MG: 25 TABLET ORAL at 09:02

## 2018-02-12 RX ADMIN — DIVALPROEX SODIUM 250 MG: 250 TABLET, DELAYED RELEASE ORAL at 01:02

## 2018-02-12 RX ADMIN — CARBIDOPA AND LEVODOPA 1 TABLET: 25; 100 TABLET ORAL at 12:02

## 2018-02-12 RX ADMIN — DOCUSATE SODIUM 100 MG: 100 CAPSULE, LIQUID FILLED ORAL at 10:02

## 2018-02-12 RX ADMIN — AMLODIPINE BESYLATE 10 MG: 5 TABLET ORAL at 10:02

## 2018-02-12 NOTE — SUBJECTIVE & OBJECTIVE
Patient History           Medical as of 2/12/2018     Past Medical History     Diagnosis Date Comments Source    Anticoagulant long-term use -- -- Provider    Hypertension -- -- Provider    Impulse control disorder 2012 gambling on mirapex; also possible dopamine dysregulation syndrome Provider    PD (Parkinson's disease) 1999 tremor-predominant Provider    PVD (peripheral vascular disease) with claudication -- poor circulation both legs Provider          Pertinent Negatives     Diagnosis Date Noted Comments Source    Amblyopia 7/13/2017 -- Provider    Arthritis 7/13/2017 -- Provider    Cataract 7/13/2017 -- Provider    Diabetes mellitus 7/13/2017 -- Provider    Diabetic retinopathy 7/13/2017 -- Provider    Encounter for blood transfusion 8/19/2016 -- Provider    Glaucoma 7/13/2017 -- Provider    History of psychiatric hospitalization 2/12/2018 -- Provider    Macular degeneration 7/13/2017 -- Provider    Psychiatric problem 2/12/2018 -- Provider    Retinal detachment 7/13/2017 -- Provider    Sickle cell anemia 7/13/2017 -- Provider    Sickle cell trait 7/13/2017 -- Provider    Strabismus 7/13/2017 -- Provider    Therapy 2/12/2018 -- Provider    Uveitis 7/13/2017 -- Provider                  Surgical as of 2/12/2018     Past Surgical History     Procedure Laterality Date Comments Source    CATARACT EXTRACTION Bilateral 1 yr  -- Provider                  Family as of 2/12/2018     Problem Relation Name Age of Onset Comments Source    No Known Problems Mother -- -- -- Provider    No Known Problems Father -- -- -- Provider    No Known Problems Sister -- -- -- Provider    No Known Problems Brother -- -- -- Provider    No Known Problems Maternal Aunt -- -- -- Provider    No Known Problems Maternal Uncle -- -- -- Provider    No Known Problems Paternal Aunt -- -- -- Provider    No Known Problems Paternal Uncle -- -- -- Provider    No Known Problems Maternal Grandmother -- -- -- Provider    No Known Problems Maternal  Grandfather -- -- -- Provider    No Known Problems Paternal Grandmother -- -- -- Provider    No Known Problems Paternal Grandfather -- -- -- Provider    Parkinsonism Neg Hx -- -- -- Provider    Amblyopia Neg Hx -- -- -- Provider    Blindness Neg Hx -- -- -- Provider    Cancer Neg Hx -- -- -- Provider    Cataracts Neg Hx -- -- -- Provider    Diabetes Neg Hx -- -- -- Provider    Glaucoma Neg Hx -- -- -- Provider    Hypertension Neg Hx -- -- -- Provider    Macular degeneration Neg Hx -- -- -- Provider    Retinal detachment Neg Hx -- -- -- Provider    Strabismus Neg Hx -- -- -- Provider    Stroke Neg Hx -- -- -- Provider    Thyroid disease Neg Hx -- -- -- Provider            Tobacco Use as of 2/12/2018     Smoking Status Smoking Start Date Smoking Quit Date Packs/day Years Used    Former Smoker -- -- -- 10.00    Types Comments Smokeless Tobacco Status Smokeless Tobacco Quit Date Source    -- Quit 40 years ago Never Used -- Provider            Alcohol Use as of 2/12/2018     Alcohol Use Drinks/Week Alcohol/Week Comments Source    No -- -- -- Provider            Drug Use as of 2/12/2018     Drug Use Types Frequency Comments Source    No -- -- -- Provider            Sexual Activity as of 2/12/2018     Sexually Active Birth Control Partners Comments Source    Not Currently -- -- -- Provider            Activities of Daily Living as of 2/12/2018     Activities of Daily Living Question Response Comments Source    Patient feels they ought to cut down on drinking/drug use Not Asked -- Provider    Patient annoyed by others criticizing their drinking/drug use Not Asked -- Provider    Patient has felt bad or guilty about drinking/drug use Not Asked -- Provider    Patient has had a drink/used drugs as an eye opener in the AM Not Asked -- Provider            Social Documentation as of 2/12/2018    Education: high school graduate  Arrests: none  Worship: Nondenominational, sporadic attendance  Leisure: did not assess  Childhood:  ""normal"  Social (Marriage, Living Situation, Children):  x2, /. Currently lives with girlfriend, 2 children  : none  Financial: social security, $1300    Psychiatric:  History of Illness: denies  Treatment History:        -Psychiatrist: none       -Psychiatric Medications: Seroquel from neurologist  Drug/Alcohol Treatment: none  Alcohol Use: none  Drug Use: none  SI/HI/AVH: denies    Source: Provider           Occupational as of 2/12/2018     Occupation Employer Comments Source    self employed -- retired Provider            Socioeconomic as of 2/12/2018     Marital Status Spouse Name Number of Children Years Education Preferred Language Ethnicity Race Source    Single -- 2 -- English /White White Provider         Pertinent History Q A Comments    as of 2/12/2018 Lives with significant other     Place in Birth Order      Lives in home     Number of Siblings 3     Raised by biological parents     Legal Involvement none     Childhood Trauma uneventful     Criminal History of none     Financial Status other retired    Highest Level of Education high school graduation     Does patient have access to a firearm?       Service No     Primary Leisure Activity      Spirituality non-practicing Orthodoxy     Past Medical History:   Diagnosis Date    Anticoagulant long-term use     Hypertension     Impulse control disorder 2012    gambling on mirapex; also possible dopamine dysregulation syndrome    PD (Parkinson's disease) 1999    tremor-predominant    PVD (peripheral vascular disease) with claudication     poor circulation both legs     Past Surgical History:   Procedure Laterality Date    CATARACT EXTRACTION Bilateral 1 yr      Family History     Problem Relation (Age of Onset)    No Known Problems Mother, Father, Sister, Brother, Maternal Aunt, Maternal Uncle, Paternal Aunt, Paternal Uncle, Maternal Grandmother, Maternal Grandfather, Paternal Grandmother, Paternal " Grandfather        Social History Main Topics    Smoking status: Former Smoker     Years: 10.00    Smokeless tobacco: Never Used      Comment: Quit 40 years ago    Alcohol use No    Drug use: No    Sexual activity: Not Currently     Review of patient's allergies indicates:   Allergen Reactions    Amantadine analogues      Caused confusion    Mirapex [pramipexole]      Pathologic gambling    Neupro [rotigotine]      Pathologic gambling         No current facility-administered medications on file prior to encounter.      Current Outpatient Prescriptions on File Prior to Encounter   Medication Sig    amlodipine (NORVASC) 10 MG tablet Take 1 tablet (10 mg total) by mouth once daily.    atorvastatin (LIPITOR) 10 MG tablet TAKE 1 TABLET(10 MG) BY MOUTH EVERY DAY    carbidopa-levodopa  mg (SINEMET)  mg per tablet Take 2 tablets by mouth 4 (four) times daily. 6am, 10am, 2pm and 6pm    ciprofloxacin HCl (CIPRO) 500 MG tablet     docusate sodium (COLACE) 100 MG capsule Take 1 capsule (100 mg total) by mouth 2 (two) times daily.    gabapentin (NEURONTIN) 300 MG capsule TAKE 2 CAPSULES(600 MG) BY MOUTH EVERY EVENING    nystatin (MYCOSTATIN) cream Apply topically 2 (two) times daily.    QUEtiapine (SEROQUEL) 25 MG Tab Take 1 tablet (25 mg total) by mouth every evening.    rivastigmine (EXELON) 4.6 mg/24 hr PT24 APPLY 1 PATCH EXTERNALLY TO THE SKIN EVERY DAY    trazodone (DESYREL) 50 MG tablet Take 1 tablet (50 mg total) by mouth nightly as needed for Insomnia.    warfarin (COUMADIN) 3 MG tablet TAKE 1 TABLET BY MOUTH EVERY DAY AS DIRECTED BY COUMADIN CLINIC     Psychotherapeutics     Start     Stop Route Frequency Ordered    02/08/18 0653  traZODone tablet 50 mg      -- Oral Nightly PRN 02/08/18 0653    02/08/18 0430  QUEtiapine tablet 25 mg      -- Oral Nightly 02/08/18 0328        Review of Systems   Unable to perform ROS: Dementia     Strengths and Liabilities: Liability: Patient has poor  "health., Liability: Patient has poor judgment, Liability: Patient has possible cognitive impairment.    Objective:     Vital Signs (Most Recent):  Temp: 97.8 °F (36.6 °C) (02/12/18 0924)  Pulse: 82 (02/12/18 0924)  Resp: 17 (02/12/18 0924)  BP: (!) 159/69 (02/12/18 0924)  SpO2: 95 % (02/12/18 0924) Vital Signs (24h Range):  Temp:  [97 °F (36.1 °C)-98.6 °F (37 °C)] 97.8 °F (36.6 °C)  Pulse:  [82-92] 82  Resp:  [17-20] 17  SpO2:  [94 %-98 %] 95 %  BP: (149-159)/(66-70) 159/69     Height: 5' 7" (170.2 cm)  Weight: 63.9 kg (140 lb 14 oz)  Body mass index is 22.06 kg/m².      Intake/Output Summary (Last 24 hours) at 02/12/18 1214  Last data filed at 02/11/18 1735   Gross per 24 hour   Intake              580 ml   Output              450 ml   Net              130 ml       Physical Exam   Psychiatric:   EXAMINATION    CONSTITUTIONAL  General Appearance: hospital attire    MUSCULOSKELETAL  Muscle Strength and Tone: normal  Abnormal Involuntary Movements: significant tremor to arms (R>L)  Gait and Station: not observed; he is trying to get out of bed    PSYCHIATRIC MENTAL STATUS EXAM   Level of Consciousness: awake and alert  Orientation: will not participate  Grooming: limited  Psychomotor Behavior: restless and picking at the bedsheets  Speech: not pressured, soft volume  Language: no abnormalities  Mood: will not answer  Affect: blunted, flattened  Thought Process: poverty  Associations: will not participate   Thought Content: will not participate  Memory: will not participate  Attention: distracted  Fund of Knowledge: not intact for conversation  Insight: poor towards medical problems or participate  Judgment: poor towards cooperation         Significant Labs:   Last 24 Hours:   Recent Lab Results       02/12/18  0811      POCT Glucose 90         All pertinent labs within the past 24 hours have been reviewed.    Significant Imaging: I have reviewed all pertinent imaging results/findings within the past 24 hours.  "

## 2018-02-12 NOTE — ASSESSMENT & PLAN NOTE
Documented history of Lewy Body dementia by his outpatient neurologist.  Now has likely evolved into a behavioral component.  Would do best with some mood calming medications due to his irritability and lack of redirection.  May want to increase quetiapine to 50mg nightly, although watch closely that his hallucinations and/or mood do not worsen.  Start depakote 250mg PO TID as a mood stabilizer.  Also, may want to start rivastigmine 4.6mg daily patch which may help with his dementia as well as calm his mood somewhat.      Capacity: the ability to accept or refuse treatment recommendations. Capacity is determined by a clinician upon specific elements of a mental status exam (does not have to be performed by a psychiatrist). To demonstrate capacity, a patient must be able to cognitively utilize information provided to them:   Communicate and Express a choice that is stable over time - does not express a choice when asked  Understand the relevant information - not expressing an understanding of his current situation or where he is currently at  Appreciate the consequences of the decision (risks vs benefits) - not able to express a risk vs benefit of redirection or being in the hospital  Manipulate all of the data in a logical fashion (rationalize) - currently not acting rational by not participating in care/interview despite being told reason      -Based on the above assessment, pt Tenzin Ortiz appears to lack capacity to make medical decisions about his care of place of living at this time.     Please have his family act in his best interest to help with medical decision making.  He does not appear to be able to live in a home environment unless he were to have 24 hour care and redirection.  He girlfriend has expressed her inability to provide this level of care to staff and case management.

## 2018-02-12 NOTE — PT/OT/SLP PROGRESS
Occupational Therapy      Patient Name:  Tenzin Ortiz   MRN:  [id number]    Patient not seen today secondary to Patient unwilling to participate.  Pt is very confused at this time and is unable to participate.    Becky Bruno OT  2/12/2018

## 2018-02-12 NOTE — PROGRESS NOTES
[facility] Hospital Medicine  Progress Note    Patient Name: Tenzin Ortiz  MRN: [id number]  Patient Class: OP- Observation   Admission Date: 2/7/2018  Length of Stay: 0 days  Attending Physician: Rosita Prakash MD  Primary Care Provider: Efrain Chavez MD        Subjective:     Principal Problem:Debility    Interval History: No acute issues. Intermittent confusion.    HPI:  Patient is 78 yo male with HTN, HLD, and Parkinson's who presents to ED reportedly for HA. Per ED note patient was complaining of HA but he denies this on my interview. Seems there was some confusion in ED and he was unclear why he was here. During interview with me, patient reports being here for NH placement although he does not appear to be very happy about this. He has been with some issues caring for himself. Currently lives with his girlfriend who reports she can no longer care for him. He states he currently gets around with a walker but sometimes has difficulty getting up out of his chair. Per chart review patient is with Parkinson's and sees neurology, Dr. Castellanos, lastly seen on 2/6/18. He was with hallucinations which were noted to be secondary to dopaminergic agents thus having to significantly reduce dose--she recommended he be admitted for placement at that time but patient declined. He has been attempting placement as an outpatient through PCP but without much luck. He otherwise denies recent illness, fever/chills, CP, SOB, abdominal pain, N/V/D, dysuria. On presentation patient with grossly normal labs/vitals. Placed in observation for what appears to be solely placement although this is something that may need to be continued as outpatient as is not indication for hospitalization.    Hospital Course:  No notes on file        Review of Systems   Constitutional: Negative for chills and fever.   HENT: Negative for congestion and sore throat.    Respiratory: Negative for cough and shortness of breath.     Cardiovascular: Negative for chest pain and palpitations.   Gastrointestinal: Negative for abdominal pain and nausea.   Endocrine: Negative for cold intolerance and heat intolerance.   Genitourinary: Negative for dysuria and frequency.   Musculoskeletal: Negative for arthralgias and myalgias.   Skin: Negative for color change and rash.   Neurological: Negative for dizziness and headaches.   Psychiatric/Behavioral: Negative for behavioral problems and confusion.     Objective:     Vital Signs (Most Recent):  Temp: 97.8 °F (36.6 °C) (02/12/18 0924)  Pulse: 82 (02/12/18 0924)  Resp: 17 (02/12/18 0924)  BP: (!) 159/69 (02/12/18 0924)  SpO2: 95 % (02/12/18 0924) Vital Signs (24h Range):  Temp:  [97 °F (36.1 °C)-98.6 °F (37 °C)] 97.8 °F (36.6 °C)  Pulse:  [82-92] 82  Resp:  [17-20] 17  SpO2:  [94 %-98 %] 95 %  BP: (149-159)/(66-70) 159/69     Weight: 63.9 kg (140 lb 14 oz)  Body mass index is 22.06 kg/m².    Intake/Output Summary (Last 24 hours) at 02/12/18 1215  Last data filed at 02/11/18 1735   Gross per 24 hour   Intake              580 ml   Output              450 ml   Net              130 ml      Physical Exam   Constitutional: He is oriented to person, place, and time. He appears well-developed and well-nourished. No distress.   HENT:   Head: Normocephalic and atraumatic.   Eyes: EOM are normal. Pupils are equal, round, and reactive to light.   Neck: Normal range of motion. Neck supple.   Cardiovascular: Normal rate and regular rhythm.    No murmur heard.  Pulmonary/Chest: Effort normal and breath sounds normal.   Abdominal: Soft. Bowel sounds are normal. He exhibits no distension.   Musculoskeletal: Normal range of motion.   Neurological: He is alert and oriented to person, place, and time.   Parkinsonian tremors; intermittent confusion   Skin: Skin is warm and dry. No rash noted.   Psychiatric: He has a normal mood and affect. His behavior is normal.         Assessment/Plan:      * Debility    TN consulted to  aid in safe discharge planning. Appears patient has been living with a significant other who can no longer care for him and refusing to take patient home. His somewhat estranged daughter has been contacted and is willing to aid in finding NH placement (she had already begun this process as an outpatient). There is an issue, however, in that patient power of  refused to sign consent for NH placement. I do not feel patient able to make his own decision regarding this as he is with intermittent confusion +/- previous unclear dementia diagnosis per daughter. Psychiatry consulted for capacity. Also recommended adding depakote 250mg TID to help with mood stabilization. He is medically cleared and was initially with plan for home with HH and SW to aid in outpatient placement, however, cannot safely discharge as he does not have a home to return to.           Dehydration    Per ED patient admitted for dehydration. His BUN is elevated with normal creatinine--may be mild clinical dehydration. Will provide gentle IVF hydration.           Hyperlipidemia    Continue statin.         Essential hypertension    Patient BP well controlled on presentation. Will continue home anti hypertensive regimen and monitor.             Parkinson disease    Currently on sinemet--recently reduced to one tablet QID. Will continue.             VTE Risk Mitigation         Ordered     Medium Risk of VTE  Once      02/08/18 0653     Place JOSEFINA hose  Until discontinued      02/08/18 0653     Place sequential compression device  Until discontinued      02/08/18 0653            Wendy Hernández PA-C  Hospitalist-Department of Hospital Medicine  [address] [place] [address]  Office [phone] Pager [phone]

## 2018-02-12 NOTE — PLAN OF CARE
Problem: Patient Care Overview  Goal: Plan of Care Review  Outcome: Ongoing (interventions implemented as appropriate)   02/11/18 1951   Coping/Psychosocial   Plan Of Care Reviewed With patient;friend     Goal: Interdisciplinary Rounds/Family Conf  Outcome: Ongoing (interventions implemented as appropriate)   02/11/18 1951   Interdisciplinary Rounds/Family Conf   Participants patient;family;nursing       Problem: Fall Risk (Adult)  Goal: Identify Related Risk Factors and Signs and Symptoms  Related risk factors and signs and symptoms are identified upon initiation of Human Response Clinical Practice Guideline (CPG)   Outcome: Ongoing (interventions implemented as appropriate)   02/11/18 1951   Fall Risk   Related Risk Factors (Fall Risk) age-related changes;confusion/agitation;fatigue/slow reaction;gait/mobility problems;history of falls;homeostatic imbalance;neuro disease/injury;sensory deficits   Signs and Symptoms (Fall Risk) presence of risk factors     Goal: Absence of Falls  Patient will demonstrate the desired outcomes by discharge/transition of care.   Outcome: Ongoing (interventions implemented as appropriate)   02/11/18 1951   Fall Risk (Adult)   Absence of Falls making progress toward outcome       Problem: Pressure Ulcer Risk (Mike Scale) (Adult,Obstetrics,Pediatric)  Goal: Identify Related Risk Factors and Signs and Symptoms  Related risk factors and signs and symptoms are identified upon initiation of Human Response Clinical Practice Guideline (CPG)   Outcome: Ongoing (interventions implemented as appropriate)   02/11/18 1951   Pressure Ulcer Risk (Mike Scale)   Related Risk Factors (Pressure Ulcer Risk (Mike Scale)) cognitive impairment;hospitalization prolonged;mental impairment;mobility impaired;nutritional deficiencies;skeletal deformities     Goal: Skin Integrity  Patient will demonstrate the desired outcomes by discharge/transition of care.   Outcome: Ongoing (interventions implemented as  appropriate)   02/11/18 1951   Pressure Ulcer Risk (Mike Scale) (Adult,Obstetrics,Pediatric)   Skin Integrity making progress toward outcome

## 2018-02-12 NOTE — PT/OT/SLP EVAL
Speech Language Pathology Evaluation  Bedside Swallow    Patient Name:  Tenzin Ortiz   MRN:  [id number]  Admitting Diagnosis: Debility    Recommendations:                 General Recommendations:  Follow-up not indicated  Diet recommendations:  Mechanical soft (assist with meals, eliminate distractions), Thin   Aspiration Precautions: 1 bite/sip at a time, Assistance with meals, Eliminate distractions, Feed only when awake/alert, HOB to 90 degrees, Meds crushed in puree and Small bites/sips   General Precautions: Standard, fall  Communication strategies:  simple sentences, eliminate distractions    History:     Past Medical History:   Diagnosis Date    Anticoagulant long-term use     Hypertension     Impulse control disorder 2012    gambling on mirapex; also possible dopamine dysregulation syndrome    PD (Parkinson's disease) 1999    tremor-predominant    PVD (peripheral vascular disease) with claudication     poor circulation both legs       Past Surgical History:   Procedure Laterality Date    CATARACT EXTRACTION Bilateral 1 yr      Chest X-Rays: 2.12- no acute process    Prior diet: per girlfriend/caretaker soft solids with thin liquids    Subjective   Pt's speech was c/b the language of confusion.   Patient goals: unable to state secondary to confusion     Objective:     Oral Musculature Evaluation  · Oral Musculature: unable to assess due to poor participation/comprehension  · Dentition: present and adequate  · Mucosal Quality: good  · Mandibular Strength and Mobility: WFL  · Oral Labial Strength and Mobility: WFL  · Lingual Strength and Mobility: WFL  · Voice Prior to PO Intake: wfl    Bedside Swallow Eval:   Consistencies Assessed:  · Thin liquids X5  · Puree X1  · Solids X2     Oral Phase:   · WFL given tactile cuing to accept boluses  · Poor oral acceptance- Pt confused, required moderate verbal and tactile suing to accept bolus trials    Pharyngeal Phase:   · no overt clinical signs/symptoms of  aspiration    Compensatory Strategies  · None    Treatment: Please note silent aspiration cannot be r/o at bedside.    Assessment:     Tenzin Ortiz is a 77 y.o. male with diagnosis of AMS he presents with mild oral dysphagia secondary to decreased cognition c/b decreased attention to bolus presentation.     Goals:    SLP Goals     Not on file          Multidisciplinary Problems (Resolved)        Problem: SLP Goal    Goal Priority Disciplines Outcome   SLP Goal   (Resolved)    Low SLP Outcome(s) achieved   Description:  STGS  Pt will participate in bedside swallow study (GOAL MET 2/12)                    Plan:     · Plan of Care expires:  02/12/18  · Plan of Care reviewed with:      · SLP Follow-Up:  No       Discharge recommendations:   (no further ST is warranted)   Barriers to Discharge:  None- from ST standpoint     Time Tracking:     SLP Treatment Date:   02/12/18  Speech Start Time:  1500  Speech Stop Time:  1510     Speech Total Time (min):  10 min    Billable Minutes: Eval Swallow and Oral Function 10    Latricia Hankins CCC-SLP  02/12/2018

## 2018-02-12 NOTE — NURSING
PER handoff received from ADONAY Oneal RN. Responds to voice, however pt is oriented x2 (not place or situation). Denies having any pain and appears to be in no distress, however pt has generalized tremors. Assessment completed per Doc Flowsheets; reference if needed. Fall and safety precautions maintained. Bed alarm activated and audible. Bed locked in lowest position, with side rails up x3. Call bell and personal items within reach. Will continue to monitor pt for any changes.

## 2018-02-12 NOTE — CONSULTS
"[facility]  Psychiatry  Consult Note    Patient Name: Tenzin Ortiz  MRN: [id number]   Code Status: Full Code  Admission Date: 2/7/2018  Hospital Length of Stay: 0 days  Attending Physician: Rosita Prakash MD  Primary Care Provider: Efrain Chavez MD    Current Legal Status: N/A    Patient information was obtained from patient and ER records.   Inpatient consult to Psychiatry  Consult performed by: RADHA AMANDA  Consult ordered by: KENJI YA        Subjective:     Principal Problem:Debility    Chief Complaint:  Mood instability and lack of decision making     HPI: Patient presents to hospital complaining of a headache. Patient is alert and awake and dressed in hospital gown. Patient also noted to have a significant hand tremor. Patient reports that he is not sure why he is in the hospital. Per chart review patient presented to the ED with a headache that he denied upon examination. ED provider noted some confusion. Chart review also states that patient lives with girlfriend and she is unable to care for him any longer and family is looking to get patient placed in a nursing home. Apparently does not have a great relationship with his children.  Patient is having problems ambulating and caring for himself. He is requiring frequent redirection from nursing staff.  Patient denies symptoms of depression and anxiety. Denies history of seeing a psychiatrist or therapist. Patient is not wanting to engage much in interview and asks me to leave, stating "I am done with your questions".  He is not answering questions about orientation and not being rational in his responses or engagement.  Review of records indicate a Lewy Body Dementia without behavioral component and Parkinson's disease.    Hospital Course: No notes on file         Patient History           Medical as of 2/12/2018     Past Medical History     Diagnosis Date Comments Source    Anticoagulant long-term use -- -- Provider "    Hypertension -- -- Provider    Impulse control disorder 2012 gambling on mirapex; also possible dopamine dysregulation syndrome Provider    PD (Parkinson's disease) 1999 tremor-predominant Provider    PVD (peripheral vascular disease) with claudication -- poor circulation both legs Provider          Pertinent Negatives     Diagnosis Date Noted Comments Source    Amblyopia 7/13/2017 -- Provider    Arthritis 7/13/2017 -- Provider    Cataract 7/13/2017 -- Provider    Diabetes mellitus 7/13/2017 -- Provider    Diabetic retinopathy 7/13/2017 -- Provider    Encounter for blood transfusion 8/19/2016 -- Provider    Glaucoma 7/13/2017 -- Provider    History of psychiatric hospitalization 2/12/2018 -- Provider    Macular degeneration 7/13/2017 -- Provider    Psychiatric problem 2/12/2018 -- Provider    Retinal detachment 7/13/2017 -- Provider    Sickle cell anemia 7/13/2017 -- Provider    Sickle cell trait 7/13/2017 -- Provider    Strabismus 7/13/2017 -- Provider    Therapy 2/12/2018 -- Provider    Uveitis 7/13/2017 -- Provider                  Surgical as of 2/12/2018     Past Surgical History     Procedure Laterality Date Comments Source    CATARACT EXTRACTION Bilateral 1 yr  -- Provider                  Family as of 2/12/2018     Problem Relation Name Age of Onset Comments Source    No Known Problems Mother -- -- -- Provider    No Known Problems Father -- -- -- Provider    No Known Problems Sister -- -- -- Provider    No Known Problems Brother -- -- -- Provider    No Known Problems Maternal Aunt -- -- -- Provider    No Known Problems Maternal Uncle -- -- -- Provider    No Known Problems Paternal Aunt -- -- -- Provider    No Known Problems Paternal Uncle -- -- -- Provider    No Known Problems Maternal Grandmother -- -- -- Provider    No Known Problems Maternal Grandfather -- -- -- Provider    No Known Problems Paternal Grandmother -- -- -- Provider    No Known Problems Paternal Grandfather -- -- -- Provider     "Parkinsonism Neg Hx -- -- -- Provider    Amblyopia Neg Hx -- -- -- Provider    Blindness Neg Hx -- -- -- Provider    Cancer Neg Hx -- -- -- Provider    Cataracts Neg Hx -- -- -- Provider    Diabetes Neg Hx -- -- -- Provider    Glaucoma Neg Hx -- -- -- Provider    Hypertension Neg Hx -- -- -- Provider    Macular degeneration Neg Hx -- -- -- Provider    Retinal detachment Neg Hx -- -- -- Provider    Strabismus Neg Hx -- -- -- Provider    Stroke Neg Hx -- -- -- Provider    Thyroid disease Neg Hx -- -- -- Provider            Tobacco Use as of 2/12/2018     Smoking Status Smoking Start Date Smoking Quit Date Packs/day Years Used    Former Smoker -- -- -- 10.00    Types Comments Smokeless Tobacco Status Smokeless Tobacco Quit Date Source    -- Quit 40 years ago Never Used -- Provider            Alcohol Use as of 2/12/2018     Alcohol Use Drinks/Week Alcohol/Week Comments Source    No -- -- -- Provider            Drug Use as of 2/12/2018     Drug Use Types Frequency Comments Source    No -- -- -- Provider            Sexual Activity as of 2/12/2018     Sexually Active Birth Control Partners Comments Source    Not Currently -- -- -- Provider            Activities of Daily Living as of 2/12/2018     Activities of Daily Living Question Response Comments Source    Patient feels they ought to cut down on drinking/drug use Not Asked -- Provider    Patient annoyed by others criticizing their drinking/drug use Not Asked -- Provider    Patient has felt bad or guilty about drinking/drug use Not Asked -- Provider    Patient has had a drink/used drugs as an eye opener in the AM Not Asked -- Provider            Social Documentation as of 2/12/2018    Education: high school graduate  Arrests: none  Adventist: Pentec[employer], sporadic attendance  Leisure: did not assess  Childhood: "normal"  Social (Marriage, Living Situation, Children):  x2, /. Currently lives with girlfriend, 2 children  : none  Financial: social " security, $1300    Psychiatric:  History of Illness: denies  Treatment History:        -Psychiatrist: none       -Psychiatric Medications: Seroquel from neurologist  Drug/Alcohol Treatment: none  Alcohol Use: none  Drug Use: none  SI/HI/AVH: denies    Source: Provider           Occupational as of 2/12/2018     Occupation Employer Comments Source    self employed -- retired Provider            Socioeconomic as of 2/12/2018     Marital Status Spouse Name Number of Children Years Education Preferred Language Ethnicity Race Source    Single -- 2 -- English /White White Provider         Pertinent History Q A Comments    as of 2/12/2018 Lives with significant other     Place in Birth Order      Lives in home     Number of Siblings 3     Raised by biological parents     Legal Involvement none     Childhood Trauma uneventful     Criminal History of none     Financial Status other retired    Highest Level of Education high school graduation     Does patient have access to a firearm?       Service No     Primary Leisure Activity      Spirituality non-practicing Sikh     Past Medical History:   Diagnosis Date    Anticoagulant long-term use     Hypertension     Impulse control disorder 2012    gambling on mirapex; also possible dopamine dysregulation syndrome    PD (Parkinson's disease) 1999    tremor-predominant    PVD (peripheral vascular disease) with claudication     poor circulation both legs     Past Surgical History:   Procedure Laterality Date    CATARACT EXTRACTION Bilateral 1 yr      Family History     Problem Relation (Age of Onset)    No Known Problems Mother, Father, Sister, Brother, Maternal Aunt, Maternal Uncle, Paternal Aunt, Paternal Uncle, Maternal Grandmother, Maternal Grandfather, Paternal Grandmother, Paternal Grandfather        Social History Main Topics    Smoking status: Former Smoker     Years: 10.00    Smokeless tobacco: Never Used      Comment: Quit 40 years ago     Alcohol use No    Drug use: No    Sexual activity: Not Currently     Review of patient's allergies indicates:   Allergen Reactions    Amantadine analogues      Caused confusion    Mirapex [pramipexole]      Pathologic gambling    Neupro [rotigotine]      Pathologic gambling         No current facility-administered medications on file prior to encounter.      Current Outpatient Prescriptions on File Prior to Encounter   Medication Sig    amlodipine (NORVASC) 10 MG tablet Take 1 tablet (10 mg total) by mouth once daily.    atorvastatin (LIPITOR) 10 MG tablet TAKE 1 TABLET(10 MG) BY MOUTH EVERY DAY    carbidopa-levodopa  mg (SINEMET)  mg per tablet Take 2 tablets by mouth 4 (four) times daily. 6am, 10am, 2pm and 6pm    ciprofloxacin HCl (CIPRO) 500 MG tablet     docusate sodium (COLACE) 100 MG capsule Take 1 capsule (100 mg total) by mouth 2 (two) times daily.    gabapentin (NEURONTIN) 300 MG capsule TAKE 2 CAPSULES(600 MG) BY MOUTH EVERY EVENING    nystatin (MYCOSTATIN) cream Apply topically 2 (two) times daily.    QUEtiapine (SEROQUEL) 25 MG Tab Take 1 tablet (25 mg total) by mouth every evening.    rivastigmine (EXELON) 4.6 mg/24 hr PT24 APPLY 1 PATCH EXTERNALLY TO THE SKIN EVERY DAY    trazodone (DESYREL) 50 MG tablet Take 1 tablet (50 mg total) by mouth nightly as needed for Insomnia.    warfarin (COUMADIN) 3 MG tablet TAKE 1 TABLET BY MOUTH EVERY DAY AS DIRECTED BY COUMADIN CLINIC     Psychotherapeutics     Start     Stop Route Frequency Ordered    02/08/18 0653  traZODone tablet 50 mg      -- Oral Nightly PRN 02/08/18 0653    02/08/18 0430  QUEtiapine tablet 25 mg      -- Oral Nightly 02/08/18 0328        Review of Systems   Unable to perform ROS: Dementia     Strengths and Liabilities: Liability: Patient has poor health., Liability: Patient has poor judgment, Liability: Patient has possible cognitive impairment.    Objective:     Vital Signs (Most Recent):  Temp: 97.8 °F (36.6 °C)  "(02/12/18 0924)  Pulse: 82 (02/12/18 0924)  Resp: 17 (02/12/18 0924)  BP: (!) 159/69 (02/12/18 0924)  SpO2: 95 % (02/12/18 0924) Vital Signs (24h Range):  Temp:  [97 °F (36.1 °C)-98.6 °F (37 °C)] 97.8 °F (36.6 °C)  Pulse:  [82-92] 82  Resp:  [17-20] 17  SpO2:  [94 %-98 %] 95 %  BP: (149-159)/(66-70) 159/69     Height: 5' 7" (170.2 cm)  Weight: 63.9 kg (140 lb 14 oz)  Body mass index is 22.06 kg/m².      Intake/Output Summary (Last 24 hours) at 02/12/18 1214  Last data filed at 02/11/18 1735   Gross per 24 hour   Intake              580 ml   Output              450 ml   Net              130 ml       Physical Exam   Psychiatric:   EXAMINATION    CONSTITUTIONAL  General Appearance: hospital attire    MUSCULOSKELETAL  Muscle Strength and Tone: normal  Abnormal Involuntary Movements: significant tremor to arms (R>L)  Gait and Station: not observed; he is trying to get out of bed    PSYCHIATRIC MENTAL STATUS EXAM   Level of Consciousness: awake and alert  Orientation: will not participate  Grooming: limited  Psychomotor Behavior: restless and picking at the bedsheets  Speech: not pressured, soft volume  Language: no abnormalities  Mood: will not answer  Affect: blunted, flattened  Thought Process: poverty  Associations: will not participate   Thought Content: will not participate  Memory: will not participate  Attention: distracted  Fund of Knowledge: not intact for conversation  Insight: poor towards medical problems or participate  Judgment: poor towards cooperation         Significant Labs:   Last 24 Hours:   Recent Lab Results       02/12/18  0811      POCT Glucose 90         All pertinent labs within the past 24 hours have been reviewed.    Significant Imaging: I have reviewed all pertinent imaging results/findings within the past 24 hours.    Assessment/Plan:     Lewy body dementia with behavioral disturbance    Documented history of Lewy Body dementia by his outpatient neurologist.  Now has likely evolved into a " behavioral component.  Would do best with some mood calming medications due to his irritability and lack of redirection.  May want to increase quetiapine to 50mg nightly, although watch closely that his hallucinations and/or mood do not worsen.  Start depakote 250mg PO TID as a mood stabilizer.  Also, may want to start rivastigmine 4.6mg daily patch which may help with his dementia as well as calm his mood somewhat.      Capacity: the ability to accept or refuse treatment recommendations. Capacity is determined by a clinician upon specific elements of a mental status exam (does not have to be performed by a psychiatrist). To demonstrate capacity, a patient must be able to cognitively utilize information provided to them:   Communicate and Express a choice that is stable over time - does not express a choice when asked  Understand the relevant information - not expressing an understanding of his current situation or where he is currently at  Appreciate the consequences of the decision (risks vs benefits) - not able to express a risk vs benefit of redirection or being in the hospital  Manipulate all of the data in a logical fashion (rationalize) - currently not acting rational by not participating in care/interview despite being told reason      -Based on the above assessment, pt Tenzin Ortiz appears to lack capacity to make medical decisions about his care of place of living at this time.     Please have his family act in his best interest to help with medical decision making.  He does not appear to be able to live in a home environment unless he were to have 24 hour care and redirection.  He girlfriend has expressed her inability to provide this level of care to staff and case management.                   Total Time:  60 minutes      Usman Archer MD   Psychiatry  [facility]

## 2018-02-12 NOTE — ASSESSMENT & PLAN NOTE
TN consulted to aid in safe discharge planning. Appears patient has been living with a significant other who can no longer care for him and refusing to take patient home. His somewhat estranged daughter has been contacted and is willing to aid in finding NH placement (she had already begun this process as an outpatient). There is an issue, however, in that patient power of  refused to sign consent for NH placement. I do not feel patient able to make his own decision regarding this as he is with intermittent confusion +/- previous unclear dementia diagnosis per daughter. Psychiatry consulted for capacity. Also recommended adding depakote 250mg TID to help with mood stabilization. He is medically cleared and was initially with plan for home with HH and SW to aid in outpatient placement, however, cannot safely discharge as he does not have a home to return to.

## 2018-02-12 NOTE — HPI
"Patient presents to hospital complaining of a headache. Patient is alert and awake and dressed in hospital gown. Patient also noted to have a significant hand tremor. Patient reports that he is not sure why he is in the hospital. Per chart review patient presented to the ED with a headache that he denied upon examination. ED provider noted some confusion. Chart review also states that patient lives with girlfriend and she is unable to care for him any longer and family is looking to get patient placed in a nursing home. Apparently does not have a great relationship with his children.  Patient is having problems ambulating and caring for himself. He is requiring frequent redirection from nursing staff.  Patient denies symptoms of depression and anxiety. Denies history of seeing a psychiatrist or therapist. Patient is not wanting to engage much in interview and asks me to leave, stating "I am done with your questions".  He is not answering questions about orientation and not being rational in his responses or engagement.  Review of records indicate a Lewy Body Dementia without behavioral component and Parkinson's disease.  "

## 2018-02-12 NOTE — PLAN OF CARE
Problem: SLP Goal  Goal: SLP Goal  STGS  Pt will participate in bedside swallow study (GOAL MET 2/12)  Outcome: Outcome(s) achieved Date Met: 02/12/18 2/12/18 ST RECS: mech soft with thin liquids, assist with meals, feed only when cooperative, crush meds in puree. No further ST is warranted. Ltaricia Hankins, [facility]-SLP

## 2018-02-12 NOTE — PLAN OF CARE
02/12/18 1041   Discharge Reassessment   Assessment Type Discharge Planning Reassessment   Dr Archer here to evaluate patient for capacity.  Dr Archer stated patient unable to his own medical decisions at this time.  Pt has a known POA, Pietro Mensah.  Daughter was unsure if Mr Mensah has medical POA or just financial.  TN called Mr Mensah's office again @ [phone] and left detailed message.  Awaiting return call.      1726:  No return calls received at this time

## 2018-02-12 NOTE — PLAN OF CARE
Problem: Patient Care Overview  Goal: Plan of Care Review   02/11/18 1951   Coping/Psychosocial   Plan Of Care Reviewed With patient;friend   Pt remained free of falls during current shift. Denied pain and did not receive any prn pain medications. Pt remained NPO until swallow study can be preformed. Awaiting psychiatry consult to evaluate competency for discharge placement. Plan of care and fall precautions reviewed with pt and verbalized understanding. Bed locked, lowered, SR up x2 and call light placed within reach.

## 2018-02-12 NOTE — SUBJECTIVE & OBJECTIVE
Review of Systems   Constitutional: Negative for chills and fever.   HENT: Negative for congestion and sore throat.    Respiratory: Negative for cough and shortness of breath.    Cardiovascular: Negative for chest pain and palpitations.   Gastrointestinal: Negative for abdominal pain and nausea.   Endocrine: Negative for cold intolerance and heat intolerance.   Genitourinary: Negative for dysuria and frequency.   Musculoskeletal: Negative for arthralgias and myalgias.   Skin: Negative for color change and rash.   Neurological: Negative for dizziness and headaches.   Psychiatric/Behavioral: Negative for behavioral problems and confusion.     Objective:     Vital Signs (Most Recent):  Temp: 97.8 °F (36.6 °C) (02/12/18 0924)  Pulse: 82 (02/12/18 0924)  Resp: 17 (02/12/18 0924)  BP: (!) 159/69 (02/12/18 0924)  SpO2: 95 % (02/12/18 0924) Vital Signs (24h Range):  Temp:  [97 °F (36.1 °C)-98.6 °F (37 °C)] 97.8 °F (36.6 °C)  Pulse:  [82-92] 82  Resp:  [17-20] 17  SpO2:  [94 %-98 %] 95 %  BP: (149-159)/(66-70) 159/69     Weight: 63.9 kg (140 lb 14 oz)  Body mass index is 22.06 kg/m².    Intake/Output Summary (Last 24 hours) at 02/12/18 1215  Last data filed at 02/11/18 1735   Gross per 24 hour   Intake              580 ml   Output              450 ml   Net              130 ml      Physical Exam   Constitutional: He is oriented to person, place, and time. He appears well-developed and well-nourished. No distress.   HENT:   Head: Normocephalic and atraumatic.   Eyes: EOM are normal. Pupils are equal, round, and reactive to light.   Neck: Normal range of motion. Neck supple.   Cardiovascular: Normal rate and regular rhythm.    No murmur heard.  Pulmonary/Chest: Effort normal and breath sounds normal.   Abdominal: Soft. Bowel sounds are normal. He exhibits no distension.   Musculoskeletal: Normal range of motion.   Neurological: He is alert and oriented to person, place, and time.   Parkinsonian tremors; intermittent  confusion   Skin: Skin is warm and dry. No rash noted.   Psychiatric: He has a normal mood and affect. His behavior is normal.

## 2018-02-13 PROCEDURE — 11000001 HC ACUTE MED/SURG PRIVATE ROOM

## 2018-02-13 PROCEDURE — 96372 THER/PROPH/DIAG INJ SC/IM: CPT

## 2018-02-13 PROCEDURE — 25000003 PHARM REV CODE 250: Performed by: PHYSICIAN ASSISTANT

## 2018-02-13 PROCEDURE — G0378 HOSPITAL OBSERVATION PER HR: HCPCS

## 2018-02-13 PROCEDURE — 25000003 PHARM REV CODE 250: Performed by: EMERGENCY MEDICINE

## 2018-02-13 PROCEDURE — 63600175 PHARM REV CODE 636 W HCPCS: Performed by: NURSE PRACTITIONER

## 2018-02-13 PROCEDURE — 25000003 PHARM REV CODE 250: Performed by: NURSE PRACTITIONER

## 2018-02-13 RX ORDER — QUETIAPINE FUMARATE 25 MG/1
25 TABLET, FILM COATED ORAL NIGHTLY
Status: DISCONTINUED | OUTPATIENT
Start: 2018-02-14 | End: 2018-03-06

## 2018-02-13 RX ORDER — ENOXAPARIN SODIUM 100 MG/ML
40 INJECTION SUBCUTANEOUS EVERY 24 HOURS
Status: DISCONTINUED | OUTPATIENT
Start: 2018-02-13 | End: 2018-03-14 | Stop reason: SDUPTHER

## 2018-02-13 RX ORDER — RIVASTIGMINE 4.6 MG/24H
1 PATCH, EXTENDED RELEASE TRANSDERMAL DAILY
Status: DISCONTINUED | OUTPATIENT
Start: 2018-02-14 | End: 2018-03-14 | Stop reason: SDUPTHER

## 2018-02-13 RX ADMIN — DIVALPROEX SODIUM 250 MG: 250 TABLET, DELAYED RELEASE ORAL at 09:02

## 2018-02-13 RX ADMIN — ENOXAPARIN SODIUM 40 MG: 100 INJECTION SUBCUTANEOUS at 12:02

## 2018-02-13 RX ADMIN — DIVALPROEX SODIUM 250 MG: 250 TABLET, DELAYED RELEASE ORAL at 05:02

## 2018-02-13 RX ADMIN — DOCUSATE SODIUM 100 MG: 100 CAPSULE, LIQUID FILLED ORAL at 08:02

## 2018-02-13 RX ADMIN — CARBIDOPA AND LEVODOPA 1 TABLET: 25; 100 TABLET ORAL at 05:02

## 2018-02-13 RX ADMIN — AMLODIPINE BESYLATE 10 MG: 5 TABLET ORAL at 08:02

## 2018-02-13 RX ADMIN — DOCUSATE SODIUM 100 MG: 100 CAPSULE, LIQUID FILLED ORAL at 09:02

## 2018-02-13 NOTE — PROGRESS NOTES
Restraints removed and checked after 15 minutes.  Patient lethargic and sleeping.  Restraints not needed at this time.  Will continue to monitor.

## 2018-02-13 NOTE — HOSPITAL COURSE
Mr. Ortiz was admitted with severe Parkinsons dementia and can no longer care for himself. He was living with his girlfriend, but she can no longer care for the patient. Case management found an accepting facility at [place]; however, POA declined signing the necessary paperwork for placement. Legal was consulted and judicial guardianship of the patient granted at his court date on April 24, 2018.    Await settling of financials and plan for NH placement. Lowered seroquel at night secondary to hallucinations and increased drowsiness, not awake for meals. Will plan for a few days of resp neb treatments with increased cough and faint exp wheeze. Drop in UOP so he was started on IVF. He has poor oral intake. He has undergone noticeable decline in the past months and is unlikely to do well in nursing home setting. Discussed status with Cornel (legal guardian) and recommended changing status to DNR and filling out [place] for no readmission for dehydration. Food intake waxes and wanes. Patient requiresassistance with meals. Likely that his food intake and hydration status will continue to wax and wane with his mental status. Discontinued IVF. Code status now DNR, LAPOST filled out. Plan for NH with hospice. Stable for discharge to NH.

## 2018-02-13 NOTE — ASSESSMENT & PLAN NOTE
Seroquel 25 mg; sinemet  mg;   Discontinue taxodione 50 mg  Restart exelon 4.6 mg in the morning when he is more awake

## 2018-02-13 NOTE — PLAN OF CARE
Problem: Confusion, Acute (Adult)  Intervention: Monitor/Assist with Self Care   02/13/18 1330   Activity   Activity Assistance Provided assistance, 1 person     Intervention: Reduce Risk/Promote Restraint Free Environment   02/13/18 1330   Safety Interventions   Environmental Safety Modification assistive device/personal items within reach;clutter free environment maintained     Intervention: Evaluate Medications/Identify Contributors to Confusion   02/13/18 1330   Safety Interventions   Medication Review/Management medications reviewed     Intervention: Optimize Communication   02/13/18 1330   Cognitive Interventions   Communication Enhancement Strategies call light answered in person;communication board used     Intervention: Promote Familiarity/Consistency   02/13/18 1330   Coping/Psychosocial Interventions   Environment Familiarity/Consistency daily routine followed     Intervention: Provide Frequent Orientation/Reorientation   02/13/18 1330   Cognitive Interventions   Reorientation Measures clock in view       Goal: Identify Related Risk Factors and Signs and Symptoms  Related risk factors and signs and symptoms are identified upon initiation of Human Response Clinical Practice Guideline (CPG)   Outcome: Ongoing (interventions implemented as appropriate)   02/13/18 1330   Confusion, Acute   Related Risk Factors (Acute Confusion) cognitive impairment     Goal: Cognitive/Functional Impairments Minimized  Patient will demonstrate the desired outcomes by discharge/transition of care.   Outcome: Ongoing (interventions implemented as appropriate)   02/13/18 1330   Confusion, Acute (Adult)   Cognitive/Functional Impairments Minimized making progress toward outcome     Goal: Safety  Patient will demonstrate the desired outcomes by discharge/transition of care.   Outcome: Ongoing (interventions implemented as appropriate)   02/13/18 1330   Confusion, Acute (Adult)   Safety making progress toward outcome

## 2018-02-13 NOTE — SUBJECTIVE & OBJECTIVE
Interval History: somnolence unclear etiology when awake resistent to care; discontinue restraints now - monitor closely, hold all sedating medications for now    Review of Systems   Constitutional: Positive for activity change, appetite change and fatigue. Negative for chills, diaphoresis and fever.   Respiratory: Negative for cough, chest tightness, shortness of breath and wheezing.    Cardiovascular: Negative for chest pain, palpitations and leg swelling.   Gastrointestinal: Negative for abdominal distention, abdominal pain, constipation, diarrhea, nausea and vomiting.   Genitourinary: Negative for dysuria and hematuria.   Musculoskeletal: Negative for joint swelling and neck stiffness.   Neurological: Positive for weakness.   Psychiatric/Behavioral: Positive for confusion.     Objective:     Vital Signs (Most Recent):  Temp: 99 °F (37.2 °C) (02/13/18 1046)  Pulse: 82 (02/13/18 1046)  Resp: 16 (02/13/18 1046)  BP: (!) 147/67 (02/13/18 1046)  SpO2: (!) 93 % (02/13/18 1046) Vital Signs (24h Range):  Temp:  [98.1 °F (36.7 °C)-99 °F (37.2 °C)] 99 °F (37.2 °C)  Pulse:  [82-91] 82  Resp:  [15-18] 16  SpO2:  [93 %-97 %] 93 %  BP: (135-153)/(64-70) 147/67     Weight: 63.6 kg (140 lb 3.4 oz)  Body mass index is 21.96 kg/m².    Intake/Output Summary (Last 24 hours) at 02/13/18 1127  Last data filed at 02/13/18 0928   Gross per 24 hour   Intake              265 ml   Output              200 ml   Net               65 ml      Physical Exam   Constitutional: He is oriented to person, place, and time. He appears well-developed and well-nourished. No distress.   HENT:   Head: Normocephalic and atraumatic.   Eyes: Pupils are equal, round, and reactive to light.   Neck: Normal range of motion. Neck supple.   Cardiovascular: Normal rate.    Pulmonary/Chest: Effort normal. No respiratory distress. He has no wheezes. He has no rales.   Abdominal: Soft. He exhibits no distension. There is no tenderness.   Genitourinary: Rectal exam  shows guaiac negative stool.   Musculoskeletal: Normal range of motion. He exhibits no edema or tenderness.   Neurological: He is alert and oriented to person, place, and time.   Skin: Skin is warm and dry. He is not diaphoretic.       Significant Labs:   CBC: No results for input(s): WBC, HGB, HCT, PLT in the last 48 hours.  CMP: No results for input(s): NA, K, CL, CO2, GLU, BUN, CREATININE, CALCIUM, PROT, ALBUMIN, BILITOT, ALKPHOS, AST, ALT, ANIONGAP, EGFRNONAA in the last 48 hours.    Invalid input(s): ESTGFAFRICA  Cardiac Markers: No results for input(s): CKMB, MYOGLOBIN, BNP, TROPISTAT in the last 48 hours.  Lipase: No results for input(s): LIPASE in the last 48 hours.  Lipid Panel: No results for input(s): CHOL, HDL, LDLCALC, TRIG, CHOLHDL in the last 48 hours.  Troponin: No results for input(s): TROPONINI in the last 48 hours.  TSH:   Recent Labs  Lab 09/03/17  2040   TSH 1.206         Significant Imaging:   Imaging Results          CT Head Without Contrast (Final result)  Result time 02/08/18 01:50:57    Final result by Madelin Story MD (02/08/18 01:50:57)                 Impression:        No CT evidence of acute intracranial abnormality.      Electronically signed by: MADELIN STORY  Date:     02/08/18  Time:    01:50              Narrative:    Procedure comments: CT examination of the head was performed from the skull base through the vertex without the use of intravenous contrast using 5-mm axial images.    Comparison: CT head 8/1/2017    Findings:    There is age appropriate generalized cerebral atrophy with compensatory ventricular enlargement and sulcal widening.There are patchy regions of hypoattenuation in the supratentorial white matter likely consistent with chronic microvascular ischemic changes. There is no evidence of acute intracranial hemorrhage. There is no midline shift or mass effect.The basal cisterns are patent. No extra-axial collections are appreciated.The visualized paranasal  sinuses and mastoid air cells are clear.There are postoperative changes of the bilateral globes. The calvarium is intact.

## 2018-02-13 NOTE — PROGRESS NOTES
[facility] Hospital Medicine  Progress Note    Patient Name: Tenzin Ortiz  MRN: [id number]  Patient Class: OP- Observation   Admission Date: 2/7/2018  Length of Stay: 0 days  Attending Physician: Kassandra Alonso MD  Primary Care Provider: Efrain Chavez MD        Subjective:     Principal Problem:Debility    HPI:  Patient is 76 yo male with HTN, HLD, and Parkinson's who presents to ED reportedly for HA. Per ED note patient was complaining of HA but he denies this on my interview. Seems there was some confusion in ED and he was unclear why he was here. During interview with me, patient reports being here for NH placement although he does not appear to be very happy about this. He has been with some issues caring for himself. Currently lives with his girlfriend who reports she can no longer care for him. He states he currently gets around with a walker but sometimes has difficulty getting up out of his chair. Per chart review patient is with Parkinson's and sees neurology, Dr. Castellanos, lastly seen on 2/6/18. He was with hallucinations which were noted to be secondary to dopaminergic agents thus having to significantly reduce dose--she recommended he be admitted for placement at that time but patient declined. He has been attempting placement as an outpatient through PCP but without much luck. He otherwise denies recent illness, fever/chills, CP, SOB, abdominal pain, N/V/D, dysuria. On presentation patient with grossly normal labs/vitals. Placed in observation for what appears to be solely placement although this is something that may need to be continued as outpatient as is not indication for hospitalization.    Hospital Course:  NV resolved - somnolence of unclear etiology; Hold all sedating medication and discontinue restraints for now - monitor closely - low grade fever - restart Exelon when more awake - low grade temp 99 - awaiting placement    Interval History: somnolence unclear etiology when  awake resistent to care; discontinue restraints now - monitor closely, hold all sedating medications for now    Review of Systems   Constitutional: Positive for activity change, appetite change and fatigue. Negative for chills, diaphoresis and fever.   Respiratory: Negative for cough, chest tightness, shortness of breath and wheezing.    Cardiovascular: Negative for chest pain, palpitations and leg swelling.   Gastrointestinal: Negative for abdominal distention, abdominal pain, constipation, diarrhea, nausea and vomiting.   Genitourinary: Negative for dysuria and hematuria.   Musculoskeletal: Negative for joint swelling and neck stiffness.   Neurological: Positive for weakness.   Psychiatric/Behavioral: Positive for confusion.     Objective:     Vital Signs (Most Recent):  Temp: 99 °F (37.2 °C) (02/13/18 1046)  Pulse: 82 (02/13/18 1046)  Resp: 16 (02/13/18 1046)  BP: (!) 147/67 (02/13/18 1046)  SpO2: (!) 93 % (02/13/18 1046) Vital Signs (24h Range):  Temp:  [98.1 °F (36.7 °C)-99 °F (37.2 °C)] 99 °F (37.2 °C)  Pulse:  [82-91] 82  Resp:  [15-18] 16  SpO2:  [93 %-97 %] 93 %  BP: (135-153)/(64-70) 147/67     Weight: 63.6 kg (140 lb 3.4 oz)  Body mass index is 21.96 kg/m².    Intake/Output Summary (Last 24 hours) at 02/13/18 1127  Last data filed at 02/13/18 0928   Gross per 24 hour   Intake              265 ml   Output              200 ml   Net               65 ml      Physical Exam   Constitutional: He is oriented to person, place, and time. He appears well-developed and well-nourished. No distress.   HENT:   Head: Normocephalic and atraumatic.   Eyes: Pupils are equal, round, and reactive to light.   Neck: Normal range of motion. Neck supple.   Cardiovascular: Normal rate.    Pulmonary/Chest: Effort normal. No respiratory distress. He has no wheezes. He has no rales.   Abdominal: Soft. He exhibits no distension. There is no tenderness.   Genitourinary: Rectal exam shows guaiac negative stool.   Musculoskeletal: Normal  range of motion. He exhibits no edema or tenderness.   Neurological: He is alert and oriented to person, place, and time.   Skin: Skin is warm and dry. He is not diaphoretic.       Significant Labs:   CBC: No results for input(s): WBC, HGB, HCT, PLT in the last 48 hours.  CMP: No results for input(s): NA, K, CL, CO2, GLU, BUN, CREATININE, CALCIUM, PROT, ALBUMIN, BILITOT, ALKPHOS, AST, ALT, ANIONGAP, EGFRNONAA in the last 48 hours.    Invalid input(s): ESTGFAFRICA  Cardiac Markers: No results for input(s): CKMB, MYOGLOBIN, BNP, TROPISTAT in the last 48 hours.  Lipase: No results for input(s): LIPASE in the last 48 hours.  Lipid Panel: No results for input(s): CHOL, HDL, LDLCALC, TRIG, CHOLHDL in the last 48 hours.  Troponin: No results for input(s): TROPONINI in the last 48 hours.  TSH:   Recent Labs  Lab 09/03/17  2040   TSH 1.206         Significant Imaging:   Imaging Results          CT Head Without Contrast (Final result)  Result time 02/08/18 01:50:57    Final result by Madelin Story MD (02/08/18 01:50:57)                 Impression:        No CT evidence of acute intracranial abnormality.      Electronically signed by: MADELIN STORY  Date:     02/08/18  Time:    01:50              Narrative:    Procedure comments: CT examination of the head was performed from the skull base through the vertex without the use of intravenous contrast using 5-mm axial images.    Comparison: CT head 8/1/2017    Findings:    There is age appropriate generalized cerebral atrophy with compensatory ventricular enlargement and sulcal widening.There are patchy regions of hypoattenuation in the supratentorial white matter likely consistent with chronic microvascular ischemic changes. There is no evidence of acute intracranial hemorrhage. There is no midline shift or mass effect.The basal cisterns are patent. No extra-axial collections are appreciated.The visualized paranasal sinuses and mastoid air cells are clear.There are  postoperative changes of the bilateral globes. The calvarium is intact.                                Assessment/Plan:      * Debility    TN consulted to aid in safe discharge planning. Appears patient has been living with a significant other who can no longer care for him and refusing to take patient home. His somewhat estranged daughter has been contacted and is willing to aid in finding NH placement (she had already begun this process as an outpatient). There is an issue, however, in that patient power of  refused to sign consent for NH placement. I do not feel patient able to make his own decision regarding this as he is with intermittent confusion +/- previous unclear dementia diagnosis per daughter. Psychiatry consulted for capacity. Also recommended adding depakote 250mg TID to help with mood stabilization. He is medically cleared and was initially with plan for home with HH and SW to aid in outpatient placement, however, cannot safely discharge as he does not have a home to return to.           Lewy body dementia with behavioral disturbance    Seen by psche who started on Depakote; sleeps during assessment, oxygen saturations 93%, he is easily arousable        Parkinson disease    Seroquel 25 mg; sinemet  mg;   Discontinue taxodione 50 mg  Restart exelon 4.6 mg in the morning when he is more awake        Dehydration    Per ED patient admitted for dehydration. His BUN is elevated with normal creatinine--may be mild clinical dehydration. Will provide gentle IVF hydration.           Hyperlipidemia    Continue statin.         Essential hypertension    Patient BP well controlled on presentation. Will continue home anti hypertensive regimen and monitor.               VTE Risk Mitigation         Ordered     enoxaparin injection 40 mg  Daily     Route:  Subcutaneous        02/13/18 1149     Medium Risk of VTE  Once      02/08/18 0653     Place JOSEFINA hose  Until discontinued      02/08/18 0653     Place  sequential compression device  Until discontinued      02/08/18 0653              SANDRO Vizcaino, FNP-C  Hospitalist - Department of Hospital Medicine  [address] [place] [address]  Office [phone]; Pager [phone]

## 2018-02-14 LAB
BASOPHILS # BLD AUTO: 0.01 K/UL
BASOPHILS NFR BLD: 0.1 %
DIFFERENTIAL METHOD: ABNORMAL
EOSINOPHIL # BLD AUTO: 0.4 K/UL
EOSINOPHIL NFR BLD: 4.6 %
ERYTHROCYTE [DISTWIDTH] IN BLOOD BY AUTOMATED COUNT: 14.7 %
HCT VFR BLD AUTO: 42.6 %
HGB BLD-MCNC: 14.3 G/DL
LYMPHOCYTES # BLD AUTO: 2.4 K/UL
LYMPHOCYTES NFR BLD: 31 %
MCH RBC QN AUTO: 28.5 PG
MCHC RBC AUTO-ENTMCNC: 33.6 G/DL
MCV RBC AUTO: 85 FL
MONOCYTES # BLD AUTO: 0.7 K/UL
MONOCYTES NFR BLD: 9 %
NEUTROPHILS # BLD AUTO: 4.3 K/UL
NEUTROPHILS NFR BLD: 54.7 %
PLATELET # BLD AUTO: 230 K/UL
PMV BLD AUTO: 9.5 FL
RBC # BLD AUTO: 5.01 M/UL
WBC # BLD AUTO: 7.81 K/UL

## 2018-02-14 PROCEDURE — 96372 THER/PROPH/DIAG INJ SC/IM: CPT

## 2018-02-14 PROCEDURE — 63600175 PHARM REV CODE 636 W HCPCS: Performed by: NURSE PRACTITIONER

## 2018-02-14 PROCEDURE — 25000003 PHARM REV CODE 250: Performed by: PHYSICIAN ASSISTANT

## 2018-02-14 PROCEDURE — 25000003 PHARM REV CODE 250: Performed by: NURSE PRACTITIONER

## 2018-02-14 PROCEDURE — 11000001 HC ACUTE MED/SURG PRIVATE ROOM

## 2018-02-14 PROCEDURE — 36415 COLL VENOUS BLD VENIPUNCTURE: CPT

## 2018-02-14 PROCEDURE — 25000003 PHARM REV CODE 250: Performed by: EMERGENCY MEDICINE

## 2018-02-14 PROCEDURE — 85025 COMPLETE CBC W/AUTO DIFF WBC: CPT

## 2018-02-14 PROCEDURE — G0378 HOSPITAL OBSERVATION PER HR: HCPCS

## 2018-02-14 RX ORDER — DIVALPROEX SODIUM 250 MG/1
250 TABLET, DELAYED RELEASE ORAL EVERY 12 HOURS
Status: DISCONTINUED | OUTPATIENT
Start: 2018-02-14 | End: 2018-03-14 | Stop reason: SDUPTHER

## 2018-02-14 RX ADMIN — ENOXAPARIN SODIUM 40 MG: 100 INJECTION SUBCUTANEOUS at 08:02

## 2018-02-14 RX ADMIN — DIVALPROEX SODIUM 250 MG: 250 TABLET, DELAYED RELEASE ORAL at 08:02

## 2018-02-14 RX ADMIN — CARBIDOPA AND LEVODOPA 1 TABLET: 25; 100 TABLET ORAL at 06:02

## 2018-02-14 RX ADMIN — DIVALPROEX SODIUM 250 MG: 250 TABLET, DELAYED RELEASE ORAL at 06:02

## 2018-02-14 RX ADMIN — DOCUSATE SODIUM 100 MG: 100 CAPSULE, LIQUID FILLED ORAL at 09:02

## 2018-02-14 RX ADMIN — DOCUSATE SODIUM 100 MG: 100 CAPSULE, LIQUID FILLED ORAL at 08:02

## 2018-02-14 RX ADMIN — AMLODIPINE BESYLATE 10 MG: 5 TABLET ORAL at 09:02

## 2018-02-14 RX ADMIN — RIVASTIGMINE TRANSDERMAL SYSTEM 1 PATCH: 4.6 PATCH, EXTENDED RELEASE TRANSDERMAL at 09:02

## 2018-02-14 RX ADMIN — CARBIDOPA AND LEVODOPA 1 TABLET: 25; 100 TABLET ORAL at 02:02

## 2018-02-14 RX ADMIN — CARBIDOPA AND LEVODOPA 1 TABLET: 25; 100 TABLET ORAL at 12:02

## 2018-02-14 RX ADMIN — QUETIAPINE FUMARATE 25 MG: 25 TABLET ORAL at 08:02

## 2018-02-14 RX ADMIN — CARBIDOPA AND LEVODOPA 1 TABLET: 25; 100 TABLET ORAL at 08:02

## 2018-02-14 NOTE — ASSESSMENT & PLAN NOTE
Seen by ofe who started on Depakote; more awake today  Continue sinemet as ordered as well as seroquel  Restart Exelon patch  Decrease divalproex 250 mg to BID from TID  Continue holding trazadone

## 2018-02-14 NOTE — ASSESSMENT & PLAN NOTE
Awaiting placement  Add PT/OT for ambulation  PPD already placed and read last week 2/7/18  monitor      TN consulted to aid in safe discharge planning. Appears patient has been living with a significant other who can no longer care for him and refusing to take patient home. His somewhat estranged daughter has been contacted and is willing to aid in finding NH placement (she had already begun this process as an outpatient). There is an issue, however, in that patient's power of  refused to sign consent for NH placement. Patient unable to make his own decision regarding this as he is with intermittent confusion +/- previous unclear dementia diagnosis per daughter. Psychiatry consulted for capacity. Also recommended adding depakote 250mg TID to help with mood stabilization. He is medically cleared and was initially with plan for home with HH and SW to aid in outpatient placement, however, cannot safely discharge as he does not have a home to return to.

## 2018-02-14 NOTE — ASSESSMENT & PLAN NOTE
Awaiting placement - Add PT/OT for ambulation and administer PPD - monitor      TN consulted to aid in safe discharge planning. Appears patient has been living with a significant other who can no longer care for him and refusing to take patient home. His somewhat estranged daughter has been contacted and is willing to aid in finding NH placement (she had already begun this process as an outpatient). There is an issue, however, in that patient's power of  refused to sign consent for NH placement. Patient unable to make his own decision regarding this as he is with intermittent confusion +/- previous unclear dementia diagnosis per daughter. Psychiatry consulted for capacity. Also recommended adding depakote 250mg TID to help with mood stabilization. He is medically cleared and was initially with plan for home with HH and SW to aid in outpatient placement, however, cannot safely discharge as he does not have a home to return to.

## 2018-02-14 NOTE — SUBJECTIVE & OBJECTIVE
Interval History: no new issues - medication management; awaiting placement PT/OT    Review of Systems   Constitutional: Positive for activity change, appetite change and fatigue. Negative for chills, diaphoresis and fever.   Respiratory: Negative for cough, chest tightness, shortness of breath and wheezing.    Cardiovascular: Negative for chest pain, palpitations and leg swelling.   Gastrointestinal: Negative for abdominal distention, abdominal pain, constipation, diarrhea, nausea and vomiting.   Genitourinary: Negative for dysuria and hematuria.   Musculoskeletal: Negative for joint swelling and neck stiffness.   Neurological: Positive for weakness.   Psychiatric/Behavioral: Positive for confusion.     Objective:     Vital Signs (Most Recent):  Temp: 97.4 °F (36.3 °C) (02/14/18 0808)  Pulse: 82 (02/14/18 0808)  Resp: 18 (02/14/18 0808)  BP: (!) 142/69 (02/14/18 0808)  SpO2: 97 % (02/14/18 0808) Vital Signs (24h Range):  Temp:  [97.1 °F (36.2 °C)-99 °F (37.2 °C)] 97.4 °F (36.3 °C)  Pulse:  [70-88] 82  Resp:  [14-20] 18  SpO2:  [93 %-97 %] 97 %  BP: (142-169)/(67-89) 142/69     Weight: 62.6 kg (138 lb 0.1 oz)  Body mass index is 21.62 kg/m².    Intake/Output Summary (Last 24 hours) at 02/14/18 1032  Last data filed at 02/14/18 0009   Gross per 24 hour   Intake              200 ml   Output                0 ml   Net              200 ml      Physical Exam   Constitutional: He is oriented to person, place, and time. He appears well-developed and well-nourished. No distress.   HENT:   Head: Normocephalic and atraumatic.   Eyes: Pupils are equal, round, and reactive to light.   Neck: Normal range of motion. Neck supple.   Cardiovascular: Normal rate.    Pulmonary/Chest: Effort normal. No respiratory distress. He has no wheezes. He has no rales.   Abdominal: Soft. He exhibits no distension. There is no tenderness.   Genitourinary: Rectal exam shows guaiac negative stool.   Musculoskeletal: Normal range of motion. He  exhibits no edema or tenderness.   Neurological: He is alert and oriented to person, place, and time.   Skin: Skin is warm and dry. He is not diaphoretic.   Psychiatric:   More awake today - answers questions appropriately       Significant Labs:   CBC:   Recent Labs  Lab 02/14/18  0830   WBC 7.81   HGB 14.3   HCT 42.6        CMP: No results for input(s): NA, K, CL, CO2, GLU, BUN, CREATININE, CALCIUM, PROT, ALBUMIN, BILITOT, ALKPHOS, AST, ALT, ANIONGAP, EGFRNONAA in the last 48 hours.    Invalid input(s): ESTGFAFRICA  Cardiac Markers: No results for input(s): CKMB, MYOGLOBIN, BNP, TROPISTAT in the last 48 hours.  Coagulation: No results for input(s): PT, INR, APTT in the last 48 hours.  Lactic Acid: No results for input(s): LACTATE in the last 48 hours.  Lipase: No results for input(s): LIPASE in the last 48 hours.  Lipid Panel: No results for input(s): CHOL, HDL, LDLCALC, TRIG, CHOLHDL in the last 48 hours.  Magnesium: No results for input(s): MG in the last 48 hours.  POCT Glucose: No results for input(s): POCTGLUCOSE in the last 48 hours.  Troponin: No results for input(s): TROPONINI in the last 48 hours.  TSH:   Recent Labs  Lab 09/03/17  2040   TSH 1.206       Significant Imaging:   Imaging Results          CT Head Without Contrast (Final result)  Result time 02/08/18 01:50:57    Final result by Madelin Story MD (02/08/18 01:50:57)                 Impression:        No CT evidence of acute intracranial abnormality.      Electronically signed by: MADELIN STORY  Date:     02/08/18  Time:    01:50              Narrative:    Procedure comments: CT examination of the head was performed from the skull base through the vertex without the use of intravenous contrast using 5-mm axial images.    Comparison: CT head 8/1/2017    Findings:    There is age appropriate generalized cerebral atrophy with compensatory ventricular enlargement and sulcal widening.There are patchy regions of hypoattenuation in the  supratentorial white matter likely consistent with chronic microvascular ischemic changes. There is no evidence of acute intracranial hemorrhage. There is no midline shift or mass effect.The basal cisterns are patent. No extra-axial collections are appreciated.The visualized paranasal sinuses and mastoid air cells are clear.There are postoperative changes of the bilateral globes. The calvarium is intact.

## 2018-02-14 NOTE — PLAN OF CARE
02/14/18 1612   Discharge Reassessment   Assessment Type Discharge Planning Reassessment   Discharge Planning remains in progress.  TN called Pietro Mensah (pt's POA)'s cell phone [phone].  Mr Mensah stated that he was unsure whether he was the POA or HPOA as well.  He will call back with that information once he gets to his office tomorrow.  Awaiting this information to precede with DC plan.  HILTON, Beryl notified.   manager, MALIK Patino LPN II, also notified.

## 2018-02-14 NOTE — PROGRESS NOTES
[facility] Hospital Medicine  Progress Note    Patient Name: Tenzin Ortiz  MRN: [id number]  Patient Class: OP- Observation   Admission Date: 2/7/2018  Length of Stay: 0 days  Attending Physician: Kassandra Alonso MD  Primary Care Provider: Efrain Chavez MD        Subjective:     Principal Problem:Debility    HPI:  Patient is 78 yo male with HTN, HLD, and Parkinson's who presents to ED reportedly for HA. Per ED note patient was complaining of HA but he denies this on my interview. Seems there was some confusion in ED and he was unclear why he was here. During interview with me, patient reports being here for NH placement although he does not appear to be very happy about this. He has been with some issues caring for himself. Currently lives with his girlfriend who reports she can no longer care for him. He states he currently gets around with a walker but sometimes has difficulty getting up out of his chair. Per chart review patient is with Parkinson's and sees neurology, Dr. Castellanos, lastly seen on 2/6/18. He was with hallucinations which were noted to be secondary to dopaminergic agents thus having to significantly reduce dose--she recommended he be admitted for placement at that time but patient declined. He has been attempting placement as an outpatient through PCP but without much luck. He otherwise denies recent illness, fever/chills, CP, SOB, abdominal pain, N/V/D, dysuria. On presentation patient with grossly normal labs/vitals. Placed in observation for what appears to be solely placement although this is something that may need to be continued as outpatient as is not indication for hospitalization.    Hospital Course:  More awake today, decrease depoke to twice daily and restart exelon patch; continue his seroquel and sinemet; continue to hold trazadone - monitor closely - restart - Awaiting placement/social issue with power of  and girlfriend can no longer care for him     Interval  History: no new issues - medication management; awaiting placement PT/OT    Review of Systems   Constitutional: Positive for activity change, appetite change and fatigue. Negative for chills, diaphoresis and fever.   Respiratory: Negative for cough, chest tightness, shortness of breath and wheezing.    Cardiovascular: Negative for chest pain, palpitations and leg swelling.   Gastrointestinal: Negative for abdominal distention, abdominal pain, constipation, diarrhea, nausea and vomiting.   Genitourinary: Negative for dysuria and hematuria.   Musculoskeletal: Negative for joint swelling and neck stiffness.   Neurological: Positive for weakness.   Psychiatric/Behavioral: Positive for confusion.     Objective:     Vital Signs (Most Recent):  Temp: 97.4 °F (36.3 °C) (02/14/18 0808)  Pulse: 82 (02/14/18 0808)  Resp: 18 (02/14/18 0808)  BP: (!) 142/69 (02/14/18 0808)  SpO2: 97 % (02/14/18 0808) Vital Signs (24h Range):  Temp:  [97.1 °F (36.2 °C)-99 °F (37.2 °C)] 97.4 °F (36.3 °C)  Pulse:  [70-88] 82  Resp:  [14-20] 18  SpO2:  [93 %-97 %] 97 %  BP: (142-169)/(67-89) 142/69     Weight: 62.6 kg (138 lb 0.1 oz)  Body mass index is 21.62 kg/m².    Intake/Output Summary (Last 24 hours) at 02/14/18 1032  Last data filed at 02/14/18 0009   Gross per 24 hour   Intake              200 ml   Output                0 ml   Net              200 ml      Physical Exam   Constitutional: He is oriented to person, place, and time. He appears well-developed and well-nourished. No distress.   HENT:   Head: Normocephalic and atraumatic.   Eyes: Pupils are equal, round, and reactive to light.   Neck: Normal range of motion. Neck supple.   Cardiovascular: Normal rate.    Pulmonary/Chest: Effort normal. No respiratory distress. He has no wheezes. He has no rales.   Abdominal: Soft. He exhibits no distension. There is no tenderness.   Genitourinary: Rectal exam shows guaiac negative stool.   Musculoskeletal: Normal range of motion. He exhibits no  edema or tenderness.   Neurological: He is alert and oriented to person, place, and time.   Skin: Skin is warm and dry. He is not diaphoretic.   Psychiatric:   More awake today - answers questions appropriately       Significant Labs:   CBC:   Recent Labs  Lab 02/14/18  0830   WBC 7.81   HGB 14.3   HCT 42.6        CMP: No results for input(s): NA, K, CL, CO2, GLU, BUN, CREATININE, CALCIUM, PROT, ALBUMIN, BILITOT, ALKPHOS, AST, ALT, ANIONGAP, EGFRNONAA in the last 48 hours.    Invalid input(s): ESTGFAFRICA  Cardiac Markers: No results for input(s): CKMB, MYOGLOBIN, BNP, TROPISTAT in the last 48 hours.  Coagulation: No results for input(s): PT, INR, APTT in the last 48 hours.  Lactic Acid: No results for input(s): LACTATE in the last 48 hours.  Lipase: No results for input(s): LIPASE in the last 48 hours.  Lipid Panel: No results for input(s): CHOL, HDL, LDLCALC, TRIG, CHOLHDL in the last 48 hours.  Magnesium: No results for input(s): MG in the last 48 hours.  POCT Glucose: No results for input(s): POCTGLUCOSE in the last 48 hours.  Troponin: No results for input(s): TROPONINI in the last 48 hours.  TSH:   Recent Labs  Lab 09/03/17  2040   TSH 1.206       Significant Imaging:   Imaging Results          CT Head Without Contrast (Final result)  Result time 02/08/18 01:50:57    Final result by Madelin Story MD (02/08/18 01:50:57)                 Impression:        No CT evidence of acute intracranial abnormality.      Electronically signed by: MADELIN STORY  Date:     02/08/18  Time:    01:50              Narrative:    Procedure comments: CT examination of the head was performed from the skull base through the vertex without the use of intravenous contrast using 5-mm axial images.    Comparison: CT head 8/1/2017    Findings:    There is age appropriate generalized cerebral atrophy with compensatory ventricular enlargement and sulcal widening.There are patchy regions of hypoattenuation in the supratentorial  white matter likely consistent with chronic microvascular ischemic changes. There is no evidence of acute intracranial hemorrhage. There is no midline shift or mass effect.The basal cisterns are patent. No extra-axial collections are appreciated.The visualized paranasal sinuses and mastoid air cells are clear.There are postoperative changes of the bilateral globes. The calvarium is intact.                                Assessment/Plan:      * Debility    Awaiting placement  Add PT/OT for ambulation  PPD already placed and read last week 2/7/18  monitor      TN consulted to aid in safe discharge planning. Appears patient has been living with a significant other who can no longer care for him and refusing to take patient home. His somewhat estranged daughter has been contacted and is willing to aid in finding NH placement (she had already begun this process as an outpatient). There is an issue, however, in that patient's power of  refused to sign consent for NH placement. Patient unable to make his own decision regarding this as he is with intermittent confusion +/- previous unclear dementia diagnosis per daughter. Psychiatry consulted for capacity. Also recommended adding depakote 250mg TID to help with mood stabilization. He is medically cleared and was initially with plan for home with HH and SW to aid in outpatient placement, however, cannot safely discharge as he does not have a home to return to.           Lewy body dementia with behavioral disturbance    Seen by [facility] who started on Depakote; more awake today  Continue sinemet as ordered as well as seroquel  Restart Exelon patch  Decrease divalproex 250 mg to BID from TID  Continue holding trazadone        Parkinson disease    Seroquel 25 mg; sinemet  mg;   Discontinue taxodione 50 mg  Restart exelon 4.6 mg in the morning when he is more awake        Dehydration    Per ED patient admitted for dehydration. His BUN is elevated with normal  creatinine--may be mild clinical dehydration. Will provide gentle IVF hydration.           Hyperlipidemia    Continue statin.         Essential hypertension    Patient BP well controlled on presentation. Will continue home anti hypertensive regimen and monitor.               VTE Risk Mitigation         Ordered     enoxaparin injection 40 mg  Daily     Route:  Subcutaneous        02/13/18 1149     Medium Risk of VTE  Once      02/08/18 0653     Place JOSEFINA hose  Until discontinued      02/08/18 0653     Place sequential compression device  Until discontinued      02/08/18 0653              SANDRO Vizcaino, FNP-C  Hospitalist - Department of Hospital Medicine  [address] [place] [address]  Office [phone]; Pager [phone]

## 2018-02-15 LAB
ALBUMIN SERPL BCP-MCNC: 3.5 G/DL
ALP SERPL-CCNC: 65 U/L
ALT SERPL W/O P-5'-P-CCNC: <5 U/L
ANION GAP SERPL CALC-SCNC: 10 MMOL/L
AST SERPL-CCNC: 15 U/L
BILIRUB SERPL-MCNC: 0.5 MG/DL
BUN SERPL-MCNC: 30 MG/DL
CALCIUM SERPL-MCNC: 9.3 MG/DL
CHLORIDE SERPL-SCNC: 107 MMOL/L
CO2 SERPL-SCNC: 22 MMOL/L
CREAT SERPL-MCNC: 1.1 MG/DL
EST. GFR  (AFRICAN AMERICAN): >60 ML/MIN/1.73 M^2
EST. GFR  (NON AFRICAN AMERICAN): >60 ML/MIN/1.73 M^2
GLUCOSE SERPL-MCNC: 81 MG/DL
POTASSIUM SERPL-SCNC: 4.2 MMOL/L
PROT SERPL-MCNC: 6.2 G/DL
SODIUM SERPL-SCNC: 139 MMOL/L

## 2018-02-15 PROCEDURE — 36415 COLL VENOUS BLD VENIPUNCTURE: CPT

## 2018-02-15 PROCEDURE — 63600175 PHARM REV CODE 636 W HCPCS: Performed by: NURSE PRACTITIONER

## 2018-02-15 PROCEDURE — 96372 THER/PROPH/DIAG INJ SC/IM: CPT

## 2018-02-15 PROCEDURE — 25000003 PHARM REV CODE 250: Performed by: NURSE PRACTITIONER

## 2018-02-15 PROCEDURE — 97530 THERAPEUTIC ACTIVITIES: CPT

## 2018-02-15 PROCEDURE — 11000001 HC ACUTE MED/SURG PRIVATE ROOM

## 2018-02-15 PROCEDURE — 80053 COMPREHEN METABOLIC PANEL: CPT

## 2018-02-15 PROCEDURE — G0378 HOSPITAL OBSERVATION PER HR: HCPCS

## 2018-02-15 PROCEDURE — 25000003 PHARM REV CODE 250: Performed by: EMERGENCY MEDICINE

## 2018-02-15 PROCEDURE — 97535 SELF CARE MNGMENT TRAINING: CPT

## 2018-02-15 RX ADMIN — AMLODIPINE BESYLATE 10 MG: 5 TABLET ORAL at 08:02

## 2018-02-15 RX ADMIN — RIVASTIGMINE TRANSDERMAL SYSTEM 1 PATCH: 4.6 PATCH, EXTENDED RELEASE TRANSDERMAL at 08:02

## 2018-02-15 RX ADMIN — QUETIAPINE FUMARATE 25 MG: 25 TABLET ORAL at 09:02

## 2018-02-15 RX ADMIN — CARBIDOPA AND LEVODOPA 1 TABLET: 25; 100 TABLET ORAL at 02:02

## 2018-02-15 RX ADMIN — DIVALPROEX SODIUM 250 MG: 250 TABLET, DELAYED RELEASE ORAL at 08:02

## 2018-02-15 RX ADMIN — DOCUSATE SODIUM 100 MG: 100 CAPSULE, LIQUID FILLED ORAL at 08:02

## 2018-02-15 RX ADMIN — CARBIDOPA AND LEVODOPA 1 TABLET: 25; 100 TABLET ORAL at 06:02

## 2018-02-15 RX ADMIN — CARBIDOPA AND LEVODOPA 1 TABLET: 25; 100 TABLET ORAL at 11:02

## 2018-02-15 RX ADMIN — DIVALPROEX SODIUM 250 MG: 250 TABLET, DELAYED RELEASE ORAL at 09:02

## 2018-02-15 RX ADMIN — ENOXAPARIN SODIUM 40 MG: 100 INJECTION SUBCUTANEOUS at 05:02

## 2018-02-15 RX ADMIN — CARBIDOPA AND LEVODOPA 1 TABLET: 25; 100 TABLET ORAL at 05:02

## 2018-02-15 RX ADMIN — DOCUSATE SODIUM 100 MG: 100 CAPSULE, LIQUID FILLED ORAL at 09:02

## 2018-02-15 NOTE — PT/OT/SLP PROGRESS
Physical Therapy Treatment    Patient Name:  Tenzin Ortiz   MRN:  [id number]    Recommendations:     Discharge Recommendations:   (PT at next level of care)   Discharge Equipment Recommendations:  (TBD)   Barriers to discharge: family unable to care for patient    Assessment:     Tenzin Ortiz is a 77 y.o. male admitted with a medical diagnosis of Debility.  He presents with the following impairments/functional limitations:  weakness, impaired endurance, impaired functional mobilty, gait instability, impaired balance, decreased lower extremity function, decreased safety awareness, impaired coordination, decreased ROM, impaired joint extensibility, impaired muscle length, impaired fine motor, decreased upper extremity function, impaired self care skills. Patient with limited ambulation today due to increased tremors and posterior lean in standing.     Rehab Prognosis:  fair; patient would benefit from acute skilled PT services to address these deficits and reach maximum level of function.      Recent Surgery: * No surgery found *      Plan:     During this hospitalization, patient to be seen 3 x/week to address the above listed problems via gait training, therapeutic activities, therapeutic exercises  · Plan of Care Expires:  02/22/18   Plan of Care Reviewed with: patient    Subjective     Communicated with nurse Addison prior to session.  Patient found supine in bed upon PT entry to room, agreeable to treatment.      Chief Complaint: Weakness.   Patient comments/goals: To walk.   Pain/Comfort:  · Pain Rating 1: 0/10    Objective:     Patient found with: peripheral IV     General Precautions: Standard, fall   Orthopedic Precautions:N/A   Braces: N/A     Functional Mobility:  · Bed Mobility:     · Supine to Sit: stand by assistance  · Sit to Supine: stand by assistance  · Transfers:     · Sit to Stand:  moderate assistance with rolling walker x2 trials from bed with RW  · Gait:  ~3 side steps along edge of bed with  posterior lean and increased tremors with mod A and RW    AM-PAC 6 CLICK MOBILITY  Turning over in bed (including adjusting bedclothes, sheets and blankets)?: 3  Sitting down on and standing up from a chair with arms (e.g., wheelchair, bedside commode, etc.): 3  Moving from lying on back to sitting on the side of the bed?: 3  Moving to and from a bed to a chair (including a wheelchair)?: 3  Need to walk in hospital room?: 3  Climbing 3-5 steps with a railing?: 1  Total Score: 16     Therapeutic Activities and Exercises:   Patient performed B LE seated therex on edge of bed 2x10 for LAQ and hip flex.     Patient left supine with all lines intact, call button in reach and nurse notified.    GOALS:    Physical Therapy Goals        Problem: Physical Therapy Goal    Goal Priority Disciplines Outcome Goal Variances Interventions   Physical Therapy Goal     PT/OT, PT Ongoing (interventions implemented as appropriate)     Description:  Goals to be met by: 18    Patient will increase functional independence with mobility by performin. Supine to sit with Stand-by Assistance  2. Sit to stand transfer with Stand-by Assistance  3. Gait  x 150 feet with Stand-by Assistance using Rolling Walker  4. Lower extremity exercise program x30 reps per handout, with assistance as needed                    Time Tracking:     PT Received On: 02/15/18  PT Start Time: 1556     PT Stop Time: 1613  PT Total Time (min): 17 min     Billable Minutes: Therapeutic Activity  17     Treatment Type: Treatment  PT/PTA: PT     PTA Visit Number: 0     Dominga Esquivel, PT  02/15/2018

## 2018-02-15 NOTE — PT/OT/SLP PROGRESS
"Occupational Therapy   Treatment    Name: Tenzin Ortiz  MRN: [id number]  Admitting Diagnosis:  Debility       Recommendations:     Discharge Recommendations:  home health OT  Discharge Equipment Recommendations:  bedside commode, bath bench  Barriers to discharge:  Decreased caregiver support    Subjective     Communicated with: Nurse Addison prior to session.  Patient agreeable to therapy. Patient stated   "I have no strength"  Pain/Comfort:  · Pain Rating 1:  (pt c/o L hip pain)  · Pain Addressed 1: Reposition, Distraction, Cessation of Activity, Nurse notified    Patients cultural, spiritual, Baptism conflicts given the current situation:      Objective:     Patient found with:      General Precautions: Standard, fall   Orthopedic Precautions:N/A   Braces: N/A     Occupational Performance:    Bed Mobility:    · Patient completed Scooting/Bridging with minimum assistance to scoot EOB  · Patient completed Supine to Sit with contact guard assistance  · Patient completed Sit to Supine with contact guard assistance     Functional Mobility/Transfers:  · Patient completed Sit <> Stand Transfer with minimum assistance and moderate assistance  with  rolling walker x 3 trials from EOB.   Functional Mobility: Patient with posterior lean noted during transfers;able to take a few side steps toward HOB with min-mod A/RW and max verbal cues. Patient has difficulty with mobility and needs assist.    Activities of Daily Living:  · Grooming: contact guard assistance for oral care seated EOB  · UB Dressing: minimum assistance to don/doff back gown  · LB Dressing: maximal assistance ; only able to doff R sock seated EOB    Patient left supine with all lines intact, call button in reach, bed alarm on and nurse notified    AMPA 6 Click:  [facility] Total Score: 13    Treatment & Education:  Patient performed bed mobility, ADL's and functional transfers as above.  Patient sat EOB ~18 minutes with SBA.  Education:    Assessment:     Tenzin WALLIS" Diana is a 77 y.o. male with a medical diagnosis of Debility.  He presents with the following performance deficits affecting function:  weakness, impaired endurance, impaired self care skills, impaired functional mobilty, gait instability, impaired balance, decreased upper extremity function, decreased lower extremity function, decreased coordination, impaired coordination, decreased ROM, impaired joint extensibility, impaired muscle length, impaired fine motor, decreased safety awareness.  Patient with good participation in OT today and tolerated treatment well. Patient requires increased time to complete tasks and requires assist for mobility;min-mod A/RW to stand due to posterior leaning.     Rehab Prognosis:  fair; patient would benefit from acute skilled OT services to address these deficits and reach maximum level of function.       Plan:     Patient to be seen 3 x/week to address the above listed problems via self-care/home management, therapeutic activities, therapeutic exercises  · Plan of Care Expires: 02/16/18  · Plan of Care Reviewed with: patient    This Plan of care has been discussed with the patient who was involved in its development and understands and is in agreement with the identified goals and treatment plan    GOALS:    Occupational Therapy Goals        Problem: Occupational Therapy Goal    Goal Priority Disciplines Outcome Interventions   Occupational Therapy Goal     OT, PT/OT Ongoing (interventions implemented as appropriate)    Description:  Goals to be met by: 2/17/18     Patient will increase functional independence with ADLs by performing:    Feeding with Set-up Assistance.  UE Dressing with Set-up Assistance.  Grooming while seated with Set-up Assistance.  Stand pivot transfers with Contact Guard Assistance.  Toilet transfer to bedside commode with Minimal Assistance.  Upper extremity exercise program x10 reps , with supervision.                      Time Tracking:     OT Date of  Treatment: 02/15/18  OT Start Time: 1125  OT Stop Time: 1151  OT Total Time (min): 26 min    Billable Minutes:Self Care/Home Management 13  Therapeutic Activity 13    DANIELLE Gomez  2/15/2018

## 2018-02-15 NOTE — PROGRESS NOTES
Spoke with Mr Pietro Mensah he is at his office currently trying to find paperwork. Mr Mensah requested my office phone number for him to return call shortly. Number provided for follow up.

## 2018-02-15 NOTE — PROGRESS NOTES
[facility] Hospital Medicine  Progress Note    Patient Name: Tenzin Ortiz  MRN: [id number]  Patient Class: OP- Observation   Admission Date: 2/7/2018  Length of Stay: 0 days  Attending Physician: Kassandra Alonso MD  Primary Care Provider: Efrain Chavez MD        Subjective:     Principal Problem:Debility    HPI:  Patient is 76 yo male with HTN, HLD, and Parkinson's who presents to ED reportedly for HA. Per ED note patient was complaining of HA but he denies this on my interview. Seems there was some confusion in ED and he was unclear why he was here. During interview with me, patient reports being here for NH placement although he does not appear to be very happy about this. He has been with some issues caring for himself. Currently lives with his girlfriend who reports she can no longer care for him. He states he currently gets around with a walker but sometimes has difficulty getting up out of his chair. Per chart review patient is with Parkinson's and sees neurology, Dr. Castellanos, lastly seen on 2/6/18. He was with hallucinations which were noted to be secondary to dopaminergic agents thus having to significantly reduce dose--she recommended he be admitted for placement at that time but patient declined. He has been attempting placement as an outpatient through PCP but without much luck. He otherwise denies recent illness, fever/chills, CP, SOB, abdominal pain, N/V/D, dysuria. On presentation patient with grossly normal labs/vitals. Placed in observation for what appears to be solely placement although this is something that may need to be continued as outpatient as is not indication for hospitalization.    Hospital Course:  Awake and alert - severe parkinsons disease -  Awaiting placement/social issue with power of  and girlfriend can no longer care for him - girlfriend at the bedside    Interval History: No new issues - awaiting placement    Review of Systems   Constitutional: Positive  for activity change, appetite change and fatigue. Negative for chills, diaphoresis and fever.   Respiratory: Negative for cough, chest tightness, shortness of breath and wheezing.    Cardiovascular: Negative for chest pain, palpitations and leg swelling.   Gastrointestinal: Negative for abdominal distention, abdominal pain, constipation, diarrhea, nausea and vomiting.   Genitourinary: Negative for dysuria and hematuria.   Musculoskeletal: Negative for joint swelling and neck stiffness.   Neurological: Positive for weakness.   Psychiatric/Behavioral: Positive for confusion.     Objective:     Vital Signs (Most Recent):  Temp: 97.5 °F (36.4 °C) (02/15/18 1115)  Pulse: 78 (02/15/18 1115)  Resp: 18 (02/15/18 1115)  BP: (!) 154/60 (02/15/18 1115)  SpO2: 98 % (02/15/18 1115) Vital Signs (24h Range):  Temp:  [97.5 °F (36.4 °C)-98.3 °F (36.8 °C)] 97.5 °F (36.4 °C)  Pulse:  [68-78] 78  Resp:  [14-20] 18  SpO2:  [96 %-100 %] 98 %  BP: (122-154)/(56-70) 154/60     Weight: 63.8 kg (140 lb 10.5 oz)  Body mass index is 22.03 kg/m².    Intake/Output Summary (Last 24 hours) at 02/15/18 1306  Last data filed at 02/14/18 1800   Gross per 24 hour   Intake              360 ml   Output                0 ml   Net              360 ml      Physical Exam   Constitutional: He is oriented to person, place, and time. He appears well-developed and well-nourished. No distress.   HENT:   Head: Normocephalic and atraumatic.   Eyes: Pupils are equal, round, and reactive to light.   Neck: Normal range of motion. Neck supple.   Cardiovascular: Normal rate.    Pulmonary/Chest: Effort normal. No respiratory distress. He has no wheezes. He has no rales.   Abdominal: Soft. He exhibits no distension. There is no tenderness.   Genitourinary: Rectal exam shows guaiac negative stool.   Musculoskeletal: Normal range of motion. He exhibits no edema or tenderness.   Neurological: He is alert and oriented to person, place, and time.   Skin: Skin is warm and dry.  He is not diaphoretic.   Psychiatric:   More awake today - answers questions appropriately       Significant Labs:   CBC:   Recent Labs  Lab 02/14/18  0830   WBC 7.81   HGB 14.3   HCT 42.6        CMP:   Recent Labs  Lab 02/15/18  0532      K 4.2      CO2 22*   GLU 81   BUN 30*   CREATININE 1.1   CALCIUM 9.3   PROT 6.2   ALBUMIN 3.5   BILITOT 0.5   ALKPHOS 65   AST 15   ALT <5*   ANIONGAP 10   EGFRNONAA >60     Cardiac Markers: No results for input(s): CKMB, MYOGLOBIN, BNP, TROPISTAT in the last 48 hours.  Lipase: No results for input(s): LIPASE in the last 48 hours.  Lipid Panel: No results for input(s): CHOL, HDL, LDLCALC, TRIG, CHOLHDL in the last 48 hours.  Magnesium: No results for input(s): MG in the last 48 hours.  Troponin: No results for input(s): TROPONINI in the last 48 hours.  TSH:   Recent Labs  Lab 09/03/17  2040   TSH 1.206     Significant Imaging:   Imaging Results          CT Head Without Contrast (Final result)  Result time 02/08/18 01:50:57    Final result by Madelin Story MD (02/08/18 01:50:57)                 Impression:        No CT evidence of acute intracranial abnormality.      Electronically signed by: MADELIN STORY  Date:     02/08/18  Time:    01:50              Narrative:    Procedure comments: CT examination of the head was performed from the skull base through the vertex without the use of intravenous contrast using 5-mm axial images.    Comparison: CT head 8/1/2017    Findings:    There is age appropriate generalized cerebral atrophy with compensatory ventricular enlargement and sulcal widening.There are patchy regions of hypoattenuation in the supratentorial white matter likely consistent with chronic microvascular ischemic changes. There is no evidence of acute intracranial hemorrhage. There is no midline shift or mass effect.The basal cisterns are patent. No extra-axial collections are appreciated.The visualized paranasal sinuses and mastoid air cells are  clear.There are postoperative changes of the bilateral globes. The calvarium is intact.                                Assessment/Plan:      * Debility    Awaiting placement  Add PT/OT for ambulation  PPD already placed and read last week 2/7/18  monitor      TN consulted to aid in safe discharge planning. Appears patient has been living with a significant other who can no longer care for him and refusing to take patient home. His somewhat estranged daughter has been contacted and is willing to aid in finding NH placement (she had already begun this process as an outpatient). There is an issue, however, in that patient's power of  refused to sign consent for NH placement. Patient unable to make his own decision regarding this as he is with intermittent confusion +/- previous unclear dementia diagnosis per daughter. Psychiatry consulted for capacity. Also recommended adding depakote 250mg TID to help with mood stabilization. He is medically cleared and was initially with plan for home with HH and SW to aid in outpatient placement, however, cannot safely discharge as he does not have a home to return to.           Lewy body dementia with behavioral disturbance    Seen by psche who started on Depakote; more awake today  Continue sinemet as ordered as well as seroquel  Restart Exelon patch  Decrease divalproex 250 mg to BID from TID  Continue holding trazadone        Parkinson disease    Seroquel 25 mg; sinemet  mg;   Discontinue taxodione 50 mg  Restart exelon 4.6 mg in the morning when he is more awake        Dehydration    Per ED patient admitted for dehydration. His BUN is elevated with normal creatinine--may be mild clinical dehydration. Will provide gentle IVF hydration.           Hyperlipidemia    Continue statin.         Essential hypertension    Patient BP well controlled on presentation. Will continue home anti hypertensive regimen and monitor.               VTE Risk Mitigation         Ordered      enoxaparin injection 40 mg  Daily     Route:  Subcutaneous        02/13/18 1149     Medium Risk of VTE  Once      02/08/18 0653     Place JOSEFINA hose  Until discontinued      02/08/18 0653     Place sequential compression device  Until discontinued      02/08/18 0653            SANDRO Vizcaino, FNP-C  Hospitalist - Department of Hospital Medicine  [address] [place] [address]  Office [phone]; Pager [phone]

## 2018-02-15 NOTE — PLAN OF CARE
Problem: Patient Care Overview  Goal: Plan of Care Review   02/14/18 1930   Coping/Psychosocial   Plan Of Care Reviewed With patient   Pt remained free of falls during current shift. Denied pain and did not receive any prn pain medications. Psych consulted and deemed pt incompetent to make medical decisions. Awaiting discharge plan. Plan of care and fall precautions reviewed with pt and verbalized understanding. Bed locked, lowered, SR up x2 and call light placed within reach.

## 2018-02-15 NOTE — PLAN OF CARE
Problem: Physical Therapy Goal  Goal: Physical Therapy Goal  Goals to be met by: 18    Patient will increase functional independence with mobility by performin. Supine to sit with Stand-by Assistance  2. Sit to stand transfer with Stand-by Assistance  3. Gait  x 150 feet with Stand-by Assistance using Rolling Walker  4. Lower extremity exercise program x30 reps per handout, with assistance as needed   Outcome: Ongoing (interventions implemented as appropriate)    Patient would continue to benefit from PT while in the hospital.

## 2018-02-15 NOTE — SUBJECTIVE & OBJECTIVE
Interval History: No new issues - awaiting placement    Review of Systems   Constitutional: Positive for activity change, appetite change and fatigue. Negative for chills, diaphoresis and fever.   Respiratory: Negative for cough, chest tightness, shortness of breath and wheezing.    Cardiovascular: Negative for chest pain, palpitations and leg swelling.   Gastrointestinal: Negative for abdominal distention, abdominal pain, constipation, diarrhea, nausea and vomiting.   Genitourinary: Negative for dysuria and hematuria.   Musculoskeletal: Negative for joint swelling and neck stiffness.   Neurological: Positive for weakness.   Psychiatric/Behavioral: Positive for confusion.     Objective:     Vital Signs (Most Recent):  Temp: 97.5 °F (36.4 °C) (02/15/18 1115)  Pulse: 78 (02/15/18 1115)  Resp: 18 (02/15/18 1115)  BP: (!) 154/60 (02/15/18 1115)  SpO2: 98 % (02/15/18 1115) Vital Signs (24h Range):  Temp:  [97.5 °F (36.4 °C)-98.3 °F (36.8 °C)] 97.5 °F (36.4 °C)  Pulse:  [68-78] 78  Resp:  [14-20] 18  SpO2:  [96 %-100 %] 98 %  BP: (122-154)/(56-70) 154/60     Weight: 63.8 kg (140 lb 10.5 oz)  Body mass index is 22.03 kg/m².    Intake/Output Summary (Last 24 hours) at 02/15/18 1306  Last data filed at 02/14/18 1800   Gross per 24 hour   Intake              360 ml   Output                0 ml   Net              360 ml      Physical Exam   Constitutional: He is oriented to person, place, and time. He appears well-developed and well-nourished. No distress.   HENT:   Head: Normocephalic and atraumatic.   Eyes: Pupils are equal, round, and reactive to light.   Neck: Normal range of motion. Neck supple.   Cardiovascular: Normal rate.    Pulmonary/Chest: Effort normal. No respiratory distress. He has no wheezes. He has no rales.   Abdominal: Soft. He exhibits no distension. There is no tenderness.   Genitourinary: Rectal exam shows guaiac negative stool.   Musculoskeletal: Normal range of motion. He exhibits no edema or  tenderness.   Neurological: He is alert and oriented to person, place, and time.   Skin: Skin is warm and dry. He is not diaphoretic.   Psychiatric:   More awake today - answers questions appropriately       Significant Labs:   CBC:   Recent Labs  Lab 02/14/18  0830   WBC 7.81   HGB 14.3   HCT 42.6        CMP:   Recent Labs  Lab 02/15/18  0532      K 4.2      CO2 22*   GLU 81   BUN 30*   CREATININE 1.1   CALCIUM 9.3   PROT 6.2   ALBUMIN 3.5   BILITOT 0.5   ALKPHOS 65   AST 15   ALT <5*   ANIONGAP 10   EGFRNONAA >60     Cardiac Markers: No results for input(s): CKMB, MYOGLOBIN, BNP, TROPISTAT in the last 48 hours.  Lipase: No results for input(s): LIPASE in the last 48 hours.  Lipid Panel: No results for input(s): CHOL, HDL, LDLCALC, TRIG, CHOLHDL in the last 48 hours.  Magnesium: No results for input(s): MG in the last 48 hours.  Troponin: No results for input(s): TROPONINI in the last 48 hours.  TSH:   Recent Labs  Lab 09/03/17  2040   TSH 1.206     Significant Imaging:   Imaging Results          CT Head Without Contrast (Final result)  Result time 02/08/18 01:50:57    Final result by Madelin Story MD (02/08/18 01:50:57)                 Impression:        No CT evidence of acute intracranial abnormality.      Electronically signed by: MADELIN STORY  Date:     02/08/18  Time:    01:50              Narrative:    Procedure comments: CT examination of the head was performed from the skull base through the vertex without the use of intravenous contrast using 5-mm axial images.    Comparison: CT head 8/1/2017    Findings:    There is age appropriate generalized cerebral atrophy with compensatory ventricular enlargement and sulcal widening.There are patchy regions of hypoattenuation in the supratentorial white matter likely consistent with chronic microvascular ischemic changes. There is no evidence of acute intracranial hemorrhage. There is no midline shift or mass effect.The basal cisterns are  patent. No extra-axial collections are appreciated.The visualized paranasal sinuses and mastoid air cells are clear.There are postoperative changes of the bilateral globes. The calvarium is intact.

## 2018-02-15 NOTE — PLAN OF CARE
Problem: Occupational Therapy Goal  Goal: Occupational Therapy Goal  Goals to be met by: 2/17/18     Patient will increase functional independence with ADLs by performing:    Feeding with Set-up Assistance.  UE Dressing with Set-up Assistance.  Grooming while seated with Set-up Assistance.  Stand pivot transfers with Contact Guard Assistance.  Toilet transfer to bedside commode with Minimal Assistance.  Upper extremity exercise program x10 reps , with supervision.     Outcome: Ongoing (interventions implemented as appropriate)   Patient with good participation in OT today and tolerated treatment well. Patient requires increased time to complete tasks and requires assist for mobility;min-mod A/RW to stand due to posterior leaning today. Patient will benefit from continued OT.

## 2018-02-15 NOTE — PROGRESS NOTES
Call placed to  Hand cell phone [phone]. Requested call back to follow up on POA, HPOA paper work. Voice Mail answered message left.

## 2018-02-16 PROCEDURE — 25000003 PHARM REV CODE 250: Performed by: NURSE PRACTITIONER

## 2018-02-16 PROCEDURE — 25000003 PHARM REV CODE 250: Performed by: EMERGENCY MEDICINE

## 2018-02-16 PROCEDURE — 63600175 PHARM REV CODE 636 W HCPCS: Performed by: NURSE PRACTITIONER

## 2018-02-16 PROCEDURE — 97535 SELF CARE MNGMENT TRAINING: CPT

## 2018-02-16 PROCEDURE — G0378 HOSPITAL OBSERVATION PER HR: HCPCS

## 2018-02-16 PROCEDURE — 96372 THER/PROPH/DIAG INJ SC/IM: CPT

## 2018-02-16 PROCEDURE — 97530 THERAPEUTIC ACTIVITIES: CPT

## 2018-02-16 PROCEDURE — 11000001 HC ACUTE MED/SURG PRIVATE ROOM

## 2018-02-16 RX ADMIN — DOCUSATE SODIUM 100 MG: 100 CAPSULE, LIQUID FILLED ORAL at 08:02

## 2018-02-16 RX ADMIN — AMLODIPINE BESYLATE 10 MG: 5 TABLET ORAL at 08:02

## 2018-02-16 RX ADMIN — DIVALPROEX SODIUM 250 MG: 250 TABLET, DELAYED RELEASE ORAL at 08:02

## 2018-02-16 RX ADMIN — ENOXAPARIN SODIUM 40 MG: 100 INJECTION SUBCUTANEOUS at 04:02

## 2018-02-16 RX ADMIN — CARBIDOPA AND LEVODOPA 1 TABLET: 25; 100 TABLET ORAL at 12:02

## 2018-02-16 RX ADMIN — CARBIDOPA AND LEVODOPA 1 TABLET: 25; 100 TABLET ORAL at 05:02

## 2018-02-16 RX ADMIN — CARBIDOPA AND LEVODOPA 1 TABLET: 25; 100 TABLET ORAL at 11:02

## 2018-02-16 RX ADMIN — RIVASTIGMINE TRANSDERMAL SYSTEM 1 PATCH: 4.6 PATCH, EXTENDED RELEASE TRANSDERMAL at 08:02

## 2018-02-16 RX ADMIN — QUETIAPINE FUMARATE 25 MG: 25 TABLET ORAL at 08:02

## 2018-02-16 NOTE — PLAN OF CARE
Problem: Patient Care Overview  Goal: Plan of Care Review   02/15/18 1930   Coping/Psychosocial   Plan Of Care Reviewed With patient   Pt remained free of falls during current shift. Turned Q2H. Denied pain and did not receive any prn pain medications. Psych consulted and deemed pt incompetent to make medical decisions. Awaiting discharge plan. Plan of care and fall precautions reviewed with pt and verbalized understanding. Bed locked, lowered, SR up x2 and call light placed within reach.

## 2018-02-16 NOTE — PROGRESS NOTES
Called placed to Mr Mensah cell phone [phone]. Left voice mail message that I was calling again related to the paperwork and moving Mr Ortiz to the appropriate care facility. Awaiting call back.

## 2018-02-16 NOTE — NURSING
PER handoff received from ADONAY Oneal RN. Responds to voice and is oriented x4, however pt can get confused. Tremors are present, however pt denies having any pain and is in no apparent distress. Assessment completed per Doc Flowsheets; reference if needed. Fall and safety precautions maintained. Bed alarm activated and audible. Bed locked in lowest position, with side rails up x2. Call bell and personal items within reach. Will continue to monitor pt for any changes.

## 2018-02-16 NOTE — PLAN OF CARE
Problem: Patient Care Overview  Goal: Plan of Care Review   02/16/18 1609   Coping/Psychosocial   Plan Of Care Reviewed With patient;significant other       Problem: Fall Risk (Adult)  Goal: Identify Related Risk Factors and Signs and Symptoms  Related risk factors and signs and symptoms are identified upon initiation of Human Response Clinical Practice Guideline (CPG)   Outcome: Ongoing (interventions implemented as appropriate)   02/16/18 1609   Fall Risk   Related Risk Factors (Fall Risk) age-related changes;confusion/agitation;gait/mobility problems;sensory deficits   Signs and Symptoms (Fall Risk) presence of risk factors     Goal: Absence of Falls  Patient will demonstrate the desired outcomes by discharge/transition of care.   Outcome: Ongoing (interventions implemented as appropriate)   02/16/18 1609   Fall Risk (Adult)   Absence of Falls making progress toward outcome       Problem: Pressure Ulcer Risk (Mike Scale) (Adult,Obstetrics,Pediatric)  Goal: Identify Related Risk Factors and Signs and Symptoms  Related risk factors and signs and symptoms are identified upon initiation of Human Response Clinical Practice Guideline (CPG)   Outcome: Ongoing (interventions implemented as appropriate)   02/16/18 1609   Pressure Ulcer Risk (Mike Scale)   Related Risk Factors (Pressure Ulcer Risk (Mike Scale)) cognitive impairment     Goal: Skin Integrity  Patient will demonstrate the desired outcomes by discharge/transition of care.   Outcome: Ongoing (interventions implemented as appropriate)   02/16/18 1609   Pressure Ulcer Risk (Mike Scale) (Adult,Obstetrics,Pediatric)   Skin Integrity making progress toward outcome       Problem: Confusion, Acute (Adult)  Goal: Identify Related Risk Factors and Signs and Symptoms  Related risk factors and signs and symptoms are identified upon initiation of Human Response Clinical Practice Guideline (CPG)   Outcome: Ongoing (interventions implemented as appropriate)    02/16/18 1609   Confusion, Acute   Related Risk Factors (Acute Confusion) cognitive impairment;mobility decreased;neurological impairment   Signs and Symptoms (Acute Confusion) disorientation;memory disturbed;thought process diminished/disorganized;understanding disturbed     Goal: Cognitive/Functional Impairments Minimized  Patient will demonstrate the desired outcomes by discharge/transition of care.   Outcome: Ongoing (interventions implemented as appropriate)   02/16/18 1609   Confusion, Acute (Adult)   Cognitive/Functional Impairments Minimized making progress toward outcome     Goal: Safety  Patient will demonstrate the desired outcomes by discharge/transition of care.   Outcome: Ongoing (interventions implemented as appropriate)   02/16/18 1609   Confusion, Acute (Adult)   Safety making progress toward outcome

## 2018-02-16 NOTE — PLAN OF CARE
Problem: Occupational Therapy Goal  Goal: Occupational Therapy Goal  Goals to be met by: 2/17/18     Patient will increase functional independence with ADLs by performing:    Feeding with Set-up Assistance.  UE Dressing with Set-up Assistance.  Grooming while seated with Set-up Assistance. Met 2/16/18  Stand pivot transfers with Contact Guard Assistance.  Toilet transfer to bedside commode with Minimal Assistance.  Upper extremity exercise program x10 reps , with supervision.     Outcome: Ongoing (interventions implemented as appropriate)  Patient able to perform stand pivot bed>bedside chair transfer with Mod A today.  Patient noted with increased tremors and posterior leaning. Patient will benefit from continued skilled OT.

## 2018-02-17 PROBLEM — E78.5 HYPERLIPIDEMIA: Chronic | Status: RESOLVED | Noted: 2017-08-01 | Resolved: 2018-02-17

## 2018-02-17 PROCEDURE — 25000003 PHARM REV CODE 250: Performed by: NURSE PRACTITIONER

## 2018-02-17 PROCEDURE — 25000003 PHARM REV CODE 250: Performed by: EMERGENCY MEDICINE

## 2018-02-17 PROCEDURE — 63600175 PHARM REV CODE 636 W HCPCS: Performed by: NURSE PRACTITIONER

## 2018-02-17 PROCEDURE — 11000001 HC ACUTE MED/SURG PRIVATE ROOM

## 2018-02-17 PROCEDURE — 96372 THER/PROPH/DIAG INJ SC/IM: CPT

## 2018-02-17 PROCEDURE — G0378 HOSPITAL OBSERVATION PER HR: HCPCS

## 2018-02-17 RX ORDER — AMOXICILLIN 250 MG
1 CAPSULE ORAL DAILY PRN
Status: DISCONTINUED | OUTPATIENT
Start: 2018-02-17 | End: 2018-02-19

## 2018-02-17 RX ADMIN — CARBIDOPA AND LEVODOPA 1 TABLET: 25; 100 TABLET ORAL at 12:02

## 2018-02-17 RX ADMIN — AMLODIPINE BESYLATE 10 MG: 5 TABLET ORAL at 09:02

## 2018-02-17 RX ADMIN — DIVALPROEX SODIUM 250 MG: 250 TABLET, DELAYED RELEASE ORAL at 09:02

## 2018-02-17 RX ADMIN — DOCUSATE SODIUM 100 MG: 100 CAPSULE, LIQUID FILLED ORAL at 09:02

## 2018-02-17 RX ADMIN — CARBIDOPA AND LEVODOPA 1 TABLET: 25; 100 TABLET ORAL at 11:02

## 2018-02-17 RX ADMIN — CARBIDOPA AND LEVODOPA 1 TABLET: 25; 100 TABLET ORAL at 06:02

## 2018-02-17 RX ADMIN — QUETIAPINE FUMARATE 25 MG: 25 TABLET ORAL at 09:02

## 2018-02-17 RX ADMIN — CARBIDOPA AND LEVODOPA 1 TABLET: 25; 100 TABLET ORAL at 05:02

## 2018-02-17 RX ADMIN — ENOXAPARIN SODIUM 40 MG: 100 INJECTION SUBCUTANEOUS at 05:02

## 2018-02-17 RX ADMIN — RIVASTIGMINE TRANSDERMAL SYSTEM 1 PATCH: 4.6 PATCH, EXTENDED RELEASE TRANSDERMAL at 09:02

## 2018-02-17 NOTE — PROGRESS NOTES
[facility] Hospital Medicine  Progress Note    Patient Name: Tenzin Ortiz  MRN: [id number]  Patient Class: OP- Observation   Admission Date: 2/7/2018  Length of Stay: 0 days  Attending Physician: Kassandra Alonso MD  Primary Care Provider: Efrain Chavez MD        Subjective:     Principal Problem:Debility    HPI:  Patient is 76 yo male with HTN, HLD, and Parkinson's who presents to ED reportedly for HA. Per ED note patient was complaining of HA but he denies this on my interview. Seems there was some confusion in ED and he was unclear why he was here. During interview with me, patient reports being here for NH placement although he does not appear to be very happy about this. He has been with some issues caring for himself. Currently lives with his girlfriend who reports she can no longer care for him. He states he currently gets around with a walker but sometimes has difficulty getting up out of his chair. Per chart review patient is with Parkinson's and sees neurology, Dr. Castellanos, lastly seen on 2/6/18. He was with hallucinations which were noted to be secondary to dopaminergic agents thus having to significantly reduce dose--she recommended he be admitted for placement at that time but patient declined. He has been attempting placement as an outpatient through PCP but without much luck. He otherwise denies recent illness, fever/chills, CP, SOB, abdominal pain, N/V/D, dysuria. On presentation patient with grossly normal labs/vitals. Placed in observation for what appears to be solely placement although this is something that may need to be continued as outpatient as is not indication for hospitalization.    Hospital Course:  Awake and alert - severe parkinsons disease and can no longer care for himself - neither can his girlfriend -  Awaiting placement/social issue with power of  and girlfriend can no longer care for him - no new issues - case management following    Interval History: No  new issues / PT for ambulation and case management for placement following    Review of Systems   Constitutional: Positive for fatigue. Negative for activity change, appetite change, chills, diaphoresis and fever.   Respiratory: Negative for cough, chest tightness, shortness of breath and wheezing.    Cardiovascular: Negative for chest pain, palpitations and leg swelling.   Gastrointestinal: Negative for abdominal distention, abdominal pain, constipation, diarrhea, nausea and vomiting.   Genitourinary: Negative for dysuria and hematuria.   Musculoskeletal: Negative for joint swelling and neck stiffness.   Neurological: Positive for tremors. Negative for weakness.   Psychiatric/Behavioral: Positive for confusion.        Intermittent confusion, requires reorienting - mood swings have stabilized - chronic essential tremors     Objective:     Vital Signs (Most Recent):  Temp: 97.9 °F (36.6 °C) (02/17/18 0751)  Pulse: 89 (02/17/18 0751)  Resp: 18 (02/17/18 0751)  BP: 136/84 (02/17/18 0751)  SpO2: 96 % (02/17/18 0751) Vital Signs (24h Range):  Temp:  [97.7 °F (36.5 °C)-98.8 °F (37.1 °C)] 97.9 °F (36.6 °C)  Pulse:  [75-89] 89  Resp:  [16-20] 18  SpO2:  [96 %-100 %] 96 %  BP: (126-164)/(61-84) 136/84     Weight: 65.1 kg (143 lb 8.3 oz)  Body mass index is 22.48 kg/m².    Intake/Output Summary (Last 24 hours) at 02/17/18 0817  Last data filed at 02/17/18 0500   Gross per 24 hour   Intake              540 ml   Output              575 ml   Net              -35 ml      Physical Exam   Constitutional: He appears well-developed and well-nourished. No distress.   HENT:   Head: Normocephalic and atraumatic.   Eyes: Pupils are equal, round, and reactive to light.   Neck: Normal range of motion. Neck supple.   Cardiovascular: Normal rate.    Pulmonary/Chest: Effort normal. No respiratory distress. He has no wheezes. He has no rales.   Abdominal: Soft. He exhibits no distension. There is no tenderness.   Genitourinary: Rectal exam  shows guaiac negative stool.   Musculoskeletal: Normal range of motion. He exhibits no edema or tenderness.   Neurological: He is alert.   Tremors - confusion requires reorienting - chronic essential tremors   Skin: Skin is warm and dry. He is not diaphoretic.   Psychiatric:   More awake today - answers questions appropriately       Significant Labs:  Significant Imaging: I have reviewed and interpreted all pertinent imaging results/findings within the past 24 hours.    Assessment/Plan:      * Debility    Awaiting placement  Add PT/OT following for ambulation  PPD already placed and read last week 2/7/18  Monitor  Case management following  Lovenox 40 mg daily      TN consulted to aid in safe discharge planning. Appears patient has been living with a significant other who can no longer care for him and refusing to take patient home. His somewhat estranged daughter has been contacted and is willing to aid in finding NH placement (she had already begun this process as an outpatient). There is an issue, however, in that patient's power of  refused to sign consent for NH placement. Patient unable to make his own decision regarding this as he is with intermittent confusion +/- previous unclear dementia diagnosis per daughter. Psychiatry consulted for capacity. Also recommended adding depakote 250mg TID to help with mood stabilization. He is medically cleared and was initially with plan for home with HH and SW to aid in outpatient placement, however, cannot safely discharge as he does not have a home to return to.           Lewy body dementia with behavioral disturbance    Seen by psche - appreciate recs - signed off  Continue medication regimen as follows:  Sinemet  QID and Seroquel 25 hs  Continue Exelon 4.6 mg/24 patch  Divalproex 250 mg to BID  Continue holding trazadone        Parkinson disease    Seroquel 25 mg; sinemet  mg;   Discontinue taxodione 50 mg  Restart exelon 4.6 mg in the morning when  he is more awake        Essential hypertension    BP well controlled - Continue amlodipine 10 mg daily            Dehydration    Per ED patient admitted for dehydration. His BUN is elevated with normal creatinine--may be mild clinical dehydration. Will provide gentle IVF hydration.             VTE Risk Mitigation         Ordered     enoxaparin injection 40 mg  Daily     Route:  Subcutaneous        02/13/18 1149     Medium Risk of VTE  Once      02/08/18 0653     Place JOSEFINA hose  Until discontinued      02/08/18 0653     Place sequential compression device  Until discontinued      02/08/18 0653            SANDRO Vizcaino, FNP-C  Hospitalist - Department of Hospital Medicine  [address], [place] [address]  Office [phone]; Pager [phone]

## 2018-02-17 NOTE — SUBJECTIVE & OBJECTIVE
Interval History: No new issues / PT for ambulation and case management for placement following    Review of Systems   Constitutional: Positive for fatigue. Negative for activity change, appetite change, chills, diaphoresis and fever.   Respiratory: Negative for cough, chest tightness, shortness of breath and wheezing.    Cardiovascular: Negative for chest pain, palpitations and leg swelling.   Gastrointestinal: Negative for abdominal distention, abdominal pain, constipation, diarrhea, nausea and vomiting.   Genitourinary: Negative for dysuria and hematuria.   Musculoskeletal: Negative for joint swelling and neck stiffness.   Neurological: Positive for tremors. Negative for weakness.   Psychiatric/Behavioral: Positive for confusion.        Intermittent confusion, requires reorienting - mood swings have stabilized - chronic essential tremors     Objective:     Vital Signs (Most Recent):  Temp: 97.9 °F (36.6 °C) (02/17/18 0751)  Pulse: 89 (02/17/18 0751)  Resp: 18 (02/17/18 0751)  BP: 136/84 (02/17/18 0751)  SpO2: 96 % (02/17/18 0751) Vital Signs (24h Range):  Temp:  [97.7 °F (36.5 °C)-98.8 °F (37.1 °C)] 97.9 °F (36.6 °C)  Pulse:  [75-89] 89  Resp:  [16-20] 18  SpO2:  [96 %-100 %] 96 %  BP: (126-164)/(61-84) 136/84     Weight: 65.1 kg (143 lb 8.3 oz)  Body mass index is 22.48 kg/m².    Intake/Output Summary (Last 24 hours) at 02/17/18 0817  Last data filed at 02/17/18 0500   Gross per 24 hour   Intake              540 ml   Output              575 ml   Net              -35 ml      Physical Exam   Constitutional: He appears well-developed and well-nourished. No distress.   HENT:   Head: Normocephalic and atraumatic.   Eyes: Pupils are equal, round, and reactive to light.   Neck: Normal range of motion. Neck supple.   Cardiovascular: Normal rate.    Pulmonary/Chest: Effort normal. No respiratory distress. He has no wheezes. He has no rales.   Abdominal: Soft. He exhibits no distension. There is no tenderness.    Genitourinary: Rectal exam shows guaiac negative stool.   Musculoskeletal: Normal range of motion. He exhibits no edema or tenderness.   Neurological: He is alert.   Tremors - confusion requires reorienting - chronic essential tremors   Skin: Skin is warm and dry. He is not diaphoretic.   Psychiatric:   More awake today - answers questions appropriately       Significant Labs:  Significant Imaging: I have reviewed and interpreted all pertinent imaging results/findings within the past 24 hours.

## 2018-02-17 NOTE — ASSESSMENT & PLAN NOTE
Seen by psche - appreciate recs - signed off  Continue medication regimen as follows:  Sinemet  QID and Seroquel 25 hs  Continue Exelon 4.6 mg/24 patch  Divalproex 250 mg to BID  Continue holding trazadone

## 2018-02-17 NOTE — ASSESSMENT & PLAN NOTE
Awaiting placement  Add PT/OT following for ambulation  PPD already placed and read last week 2/7/18  Monitor  Case management following  Lovenox 40 mg daily      TN consulted to aid in safe discharge planning. Appears patient has been living with a significant other who can no longer care for him and refusing to take patient home. His somewhat estranged daughter has been contacted and is willing to aid in finding NH placement (she had already begun this process as an outpatient). There is an issue, however, in that patient's power of  refused to sign consent for NH placement. Patient unable to make his own decision regarding this as he is with intermittent confusion +/- previous unclear dementia diagnosis per daughter. Psychiatry consulted for capacity. Also recommended adding depakote 250mg TID to help with mood stabilization. He is medically cleared and was initially with plan for home with HH and SW to aid in outpatient placement, however, cannot safely discharge as he does not have a home to return to.

## 2018-02-18 PROCEDURE — 25000003 PHARM REV CODE 250: Performed by: NURSE PRACTITIONER

## 2018-02-18 PROCEDURE — 97116 GAIT TRAINING THERAPY: CPT

## 2018-02-18 PROCEDURE — 11000001 HC ACUTE MED/SURG PRIVATE ROOM

## 2018-02-18 PROCEDURE — G0378 HOSPITAL OBSERVATION PER HR: HCPCS

## 2018-02-18 PROCEDURE — 97110 THERAPEUTIC EXERCISES: CPT

## 2018-02-18 PROCEDURE — 25000003 PHARM REV CODE 250: Performed by: EMERGENCY MEDICINE

## 2018-02-18 PROCEDURE — 96372 THER/PROPH/DIAG INJ SC/IM: CPT

## 2018-02-18 PROCEDURE — 63600175 PHARM REV CODE 636 W HCPCS: Performed by: NURSE PRACTITIONER

## 2018-02-18 RX ADMIN — DIVALPROEX SODIUM 250 MG: 250 TABLET, DELAYED RELEASE ORAL at 09:02

## 2018-02-18 RX ADMIN — RIVASTIGMINE TRANSDERMAL SYSTEM 1 PATCH: 4.6 PATCH, EXTENDED RELEASE TRANSDERMAL at 09:02

## 2018-02-18 RX ADMIN — QUETIAPINE FUMARATE 25 MG: 25 TABLET ORAL at 09:02

## 2018-02-18 RX ADMIN — AMLODIPINE BESYLATE 10 MG: 5 TABLET ORAL at 09:02

## 2018-02-18 RX ADMIN — CARBIDOPA AND LEVODOPA 1 TABLET: 25; 100 TABLET ORAL at 06:02

## 2018-02-18 RX ADMIN — CARBIDOPA AND LEVODOPA 1 TABLET: 25; 100 TABLET ORAL at 05:02

## 2018-02-18 RX ADMIN — ENOXAPARIN SODIUM 40 MG: 100 INJECTION SUBCUTANEOUS at 06:02

## 2018-02-18 RX ADMIN — DOCUSATE SODIUM 100 MG: 100 CAPSULE, LIQUID FILLED ORAL at 09:02

## 2018-02-18 RX ADMIN — CARBIDOPA AND LEVODOPA 1 TABLET: 25; 100 TABLET ORAL at 11:02

## 2018-02-18 RX ADMIN — CARBIDOPA AND LEVODOPA 1 TABLET: 25; 100 TABLET ORAL at 01:02

## 2018-02-18 NOTE — PROGRESS NOTES
[facility] Hospital Medicine  Progress Note    Patient Name: Tenzin Ortiz  MRN: [id number]  Patient Class: OP- Observation   Admission Date: 2/7/2018  Length of Stay: 0 days  Attending Physician: Kassandra Alonso MD  Primary Care Provider: Efrain Chavez MD        Subjective:     Principal Problem:Debility    HPI:  Patient is 78 yo male with HTN, HLD, and Parkinson's who presents to ED reportedly for HA. Per ED note patient was complaining of HA but he denies this on my interview. Seems there was some confusion in ED and he was unclear why he was here. During interview with me, patient reports being here for NH placement although he does not appear to be very happy about this. He has been with some issues caring for himself. Currently lives with his girlfriend who reports she can no longer care for him. He states he currently gets around with a walker but sometimes has difficulty getting up out of his chair. Per chart review patient is with Parkinson's and sees neurology, Dr. Castellanos, lastly seen on 2/6/18. He was with hallucinations which were noted to be secondary to dopaminergic agents thus having to significantly reduce dose--she recommended he be admitted for placement at that time but patient declined. He has been attempting placement as an outpatient through PCP but without much luck. He otherwise denies recent illness, fever/chills, CP, SOB, abdominal pain, N/V/D, dysuria. On presentation patient with grossly normal labs/vitals. Placed in observation for what appears to be solely placement although this is something that may need to be continued as outpatient as is not indication for hospitalization.    Hospital Course:  Awake and alert without complaint - severe parkinsons disease and can no longer care for himself - neither can his girlfriend -  Awaiting placement/social issue with power of  and girlfriend can no longer care for him - no new issues - case management  following    Interval History: No new issues - no behavioral disturbances - significant other still says she can't take care of him    Review of Systems   Constitutional: Negative for activity change, appetite change, chills, diaphoresis, fatigue and fever.   Respiratory: Negative for cough, chest tightness, shortness of breath and wheezing.    Cardiovascular: Negative for chest pain, palpitations and leg swelling.   Gastrointestinal: Negative for abdominal distention, abdominal pain, constipation, diarrhea, nausea and vomiting.   Genitourinary: Negative for dysuria and hematuria.   Musculoskeletal: Negative for joint swelling and neck stiffness.   Neurological: Positive for tremors. Negative for weakness.   Psychiatric/Behavioral: Positive for confusion.        Intermittent confusion, requires reorienting - mood swings have stabilized - chronic essential tremors     Objective:     Vital Signs (Most Recent):  Temp: 97.8 °F (36.6 °C) (02/18/18 0744)  Pulse: 73 (02/18/18 0744)  Resp: 18 (02/18/18 0744)  BP: (!) 144/65 (02/18/18 0744)  SpO2: 99 % (02/18/18 0744) Vital Signs (24h Range):  Temp:  [97.7 °F (36.5 °C)-99.7 °F (37.6 °C)] 97.8 °F (36.6 °C)  Pulse:  [68-89] 73  Resp:  [18] 18  SpO2:  [95 %-99 %] 99 %  BP: (143-160)/(61-70) 144/65     Weight: 64.1 kg (141 lb 5 oz)  Body mass index is 22.13 kg/m².    Intake/Output Summary (Last 24 hours) at 02/18/18 0907  Last data filed at 02/18/18 0540   Gross per 24 hour   Intake              480 ml   Output              550 ml   Net              -70 ml      Physical Exam   Constitutional: He appears well-developed and well-nourished. No distress.   HENT:   Head: Normocephalic and atraumatic.   Eyes: Pupils are equal, round, and reactive to light.   Neck: Normal range of motion. Neck supple.   Cardiovascular: Normal rate.    Pulmonary/Chest: Effort normal. No respiratory distress. He has no wheezes. He has no rales.   Abdominal: Soft. He exhibits no distension. There is no  tenderness.   Genitourinary: Rectal exam shows guaiac negative stool.   Musculoskeletal: Normal range of motion. He exhibits no edema or tenderness.   Neurological: He is alert.   Tremors - confusion requires reorienting - chronic essential tremors   Skin: Skin is warm and dry. He is not diaphoretic.   Psychiatric:   More awake today - answers questions appropriately       Significant Labs: All pertinent labs within the past 24 hours have been reviewed.    Significant Imaging: I have reviewed and interpreted all pertinent imaging results/findings within the past 24 hours.    Assessment/Plan:      * Debility    Awaiting placement  Add PT/OT following for ambulation  PPD already placed and read last week 2/7/18  Monitor  Case management following  Lovenox 40 mg daily      TN consulted to aid in safe discharge planning. Appears patient has been living with a significant other who can no longer care for him and refusing to take patient home. His somewhat estranged daughter has been contacted and is willing to aid in finding NH placement (she had already begun this process as an outpatient). There is an issue, however, in that patient's power of  refused to sign consent for NH placement. Patient unable to make his own decision regarding this as he is with intermittent confusion +/- previous unclear dementia diagnosis per daughter. Psychiatry consulted for capacity. Also recommended adding depakote 250mg TID to help with mood stabilization. He is medically cleared and was initially with plan for home with HH and SW to aid in outpatient placement, however, cannot safely discharge as he does not have a home to return to.           Lewy body dementia with behavioral disturbance    Seen by psche - appreciate recs - signed off  Continue medication regimen as follows:  Sinemet  QID and Seroquel 25 hs  Continue Exelon 4.6 mg/24 patch  Divalproex 250 mg to BID  Continue holding trazadone        Parkinson disease     Seroquel 25 mg; sinemet  mg;   Discontinue taxodione 50 mg  Restart exelon 4.6 mg in the morning when he is more awake        Essential hypertension    BP well controlled - Continue amlodipine 10 mg daily            Dehydration    Per ED patient admitted for dehydration. His BUN is elevated with normal creatinine--may be mild clinical dehydration. Will provide gentle IVF hydration.             VTE Risk Mitigation         Ordered     enoxaparin injection 40 mg  Daily     Route:  Subcutaneous        02/13/18 1149     Medium Risk of VTE  Once      02/08/18 0653     Place JOSEFINA hose  Until discontinued      02/08/18 0653     Place sequential compression device  Until discontinued      02/08/18 0653            SANDRO Vizcaino, FNP-C  Hospitalist - Department of Hospital Medicine  [address], [place] [address]  Office [phone]; Pager [phone]

## 2018-02-18 NOTE — PLAN OF CARE
Problem: Patient Care Overview  Goal: Plan of Care Review   02/17/18 2331   Coping/Psychosocial   Plan Of Care Reviewed With patient       Problem: Fall Risk (Adult)  Intervention: Patient Rounds   02/17/18 2329   Safety Interventions   Patient Rounds bed in low position;bed wheels locked;call light in reach;clutter free environment maintained;ID band on;placement of personal items at bedside;toileting offered;visualized patient  (Simultaneous filing. User may not have seen previous data.)         Problem: Pressure Ulcer Risk (Mike Scale) (Adult,Obstetrics,Pediatric)  Intervention: Maintain Head of Bed Elevation Less Than 30 Degrees as Tolerated   02/17/18 2331   Positioning   Head of Bed (HOB) HOB at 30-45 degrees     Intervention: Prevent/Minimize Sheer/Friction Injuries   02/17/18 2331   Positioning   Positioning/Transfer Devices pillows;in use   Skin Interventions   Pressure Reduction Devices Pressure-redistributing mattress utilized   Pressure Reduction Techniques frequent weight shift encouraged;heels elevated off bed;rest period provided between sit times         Problem: Confusion, Acute (Adult)  Goal: Safety  Patient will demonstrate the desired outcomes by discharge/transition of care.    02/17/18 2331   Confusion, Acute (Adult)   Safety making progress toward outcome

## 2018-02-18 NOTE — PT/OT/SLP PROGRESS
Physical Therapy Treatment    Patient Name:  Tenzin Ortiz   MRN:  [id number]    Recommendations:     Discharge Recommendations:   (PT at next level of care)   Discharge Equipment Recommendations:  (TBD)   Barriers to discharge: Decreased caregiver support    Assessment:     Tenzin Ortiz is a 77 y.o. male admitted with a medical diagnosis of Debility.  He presents with the following impairments/functional limitations:  weakness, impaired endurance, impaired self care skills, impaired functional mobilty, gait instability, impaired balance, decreased lower extremity function, decreased safety awareness, decreased upper extremity function, decreased ROM, impaired joint extensibility, impaired cognition, decreased coordination. Patient with increased difficulty advancing bilateral feet to take steps during transfer.     Rehab Prognosis:  fair; patient would benefit from acute skilled PT services to address these deficits and reach maximum level of function.      Recent Surgery: * No surgery found *      Plan:     During this hospitalization, patient to be seen 3 x/week to address the above listed problems via gait training, therapeutic activities, therapeutic exercises  · Plan of Care Expires:  02/22/18   Plan of Care Reviewed with: patient    Subjective     Communicated with nurse Stubbs prior to session.  Patient found supine in bed upon PT entry to room, agreeable to treatment.      Chief Complaint: Weakness.   Patient comments/goals: To walk.   Pain/Comfort:  · Pain Rating 1: 0/10    Objective:     Patient found with: peripheral IV     General Precautions: Standard, fall   Orthopedic Precautions:N/A   Braces: N/A     Functional Mobility:  · Bed Mobility:     · Supine to Sit: stand by assistance  · Scooting: maximal assistance  · Transfers:     · Sit to Stand:  maximal assistance with rolling walker due to posterior lean and patient pulling up on walker  · Gait: ~5 steps from bed to bedside chair with RW and maximal  assistance. Patient with increased difficulty advancing bilateral feet.     AM-PAC 6 CLICK MOBILITY  Turning over in bed (including adjusting bedclothes, sheets and blankets)?: 3  Sitting down on and standing up from a chair with arms (e.g., wheelchair, bedside commode, etc.): 2  Moving from lying on back to sitting on the side of the bed?: 3  Moving to and from a bed to a chair (including a wheelchair)?: 2  Need to walk in hospital room?: 2  Climbing 3-5 steps with a railing?: 1  Total Score: 13     Therapeutic Activities and Exercises:  Patient performed B LE seated therex 2x15 for A/P, LAQ, hip flex, and pillow squeezes.   Patient performed B UE seated therex x15 reps for shoulder flex (AAROM), elbow flex/ext, and finger flex/ext.    Patient left up in chair on green air cushion with all lines intact, call button in reach, chair alarm on, and nurse notified.    GOALS:    Physical Therapy Goals        Problem: Physical Therapy Goal    Goal Priority Disciplines Outcome Goal Variances Interventions   Physical Therapy Goal     PT/OT, PT Ongoing (interventions implemented as appropriate)     Description:  Goals to be met by: 18    Patient will increase functional independence with mobility by performin. Supine to sit with Stand-by Assistance  2. Sit to stand transfer with Stand-by Assistance  3. Gait  x 150 feet with Stand-by Assistance using Rolling Walker  4. Lower extremity exercise program x30 reps per handout, with assistance as needed                    Time Tracking:     PT Received On: 18  PT Start Time: 1045     PT Stop Time: 1110  PT Total Time (min): 25 min     Billable Minutes: Therapeutic Activity  13  and Therapeutic Exercise 12    Treatment Type: Treatment  PT/PTA: PT     PTA Visit Number: 0     Dominga Esquivel, PT  2018

## 2018-02-18 NOTE — PLAN OF CARE
Problem: Physical Therapy Goal  Goal: Physical Therapy Goal  Goals to be met by: 18    Patient will increase functional independence with mobility by performin. Supine to sit with Stand-by Assistance  2. Sit to stand transfer with Stand-by Assistance  3. Gait  x 150 feet with Stand-by Assistance using Rolling Walker  4. Lower extremity exercise program x30 reps per handout, with assistance as needed     Patient required increased assistance to get in bedside chair today.

## 2018-02-18 NOTE — SUBJECTIVE & OBJECTIVE
Interval History: No new issues - no behavioral disturbances - significant other still says she can't take care of him    Review of Systems   Constitutional: Negative for activity change, appetite change, chills, diaphoresis, fatigue and fever.   Respiratory: Negative for cough, chest tightness, shortness of breath and wheezing.    Cardiovascular: Negative for chest pain, palpitations and leg swelling.   Gastrointestinal: Negative for abdominal distention, abdominal pain, constipation, diarrhea, nausea and vomiting.   Genitourinary: Negative for dysuria and hematuria.   Musculoskeletal: Negative for joint swelling and neck stiffness.   Neurological: Positive for tremors. Negative for weakness.   Psychiatric/Behavioral: Positive for confusion.        Intermittent confusion, requires reorienting - mood swings have stabilized - chronic essential tremors     Objective:     Vital Signs (Most Recent):  Temp: 97.8 °F (36.6 °C) (02/18/18 0744)  Pulse: 73 (02/18/18 0744)  Resp: 18 (02/18/18 0744)  BP: (!) 144/65 (02/18/18 0744)  SpO2: 99 % (02/18/18 0744) Vital Signs (24h Range):  Temp:  [97.7 °F (36.5 °C)-99.7 °F (37.6 °C)] 97.8 °F (36.6 °C)  Pulse:  [68-89] 73  Resp:  [18] 18  SpO2:  [95 %-99 %] 99 %  BP: (143-160)/(61-70) 144/65     Weight: 64.1 kg (141 lb 5 oz)  Body mass index is 22.13 kg/m².    Intake/Output Summary (Last 24 hours) at 02/18/18 0907  Last data filed at 02/18/18 0540   Gross per 24 hour   Intake              480 ml   Output              550 ml   Net              -70 ml      Physical Exam   Constitutional: He appears well-developed and well-nourished. No distress.   HENT:   Head: Normocephalic and atraumatic.   Eyes: Pupils are equal, round, and reactive to light.   Neck: Normal range of motion. Neck supple.   Cardiovascular: Normal rate.    Pulmonary/Chest: Effort normal. No respiratory distress. He has no wheezes. He has no rales.   Abdominal: Soft. He exhibits no distension. There is no tenderness.    Genitourinary: Rectal exam shows guaiac negative stool.   Musculoskeletal: Normal range of motion. He exhibits no edema or tenderness.   Neurological: He is alert.   Tremors - confusion requires reorienting - chronic essential tremors   Skin: Skin is warm and dry. He is not diaphoretic.   Psychiatric:   More awake today - answers questions appropriately       Significant Labs: All pertinent labs within the past 24 hours have been reviewed.    Significant Imaging: I have reviewed and interpreted all pertinent imaging results/findings within the past 24 hours.

## 2018-02-19 PROCEDURE — 25000003 PHARM REV CODE 250: Performed by: EMERGENCY MEDICINE

## 2018-02-19 PROCEDURE — 97535 SELF CARE MNGMENT TRAINING: CPT

## 2018-02-19 PROCEDURE — 25000003 PHARM REV CODE 250: Performed by: NURSE PRACTITIONER

## 2018-02-19 PROCEDURE — 11000001 HC ACUTE MED/SURG PRIVATE ROOM

## 2018-02-19 PROCEDURE — 96372 THER/PROPH/DIAG INJ SC/IM: CPT

## 2018-02-19 PROCEDURE — 97110 THERAPEUTIC EXERCISES: CPT

## 2018-02-19 PROCEDURE — 63600175 PHARM REV CODE 636 W HCPCS: Performed by: NURSE PRACTITIONER

## 2018-02-19 PROCEDURE — G0378 HOSPITAL OBSERVATION PER HR: HCPCS

## 2018-02-19 RX ORDER — AMOXICILLIN 250 MG
1 CAPSULE ORAL EVERY OTHER DAY
Status: DISCONTINUED | OUTPATIENT
Start: 2018-02-19 | End: 2018-03-19 | Stop reason: SDUPTHER

## 2018-02-19 RX ORDER — ACETAMINOPHEN 325 MG/1
650 TABLET ORAL EVERY 8 HOURS PRN
Status: DISCONTINUED | OUTPATIENT
Start: 2018-02-19 | End: 2018-03-19 | Stop reason: SDUPTHER

## 2018-02-19 RX ADMIN — CARBIDOPA AND LEVODOPA 1 TABLET: 25; 100 TABLET ORAL at 05:02

## 2018-02-19 RX ADMIN — DOCUSATE SODIUM 100 MG: 100 CAPSULE, LIQUID FILLED ORAL at 08:02

## 2018-02-19 RX ADMIN — ENOXAPARIN SODIUM 40 MG: 100 INJECTION SUBCUTANEOUS at 05:02

## 2018-02-19 RX ADMIN — AMLODIPINE BESYLATE 10 MG: 5 TABLET ORAL at 08:02

## 2018-02-19 RX ADMIN — DOCUSATE SODIUM AND SENNOSIDES 1 TABLET: 8.6; 5 TABLET, FILM COATED ORAL at 11:02

## 2018-02-19 RX ADMIN — DIVALPROEX SODIUM 250 MG: 250 TABLET, DELAYED RELEASE ORAL at 08:02

## 2018-02-19 RX ADMIN — QUETIAPINE FUMARATE 25 MG: 25 TABLET ORAL at 08:02

## 2018-02-19 RX ADMIN — RIVASTIGMINE TRANSDERMAL SYSTEM 1 PATCH: 4.6 PATCH, EXTENDED RELEASE TRANSDERMAL at 08:02

## 2018-02-19 RX ADMIN — CARBIDOPA AND LEVODOPA 1 TABLET: 25; 100 TABLET ORAL at 11:02

## 2018-02-19 NOTE — PT/OT/SLP PROGRESS
Occupational Therapy   Treatment    Name: Tenzin Ortiz  MRN: [id number]  Admitting Diagnosis:  Debility       Recommendations:     Discharge Recommendations:  (OT at next level of care)  Discharge Equipment Recommendations:  bedside commode, bath bench  Barriers to discharge:  Decreased caregiver support    Subjective     Communicated with: nurse Addison  prior to session.  Pain/Comfort:  · Pain Rating 1: 10/10 (c/o of L hip pain with movement)  · Pain Addressed 1: Distraction, Cessation of Activity, Nurse notified    Patients cultural, spiritual, [employer]ism conflicts given the current situation:      Objective:     Patient found with: peripheral IV    General Precautions: Standard, fall   Orthopedic Precautions:N/A   Braces: N/A     Occupational Performance:    Bed Mobility:    · Patient completed Scooting/Bridging with maximal assistance  · Patient completed Supine to Sit with stand by assistance  · Patient completed Sit to Supine with contact guard assistance     Activities of Daily Living:  · Feeding:  set up assistance    · Grooming: stand by assistance for oral care and to comb hair  · UB Dressing: minimum assistance to don back gown    Patient left with bed in chair position set up with lunch tray with all lines intact, call button in reach, bed alarm on and nurse notified    AMPA 6 Click:  [facility] Total Score: 14    Treatment & Education:  Patient performed bed mobility and self care as above.  Patient only tolerated 5 mins sitting EOB before lying back down secondary to severe left hip pain.  Patient performed BUE AROM therex shoulder flex/ext, shoulder horizontal abd/add, elbow flex/ext, composite finger flex/ext, and forward punches x 10 reps between rest.   Education:    Assessment:     Tenzin Ortiz is a 77 y.o. male with a medical diagnosis of Debility.  He presents with the following performance deficits affecting function: weakness, impaired endurance, impaired functional mobilty, impaired self care  skills, gait instability, impaired balance, decreased coordination, decreased upper extremity function, decreased lower extremity function, decreased safety awareness, pain, impaired coordination, decreased ROM.  Patient limited with OOB mobility today secondary to L hip pain.     Rehab Prognosis:  Fair: patient would benefit from acute skilled OT services to address these deficits and reach maximum level of function.       Plan:     Patient to be seen 3 x/week to address the above listed problems via self-care/home management, therapeutic activities, therapeutic exercises  · Plan of Care Expires: 02/16/18  · Plan of Care Reviewed with: patient    This Plan of care has been discussed with the patient who was involved in its development and understands and is in agreement with the identified goals and treatment plan    GOALS:    Occupational Therapy Goals        Problem: Occupational Therapy Goal    Goal Priority Disciplines Outcome Interventions   Occupational Therapy Goal     OT, PT/OT Revised    Description:  Goals to be met by: 3/19/18     Patient will increase functional independence with ADLs by performing:    Feeding with Set-up Assistance. Met 2/19/18  UE Dressing with Set-up Assistance.  Grooming while seated with Set-up Assistance. Met 2/16/18  Stand pivot transfers with Contact Guard Assistance.  Toilet transfer to bedside commode with Minimal Assistance.  Upper extremity exercise program x10 reps , with supervision.  Pt will be able to ambulate to sink with minimal Assist for perform grooming                        Time Tracking:     OT Date of Treatment: 02/19/18  OT Start Time: 1135  OT Stop Time: 1209  OT Total Time (min): 29 min    Billable Minutes:Self Care/Home Management 16  Therapeutic Exercise 13    DANIELLE Gomez  2/19/2018

## 2018-02-19 NOTE — ASSESSMENT & PLAN NOTE
Seen by ofe who has since signed off - appreciate recs -  Doing well on current regimen  Continue medication regimen as follows:  Sinemet  QID and Seroquel 25 hs  Continue Exelon 4.6 mg/24 patch  Divalproex 250 mg to BID  Continue holding trazadone

## 2018-02-19 NOTE — ASSESSMENT & PLAN NOTE
Currently awaiting placement  PT/OT following for ambulation  PPD already placed and read last week 2/7/18  Case management following  Lovenox 40 mg daily      TN consulted to aid in safe discharge planning. Appears patient has been living with a significant other who can no longer care for him and refusing to take patient home. His somewhat estranged daughter has been contacted and is willing to aid in finding NH placement (she had already begun this process as an outpatient). There is an issue, however, in that patient's power of  refused to sign consent for NH placement. Patient unable to make his own decision regarding this as he is with intermittent confusion +/- previous unclear dementia diagnosis per daughter. Psychiatry consulted for capacity. Also recommended adding depakote 250mg TID to help with mood stabilization. He is medically cleared and was initially with plan for home with HH and SW to aid in outpatient placement, however, cannot safely discharge as he does not have a home to return to.

## 2018-02-19 NOTE — PROGRESS NOTES
Patient more awake ,alert, and able to follow commands today. He sat in chair for two hours without attempting to get up without help. He was able to make simple request and decisions on his own.

## 2018-02-19 NOTE — SUBJECTIVE & OBJECTIVE
Interval History: No new issues; PT following for ambulation; no new behavioral issues    Review of Systems   Constitutional: Negative for activity change, appetite change, chills, diaphoresis, fatigue and fever.   Respiratory: Negative for cough, chest tightness, shortness of breath and wheezing.    Cardiovascular: Negative for chest pain, palpitations and leg swelling.   Gastrointestinal: Negative for abdominal distention, abdominal pain, constipation, diarrhea, nausea and vomiting.   Genitourinary: Negative for dysuria and hematuria.   Musculoskeletal: Negative for joint swelling and neck stiffness.   Neurological: Positive for tremors. Negative for weakness.   Psychiatric/Behavioral: Positive for confusion.        Intermittent confusion, requires reorienting - mood swings have stabilized - chronic essential tremors - alert to self and place     Objective:     Vital Signs (Most Recent):  Temp: 97.5 °F (36.4 °C) (02/19/18 0801)  Pulse: 73 (02/19/18 0801)  Resp: 18 (02/19/18 0801)  BP: (!) 143/67 (02/19/18 0801)  SpO2: 97 % (02/19/18 0801) Vital Signs (24h Range):  Temp:  [97.5 °F (36.4 °C)-98.2 °F (36.8 °C)] 97.5 °F (36.4 °C)  Pulse:  [73-87] 73  Resp:  [18] 18  SpO2:  [94 %-99 %] 97 %  BP: (108-163)/(57-81) 143/67     Weight: 63.7 kg (140 lb 6.9 oz)  Body mass index is 21.99 kg/m².    Intake/Output Summary (Last 24 hours) at 02/19/18 0813  Last data filed at 02/19/18 0300   Gross per 24 hour   Intake              360 ml   Output              200 ml   Net              160 ml      Physical Exam   Constitutional: He appears well-developed and well-nourished. No distress.   HENT:   Head: Normocephalic and atraumatic.   Eyes: Pupils are equal, round, and reactive to light.   Wears glasses; at the bedside   Neck: Normal range of motion. Neck supple.   Cardiovascular: Normal rate.    Pulmonary/Chest: Effort normal. No respiratory distress. He has no wheezes. He has no rales.   Abdominal: Soft. He exhibits no distension.  There is no tenderness.   Genitourinary: Rectal exam shows guaiac negative stool.   Musculoskeletal: Normal range of motion. He exhibits no edema or tenderness.   Neurological: He is alert.   Confusion requires reorienting however, alert to self and situation - chronic essential tremors   Skin: Skin is warm and dry. He is not diaphoretic.   Dry skin; poor turgor   Psychiatric:   More awake today - answers questions appropriately       Significant Labs: All pertinent labs within the past 24 hours have been reviewed.    Significant Imaging: I have reviewed all pertinent imaging results/findings within the past 24 hours.  I have reviewed and interpreted all pertinent imaging results/findings within the past 24 hours.

## 2018-02-19 NOTE — PLAN OF CARE
02/19/18 1100   Discharge Assessment   Assessment Type Discharge Planning Reassessment   DC planning remains on going.  Message sent to MALIK Patino LPN II;  director requesting up date from legal team.  Awaiting response.

## 2018-02-19 NOTE — PLAN OF CARE
Problem: Occupational Therapy Goal  Goal: Occupational Therapy Goal  Goals to be met by: 3/19/18     Patient will increase functional independence with ADLs by performing:    Feeding with Set-up Assistance.  UE Dressing with Set-up Assistance.  Grooming while seated with Set-up Assistance. Met 2/16/18  Stand pivot transfers with Contact Guard Assistance.  Toilet transfer to bedside commode with Minimal Assistance.  Upper extremity exercise program x10 reps , with supervision.  Pt will be able to ambulate to sink with minimal Assist for perform grooming      Outcome: Revised  Pt would benefit from continued therapy to address goals.    REC: continued therapy in next level of care, 24 hour care.

## 2018-02-19 NOTE — PROGRESS NOTES
[facility] Hospital Medicine  Progress Note    Patient Name: Tenzin Ortiz  MRN: [id number]  Patient Class: OP- Observation   Admission Date: 2/7/2018  Length of Stay: 0 days  Attending Physician: Kassandra Alonso MD  Primary Care Provider: Efrain Chavez MD        Subjective:     Principal Problem:Debility    HPI:  Patient is 76 yo male with HTN, HLD, and Parkinson's who presents to ED reportedly for HA. Per ED note patient was complaining of HA but he denies this on my interview. Seems there was some confusion in ED and he was unclear why he was here. During interview with me, patient reports being here for NH placement although he does not appear to be very happy about this. He has been with some issues caring for himself. Currently lives with his girlfriend who reports she can no longer care for him. He states he currently gets around with a walker but sometimes has difficulty getting up out of his chair. Per chart review patient is with Parkinson's and sees neurology, Dr. Castellanos, lastly seen on 2/6/18. He was with hallucinations which were noted to be secondary to dopaminergic agents thus having to significantly reduce dose--she recommended he be admitted for placement at that time but patient declined. He has been attempting placement as an outpatient through PCP but without much luck. He otherwise denies recent illness, fever/chills, CP, SOB, abdominal pain, N/V/D, dysuria. On presentation patient with grossly normal labs/vitals. Placed in observation for what appears to be solely placement although this is something that may need to be continued as outpatient as is not indication for hospitalization.    Hospital Course:  Awake and alert without complaint - severe parkinsons disease and can no longer care for himself - neither can his girlfriend, she has visited daily -  Awaiting placement/social issue with power of  (his /daughter/girlfriend  - no new issues - case  management following    Interval History: No new issues; PT following for ambulation; no new behavioral issues    Review of Systems   Constitutional: Negative for activity change, appetite change, chills, diaphoresis, fatigue and fever.   Respiratory: Negative for cough, chest tightness, shortness of breath and wheezing.    Cardiovascular: Negative for chest pain, palpitations and leg swelling.   Gastrointestinal: Negative for abdominal distention, abdominal pain, constipation, diarrhea, nausea and vomiting.   Genitourinary: Negative for dysuria and hematuria.   Musculoskeletal: Negative for joint swelling and neck stiffness.   Neurological: Positive for tremors. Negative for weakness.   Psychiatric/Behavioral: Positive for confusion.        Intermittent confusion, requires reorienting - mood swings have stabilized - chronic essential tremors - alert to self and place     Objective:     Vital Signs (Most Recent):  Temp: 97.5 °F (36.4 °C) (02/19/18 0801)  Pulse: 73 (02/19/18 0801)  Resp: 18 (02/19/18 0801)  BP: (!) 143/67 (02/19/18 0801)  SpO2: 97 % (02/19/18 0801) Vital Signs (24h Range):  Temp:  [97.5 °F (36.4 °C)-98.2 °F (36.8 °C)] 97.5 °F (36.4 °C)  Pulse:  [73-87] 73  Resp:  [18] 18  SpO2:  [94 %-99 %] 97 %  BP: (108-163)/(57-81) 143/67     Weight: 63.7 kg (140 lb 6.9 oz)  Body mass index is 21.99 kg/m².    Intake/Output Summary (Last 24 hours) at 02/19/18 0813  Last data filed at 02/19/18 0300   Gross per 24 hour   Intake              360 ml   Output              200 ml   Net              160 ml      Physical Exam   Constitutional: He appears well-developed and well-nourished. No distress.   HENT:   Head: Normocephalic and atraumatic.   Eyes: Pupils are equal, round, and reactive to light.   Wears glasses; at the bedside   Neck: Normal range of motion. Neck supple.   Cardiovascular: Normal rate.    Pulmonary/Chest: Effort normal. No respiratory distress. He has no wheezes. He has no rales.   Abdominal: Soft. He  exhibits no distension. There is no tenderness.   Genitourinary: Rectal exam shows guaiac negative stool.   Musculoskeletal: Normal range of motion. He exhibits no edema or tenderness.   Neurological: He is alert.   Confusion requires reorienting however, alert to self and situation - chronic essential tremors   Skin: Skin is warm and dry. He is not diaphoretic.   Dry skin; poor turgor   Psychiatric:   More awake today - answers questions appropriately       Significant Labs: All pertinent labs within the past 24 hours have been reviewed.    Significant Imaging: I have reviewed all pertinent imaging results/findings within the past 24 hours.  I have reviewed and interpreted all pertinent imaging results/findings within the past 24 hours.    Assessment/Plan:      * Debility    Currently awaiting placement  PT/OT following for ambulation  PPD already placed and read last week 2/7/18  Case management following  Lovenox 40 mg daily      TN consulted to aid in safe discharge planning. Appears patient has been living with a significant other who can no longer care for him and refusing to take patient home. His somewhat estranged daughter has been contacted and is willing to aid in finding NH placement (she had already begun this process as an outpatient). There is an issue, however, in that patient's power of  refused to sign consent for NH placement. Patient unable to make his own decision regarding this as he is with intermittent confusion +/- previous unclear dementia diagnosis per daughter. Psychiatry consulted for capacity. Also recommended adding depakote 250mg TID to help with mood stabilization. He is medically cleared and was initially with plan for home with HH and SW to aid in outpatient placement, however, cannot safely discharge as he does not have a home to return to.           Lewy body dementia with behavioral disturbance    Seen by [facility] who has since signed off - appreciate recs -  Doing well on  current regimen  Continue medication regimen as follows:  Sinemet  QID and Seroquel 25 hs  Continue Exelon 4.6 mg/24 patch  Divalproex 250 mg to BID  Continue holding trazadone        Parkinson disease    Seroquel 25 mg; sinemet  mg;   Discontinue taxodione 50 mg  Restart exelon 4.6 mg in the morning when he is more awake        Essential hypertension    BP well controlled - Continue amlodipine 10 mg daily  Change vitals to every shift            Dehydration    Per ED patient admitted for dehydration. His BUN is elevated with normal creatinine--may be mild clinical dehydration. Will provide gentle IVF hydration.             VTE Risk Mitigation         Ordered     enoxaparin injection 40 mg  Daily     Route:  Subcutaneous        02/13/18 1149     Medium Risk of VTE  Once      02/08/18 0653     Place JOSEFINA hose  Until discontinued      02/08/18 0653     Place sequential compression device  Until discontinued      02/08/18 0653              SANDRO Vizcaino, FNP-C  Hospitalist - Department of Hospital Medicine  [address], [place] [address]  Office [phone]; Pager [phone]

## 2018-02-20 PROCEDURE — 11000001 HC ACUTE MED/SURG PRIVATE ROOM

## 2018-02-20 PROCEDURE — 25000003 PHARM REV CODE 250: Performed by: NURSE PRACTITIONER

## 2018-02-20 PROCEDURE — 97535 SELF CARE MNGMENT TRAINING: CPT

## 2018-02-20 PROCEDURE — 63600175 PHARM REV CODE 636 W HCPCS: Performed by: NURSE PRACTITIONER

## 2018-02-20 PROCEDURE — 25000003 PHARM REV CODE 250: Performed by: EMERGENCY MEDICINE

## 2018-02-20 PROCEDURE — 96372 THER/PROPH/DIAG INJ SC/IM: CPT

## 2018-02-20 PROCEDURE — G0378 HOSPITAL OBSERVATION PER HR: HCPCS

## 2018-02-20 RX ADMIN — DIVALPROEX SODIUM 250 MG: 250 TABLET, DELAYED RELEASE ORAL at 09:02

## 2018-02-20 RX ADMIN — RIVASTIGMINE TRANSDERMAL SYSTEM 1 PATCH: 4.6 PATCH, EXTENDED RELEASE TRANSDERMAL at 08:02

## 2018-02-20 RX ADMIN — ENOXAPARIN SODIUM 40 MG: 100 INJECTION SUBCUTANEOUS at 04:02

## 2018-02-20 RX ADMIN — DIVALPROEX SODIUM 250 MG: 250 TABLET, DELAYED RELEASE ORAL at 08:02

## 2018-02-20 RX ADMIN — CARBIDOPA AND LEVODOPA 1 TABLET: 25; 100 TABLET ORAL at 05:02

## 2018-02-20 RX ADMIN — AMLODIPINE BESYLATE 10 MG: 5 TABLET ORAL at 08:02

## 2018-02-20 RX ADMIN — DOCUSATE SODIUM 100 MG: 100 CAPSULE, LIQUID FILLED ORAL at 08:02

## 2018-02-20 RX ADMIN — QUETIAPINE FUMARATE 25 MG: 25 TABLET ORAL at 09:02

## 2018-02-20 RX ADMIN — CARBIDOPA AND LEVODOPA 1 TABLET: 25; 100 TABLET ORAL at 12:02

## 2018-02-20 RX ADMIN — CARBIDOPA AND LEVODOPA 1 TABLET: 25; 100 TABLET ORAL at 11:02

## 2018-02-20 RX ADMIN — DOCUSATE SODIUM 100 MG: 100 CAPSULE, LIQUID FILLED ORAL at 09:02

## 2018-02-20 NOTE — PT/OT/SLP PROGRESS
Occupational Therapy   Treatment    Name: Tenzin Ortiz  MRN: [id number]  Admitting Diagnosis:  Debility       Recommendations:     Discharge Recommendations: home health OT  Discharge Equipment Recommendations:  bedside commode, bath bench  Barriers to discharge:  Decreased caregiver support    Subjective     Communicated with: nurse prior to session.  Pain/Comfort:  · Pain Rating 1: 0/10  · Pain Rating Post-Intervention 1: 0/10    Patients cultural, spiritual, Jehovah's witness conflicts given the current situation:      Objective:     Patient found with: peripheral IV    General Precautions: Standard, fall   Orthopedic Precautions:N/A   Braces:       Occupational Performance:    Bed Mobility:    · Patient completed Scooting/Bridging with minimum assistance  · Patient completed Supine to Sit with minimum assistance     Functional Mobility/Transfers:  · Patient completed Sit <> Stand Transfer with minimum assistance  with  rolling walker   · Patient completed Bed <> Chair Transfer using Stand Pivot technique with moderate assistance with rolling walker  · Functional Mobility: Pt requires tactile and verbal cues to initiate steps.     Activities of Daily Living:  · Grooming: stand by assistance    · UB Dressing: minimum assistance with direction on hand placement    Patient left up in chair with all lines intact, call button in reach and nalini notified    [facility] 6 Click:  AMPA Total Score: 14    Treatment & Education:  Education on safety in the chair  -chair alarm in place and hand control to call nurse  Education:    Assessment:     Tenzin Ortiz is a 77 y.o. male with a medical diagnosis of Debility.  He presents with limited changes in mobility as pt status continues to fluctuate.  Performance deficits affecting function are weakness, impaired endurance, impaired self care skills, impaired functional mobilty, gait instability, impaired balance, decreased coordination, impaired cognition, decreased upper extremity  function, decreased lower extremity function, decreased safety awareness, abnormal tone, decreased ROM, impaired coordination, impaired fine motor, impaired joint extensibility, impaired muscle length.      Rehab Prognosis:  fair; patient would benefit from acute skilled OT services to address these deficits and reach maximum level of function.       Plan:     Patient to be seen 3 x/week to address the above listed problems via self-care/home management, therapeutic activities, therapeutic exercises  · Plan of Care Expires: 02/16/18  · Plan of Care Reviewed with: patient    This Plan of care has been discussed with the patient who was involved in its development and understands and is in agreement with the identified goals and treatment plan    GOALS:    Occupational Therapy Goals        Problem: Occupational Therapy Goal    Goal Priority Disciplines Outcome Interventions   Occupational Therapy Goal     OT, PT/OT Ongoing (interventions implemented as appropriate)    Description:  Goals to be met by: 3/19/18     Patient will increase functional independence with ADLs by performing:    Feeding with Set-up Assistance. Met 2/19/18  UE Dressing with Set-up Assistance.  Grooming while seated with Set-up Assistance. Met 2/16/18  Stand pivot transfers with Contact Guard Assistance.  Toilet transfer to bedside commode with Minimal Assistance.  Upper extremity exercise program x10 reps , with supervision.  Pt will be able to ambulate to sink with minimal Assist for perform grooming                         Time Tracking:     OT Date of Treatment: 02/20/18  OT Start Time: 1033  OT Stop Time: 1059  OT Total Time (min): 26 min    Billable Minutes:Self Care/Home Management 26     Becky Bruno OT  2/20/2018

## 2018-02-20 NOTE — SUBJECTIVE & OBJECTIVE
Interval History: No acute events reported. Plan of care discussed with patient. He is awake and alert and tells me he is waiting to go to [facility] today.  CM following; awaiting disposition. PT following for ambulation.    Review of Systems   Constitutional: Negative for activity change, appetite change, chills, diaphoresis, fatigue and fever.   Respiratory: Negative for cough, chest tightness, shortness of breath and wheezing.    Cardiovascular: Negative for chest pain, palpitations and leg swelling.   Gastrointestinal: Negative for abdominal distention, abdominal pain, constipation, diarrhea, nausea and vomiting.   Genitourinary: Negative for dysuria and hematuria.   Musculoskeletal: Negative for joint swelling and neck stiffness.   Neurological: Positive for tremors. Negative for weakness.   Psychiatric/Behavioral: Positive for confusion.        Intermittent confusion, requires reorienting - mood swings have stabilized - chronic essential tremors - alert to self and place     Objective:     Vital Signs (Most Recent):  Temp: 97.2 °F (36.2 °C) (02/20/18 1500)  Pulse: 73 (02/20/18 1500)  Resp: 19 (02/20/18 1500)  BP: 131/60 (02/20/18 1500)  SpO2: (!) 94 % (02/20/18 1500) Vital Signs (24h Range):  Temp:  [96.8 °F (36 °C)-98.6 °F (37 °C)] 97.2 °F (36.2 °C)  Pulse:  [73-90] 73  Resp:  [15-20] 19  SpO2:  [94 %-98 %] 94 %  BP: (104-177)/(55-83) 131/60     Weight: 64.9 kg (143 lb 1.3 oz)  Body mass index is 22.41 kg/m².  No intake or output data in the 24 hours ending 02/20/18 7002   Physical Exam   Constitutional: He appears well-developed and well-nourished. No distress.   HENT:   Head: Normocephalic and atraumatic.   Eyes: Pupils are equal, round, and reactive to light.   Wears glasses; at the bedside   Neck: Normal range of motion. Neck supple.   Cardiovascular: Normal rate.    Pulmonary/Chest: Effort normal. No respiratory distress. He has no wheezes. He has no rales.   Abdominal: Soft. He exhibits no distension.  There is no tenderness.   Genitourinary: Rectal exam shows guaiac negative stool.   Musculoskeletal: Normal range of motion. He exhibits no edema or tenderness.   Neurological: He is alert.   Confusion requires reorienting however, alert to self and situation - chronic essential tremors   Skin: Skin is warm and dry. He is not diaphoretic.   Dry skin; poor turgor   Psychiatric:   More awake today - answers questions appropriately       Significant Labs: All pertinent labs within the past 24 hours have been reviewed.    Significant Imaging: I have reviewed all pertinent imaging results/findings within the past 24 hours.  I have reviewed and interpreted all pertinent imaging results/findings within the past 24 hours.

## 2018-02-20 NOTE — PLAN OF CARE
Problem: Occupational Therapy Goal  Goal: Occupational Therapy Goal  Goals to be met by: 3/19/18     Patient will increase functional independence with ADLs by performing:    Feeding with Set-up Assistance. Met 2/19/18  UE Dressing with Set-up Assistance.  Grooming while seated with Set-up Assistance. Met 2/16/18  Stand pivot transfers with Contact Guard Assistance.  Toilet transfer to bedside commode with Minimal Assistance.  Upper extremity exercise program x10 reps , with supervision.  Pt will be able to ambulate to sink with minimal Assist for perform grooming        Outcome: Ongoing (interventions implemented as appropriate)  Pt able to tolerate sitting up in chair with chair alarm set.

## 2018-02-20 NOTE — PROGRESS NOTES
Called and spoke with Mr Mensah he has found paper work stating he is POA financial and medical. He stated that he spoke with Mr Ortiz this am and he wants to go to [facility]. I told him I would have Ирина working with Mr Ortiz call [facility] and and other facilities who may be willing to accept and have them contact him to discuss what's needed. I also asked him to be available to answer the phone as I have had trouble reaching him in the past. He said he is in court this afternoon and for the NH to leave him a message and he will get back later today or tomorrow morning.     Mr Mensah said that he is willing to sign to allow Mr Rondon Social Security check to go to cover his expenses at NH but he will take no personal responsibility for payment. He stated that the patient had no money expect his social security check. I requested that Mr Mensah have this conversation with the business office at the NH as they know more of what amount is needed to be paid upfront and what is needed to apply for medicaid. The NH will also be the one to discuss what is needed if private pay because he does not qualify for medicaid.

## 2018-02-20 NOTE — PT/OT/SLP PROGRESS
Physical Therapy      Patient Name:  Tenzin Ortiz   MRN:  [id number]    Patient not seen today secondary to patient fatigue due to being up in the chair for several hours today and just getting back in bed. Patient agreeable to participate in PT tomorrow. Will follow-up again tomorrow.    Dominga Esquivel, PT

## 2018-02-20 NOTE — PROGRESS NOTES
[facility] Hospital Medicine  Progress Note    Patient Name: Tenzin Ortiz  MRN: [id number]  Patient Class: OP- Observation   Admission Date: 2/7/2018  Length of Stay: 0 days  Attending Physician: Olivia Del Rosario MD  Primary Care Provider: Efrain Chavez MD        Subjective:     Principal Problem:Debility    HPI:  Patient is 78 yo male with HTN, HLD, and Parkinson's who presents to ED reportedly for HA. Per ED note patient was complaining of HA but he denies this on my interview. Seems there was some confusion in ED and he was unclear why he was here. During interview with me, patient reports being here for NH placement although he does not appear to be very happy about this. He has been with some issues caring for himself. Currently lives with his girlfriend who reports she can no longer care for him. He states he currently gets around with a walker but sometimes has difficulty getting up out of his chair. Per chart review patient is with Parkinson's and sees neurology, Dr. Castellanos, lastly seen on 2/6/18. He was with hallucinations which were noted to be secondary to dopaminergic agents thus having to significantly reduce dose--she recommended he be admitted for placement at that time but patient declined. He has been attempting placement as an outpatient through PCP but without much luck. He otherwise denies recent illness, fever/chills, CP, SOB, abdominal pain, N/V/D, dysuria. On presentation patient with grossly normal labs/vitals. Placed in observation for what appears to be solely placement although this is something that may need to be continued as outpatient as is not indication for hospitalization.    Hospital Course:  Awake and alert without complaint - severe parkinsons disease and can no longer care for himself - neither can his girlfriend, she has visited daily -  Awaiting placement/social issue with power of  (his /daughter/girlfriend  - no new issues - case  management following    Interval History: No acute events reported. Plan of care discussed with patient. He is awake and alert and tells me he is waiting to go to [facility] today.  CM following; awaiting disposition. PT following for ambulation.    Review of Systems   Constitutional: Negative for activity change, appetite change, chills, diaphoresis, fatigue and fever.   Respiratory: Negative for cough, chest tightness, shortness of breath and wheezing.    Cardiovascular: Negative for chest pain, palpitations and leg swelling.   Gastrointestinal: Negative for abdominal distention, abdominal pain, constipation, diarrhea, nausea and vomiting.   Genitourinary: Negative for dysuria and hematuria.   Musculoskeletal: Negative for joint swelling and neck stiffness.   Neurological: Positive for tremors. Negative for weakness.   Psychiatric/Behavioral: Positive for confusion.        Intermittent confusion, requires reorienting - mood swings have stabilized - chronic essential tremors - alert to self and place     Objective:     Vital Signs (Most Recent):  Temp: 97.2 °F (36.2 °C) (02/20/18 1500)  Pulse: 73 (02/20/18 1500)  Resp: 19 (02/20/18 1500)  BP: 131/60 (02/20/18 1500)  SpO2: (!) 94 % (02/20/18 1500) Vital Signs (24h Range):  Temp:  [96.8 °F (36 °C)-98.6 °F (37 °C)] 97.2 °F (36.2 °C)  Pulse:  [73-90] 73  Resp:  [15-20] 19  SpO2:  [94 %-98 %] 94 %  BP: (104-177)/(55-83) 131/60     Weight: 64.9 kg (143 lb 1.3 oz)  Body mass index is 22.41 kg/m².  No intake or output data in the 24 hours ending 02/20/18 1542   Physical Exam   Constitutional: He appears well-developed and well-nourished. No distress.   HENT:   Head: Normocephalic and atraumatic.   Eyes: Pupils are equal, round, and reactive to light.   Wears glasses; at the bedside   Neck: Normal range of motion. Neck supple.   Cardiovascular: Normal rate.    Pulmonary/Chest: Effort normal. No respiratory distress. He has no wheezes. He has no rales.   Abdominal: Soft. He  exhibits no distension. There is no tenderness.   Genitourinary: Rectal exam shows guaiac negative stool.   Musculoskeletal: Normal range of motion. He exhibits no edema or tenderness.   Neurological: He is alert.   Confusion requires reorienting however, alert to self and situation - chronic essential tremors   Skin: Skin is warm and dry. He is not diaphoretic.   Dry skin; poor turgor   Psychiatric:   More awake today - answers questions appropriately       Significant Labs: All pertinent labs within the past 24 hours have been reviewed.    Significant Imaging: I have reviewed all pertinent imaging results/findings within the past 24 hours.  I have reviewed and interpreted all pertinent imaging results/findings within the past 24 hours.    Assessment/Plan:      * Debility    Currently awaiting placement  PT/OT following for ambulation  PPD already placed and read last week 2/7/18  Case management following  Lovenox 40 mg daily      TN consulted to aid in safe discharge planning. Appears patient has been living with a significant other who can no longer care for him and refusing to take patient home. His somewhat estranged daughter has been contacted and is willing to aid in finding NH placement (she had already begun this process as an outpatient). There is an issue, however, in that patient's power of  refused to sign consent for NH placement. Patient unable to make his own decision regarding this as he is with intermittent confusion +/- previous unclear dementia diagnosis per daughter. Psychiatry consulted for capacity. Also recommended adding depakote 250mg TID to help with mood stabilization. He is medically cleared and was initially with plan for home with HH and SW to aid in outpatient placement, however, cannot safely discharge as he does not have a home to return to.           Lewy body dementia with behavioral disturbance    Seen by [facility] who has since signed off - appreciate recs -  Doing well on  current regimen  Continue medication regimen as follows:  Sinemet  QID and Seroquel 25 hs  Continue Exelon 4.6 mg/24 patch  Divalproex 250 mg to BID  Continue holding trazadone        Dehydration    Per ED patient admitted for dehydration. His BUN is elevated with normal creatinine--may be mild clinical dehydration. Will provide gentle IVF hydration.           Essential hypertension    BP well controlled - Continue amlodipine 10 mg daily  Change vitals to every shift            Parkinson disease    Seroquel 25 mg; sinemet  mg;   Discontinue taxodione 50 mg  Restart exelon 4.6 mg in the morning when he is more awake          VTE Risk Mitigation         Ordered     enoxaparin injection 40 mg  Daily     Route:  Subcutaneous        02/13/18 1149     Medium Risk of VTE  Once      02/08/18 0653     Place JOSEFINA hose  Until discontinued      02/08/18 0653     Place sequential compression device  Until discontinued      02/08/18 0653              ANA Dobson  Department of Hospital Medicine   [facility]

## 2018-02-20 NOTE — PLAN OF CARE
02/20/18 1624   Discharge Reassessment   Assessment Type Discharge Planning Reassessment   Pietro CARROLL to assist with NH placement.  Per MALIK Patino LPN II; THAIS mgr ok to send referral to [facility] and others that may accept the patient.  TN sent referral via [facility] to:  [facility], [place], [place], [facility] and [facility].

## 2018-02-20 NOTE — PLAN OF CARE
Problem: Patient Care Overview  Goal: Plan of Care Review   02/19/18 1930   Coping/Psychosocial   Plan Of Care Reviewed With patient   Pt remained free of falls during current shift. Turned Q2H. Denied pain and did not receive any prn pain medications. Scheduled parkinson's medication given and reoriented pt frequently. Plan of care and fall precautions reviewed with pt and verbalized understanding. Bed locked, lowered, SR up x2 and call light placed within reach.

## 2018-02-20 NOTE — PLAN OF CARE
Problem: Fall Risk (Adult)  Intervention: Monitor/Assist with Self Care   18 [id number]   Activity   Activity Assistance Provided --  assistance, 1 person --    Daily Care Interventions   Self-Care Promotion --  --  independence encouraged   Functional Level Current   Ambulation 2 - assistive person --  --    Transferring 2 - assistive person --  --    Toileting 3 - assistive equipment and person --  --    Bathing 2 - assistive person --  --    Dressing 2 - assistive person --  --    Eating 0 - independent --  --    Communication 0 - understands/communicates without difficulty --  --    Swallowing 0 - swallows foods/liquids without difficulty --  --      Intervention: Reduce Risk/Promote Restraint Free Environment   18 043   Safety Interventions   Environmental Safety Modification clutter free environment maintained;lighting adjusted;room organization consistent   Prevent  Drop/Fall   Safety/Security Measures bed alarm set     Intervention: Review Medications/Identify Contributors to Fall Risk   18   Safety Interventions   Medication Review/Management medications reviewed     Intervention: Patient Rounds   18 08   Safety Interventions   Patient Rounds bed in low position;bed wheels locked;call light in reach;clutter free environment maintained;ID band on;placement of personal items at bedside;toileting offered;visualized patient     Intervention: Safety Promotion/Fall Prevention   18 08   Safety Interventions   Safety Promotion/Fall Prevention assistive device/personal item within reach;bed alarm set;side rails raised x 2;room near unit station       Goal: Identify Related Risk Factors and Signs and Symptoms  Related risk factors and signs and symptoms are identified upon initiation of Human Response Clinical Practice Guideline (CPG)   Outcome: Ongoing (interventions implemented as appropriate)   18 1609   Fall Risk   Related Risk Factors  (Fall Risk) age-related changes;confusion/agitation;gait/mobility problems;sensory deficits   Signs and Symptoms (Fall Risk) presence of risk factors     Goal: Absence of Falls  Patient will demonstrate the desired outcomes by discharge/transition of care.   Outcome: Ongoing (interventions implemented as appropriate)   02/17/18 2331   Fall Risk (Adult)   Absence of Falls making progress toward outcome       Problem: Pressure Ulcer Risk (Mike Scale) (Adult,Obstetrics,Pediatric)  Intervention: Promote/Optimize Nutrition   02/11/18 1935   Nutrition Interventions   Oral Nutrition Promotion medicated;physical activity promoted;rest periods promoted     Intervention: Prevent/Manage Excess Moisture   02/13/18 1910 02/19/18 1505   Hygiene Care   Perineal Care --  absorbent pad changed   Bathing/Skin Care --  bath, complete;linen changed;shaved face   Skin Interventions   Skin Protection Incontinence pads;Skin sealant/moisture barrier --      Intervention: Maintain Head of Bed Elevation Less Than 30 Degrees as Tolerated   02/20/18 0800   Positioning   Head of Bed (HOB) HOB at 30 degrees     Intervention: Prevent/Minimize Sheer/Friction Injuries   02/17/18 2331 02/19/18 1930   Positioning   Positioning/Transfer Devices pillows;in use --    Skin Interventions   Pressure Reduction Devices --  Heel offloading device utilized;Pressure-redistributing mattress utilized   Pressure Reduction Techniques frequent weight shift encouraged;heels elevated off bed;rest period provided between sit times --      Intervention: Turn/Reposition Often   02/17/18 2331 02/20/18 0800   Positioning   Body Position --  side-lying, right   Skin Interventions   Pressure Reduction Techniques frequent weight shift encouraged;heels elevated off bed;rest period provided between sit times --        Goal: Identify Related Risk Factors and Signs and Symptoms  Related risk factors and signs and symptoms are identified upon initiation of Human Response  Clinical Practice Guideline (CPG)   Outcome: Ongoing (interventions implemented as appropriate)   02/16/18 1609   Pressure Ulcer Risk (Mike Scale)   Related Risk Factors (Pressure Ulcer Risk (Mike Scale)) cognitive impairment     Goal: Skin Integrity  Patient will demonstrate the desired outcomes by discharge/transition of care.   Outcome: Ongoing (interventions implemented as appropriate)   02/17/18 2331   Pressure Ulcer Risk (Mike Scale) (Adult,Obstetrics,Pediatric)   Skin Integrity making progress toward outcome

## 2018-02-20 NOTE — NURSING
PER handoff received from ADONAY Grigsby RN. Responds to voice and is oriented x4,. Denies having any pain and appears to be in no distress. Generalized tremors are present.  Assessment completed per Doc Flowsheets; reference if needed. Fall and safety precautions maintained. Bed alarm activated and audible. Bed locked in lowest position, with side rails up x2. Call bell and personal items within reach. Will continue to monitor pt for any changes.

## 2018-02-21 PROCEDURE — 96372 THER/PROPH/DIAG INJ SC/IM: CPT

## 2018-02-21 PROCEDURE — G0378 HOSPITAL OBSERVATION PER HR: HCPCS

## 2018-02-21 PROCEDURE — 25000003 PHARM REV CODE 250: Performed by: EMERGENCY MEDICINE

## 2018-02-21 PROCEDURE — 63600175 PHARM REV CODE 636 W HCPCS: Performed by: NURSE PRACTITIONER

## 2018-02-21 PROCEDURE — 11000001 HC ACUTE MED/SURG PRIVATE ROOM

## 2018-02-21 PROCEDURE — 25000003 PHARM REV CODE 250: Performed by: NURSE PRACTITIONER

## 2018-02-21 RX ADMIN — CARBIDOPA AND LEVODOPA 1 TABLET: 25; 100 TABLET ORAL at 12:02

## 2018-02-21 RX ADMIN — ENOXAPARIN SODIUM 40 MG: 100 INJECTION SUBCUTANEOUS at 05:02

## 2018-02-21 RX ADMIN — RIVASTIGMINE TRANSDERMAL SYSTEM 1 PATCH: 4.6 PATCH, EXTENDED RELEASE TRANSDERMAL at 09:02

## 2018-02-21 RX ADMIN — DOCUSATE SODIUM 100 MG: 100 CAPSULE, LIQUID FILLED ORAL at 08:02

## 2018-02-21 RX ADMIN — QUETIAPINE FUMARATE 25 MG: 25 TABLET ORAL at 09:02

## 2018-02-21 RX ADMIN — DIVALPROEX SODIUM 250 MG: 250 TABLET, DELAYED RELEASE ORAL at 08:02

## 2018-02-21 RX ADMIN — CARBIDOPA AND LEVODOPA 1 TABLET: 25; 100 TABLET ORAL at 05:02

## 2018-02-21 RX ADMIN — DOCUSATE SODIUM AND SENNOSIDES 1 TABLET: 8.6; 5 TABLET, FILM COATED ORAL at 08:02

## 2018-02-21 RX ADMIN — DIVALPROEX SODIUM 250 MG: 250 TABLET, DELAYED RELEASE ORAL at 09:02

## 2018-02-21 RX ADMIN — AMLODIPINE BESYLATE 10 MG: 5 TABLET ORAL at 08:02

## 2018-02-21 RX ADMIN — DOCUSATE SODIUM 100 MG: 100 CAPSULE, LIQUID FILLED ORAL at 09:02

## 2018-02-21 RX ADMIN — CARBIDOPA AND LEVODOPA 1 TABLET: 25; 100 TABLET ORAL at 06:02

## 2018-02-21 NOTE — PROGRESS NOTES
[facility] Hospital Medicine  Progress Note    Patient Name: Tenzin Ortiz  MRN: [id number]  Patient Class: OP- Observation   Admission Date: 2/7/2018  Length of Stay: 0 days  Attending Physician: Olivia Del Rosario MD  Primary Care Provider: Efrain Chavez MD        Subjective:     Principal Problem:Debility    HPI:  Patient is 78 yo male with HTN, HLD, and Parkinson's who presents to ED reportedly for HA. Per ED note patient was complaining of HA but he denies this on my interview. Seems there was some confusion in ED and he was unclear why he was here. During interview with me, patient reports being here for NH placement although he does not appear to be very happy about this. He has been with some issues caring for himself. Currently lives with his girlfriend who reports she can no longer care for him. He states he currently gets around with a walker but sometimes has difficulty getting up out of his chair. Per chart review patient is with Parkinson's and sees neurology, Dr. Castellanos, lastly seen on 2/6/18. He was with hallucinations which were noted to be secondary to dopaminergic agents thus having to significantly reduce dose--she recommended he be admitted for placement at that time but patient declined. He has been attempting placement as an outpatient through PCP but without much luck. He otherwise denies recent illness, fever/chills, CP, SOB, abdominal pain, N/V/D, dysuria. On presentation patient with grossly normal labs/vitals. Placed in observation for what appears to be solely placement although this is something that may need to be continued as outpatient as is not indication for hospitalization.    Hospital Course:  Awake and alert without complaint - severe parkinsons disease and can no longer care for himself - neither can his girlfriend, she has visited daily -  Awaiting placement/social issue with power of  (his /daughter/girlfriend  - no new issues - case  management following    Interval History: No acute events reported. Plan of care discussed with patient. He is awake and alert. However, pleasantly confused. Denies pain or any new issues today.  CM following; awaiting disposition. PT following for ambulation.    Review of Systems   Constitutional: Negative for activity change, appetite change, chills, diaphoresis, fatigue and fever.   Respiratory: Negative for cough, chest tightness, shortness of breath and wheezing.    Cardiovascular: Negative for chest pain, palpitations and leg swelling.   Gastrointestinal: Negative for abdominal distention, abdominal pain, constipation, diarrhea, nausea and vomiting.   Genitourinary: Negative for dysuria and hematuria.   Musculoskeletal: Negative for joint swelling and neck stiffness.   Neurological: Positive for tremors. Negative for weakness.   Psychiatric/Behavioral: Positive for confusion.        Intermittent confusion, requires reorienting - mood swings have stabilized - chronic essential tremors - alert to self and place     Objective:     Vital Signs (Most Recent):  Temp: 97.5 °F (36.4 °C) (02/21/18 0802)  Pulse: 63 (02/21/18 0802)  Resp: 16 (02/21/18 0802)  BP: (!) 117/58 (02/21/18 0802)  SpO2: 98 % (02/21/18 0802) Vital Signs (24h Range):  Temp:  [96.7 °F (35.9 °C)-98.7 °F (37.1 °C)] 97.5 °F (36.4 °C)  Pulse:  [63-84] 63  Resp:  [14-20] 16  SpO2:  [94 %-99 %] 98 %  BP: (117-159)/(58-72) 117/58     Weight: 64.3 kg (141 lb 12.1 oz)  Body mass index is 22.2 kg/m².    Intake/Output Summary (Last 24 hours) at 02/21/18 0858  Last data filed at 02/20/18 2200   Gross per 24 hour   Intake                0 ml   Output              100 ml   Net             -100 ml      Physical Exam   Constitutional: He appears well-developed and well-nourished. No distress.   HENT:   Head: Normocephalic and atraumatic.   Eyes: Pupils are equal, round, and reactive to light.   Wears glasses; at the bedside   Neck: Normal range of motion. Neck  supple.   Cardiovascular: Normal rate.    Pulmonary/Chest: Effort normal. No respiratory distress. He has no wheezes. He has no rales.   Abdominal: Soft. He exhibits no distension. There is no tenderness.   Genitourinary: Rectal exam shows guaiac negative stool.   Musculoskeletal: Normal range of motion. He exhibits no edema or tenderness.   Neurological: He is alert.   Confusion requires reorienting however, alert to self and situation - chronic essential tremors   Skin: Skin is warm and dry. He is not diaphoretic.   Dry skin; poor turgor   Psychiatric:   More awake today - answers questions appropriately       Significant Labs: All pertinent labs within the past 24 hours have been reviewed.    Significant Imaging: I have reviewed all pertinent imaging results/findings within the past 24 hours.  I have reviewed and interpreted all pertinent imaging results/findings within the past 24 hours.    Assessment/Plan:      * Debility    Currently awaiting placement  PT/OT following for ambulation  PPD already placed and read last week 2/7/18  Case management following  Lovenox 40 mg daily      TN consulted to aid in safe discharge planning. Appears patient has been living with a significant other who can no longer care for him and refusing to take patient home. His somewhat estranged daughter has been contacted and is willing to aid in finding NH placement (she had already begun this process as an outpatient). There is an issue, however, in that patient's power of  refused to sign consent for NH placement. Patient unable to make his own decision regarding this as he is with intermittent confusion +/- previous unclear dementia diagnosis per daughter. Psychiatry consulted for capacity. Also recommended adding depakote 250mg TID to help with mood stabilization. He is medically cleared and was initially with plan for home with HH and SW to aid in outpatient placement, however, cannot safely discharge as he does not  have a home to return to.           Lewy body dementia with behavioral disturbance    Seen by [facility] who has since signed off - appreciate recs -  Doing well on current regimen  Continue medication regimen as follows:  Sinemet  QID and Seroquel 25 hs  Continue Exelon 4.6 mg/24 patch  Divalproex 250 mg to BID  Continue holding trazadone        Dehydration    Per ED patient admitted for dehydration. His BUN is elevated with normal creatinine--may be mild clinical dehydration. Will provide gentle IVF hydration.           Essential hypertension    BP well controlled - Continue amlodipine 10 mg daily  Change vitals to every shift            Parkinson disease    Seroquel 25 mg; sinemet  mg;   Discontinue taxodione 50 mg  Restart exelon 4.6 mg in the morning when he is more awake          VTE Risk Mitigation         Ordered     enoxaparin injection 40 mg  Daily     Route:  Subcutaneous        02/13/18 1149     Medium Risk of VTE  Once      02/08/18 0653     Place JOSEFINA hose  Until discontinued      02/08/18 0653     Place sequential compression device  Until discontinued      02/08/18 0653              ANA Dobson  Department of Hospital Medicine   [facility]

## 2018-02-21 NOTE — SUBJECTIVE & OBJECTIVE
Interval History: No acute events reported. Plan of care discussed with patient. He is awake and alert. However, pleasantly confused. Denies pain or any new issues today.  CM following; awaiting disposition. PT following for ambulation.    Review of Systems   Constitutional: Negative for activity change, appetite change, chills, diaphoresis, fatigue and fever.   Respiratory: Negative for cough, chest tightness, shortness of breath and wheezing.    Cardiovascular: Negative for chest pain, palpitations and leg swelling.   Gastrointestinal: Negative for abdominal distention, abdominal pain, constipation, diarrhea, nausea and vomiting.   Genitourinary: Negative for dysuria and hematuria.   Musculoskeletal: Negative for joint swelling and neck stiffness.   Neurological: Positive for tremors. Negative for weakness.   Psychiatric/Behavioral: Positive for confusion.        Intermittent confusion, requires reorienting - mood swings have stabilized - chronic essential tremors - alert to self and place     Objective:     Vital Signs (Most Recent):  Temp: 97.5 °F (36.4 °C) (02/21/18 0802)  Pulse: 63 (02/21/18 0802)  Resp: 16 (02/21/18 0802)  BP: (!) 117/58 (02/21/18 0802)  SpO2: 98 % (02/21/18 0802) Vital Signs (24h Range):  Temp:  [96.7 °F (35.9 °C)-98.7 °F (37.1 °C)] 97.5 °F (36.4 °C)  Pulse:  [63-84] 63  Resp:  [14-20] 16  SpO2:  [94 %-99 %] 98 %  BP: (117-159)/(58-72) 117/58     Weight: 64.3 kg (141 lb 12.1 oz)  Body mass index is 22.2 kg/m².    Intake/Output Summary (Last 24 hours) at 02/21/18 0858  Last data filed at 02/20/18 2200   Gross per 24 hour   Intake                0 ml   Output              100 ml   Net             -100 ml      Physical Exam   Constitutional: He appears well-developed and well-nourished. No distress.   HENT:   Head: Normocephalic and atraumatic.   Eyes: Pupils are equal, round, and reactive to light.   Wears glasses; at the bedside   Neck: Normal range of motion. Neck supple.   Cardiovascular:  Normal rate.    Pulmonary/Chest: Effort normal. No respiratory distress. He has no wheezes. He has no rales.   Abdominal: Soft. He exhibits no distension. There is no tenderness.   Genitourinary: Rectal exam shows guaiac negative stool.   Musculoskeletal: Normal range of motion. He exhibits no edema or tenderness.   Neurological: He is alert.   Confusion requires reorienting however, alert to self and situation - chronic essential tremors   Skin: Skin is warm and dry. He is not diaphoretic.   Dry skin; poor turgor   Psychiatric:   More awake today - answers questions appropriately       Significant Labs: All pertinent labs within the past 24 hours have been reviewed.    Significant Imaging: I have reviewed all pertinent imaging results/findings within the past 24 hours.  I have reviewed and interpreted all pertinent imaging results/findings within the past 24 hours.

## 2018-02-21 NOTE — PLAN OF CARE
02/21/18 1043   Discharge Reassessment   Assessment Type Discharge Planning Reassessment   Call received from Keeley at [facility] who stated that patient has been clinically accepted and she plans to reach out to HPOA, P Hand to complete financials.    1108:  Call received from Keeley at [facility] stating that she spoke with Mr Mensah who is interested in patient going to [facility] and is checking his schedule to see when he can meet with her to complete paperwork.

## 2018-02-22 PROCEDURE — 25000003 PHARM REV CODE 250: Performed by: EMERGENCY MEDICINE

## 2018-02-22 PROCEDURE — 96372 THER/PROPH/DIAG INJ SC/IM: CPT

## 2018-02-22 PROCEDURE — 11000001 HC ACUTE MED/SURG PRIVATE ROOM

## 2018-02-22 PROCEDURE — 25000003 PHARM REV CODE 250: Performed by: NURSE PRACTITIONER

## 2018-02-22 PROCEDURE — G0378 HOSPITAL OBSERVATION PER HR: HCPCS

## 2018-02-22 PROCEDURE — 97535 SELF CARE MNGMENT TRAINING: CPT

## 2018-02-22 PROCEDURE — 97116 GAIT TRAINING THERAPY: CPT

## 2018-02-22 PROCEDURE — 63600175 PHARM REV CODE 636 W HCPCS: Performed by: NURSE PRACTITIONER

## 2018-02-22 RX ORDER — POLYETHYLENE GLYCOL 3350 17 G/17G
17 POWDER, FOR SOLUTION ORAL DAILY
Status: DISCONTINUED | OUTPATIENT
Start: 2018-02-22 | End: 2018-03-19 | Stop reason: SDUPTHER

## 2018-02-22 RX ADMIN — CARBIDOPA AND LEVODOPA 1 TABLET: 25; 100 TABLET ORAL at 05:02

## 2018-02-22 RX ADMIN — DIVALPROEX SODIUM 250 MG: 250 TABLET, DELAYED RELEASE ORAL at 08:02

## 2018-02-22 RX ADMIN — POLYETHYLENE GLYCOL 3350 17 G: 17 POWDER, FOR SOLUTION ORAL at 10:02

## 2018-02-22 RX ADMIN — DOCUSATE SODIUM 100 MG: 100 CAPSULE, LIQUID FILLED ORAL at 10:02

## 2018-02-22 RX ADMIN — CARBIDOPA AND LEVODOPA 1 TABLET: 25; 100 TABLET ORAL at 02:02

## 2018-02-22 RX ADMIN — RIVASTIGMINE TRANSDERMAL SYSTEM 1 PATCH: 4.6 PATCH, EXTENDED RELEASE TRANSDERMAL at 10:02

## 2018-02-22 RX ADMIN — AMLODIPINE BESYLATE 10 MG: 5 TABLET ORAL at 10:02

## 2018-02-22 RX ADMIN — ENOXAPARIN SODIUM 40 MG: 100 INJECTION SUBCUTANEOUS at 05:02

## 2018-02-22 RX ADMIN — DIVALPROEX SODIUM 250 MG: 250 TABLET, DELAYED RELEASE ORAL at 10:02

## 2018-02-22 RX ADMIN — DOCUSATE SODIUM 100 MG: 100 CAPSULE, LIQUID FILLED ORAL at 08:02

## 2018-02-22 RX ADMIN — CARBIDOPA AND LEVODOPA 1 TABLET: 25; 100 TABLET ORAL at 12:02

## 2018-02-22 RX ADMIN — QUETIAPINE FUMARATE 25 MG: 25 TABLET ORAL at 08:02

## 2018-02-22 NOTE — SUBJECTIVE & OBJECTIVE
Interval History: No acute events reported. Plan of care discussed with patient. He is awake and alert. However, pleasantly confused. Denies pain or any new issues today.  CM following; awaiting disposition to [facility] once legals have been signed per POA. PT following for ambulation.    Review of Systems   Constitutional: Negative for activity change, appetite change, chills, diaphoresis, fatigue and fever.   Respiratory: Negative for cough, chest tightness, shortness of breath and wheezing.    Cardiovascular: Negative for chest pain, palpitations and leg swelling.   Gastrointestinal: Negative for abdominal distention, abdominal pain, constipation, diarrhea, nausea and vomiting.   Genitourinary: Negative for dysuria and hematuria.   Musculoskeletal: Negative for joint swelling and neck stiffness.   Neurological: Positive for tremors. Negative for weakness.   Psychiatric/Behavioral: Positive for confusion.        Intermittent confusion, requires reorienting - mood swings have stabilized - chronic essential tremors - alert to self and place     Objective:     Vital Signs (Most Recent):  Temp: 98.3 °F (36.8 °C) (02/22/18 1602)  Pulse: 91 (02/22/18 1602)  Resp: 19 (02/22/18 1602)  BP: (!) 158/67 (02/22/18 1602)  SpO2: 98 % (02/22/18 1602) Vital Signs (24h Range):  Temp:  [97.5 °F (36.4 °C)-99.3 °F (37.4 °C)] 98.3 °F (36.8 °C)  Pulse:  [78-91] 91  Resp:  [16-19] 19  SpO2:  [96 %-98 %] 98 %  BP: (115-176)/(56-73) 158/67     Weight: 63.8 kg (140 lb 10.5 oz)  Body mass index is 22.03 kg/m².    Intake/Output Summary (Last 24 hours) at 02/22/18 1726  Last data filed at 02/22/18 1208   Gross per 24 hour   Intake              360 ml   Output              500 ml   Net             -140 ml      Physical Exam   Constitutional: He appears well-developed and well-nourished. No distress.   HENT:   Head: Normocephalic and atraumatic.   Eyes: Pupils are equal, round, and reactive to light.   Wears glasses; at the bedside   Neck:  Normal range of motion. Neck supple.   Cardiovascular: Normal rate.    Pulmonary/Chest: Effort normal. No respiratory distress. He has no wheezes. He has no rales.   Abdominal: Soft. He exhibits no distension. There is no tenderness.   Genitourinary: Rectal exam shows guaiac negative stool.   Musculoskeletal: Normal range of motion. He exhibits no edema or tenderness.   Neurological: He is alert.   Confusion requires reorienting however, alert to self and situation - chronic essential tremors   Skin: Skin is warm and dry. He is not diaphoretic.   Dry skin; poor turgor   Psychiatric:   More awake today - answers questions appropriately       Significant Labs: All pertinent labs within the past 24 hours have been reviewed.    Significant Imaging: I have reviewed all pertinent imaging results/findings within the past 24 hours.  I have reviewed and interpreted all pertinent imaging results/findings within the past 24 hours.

## 2018-02-22 NOTE — PROGRESS NOTES
Patient voided about 100 ml of urine overnight. Bladder scan shows 360 of residual urine. Last bowel movement was on 2/20. NP notified. Esther lax ordered. Will continue to monitor patient's output and perform bladder scan at noon

## 2018-02-22 NOTE — PLAN OF CARE
Problem: Physical Therapy Goal  Goal: Physical Therapy Goal  Goals to be met by: 18    Patient will increase functional independence with mobility by performin. Supine to sit with Stand-by Assistance  2. Sit to stand transfer with Stand-by Assistance  3. Gait  x 150 feet with Stand-by Assistance using Rolling Walker  4. Lower extremity exercise program x30 reps per handout, with assistance as needed   Outcome: Ongoing (interventions implemented as appropriate)  Pt requires CGA for BM and Min A, RW with posterior leaning for transfers.  Pt ambulated ~10ft with RW, Mod A with pt ambulating on B forefeet.

## 2018-02-22 NOTE — PT/OT/SLP PROGRESS
Occupational Therapy   Treatment    Name: Tenzin Ortiz  MRN: [id number]  Admitting Diagnosis:  Debility       Recommendations:     Discharge Recommendations:   Discharge Equipment Recommendations:  bedside commode, bath bench  Barriers to discharge:  Decreased caregiver support    Subjective     Communicated with: Nurse Stubbs prior to session.  Pain/Comfort:  · Pain Rating 1: 0/10    Patients cultural, spiritual, Zoroastrian conflicts given the current situation:      Objective:     Patient found with: peripheral IV    General Precautions: Standard, fall   Orthopedic Precautions:N/A   Braces: N/A     Occupational Performance:    Bed Mobility:    · Patient completed Scooting/Bridging with contact guard assistance  · Patient completed Supine to Sit with contact guard assistance     Functional Mobility/Transfers:  · Patient completed Sit <> Stand Transfer with minimum assistance  with  rolling walker  Patient with posterior lean.   · Functional Mobility: Patient able to amb to sink with RW/Mod A. Patient has difficulty initiating steps and requires assist.    Activities of Daily Living:  · Grooming: stand by assistance seated in chair at sink  · UB Dressing: minimum assistance    · LB Dressing: Patient attempted but was unable to don socks EOB today.       Patient left up in chair with all lines intact, call button in reach, chair alarm on and nurse notified    AMPA 6 Click:  AMPA Total Score: 15    Treatment & Education:   Patient performed bed mobility, self care, and functional transfers as above.  Patient performed BUE AROM therex shoulder flex/ext, elbow flex/ext, shoulder horizontal abd/add, forward punches, and composite finger flex/ext x 10 reps with verbal cues to achieve full ROM.  Education:    Assessment:     Tenzin Ortiz is a 77 y.o. male with a medical diagnosis of Debility.  He presents with the following performance deficits affecting function are weakness, impaired functional mobilty, impaired  endurance, impaired self care skills, impaired balance, decreased coordination, decreased upper extremity function, decreased lower extremity function, decreased safety awareness, decreased ROM, impaired coordination, impaired fine motor.  Patient tolerated therapy well today with improvements in mobility noted.     Rehab Prognosis:  fair; patient would benefit from acute skilled OT services to address these deficits and reach maximum level of function.       Plan:     Patient to be seen 3 x/week to address the above listed problems via self-care/home management, therapeutic activities, therapeutic exercises  · Plan of Care Expires: 02/16/18  · Plan of Care Reviewed with: patient    This Plan of care has been discussed with the patient who was involved in its development and understands and is in agreement with the identified goals and treatment plan    GOALS:    Occupational Therapy Goals        Problem: Occupational Therapy Goal    Goal Priority Disciplines Outcome Interventions   Occupational Therapy Goal     OT, PT/OT Ongoing (interventions implemented as appropriate)    Description:  Goals to be met by: 3/19/18     Patient will increase functional independence with ADLs by performing:    Feeding with Set-up Assistance. Met 2/19/18  UE Dressing with Set-up Assistance.  Grooming while seated with Set-up Assistance. Met 2/16/18  Stand pivot transfers with Contact Guard Assistance.  Toilet transfer to bedside commode with Minimal Assistance.  Upper extremity exercise program x10 reps , with supervision.  Pt will be able to ambulate to sink with minimal Assist for perform grooming                         Time Tracking:     OT Date of Treatment: 02/22/18  OT Start Time: 1206  OT Stop Time: 1230  OT Total Time (min): 24 min    Billable Minutes:Self Care/Home Management 12 (co-tx with PT)    DANIELLE Gomez  2/22/2018

## 2018-02-22 NOTE — PLAN OF CARE
Problem: Occupational Therapy Goal  Goal: Occupational Therapy Goal  Goals to be met by: 3/19/18     Patient will increase functional independence with ADLs by performing:    Feeding with Set-up Assistance. Met 2/19/18  UE Dressing with Set-up Assistance.  Grooming while seated with Set-up Assistance. Met 2/16/18  Stand pivot transfers with Contact Guard Assistance.  Toilet transfer to bedside commode with Minimal Assistance.  Upper extremity exercise program x10 reps , with supervision.  Pt will be able to ambulate to sink with minimal Assist for perform grooming        Outcome: Ongoing (interventions implemented as appropriate)  Patient tolerated therapy well today with improvements in mobility noted.  Patient will benefit from continued skilled OT.

## 2018-02-22 NOTE — PLAN OF CARE
Problem: Patient Care Overview  Goal: Plan of Care Review   02/22/18 0510   Coping/Psychosocial   Plan Of Care Reviewed With patient       Problem: Fall Risk (Adult)  Goal: Absence of Falls  Patient will demonstrate the desired outcomes by discharge/transition of care.    02/22/18 0510   Fall Risk (Adult)   Absence of Falls making progress toward outcome       Problem: Pressure Ulcer Risk (Mike Scale) (Adult,Obstetrics,Pediatric)  Goal: Skin Integrity  Patient will demonstrate the desired outcomes by discharge/transition of care.    02/22/18 0510   Pressure Ulcer Risk (Mike Scale) (Adult,Obstetrics,Pediatric)   Skin Integrity making progress toward outcome       Problem: Confusion, Acute (Adult)  Goal: Safety  Patient will demonstrate the desired outcomes by discharge/transition of care.    02/22/18 0510   Confusion, Acute (Adult)   Safety making progress toward outcome

## 2018-02-22 NOTE — PLAN OF CARE
02/22/18 1219   Discharge Reassessment   Assessment Type Discharge Planning Reassessment   DC plan:  skilled nursing NH placement.  Waiting for HPOA to schedule meeting with Admissions Coordinator at [facility] to sign paperwork.      Pasrr completed and signed by MD  Locet completed.  PASSR faxed to [facility] at:  [phone].   Awaiting [phone]:  Pasrr received

## 2018-02-22 NOTE — PROGRESS NOTES
[facility] Hospital Medicine  Progress Note    Patient Name: Tenzin Ortiz  MRN: [id number]  Patient Class: OP- Observation   Admission Date: 2/7/2018  Length of Stay: 0 days  Attending Physician: Olivia Del Rosario MD  Primary Care Provider: Efrain Chavez MD        Subjective:     Principal Problem:Debility    HPI:  Patient is 78 yo male with HTN, HLD, and Parkinson's who presents to ED reportedly for HA. Per ED note patient was complaining of HA but he denies this on my interview. Seems there was some confusion in ED and he was unclear why he was here. During interview with me, patient reports being here for NH placement although he does not appear to be very happy about this. He has been with some issues caring for himself. Currently lives with his girlfriend who reports she can no longer care for him. He states he currently gets around with a walker but sometimes has difficulty getting up out of his chair. Per chart review patient is with Parkinson's and sees neurology, Dr. Castellanos, lastly seen on 2/6/18. He was with hallucinations which were noted to be secondary to dopaminergic agents thus having to significantly reduce dose--she recommended he be admitted for placement at that time but patient declined. He has been attempting placement as an outpatient through PCP but without much luck. He otherwise denies recent illness, fever/chills, CP, SOB, abdominal pain, N/V/D, dysuria. On presentation patient with grossly normal labs/vitals. Placed in observation for what appears to be solely placement although this is something that may need to be continued as outpatient as is not indication for hospitalization.    Hospital Course:  Awake and alert without complaint - severe parkinsons disease and can no longer care for himself - neither can his girlfriend.  CM reports patient with acceptance to [facility]; awaiting legals to be signs by patient POA. No new issues - case management  following    Interval History: No acute events reported. Plan of care discussed with patient. He is awake and alert. However, pleasantly confused. Denies pain or any new issues today.  CM following; awaiting disposition to [facility] once legals have been signed per POA. PT following for ambulation.    Review of Systems   Constitutional: Negative for activity change, appetite change, chills, diaphoresis, fatigue and fever.   Respiratory: Negative for cough, chest tightness, shortness of breath and wheezing.    Cardiovascular: Negative for chest pain, palpitations and leg swelling.   Gastrointestinal: Negative for abdominal distention, abdominal pain, constipation, diarrhea, nausea and vomiting.   Genitourinary: Negative for dysuria and hematuria.   Musculoskeletal: Negative for joint swelling and neck stiffness.   Neurological: Positive for tremors. Negative for weakness.   Psychiatric/Behavioral: Positive for confusion.        Intermittent confusion, requires reorienting - mood swings have stabilized - chronic essential tremors - alert to self and place     Objective:     Vital Signs (Most Recent):  Temp: 98.3 °F (36.8 °C) (02/22/18 1602)  Pulse: 91 (02/22/18 1602)  Resp: 19 (02/22/18 1602)  BP: (!) 158/67 (02/22/18 1602)  SpO2: 98 % (02/22/18 1602) Vital Signs (24h Range):  Temp:  [97.5 °F (36.4 °C)-99.3 °F (37.4 °C)] 98.3 °F (36.8 °C)  Pulse:  [78-91] 91  Resp:  [16-19] 19  SpO2:  [96 %-98 %] 98 %  BP: (115-176)/(56-73) 158/67     Weight: 63.8 kg (140 lb 10.5 oz)  Body mass index is 22.03 kg/m².    Intake/Output Summary (Last 24 hours) at 02/22/18 1726  Last data filed at 02/22/18 1208   Gross per 24 hour   Intake              360 ml   Output              500 ml   Net             -140 ml      Physical Exam   Constitutional: He appears well-developed and well-nourished. No distress.   HENT:   Head: Normocephalic and atraumatic.   Eyes: Pupils are equal, round, and reactive to light.   Wears glasses; at the bedside    Neck: Normal range of motion. Neck supple.   Cardiovascular: Normal rate.    Pulmonary/Chest: Effort normal. No respiratory distress. He has no wheezes. He has no rales.   Abdominal: Soft. He exhibits no distension. There is no tenderness.   Genitourinary: Rectal exam shows guaiac negative stool.   Musculoskeletal: Normal range of motion. He exhibits no edema or tenderness.   Neurological: He is alert.   Confusion requires reorienting however, alert to self and situation - chronic essential tremors   Skin: Skin is warm and dry. He is not diaphoretic.   Dry skin; poor turgor   Psychiatric:   More awake today - answers questions appropriately       Significant Labs: All pertinent labs within the past 24 hours have been reviewed.    Significant Imaging: I have reviewed all pertinent imaging results/findings within the past 24 hours.  I have reviewed and interpreted all pertinent imaging results/findings within the past 24 hours.    Assessment/Plan:      * Debility    Currently awaiting placement  PT/OT following for ambulation  PPD already placed and read last week 2/7/18  Case management following  Lovenox 40 mg daily      TN consulted to aid in safe discharge planning. Appears patient has been living with a significant other who can no longer care for him and refusing to take patient home. His somewhat estranged daughter has been contacted and is willing to aid in finding NH placement (she had already begun this process as an outpatient). There is an issue, however, in that patient's power of  refused to sign consent for NH placement. Patient unable to make his own decision regarding this as he is with intermittent confusion +/- previous unclear dementia diagnosis per daughter. Psychiatry consulted for capacity. Also recommended adding depakote 250mg TID to help with mood stabilization. He is medically cleared and was initially with plan for home with HH and SW to aid in outpatient placement, however,  cannot safely discharge as he does not have a home to return to.           Lewy body dementia with behavioral disturbance    Seen by [facility] who has since signed off - appreciate recs -  Doing well on current regimen  Continue medication regimen as follows:  Sinemet  QID and Seroquel 25 hs  Continue Exelon 4.6 mg/24 patch  Divalproex 250 mg to BID  Continue holding trazadone        Dehydration    Per ED patient admitted for dehydration. His BUN is elevated with normal creatinine--may be mild clinical dehydration. Will provide gentle IVF hydration.           Essential hypertension    BP well controlled - Continue amlodipine 10 mg daily  Change vitals to every shift            Parkinson disease    Seroquel 25 mg; sinemet  mg;   Discontinue taxodione 50 mg  Restart exelon 4.6 mg in the morning when he is more awake          VTE Risk Mitigation         Ordered     enoxaparin injection 40 mg  Daily     Route:  Subcutaneous        02/13/18 1149     Medium Risk of VTE  Once      02/08/18 0653     Place JOSEFINA hose  Until discontinued      02/08/18 0653     Place sequential compression device  Until discontinued      02/08/18 0653              ANA Dobson  Department of Hospital Medicine   [facility]

## 2018-02-22 NOTE — PT/OT/SLP PROGRESS
Physical Therapy Treatment    Patient Name:  Tenzin Ortiz   MRN:  [id number]    Recommendations:     Discharge Recommendations:   (PT at next level of care)   Discharge Equipment Recommendations: bedside commode, bath bench   Barriers to discharge: Decreased caregiver support    Assessment:     Tenzin Ortiz is a 77 y.o. male admitted with a medical diagnosis of Debility.  He presents with the following impairments/functional limitations:  weakness, impaired functional mobilty, impaired endurance, gait instability, impaired self care skills, decreased safety awareness, decreased coordination, impaired balance, decreased upper extremity function, decreased lower extremity function, decreased ROM, impaired fine motor, impaired joint extensibility, impaired muscle length  Pt requires CGA for BM and Min A, RW with posterior leaning for transfers.  Pt ambulated ~10ft with RW, Mod A with pt ambulating on B forefeet.      Rehab Prognosis:  fair; patient would benefit from acute skilled PT services to address these deficits and reach maximum level of function.      Recent Surgery: * No surgery found *      Plan:     During this hospitalization, patient to be seen 3 x/week to address the above listed problems via gait training, therapeutic activities, therapeutic exercises  · Plan of Care Expires:  02/22/18   Plan of Care Reviewed with: patient    Subjective     Communicated with pt's nurse, Enoc, prior to session.  Patient found supine upon PT entry to room, agreeable to treatment.      Chief Complaint: walking hurts my feet  Patient comments/goals: Pt agreeable to therapy session.  Pain/Comfort:  · Pain Rating 1: 0/10    Patients cultural, spiritual, Adventist conflicts given the current situation:      Objective:     Patient found with: peripheral IV     General Precautions: Standard, fall   Orthopedic Precautions:N/A   Braces: N/A     Functional Mobility:  · Bed Mobility:     · Scooting: contact guard  assistance  · Supine to Sit: Min A to scoot to HOB  · Transfers:x1 from EOB    · Sit to Stand:  Min A  with rolling walker with posterior leaning  · Gait: Pt ambulated ~10ft with RW, Mod A with pt ambulating on B forefeet.  Pt requires verbal and tactile cues for advancement of BLEs and to improve step length.  Pt with decreased step length and step width.  · Balance: Pt with fair- balance      AM-PAC 6 CLICK MOBILITY  Turning over in bed (including adjusting bedclothes, sheets and blankets)?: 3  Sitting down on and standing up from a chair with arms (e.g., wheelchair, bedside commode, etc.): 3  Moving from lying on back to sitting on the side of the bed?: 3  Moving to and from a bed to a chair (including a wheelchair)?: 2  Need to walk in hospital room?: 2  Climbing 3-5 steps with a railing?: 1  Total Score: 14       Therapeutic Activities and Exercises:   Pt performed seated therex to BLEs x10 reps AROM including: hip flexion, LAQs, ankle pumps, active hip abduction/adduction.    Patient left up in chair with pressure relief cushion with all lines intact, call button in reach, chair alarm on and pt's nurse, Enoc. notified..    GOALS:    Physical Therapy Goals        Problem: Physical Therapy Goal    Goal Priority Disciplines Outcome Goal Variances Interventions   Physical Therapy Goal     PT/OT, PT Ongoing (interventions implemented as appropriate)     Description:  Goals to be met by: 18    Patient will increase functional independence with mobility by performin. Supine to sit with Stand-by Assistance  2. Sit to stand transfer with Stand-by Assistance  3. Gait  x 150 feet with Stand-by Assistance using Rolling Walker  4. Lower extremity exercise program x30 reps per handout, with assistance as needed                    Time Tracking:     PT Received On: 18  PT Start Time: 1206     PT Stop Time: 1230  PT Total Time (min): 24 min     Billable Minutes: Gait Training 12 Cotx with  HARDEN    Treatment Type: Treatment  PT/PTA: PTA     PTA Visit Number: 1     Citlali Dennis, PTA  02/22/2018

## 2018-02-23 PROCEDURE — 25000003 PHARM REV CODE 250: Performed by: NURSE PRACTITIONER

## 2018-02-23 PROCEDURE — 11000001 HC ACUTE MED/SURG PRIVATE ROOM

## 2018-02-23 PROCEDURE — 96372 THER/PROPH/DIAG INJ SC/IM: CPT

## 2018-02-23 PROCEDURE — 63600175 PHARM REV CODE 636 W HCPCS: Performed by: NURSE PRACTITIONER

## 2018-02-23 PROCEDURE — 97110 THERAPEUTIC EXERCISES: CPT

## 2018-02-23 PROCEDURE — 25000003 PHARM REV CODE 250: Performed by: EMERGENCY MEDICINE

## 2018-02-23 PROCEDURE — 97116 GAIT TRAINING THERAPY: CPT

## 2018-02-23 PROCEDURE — G0378 HOSPITAL OBSERVATION PER HR: HCPCS

## 2018-02-23 RX ADMIN — CARBIDOPA AND LEVODOPA 1 TABLET: 25; 100 TABLET ORAL at 12:02

## 2018-02-23 RX ADMIN — CARBIDOPA AND LEVODOPA 1 TABLET: 25; 100 TABLET ORAL at 01:02

## 2018-02-23 RX ADMIN — DOCUSATE SODIUM AND SENNOSIDES 1 TABLET: 8.6; 5 TABLET, FILM COATED ORAL at 01:02

## 2018-02-23 RX ADMIN — CARBIDOPA AND LEVODOPA 1 TABLET: 25; 100 TABLET ORAL at 06:02

## 2018-02-23 RX ADMIN — DIVALPROEX SODIUM 250 MG: 250 TABLET, DELAYED RELEASE ORAL at 01:02

## 2018-02-23 RX ADMIN — POLYETHYLENE GLYCOL 3350 17 G: 17 POWDER, FOR SOLUTION ORAL at 02:02

## 2018-02-23 RX ADMIN — AMLODIPINE BESYLATE 10 MG: 5 TABLET ORAL at 01:02

## 2018-02-23 RX ADMIN — DOCUSATE SODIUM 100 MG: 100 CAPSULE, LIQUID FILLED ORAL at 08:02

## 2018-02-23 RX ADMIN — DOCUSATE SODIUM 100 MG: 100 CAPSULE, LIQUID FILLED ORAL at 01:02

## 2018-02-23 RX ADMIN — QUETIAPINE FUMARATE 25 MG: 25 TABLET ORAL at 08:02

## 2018-02-23 RX ADMIN — ENOXAPARIN SODIUM 40 MG: 100 INJECTION SUBCUTANEOUS at 06:02

## 2018-02-23 RX ADMIN — RIVASTIGMINE TRANSDERMAL SYSTEM 1 PATCH: 4.6 PATCH, EXTENDED RELEASE TRANSDERMAL at 01:02

## 2018-02-23 RX ADMIN — DIVALPROEX SODIUM 250 MG: 250 TABLET, DELAYED RELEASE ORAL at 08:02

## 2018-02-23 NOTE — NURSING
PER handoff received from RADHA Lopes RN. Responds to voice and is oriented x4. Denies having pain and is no apparent distress, tremors present. Assessment completed per Doc Flowsheets; reference if needed. Fall and safety precautions maintained. Bed alarm activated and audible. Bed locked in lowest position, with side rails up x2. Call bell and personal items within reach. Will continue to monitor pt for any changes.

## 2018-02-23 NOTE — PT/OT/SLP PROGRESS
Physical Therapy Treatment    Patient Name:  Tenzin Ortiz   MRN:  [id number]    Recommendations:     Discharge Recommendations:   (PT at next level of care)   Discharge Equipment Recommendations: bedside commode, bath bench   Barriers to discharge: no family support at home     Assessment:     Tenzin Ortiz is a 77 y.o. male admitted with a medical diagnosis of Debility.  He presents with the following impairments/functional limitations:  weakness, impaired endurance, impaired functional mobilty, gait instability, impaired balance, impaired coordination, decreased safety awareness, decreased lower extremity function, decreased upper extremity function, impaired fine motor, impaired joint extensibility, decreased ROM. Patient with increased tremors to R UE today.     Rehab Prognosis:  fair; patient would benefit from acute skilled PT services to address these deficits and reach maximum level of function.      Recent Surgery: * No surgery found *      Plan:     During this hospitalization, patient to be seen 3 x/week to address the above listed problems via gait training, therapeutic activities, therapeutic exercises  · Plan of Care Expires:  03/09/18   Plan of Care Reviewed with: patient    Subjective     Communicated with nurse prior to session.  Patient found supine in bed upon PT entry to room, agreeable to treatment.      Chief Complaint: Am I ever going to get out of here?  Patient comments/goals: To get out of hospital.   Pain/Comfort:  · Pain Rating 1: 0/10    Objective:     Patient found with: peripheral IV     General Precautions: Standard, fall   Orthopedic Precautions:N/A   Braces: N/A     Functional Mobility:  · Bed Mobility:     · Supine to Sit: contact guard assistance with increased time to perform  · Transfers:     · Sit to Stand:  moderate assistance with rolling walker due to posterior lean and patient pulling up on walker  · Gait: ~5 steps with moderate assistance and rolling walker. Patient needed  assistance with upright posture due to posterior lean and rolling walker management. Patient with increased difficulty advancing bilateral feet and needed verbal cues to increased base of support.     Therapeutic Activities and Exercises:   Patient performed B UE seated therex 2x15 for shoulder flex, elbow flex/ext, and finger flex/ext. Patient performed B LE seated therex 2x15 for A/P, LAQ, hip flex, and pillow squeezes.     Patient left up in chair with all lines intact, call button in reach, chair alarm on and nurse notified..    GOALS:    Physical Therapy Goals        Problem: Physical Therapy Goal    Goal Priority Disciplines Outcome Goal Variances Interventions   Physical Therapy Goal     PT/OT, PT Ongoing (interventions implemented as appropriate)     Description:  Goals to be met by: 3/9/18    Patient will increase functional independence with mobility by performin. Supine to sit with Stand-by Assistance  2. Sit to stand transfer with Stand-by Assistance  3. Gait  x 150 feet with Stand-by Assistance using Rolling Walker  4. Lower extremity exercise program x30 reps per handout, with assistance as needed                     Time Tracking:     PT Received On: 18  PT Start Time: 1507     PT Stop Time: 1530  PT Total Time (min): 23 min     Billable Minutes: Therapeutic Activity  13  and Therapeutic Exercise 10    Treatment Type: Treatment  PT/PTA: PT     PTA Visit Number: 0     Dominga Esquivel, PT  2018

## 2018-02-23 NOTE — SUBJECTIVE & OBJECTIVE
Interval History: No acute events reported. Plan of care discussed with patient. He is awake and alert. However, pleasantly confused. Denies pain or any new issues today.  CM following; awaiting disposition to [facility] once legals have been signed per POA. PT following for ambulation.    Review of Systems   Constitutional: Negative for activity change, appetite change, chills, diaphoresis, fatigue and fever.   Respiratory: Negative for cough, chest tightness, shortness of breath and wheezing.    Cardiovascular: Negative for chest pain, palpitations and leg swelling.   Gastrointestinal: Negative for abdominal distention, abdominal pain, constipation, diarrhea, nausea and vomiting.   Genitourinary: Negative for dysuria and hematuria.   Musculoskeletal: Negative for joint swelling and neck stiffness.   Neurological: Positive for tremors. Negative for weakness.   Psychiatric/Behavioral: Positive for confusion.        Intermittent confusion, requires reorienting - mood swings have stabilized - chronic essential tremors - alert to self and place     Objective:     Vital Signs (Most Recent):  Temp: 98 °F (36.7 °C) (02/23/18 0825)  Pulse: 75 (02/23/18 0825)  Resp: 18 (02/23/18 0825)  BP: 129/62 (02/23/18 0825)  SpO2: 97 % (02/23/18 0825) Vital Signs (24h Range):  Temp:  [97.7 °F (36.5 °C)-98.4 °F (36.9 °C)] 98 °F (36.7 °C)  Pulse:  [75-91] 75  Resp:  [16-20] 18  SpO2:  [96 %-98 %] 97 %  BP: (129-162)/(62-69) 129/62     Weight: 63.8 kg (140 lb 10.5 oz)  Body mass index is 22.03 kg/m².    Intake/Output Summary (Last 24 hours) at 02/23/18 1411  Last data filed at 02/23/18 0456   Gross per 24 hour   Intake              360 ml   Output              500 ml   Net             -140 ml      Physical Exam   Constitutional: He appears well-developed and well-nourished. No distress.   HENT:   Head: Normocephalic and atraumatic.   Eyes: Pupils are equal, round, and reactive to light.   Wears glasses; at the bedside   Neck: Normal range  of motion. Neck supple.   Cardiovascular: Normal rate.    Pulmonary/Chest: Effort normal. No respiratory distress. He has no wheezes. He has no rales.   Abdominal: Soft. He exhibits no distension. There is no tenderness.   Genitourinary: Rectal exam shows guaiac negative stool.   Musculoskeletal: Normal range of motion. He exhibits no edema or tenderness.   Neurological: He is alert.   Confusion requires reorienting however, alert to self and situation - chronic essential tremors   Skin: Skin is warm and dry. He is not diaphoretic.   Dry skin; poor turgor   Psychiatric:   More awake today - answers questions appropriately       Significant Labs: All pertinent labs within the past 24 hours have been reviewed.    Significant Imaging: I have reviewed all pertinent imaging results/findings within the past 24 hours.  I have reviewed and interpreted all pertinent imaging results/findings within the past 24 hours.

## 2018-02-23 NOTE — PLAN OF CARE
Problem: Physical Therapy Goal  Goal: Physical Therapy Goal  Goals to be met by: 3/9/18    Patient will increase functional independence with mobility by performin. Supine to sit with Stand-by Assistance  2. Sit to stand transfer with Stand-by Assistance  3. Gait  x 150 feet with Stand-by Assistance using Rolling Walker  4. Lower extremity exercise program x30 reps per handout, with assistance as needed   Outcome: Ongoing (interventions implemented as appropriate)    Patient with increased tremors to R UE. Patient making slow progress with PT so PT extended POC for another 2 weeks.

## 2018-02-23 NOTE — PLAN OF CARE
Problem: Patient Care Overview  Goal: Plan of Care Review   02/22/18 1930   Coping/Psychosocial   Plan Of Care Reviewed With patient   Pt remained free of falls during current shift. Turned Q2H. Denied pain and did not receive any prn pain medications. Psych consulted and pt deemed incompetent, therefore awaiting pt's POA to sign for pt's admission to nursing home. Plan of care and fall precautions reviewed with pt and verbalized understanding. Bed locked, lowered, SR up x2 and call light placed within reach.

## 2018-02-23 NOTE — PLAN OF CARE
02/23/18 1053   Discharge Reassessment   Assessment Type Discharge Planning Reassessment   Discharge planning on-going.  TN called Mr Mensah to discuss NH placement.  Mr Mensah was adamant that he would not be able to get to any of these facilities until possibly week after next.  TN explained that the patient has not met criteria to be in an acute care hospital for quite some time and that he must be moved to the proper level of care for his condition within a timely manor.  Mr Mensah stated that he could not do anymore than what he is doing now.  TN asked if Keeley barriga [facility] would be able to fax the paperwork would he complete and fax back to her so that the patient could be moved.  He did agree.  He did ask that TN call [facility] first to see if they could accept.  TN called Lazara at [facility], message left.  Message also sent via [facility].  Message sent to Keeley barriga [facility] with email info.      1300:  Message received from Kee[facility] stating that Mr Mensah is refusing to sign paperwork.  TN notified MALIK Patino LPN II;  manager notified.

## 2018-02-24 LAB
ANION GAP SERPL CALC-SCNC: 10 MMOL/L
BUN SERPL-MCNC: 19 MG/DL
CALCIUM SERPL-MCNC: 9.7 MG/DL
CHLORIDE SERPL-SCNC: 106 MMOL/L
CO2 SERPL-SCNC: 22 MMOL/L
CREAT SERPL-MCNC: 1 MG/DL
EST. GFR  (AFRICAN AMERICAN): >60 ML/MIN/1.73 M^2
EST. GFR  (NON AFRICAN AMERICAN): >60 ML/MIN/1.73 M^2
GLUCOSE SERPL-MCNC: 91 MG/DL
POTASSIUM SERPL-SCNC: 4.1 MMOL/L
SODIUM SERPL-SCNC: 138 MMOL/L

## 2018-02-24 PROCEDURE — 25000003 PHARM REV CODE 250: Performed by: NURSE PRACTITIONER

## 2018-02-24 PROCEDURE — 25000003 PHARM REV CODE 250: Performed by: EMERGENCY MEDICINE

## 2018-02-24 PROCEDURE — G0378 HOSPITAL OBSERVATION PER HR: HCPCS

## 2018-02-24 PROCEDURE — 63600175 PHARM REV CODE 636 W HCPCS: Performed by: NURSE PRACTITIONER

## 2018-02-24 PROCEDURE — 11000001 HC ACUTE MED/SURG PRIVATE ROOM

## 2018-02-24 PROCEDURE — 96372 THER/PROPH/DIAG INJ SC/IM: CPT

## 2018-02-24 PROCEDURE — 36415 COLL VENOUS BLD VENIPUNCTURE: CPT

## 2018-02-24 PROCEDURE — 80048 BASIC METABOLIC PNL TOTAL CA: CPT

## 2018-02-24 RX ADMIN — DOCUSATE SODIUM 100 MG: 100 CAPSULE, LIQUID FILLED ORAL at 09:02

## 2018-02-24 RX ADMIN — CARBIDOPA AND LEVODOPA 1 TABLET: 25; 100 TABLET ORAL at 11:02

## 2018-02-24 RX ADMIN — CARBIDOPA AND LEVODOPA 1 TABLET: 25; 100 TABLET ORAL at 12:02

## 2018-02-24 RX ADMIN — CARBIDOPA AND LEVODOPA 1 TABLET: 25; 100 TABLET ORAL at 05:02

## 2018-02-24 RX ADMIN — DOCUSATE SODIUM AND SENNOSIDES 1 TABLET: 8.6; 5 TABLET, FILM COATED ORAL at 09:02

## 2018-02-24 RX ADMIN — AMLODIPINE BESYLATE 10 MG: 5 TABLET ORAL at 09:02

## 2018-02-24 RX ADMIN — CARBIDOPA AND LEVODOPA 1 TABLET: 25; 100 TABLET ORAL at 01:02

## 2018-02-24 RX ADMIN — QUETIAPINE FUMARATE 25 MG: 25 TABLET ORAL at 08:02

## 2018-02-24 RX ADMIN — ENOXAPARIN SODIUM 40 MG: 100 INJECTION SUBCUTANEOUS at 05:02

## 2018-02-24 RX ADMIN — DIVALPROEX SODIUM 250 MG: 250 TABLET, DELAYED RELEASE ORAL at 08:02

## 2018-02-24 RX ADMIN — RIVASTIGMINE TRANSDERMAL SYSTEM 1 PATCH: 4.6 PATCH, EXTENDED RELEASE TRANSDERMAL at 09:02

## 2018-02-24 RX ADMIN — DIVALPROEX SODIUM 250 MG: 250 TABLET, DELAYED RELEASE ORAL at 09:02

## 2018-02-24 RX ADMIN — DOCUSATE SODIUM 100 MG: 100 CAPSULE, LIQUID FILLED ORAL at 08:02

## 2018-02-24 NOTE — PROGRESS NOTES
[facility] Medicine  Progress Note    Patient Name: Tenzin Ortiz  MRN: [id number]  Patient Class: OP- Observation   Admission Date: 2/7/2018  Length of Stay: 0 days  Attending Physician: Olivia Del Rosario MD  Primary Care Provider: Efrain Chavez MD        Subjective:     Principal Problem:Debility    Interval History: No acute issues/complaints.     HPI:  Patient is 78 yo male with HTN, HLD, and Parkinson's who presents to ED reportedly for HA. Per ED note patient was complaining of HA but he denies this on my interview. Seems there was some confusion in ED and he was unclear why he was here. During interview with me, patient reports being here for NH placement although he does not appear to be very happy about this. He has been with some issues caring for himself. Currently lives with his girlfriend who reports she can no longer care for him. He states he currently gets around with a walker but sometimes has difficulty getting up out of his chair. Per chart review patient is with Parkinson's and sees neurology, Dr. Castellanos, lastly seen on 2/6/18. He was with hallucinations which were noted to be secondary to dopaminergic agents thus having to significantly reduce dose--she recommended he be admitted for placement at that time but patient declined. He has been attempting placement as an outpatient through PCP but without much luck. He otherwise denies recent illness, fever/chills, CP, SOB, abdominal pain, N/V/D, dysuria. On presentation patient with grossly normal labs/vitals. Placed in observation for what appears to be solely placement although this is something that may need to be continued as outpatient as is not indication for hospitalization.    Hospital Course:  Awake and alert without complaint - severe parkinsons disease and can no longer care for himself - neither can his girlfriend.  CM reports patient with acceptance to [facility]; awaiting legals to be signs by patient POA.  No new issues - case management following    Review of Systems   Constitutional: Negative for activity change, appetite change, chills, diaphoresis, fatigue and fever.   Respiratory: Negative for cough, chest tightness, shortness of breath and wheezing.    Cardiovascular: Negative for chest pain, palpitations and leg swelling.   Gastrointestinal: Negative for abdominal distention, abdominal pain, constipation, diarrhea, nausea and vomiting.   Genitourinary: Negative for dysuria and hematuria.   Musculoskeletal: Negative for joint swelling and neck stiffness.   Neurological: Positive for tremors. Negative for weakness.   Psychiatric/Behavioral: Positive for confusion.        Intermittent confusion, requires reorienting - mood swings have stabilized - chronic essential tremors - alert to self and place     Objective:     Vital Signs (Most Recent):  Temp: 97.6 °F (36.4 °C) (02/24/18 0721)  Pulse: 70 (02/24/18 0721)  Resp: 16 (02/24/18 0721)  BP: 129/60 (02/24/18 0721)  SpO2: 99 % (02/24/18 0721) Vital Signs (24h Range):  Temp:  [97.6 °F (36.4 °C)-98.3 °F (36.8 °C)] 97.6 °F (36.4 °C)  Pulse:  [70-92] 70  Resp:  [16-20] 16  SpO2:  [95 %-99 %] 99 %  BP: (122-160)/(60-74) 129/60     Weight: 63.9 kg (140 lb 14 oz)  Body mass index is 22.06 kg/m².    Intake/Output Summary (Last 24 hours) at 02/24/18 0924  Last data filed at 02/24/18 0600   Gross per 24 hour   Intake              420 ml   Output              825 ml   Net             -405 ml      Physical Exam   Constitutional: He is oriented to person, place, and time. He appears well-developed and well-nourished. No distress.   HENT:   Head: Normocephalic and atraumatic.   Eyes: EOM are normal. Pupils are equal, round, and reactive to light.   Neck: Normal range of motion. Neck supple.   Cardiovascular: Normal rate and regular rhythm.    No murmur heard.  Pulmonary/Chest: Effort normal and breath sounds normal.   Abdominal: Soft. Bowel sounds are normal. He exhibits no  distension.   Musculoskeletal: Normal range of motion.   Neurological: He is alert and oriented to person, place, and time.   tremors   Skin: Skin is warm and dry. No rash noted.   Psychiatric: He has a normal mood and affect. His behavior is normal.         Assessment/Plan:      * Debility    Currently awaiting placement  PT/OT following for ambulation  PPD already placed and read last week 2/7/18  Case management following  Lovenox 40 mg daily      TN consulted to aid in safe discharge planning. Appears patient has been living with a significant other who can no longer care for him and refusing to take patient home. His somewhat estranged daughter has been contacted and is willing to aid in finding NH placement (she had already begun this process as an outpatient). There is an issue, however, in that patient's power of  refused to sign consent for NH placement. Patient unable to make his own decision regarding this as he is with intermittent confusion +/- previous unclear dementia diagnosis per daughter. Psychiatry consulted for capacity. Also recommended adding depakote 250mg TID to help with mood stabilization. He is medically cleared and was initially with plan for home with HH and SW to aid in outpatient placement, however, cannot safely discharge as he does not have a home to return to.           Lewy body dementia with behavioral disturbance    Seen by psyche who has since signed off - appreciate recs -  Doing well on current regimen  Continue medication regimen as follows:  Sinemet  QID and Seroquel 25 hs  Continue Exelon 4.6 mg/24 patch  Divalproex 250 mg to BID  Continue holding trazadone        Dehydration    Per ED patient admitted for dehydration. His BUN is elevated with normal creatinine--may be mild clinical dehydration. Will provide gentle IVF hydration.           Essential hypertension    BP well controlled - Continue amlodipine 10 mg daily  Change vitals to every shift             Parkinson disease    Seroquel 25 mg; sinemet  mg;   Discontinue taxodione 50 mg  Restart exelon 4.6 mg in the morning when he is more awake          VTE Risk Mitigation         Ordered     enoxaparin injection 40 mg  Daily     Route:  Subcutaneous        02/13/18 1149     Medium Risk of VTE  Once      02/08/18 0653     Place JOSEFINA hose  Until discontinued      02/08/18 0653     Place sequential compression device  Until discontinued      02/08/18 0653              Wendy Hernández PA-C  Hospitalist-Department of Hospital Medicine  [address] [place] [address]  Office [phone] Pager [phone]

## 2018-02-24 NOTE — PLAN OF CARE
Problem: Patient Care Overview  Goal: Plan of Care Review  Outcome: Ongoing (interventions implemented as appropriate)   02/24/18 1629   Coping/Psychosocial   Plan Of Care Reviewed With patient       Problem: Fall Risk (Adult)  Goal: Identify Related Risk Factors and Signs and Symptoms  Related risk factors and signs and symptoms are identified upon initiation of Human Response Clinical Practice Guideline (CPG)   Outcome: Ongoing (interventions implemented as appropriate)   02/24/18 1629   Fall Risk   Related Risk Factors (Fall Risk) age-related changes;gait/mobility problems;depression/anxiety;fatigue/slow reaction;polypharmacy;sensory deficits   Signs and Symptoms (Fall Risk) presence of risk factors     Goal: Absence of Falls  Patient will demonstrate the desired outcomes by discharge/transition of care.   Outcome: Ongoing (interventions implemented as appropriate)   02/24/18 1629   Fall Risk (Adult)   Absence of Falls making progress toward outcome       Problem: Pressure Ulcer Risk (Mike Scale) (Adult,Obstetrics,Pediatric)  Goal: Identify Related Risk Factors and Signs and Symptoms  Related risk factors and signs and symptoms are identified upon initiation of Human Response Clinical Practice Guideline (CPG)   Outcome: Ongoing (interventions implemented as appropriate)   02/24/18 1629   Pressure Ulcer Risk (Imke Scale)   Related Risk Factors (Pressure Ulcer Risk (Mike Scale)) age extremes;body weight extremes;cognitive impairment;hospitalization prolonged;mental impairment;mobility impaired     Goal: Skin Integrity  Patient will demonstrate the desired outcomes by discharge/transition of care.   Outcome: Ongoing (interventions implemented as appropriate)   02/24/18 1629   Pressure Ulcer Risk (Mike Scale) (Adult,Obstetrics,Pediatric)   Skin Integrity making progress toward outcome       Problem: Confusion, Acute (Adult)  Goal: Identify Related Risk Factors and Signs and Symptoms  Related risk factors and  signs and symptoms are identified upon initiation of Human Response Clinical Practice Guideline (CPG)   Outcome: Ongoing (interventions implemented as appropriate)   02/24/18 1629   Confusion, Acute   Related Risk Factors (Acute Confusion) cognitive impairment;advanced age;mobility decreased;neurological impairment;polypharmacy;sensory deprivation   Signs and Symptoms (Acute Confusion) communication disturbed;disorientation;thought process diminished/disorganized     Goal: Cognitive/Functional Impairments Minimized  Patient will demonstrate the desired outcomes by discharge/transition of care.   Outcome: Ongoing (interventions implemented as appropriate)   02/24/18 1629   Confusion, Acute (Adult)   Cognitive/Functional Impairments Minimized making progress toward outcome     Goal: Safety  Patient will demonstrate the desired outcomes by discharge/transition of care.   Outcome: Ongoing (interventions implemented as appropriate)   02/24/18 1629   Confusion, Acute (Adult)   Safety making progress toward outcome

## 2018-02-24 NOTE — SUBJECTIVE & OBJECTIVE
Interval History: No acute issues/complaints.     Review of Systems   Constitutional: Negative for activity change, appetite change, chills, diaphoresis, fatigue and fever.   Respiratory: Negative for cough, chest tightness, shortness of breath and wheezing.    Cardiovascular: Negative for chest pain, palpitations and leg swelling.   Gastrointestinal: Negative for abdominal distention, abdominal pain, constipation, diarrhea, nausea and vomiting.   Genitourinary: Negative for dysuria and hematuria.   Musculoskeletal: Negative for joint swelling and neck stiffness.   Neurological: Positive for tremors. Negative for weakness.   Psychiatric/Behavioral: Positive for confusion.        Intermittent confusion, requires reorienting - mood swings have stabilized - chronic essential tremors - alert to self and place     Objective:     Vital Signs (Most Recent):  Temp: 97.6 °F (36.4 °C) (02/24/18 0721)  Pulse: 70 (02/24/18 0721)  Resp: 16 (02/24/18 0721)  BP: 129/60 (02/24/18 0721)  SpO2: 99 % (02/24/18 0721) Vital Signs (24h Range):  Temp:  [97.6 °F (36.4 °C)-98.3 °F (36.8 °C)] 97.6 °F (36.4 °C)  Pulse:  [70-92] 70  Resp:  [16-20] 16  SpO2:  [95 %-99 %] 99 %  BP: (122-160)/(60-74) 129/60     Weight: 63.9 kg (140 lb 14 oz)  Body mass index is 22.06 kg/m².    Intake/Output Summary (Last 24 hours) at 02/24/18 0924  Last data filed at 02/24/18 0600   Gross per 24 hour   Intake              420 ml   Output              825 ml   Net             -405 ml      Physical Exam   Constitutional: He is oriented to person, place, and time. He appears well-developed and well-nourished. No distress.   HENT:   Head: Normocephalic and atraumatic.   Eyes: EOM are normal. Pupils are equal, round, and reactive to light.   Neck: Normal range of motion. Neck supple.   Cardiovascular: Normal rate and regular rhythm.    No murmur heard.  Pulmonary/Chest: Effort normal and breath sounds normal.   Abdominal: Soft. Bowel sounds are normal. He exhibits no  distension.   Musculoskeletal: Normal range of motion.   Neurological: He is alert and oriented to person, place, and time.   tremors   Skin: Skin is warm and dry. No rash noted.   Psychiatric: He has a normal mood and affect. His behavior is normal.

## 2018-02-24 NOTE — ASSESSMENT & PLAN NOTE
Seen by azael who has since signed off - appreciate recs -  Doing well on current regimen  Continue medication regimen as follows:  Sinemet  QID and Seroquel 25 hs  Continue Exelon 4.6 mg/24 patch  Divalproex 250 mg to BID  Continue holding trazadone

## 2018-02-24 NOTE — PLAN OF CARE
Problem: Confusion, Acute (Adult)  Intervention: Monitor/Assist with Self Care   18 0533   Activity   Activity Assistance Provided assistance, 1 person   Daily Care Interventions   Self-Care Promotion independence encouraged;BADL personal objects within reach   Functional Level Current   Ambulation 3 - assistive equipment and person   Transferring 3 - assistive equipment and person   Toileting 3 - assistive equipment and person   Bathing 2 - assistive person   Dressing 2 - assistive person   Eating 2 - assistive person   Communication 0 - understands/communicates without difficulty   Swallowing 0 - swallows foods/liquids without difficulty     Intervention: Reduce Risk/Promote Restraint Free Environment   18 0533   Safety Interventions   Environmental Safety Modification assistive device/personal items within reach;clutter free environment maintained;lighting adjusted;room near unit station;room organization consistent   Prevent [place] Drop/Fall   Safety/Security Measures bed alarm set   Safety Interventions   Safety Precautions emergency equipment at bedside       Goal: Identify Related Risk Factors and Signs and Symptoms  Related risk factors and signs and symptoms are identified upon initiation of Human Response Clinical Practice Guideline (CPG)    18 0533   Confusion, Acute   Related Risk Factors (Acute Confusion) mobility decreased;advanced age;cognitive impairment   Signs and Symptoms (Acute Confusion) disorientation     Goal: Cognitive/Functional Impairments Minimized  Patient will demonstrate the desired outcomes by discharge/transition of care.   Outcome: Ongoing (interventions implemented as appropriate)   18 0533   Confusion, Acute (Adult)   Cognitive/Functional Impairments Minimized making progress toward outcome     Goal: Safety  Patient will demonstrate the desired outcomes by discharge/transition of care.   Outcome: Ongoing (interventions implemented as appropriate)   18  0503   Confusion, Acute (Adult)   Safety making progress toward outcome

## 2018-02-25 PROCEDURE — 25000003 PHARM REV CODE 250: Performed by: EMERGENCY MEDICINE

## 2018-02-25 PROCEDURE — 25000003 PHARM REV CODE 250: Performed by: NURSE PRACTITIONER

## 2018-02-25 PROCEDURE — 25000003 PHARM REV CODE 250: Performed by: PHYSICIAN ASSISTANT

## 2018-02-25 PROCEDURE — G0378 HOSPITAL OBSERVATION PER HR: HCPCS

## 2018-02-25 PROCEDURE — 11000001 HC ACUTE MED/SURG PRIVATE ROOM

## 2018-02-25 PROCEDURE — 96361 HYDRATE IV INFUSION ADD-ON: CPT

## 2018-02-25 PROCEDURE — 96360 HYDRATION IV INFUSION INIT: CPT

## 2018-02-25 RX ORDER — SODIUM CHLORIDE 9 MG/ML
INJECTION, SOLUTION INTRAVENOUS CONTINUOUS
Status: DISCONTINUED | OUTPATIENT
Start: 2018-02-25 | End: 2018-02-27

## 2018-02-25 RX ADMIN — DOCUSATE SODIUM AND SENNOSIDES 1 TABLET: 8.6; 5 TABLET, FILM COATED ORAL at 08:02

## 2018-02-25 RX ADMIN — CARBIDOPA AND LEVODOPA 1 TABLET: 25; 100 TABLET ORAL at 05:02

## 2018-02-25 RX ADMIN — DOCUSATE SODIUM 100 MG: 100 CAPSULE, LIQUID FILLED ORAL at 08:02

## 2018-02-25 RX ADMIN — SODIUM CHLORIDE: 0.9 INJECTION, SOLUTION INTRAVENOUS at 09:02

## 2018-02-25 RX ADMIN — DIVALPROEX SODIUM 250 MG: 250 TABLET, DELAYED RELEASE ORAL at 08:02

## 2018-02-25 RX ADMIN — RIVASTIGMINE TRANSDERMAL SYSTEM 1 PATCH: 4.6 PATCH, EXTENDED RELEASE TRANSDERMAL at 08:02

## 2018-02-25 RX ADMIN — POLYETHYLENE GLYCOL 3350 17 G: 17 POWDER, FOR SOLUTION ORAL at 08:02

## 2018-02-25 RX ADMIN — QUETIAPINE FUMARATE 25 MG: 25 TABLET ORAL at 09:02

## 2018-02-25 RX ADMIN — DOCUSATE SODIUM 100 MG: 100 CAPSULE, LIQUID FILLED ORAL at 09:02

## 2018-02-25 RX ADMIN — AMLODIPINE BESYLATE 10 MG: 5 TABLET ORAL at 08:02

## 2018-02-25 RX ADMIN — CARBIDOPA AND LEVODOPA 1 TABLET: 25; 100 TABLET ORAL at 12:02

## 2018-02-25 RX ADMIN — DIVALPROEX SODIUM 250 MG: 250 TABLET, DELAYED RELEASE ORAL at 09:02

## 2018-02-25 NOTE — SUBJECTIVE & OBJECTIVE
Interval History: No acute events reported. Plan of care discussed with patient. He is awake and alert. However, pleasantly confused. Denies pain or any new issues today.  CM following; awaiting disposition to [facility] once legals have been signed per POA. PT following for ambulation.    Review of Systems   Constitutional: Negative for activity change, appetite change, chills, diaphoresis, fatigue and fever.   Respiratory: Negative for cough, chest tightness, shortness of breath and wheezing.    Cardiovascular: Negative for chest pain, palpitations and leg swelling.   Gastrointestinal: Negative for abdominal distention, abdominal pain, constipation, diarrhea, nausea and vomiting.   Genitourinary: Negative for dysuria and hematuria.   Musculoskeletal: Negative for joint swelling and neck stiffness.   Neurological: Positive for tremors. Negative for weakness.   Psychiatric/Behavioral: Positive for confusion.        Intermittent confusion, requires reorienting - mood swings have stabilized - chronic essential tremors - alert to self and place     Objective:     Vital Signs (Most Recent):  Temp: 97.8 °F (36.6 °C) (02/25/18 0720)  Pulse: 76 (02/25/18 0720)  Resp: 18 (02/25/18 0720)  BP: (!) 104/54 (02/25/18 0720)  SpO2: 97 % (02/25/18 0720) Vital Signs (24h Range):  Temp:  [96.1 °F (35.6 °C)-98.9 °F (37.2 °C)] 97.8 °F (36.6 °C)  Pulse:  [76-95] 76  Resp:  [18] 18  SpO2:  [95 %-98 %] 97 %  BP: (104-162)/(54-66) 104/54     Weight: 62.9 kg (138 lb 10.7 oz)  Body mass index is 21.72 kg/m².    Intake/Output Summary (Last 24 hours) at 02/25/18 0929  Last data filed at 02/24/18 1900   Gross per 24 hour   Intake              360 ml   Output              500 ml   Net             -140 ml      Physical Exam   Constitutional: He appears well-developed and well-nourished. No distress.   HENT:   Head: Normocephalic and atraumatic.   Eyes: Pupils are equal, round, and reactive to light.   Wears glasses; at the bedside   Neck: Normal  range of motion. Neck supple.   Cardiovascular: Normal rate.    Pulmonary/Chest: Effort normal. No respiratory distress. He has no wheezes. He has no rales.   Abdominal: Soft. He exhibits no distension. There is no tenderness.   Genitourinary: Rectal exam shows guaiac negative stool.   Musculoskeletal: Normal range of motion. He exhibits no edema or tenderness.   Neurological: He is alert.   Confusion requires reorienting however, alert to self and situation - chronic essential tremors   Skin: Skin is warm and dry. He is not diaphoretic.   Dry skin; poor turgor   Psychiatric:   More awake today - answers questions appropriately       Significant Labs: All pertinent labs within the past 24 hours have been reviewed.    Significant Imaging: I have reviewed all pertinent imaging results/findings within the past 24 hours.  I have reviewed and interpreted all pertinent imaging results/findings within the past 24 hours.

## 2018-02-25 NOTE — PLAN OF CARE
Problem: Confusion, Acute (Adult)  Goal: Identify Related Risk Factors and Signs and Symptoms  Related risk factors and signs and symptoms are identified upon initiation of Human Response Clinical Practice Guideline (CPG)   Outcome: Ongoing (interventions implemented as appropriate)   02/25/18 1705   Confusion, Acute   Related Risk Factors (Acute Confusion) cognitive impairment   Signs and Symptoms (Acute Confusion) emotional/behavioral disturbances     Goal: Cognitive/Functional Impairments Minimized  Patient will demonstrate the desired outcomes by discharge/transition of care.   Outcome: Ongoing (interventions implemented as appropriate)   02/25/18 1705   Confusion, Acute (Adult)   Cognitive/Functional Impairments Minimized making progress toward outcome     Goal: Safety  Patient will demonstrate the desired outcomes by discharge/transition of care.   Outcome: Ongoing (interventions implemented as appropriate)   02/25/18 1705   Confusion, Acute (Adult)   Safety making progress toward outcome

## 2018-02-25 NOTE — NURSING
Patient refused carbidopa-leva dopa and enoxaparin. He refused to eat lunch or dinner. He has refused to drink and fluids since breakfast. He states he is sad and wants to die here in the hospital. KALANI SUÁREZ notified of above. She acknowledged the information and states she will assess him. No new orders at this time.

## 2018-02-25 NOTE — PLAN OF CARE
Problem: Patient Care Overview  Goal: Plan of Care Review   02/24/18 1930   Coping/Psychosocial   Plan Of Care Reviewed With patient   Pt remained free of falls during current shift. Denied pain and did not receive any prn pain medications. Tremors present and pt received parkinson's medication every 6 hours. Awaiting pt's family to find placement for discharge. Pt not confused at current time, but is having hallucinations, therefore Seroquel administered during shift. Plan of care and fall precautions reviewed with pt and verbalized understanding. Bed locked, lowered, SR up x2 and call light placed within reach.

## 2018-02-25 NOTE — NURSING
Patient has not urinated during shift. Betty SUÁREZ notified; she ordered bladder scan.    Bladder scan result 223 cc. Betty SUÁREZ notified; she ordered to place peripheral IV and start NS @ 75 cc/hr for hydration.

## 2018-02-25 NOTE — NURSING
PER handoff received from ADONAY Grigsby RN. Responds to voice and is oriented x4. Generalized tremors are present. Denies having any pain and is in no apparent distress. Friend at pt's bedside. Assessment completed per Doc Flowsheets; reference if needed. Fall and safety precautions maintained. Bed alarm activated and audible. Bed locked in lowest position, with side rails up x2. Call bell and personal items within reach. Will continue to monitor pt for any changes.

## 2018-02-25 NOTE — PROGRESS NOTES
[facility] Hospital Medicine  Progress Note    Patient Name: eTnzin Ortiz  MRN: [id number]  Patient Class: OP- Observation   Admission Date: 2/7/2018  Length of Stay: 0 days  Attending Physician: Olivia Del Rosario MD  Primary Care Provider: Efrain Chavez MD        Subjective:     Principal Problem:Debility    HPI:  Patient is 78 yo male with HTN, HLD, and Parkinson's who presents to ED reportedly for HA. Per ED note patient was complaining of HA but he denies this on my interview. Seems there was some confusion in ED and he was unclear why he was here. During interview with me, patient reports being here for NH placement although he does not appear to be very happy about this. He has been with some issues caring for himself. Currently lives with his girlfriend who reports she can no longer care for him. He states he currently gets around with a walker but sometimes has difficulty getting up out of his chair. Per chart review patient is with Parkinson's and sees neurology, Dr. Castellanos, lastly seen on 2/6/18. He was with hallucinations which were noted to be secondary to dopaminergic agents thus having to significantly reduce dose--she recommended he be admitted for placement at that time but patient declined. He has been attempting placement as an outpatient through PCP but without much luck. He otherwise denies recent illness, fever/chills, CP, SOB, abdominal pain, N/V/D, dysuria. On presentation patient with grossly normal labs/vitals. Placed in observation for what appears to be solely placement although this is something that may need to be continued as outpatient as is not indication for hospitalization.    Hospital Course:  Awake and alert without complaint - severe parkinsons disease and can no longer care for himself - neither can his girlfriend.  CM reports patient with acceptance to [facility]; awaiting legals to be signs by patient POA. No new issues - case management  following    Interval History: No acute events reported. Plan of care discussed with patient. He is awake and alert. However, pleasantly confused. Denies pain or any new issues today.  CM following; awaiting disposition to [employer] [facility] once legals have been signed per POA. PT following for ambulation.    Review of Systems   Constitutional: Negative for activity change, appetite change, chills, diaphoresis, fatigue and fever.   Respiratory: Negative for cough, chest tightness, shortness of breath and wheezing.    Cardiovascular: Negative for chest pain, palpitations and leg swelling.   Gastrointestinal: Negative for abdominal distention, abdominal pain, constipation, diarrhea, nausea and vomiting.   Genitourinary: Negative for dysuria and hematuria.   Musculoskeletal: Negative for joint swelling and neck stiffness.   Neurological: Positive for tremors. Negative for weakness.   Psychiatric/Behavioral: Positive for confusion.        Intermittent confusion, requires reorienting - mood swings have stabilized - chronic essential tremors - alert to self and place     Objective:     Vital Signs (Most Recent):  Temp: 97.8 °F (36.6 °C) (02/25/18 0720)  Pulse: 76 (02/25/18 0720)  Resp: 18 (02/25/18 0720)  BP: (!) 104/54 (02/25/18 0720)  SpO2: 97 % (02/25/18 0720) Vital Signs (24h Range):  Temp:  [96.1 °F (35.6 °C)-98.9 °F (37.2 °C)] 97.8 °F (36.6 °C)  Pulse:  [76-95] 76  Resp:  [18] 18  SpO2:  [95 %-98 %] 97 %  BP: (104-162)/(54-66) 104/54     Weight: 62.9 kg (138 lb 10.7 oz)  Body mass index is 21.72 kg/m².    Intake/Output Summary (Last 24 hours) at 02/25/18 0929  Last data filed at 02/24/18 1900   Gross per 24 hour   Intake              360 ml   Output              500 ml   Net             -140 ml      Physical Exam   Constitutional: He appears well-developed and well-nourished. No distress.   HENT:   Head: Normocephalic and atraumatic.   Eyes: Pupils are equal, round, and reactive to light.   Wears glasses; at the bedside    Neck: Normal range of motion. Neck supple.   Cardiovascular: Normal rate.    Pulmonary/Chest: Effort normal. No respiratory distress. He has no wheezes. He has no rales.   Abdominal: Soft. He exhibits no distension. There is no tenderness.   Genitourinary: Rectal exam shows guaiac negative stool.   Musculoskeletal: Normal range of motion. He exhibits no edema or tenderness.   Neurological: He is alert.   Confusion requires reorienting however, alert to self and situation - chronic essential tremors   Skin: Skin is warm and dry. He is not diaphoretic.   Dry skin; poor turgor   Psychiatric:   More awake today - answers questions appropriately       Significant Labs: All pertinent labs within the past 24 hours have been reviewed.    Significant Imaging: I have reviewed all pertinent imaging results/findings within the past 24 hours.  I have reviewed and interpreted all pertinent imaging results/findings within the past 24 hours.    Assessment/Plan:      * Debility    Currently awaiting placement  PT/OT following for ambulation  PPD already placed and read last week 2/7/18  Case management following  Lovenox 40 mg daily      TN consulted to aid in safe discharge planning. Appears patient has been living with a significant other who can no longer care for him and refusing to take patient home. His somewhat estranged daughter has been contacted and is willing to aid in finding NH placement (she had already begun this process as an outpatient). There is an issue, however, in that patient's power of  refused to sign consent for NH placement. Patient unable to make his own decision regarding this as he is with intermittent confusion +/- previous unclear dementia diagnosis per daughter. Psychiatry consulted for capacity. Also recommended adding depakote 250mg TID to help with mood stabilization. He is medically cleared and was initially with plan for home with HH and SW to aid in outpatient placement, however,  cannot safely discharge as he does not have a home to return to.           Lewy body dementia with behavioral disturbance    Seen by psyche who has since signed off - appreciate recs -  Doing well on current regimen  Continue medication regimen as follows:  Sinemet  QID and Seroquel 25 hs  Continue Exelon 4.6 mg/24 patch  Divalproex 250 mg to BID  Continue holding trazadone        Dehydration    Per ED patient admitted for dehydration. His BUN is elevated with normal creatinine--may be mild clinical dehydration. Will provide gentle IVF hydration.           Essential hypertension    BP well controlled - Continue amlodipine 10 mg daily  Change vitals to every shift            Parkinson disease    Seroquel 25 mg; sinemet  mg;   Discontinue taxodione 50 mg  Restart exelon 4.6 mg in the morning when he is more awake          VTE Risk Mitigation         Ordered     enoxaparin injection 40 mg  Daily     Route:  Subcutaneous        02/13/18 1149     Medium Risk of VTE  Once      02/08/18 0653     Place JOSEFINA hose  Until discontinued      02/08/18 0653     Place sequential compression device  Until discontinued      02/08/18 0653              ANA Dobson  Department of Hospital Medicine   [facility]

## 2018-02-25 NOTE — NURSING
Dime-sized reddened, raised, area around hair follicle to upper chest; pus noted to head of lesion. Cisneros FNP notified; order placed. Warm compress applied to affected area.

## 2018-02-26 PROCEDURE — 25000003 PHARM REV CODE 250: Performed by: PHYSICIAN ASSISTANT

## 2018-02-26 PROCEDURE — G0378 HOSPITAL OBSERVATION PER HR: HCPCS

## 2018-02-26 PROCEDURE — 96361 HYDRATE IV INFUSION ADD-ON: CPT

## 2018-02-26 PROCEDURE — 63600175 PHARM REV CODE 636 W HCPCS: Performed by: NURSE PRACTITIONER

## 2018-02-26 PROCEDURE — 97116 GAIT TRAINING THERAPY: CPT

## 2018-02-26 PROCEDURE — 96372 THER/PROPH/DIAG INJ SC/IM: CPT

## 2018-02-26 PROCEDURE — 97110 THERAPEUTIC EXERCISES: CPT

## 2018-02-26 PROCEDURE — 25000003 PHARM REV CODE 250: Performed by: NURSE PRACTITIONER

## 2018-02-26 PROCEDURE — 97530 THERAPEUTIC ACTIVITIES: CPT

## 2018-02-26 PROCEDURE — 25000003 PHARM REV CODE 250: Performed by: EMERGENCY MEDICINE

## 2018-02-26 PROCEDURE — 11000001 HC ACUTE MED/SURG PRIVATE ROOM

## 2018-02-26 RX ADMIN — POLYETHYLENE GLYCOL 3350 17 G: 17 POWDER, FOR SOLUTION ORAL at 09:02

## 2018-02-26 RX ADMIN — ENOXAPARIN SODIUM 40 MG: 100 INJECTION SUBCUTANEOUS at 06:02

## 2018-02-26 RX ADMIN — CARBIDOPA AND LEVODOPA 1 TABLET: 25; 100 TABLET ORAL at 06:02

## 2018-02-26 RX ADMIN — DIVALPROEX SODIUM 250 MG: 250 TABLET, DELAYED RELEASE ORAL at 08:02

## 2018-02-26 RX ADMIN — CARBIDOPA AND LEVODOPA 1 TABLET: 25; 100 TABLET ORAL at 12:02

## 2018-02-26 RX ADMIN — CARBIDOPA AND LEVODOPA 1 TABLET: 25; 100 TABLET ORAL at 05:02

## 2018-02-26 RX ADMIN — AMLODIPINE BESYLATE 10 MG: 5 TABLET ORAL at 09:02

## 2018-02-26 RX ADMIN — DOCUSATE SODIUM 100 MG: 100 CAPSULE, LIQUID FILLED ORAL at 09:02

## 2018-02-26 RX ADMIN — DIVALPROEX SODIUM 250 MG: 250 TABLET, DELAYED RELEASE ORAL at 09:02

## 2018-02-26 RX ADMIN — SODIUM CHLORIDE: 0.9 INJECTION, SOLUTION INTRAVENOUS at 09:02

## 2018-02-26 RX ADMIN — QUETIAPINE FUMARATE 25 MG: 25 TABLET ORAL at 08:02

## 2018-02-26 RX ADMIN — RIVASTIGMINE TRANSDERMAL SYSTEM 1 PATCH: 4.6 PATCH, EXTENDED RELEASE TRANSDERMAL at 09:02

## 2018-02-26 RX ADMIN — CARBIDOPA AND LEVODOPA 1 TABLET: 25; 100 TABLET ORAL at 01:02

## 2018-02-26 NOTE — NURSING
Unable to place peripheral IV after multiple attempts: Jm RN, Kerry COLLINS, Stacey COLLINS OC attempted x 3. Betty SUÁREZ notified. She acknowledged the information. No new orders at this time.

## 2018-02-26 NOTE — PLAN OF CARE
Problem: Patient Care Overview  Goal: Plan of Care Review   02/25/18 1930   Coping/Psychosocial   Plan Of Care Reviewed With patient   Pt remained free of falls during current shift. Turned Q2H. Denied pain and did not receive any prn pain medications. Tremors present and pt received parkinson's medication every 6 hours. Awaiting pt's family to find placement for discharge. Pt not confused at current time, but is having hallucinations, therefore Seroquel administered during shift. Plan of care and fall precautions reviewed with pt and verbalized understanding. Bed locked, lowered, SR up x2 and call light placed within reach.

## 2018-02-26 NOTE — SUBJECTIVE & OBJECTIVE
Review of Systems   Constitutional: Positive for appetite change. Negative for activity change, chills, diaphoresis, fatigue and fever.   Respiratory: Negative for cough, chest tightness, shortness of breath and wheezing.    Cardiovascular: Negative for chest pain, palpitations and leg swelling.   Gastrointestinal: Negative for abdominal distention, abdominal pain, constipation, diarrhea, nausea and vomiting.   Genitourinary: Negative for dysuria and hematuria.   Musculoskeletal: Negative for joint swelling and neck stiffness.   Neurological: Positive for tremors. Negative for weakness.   Psychiatric/Behavioral: Positive for confusion and dysphoric mood.        Intermittent confusion, requires reorienting - mood swings have stabilized - chronic essential tremors - alert to self and place     Objective:     Vital Signs (Most Recent):  Temp: 97.4 °F (36.3 °C) (02/26/18 0800)  Pulse: 81 (02/26/18 0800)  Resp: 18 (02/26/18 0800)  BP: 134/74 (02/26/18 0800)  SpO2: 97 % (02/26/18 0800) Vital Signs (24h Range):  Temp:  [96.7 °F (35.9 °C)-97.8 °F (36.6 °C)] 97.4 °F (36.3 °C)  Pulse:  [69-85] 81  Resp:  [16-19] 18  SpO2:  [96 %-98 %] 97 %  BP: (125-153)/(60-74) 134/74     Weight: 63.7 kg (140 lb 6.9 oz)  Body mass index is 21.99 kg/m².    Intake/Output Summary (Last 24 hours) at 02/26/18 0928  Last data filed at 02/26/18 0630   Gross per 24 hour   Intake           1112.5 ml   Output                0 ml   Net           1112.5 ml      Physical Exam   Constitutional: He appears well-developed and well-nourished. No distress.   HENT:   Head: Normocephalic and atraumatic.   Eyes: Pupils are equal, round, and reactive to light.   Wears glasses; at the bedside   Neck: Normal range of motion. Neck supple.   Cardiovascular: Normal rate.    Pulmonary/Chest: Effort normal. No respiratory distress. He has no wheezes. He has no rales.   Abdominal: Soft. He exhibits no distension. There is no tenderness.   Genitourinary: Rectal exam  shows guaiac negative stool.   Musculoskeletal: Normal range of motion. He exhibits no edema or tenderness.   Neurological: He is alert.   Confusion requires reorienting however, alert to self and situation - chronic essential tremors   Skin: Skin is warm and dry. He is not diaphoretic.   Dry skin; poor turgor   Psychiatric:   Depressed mood

## 2018-02-26 NOTE — PT/OT/SLP PROGRESS
"Physical Therapy Treatment    Patient Name:  Tenzin Ortiz   MRN:  [id number]    Recommendations:     Discharge Recommendations:   (PT at next level of care)   Discharge Equipment Recommendations: bedside commode, bath bench   Barriers to discharge: Decreased caregiver support    Assessment:     Tenzin Ortiz is a 77 y.o. male admitted with a medical diagnosis of Debility.  He presents with the following impairments/functional limitations:  weakness, impaired endurance, impaired functional mobilty, impaired self care skills, gait instability, impaired balance, decreased safety awareness, decreased lower extremity function, decreased ROM, impaired joint extensibility, impaired muscle length, decreased coordination.  Pt very motivated to ambulate and participate with therapy today.  Pt ambulated ~130ft with RW, CGA with shuffling gait noted. Requires verbal cues for increased step length. Pt with multiple stops and  displays difficulty with initiation requiring tactile cues to start up again.    Rehab Prognosis:  good; patient would benefit from acute skilled PT services to address these deficits and reach maximum level of function.      Recent Surgery: * No surgery found *      Plan:     During this hospitalization, patient to be seen 3 x/week to address the above listed problems via gait training, therapeutic activities, therapeutic exercises  · Plan of Care Expires:  03/09/18   Plan of Care Reviewed with: patient    Subjective     Communicated with DERECK Nava (in room with pt finishing session) prior to session.  Patient found seated in [place] with DERECK Nava upon PT entry to room, agreeable to treatment.      Chief Complaint: wanting to get out of here  Patient comments/goals: Pt states " I will do anything to leave."  Pain/Comfort:  · Pain Rating 1: 0/10    Patients cultural, spiritual, Sabianism conflicts given the current situation:      Objective:     Patient found with: peripheral IV     General " Precautions: Standard, fall   Orthopedic Precautions:N/A   Braces: N/A     Functional Mobility:  · Transfers:     · Sit to Stand:  Min A with increased posterior leaning with rolling walker  · Gait(requires tennis shoes to ambulate): Pt ambulated ~130ft with RW, CGA with shuffling gait noted.  Pt with multiple stops and difficulty with initiation requiring tactile cues to start up again.  Pt with dereased step length and requires verbal cues to increase.  · Balance: good sitting and fair standing      AM-PAC 6 CLICK MOBILITY  Turning over in bed (including adjusting bedclothes, sheets and blankets)?: 3  Sitting down on and standing up from a chair with arms (e.g., wheelchair, bedside commode, etc.): 3  Moving from lying on back to sitting on the side of the bed?: 3  Moving to and from a bed to a chair (including a wheelchair)?: 2  Need to walk in hospital room?: 3  Climbing 3-5 steps with a railing?: 2  Total Score: 16       Therapeutic Activities and Exercises:   Pt performed seated therex to BLEs x15 reps AROM including: hip flexion, LAQs, heel raises, toe raises, active hip abduction/adduction  - pt tearful at end of session when speaking about his children.  KARINA Gaxiola notified.    Patient left up in chair with all lines intact, call button in reach, chair alarm on and KARINA Gaxiola (covering while Enoc (pt's nurse) is at lunch notified..    GOALS:    Physical Therapy Goals        Problem: Physical Therapy Goal    Goal Priority Disciplines Outcome Goal Variances Interventions   Physical Therapy Goal     PT/OT, PT Ongoing (interventions implemented as appropriate)     Description:  Goals to be met by: 3/9/18    Patient will increase functional independence with mobility by performin. Supine to sit with Stand-by Assistance  2. Sit to stand transfer with Stand-by Assistance  3. Gait  x 150 feet with Stand-by Assistance using Rolling Walker  4. Lower extremity exercise program x30 reps per handout,  with assistance as needed                     Time Tracking:     PT Received On: 02/26/18  PT Start Time: 1402     PT Stop Time: 1426  PT Total Time (min): 24 min     Billable Minutes: Gait Training 14 and Therapeutic Exercise 10    Treatment Type: Treatment  PT/PTA: PTA     PTA Visit Number: 1     Citlalisonia Dennis, PTA  02/26/2018

## 2018-02-26 NOTE — PLAN OF CARE
02/26/18 1441   Discharge Reassessment   Assessment Type Discharge Planning Reassessment   Patient remains ready for DC to NH.  Per Laly at [facility], Mr Mensah refused to sign paperwork.  He is the only one with knowledge of the patient's financial status.    Per MDT's this am CM still waiting for direction from the legal team.    TN left message with Mr SUNDAY Mensah that [facility] needs a copy of the POA/HPOA.  TN provided fax number for it to be sent to.  Awaiting fax.    Elena Gonsales denied patient via [facility].  DC planning on-going.

## 2018-02-26 NOTE — NURSING
PER handoff received from HOUSTON Foote RN. Responds to voice and is oriented x4, however pt has tremors to edith upper arms. Denies having any pain and is in no apparent distress. Assessment completed per Doc Flowsheets; reference if needed. Fall and safety precautions maintained. Bed alarm activated and audible. Bed locked in lowest position, with side rails up x2. Call bell and personal items within reach. Will continue to monitor pt for any changes.

## 2018-02-26 NOTE — PLAN OF CARE
Problem: Occupational Therapy Goal  Goal: Occupational Therapy Goal  Goals to be met by: 3/19/18     Patient will increase functional independence with ADLs by performing:    Feeding with Set-up Assistance. Met 2/19/18  UE Dressing with Set-up Assistance.  Grooming while seated with Set-up Assistance. Met 2/16/18  Stand pivot transfers with Contact Guard Assistance.  Toilet transfer to bedside commode with Minimal Assistance.  Upper extremity exercise program x10 reps , with supervision.  Pt will be able to ambulate to sink with minimal Assist for perform grooming        Outcome: Ongoing (interventions implemented as appropriate)  Patient able to take several steps to bedside chair today with min A/RW and max verbal cues for initiation.  Patient will benefit from continued skilled OT.

## 2018-02-26 NOTE — PLAN OF CARE
Problem: Physical Therapy Goal  Goal: Physical Therapy Goal  Goals to be met by: 3/9/18    Patient will increase functional independence with mobility by performin. Supine to sit with Stand-by Assistance  2. Sit to stand transfer with Stand-by Assistance  3. Gait  x 150 feet with Stand-by Assistance using Rolling Walker  4. Lower extremity exercise program x30 reps per handout, with assistance as needed    Outcome: Ongoing (interventions implemented as appropriate)   Pt very motivated to ambulate and participate with therapy today.  Pt ambulated ~130ft with RW, CGA with shuffling gait noted. Requires verbal cues for increased step length. Pt with multiple stops and  displays difficulty with initiation requiring tactile cues to start up again.

## 2018-02-26 NOTE — PT/OT/SLP PROGRESS
Occupational Therapy   Treatment    Name: Tenzin Ortiz  MRN: [id number]  Admitting Diagnosis:  Debility       Recommendations:     Discharge Recommendations:  (OT at next level of care)  Discharge Equipment Recommendations:  bedside commode, bath bench  Barriers to discharge:  Decreased caregiver support    Subjective     Communicated with: Nurse Stubbs prior to session.  Patient agreeable to therapy.   Pain/Comfort:  · Pain Rating 1: 0/10    Patients cultural, spiritual, Orthodox conflicts given the current situation:      Objective:     Patient found with: peripheral IV    General Precautions: Standard, fall   Orthopedic Precautions:N/A   Braces: N/A     Occupational Performance:    Bed Mobility:    · Patient completed Scooting/Bridging with stand by assistance and verbal cues   · Patient completed Supine to Sit with contact guard assistance and verbal cues    Functional Mobility/Transfers:  · Patient completed Sit <> Stand Transfer with minimum assistance  with  rolling walker   · Patient completed Bed <> Chair Transfer using Step Transfer technique with minimum assistance with rolling walker; patient noted with posterior leaning  · Functional Mobility: Patient took several side steps to bedside chair with min A/RW. Patient requires max verbal/tactile cues to initiate steps.    Activities of Daily Living:  · UB Dressing: minimum assistance to don gown  · LB Dressing: contact guard assistance and set up assistance to don slip on tennis shoes.    Patient left up in chair with all lines intact, call button in reach, chair alarm on, nurse notified and PTA Citlali present    [facility] 6 Click:  [facility] Total Score: 16    Treatment & Education:  Patient performed bed mobility and functional transfers as above.  Patient performed BUE AROM x 10 reps shoulder flex/ext, shoulder horizontal abd/add, elbow flex/ext, and forward punches.  Patient tolerated well.  Education:    Assessment:     Tenzin Ortiz is a 77 y.o. male with a  medical diagnosis of Debility.  He presents with the following performance deficits affecting function: weakness, impaired endurance, impaired self care skills, impaired functional mobilty, gait instability, decreased upper extremity function, decreased coordination, decreased lower extremity function, decreased safety awareness, impaired joint extensibility, impaired muscle length, impaired coordination, decreased ROM, impaired balance.      Rehab Prognosis:  good; patient would benefit from acute skilled OT services to address these deficits and reach maximum level of function.       Plan:     Patient to be seen 3 x/week to address the above listed problems via self-care/home management, therapeutic activities, therapeutic exercises  · Plan of Care Expires: 03/19/18  · Plan of Care Reviewed with: patient    This Plan of care has been discussed with the patient who was involved in its development and understands and is in agreement with the identified goals and treatment plan    GOALS:    Occupational Therapy Goals        Problem: Occupational Therapy Goal    Goal Priority Disciplines Outcome Interventions   Occupational Therapy Goal     OT, PT/OT Ongoing (interventions implemented as appropriate)    Description:  Goals to be met by: 3/19/18     Patient will increase functional independence with ADLs by performing:    Feeding with Set-up Assistance. Met 2/19/18  UE Dressing with Set-up Assistance.  Grooming while seated with Set-up Assistance. Met 2/16/18  Stand pivot transfers with Contact Guard Assistance.  Toilet transfer to bedside commode with Minimal Assistance.  Upper extremity exercise program x10 reps , with supervision.  Pt will be able to ambulate to sink with minimal Assist for perform grooming                         Time Tracking:     OT Date of Treatment: 02/26/18  OT Start Time: 1346  OT Stop Time: 1402  OT Total Time (min): 16 min    Billable Minutes:Therapeutic Activity 16    Brandy Schmitz  DANIELLE  2/26/2018

## 2018-02-26 NOTE — PROGRESS NOTES
[facility] Hospital Medicine  Progress Note    Patient Name: Tenzin Ortiz  MRN: [id number]  Patient Class: OP- Observation   Admission Date: 2/7/2018  Length of Stay: 0 days  Attending Physician: Olivia Del Rosario MD  Primary Care Provider: Efrain Chavez MD        Subjective:     Principal Problem:Debility    Interval History: Patient with depressed mood today. Minimal PO intake.     HPI:  Patient is 78 yo male with HTN, HLD, and Parkinson's who presents to ED reportedly for HA. Per ED note patient was complaining of HA but he denies this on my interview. Seems there was some confusion in ED and he was unclear why he was here. During interview with me, patient reports being here for NH placement although he does not appear to be very happy about this. He has been with some issues caring for himself. Currently lives with his girlfriend who reports she can no longer care for him. He states he currently gets around with a walker but sometimes has difficulty getting up out of his chair. Per chart review patient is with Parkinson's and sees neurology, Dr. Castellanos, lastly seen on 2/6/18. He was with hallucinations which were noted to be secondary to dopaminergic agents thus having to significantly reduce dose--she recommended he be admitted for placement at that time but patient declined. He has been attempting placement as an outpatient through PCP but without much luck. He otherwise denies recent illness, fever/chills, CP, SOB, abdominal pain, N/V/D, dysuria. On presentation patient with grossly normal labs/vitals. Placed in observation for what appears to be solely placement although this is something that may need to be continued as outpatient as is not indication for hospitalization.    Hospital Course:  Awake and alert without complaint - severe parkinsons disease and can no longer care for himself - neither can his girlfriend.  CM reports patient with acceptance to [facility]; awaiting legals to  be signs by patient POA. No new issues - case management following        Review of Systems   Constitutional: Positive for appetite change. Negative for activity change, chills, diaphoresis, fatigue and fever.   Respiratory: Negative for cough, chest tightness, shortness of breath and wheezing.    Cardiovascular: Negative for chest pain, palpitations and leg swelling.   Gastrointestinal: Negative for abdominal distention, abdominal pain, constipation, diarrhea, nausea and vomiting.   Genitourinary: Negative for dysuria and hematuria.   Musculoskeletal: Negative for joint swelling and neck stiffness.   Neurological: Positive for tremors. Negative for weakness.   Psychiatric/Behavioral: Positive for confusion and dysphoric mood.        Intermittent confusion, requires reorienting - mood swings have stabilized - chronic essential tremors - alert to self and place     Objective:     Vital Signs (Most Recent):  Temp: 97.4 °F (36.3 °C) (02/26/18 0800)  Pulse: 81 (02/26/18 0800)  Resp: 18 (02/26/18 0800)  BP: 134/74 (02/26/18 0800)  SpO2: 97 % (02/26/18 0800) Vital Signs (24h Range):  Temp:  [96.7 °F (35.9 °C)-97.8 °F (36.6 °C)] 97.4 °F (36.3 °C)  Pulse:  [69-85] 81  Resp:  [16-19] 18  SpO2:  [96 %-98 %] 97 %  BP: (125-153)/(60-74) 134/74     Weight: 63.7 kg (140 lb 6.9 oz)  Body mass index is 21.99 kg/m².    Intake/Output Summary (Last 24 hours) at 02/26/18 0928  Last data filed at 02/26/18 0630   Gross per 24 hour   Intake           1112.5 ml   Output                0 ml   Net           1112.5 ml      Physical Exam   Constitutional: He appears well-developed and well-nourished. No distress.   HENT:   Head: Normocephalic and atraumatic.   Eyes: Pupils are equal, round, and reactive to light.   Wears glasses; at the bedside   Neck: Normal range of motion. Neck supple.   Cardiovascular: Normal rate.    Pulmonary/Chest: Effort normal. No respiratory distress. He has no wheezes. He has no rales.   Abdominal: Soft. He exhibits  no distension. There is no tenderness.   Genitourinary: Rectal exam shows guaiac negative stool.   Musculoskeletal: Normal range of motion. He exhibits no edema or tenderness.   Neurological: He is alert.   Confusion requires reorienting however, alert to self and situation - chronic essential tremors   Skin: Skin is warm and dry. He is not diaphoretic.   Dry skin; poor turgor   Psychiatric:   Depressed mood            Assessment/Plan:      * Debility    Currently awaiting placement  PT/OT following for ambulation  PPD already placed and read last week 2/7/18  Case management following  Lovenox 40 mg daily      TN consulted to aid in safe discharge planning. Appears patient has been living with a significant other who can no longer care for him and refusing to take patient home. His somewhat estranged daughter has been contacted and is willing to aid in finding NH placement (she had already begun this process as an outpatient). There is an issue, however, in that patient's power of  refused to sign consent for NH placement. Patient unable to make his own decision regarding this as he is with intermittent confusion +/- previous unclear dementia diagnosis per daughter. Psychiatry consulted for capacity. Also recommended adding depakote 250mg TID to help with mood stabilization. He is medically cleared and was initially with plan for home with HH and SW to aid in outpatient placement, however, cannot safely discharge as he does not have a home to return to.           Lewy body dementia with behavioral disturbance    Seen by psyche who has since signed off - appreciate recs -  Doing well on current regimen  Continue medication regimen as follows:  Sinemet  QID and Seroquel 25 hs  Continue Exelon 4.6 mg/24 patch  Divalproex 250 mg to BID  Continue holding trazadone        Dehydration    Restarted IVF as patient with minimal PO intake.           Essential hypertension    BP well controlled - Continue  amlodipine 10 mg daily  Change vitals to every shift            Parkinson disease    Seroquel 25 mg; sinemet  mg;   Discontinue taxodione 50 mg  Restart exelon 4.6 mg in the morning when he is more awake          VTE Risk Mitigation         Ordered     enoxaparin injection 40 mg  Daily     Route:  Subcutaneous        02/13/18 1149     Medium Risk of VTE  Once      02/08/18 0653     Place JOSEFINA hose  Until discontinued      02/08/18 0653     Place sequential compression device  Until discontinued      02/08/18 0653              Wendy Hernández PA-C  Hospitalist-Department of Hospital Medicine  [address] [place] [address]  Office [phone] Pager [phone]

## 2018-02-27 PROBLEM — F02.80 LEWY BODY DEMENTIA WITHOUT BEHAVIORAL DISTURBANCE: Status: ACTIVE | Noted: 2018-02-12

## 2018-02-27 PROCEDURE — 25000003 PHARM REV CODE 250: Performed by: NURSE PRACTITIONER

## 2018-02-27 PROCEDURE — 25000003 PHARM REV CODE 250: Performed by: EMERGENCY MEDICINE

## 2018-02-27 PROCEDURE — 97530 THERAPEUTIC ACTIVITIES: CPT

## 2018-02-27 PROCEDURE — 63600175 PHARM REV CODE 636 W HCPCS: Performed by: NURSE PRACTITIONER

## 2018-02-27 PROCEDURE — 97535 SELF CARE MNGMENT TRAINING: CPT

## 2018-02-27 PROCEDURE — 21400001 HC TELEMETRY ROOM

## 2018-02-27 PROCEDURE — 97116 GAIT TRAINING THERAPY: CPT

## 2018-02-27 RX ADMIN — CARBIDOPA AND LEVODOPA 1 TABLET: 25; 100 TABLET ORAL at 05:02

## 2018-02-27 RX ADMIN — DOCUSATE SODIUM 100 MG: 100 CAPSULE, LIQUID FILLED ORAL at 08:02

## 2018-02-27 RX ADMIN — DIVALPROEX SODIUM 250 MG: 250 TABLET, DELAYED RELEASE ORAL at 08:02

## 2018-02-27 RX ADMIN — ACETAMINOPHEN 650 MG: 325 TABLET, FILM COATED ORAL at 12:02

## 2018-02-27 RX ADMIN — ENOXAPARIN SODIUM 40 MG: 100 INJECTION SUBCUTANEOUS at 04:02

## 2018-02-27 RX ADMIN — RIVASTIGMINE TRANSDERMAL SYSTEM 1 PATCH: 4.6 PATCH, EXTENDED RELEASE TRANSDERMAL at 08:02

## 2018-02-27 RX ADMIN — CARBIDOPA AND LEVODOPA 1 TABLET: 25; 100 TABLET ORAL at 12:02

## 2018-02-27 RX ADMIN — POLYETHYLENE GLYCOL 3350 17 G: 17 POWDER, FOR SOLUTION ORAL at 08:02

## 2018-02-27 RX ADMIN — AMLODIPINE BESYLATE 10 MG: 5 TABLET ORAL at 08:02

## 2018-02-27 RX ADMIN — QUETIAPINE FUMARATE 25 MG: 25 TABLET ORAL at 08:02

## 2018-02-27 RX ADMIN — DOCUSATE SODIUM AND SENNOSIDES 1 TABLET: 8.6; 5 TABLET, FILM COATED ORAL at 09:02

## 2018-02-27 NOTE — SUBJECTIVE & OBJECTIVE
Interval History: no new issues    Review of Systems   Constitutional: Negative for activity change, appetite change, chills, diaphoresis, fatigue and fever.   Respiratory: Negative for cough, chest tightness, shortness of breath and wheezing.    Cardiovascular: Negative for chest pain, palpitations and leg swelling.   Gastrointestinal: Negative for abdominal distention, abdominal pain, constipation, diarrhea, nausea and vomiting.   Genitourinary: Negative for dysuria and hematuria.   Musculoskeletal: Negative for joint swelling and neck stiffness.   Neurological: Positive for tremors. Negative for weakness.   Psychiatric/Behavioral: Positive for confusion.        Intermittent confusion, requires reorienting - mood swings have stabilized - chronic essential tremors - alert to self and place     Objective:     Vital Signs (Most Recent):  Temp: 98.5 °F (36.9 °C) (02/27/18 0700)  Pulse: 76 (02/27/18 0700)  Resp: (!) 24 (02/27/18 0700)  BP: (!) 143/67 (02/27/18 0700)  SpO2: 97 % (02/27/18 0700) Vital Signs (24h Range):  Temp:  [97.3 °F (36.3 °C)-98.7 °F (37.1 °C)] 98.5 °F (36.9 °C)  Pulse:  [75-90] 76  Resp:  [18-24] 24  SpO2:  [96 %-98 %] 97 %  BP: (128-169)/() 143/67     Weight: 63.7 kg (140 lb 6.9 oz)  Body mass index is 21.99 kg/m².    Intake/Output Summary (Last 24 hours) at 02/27/18 1013  Last data filed at 02/27/18 0556   Gross per 24 hour   Intake              660 ml   Output             1250 ml   Net             -590 ml      Physical Exam   Constitutional: No distress.   Deconditioned; Body mass index is 21.99 kg/m².   HENT:   Head: Normocephalic and atraumatic.   Eyes: Pupils are equal, round, and reactive to light.   Wears glasses; at the bedside   Neck: Normal range of motion. Neck supple.   Cardiovascular: Normal rate.    Pulmonary/Chest: Effort normal. No respiratory distress. He has no wheezes. He has no rales.   Abdominal: Soft. He exhibits no distension. There is no tenderness.   Genitourinary:  Rectal exam shows guaiac negative stool.   Musculoskeletal: Normal range of motion. He exhibits no edema or tenderness.   Neurological: He is alert.   Confusion requires reorienting however, alert to self and situation - chronic essential tremors   Skin: Skin is warm and dry. He is not diaphoretic.   Dry skin; poor turgor   Psychiatric:   Intermittent confusions, requires reorienting often       Significant Labs: All pertinent labs within the past 24 hours have been reviewed.    Significant Imaging: I have reviewed all pertinent imaging results/findings within the past 24 hours.

## 2018-02-27 NOTE — PLAN OF CARE
Problem: Patient Care Overview  Goal: Plan of Care Review  Outcome: Ongoing (interventions implemented as appropriate)   02/27/18 1743   Coping/Psychosocial   Plan Of Care Reviewed With patient       Problem: Fall Risk (Adult)  Goal: Absence of Falls  Patient will demonstrate the desired outcomes by discharge/transition of care.   Outcome: Ongoing (interventions implemented as appropriate)   02/27/18 1743   Fall Risk (Adult)   Absence of Falls making progress toward outcome       Problem: Pressure Ulcer Risk (Mike Scale) (Adult,Obstetrics,Pediatric)  Goal: Skin Integrity  Patient will demonstrate the desired outcomes by discharge/transition of care.   Outcome: Ongoing (interventions implemented as appropriate)   02/27/18 1743   Pressure Ulcer Risk (Mike Scale) (Adult,Obstetrics,Pediatric)   Skin Integrity making progress toward outcome       Problem: Confusion, Acute (Adult)  Goal: Cognitive/Functional Impairments Minimized  Patient will demonstrate the desired outcomes by discharge/transition of care.   Outcome: Ongoing (interventions implemented as appropriate)   02/27/18 1743   Confusion, Acute (Adult)   Cognitive/Functional Impairments Minimized making progress toward outcome     Goal: Safety  Patient will demonstrate the desired outcomes by discharge/transition of care.   Outcome: Ongoing (interventions implemented as appropriate)   02/27/18 1743   Confusion, Acute (Adult)   Safety making progress toward outcome

## 2018-02-27 NOTE — PT/OT/SLP PROGRESS
"Physical Therapy Treatment    Patient Name:  Tenzin Ortiz   MRN:  [id number]    Recommendations:     Discharge Recommendations:   (PT at next level of care)   Discharge Equipment Recommendations: bedside commode, bath bench   Barriers to discharge: Decreased caregiver support    Assessment:     Tenzin Ortiz is a 77 y.o. male admitted with a medical diagnosis of Debility.  He presents with the following impairments/functional limitations:  weakness, impaired endurance, impaired functional mobilty, impaired balance, impaired self care skills, gait instability, decreased coordination, decreased safety awareness, decreased lower extremity function, decreased ROM, impaired coordination, impaired muscle length, impaired joint extensibility.  Pt continues to make progress with gait distance.  Pt ambulated ~125ft, ~125ft with RW, CGA with shuffling gait. Pt stops when making turns and has difficulty with initiation.  Pt requiring tactile cues and assistance for initial LE advancement.    Rehab Prognosis:  good; patient would benefit from acute skilled PT services to address these deficits and reach maximum level of function.      Recent Surgery: * No surgery found *      Plan:     During this hospitalization, patient to be seen 3 x/week to address the above listed problems via gait training, therapeutic activities, therapeutic exercises  · Plan of Care Expires:  03/09/18   Plan of Care Reviewed with: patient    Subjective     Communicated with pt's nurse, Enoc, prior to session.  Patient found seated on toilet with HARDEN and nursing student upon PT entry to room, agreeable to treatment.      Chief Complaint: warn out today  Patient comments/goals: Pt states " I am going to walk the whole floor."  Pain/Comfort:  · Pain Rating 1: 0/10    Patients cultural, spiritual, Druze conflicts given the current situation:      Objective:     Patient found with: peripheral IV     General Precautions: Standard, fall   Orthopedic " Precautions:N/A   Braces: N/A     Functional Mobility:  · Bed Mobility:     · Scooting: Dep A x2 to scoot to HOB  · Sit to Supine: stand by assistance  · Transfers: x1 from toilet and x2 from BSchair  · Sit to Stand:  minimum assistance(BSchair) and moderate assistance(from toilet) with rolling walker  · Chair to Bed: minimum assistance with  no AD  using  Stand Pivot  · Gait(requires tennis shoes to ambulate): Pt ambulated ~125ft, ~125ft with RW, CGA with seated rest break in b/w. Pt stops when making turns and has difficulty with initiation.  Pt requiring tactile cues and assistance for initial LE advancement.  Pt with shuffling gait and requires cues to increase step length.  · Balance: good sitting and fair standing      AM-PAC 6 CLICK MOBILITY  Turning over in bed (including adjusting bedclothes, sheets and blankets)?: 3  Sitting down on and standing up from a chair with arms (e.g., wheelchair, bedside commode, etc.): 3  Moving from lying on back to sitting on the side of the bed?: 3  Moving to and from a bed to a chair (including a wheelchair)?: 3  Need to walk in hospital room?: 3  Climbing 3-5 steps with a railing?: 2  Total Score: 17       Therapeutic Activities and Exercises:   Pt unwilling to perform exercises today due to increased fatigue.    Patient left supine with B heels elevated with all lines intact, call button in reach, bed alarm on, pt's nurse, Enoc, notified and student nurse present..    GOALS:    Physical Therapy Goals        Problem: Physical Therapy Goal    Goal Priority Disciplines Outcome Goal Variances Interventions   Physical Therapy Goal     PT/OT, PT Ongoing (interventions implemented as appropriate)     Description:  Goals to be met by: 3/9/18    Patient will increase functional independence with mobility by performin. Supine to sit with Stand-by Assistance  2. Sit to stand transfer with Stand-by Assistance  3. Gait  x 150 feet with Stand-by Assistance using Rolling  Walker  4. Lower extremity exercise program x30 reps per handout, with assistance as needed                     Time Tracking:     PT Received On: 02/27/18  PT Start Time: 1127     PT Stop Time: 1150  PT Total Time (min): 23 min     Billable Minutes: Gait Training 13 and Therapeutic Activity 10    Treatment Type: Treatment  PT/PTA: PTA     PTA Visit Number: 2     Citlali Dennis, PTA  02/27/2018

## 2018-02-27 NOTE — PLAN OF CARE
Problem: Patient Care Overview  Goal: Plan of Care Review   02/27/18 0434   Coping/Psychosocial   Plan Of Care Reviewed With patient       Comments: Patient remained free of injury throughout the shift. Vital signs were WNL. No complaints of pain or signs of respiratory distress noted. Patient plan of care to be continued. Patient stable and will continue to be monitored.

## 2018-02-27 NOTE — NURSING
Report received from KARINA Stubbs. Patient care assumed. Patient observed lying in bed. Vital signs stable and found in flow sheets with complete patient assessment. Skin dry with bruising noted to JOYCELYN extremity. No complaints of pain and no signs of respiratory distress noted. Patient stable and will continue to monitor.

## 2018-02-27 NOTE — PLAN OF CARE
Problem: Occupational Therapy Goal  Goal: Occupational Therapy Goal  Goals to be met by: 3/19/18     Patient will increase functional independence with ADLs by performing:    Feeding with Set-up Assistance. Met 2/19/18  UE Dressing with Set-up Assistance.  Grooming while seated with Set-up Assistance. Met 2/16/18  Stand pivot transfers with Contact Guard Assistance.  Toilet transfer to bedside commode with Minimal Assistance. Met 2/27/18 (transfer to toilet)  Upper extremity exercise program x10 reps , with supervision.  Pt will be able to ambulate to sink with minimal Assist for perform grooming met 2/27/18        Outcome: Ongoing (interventions implemented as appropriate)  Patient able to amb to sink and bathroom with min A/RW for initiation of steps. Patient performed toilet  transfer with Mod A/RW.   Patient is making progress toward OT goals.

## 2018-02-27 NOTE — PLAN OF CARE
Problem: Physical Therapy Goal  Goal: Physical Therapy Goal  Goals to be met by: 3/9/18    Patient will increase functional independence with mobility by performin. Supine to sit with Stand-by Assistance  2. Sit to stand transfer with Stand-by Assistance  3. Gait  x 150 feet with Stand-by Assistance using Rolling Walker  4. Lower extremity exercise program x30 reps per handout, with assistance as needed    Outcome: Ongoing (interventions implemented as appropriate)   Pt continues to make progress with gait distance.  Pt ambulated ~125ft, ~125ft with RW, CGA with shuffling gait. Pt stops when making turns and has difficulty with initiation.  Pt requiring tactile cues and assistance for initial LE advancement.

## 2018-02-27 NOTE — PROGRESS NOTES
[facility] Hospital Medicine  Progress Note    Patient Name: Tenzin Ortiz  MRN: [id number]  Patient Class: OP- Observation   Admission Date: 2/7/2018  Length of Stay: 0 days  Attending Physician: Cami Crow MD  Primary Care Provider: Efrain Chavez MD        Subjective:     Principal Problem:Debility    HPI:  Patient is 76 yo male with HTN, HLD, and Parkinson's who presents to ED reportedly for HA. Per ED note patient was complaining of HA but he denies this on my interview. Seems there was some confusion in ED and he was unclear why he was here. During interview with me, patient reports being here for NH placement although he does not appear to be very happy about this. He has been with some issues caring for himself. Currently lives with his girlfriend who reports she can no longer care for him. He states he currently gets around with a walker but sometimes has difficulty getting up out of his chair. Per chart review patient is with Parkinson's and sees neurology, Dr. Castellanos, lastly seen on 2/6/18. He was with hallucinations which were noted to be secondary to dopaminergic agents thus having to significantly reduce dose--she recommended he be admitted for placement at that time but patient declined. He has been attempting placement as an outpatient through PCP but without much luck. He otherwise denies recent illness, fever/chills, CP, SOB, abdominal pain, N/V/D, dysuria. On presentation patient with grossly normal labs/vitals. Placed in observation for what appears to be solely placement although this is something that may need to be continued as outpatient as is not indication for hospitalization.    Hospital Course:  Awake and alert without complaint - severe parkinsons disease and can no longer care for himself - neither can his girlfriend.  CM reports patient with acceptance to [facility]; awaiting legals to be signed by patient POA. No new issues - case management  following    Interval History: no new issues    Review of Systems   Constitutional: Negative for activity change, appetite change, chills, diaphoresis, fatigue and fever.   Respiratory: Negative for cough, chest tightness, shortness of breath and wheezing.    Cardiovascular: Negative for chest pain, palpitations and leg swelling.   Gastrointestinal: Negative for abdominal distention, abdominal pain, constipation, diarrhea, nausea and vomiting.   Genitourinary: Negative for dysuria and hematuria.   Musculoskeletal: Negative for joint swelling and neck stiffness.   Neurological: Positive for tremors. Negative for weakness.   Psychiatric/Behavioral: Positive for confusion.        Intermittent confusion, requires reorienting - mood swings have stabilized - chronic essential tremors - alert to self and place     Objective:     Vital Signs (Most Recent):  Temp: 98.5 °F (36.9 °C) (02/27/18 0700)  Pulse: 76 (02/27/18 0700)  Resp: (!) 24 (02/27/18 0700)  BP: (!) 143/67 (02/27/18 0700)  SpO2: 97 % (02/27/18 0700) Vital Signs (24h Range):  Temp:  [97.3 °F (36.3 °C)-98.7 °F (37.1 °C)] 98.5 °F (36.9 °C)  Pulse:  [75-90] 76  Resp:  [18-24] 24  SpO2:  [96 %-98 %] 97 %  BP: (128-169)/() 143/67     Weight: 63.7 kg (140 lb 6.9 oz)  Body mass index is 21.99 kg/m².    Intake/Output Summary (Last 24 hours) at 02/27/18 1013  Last data filed at 02/27/18 0556   Gross per 24 hour   Intake              660 ml   Output             1250 ml   Net             -590 ml      Physical Exam   Constitutional: No distress.   Deconditioned; Body mass index is 21.99 kg/m².   HENT:   Head: Normocephalic and atraumatic.   Eyes: Pupils are equal, round, and reactive to light.   Wears glasses; at the bedside   Neck: Normal range of motion. Neck supple.   Cardiovascular: Normal rate.    Pulmonary/Chest: Effort normal. No respiratory distress. He has no wheezes. He has no rales.   Abdominal: Soft. He exhibits no distension. There is no tenderness.    Genitourinary: Rectal exam shows guaiac negative stool.   Musculoskeletal: Normal range of motion. He exhibits no edema or tenderness.   Neurological: He is alert.   Confusion requires reorienting however, alert to self and situation - chronic essential tremors   Skin: Skin is warm and dry. He is not diaphoretic.   Dry skin; poor turgor   Psychiatric:   Intermittent confusions, requires reorienting often       Significant Labs: All pertinent labs within the past 24 hours have been reviewed.    Significant Imaging: I have reviewed all pertinent imaging results/findings within the past 24 hours.    Assessment/Plan:      * Debility    Currently awaiting placement  PT/OT following for ambulation  PPD already placed and read last week 2/7/18  Case management following  Lovenox 40 mg daily      TN consulted to aid in safe discharge planning. Appears patient has been living with a significant other who can no longer care for him and refusing to take patient home. His somewhat estranged daughter has been contacted and is willing to aid in finding NH placement (she had already begun this process as an outpatient). There is an issue, however, in that patient's power of  refused to sign consent for NH placement. Patient unable to make his own decision regarding this as he is with intermittent confusion +/- previous unclear dementia diagnosis per daughter. Psychiatry consulted for capacity. Also recommended adding depakote 250mg TID to help with mood stabilization. He is medically cleared and was initially with plan for home with HH and SW to aid in outpatient placement, however, cannot safely discharge as he does not have a home to return to.           Lewy body dementia without behavioral disturbance    Seen by psyche who has since signed off - appreciate recs -  Doing well on current regimen  Continue medication regimen as follows:  Sinemet  QID and Seroquel 25 hs  Continue Exelon 4.6 mg/24 patch  Divalproex  250 mg to BID  Continue holding trazadone        Parkinson disease    Seroquel 25 mg; sinemet  mg;   Discontinue taxodione 50 mg  Restart exelon 4.6 mg in the morning when he is more awake        Essential hypertension    BP well controlled - Continue amlodipine 10 mg daily  Change vitals to every shift            Dehydration    Per ED patient admitted for dehydration. His BUN is elevated with normal creatinine--may be mild clinical dehydration. Will provide gentle IVF hydration.             VTE Risk Mitigation         Ordered     enoxaparin injection 40 mg  Daily     Route:  Subcutaneous        02/13/18 1149     Medium Risk of VTE  Once      02/08/18 0653     Place JOSEFINA hose  Until discontinued      02/08/18 0653     Place sequential compression device  Until discontinued      02/08/18 0653              SANDRO Vizcaino, FNP-C  Hospitalist - Department of Hospital Medicine  [address] [place] [address]  Office [phone]; Pager [phone]

## 2018-02-27 NOTE — PT/OT/SLP PROGRESS
Occupational Therapy   Treatment    Name: Tenzin Ortiz  MRN: [id number]  Admitting Diagnosis:  Debility       Recommendations:     Discharge Recommendations:  (OT at next level of care)  Discharge Equipment Recommendations:  bedside commode, bath bench  Barriers to discharge:  Decreased caregiver support    Subjective     Communicated with: nurse Stubbs prior to session.  Patient asleep upon arrival, easily aroused and agreeable to therapy.   Pain/Comfort:  · Pain Rating 1: 0/10    Patients cultural, spiritual, Mu-ism conflicts given the current situation:      Objective:     Patient found with: peripheral IV    General Precautions: Standard, fall   Orthopedic Precautions:N/A   Braces: N/A     Occupational Performance:    Bed Mobility:    · Patient completed Scooting/Bridging with stand by assistance  · Patient completed Supine to Sit with stand by assistance   · Verbal cues for technique.     Functional Mobility/Transfers:  · Patient completed Sit <> Stand Transfer with minimum assistance  with  rolling walker 1x from bed, 1x from bedside chair, moderate assistance from toilet  · Patient completed Bed <> Chair Transfer using Step Transfer technique with minimum assistance with rolling walker  · Patient completed Toilet Transfer Step Transfer technique with moderate assistance with  grab bars and rolling walker  · Functional Mobility: Patient amb to sink for self care and to bathroom for toileting with min A/RW.  Pt with increased tremors and difficulty initiating steps and required max verbal/tactile cues.    Activities of Daily Living:  · Grooming: stand by assistance to perform oral care while standing at sink  · UB Dressing: contact guard assistance    · LB Dressing: contact guard assistance and set up assistance  · Toileting: contact guard assistance for clothing management    Patient left on toilet with all lines intact and PTA, Citlali present    AMPA 6 Click:  AMPAC Total Score: 17    Treatment &  Education:  Patient performed bed mobility, functional transfers, and ADL's as stated above.   Education:    Assessment:     Tenzin Ortiz is a 77 y.o. male with a medical diagnosis of Debility.  He presents with the following performance deficits affecting function: weakness, impaired endurance, impaired self care skills, impaired functional mobilty, gait instability, impaired balance, decreased coordination, decreased upper extremity function, decreased lower extremity function, decreased safety awareness, impaired coordination, impaired fine motor. Patient able to amb to sink and bathroom with min A/RW for initiation of steps. Patient performed toilet  transfer with Mod A/RW.   Patient is making progress toward OT goals.     Rehab Prognosis:  good; patient would benefit from acute skilled OT services to address these deficits and reach maximum level of function.       Plan:     Patient to be seen 3 x/week to address the above listed problems via self-care/home management, therapeutic activities, therapeutic exercises  · Plan of Care Expires: 02/16/18  · Plan of Care Reviewed with: patient    This Plan of care has been discussed with the patient who was involved in its development and understands and is in agreement with the identified goals and treatment plan    GOALS:    Occupational Therapy Goals        Problem: Occupational Therapy Goal    Goal Priority Disciplines Outcome Interventions   Occupational Therapy Goal     OT, PT/OT Ongoing (interventions implemented as appropriate)    Description:  Goals to be met by: 3/19/18     Patient will increase functional independence with ADLs by performing:    Feeding with Set-up Assistance. Met 2/19/18  UE Dressing with Set-up Assistance.  Grooming while seated with Set-up Assistance. Met 2/16/18  Stand pivot transfers with Contact Guard Assistance.  Toilet transfer to bedside commode with Minimal Assistance.   Upper extremity exercise program x10 reps , with  supervision.  Pt will be able to ambulate to sink with minimal Assist for perform grooming Met 2/27/18                         Time Tracking:     OT Date of Treatment: 02/27/18  OT Start Time: 1104  OT Stop Time: 1127  OT Total Time (min): 23 min    Billable Minutes:Self Care/Home Management 23    DANIELLE Gomez  2/27/2018

## 2018-02-27 NOTE — NURSING
Report received from obs night. Patient arrived from obs unit. Visualized patient and assessed patient's overall condition and appearance. NAD noted. Will continue to monitor.

## 2018-02-28 PROCEDURE — 21400001 HC TELEMETRY ROOM

## 2018-02-28 PROCEDURE — 25000003 PHARM REV CODE 250: Performed by: NURSE PRACTITIONER

## 2018-02-28 PROCEDURE — 25000003 PHARM REV CODE 250: Performed by: EMERGENCY MEDICINE

## 2018-02-28 PROCEDURE — 63600175 PHARM REV CODE 636 W HCPCS: Performed by: NURSE PRACTITIONER

## 2018-02-28 RX ADMIN — POLYETHYLENE GLYCOL 3350 17 G: 17 POWDER, FOR SOLUTION ORAL at 08:02

## 2018-02-28 RX ADMIN — DOCUSATE SODIUM 100 MG: 100 CAPSULE, LIQUID FILLED ORAL at 08:02

## 2018-02-28 RX ADMIN — CARBIDOPA AND LEVODOPA 1 TABLET: 25; 100 TABLET ORAL at 06:02

## 2018-02-28 RX ADMIN — CARBIDOPA AND LEVODOPA 1 TABLET: 25; 100 TABLET ORAL at 11:02

## 2018-02-28 RX ADMIN — DIVALPROEX SODIUM 250 MG: 250 TABLET, DELAYED RELEASE ORAL at 08:02

## 2018-02-28 RX ADMIN — ENOXAPARIN SODIUM 40 MG: 100 INJECTION SUBCUTANEOUS at 05:02

## 2018-02-28 RX ADMIN — RIVASTIGMINE TRANSDERMAL SYSTEM 1 PATCH: 4.6 PATCH, EXTENDED RELEASE TRANSDERMAL at 08:02

## 2018-02-28 RX ADMIN — CARBIDOPA AND LEVODOPA 1 TABLET: 25; 100 TABLET ORAL at 05:02

## 2018-02-28 RX ADMIN — CARBIDOPA AND LEVODOPA 1 TABLET: 25; 100 TABLET ORAL at 12:02

## 2018-02-28 RX ADMIN — AMLODIPINE BESYLATE 10 MG: 5 TABLET ORAL at 08:02

## 2018-02-28 RX ADMIN — QUETIAPINE FUMARATE 25 MG: 25 TABLET ORAL at 08:02

## 2018-02-28 NOTE — SUBJECTIVE & OBJECTIVE
Interval History: no new complaints     Review of Systems   Constitutional: Negative for activity change, appetite change, chills, diaphoresis, fatigue and fever.   Respiratory: Negative for cough, chest tightness, shortness of breath and wheezing.    Cardiovascular: Negative for chest pain, palpitations and leg swelling.   Gastrointestinal: Negative for abdominal distention, abdominal pain, constipation, diarrhea, nausea and vomiting.   Genitourinary: Negative for dysuria and hematuria.   Musculoskeletal: Negative for joint swelling and neck stiffness.   Neurological: Positive for tremors. Negative for weakness.   Psychiatric/Behavioral: Positive for confusion.        Intermittent confusion, requires reorienting - mood swings have stabilized - chronic essential tremors - alert to self and place     Objective:     Vital Signs (Most Recent):  Temp: 98.3 °F (36.8 °C) (02/28/18 1132)  Pulse: 85 (02/28/18 1132)  Resp: 18 (02/28/18 1132)  BP: 136/63 (02/28/18 1132)  SpO2: 98 % (02/28/18 1132) Vital Signs (24h Range):  Temp:  [97.1 °F (36.2 °C)-98.4 °F (36.9 °C)] 98.3 °F (36.8 °C)  Pulse:  [71-85] 85  Resp:  [18-26] 18  SpO2:  [97 %-99 %] 98 %  BP: (119-157)/(62-70) 136/63     Weight: 63.7 kg (140 lb 6.9 oz)  Body mass index is 21.99 kg/m².    Intake/Output Summary (Last 24 hours) at 02/28/18 1424  Last data filed at 02/28/18 1146   Gross per 24 hour   Intake              660 ml   Output             1076 ml   Net             -416 ml      Physical Exam   Constitutional: No distress.   Deconditioned; Body mass index is 21.99 kg/m².   HENT:   Head: Normocephalic and atraumatic.   Eyes: Pupils are equal, round, and reactive to light.   Wears glasses; at the bedside   Neck: Normal range of motion. Neck supple.   Cardiovascular: Normal rate.    Pulmonary/Chest: Effort normal. No respiratory distress. He has no wheezes. He has no rales.   Abdominal: Soft. He exhibits no distension. There is no tenderness.   Genitourinary: Rectal  exam shows guaiac negative stool.   Musculoskeletal: Normal range of motion. He exhibits no edema or tenderness.   Neurological: He is alert.   Confusion requires reorienting however, alert to self and situation - chronic essential tremors   Skin: Skin is warm and dry. He is not diaphoretic.   Dry skin; poor turgor   Psychiatric:   Intermittent confusions, requires reorienting often       Significant Labs: All pertinent labs within the past 24 hours have been reviewed.    Significant Imaging: I have reviewed and interpreted all pertinent imaging results/findings within the past 24 hours.

## 2018-02-28 NOTE — PROGRESS NOTES
[facility] Medicine  Progress Note    Patient Name: Tenzin Ortiz  MRN: [id number]  Patient Class: IP- Inpatient   Admission Date: 2/7/2018  Length of Stay: 1 days  Attending Physician: Sidra Rivas MD  Primary Care Provider: Efrain Chavez MD        Subjective:     Principal Problem:Debility    HPI:  Patient is 78 yo male with HTN, HLD, and Parkinson's who presents to ED reportedly for HA. Per ED note patient was complaining of HA but he denies this on my interview. Seems there was some confusion in ED and he was unclear why he was here. During interview with me, patient reports being here for NH placement although he does not appear to be very happy about this. He has been with some issues caring for himself. Currently lives with his girlfriend who reports she can no longer care for him. He states he currently gets around with a walker but sometimes has difficulty getting up out of his chair. Per chart review patient is with Parkinson's and sees neurology, Dr. Castellanos, lastly seen on 2/6/18. He was with hallucinations which were noted to be secondary to dopaminergic agents thus having to significantly reduce dose--she recommended he be admitted for placement at that time but patient declined. He has been attempting placement as an outpatient through PCP but without much luck. He otherwise denies recent illness, fever/chills, CP, SOB, abdominal pain, N/V/D, dysuria. On presentation patient with grossly normal labs/vitals. Placed in observation for what appears to be solely placement although this is something that may need to be continued as outpatient as is not indication for hospitalization.    Hospital Course:  Awake and alert without complaint - severe parkinsons disease and can no longer care for himself - neither can his girlfriend.  CM reports patient with acceptance to [facility]; awaiting legals to be signed by patient POA. No new issues - case management following.    Interval  History: no new complaints     Review of Systems   Constitutional: Negative for activity change, appetite change, chills, diaphoresis, fatigue and fever.   Respiratory: Negative for cough, chest tightness, shortness of breath and wheezing.    Cardiovascular: Negative for chest pain, palpitations and leg swelling.   Gastrointestinal: Negative for abdominal distention, abdominal pain, constipation, diarrhea, nausea and vomiting.   Genitourinary: Negative for dysuria and hematuria.   Musculoskeletal: Negative for joint swelling and neck stiffness.   Neurological: Positive for tremors. Negative for weakness.   Psychiatric/Behavioral: Positive for confusion.        Intermittent confusion, requires reorienting - mood swings have stabilized - chronic essential tremors - alert to self and place     Objective:     Vital Signs (Most Recent):  Temp: 98.3 °F (36.8 °C) (02/28/18 1132)  Pulse: 85 (02/28/18 1132)  Resp: 18 (02/28/18 1132)  BP: 136/63 (02/28/18 1132)  SpO2: 98 % (02/28/18 1132) Vital Signs (24h Range):  Temp:  [97.1 °F (36.2 °C)-98.4 °F (36.9 °C)] 98.3 °F (36.8 °C)  Pulse:  [71-85] 85  Resp:  [18-26] 18  SpO2:  [97 %-99 %] 98 %  BP: (119-157)/(62-70) 136/63     Weight: 63.7 kg (140 lb 6.9 oz)  Body mass index is 21.99 kg/m².    Intake/Output Summary (Last 24 hours) at 02/28/18 1424  Last data filed at 02/28/18 1146   Gross per 24 hour   Intake              660 ml   Output             1076 ml   Net             -416 ml      Physical Exam   Constitutional: No distress.   Deconditioned; Body mass index is 21.99 kg/m².   HENT:   Head: Normocephalic and atraumatic.   Eyes: Pupils are equal, round, and reactive to light.   Wears glasses; at the bedside   Neck: Normal range of motion. Neck supple.   Cardiovascular: Normal rate.    Pulmonary/Chest: Effort normal. No respiratory distress. He has no wheezes. He has no rales.   Abdominal: Soft. He exhibits no distension. There is no tenderness.   Genitourinary: Rectal exam  shows guaiac negative stool.   Musculoskeletal: Normal range of motion. He exhibits no edema or tenderness.   Neurological: He is alert.   Confusion requires reorienting however, alert to self and situation - chronic essential tremors   Skin: Skin is warm and dry. He is not diaphoretic.   Dry skin; poor turgor   Psychiatric:   Intermittent confusions, requires reorienting often       Significant Labs: All pertinent labs within the past 24 hours have been reviewed.    Significant Imaging: I have reviewed and interpreted all pertinent imaging results/findings within the past 24 hours.    Assessment/Plan:      * Debility    Currently awaiting placement  PT/OT following for ambulation  PPD already placed and read last week 2/7/18  Case management following  Lovenox 40 mg daily      TN consulted to aid in safe discharge planning. Appears patient has been living with a significant other who can no longer care for him and refusing to take patient home. His somewhat estranged daughter has been contacted and is willing to aid in finding NH placement (she had already begun this process as an outpatient). There is an issue, however, in that patient's power of  refused to sign consent for NH placement. Patient unable to make his own decision regarding this as he is with intermittent confusion +/- previous unclear dementia diagnosis per daughter. Psychiatry consulted for capacity. Also recommended adding depakote 250mg TID to help with mood stabilization. He is medically cleared and was initially with plan for home with HH and SW to aid in outpatient placement, however, cannot safely discharge as he does not have a home to return to.           Lewy body dementia without behavioral disturbance    Seen by psyche who has since signed off - appreciate recs -  Doing well on current regimen  Continue medication regimen as follows:  Sinemet  QID and Seroquel 25 hs  Continue Exelon 4.6 mg/24 patch  Divalproex 250 mg to  BID  Continue holding trazadone        Dehydration    Per ED patient admitted for dehydration. His BUN is elevated with normal creatinine--may be mild clinical dehydration. Will provide gentle IVF hydration.           Essential hypertension    BP well controlled - Continue amlodipine 10 mg daily  Change vitals to every shift            Parkinson disease    Seroquel 25 mg; sinemet  mg;   Discontinue taxodione 50 mg  Restart exelon 4.6 mg in the morning when he is more awake          VTE Risk Mitigation         Ordered     enoxaparin injection 40 mg  Daily     Route:  Subcutaneous        02/13/18 1149     Medium Risk of VTE  Once      02/08/18 0653     Place JOSEFINA hose  Until discontinued      02/08/18 0653     Place sequential compression device  Until discontinued      02/08/18 0653              Sidra Rivas MD  Department of Hospital Medicine   [facility]

## 2018-02-28 NOTE — PLAN OF CARE
Problem: Fall Risk (Adult)  Intervention: Reduce Risk/Promote Restraint Free Environment   18 1007   Safety Interventions   Environmental Safety Modification assistive device/personal items within reach;clutter free environment maintained;lighting adjusted;mattress on floor;room near unit station;mobility aid in reach;room organization consistent   Prevent Martha Drop/Fall   Safety/Security Measures bed alarm set     Intervention: Review Medications/Identify Contributors to Fall Risk   18 1007   Safety Interventions   Medication Review/Management medications reviewed     Intervention: Patient Rounds   18 1007   Safety Interventions   Patient Rounds bed in low position;bed wheels locked;call light in reach;clutter free environment maintained;ID band on;placement of personal items at bedside;toileting offered;visualized patient     Intervention: Safety Promotion/Fall Prevention   18 1007   Safety Interventions   Safety Promotion/Fall Prevention assistive device/personal item within reach;bed alarm set;side rails raised x 2;room near unit station       Goal: Identify Related Risk Factors and Signs and Symptoms  Related risk factors and signs and symptoms are identified upon initiation of Human Response Clinical Practice Guideline (CPG)    18 1007   Fall Risk   Related Risk Factors (Fall Risk) age-related changes;inadequate lighting;objects hard to reach;polypharmacy;sleep pattern alteration;slipper/uneven surfaces;environment unfamiliar   Signs and Symptoms (Fall Risk) presence of risk factors     Goal: Absence of Falls  Patient will demonstrate the desired outcomes by discharge/transition of care.    18 1007   Fall Risk (Adult)   Absence of Falls making progress toward outcome

## 2018-02-28 NOTE — PROGRESS NOTES
TN spoke with Josselin Valadez, RN, MSN, ACM-RN; Director Case Management who agreed that [facility] should be notified of discharge problem and lack of cooperation from [facility].  TN called [facility] @ [phone], spoke with Pretty who took a full report and stated someone from their office will come to the hospital to meet with patient in 5-7 and will investigate.

## 2018-02-28 NOTE — PROGRESS NOTES
Spoke with Danae at [facility] to verify bed availability. Per Marylou, there are no male beds available. Will send documents to other facilities.

## 2018-03-01 PROCEDURE — 25000003 PHARM REV CODE 250: Performed by: NURSE PRACTITIONER

## 2018-03-01 PROCEDURE — 25000003 PHARM REV CODE 250: Performed by: EMERGENCY MEDICINE

## 2018-03-01 PROCEDURE — 21400001 HC TELEMETRY ROOM

## 2018-03-01 PROCEDURE — 63600175 PHARM REV CODE 636 W HCPCS: Performed by: NURSE PRACTITIONER

## 2018-03-01 PROCEDURE — 97535 SELF CARE MNGMENT TRAINING: CPT

## 2018-03-01 RX ADMIN — ENOXAPARIN SODIUM 40 MG: 100 INJECTION SUBCUTANEOUS at 05:03

## 2018-03-01 RX ADMIN — AMLODIPINE BESYLATE 10 MG: 5 TABLET ORAL at 08:03

## 2018-03-01 RX ADMIN — DIVALPROEX SODIUM 250 MG: 250 TABLET, DELAYED RELEASE ORAL at 08:03

## 2018-03-01 RX ADMIN — DIVALPROEX SODIUM 250 MG: 250 TABLET, DELAYED RELEASE ORAL at 09:03

## 2018-03-01 RX ADMIN — QUETIAPINE FUMARATE 25 MG: 25 TABLET ORAL at 09:03

## 2018-03-01 RX ADMIN — CARBIDOPA AND LEVODOPA 1 TABLET: 25; 100 TABLET ORAL at 11:03

## 2018-03-01 RX ADMIN — ACETAMINOPHEN 650 MG: 325 TABLET, FILM COATED ORAL at 09:03

## 2018-03-01 RX ADMIN — DOCUSATE SODIUM AND SENNOSIDES 1 TABLET: 8.6; 5 TABLET, FILM COATED ORAL at 08:03

## 2018-03-01 RX ADMIN — CARBIDOPA AND LEVODOPA 1 TABLET: 25; 100 TABLET ORAL at 05:03

## 2018-03-01 RX ADMIN — RIVASTIGMINE TRANSDERMAL SYSTEM 1 PATCH: 4.6 PATCH, EXTENDED RELEASE TRANSDERMAL at 08:03

## 2018-03-01 RX ADMIN — DOCUSATE SODIUM 100 MG: 100 CAPSULE, LIQUID FILLED ORAL at 08:03

## 2018-03-01 RX ADMIN — DOCUSATE SODIUM 100 MG: 100 CAPSULE, LIQUID FILLED ORAL at 09:03

## 2018-03-01 NOTE — PLAN OF CARE
Problem: Fall Risk (Adult)  Intervention: Reduce Risk/Promote Restraint Free Environment   18   Safety Interventions   Environmental Safety Modification assistive device/personal items within reach;clutter free environment maintained;lighting adjusted;room near unit station;room organization consistent   Prevent  Drop/Fall   Safety/Security Measures bed alarm set     Intervention: Review Medications/Identify Contributors to Fall Risk   18   Safety Interventions   Medication Review/Management medications reviewed     Intervention: Patient Rounds   18   Safety Interventions   Patient Rounds bed in low position;bed wheels locked;clutter free environment maintained;ID band on;call light in reach;visualized patient;placement of personal items at bedside;toileting offered     Intervention: Safety Promotion/Fall Prevention   18   Safety Interventions   Safety Promotion/Fall Prevention assistive device/personal item within reach;bed alarm set;commode/urinal/bedpan at bedside;room near unit station;lighting adjusted;medications reviewed       Goal: Identify Related Risk Factors and Signs and Symptoms  Related risk factors and signs and symptoms are identified upon initiation of Human Response Clinical Practice Guideline (CPG)    18   Fall Risk   Related Risk Factors (Fall Risk) inadequate lighting;impaired vision;neuro disease/injury;objects hard to reach;sleep pattern alteration;slipper/uneven surfaces;environment unfamiliar     Goal: Absence of Falls  Patient will demonstrate the desired outcomes by discharge/transition of care.    18   Fall Risk (Adult)   Absence of Falls making progress toward outcome

## 2018-03-01 NOTE — PT/OT/SLP PROGRESS
Occupational Therapy   Treatment    Name: Tenzin Ortiz  MRN: [id number]  Admitting Diagnosis:  Debility       Recommendations:     Discharge Recommendations: nursing facility, basic  Discharge Equipment Recommendations:  bedside commode, bath bench  Barriers to discharge:  Decreased caregiver support    Subjective     Communicated with: nurse Bryson prior to session.  Pain/Comfort:  · Pain Rating 1: 0/10  · Pain Rating Post-Intervention 1: 0/10    Patients cultural, spiritual, Denominational conflicts given the current situation:      Objective:     Patient found with: peripheral IV    General Precautions: Standard, fall   Orthopedic Precautions:N/A   Braces: N/A     Occupational Performance:    Bed Mobility:    · Patient completed Rolling/Turning to Left with  contact guard assistance  · Patient completed Rolling/Turning to Right with contact guard assistance  · Patient completed Scooting/Bridging with minimum assistance  · Patient completed Supine to Sit with minimum assistance       Activities of Daily Living:  · Grooming: Pt declined to participate despite encouragement. Pt started with oral care then declined to continue, OT offered tray for breakfast and pt declined. Nurse is aware.      Patient left supine with all lines intact, call button in reach and bed alarm on    AMPA 6 Click:  AMPA Total Score: 17    Education:    Assessment:     Tenzin Ortiz is a 77 y.o. male with a medical diagnosis of Debility.  He presents with limited participation.  Performance deficits affecting function are weakness, impaired endurance, impaired self care skills, impaired functional mobilty, gait instability, impaired balance, impaired cognition, decreased coordination, decreased upper extremity function, decreased lower extremity function, decreased safety awareness, decreased ROM, impaired coordination, impaired fine motor, impaired joint extensibility, impaired muscle length.      Rehab Prognosis:  poor; patient would benefit  from acute skilled OT services to address these deficits and reach maximum level of function.       Plan:     Patient to be seen 3 x/week to address the above listed problems via self-care/home management, therapeutic activities, therapeutic exercises  · Plan of Care Expires: 03/08/18  · Plan of Care Reviewed with: patient    This Plan of care has been discussed with the patient who was involved in its development and understands and is in agreement with the identified goals and treatment plan    GOALS:    Occupational Therapy Goals        Problem: Occupational Therapy Goal    Goal Priority Disciplines Outcome Interventions   Occupational Therapy Goal     OT, PT/OT Ongoing (interventions implemented as appropriate)    Description:  Goals to be met by: 3/19/18     Patient will increase functional independence with ADLs by performing:    Feeding with Set-up Assistance. Met 2/19/18  UE Dressing with Set-up Assistance.  Grooming while seated with Set-up Assistance. Met 2/16/18  Stand pivot transfers with Contact Guard Assistance.  Toilet transfer to bedside commode with Minimal Assistance. Met 2/27/18 (transfer to toilet)  Upper extremity exercise program x10 reps , with supervision.  Pt will be able to ambulate to sink with minimal Assist for perform grooming met 2/27/18                          Time Tracking:     OT Date of Treatment: 03/01/18  OT Start Time: 0758  OT Stop Time: 0810  OT Total Time (min): 12 min    Billable Minutes:Self Care/Home Management 12     Becky Bruno OT  3/1/2018

## 2018-03-01 NOTE — PROGRESS NOTES
TN contacted patient's daughter, Carole, to discuss signing of paperwork for nursing home placement. Carole was informed by TN that Pietro Mensah stated that he was not Power of  of her father,Tenzin Ortiz. Carole immediately stated that the information provided by Mr. Mensah was untrue and that she was not going to sign paperwork. Carole explained to TN that Mr. Mensah signed paperwork to become Power of  during a prior nursing home stay before this hospitalization. Carole stated that Mr. Mensah informed her himself after she called to inquire of the matter that he was Power of  of her father Tenzin Ortiz.

## 2018-03-01 NOTE — PLAN OF CARE
Problem: Patient Care Overview  Goal: Plan of Care Review  Outcome: Ongoing (interventions implemented as appropriate)   03/01/18 0003   Coping/Psychosocial   Plan Of Care Reviewed With patient       Problem: Fall Risk (Adult)  Goal: Identify Related Risk Factors and Signs and Symptoms  Related risk factors and signs and symptoms are identified upon initiation of Human Response Clinical Practice Guideline (CPG)   Outcome: Ongoing (interventions implemented as appropriate)   02/28/18 1007   Fall Risk   Related Risk Factors (Fall Risk) age-related changes;inadequate lighting;objects hard to reach;polypharmacy;sleep pattern alteration;slipper/uneven surfaces;environment unfamiliar   Signs and Symptoms (Fall Risk) presence of risk factors     Goal: Absence of Falls  Patient will demonstrate the desired outcomes by discharge/transition of care.   Outcome: Ongoing (interventions implemented as appropriate)   03/01/18 0003   Fall Risk (Adult)   Absence of Falls making progress toward outcome

## 2018-03-01 NOTE — PLAN OF CARE
Problem: Occupational Therapy Goal  Goal: Occupational Therapy Goal  Goals to be met by: 3/19/18     Patient will increase functional independence with ADLs by performing:    Feeding with Set-up Assistance. Met 2/19/18  UE Dressing with Set-up Assistance.  Grooming while seated with Set-up Assistance. Met 2/16/18  Stand pivot transfers with Contact Guard Assistance.  Toilet transfer to bedside commode with Minimal Assistance. Met 2/27/18 (transfer to toilet)  Upper extremity exercise program x10 reps , with supervision.  Pt will be able to ambulate to sink with minimal Assist for perform grooming met 2/27/18         Outcome: Ongoing (interventions implemented as appropriate)  Pt did not want to sit edge of bed and in bedside chair.  Pt can not be redirected.

## 2018-03-01 NOTE — SUBJECTIVE & OBJECTIVE
Interval History: pt reports that he doesn't want to eat, talking about closing the printing company     Review of Systems   Constitutional: Negative for activity change, appetite change, chills, diaphoresis, fatigue and fever.   Respiratory: Negative for cough, chest tightness, shortness of breath and wheezing.    Cardiovascular: Negative for chest pain, palpitations and leg swelling.   Gastrointestinal: Negative for abdominal distention, abdominal pain, constipation, diarrhea, nausea and vomiting.   Genitourinary: Negative for dysuria and hematuria.   Musculoskeletal: Negative for joint swelling and neck stiffness.   Neurological: Positive for tremors. Negative for weakness.   Psychiatric/Behavioral: Positive for confusion.        Intermittent confusion, requires reorienting - mood swings have stabilized - chronic essential tremors - alert to self and place     Objective:     Vital Signs (Most Recent):  Temp: 97.3 °F (36.3 °C) (03/01/18 0734)  Pulse: 80 (03/01/18 0734)  Resp: 18 (03/01/18 0734)  BP: (!) 154/67 (03/01/18 0734)  SpO2: 96 % (03/01/18 0734) Vital Signs (24h Range):  Temp:  [97.3 °F (36.3 °C)-98.3 °F (36.8 °C)] 97.3 °F (36.3 °C)  Pulse:  [80-89] 80  Resp:  [18-20] 18  SpO2:  [95 %-98 %] 96 %  BP: (124-158)/(63-82) 154/67     Weight: 62.5 kg (137 lb 12.6 oz)  Body mass index is 21.58 kg/m².    Intake/Output Summary (Last 24 hours) at 03/01/18 0958  Last data filed at 02/28/18 2300   Gross per 24 hour   Intake              120 ml   Output              650 ml   Net             -530 ml      Physical Exam   Constitutional: No distress.   Deconditioned; Body mass index is 21.99 kg/m².   HENT:   Head: Normocephalic and atraumatic.   Eyes: Pupils are equal, round, and reactive to light.   Wears glasses; at the bedside   Neck: Normal range of motion. Neck supple.   Cardiovascular: Normal rate.    Pulmonary/Chest: Effort normal. No respiratory distress. He has no wheezes. He has no rales.   Abdominal: Soft. He  exhibits no distension. There is no tenderness.   Genitourinary: Rectal exam shows guaiac negative stool.   Musculoskeletal: Normal range of motion. He exhibits no edema or tenderness.   Neurological: He is alert.   Confusion requires reorienting however, alert to self and situation - chronic essential tremors   Skin: Skin is warm and dry. He is not diaphoretic.   Dry skin; poor turgor   Psychiatric:   Intermittent confusions, requires reorienting often       Significant Labs: All pertinent labs within the past 24 hours have been reviewed.    Significant Imaging: I have reviewed all pertinent imaging results/findings within the past 24 hours.

## 2018-03-02 PROCEDURE — 63600175 PHARM REV CODE 636 W HCPCS: Performed by: NURSE PRACTITIONER

## 2018-03-02 PROCEDURE — 21400001 HC TELEMETRY ROOM

## 2018-03-02 PROCEDURE — 25000003 PHARM REV CODE 250: Performed by: NURSE PRACTITIONER

## 2018-03-02 PROCEDURE — 97116 GAIT TRAINING THERAPY: CPT

## 2018-03-02 PROCEDURE — 25000003 PHARM REV CODE 250: Performed by: EMERGENCY MEDICINE

## 2018-03-02 RX ADMIN — DIVALPROEX SODIUM 250 MG: 250 TABLET, DELAYED RELEASE ORAL at 10:03

## 2018-03-02 RX ADMIN — CARBIDOPA AND LEVODOPA 1 TABLET: 25; 100 TABLET ORAL at 12:03

## 2018-03-02 RX ADMIN — DIVALPROEX SODIUM 250 MG: 250 TABLET, DELAYED RELEASE ORAL at 09:03

## 2018-03-02 RX ADMIN — AMLODIPINE BESYLATE 10 MG: 5 TABLET ORAL at 09:03

## 2018-03-02 RX ADMIN — CARBIDOPA AND LEVODOPA 1 TABLET: 25; 100 TABLET ORAL at 05:03

## 2018-03-02 RX ADMIN — DOCUSATE SODIUM 100 MG: 100 CAPSULE, LIQUID FILLED ORAL at 10:03

## 2018-03-02 RX ADMIN — POLYETHYLENE GLYCOL 3350 17 G: 17 POWDER, FOR SOLUTION ORAL at 09:03

## 2018-03-02 RX ADMIN — QUETIAPINE FUMARATE 25 MG: 25 TABLET ORAL at 10:03

## 2018-03-02 RX ADMIN — DOCUSATE SODIUM 100 MG: 100 CAPSULE, LIQUID FILLED ORAL at 09:03

## 2018-03-02 RX ADMIN — RIVASTIGMINE TRANSDERMAL SYSTEM 1 PATCH: 4.6 PATCH, EXTENDED RELEASE TRANSDERMAL at 09:03

## 2018-03-02 RX ADMIN — CARBIDOPA AND LEVODOPA 1 TABLET: 25; 100 TABLET ORAL at 11:03

## 2018-03-02 RX ADMIN — ENOXAPARIN SODIUM 40 MG: 100 INJECTION SUBCUTANEOUS at 05:03

## 2018-03-02 NOTE — PLAN OF CARE
Problem: Patient Care Overview  Goal: Plan of Care Review  Outcome: Ongoing (interventions implemented as appropriate)   03/02/18 0027   Coping/Psychosocial   Plan Of Care Reviewed With patient       Problem: Fall Risk (Adult)  Goal: Identify Related Risk Factors and Signs and Symptoms  Related risk factors and signs and symptoms are identified upon initiation of Human Response Clinical Practice Guideline (CPG)   Outcome: Ongoing (interventions implemented as appropriate)   02/28/18 1007 03/01/18 0805   Fall Risk   Related Risk Factors (Fall Risk) --  inadequate lighting;impaired vision;neuro disease/injury;objects hard to reach;sleep pattern alteration;slipper/uneven surfaces;environment unfamiliar   Signs and Symptoms (Fall Risk) presence of risk factors --      Goal: Absence of Falls  Patient will demonstrate the desired outcomes by discharge/transition of care.   Outcome: Ongoing (interventions implemented as appropriate)   03/02/18 0027   Fall Risk (Adult)   Absence of Falls making progress toward outcome

## 2018-03-02 NOTE — PLAN OF CARE
Problem: Physical Therapy Goal  Goal: Physical Therapy Goal  Goals to be met by: 3/9/18    Patient will increase functional independence with mobility by performin. Supine to sit with Stand-by Assistance  2. Sit to stand transfer with Stand-by Assistance  3. Gait  x 150 feet with Stand-by Assistance using Rolling Walker  4. Lower extremity exercise program x30 reps per handout, with assistance as needed    Outcome: Ongoing (interventions implemented as appropriate)    Patient limited in PT session due to needing to have a bowel movement.

## 2018-03-02 NOTE — PT/OT/SLP PROGRESS
Physical Therapy Treatment    Patient Name:  Tenzin Ortiz   MRN:  [id number]    Recommendations:     Discharge Recommendations:  nursing facility, basic   Discharge Equipment Recommendations: bedside commode, bath bench   Barriers to discharge: Decreased caregiver support (family unable to care for patient)    Assessment:     Tenzin Ortiz is a 77 y.o. male admitted with a medical diagnosis of Debility.  He presents with the following impairments/functional limitations:  weakness, impaired endurance, impaired functional mobilty, gait instability, impaired balance, decreased lower extremity function, impaired self care skills, decreased safety awareness, impaired cognition, decreased coordination, decreased ROM.    Rehab Prognosis:  fair; patient would benefit from acute skilled PT services to address these deficits and reach maximum level of function.      Recent Surgery: * No surgery found *      Plan:     During this hospitalization, patient to be seen 3 x/week to address the above listed problems via gait training, therapeutic activities, therapeutic exercises  · Plan of Care Expires:  03/09/18   Plan of Care Reviewed with: patient    Subjective     Communicated with nurse Machado prior to session.  Patient found supine in bed upon PT entry to room, agreeable to treatment.      Chief Complaint: No complaint.   Patient comments/goals: To go home.   Pain/Comfort:  · Pain Rating 1: 0/10    Objective:     Patient found with: peripheral IV (Avasys)     General Precautions: Standard, fall   Orthopedic Precautions:N/A   Braces: N/A     Functional Mobility:  · Bed Mobility:     · Supine to Sit: minimum assistance  · Transfers:     · Sit to Stand:  minimum assistance and moderate assistance with rolling walker x2 trials from edge of bed with posterior lean  · Gait:  ~8ft with minimum assistance and rolling walker. Patient with shuffling gait and increased difficulty advancing B LE's today. Patient required tactile cues to  initiate steps with KRIS GONZALES's. Patient declined further ambulation due to needing to have a bowel movement.   ·   AM-PAC 6 CLICK MOBILITY  Turning over in bed (including adjusting bedclothes, sheets and blankets)?: 3  Sitting down on and standing up from a chair with arms (e.g., wheelchair, bedside commode, etc.): 3  Moving from lying on back to sitting on the side of the bed?: 3  Moving to and from a bed to a chair (including a wheelchair)?: 3  Need to walk in hospital room?: 3  Climbing 3-5 steps with a railing?: 2  Total Score: 17     Patient left up in chair with all lines intact, call button in reach and nurse Jeannette notified..    GOALS:    Physical Therapy Goals        Problem: Physical Therapy Goal    Goal Priority Disciplines Outcome Goal Variances Interventions   Physical Therapy Goal     PT/OT, PT Ongoing (interventions implemented as appropriate)     Description:  Goals to be met by: 3/9/18    Patient will increase functional independence with mobility by performin. Supine to sit with Stand-by Assistance  2. Sit to stand transfer with Stand-by Assistance  3. Gait  x 150 feet with Stand-by Assistance using Rolling Walker  4. Lower extremity exercise program x30 reps per handout, with assistance as needed                     Time Tracking:     PT Received On: 18  PT Start Time: 0945     PT Stop Time: 1003  PT Total Time (min): 18 min     Billable Minutes: Gait Training  18     Treatment Type: Treatment  PT/PTA: PT     PTA Visit Number: 0     Dominga Esquivel, PT  2018

## 2018-03-02 NOTE — PLAN OF CARE
Problem: Confusion, Acute (Adult)  Intervention: Monitor/Assist with Self Care   03/02/18 1537   Activity   Activity Assistance Provided assistance, 1 person     Intervention: Reduce Risk/Promote Restraint Free Environment   03/02/18 1537   Safety Interventions   Environmental Safety Modification assistive device/personal items within reach     Intervention: Evaluate Medications/Identify Contributors to Confusion   03/02/18 1537   Safety Interventions   Medication Review/Management medications reviewed       Goal: Identify Related Risk Factors and Signs and Symptoms  Related risk factors and signs and symptoms are identified upon initiation of Human Response Clinical Practice Guideline (CPG)   Outcome: Ongoing (interventions implemented as appropriate)   03/02/18 1537   Confusion, Acute   Related Risk Factors (Acute Confusion) cognitive impairment     Goal: Cognitive/Functional Impairments Minimized  Patient will demonstrate the desired outcomes by discharge/transition of care.   Outcome: Ongoing (interventions implemented as appropriate)   03/02/18 1537   Confusion, Acute (Adult)   Cognitive/Functional Impairments Minimized making progress toward outcome     Goal: Safety  Patient will demonstrate the desired outcomes by discharge/transition of care.   Outcome: Ongoing (interventions implemented as appropriate)   03/02/18 1537   Confusion, Acute (Adult)   Safety making progress toward outcome

## 2018-03-02 NOTE — PROGRESS NOTES
[facility] Medicine  Progress Note    Patient Name: Tenzin Ortiz  MRN: [id number]  Patient Class: IP- Inpatient   Admission Date: 2/7/2018  Length of Stay: 3 days  Attending Physician: Sidra Rivas MD  Primary Care Provider: Efrain Chavez MD        Subjective:     Principal Problem:Debility    HPI:  Patient is 76 yo male with HTN, HLD, and Parkinson's who presents to ED reportedly for HA. Per ED note patient was complaining of HA but he denies this on my interview. Seems there was some confusion in ED and he was unclear why he was here. During interview with me, patient reports being here for NH placement although he does not appear to be very happy about this. He has been with some issues caring for himself. Currently lives with his girlfriend who reports she can no longer care for him. He states he currently gets around with a walker but sometimes has difficulty getting up out of his chair. Per chart review patient is with Parkinson's and sees neurology, Dr. Castellanos, lastly seen on 2/6/18. He was with hallucinations which were noted to be secondary to dopaminergic agents thus having to significantly reduce dose--she recommended he be admitted for placement at that time but patient declined. He has been attempting placement as an outpatient through PCP but without much luck. He otherwise denies recent illness, fever/chills, CP, SOB, abdominal pain, N/V/D, dysuria. On presentation patient with grossly normal labs/vitals. Placed in observation for what appears to be solely placement although this is something that may need to be continued as outpatient as is not indication for hospitalization.    Hospital Course:  Awake and alert without complaint - severe parkinsons disease and can no longer care for himself - neither can his girlfriend.  CM reports patient with acceptance to [facility]; awaiting legals to be signed by patient POA. No new issues - case management following.    3/1-  updated by CM that the  will not sign paperwork for NH placement, hopefully daughter will be able to assist with placement   3/2- no acute changes, placement pending     No new subjective & objective note has been filed under this hospital service since the last note was generated.    Assessment/Plan:      * Debility    Currently awaiting placement  PT/OT following for ambulation  PPD already placed and read last week 2/7/18  Case management following  Lovenox 40 mg daily      TN consulted to aid in safe discharge planning. Appears patient has been living with a significant other who can no longer care for him and refusing to take patient home. His somewhat estranged daughter has been contacted and is willing to aid in finding NH placement (she had already begun this process as an outpatient). There is an issue, however, in that patient's power of  refused to sign consent for NH placement. Patient unable to make his own decision regarding this as he is with intermittent confusion +/- previous unclear dementia diagnosis per daughter. Psychiatry consulted for capacity. Also recommended adding depakote 250mg TID to help with mood stabilization. He is medically cleared and was initially with plan for home with HH and SW to aid in outpatient placement, however, cannot safely discharge as he does not have a home to return to.           Lewy body dementia without behavioral disturbance    Seen by psyche who has since signed off - appreciate recs -  Doing well on current regimen  Continue medication regimen as follows:  Sinemet  QID and Seroquel 25 hs  Continue Exelon 4.6 mg/24 patch  Divalproex 250 mg to BID  Continue holding trazadone        Dehydration    Per ED patient admitted for dehydration. His BUN is elevated with normal creatinine--may be mild clinical dehydration. Will provide gentle IVF hydration.           Essential hypertension    BP well controlled - Continue amlodipine 10 mg daily  Change  vitals to every shift            Parkinson disease    Seroquel 25 mg; sinemet  mg;   Discontinue taxodione 50 mg  Restart exelon 4.6 mg in the morning when he is more awake          VTE Risk Mitigation         Ordered     enoxaparin injection 40 mg  Daily     Route:  Subcutaneous        02/13/18 1149     Medium Risk of VTE  Once      02/08/18 0653     Place JOSEFINA hose  Until discontinued      02/08/18 0653     Place sequential compression device  Until discontinued      02/08/18 0653              Sidra Rivas MD  Department of Hospital Medicine   [facility]

## 2018-03-03 PROCEDURE — 25000003 PHARM REV CODE 250: Performed by: NURSE PRACTITIONER

## 2018-03-03 PROCEDURE — 25000003 PHARM REV CODE 250: Performed by: EMERGENCY MEDICINE

## 2018-03-03 PROCEDURE — 21400001 HC TELEMETRY ROOM

## 2018-03-03 PROCEDURE — 63600175 PHARM REV CODE 636 W HCPCS: Performed by: NURSE PRACTITIONER

## 2018-03-03 RX ORDER — CARBIDOPA AND LEVODOPA 25; 100 MG/1; MG/1
2 TABLET ORAL 4 TIMES DAILY
Status: DISCONTINUED | OUTPATIENT
Start: 2018-03-04 | End: 2018-03-28 | Stop reason: SDUPTHER

## 2018-03-03 RX ADMIN — CARBIDOPA AND LEVODOPA 1 TABLET: 25; 100 TABLET ORAL at 01:03

## 2018-03-03 RX ADMIN — CARBIDOPA AND LEVODOPA 1 TABLET: 25; 100 TABLET ORAL at 05:03

## 2018-03-03 RX ADMIN — CARBIDOPA AND LEVODOPA 1 TABLET: 25; 100 TABLET ORAL at 06:03

## 2018-03-03 RX ADMIN — ENOXAPARIN SODIUM 40 MG: 100 INJECTION SUBCUTANEOUS at 05:03

## 2018-03-03 RX ADMIN — DIVALPROEX SODIUM 250 MG: 250 TABLET, DELAYED RELEASE ORAL at 09:03

## 2018-03-03 RX ADMIN — CARBIDOPA AND LEVODOPA 1 TABLET: 25; 100 TABLET ORAL at 12:03

## 2018-03-03 RX ADMIN — AMLODIPINE BESYLATE 10 MG: 5 TABLET ORAL at 08:03

## 2018-03-03 RX ADMIN — DIVALPROEX SODIUM 250 MG: 250 TABLET, DELAYED RELEASE ORAL at 08:03

## 2018-03-03 RX ADMIN — DOCUSATE SODIUM 100 MG: 100 CAPSULE, LIQUID FILLED ORAL at 08:03

## 2018-03-03 RX ADMIN — RIVASTIGMINE TRANSDERMAL SYSTEM 1 PATCH: 4.6 PATCH, EXTENDED RELEASE TRANSDERMAL at 08:03

## 2018-03-03 RX ADMIN — DOCUSATE SODIUM AND SENNOSIDES 1 TABLET: 8.6; 5 TABLET, FILM COATED ORAL at 08:03

## 2018-03-03 RX ADMIN — QUETIAPINE FUMARATE 25 MG: 25 TABLET ORAL at 09:03

## 2018-03-03 RX ADMIN — DOCUSATE SODIUM 100 MG: 100 CAPSULE, LIQUID FILLED ORAL at 09:03

## 2018-03-03 NOTE — PLAN OF CARE
Problem: Patient Care Overview  Goal: Plan of Care Review  Outcome: Ongoing (interventions implemented as appropriate)   03/02/18 2023   Coping/Psychosocial   Plan Of Care Reviewed With patient       Problem: Fall Risk (Adult)  Goal: Absence of Falls  Patient will demonstrate the desired outcomes by discharge/transition of care.   Outcome: Ongoing (interventions implemented as appropriate)   03/02/18 2023   Fall Risk (Adult)   Absence of Falls making progress toward outcome       Problem: Pressure Ulcer Risk (Mike Scale) (Adult,Obstetrics,Pediatric)  Goal: Skin Integrity  Patient will demonstrate the desired outcomes by discharge/transition of care.   Outcome: Ongoing (interventions implemented as appropriate)   03/02/18 2023   Pressure Ulcer Risk (Mike Scale) (Adult,Obstetrics,Pediatric)   Skin Integrity making progress toward outcome       Problem: Confusion, Acute (Adult)  Goal: Safety  Patient will demonstrate the desired outcomes by discharge/transition of care.   Outcome: Ongoing (interventions implemented as appropriate)   03/02/18 2023   Confusion, Acute (Adult)   Safety making progress toward outcome       Comments: Report received and assumed care of pt. Assessment as noted. Pt resting in bed with call light in reach and safety intact. POC discussed with pt. Mild confusion noted. Avasys continued. Will continued to monitored for changes in status.

## 2018-03-03 NOTE — PLAN OF CARE
Problem: Patient Care Overview  Goal: Plan of Care Review  Outcome: Ongoing (interventions implemented as appropriate)   03/03/18 1104   Coping/Psychosocial   Plan Of Care Reviewed With patient       Problem: Fall Risk (Adult)  Goal: Absence of Falls  Patient will demonstrate the desired outcomes by discharge/transition of care.   Outcome: Ongoing (interventions implemented as appropriate)   03/03/18 1104   Fall Risk (Adult)   Absence of Falls making progress toward outcome       Problem: Pressure Ulcer Risk (Mike Scale) (Adult,Obstetrics,Pediatric)  Goal: Skin Integrity  Patient will demonstrate the desired outcomes by discharge/transition of care.   Outcome: Ongoing (interventions implemented as appropriate)   03/03/18 1104   Pressure Ulcer Risk (Mike Scale) (Adult,Obstetrics,Pediatric)   Skin Integrity making progress toward outcome       Problem: Confusion, Acute (Adult)  Goal: Cognitive/Functional Impairments Minimized  Patient will demonstrate the desired outcomes by discharge/transition of care.   Outcome: Ongoing (interventions implemented as appropriate)   03/03/18 1104   Confusion, Acute (Adult)   Cognitive/Functional Impairments Minimized making progress toward outcome     Goal: Safety  Patient will demonstrate the desired outcomes by discharge/transition of care.   Outcome: Ongoing (interventions implemented as appropriate)   03/03/18 1104   Confusion, Acute (Adult)   Safety making progress toward outcome

## 2018-03-04 PROCEDURE — 63600175 PHARM REV CODE 636 W HCPCS: Performed by: NURSE PRACTITIONER

## 2018-03-04 PROCEDURE — 25000003 PHARM REV CODE 250: Performed by: HOSPITALIST

## 2018-03-04 PROCEDURE — 25000003 PHARM REV CODE 250: Performed by: EMERGENCY MEDICINE

## 2018-03-04 PROCEDURE — 25000003 PHARM REV CODE 250: Performed by: NURSE PRACTITIONER

## 2018-03-04 PROCEDURE — 21400001 HC TELEMETRY ROOM

## 2018-03-04 RX ADMIN — AMLODIPINE BESYLATE 10 MG: 5 TABLET ORAL at 08:03

## 2018-03-04 RX ADMIN — ENOXAPARIN SODIUM 40 MG: 100 INJECTION SUBCUTANEOUS at 05:03

## 2018-03-04 RX ADMIN — CARBIDOPA AND LEVODOPA 2 TABLET: 25; 100 TABLET ORAL at 12:03

## 2018-03-04 RX ADMIN — RIVASTIGMINE TRANSDERMAL SYSTEM 1 PATCH: 4.6 PATCH, EXTENDED RELEASE TRANSDERMAL at 08:03

## 2018-03-04 RX ADMIN — DIVALPROEX SODIUM 250 MG: 250 TABLET, DELAYED RELEASE ORAL at 08:03

## 2018-03-04 RX ADMIN — CARBIDOPA AND LEVODOPA 2 TABLET: 25; 100 TABLET ORAL at 11:03

## 2018-03-04 RX ADMIN — CARBIDOPA AND LEVODOPA 2 TABLET: 25; 100 TABLET ORAL at 01:03

## 2018-03-04 RX ADMIN — CARBIDOPA AND LEVODOPA 2 TABLET: 25; 100 TABLET ORAL at 05:03

## 2018-03-04 RX ADMIN — QUETIAPINE FUMARATE 25 MG: 25 TABLET ORAL at 08:03

## 2018-03-04 NOTE — SUBJECTIVE & OBJECTIVE
Interval History: pt has no complaints    Review of Systems   Constitutional: Negative for activity change, appetite change, chills, diaphoresis, fatigue and fever.   Respiratory: Negative for cough, chest tightness, shortness of breath and wheezing.    Cardiovascular: Negative for chest pain, palpitations and leg swelling.   Gastrointestinal: Negative for abdominal distention, abdominal pain, constipation, diarrhea, nausea and vomiting.   Genitourinary: Negative for dysuria and hematuria.   Musculoskeletal: Negative for joint swelling and neck stiffness.   Neurological: Positive for tremors. Negative for weakness.   Psychiatric/Behavioral: Positive for confusion.        Intermittent confusion, requires reorienting - mood swings have stabilized - chronic essential tremors - alert to self and place     Objective:     Vital Signs (Most Recent):  Temp: 97.8 °F (36.6 °C) (03/04/18 1114)  Pulse: 77 (03/04/18 1114)  Resp: 18 (03/04/18 1114)  BP: 128/61 (03/04/18 1114)  SpO2: 96 % (03/04/18 1114) Vital Signs (24h Range):  Temp:  [97.7 °F (36.5 °C)-98.9 °F (37.2 °C)] 97.8 °F (36.6 °C)  Pulse:  [62-98] 77  Resp:  [18] 18  SpO2:  [95 %-99 %] 96 %  BP: ()/(58-75) 128/61     Weight: 62.5 kg (137 lb 12.6 oz)  Body mass index is 21.58 kg/m².    Intake/Output Summary (Last 24 hours) at 03/04/18 1122  Last data filed at 03/04/18 0800   Gross per 24 hour   Intake              960 ml   Output              350 ml   Net              610 ml      Physical Exam   Constitutional: No distress.   Deconditioned; Body mass index is 21.99 kg/m².   HENT:   Head: Normocephalic and atraumatic.   Eyes: Pupils are equal, round, and reactive to light.   Wears glasses; at the bedside   Neck: Normal range of motion. Neck supple.   Cardiovascular: Normal rate.    Pulmonary/Chest: Effort normal. No respiratory distress. He has no wheezes. He has no rales.   Abdominal: Soft. He exhibits no distension. There is no tenderness.   Genitourinary: Rectal  exam shows guaiac negative stool.   Musculoskeletal: Normal range of motion. He exhibits no edema or tenderness.   Neurological: He is alert.   Confusion requires reorienting however, alert to self and situation - chronic essential tremors   Skin: Skin is warm and dry. He is not diaphoretic.   Dry skin; poor turgor   Psychiatric:   Intermittent confusions, requires reorienting often       Significant Labs: All pertinent labs within the past 24 hours have been reviewed.    Significant Imaging: I have reviewed all pertinent imaging results/findings within the past 24 hours.

## 2018-03-04 NOTE — SUBJECTIVE & OBJECTIVE
Interval History: no new complaints     Review of Systems   Constitutional: Negative for activity change, appetite change, chills, diaphoresis, fatigue and fever.   Respiratory: Negative for cough, chest tightness, shortness of breath and wheezing.    Cardiovascular: Negative for chest pain, palpitations and leg swelling.   Gastrointestinal: Negative for abdominal distention, abdominal pain, constipation, diarrhea, nausea and vomiting.   Genitourinary: Negative for dysuria and hematuria.   Musculoskeletal: Negative for joint swelling and neck stiffness.   Neurological: Positive for tremors. Negative for weakness.   Psychiatric/Behavioral: Positive for confusion.        Intermittent confusion, requires reorienting - mood swings have stabilized - chronic essential tremors - alert to self and place     Objective:     Vital Signs (Most Recent):  Temp: 97.8 °F (36.6 °C) (03/04/18 1623)  Pulse: 77 (03/04/18 1623)  Resp: 18 (03/04/18 1623)  BP: 124/60 (03/04/18 1623)  SpO2: 95 % (03/04/18 1623) Vital Signs (24h Range):  Temp:  [97.7 °F (36.5 °C)-98.9 °F (37.2 °C)] 97.8 °F (36.6 °C)  Pulse:  [62-84] 77  Resp:  [18] 18  SpO2:  [95 %-99 %] 95 %  BP: ()/(58-67) 124/60     Weight: 62.5 kg (137 lb 12.6 oz)  Body mass index is 21.58 kg/m².    Intake/Output Summary (Last 24 hours) at 03/04/18 1719  Last data filed at 03/04/18 1600   Gross per 24 hour   Intake              720 ml   Output              400 ml   Net              320 ml      Physical Exam   Constitutional: No distress.   Deconditioned; Body mass index is 21.99 kg/m².   HENT:   Head: Normocephalic and atraumatic.   Eyes: Pupils are equal, round, and reactive to light.   Wears glasses; at the bedside   Neck: Normal range of motion. Neck supple.   Cardiovascular: Normal rate.    Pulmonary/Chest: Effort normal. No respiratory distress. He has no wheezes. He has no rales.   Abdominal: Soft. He exhibits no distension. There is no tenderness.   Genitourinary: Rectal  exam shows guaiac negative stool.   Musculoskeletal: Normal range of motion. He exhibits no edema or tenderness.   Neurological: He is alert.   Confusion requires reorienting however, alert to self and situation - chronic essential tremors   Skin: Skin is warm and dry. He is not diaphoretic.   Dry skin; poor turgor   Psychiatric:   Intermittent confusions, requires reorienting often       Significant Labs: All pertinent labs within the past 24 hours have been reviewed.    Significant Imaging: I have reviewed all pertinent imaging results/findings within the past 24 hours.

## 2018-03-04 NOTE — PLAN OF CARE
Problem: Patient Care Overview  Goal: Plan of Care Review  Outcome: Ongoing (interventions implemented as appropriate)   03/04/18 1012   Coping/Psychosocial   Plan Of Care Reviewed With patient       Problem: Fall Risk (Adult)  Goal: Absence of Falls  Patient will demonstrate the desired outcomes by discharge/transition of care.   Outcome: Ongoing (interventions implemented as appropriate)   03/04/18 1012   Fall Risk (Adult)   Absence of Falls making progress toward outcome       Problem: Pressure Ulcer Risk (Mike Scale) (Adult,Obstetrics,Pediatric)  Goal: Skin Integrity  Patient will demonstrate the desired outcomes by discharge/transition of care.   Outcome: Ongoing (interventions implemented as appropriate)   03/04/18 1012   Pressure Ulcer Risk (Mike Scale) (Adult,Obstetrics,Pediatric)   Skin Integrity making progress toward outcome       Problem: Confusion, Acute (Adult)  Goal: Cognitive/Functional Impairments Minimized  Patient will demonstrate the desired outcomes by discharge/transition of care.   Outcome: Ongoing (interventions implemented as appropriate)   03/04/18 1012   Confusion, Acute (Adult)   Cognitive/Functional Impairments Minimized making progress toward outcome     Goal: Safety  Patient will demonstrate the desired outcomes by discharge/transition of care.   Outcome: Ongoing (interventions implemented as appropriate)   03/04/18 1012   Confusion, Acute (Adult)   Safety making progress toward outcome

## 2018-03-04 NOTE — CARE UPDATE
Rounded on pt. No changes in status. Pericare provided. No pain or distress noted. Pt resting in bed with call light in reach and safety intact. Will continue to monitor for changes in status. 12 hour chart check completed.

## 2018-03-04 NOTE — PROGRESS NOTES
[facility]  Progress Note    Patient Name: Tenzin Ortiz  MRN: [id number]  Patient Class: IP- Inpatient   Admission Date: 2/7/2018  Length of Stay: 5 days  Attending Physician: Sidra Rivas MD  Primary Care Provider: Efrain Chavez MD        Subjective:     Principal Problem:Debility    HPI:  Patient is 76 yo male with HTN, HLD, and Parkinson's who presents to ED reportedly for HA. Per ED note patient was complaining of HA but he denies this on my interview. Seems there was some confusion in ED and he was unclear why he was here. During interview with me, patient reports being here for NH placement although he does not appear to be very happy about this. He has been with some issues caring for himself. Currently lives with his girlfriend who reports she can no longer care for him. He states he currently gets around with a walker but sometimes has difficulty getting up out of his chair. Per chart review patient is with Parkinson's and sees neurology, Dr. Castellanos, lastly seen on 2/6/18. He was with hallucinations which were noted to be secondary to dopaminergic agents thus having to significantly reduce dose--she recommended he be admitted for placement at that time but patient declined. He has been attempting placement as an outpatient through PCP but without much luck. He otherwise denies recent illness, fever/chills, CP, SOB, abdominal pain, N/V/D, dysuria. On presentation patient with grossly normal labs/vitals. Placed in observation for what appears to be solely placement although this is something that may need to be continued as outpatient as is not indication for hospitalization.    Hospital Course:  Awake and alert without complaint - severe parkinsons disease and can no longer care for himself - neither can his girlfriend.  CM reports [facility] with [facility] to [facility]; awaiting legals to be signed by patient POA. No new issues - case management following.    3/1-  updated by CM that the  will not sign paperwork for NH placement, hopefully daughter will be able to assist with placement   3/2-3/4 - no acute changes, placement pending       Interval History: no new complaints     Review of Systems   Constitutional: Negative for activity change, appetite change, chills, diaphoresis, fatigue and fever.   Respiratory: Negative for cough, chest tightness, shortness of breath and wheezing.    Cardiovascular: Negative for chest pain, palpitations and leg swelling.   Gastrointestinal: Negative for abdominal distention, abdominal pain, constipation, diarrhea, nausea and vomiting.   Genitourinary: Negative for dysuria and hematuria.   Musculoskeletal: Negative for joint swelling and neck stiffness.   Neurological: Positive for tremors. Negative for weakness.   Psychiatric/Behavioral: Positive for confusion.        Intermittent confusion, requires reorienting - mood swings have stabilized - chronic essential tremors - alert to self and place     Objective:     Vital Signs (Most Recent):  Temp: 97.8 °F (36.6 °C) (03/04/18 1623)  Pulse: 77 (03/04/18 1623)  Resp: 18 (03/04/18 1623)  BP: 124/60 (03/04/18 1623)  SpO2: 95 % (03/04/18 1623) Vital Signs (24h Range):  Temp:  [97.7 °F (36.5 °C)-98.9 °F (37.2 °C)] 97.8 °F (36.6 °C)  Pulse:  [62-84] 77  Resp:  [18] 18  SpO2:  [95 %-99 %] 95 %  BP: ()/(58-67) 124/60     Weight: 62.5 kg (137 lb 12.6 oz)  Body mass index is 21.58 kg/m².    Intake/Output Summary (Last 24 hours) at 03/04/18 1719  Last data filed at 03/04/18 1600   Gross per 24 hour   Intake              720 ml   Output              400 ml   Net              320 ml      Physical Exam   Constitutional: No distress.   Deconditioned; Body mass index is 21.99 kg/m².   HENT:   Head: Normocephalic and atraumatic.   Eyes: Pupils are equal, round, and reactive to light.   Wears glasses; at the bedside   Neck: Normal range of motion. Neck supple.   Cardiovascular: Normal rate.     Pulmonary/Chest: Effort normal. No respiratory distress. He has no wheezes. He has no rales.   Abdominal: Soft. He exhibits no distension. There is no tenderness.   Genitourinary: Rectal exam shows guaiac negative stool.   Musculoskeletal: Normal range of motion. He exhibits no edema or tenderness.   Neurological: He is alert.   Confusion requires reorienting however, alert to self and situation - chronic essential tremors   Skin: Skin is warm and dry. He is not diaphoretic.   Dry skin; poor turgor   Psychiatric:   Intermittent confusions, requires reorienting often       Significant Labs: All pertinent labs within the past 24 hours have been reviewed.    Significant Imaging: I have reviewed all pertinent imaging results/findings within the past 24 hours.    Assessment/Plan:      * Debility    Currently awaiting placement  PT/OT following for ambulation  PPD already placed and read last week 2/7/18  Case management following  Lovenox 40 mg daily      TN consulted to aid in safe discharge planning. Appears patient has been living with a significant other who can no longer care for him and refusing to take patient home. His somewhat estranged daughter has been contacted and is willing to aid in finding NH placement (she had already begun this process as an outpatient). There is an issue, however, in that patient's power of  refused to sign consent for NH placement. Patient unable to make his own decision regarding this as he is with intermittent confusion +/- previous unclear dementia diagnosis per daughter. Psychiatry consulted for capacity. Also recommended adding depakote 250mg TID to help with mood stabilization. He is medically cleared and was initially with plan for home with HH and SW to aid in outpatient placement, however, cannot safely discharge as he does not have a home to return to.           Lewy body dementia without behavioral disturbance    Seen by psyche who has since signed off -  appreciate recs -  Doing well on current regimen  Continue medication regimen as follows:  Sinemet  QID and Seroquel 25 hs  Continue Exelon 4.6 mg/24 patch  Divalproex 250 mg to BID  Continue holding trazadone        Dehydration    Per ED patient admitted for dehydration. His BUN is elevated with normal creatinine--may be mild clinical dehydration. Will provide gentle IVF hydration.           Essential hypertension    BP well controlled - Continue amlodipine 10 mg daily  Change vitals to every shift            Parkinson disease    Seroquel 25 mg; sinemet  mg;   Discontinue taxodione 50 mg  Restart exelon 4.6 mg in the morning when he is more awake          VTE Risk Mitigation         Ordered     enoxaparin injection 40 mg  Daily     Route:  Subcutaneous        02/13/18 1149     Medium Risk of VTE  Once      02/08/18 0653     Place JOSEFINA hose  Until discontinued      02/08/18 0653     Place sequential compression device  Until discontinued      02/08/18 0653              Sidra Rivas MD  Department of Hospital Medicine   [facility]

## 2018-03-04 NOTE — PROGRESS NOTES
[facility]  Progress Note    Patient Name: eTnzin Ortiz  MRN: [id number]  Patient Class: IP- Inpatient   Admission Date: 2/7/2018  Length of Stay: 5 days  Attending Physician: Sidra Rivas MD  Primary Care Provider: Efrain Chavez MD        Subjective:     Principal Problem:Debility    HPI:  Patient is 78 yo male with HTN, HLD, and Parkinson's who presents to ED reportedly for HA. Per ED note patient was complaining of HA but he denies this on my interview. Seems there was some confusion in ED and he was unclear why he was here. During interview with me, patient reports being here for NH placement although he does not appear to be very happy about this. He has been with some issues caring for himself. Currently lives with his girlfriend who reports she can no longer care for him. He states he currently gets around with a walker but sometimes has difficulty getting up out of his chair. Per chart review patient is with Parkinson's and sees neurology, Dr. Castellanos, lastly seen on 2/6/18. He was with hallucinations which were noted to be secondary to dopaminergic agents thus having to significantly reduce dose--she recommended he be admitted for placement at that time but patient declined. He has been attempting placement as an outpatient through PCP but without much luck. He otherwise denies recent illness, fever/chills, CP, SOB, abdominal pain, N/V/D, dysuria. On presentation patient with grossly normal labs/vitals. Placed in observation for what appears to be solely placement although this is something that may need to be continued as outpatient as is not indication for hospitalization.    Hospital Course:  Awake and alert without complaint - severe parkinsons disease and can no longer care for himself - neither can his girlfriend.  CM reports patient with acceptance to [facility]; awaiting legals to be signed by patient POA. No new issues - case management following.    3/1-  updated by CM that the  will not sign paperwork for NH placement, hopefully daughter will be able to assist with placement   3/2, 3/3 - no acute changes, placement pending       Interval History: pt has no complaints    Review of Systems   Constitutional: Negative for activity change, appetite change, chills, diaphoresis, fatigue and fever.   Respiratory: Negative for cough, chest tightness, shortness of breath and wheezing.    Cardiovascular: Negative for chest pain, palpitations and leg swelling.   Gastrointestinal: Negative for abdominal distention, abdominal pain, constipation, diarrhea, nausea and vomiting.   Genitourinary: Negative for dysuria and hematuria.   Musculoskeletal: Negative for joint swelling and neck stiffness.   Neurological: Positive for tremors. Negative for weakness.   Psychiatric/Behavioral: Positive for confusion.        Intermittent confusion, requires reorienting - mood swings have stabilized - chronic essential tremors - alert to self and place     Objective:     Vital Signs (Most Recent):  Temp: 97.8 °F (36.6 °C) (03/04/18 1114)  Pulse: 77 (03/04/18 1114)  Resp: 18 (03/04/18 1114)  BP: 128/61 (03/04/18 1114)  SpO2: 96 % (03/04/18 1114) Vital Signs (24h Range):  Temp:  [97.7 °F (36.5 °C)-98.9 °F (37.2 °C)] 97.8 °F (36.6 °C)  Pulse:  [62-98] 77  Resp:  [18] 18  SpO2:  [95 %-99 %] 96 %  BP: ()/(58-75) 128/61     Weight: 62.5 kg (137 lb 12.6 oz)  Body mass index is 21.58 kg/m².    Intake/Output Summary (Last 24 hours) at 03/04/18 1122  Last data filed at 03/04/18 0800   Gross per 24 hour   Intake              960 ml   Output              350 ml   Net              610 ml      Physical Exam   Constitutional: No distress.   Deconditioned; Body mass index is 21.99 kg/m².   HENT:   Head: Normocephalic and atraumatic.   Eyes: Pupils are equal, round, and reactive to light.   Wears glasses; at the bedside   Neck: Normal range of motion. Neck supple.   Cardiovascular: Normal rate.     Pulmonary/Chest: Effort normal. No respiratory distress. He has no wheezes. He has no rales.   Abdominal: Soft. He exhibits no distension. There is no tenderness.   Genitourinary: Rectal exam shows guaiac negative stool.   Musculoskeletal: Normal range of motion. He exhibits no edema or tenderness.   Neurological: He is alert.   Confusion requires reorienting however, alert to self and situation - chronic essential tremors   Skin: Skin is warm and dry. He is not diaphoretic.   Dry skin; poor turgor   Psychiatric:   Intermittent confusions, requires reorienting often       Significant Labs: All pertinent labs within the past 24 hours have been reviewed.    Significant Imaging: I have reviewed all pertinent imaging results/findings within the past 24 hours.    Assessment/Plan:      * Debility    Currently awaiting placement  PT/OT following for ambulation  PPD already placed and read last week 2/7/18  Case management following  Lovenox 40 mg daily      TN consulted to aid in safe discharge planning. Appears patient has been living with a significant other who can no longer care for him and refusing to take patient home. His somewhat estranged daughter has been contacted and is willing to aid in finding NH placement (she had already begun this process as an outpatient). There is an issue, however, in that patient's power of  refused to sign consent for NH placement. Patient unable to make his own decision regarding this as he is with intermittent confusion +/- previous unclear dementia diagnosis per daughter. Psychiatry consulted for capacity. Also recommended adding depakote 250mg TID to help with mood stabilization. He is medically cleared and was initially with plan for home with HH and SW to aid in outpatient placement, however, cannot safely discharge as he does not have a home to return to.           Lewy body dementia without behavioral disturbance    Seen by psyche who has since signed off -  appreciate recs -  Doing well on current regimen  Continue medication regimen as follows:  Sinemet  QID and Seroquel 25 hs  Continue Exelon 4.6 mg/24 patch  Divalproex 250 mg to BID  Continue holding trazadone        Dehydration    Per ED patient admitted for dehydration. His BUN is elevated with normal creatinine--may be mild clinical dehydration. Will provide gentle IVF hydration.           Essential hypertension    BP well controlled - Continue amlodipine 10 mg daily  Change vitals to every shift            Parkinson disease    Seroquel 25 mg; sinemet  mg;   Discontinue taxodione 50 mg  Restart exelon 4.6 mg in the morning when he is more awake          VTE Risk Mitigation         Ordered     enoxaparin injection 40 mg  Daily     Route:  Subcutaneous        02/13/18 1149     Medium Risk of VTE  Once      02/08/18 0653     Place JOSEFINA hose  Until discontinued      02/08/18 0653     Place sequential compression device  Until discontinued      02/08/18 0653              Sidra Rivas MD  Department of Hospital Medicine   [facility]

## 2018-03-04 NOTE — PLAN OF CARE
Problem: Patient Care Overview  Goal: Plan of Care Review  Outcome: Ongoing (interventions implemented as appropriate)   03/03/18 2306   Coping/Psychosocial   Plan Of Care Reviewed With patient       Problem: Fall Risk (Adult)  Goal: Absence of Falls  Patient will demonstrate the desired outcomes by discharge/transition of care.   Outcome: Ongoing (interventions implemented as appropriate)   03/03/18 2306   Fall Risk (Adult)   Absence of Falls making progress toward outcome       Problem: Pressure Ulcer Risk (Mike Scale) (Adult,Obstetrics,Pediatric)  Goal: Skin Integrity  Patient will demonstrate the desired outcomes by discharge/transition of care.   Outcome: Ongoing (interventions implemented as appropriate)   03/03/18 2306   Pressure Ulcer Risk (Mike Scale) (Adult,Obstetrics,Pediatric)   Skin Integrity making progress toward outcome       Comments: Report received and assumed care of pt. Assessment as noted. Pt resting in bed with call light in reach and safety intact. POC reviewed with pt. Pt continuously asks when he can go home. Pt awaiting placement in [facility]. No skin breakdown noted. Will continue to monitor for changes in status.

## 2018-03-05 PROCEDURE — 25000003 PHARM REV CODE 250: Performed by: EMERGENCY MEDICINE

## 2018-03-05 PROCEDURE — 97116 GAIT TRAINING THERAPY: CPT

## 2018-03-05 PROCEDURE — 21400001 HC TELEMETRY ROOM

## 2018-03-05 PROCEDURE — 97530 THERAPEUTIC ACTIVITIES: CPT

## 2018-03-05 PROCEDURE — 25000003 PHARM REV CODE 250: Performed by: HOSPITALIST

## 2018-03-05 PROCEDURE — 63600175 PHARM REV CODE 636 W HCPCS: Performed by: NURSE PRACTITIONER

## 2018-03-05 PROCEDURE — 25000003 PHARM REV CODE 250: Performed by: NURSE PRACTITIONER

## 2018-03-05 RX ORDER — AMLODIPINE BESYLATE 5 MG/1
10 TABLET ORAL DAILY
Status: DISCONTINUED | OUTPATIENT
Start: 2018-03-06 | End: 2018-03-21

## 2018-03-05 RX ORDER — ONDANSETRON 2 MG/ML
4 INJECTION INTRAMUSCULAR; INTRAVENOUS EVERY 8 HOURS PRN
Status: DISCONTINUED | OUTPATIENT
Start: 2018-03-05 | End: 2018-03-30 | Stop reason: SDUPTHER

## 2018-03-05 RX ORDER — DOCUSATE SODIUM 100 MG/1
100 CAPSULE, LIQUID FILLED ORAL 2 TIMES DAILY
Status: DISCONTINUED | OUTPATIENT
Start: 2018-03-05 | End: 2018-03-28 | Stop reason: SDUPTHER

## 2018-03-05 RX ADMIN — QUETIAPINE FUMARATE 25 MG: 25 TABLET ORAL at 09:03

## 2018-03-05 RX ADMIN — DIVALPROEX SODIUM 250 MG: 250 TABLET, DELAYED RELEASE ORAL at 09:03

## 2018-03-05 RX ADMIN — AMLODIPINE BESYLATE 10 MG: 5 TABLET ORAL at 09:03

## 2018-03-05 RX ADMIN — CARBIDOPA AND LEVODOPA 2 TABLET: 25; 100 TABLET ORAL at 05:03

## 2018-03-05 RX ADMIN — RIVASTIGMINE TRANSDERMAL SYSTEM 1 PATCH: 4.6 PATCH, EXTENDED RELEASE TRANSDERMAL at 09:03

## 2018-03-05 RX ADMIN — CARBIDOPA AND LEVODOPA 2 TABLET: 25; 100 TABLET ORAL at 11:03

## 2018-03-05 RX ADMIN — DOCUSATE SODIUM 100 MG: 100 CAPSULE, LIQUID FILLED ORAL at 09:03

## 2018-03-05 RX ADMIN — ENOXAPARIN SODIUM 40 MG: 100 INJECTION SUBCUTANEOUS at 05:03

## 2018-03-05 RX ADMIN — DOCUSATE SODIUM AND SENNOSIDES 1 TABLET: 8.6; 5 TABLET, FILM COATED ORAL at 09:03

## 2018-03-05 NOTE — PLAN OF CARE
Problem: Physical Therapy Goal  Goal: Physical Therapy Goal  Goals to be met by: 3/9/18    Patient will increase functional independence with mobility by performin. Supine to sit with Stand-by Assistance  2. Sit to stand transfer with Stand-by Assistance  3. Gait  x 150 feet with Stand-by Assistance using Rolling Walker  4. Lower extremity exercise program x30 reps per handout, with assistance as needed    Outcome: Ongoing (interventions implemented as appropriate)   Pt with decreased safety awareness and requires assistance for all OOB mobility.  Pt able to ambulate ~90ft, 90ft with RW, CGA with seated rest break in b/w. Continues to display difficulty with initiation requiring min A for RLE to start.

## 2018-03-05 NOTE — SUBJECTIVE & OBJECTIVE
Interval History: pt no acute complaints    Review of Systems   Constitutional: Negative for activity change, appetite change, chills, diaphoresis, fatigue and fever.   Respiratory: Negative for cough, chest tightness, shortness of breath and wheezing.    Cardiovascular: Negative for chest pain, palpitations and leg swelling.   Gastrointestinal: Negative for abdominal distention, abdominal pain, constipation, diarrhea, nausea and vomiting.   Genitourinary: Negative for dysuria and hematuria.   Musculoskeletal: Negative for joint swelling and neck stiffness.   Neurological: Positive for tremors. Negative for weakness.   Psychiatric/Behavioral: Positive for confusion.        Intermittent confusion, requires reorienting - mood swings have stabilized - chronic essential tremors - alert to self and place     Objective:     Vital Signs (Most Recent):  Temp: 97.9 °F (36.6 °C) (03/05/18 0734)  Pulse: 74 (03/05/18 0734)  Resp: 20 (03/05/18 0734)  BP: (!) 146/63 (03/05/18 0734)  SpO2: 96 % (03/05/18 0734) Vital Signs (24h Range):  Temp:  [96.7 °F (35.9 °C)-97.9 °F (36.6 °C)] 97.9 °F (36.6 °C)  Pulse:  [74-87] 74  Resp:  [18-20] 20  SpO2:  [95 %-98 %] 96 %  BP: (124-152)/(60-72) 146/63     Weight: 62.5 kg (137 lb 12.6 oz)  Body mass index is 21.58 kg/m².    Intake/Output Summary (Last 24 hours) at 03/05/18 0741  Last data filed at 03/05/18 0400   Gross per 24 hour   Intake              720 ml   Output              700 ml   Net               20 ml      Physical Exam   Constitutional: No distress.   Deconditioned; Body mass index is 21.99 kg/m².   HENT:   Head: Normocephalic and atraumatic.   Eyes: Pupils are equal, round, and reactive to light.   Wears glasses; at the bedside   Neck: Normal range of motion. Neck supple.   Cardiovascular: Normal rate.    Pulmonary/Chest: Effort normal. No respiratory distress. He has no wheezes. He has no rales.   Abdominal: Soft. He exhibits no distension. There is no tenderness.    Genitourinary: Rectal exam shows guaiac negative stool.   Musculoskeletal: Normal range of motion. He exhibits no edema or tenderness.   Neurological: He is alert.   Confusion requires reorienting however, alert to self and situation - chronic essential tremors   Skin: Skin is warm and dry. He is not diaphoretic.   Dry skin; poor turgor   Psychiatric:   Intermittent confusions, requires reorienting often       Significant Labs: All pertinent labs within the past 24 hours have been reviewed.    Significant Imaging: I have reviewed all pertinent imaging results/findings within the past 24 hours.

## 2018-03-05 NOTE — NURSING
Report received and assumed care of pt. Assessment as noted. Pt resting in bed with call light in reach and safety intact. No c/o pain or distress noted. POC discussed with pt. Pt having moments of confusion and requesting someone to stay with him. 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 Pt attempted to get out of bed constantly. Pt finally settled down and went to sleep. No other acute changes in status. No new orders obtained. Will continue to monitor for changes. 12 hr chart check completed.

## 2018-03-05 NOTE — PLAN OF CARE
Problem: Confusion, Acute (Adult)  Intervention: Monitor/Assist with Self Care   03/05/18 1401   Activity   Activity Assistance Provided assistance, 2 people   Functional Level Current   Ambulation 2 - assistive person       Goal: Identify Related Risk Factors and Signs and Symptoms  Related risk factors and signs and symptoms are identified upon initiation of Human Response Clinical Practice Guideline (CPG)   Outcome: Ongoing (interventions implemented as appropriate)   03/05/18 1401   Confusion, Acute   Related Risk Factors (Acute Confusion) cognitive impairment   Signs and Symptoms (Acute Confusion) thought process diminished/disorganized     Goal: Cognitive/Functional Impairments Minimized  Patient will demonstrate the desired outcomes by discharge/transition of care.   Outcome: Ongoing (interventions implemented as appropriate)   03/05/18 1401   Confusion, Acute (Adult)   Cognitive/Functional Impairments Minimized making progress toward outcome     Goal: Safety  Patient will demonstrate the desired outcomes by discharge/transition of care.   Outcome: Ongoing (interventions implemented as appropriate)   03/05/18 1401   Confusion, Acute (Adult)   Safety making progress toward outcome

## 2018-03-05 NOTE — PROGRESS NOTES
[facility]  Progress Note    Patient Name: Tenzin Ortiz  MRN: [id number]  Patient Class: IP- Inpatient   Admission Date: 2/7/2018  Length of Stay: 6 days  Attending Physician: Sidra Rivas MD  Primary Care Provider: Efrain Chavez MD        Subjective:     Principal Problem:Debility    HPI:  Patient is 76 yo male with HTN, HLD, and Parkinson's who presents to ED reportedly for HA. Per ED note patient was complaining of HA but he denies this on my interview. Seems there was some confusion in ED and he was unclear why he was here. During interview with me, patient reports being here for NH placement although he does not appear to be very happy about this. He has been with some issues caring for himself. Currently lives with his girlfriend who reports she can no longer care for him. He states he currently gets around with a walker but sometimes has difficulty getting up out of his chair. Per chart review patient is with Parkinson's and sees neurology, Dr. Castellanos, lastly seen on 2/6/18. He was with hallucinations which were noted to be secondary to dopaminergic agents thus having to significantly reduce dose--she recommended he be admitted for placement at that time but patient declined. He has been attempting placement as an outpatient through PCP but without much luck. He otherwise denies recent illness, fever/chills, CP, SOB, abdominal pain, N/V/D, dysuria. On presentation patient with grossly normal labs/vitals. Placed in observation for what appears to be solely placement although this is something that may need to be continued as outpatient as is not indication for hospitalization.    Hospital Course:  Awake and alert without complaint - severe parkinsons disease and can no longer care for himself - neither can his girlfriend.  CM reports patient with acceptance to [facility]; awaiting legals to be signed by patient POA. No new issues - case management following.    3/1-  updated by CM that the  will not sign paperwork for NH placement, hopefully daughter will be able to assist with placement   3/2-3/5 - no acute changes, placement pending       Interval History: pt no acute complaints    Review of Systems   Constitutional: Negative for activity change, appetite change, chills, diaphoresis, fatigue and fever.   Respiratory: Negative for cough, chest tightness, shortness of breath and wheezing.    Cardiovascular: Negative for chest pain, palpitations and leg swelling.   Gastrointestinal: Negative for abdominal distention, abdominal pain, constipation, diarrhea, nausea and vomiting.   Genitourinary: Negative for dysuria and hematuria.   Musculoskeletal: Negative for joint swelling and neck stiffness.   Neurological: Positive for tremors. Negative for weakness.   Psychiatric/Behavioral: Positive for confusion.        Intermittent confusion, requires reorienting - mood swings have stabilized - chronic essential tremors - alert to self and place     Objective:     Vital Signs (Most Recent):  Temp: 97.9 °F (36.6 °C) (03/05/18 0734)  Pulse: 74 (03/05/18 0734)  Resp: 20 (03/05/18 0734)  BP: (!) 146/63 (03/05/18 0734)  SpO2: 96 % (03/05/18 0734) Vital Signs (24h Range):  Temp:  [96.7 °F (35.9 °C)-97.9 °F (36.6 °C)] 97.9 °F (36.6 °C)  Pulse:  [74-87] 74  Resp:  [18-20] 20  SpO2:  [95 %-98 %] 96 %  BP: (124-152)/(60-72) 146/63     Weight: 62.5 kg (137 lb 12.6 oz)  Body mass index is 21.58 kg/m².    Intake/Output Summary (Last 24 hours) at 03/05/18 0741  Last data filed at 03/05/18 0400   Gross per 24 hour   Intake              720 ml   Output              700 ml   Net               20 ml      Physical Exam   Constitutional: No distress.   Deconditioned; Body mass index is 21.99 kg/m².   HENT:   Head: Normocephalic and atraumatic.   Eyes: Pupils are equal, round, and reactive to light.   Wears glasses; at the bedside   Neck: Normal range of motion. Neck supple.   Cardiovascular: Normal  rate.    Pulmonary/Chest: Effort normal. No respiratory distress. He has no wheezes. He has no rales.   Abdominal: Soft. He exhibits no distension. There is no tenderness.   Genitourinary: Rectal exam shows guaiac negative stool.   Musculoskeletal: Normal range of motion. He exhibits no edema or tenderness.   Neurological: He is alert.   Confusion requires reorienting however, alert to self and situation - chronic essential tremors   Skin: Skin is warm and dry. He is not diaphoretic.   Dry skin; poor turgor   Psychiatric:   Intermittent confusions, requires reorienting often       Significant Labs: All pertinent labs within the past 24 hours have been reviewed.    Significant Imaging: I have reviewed all pertinent imaging results/findings within the past 24 hours.    Assessment/Plan:      * Debility    Currently awaiting placement  PT/OT following for ambulation  PPD already placed and read last week 2/7/18  Case management following  Lovenox 40 mg daily      TN consulted to aid in safe discharge planning. Appears patient has been living with a significant other who can no longer care for him and refusing to take patient home. His somewhat estranged daughter has been contacted and is willing to aid in finding NH placement (she had already begun this process as an outpatient). There is an issue, however, in that patient's power of  refused to sign consent for NH placement. Patient unable to make his own decision regarding this as he is with intermittent confusion +/- previous unclear dementia diagnosis per daughter. Psychiatry consulted for capacity. Also recommended adding depakote 250mg TID to help with mood stabilization. He is medically cleared and was initially with plan for home with HH and SW to aid in outpatient placement, however, cannot safely discharge as he does not have a home to return to.           Lewy body dementia without behavioral disturbance    Seen by azael who has since signed off -  appreciate recs -  Doing well on current regimen  Continue medication regimen as follows:  Sinemet  QID and Seroquel 25 hs  Continue Exelon 4.6 mg/24 patch  Divalproex 250 mg to BID  Continue holding trazadone        Dehydration    Per ED patient admitted for dehydration. His BUN is elevated with normal creatinine--may be mild clinical dehydration. Will provide gentle IVF hydration.           Essential hypertension    BP well controlled - Continue amlodipine 10 mg daily  Change vitals to every shift            Parkinson disease    Seroquel 25 mg; sinemet  mg;   Discontinue taxodione 50 mg  Restart exelon 4.6 mg in the morning when he is more awake          VTE Risk Mitigation         Ordered     enoxaparin injection 40 mg  Daily     Route:  Subcutaneous        02/13/18 1149     Medium Risk of VTE  Once      02/08/18 0653     Place JOSEFINA hose  Until discontinued      02/08/18 0653     Place sequential compression device  Until discontinued      02/08/18 0653              Sidra Rivas MD  Department of Hospital Medicine   [facility]

## 2018-03-05 NOTE — PT/OT/SLP PROGRESS
"Physical Therapy Treatment    Patient Name:  Tenzin Ortiz   MRN:  [id number]    Recommendations:     Discharge Recommendations:  nursing facility, basic   Discharge Equipment Recommendations: bedside commode, bath bench   Barriers to discharge: Decreased caregiver support    Assessment:     Tenzin Ortiz is a 77 y.o. male admitted with a medical diagnosis of Debility.  He presents with the following impairments/functional limitations:  weakness, impaired endurance, impaired functional mobilty, gait instability, impaired self care skills, impaired balance, impaired cognition, decreased coordination, decreased lower extremity function, decreased safety awareness, decreased ROM, impaired muscle length, impaired joint extensibility, impaired fine motor.  Pt with decreased safety awareness and requires assistance for all OOB mobility.  Pt able to ambulate ~90ft, 90ft with RW, CGA with seated rest break in b/w. Continues to display difficulty with initiation requiring min A for RLE to start.    Rehab Prognosis:  good; patient would benefit from acute skilled PT services to address these deficits and reach maximum level of function.      Recent Surgery: * No surgery found *      Plan:     During this hospitalization, patient to be seen 3 x/week to address the above listed problems via gait training, therapeutic activities, therapeutic exercises  · Plan of Care Expires:  03/09/18   Plan of Care Reviewed with: patient    Subjective     Communicated with pt's nurse, Jeannette, prior to session.  Patient found supine upon PT entry to room, agreeable to treatment.      Chief Complaint: ready to leave  Patient comments/goals: Pt states " I took them on the walk of their lives."  Pain/Comfort:  · Pain Rating 1: 0/10    Patients cultural, spiritual, Mu-ism conflicts given the current situation:      Objective:     Patient found with: peripheral IV (AVASYS)     General Precautions: Standard, fall   Orthopedic Precautions:N/A "   Braces: N/A     Functional Mobility:  · Bed Mobility:     · Scooting: Min A to scoot to EOB  · Supine to Sit: Min A for BLEs (HR for assist)  · Transfers:x1 from EOB, x4 trials from BSchair     · Sit to Stand:  Min A 2/2 posterior leaning with rolling walker  · Gait: Pt ambulated ~90ft, ~90ft with RW, CGA with seated rest break in b/w.  Pt continues to display difficulty with initiation and requires Min A to RLE to start.  · Balance: good sitting and fair standing      AM-PAC 6 CLICK MOBILITY  Turning over in bed (including adjusting bedclothes, sheets and blankets)?: 3  Sitting down on and standing up from a chair with arms (e.g., wheelchair, bedside commode, etc.): 3  Moving from lying on back to sitting on the side of the bed?: 3  Moving to and from a bed to a chair (including a wheelchair)?: 3  Need to walk in hospital room?: 3  Climbing 3-5 steps with a railing?: 2  Total Score: 17       Therapeutic Activities and Exercises:  Pt performed BM, transfers, and ambulation.       Patient left reclined in BSchair  with all lines intact, call button in reach, chair alarm on, pt's nurse, Jeannette, notified and AVASYS present..    GOALS:    Physical Therapy Goals        Problem: Physical Therapy Goal    Goal Priority Disciplines Outcome Goal Variances Interventions   Physical Therapy Goal     PT/OT, PT Ongoing (interventions implemented as appropriate)     Description:  Goals to be met by: 3/9/18    Patient will increase functional independence with mobility by performin. Supine to sit with Stand-by Assistance  2. Sit to stand transfer with Stand-by Assistance  3. Gait  x 150 feet with Stand-by Assistance using Rolling Walker  4. Lower extremity exercise program x30 reps per handout, with assistance as needed                     Time Tracking:     PT Received On: 18  PT Start Time: 1330     PT Stop Time: 1355  PT Total Time (min): 25 min     Billable Minutes: Gait Training 15 and Therapeutic Activity  10    Treatment Type: Treatment  PT/PTA: PTA     PTA Visit Number: 1     Citlali Dennis, PTA  03/05/2018

## 2018-03-05 NOTE — PLAN OF CARE
Nursing home placement is being pursued for patient. Legal department involved with patient. SW will continue to assist as needed.         03/05/18 7236   Discharge Reassessment   Assessment Type Discharge Planning Reassessment   Provided patient/caregiver education on the expected discharge date and the discharge plan No   Do you have any problems affording any of your prescribed medications? No   Discharge Plan A New Nursing Home placement - [facility] care facility   Discharge Plan B New Nursing Home placement - [facility] care facility   Patient choice form signed by patient/caregiver N/A   Can the patient answer the patient profile reliably? No, cognitively impaired   How does the patient rate their overall health at the present time? Good   Describe the patient's ability to walk at the present time. Minor restrictions or changes   How often would a person be available to care for the patient? Infrequently

## 2018-03-06 PROBLEM — E86.0 DEHYDRATION: Status: RESOLVED | Noted: 2018-02-08 | Resolved: 2018-03-06

## 2018-03-06 PROCEDURE — 25000003 PHARM REV CODE 250: Performed by: NURSE PRACTITIONER

## 2018-03-06 PROCEDURE — 25000003 PHARM REV CODE 250: Performed by: INTERNAL MEDICINE

## 2018-03-06 PROCEDURE — 25000003 PHARM REV CODE 250: Performed by: HOSPITALIST

## 2018-03-06 PROCEDURE — 21400001 HC TELEMETRY ROOM

## 2018-03-06 PROCEDURE — 63600175 PHARM REV CODE 636 W HCPCS: Performed by: NURSE PRACTITIONER

## 2018-03-06 PROCEDURE — S0166 INJ OLANZAPINE 2.5MG: HCPCS | Performed by: INTERNAL MEDICINE

## 2018-03-06 RX ORDER — OLANZAPINE 10 MG/2ML
2.5 INJECTION, POWDER, FOR SOLUTION INTRAMUSCULAR ONCE AS NEEDED
Status: COMPLETED | OUTPATIENT
Start: 2018-03-06 | End: 2018-03-06

## 2018-03-06 RX ORDER — QUETIAPINE FUMARATE 25 MG/1
50 TABLET, FILM COATED ORAL NIGHTLY
Status: DISCONTINUED | OUTPATIENT
Start: 2018-03-06 | End: 2018-03-16

## 2018-03-06 RX ADMIN — RIVASTIGMINE TRANSDERMAL SYSTEM 1 PATCH: 4.6 PATCH, EXTENDED RELEASE TRANSDERMAL at 08:03

## 2018-03-06 RX ADMIN — QUETIAPINE FUMARATE 50 MG: 25 TABLET ORAL at 08:03

## 2018-03-06 RX ADMIN — DIVALPROEX SODIUM 250 MG: 250 TABLET, DELAYED RELEASE ORAL at 08:03

## 2018-03-06 RX ADMIN — CARBIDOPA AND LEVODOPA 2 TABLET: 25; 100 TABLET ORAL at 05:03

## 2018-03-06 RX ADMIN — DOCUSATE SODIUM 100 MG: 100 CAPSULE, LIQUID FILLED ORAL at 08:03

## 2018-03-06 RX ADMIN — AMLODIPINE BESYLATE 10 MG: 5 TABLET ORAL at 08:03

## 2018-03-06 RX ADMIN — POLYETHYLENE GLYCOL 3350 17 G: 17 POWDER, FOR SOLUTION ORAL at 08:03

## 2018-03-06 RX ADMIN — ENOXAPARIN SODIUM 40 MG: 100 INJECTION SUBCUTANEOUS at 05:03

## 2018-03-06 RX ADMIN — CARBIDOPA AND LEVODOPA 2 TABLET: 25; 100 TABLET ORAL at 12:03

## 2018-03-06 RX ADMIN — CARBIDOPA AND LEVODOPA 2 TABLET: 25; 100 TABLET ORAL at 06:03

## 2018-03-06 RX ADMIN — CARBIDOPA AND LEVODOPA 2 TABLET: 25; 100 TABLET ORAL at 11:03

## 2018-03-06 RX ADMIN — OLANZAPINE 2.5 MG: 10 INJECTION, POWDER, LYOPHILIZED, FOR SOLUTION INTRAMUSCULAR at 08:03

## 2018-03-06 NOTE — PROGRESS NOTES
Upper GI Bleeding  Your upper gastrointestinal (GI) tract includes your esophagus, stomach, and upper small intestine. You have signs of bleeding from your upper GI tract. You may have vomited or coughed up blood or coffee-ground like material. Or you may have black or tarry stools. Very small amounts of GI bleeding may not be visible and can only be found by a test of the stool.  Causes of upper GI bleeding can include:  · Tear in the lining of the esophagus  · Enlarged veins in the esophagus  · An ulcer in the stomach or top of the small intestine  · Severe irritation of the stomach  · Inflammation of the digestive tract  Note: A bloody nose or mouth or dental problems may cause blood to be swallowed and vomited up again. This is not true GI bleeding. Iron supplements and medicines for diarrhea and upset stomach can cause black stools. This is not GI bleeding and is not a cause for concern.  Home care  Depending on the cause of your bleeding, care may include the following:  · You may be given medicines to help protect your GI tract, treat your problem, and promote healing. Take these as directed.  · Avoid NSAIDs, such as aspirin, ibuprofen, or naproxen. They can irritate the stomach and cause further bleeding. If you are taking these medicines for other medical reasons, talk to your healthcare provider before you stop them.    · Avoid alcohol, caffeine, and tobacco, which can delay healing and worsen your problem.  Follow-up care  Follow up with your healthcare provider as advised. Further tests may need to be done to find the cause of your bleeding.  When to seek medical advice  Call your healthcare provider for any of the following:  · Stomach pain appears or gets worse  · Pain spreads to the neck, back, shoulder, or arm  · Weakness or dizziness  · Swelling of your abdomen  · Red blood in your stool  · Fever of 100.4F (38C) or higher, or as directed by your healthcare provider  Call 911  Get emergency medical  care if any of these occur:  · Trouble breathing or swallowing  · Severe dizziness  · Loss of consciousness  · Vomiting blood or large amounts of blood in the stool

## 2018-03-06 NOTE — PT/OT/SLP PROGRESS
Occupational Therapy      Patient Name:  Tenzin Ortiz   MRN:  [id number]    Patient not seen today secondary to Other (patient lethargic and confused.  Patient mumbling and unable to stay awake/follow commands to participate in OT. Per nursing, patient has been lethargic all morning due to medication. Will follow-up as able.    14:55 Patient remains lethargic.  Will follow-up as able.  DANIELLE Gomez  3/6/2018

## 2018-03-06 NOTE — SUBJECTIVE & OBJECTIVE
"Interval History: attempting to get out of bed to "take care of things". Disoriented. Vitals stable. Afebrile.     Review of Systems   Constitutional: Negative for activity change, appetite change, chills, diaphoresis, fatigue and fever.   Respiratory: Negative for cough, chest tightness, shortness of breath and wheezing.    Cardiovascular: Negative for chest pain, palpitations and leg swelling.   Gastrointestinal: Negative for abdominal distention, abdominal pain, constipation, diarrhea, nausea and vomiting.   Genitourinary: Negative for dysuria and hematuria.   Musculoskeletal: Negative for joint swelling and neck stiffness.   Neurological: Positive for tremors. Negative for weakness.   Psychiatric/Behavioral: Positive for confusion.     Objective:     Vital Signs (Most Recent):  Temp: 96.8 °F (36 °C) (03/06/18 1500)  Pulse: 88 (03/06/18 1500)  Resp: 17 (03/06/18 1500)  BP: (!) 146/60 (03/06/18 1500)  SpO2: 99 % (03/06/18 1500) Vital Signs (24h Range):  Temp:  [96.8 °F (36 °C)-98.6 °F (37 °C)] 96.8 °F (36 °C)  Pulse:  [86-96] 88  Resp:  [17-20] 17  SpO2:  [94 %-99 %] 99 %  BP: (136-173)/(60-76) 146/60     Weight: 62.5 kg (137 lb 12.6 oz)  Body mass index is 21.58 kg/m².    Intake/Output Summary (Last 24 hours) at 03/06/18 1711  Last data filed at 03/06/18 0700   Gross per 24 hour   Intake              300 ml   Output              900 ml   Net             -600 ml      Physical Exam   Constitutional: No distress.   Deconditioned; Body mass index is 21.99 kg/m².   HENT:   Head: Normocephalic and atraumatic.   Eyes: Pupils are equal, round, and reactive to light.   Wears glasses; at the bedside   Neck: Normal range of motion. Neck supple.   Cardiovascular: Normal rate.    Pulmonary/Chest: Effort normal. No respiratory distress. He has no wheezes. He has no rales.   Abdominal: Soft. He exhibits no distension. There is no tenderness.   Genitourinary: Rectal exam shows guaiac negative stool.   Musculoskeletal: Normal " range of motion. He exhibits no edema or tenderness.   Neurological: He is alert.   disoriented   Skin: Skin is warm and dry. He is not diaphoretic.   Psychiatric:   Intermittent confusions, requires reorienting often       Significant Labs: All pertinent labs within the past 24 hours have been reviewed.    Significant Imaging: I have reviewed all pertinent imaging results/findings within the past 24 hours.  I have reviewed and interpreted all pertinent imaging results/findings within the past 24 hours.

## 2018-03-06 NOTE — ASSESSMENT & PLAN NOTE
Seen by azael who has since signed off - appreciate recs -  Doing well on current regimen  Continue medication regimen as follows:  Sinemet  QID and Seroquel 25 hs. Will increase seroquel to 50 mg QHS  Continue Exelon 4.6 mg/24 patch  Divalproex 250 mg to BID  Continue holding trazadone

## 2018-03-06 NOTE — PLAN OF CARE
Problem: Patient Care Overview  Goal: Plan of Care Review  Outcome: Ongoing (interventions implemented as appropriate)   03/06/18 0332   Coping/Psychosocial   Plan Of Care Reviewed With patient       Problem: Fall Risk (Adult)  Goal: Absence of Falls  Patient will demonstrate the desired outcomes by discharge/transition of care.   Outcome: Ongoing (interventions implemented as appropriate)   03/06/18 0332   Fall Risk (Adult)   Absence of Falls making progress toward outcome       Problem: Pressure Ulcer Risk (Mike Scale) (Adult,Obstetrics,Pediatric)  Goal: Skin Integrity  Patient will demonstrate the desired outcomes by discharge/transition of care.   Outcome: Ongoing (interventions implemented as appropriate)   03/06/18 0332   Pressure Ulcer Risk (Mike Scale) (Adult,Obstetrics,Pediatric)   Skin Integrity making progress toward outcome       Problem: Confusion, Acute (Adult)  Goal: Safety  Patient will demonstrate the desired outcomes by discharge/transition of care.   Outcome: Ongoing (interventions implemented as appropriate)   03/06/18 0332   Confusion, Acute (Adult)   Safety making progress toward outcome

## 2018-03-06 NOTE — PROGRESS NOTES
[facility] [place] [facility]  Progress Note    Patient Name: Tenzin Ortiz  MRN: [id number]  Patient Class: IP- Inpatient   Admission Date: 2/7/2018  Length of Stay: 7 days  Attending Physician: Olivia Del Rosario MD  Primary Care Provider: Efrain Chavez MD        Subjective:     Principal Problem:Debility    HPI:  Patient is 76 yo male with HTN, HLD, and Parkinson's who presents to ED reportedly for HA. Per ED note patient was complaining of HA but he denies this on my interview. Seems there was some confusion in ED and he was unclear why he was here. During interview with me, patient reports being here for NH placement although he does not appear to be very happy about this. He has been with some issues caring for himself. Currently lives with his girlfriend who reports she can no longer care for him. He states he currently gets around with a walker but sometimes has difficulty getting up out of his chair. Per chart review patient is with Parkinson's and sees neurology, Dr. Castellanos, lastly seen on 2/6/18. He was with hallucinations which were noted to be secondary to dopaminergic agents thus having to significantly reduce dose--she recommended he be admitted for placement at that time but patient declined. He has been attempting placement as an outpatient through PCP but without much luck. He otherwise denies recent illness, fever/chills, CP, SOB, abdominal pain, N/V/D, dysuria. On presentation patient with grossly normal labs/vitals. Placed in observation for what appears to be solely placement although this is something that may need to be continued as outpatient as is not indication for hospitalization.    Hospital Course:  Awake and alert without complaint - severe parkinsons disease and can no longer care for himself - neither can his girlfriend.  CM reports patient with acceptance to [facility]; awaiting legals to be signed by patient POA. No new issues - case management following.    3/1-  "updated by CM that the  will not sign paperwork for NH placement, hopefully daughter will be able to assist with placement   3/2-3/5 - no acute changes, placement pending       Interval History: attempting to get out of bed to "take care of things". Disoriented. Vitals stable. Afebrile.     Review of Systems   Constitutional: Negative for activity change, appetite change, chills, diaphoresis, fatigue and fever.   Respiratory: Negative for cough, chest tightness, shortness of breath and wheezing.    Cardiovascular: Negative for chest pain, palpitations and leg swelling.   Gastrointestinal: Negative for abdominal distention, abdominal pain, constipation, diarrhea, nausea and vomiting.   Genitourinary: Negative for dysuria and hematuria.   Musculoskeletal: Negative for joint swelling and neck stiffness.   Neurological: Positive for tremors. Negative for weakness.   Psychiatric/Behavioral: Positive for confusion.     Objective:     Vital Signs (Most Recent):  Temp: 96.8 °F (36 °C) (03/06/18 1500)  Pulse: 88 (03/06/18 1500)  Resp: 17 (03/06/18 1500)  BP: (!) 146/60 (03/06/18 1500)  SpO2: 99 % (03/06/18 1500) Vital Signs (24h Range):  Temp:  [96.8 °F (36 °C)-98.6 °F (37 °C)] 96.8 °F (36 °C)  Pulse:  [86-96] 88  Resp:  [17-20] 17  SpO2:  [94 %-99 %] 99 %  BP: (136-173)/(60-76) 146/60     Weight: 62.5 kg (137 lb 12.6 oz)  Body mass index is 21.58 kg/m².    Intake/Output Summary (Last 24 hours) at 03/06/18 1711  Last data filed at 03/06/18 0700   Gross per 24 hour   Intake              300 ml   Output              900 ml   Net             -600 ml      Physical Exam   Constitutional: No distress.   Deconditioned; Body mass index is 21.99 kg/m².   HENT:   Head: Normocephalic and atraumatic.   Eyes: Pupils are equal, round, and reactive to light.   Wears glasses; at the bedside   Neck: Normal range of motion. Neck supple.   Cardiovascular: Normal rate.    Pulmonary/Chest: Effort normal. No respiratory distress. He has no " wheezes. He has no rales.   Abdominal: Soft. He exhibits no distension. There is no tenderness.   Genitourinary: Rectal exam shows guaiac negative stool.   Musculoskeletal: Normal range of motion. He exhibits no edema or tenderness.   Neurological: He is alert.   disoriented   Skin: Skin is warm and dry. He is not diaphoretic.   Psychiatric:   Intermittent confusions, requires reorienting often       Significant Labs: All pertinent labs within the past 24 hours have been reviewed.    Significant Imaging: I have reviewed all pertinent imaging results/findings within the past 24 hours.  I have reviewed and interpreted all pertinent imaging results/findings within the past 24 hours.    Assessment/Plan:      * Debility    Currently awaiting placement  PT/OT following for ambulation  PPD already placed and read last week 2/7/18  Case management following  Lovenox 40 mg daily      TN consulted to aid in safe discharge planning. Appears patient has been living with a significant other who can no longer care for him and refusing to take patient home. His somewhat estranged daughter has been contacted and is willing to aid in finding NH placement (she had already begun this process as an outpatient). There is an issue, however, in that patient's power of  refused to sign consent for NH placement. Patient unable to make his own decision regarding this as he is with intermittent confusion +/- previous unclear dementia diagnosis per daughter. Psychiatry consulted for capacity. Also recommended adding depakote 250mg TID to help with mood stabilization. He is medically cleared and was initially with plan for home with HH and SW to aid in outpatient placement, however, cannot safely discharge as he does not have a home to return to.           Lewy body dementia without behavioral disturbance    Seen by psyche who has since signed off - appreciate recs -  Doing well on current regimen  Continue medication regimen as  follows:  Sinemet  QID and Seroquel 25 hs. Will increase seroquel to 50 mg QHS  Continue Exelon 4.6 mg/24 patch  Divalproex 250 mg to BID  Continue holding trazadone        Essential hypertension    BP well controlled - Continue amlodipine 10 mg daily              Parkinson disease    Seroquel 25 mg; sinemet  mg;   Discontinued taxodione 50 mg  Restarted exelon 4.6 mg in the morning when he is more awake          VTE Risk Mitigation         Ordered     enoxaparin injection 40 mg  Daily     Route:  Subcutaneous        02/13/18 1149     Medium Risk of VTE  Once      02/08/18 0653     Place JOSEFINA hose  Until discontinued      02/08/18 0653     Place sequential compression device  Until discontinued      02/08/18 0653          Increase seroquel to 50 QHS. Change to bed closer to nursing station for close observation for episodes of disorientation.     Olivia Elizalde MD  Department of Hospital Medicine   [facility]

## 2018-03-06 NOTE — ASSESSMENT & PLAN NOTE
Seroquel 25 mg; sinemet  mg;   Discontinued taxodione 50 mg  Restarted exelon 4.6 mg in the morning when he is more awake

## 2018-03-07 LAB
ALBUMIN SERPL BCP-MCNC: 3.3 G/DL
ALP SERPL-CCNC: 64 U/L
ALT SERPL W/O P-5'-P-CCNC: 6 U/L
ANION GAP SERPL CALC-SCNC: 13 MMOL/L
AST SERPL-CCNC: 12 U/L
BASOPHILS # BLD AUTO: 0.02 K/UL
BASOPHILS NFR BLD: 0.3 %
BILIRUB SERPL-MCNC: 0.4 MG/DL
BUN SERPL-MCNC: 22 MG/DL
CALCIUM SERPL-MCNC: 9.7 MG/DL
CHLORIDE SERPL-SCNC: 108 MMOL/L
CO2 SERPL-SCNC: 22 MMOL/L
CREAT SERPL-MCNC: 1.1 MG/DL
DIFFERENTIAL METHOD: ABNORMAL
EOSINOPHIL # BLD AUTO: 0.5 K/UL
EOSINOPHIL NFR BLD: 7.2 %
ERYTHROCYTE [DISTWIDTH] IN BLOOD BY AUTOMATED COUNT: 14.8 %
EST. GFR  (AFRICAN AMERICAN): >60 ML/MIN/1.73 M^2
EST. GFR  (NON AFRICAN AMERICAN): >60 ML/MIN/1.73 M^2
GLUCOSE SERPL-MCNC: 60 MG/DL
HCT VFR BLD AUTO: 39.7 %
HGB BLD-MCNC: 12.7 G/DL
LYMPHOCYTES # BLD AUTO: 1.7 K/UL
LYMPHOCYTES NFR BLD: 24.7 %
MCH RBC QN AUTO: 27.8 PG
MCHC RBC AUTO-ENTMCNC: 32 G/DL
MCV RBC AUTO: 87 FL
MONOCYTES # BLD AUTO: 1 K/UL
MONOCYTES NFR BLD: 13.9 %
NEUTROPHILS # BLD AUTO: 3.4 K/UL
NEUTROPHILS NFR BLD: 50.1 %
PLATELET # BLD AUTO: 251 K/UL
PMV BLD AUTO: 10.2 FL
POCT GLUCOSE: 104 MG/DL (ref 70–110)
POTASSIUM SERPL-SCNC: 4.4 MMOL/L
PROT SERPL-MCNC: 6 G/DL
RBC # BLD AUTO: 4.57 M/UL
SODIUM SERPL-SCNC: 143 MMOL/L
WBC # BLD AUTO: 6.84 K/UL

## 2018-03-07 PROCEDURE — 25000003 PHARM REV CODE 250: Performed by: NURSE PRACTITIONER

## 2018-03-07 PROCEDURE — 25000003 PHARM REV CODE 250: Performed by: HOSPITALIST

## 2018-03-07 PROCEDURE — 36415 COLL VENOUS BLD VENIPUNCTURE: CPT

## 2018-03-07 PROCEDURE — 25000003 PHARM REV CODE 250: Performed by: INTERNAL MEDICINE

## 2018-03-07 PROCEDURE — 97535 SELF CARE MNGMENT TRAINING: CPT

## 2018-03-07 PROCEDURE — 85025 COMPLETE CBC W/AUTO DIFF WBC: CPT

## 2018-03-07 PROCEDURE — 63600175 PHARM REV CODE 636 W HCPCS: Performed by: NURSE PRACTITIONER

## 2018-03-07 PROCEDURE — 80053 COMPREHEN METABOLIC PANEL: CPT

## 2018-03-07 PROCEDURE — 21400001 HC TELEMETRY ROOM

## 2018-03-07 RX ADMIN — DIVALPROEX SODIUM 250 MG: 250 TABLET, DELAYED RELEASE ORAL at 08:03

## 2018-03-07 RX ADMIN — CARBIDOPA AND LEVODOPA 2 TABLET: 25; 100 TABLET ORAL at 11:03

## 2018-03-07 RX ADMIN — RIVASTIGMINE TRANSDERMAL SYSTEM 1 PATCH: 4.6 PATCH, EXTENDED RELEASE TRANSDERMAL at 08:03

## 2018-03-07 RX ADMIN — CARBIDOPA AND LEVODOPA 2 TABLET: 25; 100 TABLET ORAL at 06:03

## 2018-03-07 RX ADMIN — DOCUSATE SODIUM 100 MG: 100 CAPSULE, LIQUID FILLED ORAL at 08:03

## 2018-03-07 RX ADMIN — DOCUSATE SODIUM AND SENNOSIDES 1 TABLET: 8.6; 5 TABLET, FILM COATED ORAL at 08:03

## 2018-03-07 RX ADMIN — AMLODIPINE BESYLATE 10 MG: 5 TABLET ORAL at 08:03

## 2018-03-07 RX ADMIN — ENOXAPARIN SODIUM 40 MG: 100 INJECTION SUBCUTANEOUS at 06:03

## 2018-03-07 RX ADMIN — QUETIAPINE FUMARATE 50 MG: 25 TABLET ORAL at 08:03

## 2018-03-07 NOTE — ASSESSMENT & PLAN NOTE
Seen by azael who has since signed off - appreciate recs -  Doing well on current regimen  Continue medication regimen as follows:  Sinemet  QID and Seroquel 50 QHS  Continue Exelon 4.6 mg/24 patch  Divalproex 250 mg to BID  Continue holding trazadone

## 2018-03-07 NOTE — UM SECONDARY REVIEW
Physician Advisor Internal    IP Extended Stay > 10    LOS: approved an agreement with D/C plan  APPROVED PER  LLOS AND  APPROVAL CRITERIA  Discharge planning begun, but no definite post-acute location identified (either pending insurance reasons or family reasons). Variance days should be appropriately assigned

## 2018-03-07 NOTE — PT/OT/SLP PROGRESS
Occupational Therapy   Treatment    Name: Tenzin Ortiz  MRN: [id number]  Admitting Diagnosis:  Debility       Recommendations:     Discharge Recommendations:  Nursing facility, basic  Discharge Equipment Recommendations:  bedside commode, bath bench  Barriers to discharge:  Decreased caregiver support    Subjective     Communicated with: Nurse Mckinney prior to session.  Patient agreeable to therapy.  Pain/Comfort:  · Pain Rating 1: 0/10    Patients cultural, spiritual, Islam conflicts given the current situation:      Objective:     Patient found with: peripheral IV (AVASYS)    General Precautions: Standard, fall   Orthopedic Precautions:N/A   Braces: N/A     Occupational Performance:    Bed Mobility:    · Patient completed Scooting/Bridging with stand by assistance using bed rails to scoot EOB  · Patient completed Supine to Sit with minimum assistance   · Patient completed Sit to Supine with contact guard assistance    Functional Mobility/Transfers:  · Patient completed Sit <> Stand Transfer with minimum assistance  with  rolling walker  from EOB withposterior lean  · Functional Mobility: Patient took several side steps along EOB with min A/RW with   Patient required tactile and verbal cues to initiate steps.     Activities of Daily Living:  · Grooming: supervision and set up assistance for oral care and to wash face seated EOB.  · UB Dressing: contact guard assistance to yani gown  · LB Dressing: moderate assistance patient able to don R sock seated EOB; unable to don L sock    Patient left right sidelying on wedge with all lines intact, call button in reach, bed alarm on and nurse notified, ELSIEASYS present    [facility] 6 Click:  [facility] Total Score: 17    Treatment & Education:  Patient performed bed mobility, functional transfers, and self care as above.   Assessment:     Tenzin Ortiz is a 77 y.o. male with a medical diagnosis of Debility.  He presents with the erformance deficits affecting function: weakness,  impaired endurance, impaired functional mobilty, gait instability, impaired self care skills, impaired balance, decreased coordination, decreased upper extremity function, decreased lower extremity function, decreased ROM, impaired fine motor, decreased safety awareness, impaired coordination, impaired joint extensibility, impaired muscle length.      Rehab Prognosis:  Fair; patient would benefit from acute skilled OT services to address these deficits and reach maximum level of function.       Plan:     Patient to be seen 3 x/week to address the above listed problems via self-care/home management, therapeutic activities, therapeutic exercises  · Plan of Care Expires: 03/08/18  · Plan of Care Reviewed with: patient    This Plan of care has been discussed with the patient who was involved in its development and understands and is in agreement with the identified goals and treatment plan    GOALS:    Occupational Therapy Goals        Problem: Occupational Therapy Goal    Goal Priority Disciplines Outcome Interventions   Occupational Therapy Goal     OT, PT/OT Ongoing (interventions implemented as appropriate)    Description:  Goals to be met by: 3/19/18     Patient will increase functional independence with ADLs by performing:    Feeding with Set-up Assistance. Met 2/19/18  UE Dressing with Set-up Assistance.  Grooming while seated with Set-up Assistance. Met 2/16/18  Stand pivot transfers with Contact Guard Assistance.  Toilet transfer to bedside commode with Minimal Assistance. Met 2/27/18 (transfer to toilet)  Upper extremity exercise program x10 reps , with supervision.  Pt will be able to ambulate to sink with minimal Assist for perform grooming met 2/27/18                          Time Tracking:     OT Date of Treatment: 03/07/18  OT Start Time: 1033  OT Stop Time: 1056  OT Total Time (min): 23 min    Billable Minutes:Self Care/Home Management 23    DANIELLE Gomez  3/7/2018

## 2018-03-07 NOTE — PROGRESS NOTES
[facility] [place] [facility]  Progress Note    Patient Name: Tenzin Ortiz  MRN: [id number]  Patient Class: IP- Inpatient   Admission Date: 2/7/2018  Length of Stay: 8 days  Attending Physician: Olivia Del Rosario MD  Primary Care Provider: Efrain Chavez MD        Subjective:     Principal Problem:Debility    HPI:  Patient is 76 yo male with HTN, HLD, and Parkinson's who presents to ED reportedly for HA. Per ED note patient was complaining of HA but he denies this on my interview. Seems there was some confusion in ED and he was unclear why he was here. During interview with me, patient reports being here for NH placement although he does not appear to be very happy about this. He has been with some issues caring for himself. Currently lives with his girlfriend who reports she can no longer care for him. He states he currently gets around with a walker but sometimes has difficulty getting up out of his chair. Per chart review patient is with Parkinson's and sees neurology, Dr. Castellanos, lastly seen on 2/6/18. He was with hallucinations which were noted to be secondary to dopaminergic agents thus having to significantly reduce dose--she recommended he be admitted for placement at that time but patient declined. He has been attempting placement as an outpatient through PCP but without much luck. He otherwise denies recent illness, fever/chills, CP, SOB, abdominal pain, N/V/D, dysuria. On presentation patient with grossly normal labs/vitals. Placed in observation for what appears to be solely placement although this is something that may need to be continued as outpatient as is not indication for hospitalization.    Hospital Course:  Awake and alert without complaint - severe parkinsons disease and can no longer care for himself - neither can his girlfriend.  CM reports patient with acceptance to [facility]; awaiting legals to be signed by patient POA. No new issues - case management following.    3/1-  updated by CM that the  will not sign paperwork for NH placement, hopefully daughter will be able to assist with placement   3/2-3/5 - no acute changes, placement pending       Interval History: with it today. Slept last night. BG 60 this morning.     Review of Systems   Constitutional: Negative for activity change, appetite change, chills, diaphoresis, fatigue and fever.   Respiratory: Negative for cough, chest tightness, shortness of breath and wheezing.    Cardiovascular: Negative for chest pain, palpitations and leg swelling.   Gastrointestinal: Negative for abdominal distention, abdominal pain, constipation, diarrhea, nausea and vomiting.   Genitourinary: Negative for dysuria and hematuria.   Musculoskeletal: Negative for joint swelling and neck stiffness.   Neurological: Negative for tremors and weakness.   Psychiatric/Behavioral: Positive for confusion.     Objective:     Vital Signs (Most Recent):  Temp: 98.2 °F (36.8 °C) (03/07/18 1619)  Pulse: 87 (03/07/18 1619)  Resp: 16 (03/07/18 1619)  BP: 127/60 (03/07/18 1619)  SpO2: 96 % (03/07/18 1619) Vital Signs (24h Range):  Temp:  [97.3 °F (36.3 °C)-99.1 °F (37.3 °C)] 98.2 °F (36.8 °C)  Pulse:  [62-91] 87  Resp:  [16-18] 16  SpO2:  [95 %-98 %] 96 %  BP: (108-147)/(54-64) 127/60     Weight: 62.5 kg (137 lb 12.6 oz)  Body mass index is 21.58 kg/m².  No intake or output data in the 24 hours ending 03/07/18 1752   Physical Exam   Constitutional: He is oriented to person, place, and time. No distress.   Deconditioned; Body mass index is 21.99 kg/m².   HENT:   Head: Normocephalic and atraumatic.   Eyes: Pupils are equal, round, and reactive to light.   Wears glasses; at the bedside   Neck: Normal range of motion. Neck supple.   Cardiovascular: Normal rate.    Pulmonary/Chest: Effort normal. No respiratory distress. He has no wheezes. He has no rales.   Abdominal: Soft. He exhibits no distension. There is no tenderness.   Genitourinary: Rectal exam shows guaiac  negative stool.   Musculoskeletal: Normal range of motion. He exhibits no edema or tenderness.   Neurological: He is alert and oriented to person, place, and time.   Skin: Skin is warm and dry. He is not diaphoretic.   Psychiatric:   Intermittent confusions, requires reorienting often       Significant Labs: All pertinent labs within the past 24 hours have been reviewed.    Significant Imaging: I have reviewed all pertinent imaging results/findings within the past 24 hours.  I have reviewed and interpreted all pertinent imaging results/findings within the past 24 hours.    Assessment/Plan:      * Debility    Currently awaiting placement  PT/OT following for ambulation  PPD already placed and read last week 2/7/18  Case management following  Lovenox 40 mg daily      TN consulted to aid in safe discharge planning. Appears patient has been living with a significant other who can no longer care for him and refusing to take patient home. His somewhat estranged daughter has been contacted and is willing to aid in finding NH placement (she had already begun this process as an outpatient). There is an issue, however, in that patient's power of  refused to sign consent for NH placement. Patient unable to make his own decision regarding this as he is with intermittent confusion +/- previous unclear dementia diagnosis per daughter. Psychiatry consulted for capacity. Also recommended adding depakote 250mg TID to help with mood stabilization. He is medically cleared and was initially with plan for home with HH and SW to aid in outpatient placement, however, cannot safely discharge as he does not have a home to return to.           Lewy body dementia without behavioral disturbance    Seen by psyche who has since signed off - appreciate recs -  Doing well on current regimen  Continue medication regimen as follows:  Sinemet  QID and Seroquel 50 QHS  Continue Exelon 4.6 mg/24 patch  Divalproex 250 mg to BID  Continue  holding trazadone        Essential hypertension    BP well controlled - Continue amlodipine 10 mg daily              Parkinson disease    Seroquel 50 mg; sinemet  mg;   Discontinued taxodione 50 mg  Restarted exelon 4.6 mg in the morning when he is more awake          VTE Risk Mitigation         Ordered     enoxaparin injection 40 mg  Daily     Route:  Subcutaneous        02/13/18 1149     Medium Risk of VTE  Once      02/08/18 0653     Place JOSEFINA hose  Until discontinued      02/08/18 0653     Place sequential compression device  Until discontinued      02/08/18 0653          Change to bed closer to nursing station for close observation for episodes of disorientation. POC gluc q shift. Encourage PO intake. Pending placement    Olivia Elizalde MD  Department of Hospital Medicine   [facility] [place]

## 2018-03-07 NOTE — ASSESSMENT & PLAN NOTE
Seroquel 50 mg; sinemet  mg;   Discontinued taxodione 50 mg  Restarted exelon 4.6 mg in the morning when he is more awake

## 2018-03-07 NOTE — PLAN OF CARE
Problem: Occupational Therapy Goal  Goal: Occupational Therapy Goal  Goals to be met by: 3/19/18     Patient will increase functional independence with ADLs by performing:    Feeding with Set-up Assistance. Met 2/19/18  UE Dressing with Set-up Assistance.  Grooming while seated with Set-up Assistance. Met 2/16/18  Stand pivot transfers with Contact Guard Assistance.  Toilet transfer to bedside commode with Minimal Assistance. Met 2/27/18 (transfer to toilet)  Upper extremity exercise program x10 reps , with supervision.  Pt will be able to ambulate to sink with minimal Assist for perform grooming met 2/27/18         Outcome: Ongoing (interventions implemented as appropriate)  Patient with good participation during OT session today. Patient able to perform self care sitting EOB with supervision/set up assistance. Min A/RW for transfers due to posterior leaning and tactile cues needed to initiate steps.

## 2018-03-07 NOTE — SUBJECTIVE & OBJECTIVE
Interval History: with it today. Slept last night. BG 60 this morning.     Review of Systems   Constitutional: Negative for activity change, appetite change, chills, diaphoresis, fatigue and fever.   Respiratory: Negative for cough, chest tightness, shortness of breath and wheezing.    Cardiovascular: Negative for chest pain, palpitations and leg swelling.   Gastrointestinal: Negative for abdominal distention, abdominal pain, constipation, diarrhea, nausea and vomiting.   Genitourinary: Negative for dysuria and hematuria.   Musculoskeletal: Negative for joint swelling and neck stiffness.   Neurological: Negative for tremors and weakness.   Psychiatric/Behavioral: Positive for confusion.     Objective:     Vital Signs (Most Recent):  Temp: 98.2 °F (36.8 °C) (03/07/18 1619)  Pulse: 87 (03/07/18 1619)  Resp: 16 (03/07/18 1619)  BP: 127/60 (03/07/18 1619)  SpO2: 96 % (03/07/18 1619) Vital Signs (24h Range):  Temp:  [97.3 °F (36.3 °C)-99.1 °F (37.3 °C)] 98.2 °F (36.8 °C)  Pulse:  [62-91] 87  Resp:  [16-18] 16  SpO2:  [95 %-98 %] 96 %  BP: (108-147)/(54-64) 127/60     Weight: 62.5 kg (137 lb 12.6 oz)  Body mass index is 21.58 kg/m².  No intake or output data in the 24 hours ending 03/07/18 1752   Physical Exam   Constitutional: He is oriented to person, place, and time. No distress.   Deconditioned; Body mass index is 21.99 kg/m².   HENT:   Head: Normocephalic and atraumatic.   Eyes: Pupils are equal, round, and reactive to light.   Wears glasses; at the bedside   Neck: Normal range of motion. Neck supple.   Cardiovascular: Normal rate.    Pulmonary/Chest: Effort normal. No respiratory distress. He has no wheezes. He has no rales.   Abdominal: Soft. He exhibits no distension. There is no tenderness.   Genitourinary: Rectal exam shows guaiac negative stool.   Musculoskeletal: Normal range of motion. He exhibits no edema or tenderness.   Neurological: He is alert and oriented to person, place, and time.   Skin: Skin is warm  and dry. He is not diaphoretic.   Psychiatric:   Intermittent confusions, requires reorienting often       Significant Labs: All pertinent labs within the past 24 hours have been reviewed.    Significant Imaging: I have reviewed all pertinent imaging results/findings within the past 24 hours.  I have reviewed and interpreted all pertinent imaging results/findings within the past 24 hours.

## 2018-03-08 LAB — POCT GLUCOSE: 150 MG/DL (ref 70–110)

## 2018-03-08 PROCEDURE — 97110 THERAPEUTIC EXERCISES: CPT

## 2018-03-08 PROCEDURE — 97116 GAIT TRAINING THERAPY: CPT

## 2018-03-08 PROCEDURE — 25000003 PHARM REV CODE 250: Performed by: INTERNAL MEDICINE

## 2018-03-08 PROCEDURE — 25000003 PHARM REV CODE 250: Performed by: HOSPITALIST

## 2018-03-08 PROCEDURE — 97535 SELF CARE MNGMENT TRAINING: CPT

## 2018-03-08 PROCEDURE — 21400001 HC TELEMETRY ROOM

## 2018-03-08 PROCEDURE — 25000003 PHARM REV CODE 250: Performed by: NURSE PRACTITIONER

## 2018-03-08 PROCEDURE — 63600175 PHARM REV CODE 636 W HCPCS: Performed by: NURSE PRACTITIONER

## 2018-03-08 RX ADMIN — CARBIDOPA AND LEVODOPA 2 TABLET: 25; 100 TABLET ORAL at 06:03

## 2018-03-08 RX ADMIN — AMLODIPINE BESYLATE 10 MG: 5 TABLET ORAL at 09:03

## 2018-03-08 RX ADMIN — QUETIAPINE FUMARATE 50 MG: 25 TABLET ORAL at 09:03

## 2018-03-08 RX ADMIN — DOCUSATE SODIUM 100 MG: 100 CAPSULE, LIQUID FILLED ORAL at 09:03

## 2018-03-08 RX ADMIN — CARBIDOPA AND LEVODOPA 2 TABLET: 25; 100 TABLET ORAL at 05:03

## 2018-03-08 RX ADMIN — RIVASTIGMINE TRANSDERMAL SYSTEM 1 PATCH: 4.6 PATCH, EXTENDED RELEASE TRANSDERMAL at 09:03

## 2018-03-08 RX ADMIN — DIVALPROEX SODIUM 250 MG: 250 TABLET, DELAYED RELEASE ORAL at 09:03

## 2018-03-08 RX ADMIN — ENOXAPARIN SODIUM 40 MG: 100 INJECTION SUBCUTANEOUS at 05:03

## 2018-03-08 NOTE — PT/OT/SLP PROGRESS
Physical Therapy Treatment    Patient Name:  Tenzin Ortiz   MRN:  [id number]    Recommendations:     Discharge Recommendations:  nursing facility, basic   Discharge Equipment Recommendations: none   Barriers to discharge: Decreased caregiver support (family unable to care for patient)    Assessment:     Tenzin Ortiz is a 77 y.o. male admitted with a medical diagnosis of Debility.  He presents with the following impairments/functional limitations:  weakness, impaired endurance, impaired functional mobilty, gait instability, impaired balance, decreased safety awareness, impaired self care skills, decreased upper extremity function, decreased lower extremity function, impaired muscle length, impaired joint extensibility.    Rehab Prognosis:  fair; patient would benefit from acute skilled PT services to address these deficits and reach maximum level of function.      Recent Surgery: * No surgery found *      Plan:     During this hospitalization, patient to be seen 3 x/week to address the above listed problems via gait training, therapeutic activities, therapeutic exercises  · Plan of Care Expires:  03/09/18   Plan of Care Reviewed with: patient    Subjective     Communicated with nurse Orantes prior to session.  Patient found supine in bed upon PT entry to room, agreeable to treatment.      Chief Complaint: Has not walked in several days.   Patient comments/goals: To walk.   Pain/Comfort:  · Pain Rating 1: 0/10    Objective:     Patient found with: peripheral IV (Avasys)     General Precautions: Standard, fall   Orthopedic Precautions:N/A   Braces: N/A     Functional Mobility:  · Bed Mobility:     · Supine to Sit: minimum assistance  · Transfers:     · Sit to Stand:  contact guard assistance with rolling walker x1 from bed x1 from bedside chair with verbal cues for safe technique  · Gait:  125ft x2 trials with seated rest break between trials, contact guard assistance, and rolling walker    Therapeutic Activities and  Exercises:   Patient performed B LE seated therex in bedside chair 2x15 for A/P, LAQ, hip flex, and pillow squeezes.     Patient left reclined in bedside chair with all lines intact, call button in reach, [employer], and nurse notified.    GOALS:    Physical Therapy Goals        Problem: Physical Therapy Goal    Goal Priority Disciplines Outcome Goal Variances Interventions   Physical Therapy Goal     PT/OT, PT Ongoing (interventions implemented as appropriate)     Description:  Goals to be met by: 3/9/18    Patient will increase functional independence with mobility by performin. Supine to sit with Stand-by Assistance  2. Sit to stand transfer with Stand-by Assistance  3. Gait  x 150 feet with Stand-by Assistance using Rolling Walker  4. Lower extremity exercise program x30 reps per handout, with assistance as needed                     Time Tracking:     PT Received On: 18  PT Start Time: 1350     PT Stop Time: 1415  PT Total Time (min): 25 min     Billable Minutes: Gait Training 17 and Therapeutic Exercise 8    Treatment Type: Treatment  PT/PTA: PT     PTA Visit Number: 0     Dominga Esquivel, PT  2018

## 2018-03-08 NOTE — PROGRESS NOTES
[facility] Medicine  Progress Note    Patient Name: Tenzin Ortiz  MRN: [id number]  Patient Class: IP- Inpatient   Admission Date: 2/7/2018  Length of Stay: 9 days  Attending Physician: Olivia Del Rosario MD  Primary Care Provider: Efrain Chavez MD        Subjective:     Principal Problem:Debility    HPI:  Patient is 78 yo male with HTN, HLD, and Parkinson's who presents to ED reportedly for HA. Per ED note patient was complaining of HA but he denies this on my interview. Seems there was some confusion in ED and he was unclear why he was here. During interview with me, patient reports being here for NH placement although he does not appear to be very happy about this. He has been with some issues caring for himself. Currently lives with his girlfriend who reports she can no longer care for him. He states he currently gets around with a walker but sometimes has difficulty getting up out of his chair. Per chart review patient is with Parkinson's and sees neurology, Dr. Castellanos, lastly seen on 2/6/18. He was with hallucinations which were noted to be secondary to dopaminergic agents thus having to significantly reduce dose--she recommended he be admitted for placement at that time but patient declined. He has been attempting placement as an outpatient through PCP but without much luck. He otherwise denies recent illness, fever/chills, CP, SOB, abdominal pain, N/V/D, dysuria. On presentation patient with grossly normal labs/vitals. Placed in observation for what appears to be solely placement although this is something that may need to be continued as outpatient as is not indication for hospitalization.    Hospital Course:  Awake and alert without complaint - severe parkinsons disease and can no longer care for himself - neither can his girlfriend.  CM reports patient with acceptance to [facility]; awaiting legals to be signed by patient POA. No new issues - case management following.    3/1-  updated by CM that the  will not sign paperwork for NH placement, hopefully daughter will be able to assist with placement   3/2-3/5 - no acute changes, placement pending       Interval History: with it today. Slept last night. BG ok    Review of Systems   Constitutional: Negative for activity change, appetite change, chills, diaphoresis, fatigue and fever.   Respiratory: Negative for cough, chest tightness, shortness of breath and wheezing.    Cardiovascular: Negative for chest pain, palpitations and leg swelling.   Gastrointestinal: Negative for abdominal distention, abdominal pain, constipation, diarrhea, nausea and vomiting.   Genitourinary: Negative for dysuria and hematuria.   Musculoskeletal: Negative for joint swelling and neck stiffness.   Neurological: Negative for tremors and weakness.   Psychiatric/Behavioral: Positive for confusion.     Objective:     Vital Signs (Most Recent):  Temp: 98 °F (36.7 °C) (03/08/18 1508)  Pulse: 88 (03/08/18 1508)  Resp: 16 (03/08/18 1508)  BP: (!) 121/56 (03/08/18 1508)  SpO2: 98 % (03/08/18 1508) Vital Signs (24h Range):  Temp:  [96.5 °F (35.8 °C)-98.7 °F (37.1 °C)] 98 °F (36.7 °C)  Pulse:  [59-88] 88  Resp:  [16-20] 16  SpO2:  [94 %-99 %] 98 %  BP: (105-152)/(56-73) 121/56     Weight: 62.6 kg (138 lb 0.1 oz)  Body mass index is 21.62 kg/m².    Intake/Output Summary (Last 24 hours) at 03/08/18 1637  Last data filed at 03/08/18 1400   Gross per 24 hour   Intake              490 ml   Output              240 ml   Net              250 ml      Physical Exam   Constitutional: He is oriented to person, place, and time. No distress.   Deconditioned; Body mass index is 21.99 kg/m².   HENT:   Head: Normocephalic and atraumatic.   Eyes: Pupils are equal, round, and reactive to light.   Wears glasses; at the bedside   Neck: Normal range of motion. Neck supple.   Cardiovascular: Normal rate.    Pulmonary/Chest: Effort normal. No respiratory distress. He has no wheezes. He has no  rales.   Abdominal: Soft. He exhibits no distension. There is no tenderness.   Genitourinary: Rectal exam shows guaiac negative stool.   Musculoskeletal: Normal range of motion. He exhibits no edema or tenderness.   Neurological: He is alert and oriented to person, place, and time.   Skin: Skin is warm and dry. He is not diaphoretic.   Psychiatric:   Intermittent confusions, requires reorienting often       Significant Labs: All pertinent labs within the past 24 hours have been reviewed.    Significant Imaging: I have reviewed all pertinent imaging results/findings within the past 24 hours.  I have reviewed and interpreted all pertinent imaging results/findings within the past 24 hours.    Assessment/Plan:      * Debility    Currently awaiting placement  PT/OT following for ambulation  PPD already placed and read last week 2/7/18  Case management following  Lovenox 40 mg daily      TN consulted to aid in safe discharge planning. Appears patient has been living with a significant other who can no longer care for him and refusing to take patient home. His somewhat estranged daughter has been contacted and is willing to aid in finding NH placement (she had already begun this process as an outpatient). There is an issue, however, in that patient's power of  refused to sign consent for NH placement. Patient unable to make his own decision regarding this as he is with intermittent confusion +/- previous unclear dementia diagnosis per daughter. Psychiatry consulted for capacity. Also recommended adding depakote 250mg TID to help with mood stabilization. He is medically cleared and was initially with plan for home with HH and SW to aid in outpatient placement, however, cannot safely discharge as he does not have a home to return to.           Lewy body dementia without behavioral disturbance    Seen by psyche who has since signed off - appreciate recs -  Doing well on current regimen  Continue medication regimen as  follows:  Sinemet  QID and Seroquel 50 QHS  Continue Exelon 4.6 mg/24 patch  Divalproex 250 mg to BID  Continue holding trazadone        Essential hypertension    BP well controlled - Continue amlodipine 10 mg daily              Parkinson disease    Seroquel 50 mg; sinemet  mg;   Discontinued taxodione 50 mg  Restarted exelon 4.6 mg in the morning when he is more awake          VTE Risk Mitigation         Ordered     enoxaparin injection 40 mg  Daily     Route:  Subcutaneous        02/13/18 1149     Medium Risk of VTE  Once      02/08/18 0653     Place JOSEFINA hose  Until discontinued      02/08/18 0653     Place sequential compression device  Until discontinued      02/08/18 0653          Change to bed closer to nursing station for close observation for episodes of disorientation. Continue POC gluc q shift. Encourage PO intake. Pending placement    Olivia Elizalde MD  Department of Hospital Medicine   [facility]

## 2018-03-08 NOTE — PLAN OF CARE
Per Helene Patino, (UM Manager) [facility] is willing to accept patient for nursing home placement. KIMO uploaded clinicals via [facility] enclosed: MD notes, labs, and therapy notes. Legal department involved with discharge plan, working with POA to have paperwork signed at nursing [place].         03/08/18 1430   Discharge Reassessment   Assessment Type Discharge Planning Reassessment   Provided patient/caregiver education on the expected discharge date and the discharge plan No   Do you have any problems affording any of your prescribed medications? No   Discharge Plan A New Nursing Home placement - [facility] care facility   Discharge Plan B New Nursing Home placement - [facility] care facility   Patient choice form signed by patient/caregiver N/A   Can the patient answer the patient profile reliably? No, cognitively impaired   How does the patient rate their overall health at the present time? Fair   Describe the patient's ability to walk at the present time. Walks with the help of equipment   How often would a person be available to care for the patient? Never

## 2018-03-08 NOTE — SUBJECTIVE & OBJECTIVE
Interval History: with it today. Slept last night. BG ok    Review of Systems   Constitutional: Negative for activity change, appetite change, chills, diaphoresis, fatigue and fever.   Respiratory: Negative for cough, chest tightness, shortness of breath and wheezing.    Cardiovascular: Negative for chest pain, palpitations and leg swelling.   Gastrointestinal: Negative for abdominal distention, abdominal pain, constipation, diarrhea, nausea and vomiting.   Genitourinary: Negative for dysuria and hematuria.   Musculoskeletal: Negative for joint swelling and neck stiffness.   Neurological: Negative for tremors and weakness.   Psychiatric/Behavioral: Positive for confusion.     Objective:     Vital Signs (Most Recent):  Temp: 98 °F (36.7 °C) (03/08/18 1508)  Pulse: 88 (03/08/18 1508)  Resp: 16 (03/08/18 1508)  BP: (!) 121/56 (03/08/18 1508)  SpO2: 98 % (03/08/18 1508) Vital Signs (24h Range):  Temp:  [96.5 °F (35.8 °C)-98.7 °F (37.1 °C)] 98 °F (36.7 °C)  Pulse:  [59-88] 88  Resp:  [16-20] 16  SpO2:  [94 %-99 %] 98 %  BP: (105-152)/(56-73) 121/56     Weight: 62.6 kg (138 lb 0.1 oz)  Body mass index is 21.62 kg/m².    Intake/Output Summary (Last 24 hours) at 03/08/18 1637  Last data filed at 03/08/18 1400   Gross per 24 hour   Intake              490 ml   Output              240 ml   Net              250 ml      Physical Exam   Constitutional: He is oriented to person, place, and time. No distress.   Deconditioned; Body mass index is 21.99 kg/m².   HENT:   Head: Normocephalic and atraumatic.   Eyes: Pupils are equal, round, and reactive to light.   Wears glasses; at the bedside   Neck: Normal range of motion. Neck supple.   Cardiovascular: Normal rate.    Pulmonary/Chest: Effort normal. No respiratory distress. He has no wheezes. He has no rales.   Abdominal: Soft. He exhibits no distension. There is no tenderness.   Genitourinary: Rectal exam shows guaiac negative stool.   Musculoskeletal: Normal range of motion. He  exhibits no edema or tenderness.   Neurological: He is alert and oriented to person, place, and time.   Skin: Skin is warm and dry. He is not diaphoretic.   Psychiatric:   Intermittent confusions, requires reorienting often       Significant Labs: All pertinent labs within the past 24 hours have been reviewed.    Significant Imaging: I have reviewed all pertinent imaging results/findings within the past 24 hours.  I have reviewed and interpreted all pertinent imaging results/findings within the past 24 hours.

## 2018-03-08 NOTE — PLAN OF CARE
Problem: Patient Care Overview  Goal: Plan of Care Review  Outcome: Ongoing (interventions implemented as appropriate)   03/08/18 1252   Coping/Psychosocial   Plan Of Care Reviewed With patient       Problem: Fall Risk (Adult)  Goal: Absence of Falls  Patient will demonstrate the desired outcomes by discharge/transition of care.   Outcome: Ongoing (interventions implemented as appropriate)   03/08/18 1252   Fall Risk (Adult)   Absence of Falls making progress toward outcome       Problem: Pressure Ulcer Risk (Mike Scale) (Adult,Obstetrics,Pediatric)  Goal: Skin Integrity  Patient will demonstrate the desired outcomes by discharge/transition of care.   Outcome: Ongoing (interventions implemented as appropriate)   03/08/18 1252   Pressure Ulcer Risk (Mike Scale) (Adult,Obstetrics,Pediatric)   Skin Integrity making progress toward outcome       Problem: Confusion, Acute (Adult)  Goal: Cognitive/Functional Impairments Minimized  Patient will demonstrate the desired outcomes by discharge/transition of care.   Outcome: Ongoing (interventions implemented as appropriate)   03/08/18 1252   Confusion, Acute (Adult)   Cognitive/Functional Impairments Minimized making progress toward outcome     Goal: Safety  Patient will demonstrate the desired outcomes by discharge/transition of care.   Outcome: Ongoing (interventions implemented as appropriate)   03/08/18 1252   Confusion, Acute (Adult)   Safety making progress toward outcome

## 2018-03-08 NOTE — PLAN OF CARE
Problem: Physical Therapy Goal  Goal: Physical Therapy Goal  Goals to be met by: 3/9/18    Patient will increase functional independence with mobility by performin. Supine to sit with Stand-by Assistance  2. Sit to stand transfer with Stand-by Assistance  3. Gait  x 150 feet with Stand-by Assistance using Rolling Walker  4. Lower extremity exercise program x30 reps per handout, with assistance as needed    Outcome: Ongoing (interventions implemented as appropriate)    Patient tolerated ambulating today with RW and CGA.

## 2018-03-08 NOTE — PT/OT/SLP PROGRESS
Occupational Therapy   Treatment/Discharge    Name: Tenzin Ortiz  MRN: [id number]  Admitting Diagnosis:  Debility       Recommendations:     Discharge Recommendations: nursing facility, basic  Discharge Equipment Recommendations:  none  Barriers to discharge:  Decreased caregiver support    Subjective     Communicated with: nurse Orantes prior to session.  Pain/Comfort:  · Pain Rating 1: 0/10  · Pain Rating Post-Intervention 1: 0/10    Patients cultural, spiritual, Anabaptist conflicts given the current situation:      Objective:     Patient found with: peripheral IV    General Precautions: Standard, fall   Orthopedic Precautions:N/A   Braces: N/A     Occupational Performance:    Bed Mobility:    · Patient completed Rolling/Turning to Left with  contact guard assistance  · Patient completed Scooting/Bridging with contact guard assistance  · Patient completed Supine to Sit with contact guard assistance  · Patient completed Sit to Supine with contact guard assistance     Functional Mobility/Transfers:  · Patient completed Sit <> Stand Transfer with contact guard assistance  with  no assistive device       Activities of Daily Living:  · Feeding:  supervision    · Grooming: supervision      Patient left supine with all lines intact, call button in reach and bed alarm on    AMPAC 6 Click:  AMPAC Total Score: 19    Treatment & Education:  Safety in the room  Education:    Assessment:     Tenzin Ortiz is a 77 y.o. male with a medical diagnosis of Debility.  He presents with limited support in the home as pt will be discharged to a nursing home. Pt 's has not made consisitent goals with therapy. Pt will need assistance for all levels of self care and mobility due to safety concerns.  Performance deficits affecting function are weakness, impaired endurance, impaired self care skills, impaired functional mobilty, impaired balance, decreased upper extremity function, decreased coordination, decreased safety awareness, impaired  muscle length, impaired joint extensibility.      Rehab Prognosis:  fair; patient would benefit from acute skilled OT services to address these deficits and reach maximum level of function.       Plan:     Patient to be seen 3 x/week to address the above listed problems via self-care/home management, therapeutic activities, therapeutic exercises  · Plan of Care Expires: 03/08/18  · Plan of Care Reviewed with: patient    This Plan of care has been discussed with the patient who was involved in its development and understands and is in agreement with the identified goals and treatment plan    GOALS:    Occupational Therapy Goals     Not on file          Multidisciplinary Problems (Resolved)        Problem: Occupational Therapy Goal    Goal Priority Disciplines Outcome Interventions   Occupational Therapy Goal   (Resolved)     OT, PT/OT Outcome(s) achieved    Description:  Goals to be met by: 3/19/18     Patient will increase functional independence with ADLs by performing:    Feeding with Set-up Assistance. Met 2/19/18  UE Dressing with Set-up Assistance. Goal not Met as pt is not consistent with making progress  Grooming while seated with Set-up Assistance. Met 2/16/18  Stand pivot transfers with Contact Guard Assistance. Goal Not Met as pt is not able to consistently make progress  Toilet transfer to bedside commode with Minimal Assistance. Met 2/27/18 (transfer to toilet)  Upper extremity exercise program x10 reps , with supervision. Goal Met 3/8/18  Pt will be able to ambulate to sink with minimal Assist for perform grooming met 2/27/18                           Time Tracking:     OT Date of Treatment: 03/08/18  OT Start Time: 0802  OT Stop Time: 0833  OT Total Time (min): 31 min    Billable Minutes:Self Care/Home Management  31    Becky Bruno OT  3/8/2018

## 2018-03-08 NOTE — PLAN OF CARE
Problem: Pressure Ulcer Risk (Mike Scale) (Adult,Obstetrics,Pediatric)  Intervention: Promote/Optimize Nutrition   03/08/18 0450   Nutrition Interventions   Oral Nutrition Promotion rest periods promoted     Intervention: Prevent/Manage Excess Moisture   03/06/18 2011 03/07/18 2040 03/08/18 0450   Hygiene Care   Perineal Care diaper changed;perineum cleansed --  --    Bathing/Skin Care --  --  incontinence care   Skin Interventions   Skin Protection --  Adhesive use limited;Electrode sites;Incontinence pads;Transparent dressing;Tubing/devices free from skin contact --      Intervention: Maintain Head of Bed Elevation Less Than 30 Degrees as Tolerated   03/08/18 0420   Positioning   Head of Bed (HOB) HOB lowered     Intervention: Prevent/Minimize Sheer/Friction Injuries   03/07/18 2040   Positioning   Positioning/Transfer Devices abduction splint /pillow;pillows;wedge;in use   Skin Interventions   Pressure Reduction Devices Pressure-redistributing mattress utilized;Heel offloading device utilized   Pressure Reduction Techniques heels elevated off bed;sit time limited to 2 hours;weight shift assistance provided     Intervention: Turn/Reposition Often   03/07/18 2040 03/08/18 0420   Positioning   Body Position --  side-lying, left   Skin Interventions   Pressure Reduction Techniques heels elevated off bed;sit time limited to 2 hours;weight shift assistance provided --        Goal: Identify Related Risk Factors and Signs and Symptoms  Related risk factors and signs and symptoms are identified upon initiation of Human Response Clinical Practice Guideline (CPG)    03/08/18 0450   Pressure Ulcer Risk (Mike Scale)   Related Risk Factors (Pressure Ulcer Risk (Mike Scale)) age extremes;cognitive impairment;hospitalization prolonged;mental impairment;mobility impaired     Goal: Skin Integrity  Patient will demonstrate the desired outcomes by discharge/transition of care.    03/06/18 0332   Pressure Ulcer Risk (Mike  Scale) (Adult,Obstetrics,Pediatric)   Skin Integrity making progress toward outcome

## 2018-03-09 PROCEDURE — 97116 GAIT TRAINING THERAPY: CPT

## 2018-03-09 PROCEDURE — 25000003 PHARM REV CODE 250: Performed by: NURSE PRACTITIONER

## 2018-03-09 PROCEDURE — 25000003 PHARM REV CODE 250: Performed by: HOSPITALIST

## 2018-03-09 PROCEDURE — 25000003 PHARM REV CODE 250: Performed by: INTERNAL MEDICINE

## 2018-03-09 PROCEDURE — 21400001 HC TELEMETRY ROOM

## 2018-03-09 PROCEDURE — 63600175 PHARM REV CODE 636 W HCPCS: Performed by: NURSE PRACTITIONER

## 2018-03-09 RX ADMIN — DIVALPROEX SODIUM 250 MG: 250 TABLET, DELAYED RELEASE ORAL at 08:03

## 2018-03-09 RX ADMIN — RIVASTIGMINE TRANSDERMAL SYSTEM 1 PATCH: 4.6 PATCH, EXTENDED RELEASE TRANSDERMAL at 09:03

## 2018-03-09 RX ADMIN — DOCUSATE SODIUM 100 MG: 100 CAPSULE, LIQUID FILLED ORAL at 09:03

## 2018-03-09 RX ADMIN — CARBIDOPA AND LEVODOPA 2 TABLET: 25; 100 TABLET ORAL at 12:03

## 2018-03-09 RX ADMIN — DIVALPROEX SODIUM 250 MG: 250 TABLET, DELAYED RELEASE ORAL at 09:03

## 2018-03-09 RX ADMIN — QUETIAPINE FUMARATE 50 MG: 25 TABLET ORAL at 08:03

## 2018-03-09 RX ADMIN — CARBIDOPA AND LEVODOPA 2 TABLET: 25; 100 TABLET ORAL at 05:03

## 2018-03-09 RX ADMIN — CARBIDOPA AND LEVODOPA 2 TABLET: 25; 100 TABLET ORAL at 11:03

## 2018-03-09 RX ADMIN — ENOXAPARIN SODIUM 40 MG: 100 INJECTION SUBCUTANEOUS at 05:03

## 2018-03-09 RX ADMIN — DOCUSATE SODIUM AND SENNOSIDES 1 TABLET: 8.6; 5 TABLET, FILM COATED ORAL at 09:03

## 2018-03-09 RX ADMIN — DOCUSATE SODIUM 100 MG: 100 CAPSULE, LIQUID FILLED ORAL at 08:03

## 2018-03-09 NOTE — PLAN OF CARE
Problem: Patient Care Overview  Goal: Plan of Care Review  Outcome: Ongoing (interventions implemented as appropriate)   03/09/18 1046   Coping/Psychosocial   Plan Of Care Reviewed With patient       Problem: Fall Risk (Adult)  Goal: Absence of Falls  Patient will demonstrate the desired outcomes by discharge/transition of care.   Outcome: Ongoing (interventions implemented as appropriate)   03/09/18 1046   Fall Risk (Adult)   Absence of Falls making progress toward outcome       Problem: Pressure Ulcer Risk (Mike Scale) (Adult,Obstetrics,Pediatric)  Goal: Skin Integrity  Patient will demonstrate the desired outcomes by discharge/transition of care.   Outcome: Ongoing (interventions implemented as appropriate)   03/09/18 1046   Pressure Ulcer Risk (Mike Scale) (Adult,Obstetrics,Pediatric)   Skin Integrity making progress toward outcome       Problem: Confusion, Acute (Adult)  Goal: Cognitive/Functional Impairments Minimized  Patient will demonstrate the desired outcomes by discharge/transition of care.   Outcome: Ongoing (interventions implemented as appropriate)   03/09/18 1046   Confusion, Acute (Adult)   Cognitive/Functional Impairments Minimized making progress toward outcome     Goal: Safety  Patient will demonstrate the desired outcomes by discharge/transition of care.   Outcome: Ongoing (interventions implemented as appropriate)   03/09/18 1046   Confusion, Acute (Adult)   Safety making progress toward outcome

## 2018-03-09 NOTE — SUBJECTIVE & OBJECTIVE
Interval History: with it today. Slept last night. BG ok    Review of Systems   Constitutional: Negative for activity change, appetite change, chills, diaphoresis, fatigue and fever.   Respiratory: Negative for cough, chest tightness, shortness of breath and wheezing.    Cardiovascular: Negative for chest pain, palpitations and leg swelling.   Gastrointestinal: Negative for abdominal distention, abdominal pain, constipation, diarrhea, nausea and vomiting.   Genitourinary: Negative for dysuria and hematuria.   Musculoskeletal: Negative for joint swelling and neck stiffness.   Neurological: Negative for tremors and weakness.   Psychiatric/Behavioral: Negative for confusion.     Objective:     Vital Signs (Most Recent):  Temp: 97.7 °F (36.5 °C) (03/09/18 0735)  Pulse: 71 (03/09/18 0735)  Resp: 16 (03/09/18 0735)  BP: (!) 96/52 (03/09/18 0735)  SpO2: 96 % (03/09/18 0735) Vital Signs (24h Range):  Temp:  [97.1 °F (36.2 °C)-98.8 °F (37.1 °C)] 97.7 °F (36.5 °C)  Pulse:  [71-88] 71  Resp:  [16-20] 16  SpO2:  [95 %-99 %] 96 %  BP: ()/(52-70) 96/52     Weight: 62.6 kg (138 lb 0.1 oz)  Body mass index is 21.62 kg/m².    Intake/Output Summary (Last 24 hours) at 03/09/18 0907  Last data filed at 03/09/18 0536   Gross per 24 hour   Intake              480 ml   Output              450 ml   Net               30 ml      Physical Exam   Constitutional: He is oriented to person, place, and time. No distress.   Deconditioned; Body mass index is 21.99 kg/m².   HENT:   Head: Normocephalic and atraumatic.   Eyes: Pupils are equal, round, and reactive to light.   Wears glasses; at the bedside   Neck: Normal range of motion. Neck supple.   Cardiovascular: Normal rate.    Pulmonary/Chest: Effort normal. No respiratory distress. He has no wheezes. He has no rales.   Abdominal: Soft. He exhibits no distension. There is no tenderness.   Genitourinary: Rectal exam shows guaiac negative stool.   Musculoskeletal: Normal range of motion. He  exhibits no edema or tenderness.   Neurological: He is alert and oriented to person, place, and time.   Skin: Skin is warm and dry. He is not diaphoretic.   Psychiatric:   requires reorienting often. Stable for days       Significant Labs: All pertinent labs within the past 24 hours have been reviewed.    Significant Imaging: I have reviewed all pertinent imaging results/findings within the past 24 hours.  I have reviewed and interpreted all pertinent imaging results/findings within the past 24 hours.

## 2018-03-09 NOTE — ASSESSMENT & PLAN NOTE
TN consulted to aid in safe discharge planning. Appears patient has been living with a significant other who can no longer care for him and refusing to take patient home. His somewhat estranged daughter has been contacted and is willing to aid in finding NH placement (she had already begun this process as an outpatient) but there is an issue: patient's power of  (a business friend) refused to sign consent for NH placement. Patient unable to make his own decision regarding this as he is with intermittent confusion +/- previous unclear dementia diagnosis per daughter. Psychiatry consulted for capacity, who agrees that he has no capacity for decision making. Also recommended adding depakote 250mg TID to help with mood stabilization. He is medically cleared for transfer once nursing facility arranged.

## 2018-03-09 NOTE — PT/OT/SLP PROGRESS
Physical Therapy Treatment    Patient Name:  Tenzin Ortiz   MRN:  [id number]    Recommendations:     Discharge Recommendations:  nursing facility, basic   Discharge Equipment Recommendations: none   Barriers to discharge: Decreased caregiver support (family unable to care for patient)    Assessment:     Tenzin Ortiz is a 77 y.o. male admitted with a medical diagnosis of Debility.  He presents with the following impairments/functional limitations:  weakness, impaired endurance, impaired functional mobilty, gait instability, impaired balance, decreased safety awareness, impaired self care skills, decreased upper extremity function, decreased lower extremity function, impaired joint extensibility, impaired muscle length.    Rehab Prognosis:  fair; patient would benefit from acute skilled PT services to address these deficits and reach maximum level of function.      Recent Surgery: * No surgery found *      Plan:     During this hospitalization, patient to be seen 3 x/week to address the above listed problems via gait training, therapeutic activities, therapeutic exercises  · Plan of Care Expires:  04/09/18   Plan of Care Reviewed with: patient    Subjective     Communicated with nurse Orantes prior to session.  Patient found supine in bed upon PT entry to room, agreeable to treatment.      Chief Complaint: No complaint.   Patient comments/goals: To walk.   Pain/Comfort:  Pain Rating 1: 0/10    Objective:     Patient found with: peripheral IV (Avasys)     General Precautions: Standard, fall   Orthopedic Precautions:N/A   Braces: N/A     Functional Mobility:  · Bed Mobility:     · Supine to Sit: contact guard assistance  · Sit to Supine: contact guard assistance  · Transfers:     · Sit to Stand:  contact guard assistance with rolling walker  · Gait:  ~250ft with contact guard assistance and rolling walker. Patient with shuffling steps but did not have any difficulty initiation steps today.     AM-PAC 6 CLICK  MOBILITY  Turning over in bed (including adjusting bedclothes, sheets and blankets)?: 4  Sitting down on and standing up from a chair with arms (e.g., wheelchair, bedside commode, etc.): 3  Moving from lying on back to sitting on the side of the bed?: 4  Moving to and from a bed to a chair (including a wheelchair)?: 3  Need to walk in hospital room?: 3  Climbing 3-5 steps with a railing?: 2  Total Score: 19     Patient left supine with all lines intact, call button in reach, bed alarm on, nurse Rolanda notified and [employer]as[employer] on.    GOALS:    Physical Therapy Goals        Problem: Physical Therapy Goal    Goal Priority Disciplines Outcome Goal Variances Interventions   Physical Therapy Goal     PT/OT, PT Ongoing (interventions implemented as appropriate)     Description:  Goals to be met by: 18    Patient will increase functional independence with mobility by performin. Supine to sit with supervision  2. Sit to stand transfer with Stand-by Assistance  3. Gait  x 300 feet with Stand-by Assistance using Rolling Walker  4. Lower extremity exercise program x30 reps per handout, with assistance as needed                      Time Tracking:     PT Received On: 18  PT Start Time: 1329     PT Stop Time: 1346  PT Total Time (min): 17 min     Billable Minutes: Gait Training  17     Treatment Type: Treatment  PT/PTA: PT     PTA Visit Number: 0     Dominga Esquivel, PT  2018

## 2018-03-09 NOTE — NURSING
Report received from day nurse. Visualized patient and assessed patient's overall condition and appearance. NAD noted. Will continue to monitor.

## 2018-03-09 NOTE — PLAN OF CARE
Recommendations     Recommendation/Intervention:   1. Continue current diet   2. RD to monitor     Goals: Meet >85% EEN  Nutrition Goal Status: new  Communication of RD Recs: reviewed with RN     D/C planning: Reg diet

## 2018-03-09 NOTE — PROGRESS NOTES
"  [facility] [place]  Adult Nutrition  Consult Note    SUMMARY     Recommendations    Recommendation/Intervention:   1. Continue current diet   2. RD to monitor    Goals: Meet >85% EEN  Nutrition Goal Status: new  Communication of RD Recs: reviewed with RN    D/C planning: Reg diet    Reason for Assessment    Reason for Assessment: length of stay  Diagnosis:  (Debility)  Relevant Medical History: HTN, Parkinsosn    General Information Comments: Patient denies issues with n/v/chewing or swallowing. Reports good appetite. Eating % of meals. Patient appears nourished with age appropriate loss of lean body mass.    Nutrition Risk Screen    Nutrition Risk Screen: no indicators present    Nutrition/Diet History    Do you have any cultural, spiritual, Congregation conflicts, given your current situation?: None    Anthropometrics    Temp: 98 °F (36.7 °C)  Height Method: Stated  Height: 5' 7" (170.2 cm)  Height (inches): 67 in  Weight Method: Bed Scale  Weight: 62.6 kg (138 lb 0.1 oz)  Weight (lb): 138.01 lb  Ideal Body Weight (IBW), Male: 148 lb  % Ideal Body Weight, Male (lb): 93.25 lb  BMI (Calculated): 21.7  BMI Grade: 18.5-24.9 - normal    Lab/Procedures/Meds    Pertinent Labs Reviewed: reviewed  Pertinent Medications Reviewed: reviewed    Physical Findings/Assessment    Overall Physical Appearance: nourished  Oral/Mouth Cavity: tooth/teeth missing  Skin: intact    Estimated/Assessed Needs    Weight Used For Calorie Calculations: 62.6 kg (138 lb 0.1 oz)  Height: 5' 7" (170.2 cm)  Energy Calorie Requirements (kcal): 8480-6307 kcal  Energy Need Method: Ely-St Jeor    RMR (Ely-St. Jeor Equation): 1314.62  Protein Requirements: 65-75g  Weight Used For Protein Calculations: 62.6 kg (138 lb 0.1 oz)    Fluid Need Method: RDA Method  RDA Method (mL): 1600       Nutrition Prescription Ordered    Current Diet Order: Reg    Evaluation of Received Nutrient/Fluid Intake    Energy Calories Required: meeting " needs  Protein Required: meeting needs  Fluid Required: meeting needs  Comments: LBM: 3/9  Tolerance: tolerating    % Intake of Estimated Energy Needs: %  % Meal Intake: %    Nutrition Risk    Level of Risk/Frequency of Follow-up:  (1 x week)     Recommendations    Recommendation/Intervention: 1. Continue current diet 2. RD to monitor  Goals: Meet >85% EEN  Nutrition Goal Status: new  Communication of RD Recs: reviewed with RN    Assessment and Plan    Nutrition Dx: No nutrition related issues at this time.        Monitor and Eval    Food and Nutrient Intake: energy intake, food and beverage intake  Food and Nutrient Adminstration: diet order  Knowledge/Beliefs/Attitudes: food and nutrition knowledge/skill  Physical Activity and Function: nutrition-related ADLs and IADLs  Anthropometric Measurements: weight, weight change  Biochemical Data, Medical Tests and Procedures: electrolyte and renal panel  Nutrition-Focused Physical Findings: overall appearance     Nutrition Follow-Up    RD Follow-up?: Yes

## 2018-03-09 NOTE — PROGRESS NOTES
[facility] Medicine  Progress Note    Patient Name: Tenzin Ortiz  MRN: [id number]  Patient Class: IP- Inpatient   Admission Date: 2/7/2018  Length of Stay: 10 days  Attending Physician: Olivia Del Rosario MD  Primary Care Provider: Efrain Chavez MD        Subjective:     Principal Problem:Debility    HPI:  Patient is 78 yo male with HTN, HLD, and Parkinson's who presents to ED reportedly for HA. Per ED note patient was complaining of HA but he denies this on my interview. Seems there was some confusion in ED and he was unclear why he was here. During interview with me, patient reports being here for NH placement although he does not appear to be very happy about this. He has been with some issues caring for himself. Currently lives with his girlfriend who reports she can no longer care for him. He states he currently gets around with a walker but sometimes has difficulty getting up out of his chair. Per chart review patient is with Parkinson's and sees neurology, Dr. Castellanos, lastly seen on 2/6/18. He was with hallucinations which were noted to be secondary to dopaminergic agents thus having to significantly reduce dose--she recommended he be admitted for placement at that time but patient declined. He has been attempting placement as an outpatient through PCP but without much luck. He otherwise denies recent illness, fever/chills, CP, SOB, abdominal pain, N/V/D, dysuria. On presentation patient with grossly normal labs/vitals. Placed in observation for what appears to be solely placement although this is something that may need to be continued as outpatient as is not indication for hospitalization.    Hospital Course:  Awake and alert without complaint - severe parkinsons disease and can no longer care for himself - neither can his girlfriend.  CM reports patient with acceptance to [facility]; awaiting legals to be signed by patient POA. No new issues - case management following.    3/1-  updated by CM that the  will not sign paperwork for NH placement, hopefully daughter will be able to assist with placement   3/2-3/5 - no acute changes, placement pending       Interval History: with it today. Slept last night. BG ok    Review of Systems   Constitutional: Negative for activity change, appetite change, chills, diaphoresis, fatigue and fever.   Respiratory: Negative for cough, chest tightness, shortness of breath and wheezing.    Cardiovascular: Negative for chest pain, palpitations and leg swelling.   Gastrointestinal: Negative for abdominal distention, abdominal pain, constipation, diarrhea, nausea and vomiting.   Genitourinary: Negative for dysuria and hematuria.   Musculoskeletal: Negative for joint swelling and neck stiffness.   Neurological: Negative for tremors and weakness.   Psychiatric/Behavioral: Negative for confusion.     Objective:     Vital Signs (Most Recent):  Temp: 97.7 °F (36.5 °C) (03/09/18 0735)  Pulse: 71 (03/09/18 0735)  Resp: 16 (03/09/18 0735)  BP: (!) 96/52 (03/09/18 0735)  SpO2: 96 % (03/09/18 0735) Vital Signs (24h Range):  Temp:  [97.1 °F (36.2 °C)-98.8 °F (37.1 °C)] 97.7 °F (36.5 °C)  Pulse:  [71-88] 71  Resp:  [16-20] 16  SpO2:  [95 %-99 %] 96 %  BP: ()/(52-70) 96/52     Weight: 62.6 kg (138 lb 0.1 oz)  Body mass index is 21.62 kg/m².    Intake/Output Summary (Last 24 hours) at 03/09/18 0907  Last data filed at 03/09/18 0587   Gross per 24 hour   Intake              480 ml   Output              450 ml   Net               30 ml      Physical Exam   Constitutional: He is oriented to person, place, and time. No distress.   Deconditioned; Body mass index is 21.99 kg/m².   HENT:   Head: Normocephalic and atraumatic.   Eyes: Pupils are equal, round, and reactive to light.   Wears glasses; at the bedside   Neck: Normal range of motion. Neck supple.   Cardiovascular: Normal rate.    Pulmonary/Chest: Effort normal. No respiratory distress. He has no wheezes. He has no  rales.   Abdominal: Soft. He exhibits no distension. There is no tenderness.   Genitourinary: Rectal exam shows guaiac negative stool.   Musculoskeletal: Normal range of motion. He exhibits no edema or tenderness.   Neurological: He is alert and oriented to person, place, and time.   Skin: Skin is warm and dry. He is not diaphoretic.   Psychiatric:   requires reorienting often. Stable for days       Significant Labs: All pertinent labs within the past 24 hours have been reviewed.    Significant Imaging: I have reviewed all pertinent imaging results/findings within the past 24 hours.  I have reviewed and interpreted all pertinent imaging results/findings within the past 24 hours.    Assessment/Plan:      * Debility    TN consulted to aid in safe discharge planning. Appears patient has been living with a significant other who can no longer care for him and refusing to take patient home. His somewhat estranged daughter has been contacted and is willing to aid in finding NH placement (she had already begun this process as an outpatient) but there is an issue: patient's power of  (a business friend) refused to sign consent for NH placement. Patient unable to make his own decision regarding this as he is with intermittent confusion +/- previous unclear dementia diagnosis per daughter. Psychiatry consulted for capacity, who agrees that he has no capacity for decision making. Also recommended adding depakote 250mg TID to help with mood stabilization. He is medically cleared for transfer once nursing facility arranged.         Lewy body dementia without behavioral disturbance    Seen by psyche who has since signed off - appreciate recs -  Doing well on current regimen  Continue medication regimen as follows:  Sinemet  QID and Seroquel 50 QHS  Continue Exelon 4.6 mg/24 patch  Divalproex 250 mg to BID  Continue holding trazadone        Essential hypertension    BP well controlled - Continue amlodipine 10 mg  daily              Parkinson disease    Seroquel 50 mg; sinemet  mg;   Discontinued taxodione 50 mg  Restarted exelon 4.6 mg in the morning when he is more awake          VTE Risk Mitigation         Ordered     enoxaparin injection 40 mg  Daily     Route:  Subcutaneous        02/13/18 1149     Medium Risk of VTE  Once      02/08/18 0653     Place JOSEFINA hose  Until discontinued      02/08/18 0653     Place sequential compression device  Until discontinued      02/08/18 0653          Medically cleared for discharge once nursing facility arranged    Olivia Elizalde MD  Department of Hospital Medicine   [facility]

## 2018-03-09 NOTE — PLAN OF CARE
Problem: Fall Risk (Adult)  Intervention: Monitor/Assist with Self Care   03/08/18 1925 03/09/18 0000   Activity   Activity Assistance Provided --  assistance, 1 person   Daily Care Interventions   Self-Care Promotion independence encouraged --    Functional Level Current   Ambulation 2 - assistive person --    Transferring 2 - assistive person --    Toileting 3 - assistive equipment and person --    Bathing 3 - assistive equipment and person --    Dressing 2 - assistive person --    Eating 0 - independent --    Communication 0 - understands/communicates without difficulty --    Swallowing 0 - swallows foods/liquids without difficulty --        Goal: Identify Related Risk Factors and Signs and Symptoms  Related risk factors and signs and symptoms are identified upon initiation of Human Response Clinical Practice Guideline (CPG)    02/28/18 1007 03/01/18 0805   Fall Risk   Related Risk Factors (Fall Risk) --  inadequate lighting;impaired vision;neuro disease/injury;objects hard to reach;sleep pattern alteration;slipper/uneven surfaces;environment unfamiliar   Signs and Symptoms (Fall Risk) presence of risk factors --      Goal: Absence of Falls  Patient will demonstrate the desired outcomes by discharge/transition of care.    03/08/18 1252   Fall Risk (Adult)   Absence of Falls making progress toward outcome

## 2018-03-09 NOTE — PLAN OF CARE
Problem: Physical Therapy Goal  Goal: Physical Therapy Goal  Goals to be met by: 18    Patient will increase functional independence with mobility by performin. Supine to sit with supervision  2. Sit to stand transfer with Stand-by Assistance  3. Gait  x 300 feet with Stand-by Assistance using Rolling Walker  4. Lower extremity exercise program x30 reps per handout, with assistance as needed    Outcome: Ongoing (interventions implemented as appropriate)    Patient's POC  so PT increased POC and goals due to patient still being in the hospital and requiring PT treatment.

## 2018-03-10 PROCEDURE — 25000003 PHARM REV CODE 250: Performed by: INTERNAL MEDICINE

## 2018-03-10 PROCEDURE — 21400001 HC TELEMETRY ROOM

## 2018-03-10 PROCEDURE — 25000003 PHARM REV CODE 250: Performed by: HOSPITALIST

## 2018-03-10 PROCEDURE — 25000003 PHARM REV CODE 250: Performed by: NURSE PRACTITIONER

## 2018-03-10 PROCEDURE — 63600175 PHARM REV CODE 636 W HCPCS: Performed by: NURSE PRACTITIONER

## 2018-03-10 RX ADMIN — CARBIDOPA AND LEVODOPA 2 TABLET: 25; 100 TABLET ORAL at 05:03

## 2018-03-10 RX ADMIN — CARBIDOPA AND LEVODOPA 2 TABLET: 25; 100 TABLET ORAL at 11:03

## 2018-03-10 RX ADMIN — ENOXAPARIN SODIUM 40 MG: 100 INJECTION SUBCUTANEOUS at 05:03

## 2018-03-10 RX ADMIN — QUETIAPINE FUMARATE 50 MG: 25 TABLET ORAL at 09:03

## 2018-03-10 RX ADMIN — DOCUSATE SODIUM 100 MG: 100 CAPSULE, LIQUID FILLED ORAL at 09:03

## 2018-03-10 RX ADMIN — DIVALPROEX SODIUM 250 MG: 250 TABLET, DELAYED RELEASE ORAL at 09:03

## 2018-03-10 RX ADMIN — AMLODIPINE BESYLATE 10 MG: 5 TABLET ORAL at 09:03

## 2018-03-10 RX ADMIN — RIVASTIGMINE TRANSDERMAL SYSTEM 1 PATCH: 4.6 PATCH, EXTENDED RELEASE TRANSDERMAL at 09:03

## 2018-03-10 NOTE — PLAN OF CARE
Problem: Fall Risk (Adult)  Intervention: Monitor/Assist with Self Care   18 1931 03/10/18 0200   Activity   Activity Assistance Provided --  assistance, 1 person   Daily Care Interventions   Self-Care Promotion independence encouraged --    Functional Level Current   Ambulation 2 - assistive person --    Transferring 2 - assistive person --    Toileting 3 - assistive equipment and person --    Bathing 3 - assistive equipment and person --    Dressing 2 - assistive person --    Eating 0 - independent --    Communication 0 - understands/communicates without difficulty --    Swallowing 0 - swallows foods/liquids without difficulty --      Intervention: Reduce Risk/Promote Restraint Free Environment   18 0805 18 1537 03/10/18 0200   Safety Interventions   Environmental Safety Modification --  assistive device/personal items within reach --    Prevent [place] Drop/Fall   Safety/Security Measures bed alarm set --  --    Safety Interventions   Safety Precautions --  --  emergency equipment at bedside

## 2018-03-10 NOTE — PROGRESS NOTES
[facility]  Progress Note    Patient Name: Tenzin Ortiz  MRN: [id number]  Patient Class: IP- Inpatient   Admission Date: 2/7/2018  Length of Stay: 11 days  Attending Physician: Olivia Del Rosario MD  Primary Care Provider: Efrain Chavez MD        Subjective:     Principal Problem:Debility    HPI:  Patient is 78 yo male with HTN, HLD, and Parkinson's who presents to ED reportedly for HA. Per ED note patient was complaining of HA but he denies this on my interview. Seems there was some confusion in ED and he was unclear why he was here. During interview with me, patient reports being here for NH placement although he does not appear to be very happy about this. He has been with some issues caring for himself. Currently lives with his girlfriend who reports she can no longer care for him. He states he currently gets around with a walker but sometimes has difficulty getting up out of his chair. Per chart review patient is with Parkinson's and sees neurology, Dr. Castellanos, lastly seen on 2/6/18. He was with hallucinations which were noted to be secondary to dopaminergic agents thus having to significantly reduce dose--she recommended he be admitted for placement at that time but patient declined. He has been attempting placement as an outpatient through PCP but without much luck. He otherwise denies recent illness, fever/chills, CP, SOB, abdominal pain, N/V/D, dysuria. On presentation patient with grossly normal labs/vitals. Placed in observation for what appears to be solely placement although this is something that may need to be continued as outpatient as is not indication for hospitalization.    Hospital Course:  Awake and alert without complaint - severe parkinsons disease and can no longer care for himself - neither can his girlfriend.  CM reports patient with acceptance to [facility]; awaiting legals to be signed by patient POA. No new issues - case management following.    3/1-  updated by CM that the  will not sign paperwork for NH placement, hopefully daughter will be able to assist with placement   3/2-3/5 - no acute changes, placement pending       Interval History: no acute issues    Review of Systems   Constitutional: Negative for activity change, appetite change, chills, diaphoresis, fatigue and fever.   Respiratory: Negative for cough, chest tightness, shortness of breath and wheezing.    Cardiovascular: Negative for chest pain, palpitations and leg swelling.   Gastrointestinal: Negative for abdominal distention, abdominal pain, constipation, diarrhea, nausea and vomiting.   Genitourinary: Negative for dysuria and hematuria.   Musculoskeletal: Negative for joint swelling and neck stiffness.   Neurological: Negative for tremors and weakness.   Psychiatric/Behavioral: Negative for confusion.     Objective:     Vital Signs (Most Recent):  Temp: 97.9 °F (36.6 °C) (03/10/18 1156)  Pulse: 97 (03/10/18 1156)  Resp: 18 (03/10/18 1156)  BP: (!) 147/66 (03/10/18 1156)  SpO2: 96 % (03/10/18 1156) Vital Signs (24h Range):  Temp:  [97.3 °F (36.3 °C)-98.8 °F (37.1 °C)] 97.9 °F (36.6 °C)  Pulse:  [] 97  Resp:  [12-18] 18  SpO2:  [95 %-98 %] 96 %  BP: (143-175)/(65-85) 147/66     Weight: 62.6 kg (138 lb 0.1 oz)  Body mass index is 21.62 kg/m².    Intake/Output Summary (Last 24 hours) at 03/10/18 1432  Last data filed at 03/10/18 1300   Gross per 24 hour   Intake              360 ml   Output             1100 ml   Net             -740 ml      Physical Exam   Constitutional: He is oriented to person, place, and time. No distress.   Deconditioned; Body mass index is 21.99 kg/m².   HENT:   Head: Normocephalic and atraumatic.   Eyes: Pupils are equal, round, and reactive to light.   Wears glasses; at the bedside   Neck: Normal range of motion. Neck supple.   Cardiovascular: Normal rate.    Pulmonary/Chest: Effort normal. No respiratory distress. He has no wheezes. He has no rales.    Abdominal: Soft. He exhibits no distension. There is no tenderness.   Genitourinary: Rectal exam shows guaiac negative stool.   Musculoskeletal: Normal range of motion. He exhibits no edema or tenderness.   Neurological: He is alert and oriented to person, place, and time.   Skin: Skin is warm and dry. He is not diaphoretic.   Psychiatric:   requires reorienting often. Stable for days       Significant Labs: All pertinent labs within the past 24 hours have been reviewed.    Significant Imaging: I have reviewed all pertinent imaging results/findings within the past 24 hours.  I have reviewed and interpreted all pertinent imaging results/findings within the past 24 hours.    Assessment/Plan:      * Debility    TN consulted to aid in safe discharge planning. Appears patient has been living with a significant other who can no longer care for him and refusing to take patient home. His somewhat estranged daughter has been contacted and is willing to aid in finding NH placement (she had already begun this process as an outpatient) but there is an issue: patient's power of  (a business friend) refused to sign consent for NH placement. Patient unable to make his own decision regarding this as he is with intermittent confusion +/- previous unclear dementia diagnosis per daughter. Psychiatry consulted for capacity, who agrees that he has no capacity for decision making. Also recommended adding depakote 250mg TID to help with mood stabilization. He is medically cleared for transfer once nursing facility arranged.         Lewy body dementia without behavioral disturbance    Seen by psyche who has since signed off - appreciate recs -  Doing well on current regimen  Continue medication regimen as follows:  Sinemet  QID and Seroquel 50 QHS  Continue Exelon 4.6 mg/24 patch  Divalproex 250 mg to BID  Continue holding trazadone        Essential hypertension    BP well controlled - Continue amlodipine 10 mg  daily              Parkinson disease    Seroquel 50 mg; sinemet  mg;   Discontinued taxodione 50 mg  Restarted exelon 4.6 mg in the morning when he is more awake          VTE Risk Mitigation         Ordered     enoxaparin injection 40 mg  Daily     Route:  Subcutaneous        02/13/18 1149     Medium Risk of VTE  Once      02/08/18 0653     Place JOSEFINA hose  Until discontinued      02/08/18 0653     Place sequential compression device  Until discontinued      02/08/18 0653              Olivia Elizalde MD  Department of Hospital Medicine   [facility]

## 2018-03-10 NOTE — SUBJECTIVE & OBJECTIVE
Interval History: no acute issues    Review of Systems   Constitutional: Negative for activity change, appetite change, chills, diaphoresis, fatigue and fever.   Respiratory: Negative for cough, chest tightness, shortness of breath and wheezing.    Cardiovascular: Negative for chest pain, palpitations and leg swelling.   Gastrointestinal: Negative for abdominal distention, abdominal pain, constipation, diarrhea, nausea and vomiting.   Genitourinary: Negative for dysuria and hematuria.   Musculoskeletal: Negative for joint swelling and neck stiffness.   Neurological: Negative for tremors and weakness.   Psychiatric/Behavioral: Negative for confusion.     Objective:     Vital Signs (Most Recent):  Temp: 97.9 °F (36.6 °C) (03/10/18 1156)  Pulse: 97 (03/10/18 1156)  Resp: 18 (03/10/18 1156)  BP: (!) 147/66 (03/10/18 1156)  SpO2: 96 % (03/10/18 1156) Vital Signs (24h Range):  Temp:  [97.3 °F (36.3 °C)-98.8 °F (37.1 °C)] 97.9 °F (36.6 °C)  Pulse:  [] 97  Resp:  [12-18] 18  SpO2:  [95 %-98 %] 96 %  BP: (143-175)/(65-85) 147/66     Weight: 62.6 kg (138 lb 0.1 oz)  Body mass index is 21.62 kg/m².    Intake/Output Summary (Last 24 hours) at 03/10/18 1432  Last data filed at 03/10/18 1300   Gross per 24 hour   Intake              360 ml   Output             1100 ml   Net             -740 ml      Physical Exam   Constitutional: He is oriented to person, place, and time. No distress.   Deconditioned; Body mass index is 21.99 kg/m².   HENT:   Head: Normocephalic and atraumatic.   Eyes: Pupils are equal, round, and reactive to light.   Wears glasses; at the bedside   Neck: Normal range of motion. Neck supple.   Cardiovascular: Normal rate.    Pulmonary/Chest: Effort normal. No respiratory distress. He has no wheezes. He has no rales.   Abdominal: Soft. He exhibits no distension. There is no tenderness.   Genitourinary: Rectal exam shows guaiac negative stool.   Musculoskeletal: Normal range of motion. He exhibits no edema or  tenderness.   Neurological: He is alert and oriented to person, place, and time.   Skin: Skin is warm and dry. He is not diaphoretic.   Psychiatric:   requires reorienting often. Stable for days       Significant Labs: All pertinent labs within the past 24 hours have been reviewed.    Significant Imaging: I have reviewed all pertinent imaging results/findings within the past 24 hours.  I have reviewed and interpreted all pertinent imaging results/findings within the past 24 hours.

## 2018-03-10 NOTE — NURSING
Pt lying supine in bed.Bed is lowered, alarmed, and locked. Call light is within reach. No sign of distress noted at this time. Will continue to monitor

## 2018-03-11 PROCEDURE — 25000003 PHARM REV CODE 250: Performed by: NURSE PRACTITIONER

## 2018-03-11 PROCEDURE — 25000003 PHARM REV CODE 250: Performed by: INTERNAL MEDICINE

## 2018-03-11 PROCEDURE — 63600175 PHARM REV CODE 636 W HCPCS: Performed by: NURSE PRACTITIONER

## 2018-03-11 PROCEDURE — 21400001 HC TELEMETRY ROOM

## 2018-03-11 PROCEDURE — 25000003 PHARM REV CODE 250: Performed by: HOSPITALIST

## 2018-03-11 RX ADMIN — POLYETHYLENE GLYCOL 3350 17 G: 17 POWDER, FOR SOLUTION ORAL at 09:03

## 2018-03-11 RX ADMIN — DOCUSATE SODIUM AND SENNOSIDES 1 TABLET: 8.6; 5 TABLET, FILM COATED ORAL at 09:03

## 2018-03-11 RX ADMIN — CARBIDOPA AND LEVODOPA 2 TABLET: 25; 100 TABLET ORAL at 11:03

## 2018-03-11 RX ADMIN — CARBIDOPA AND LEVODOPA 2 TABLET: 25; 100 TABLET ORAL at 05:03

## 2018-03-11 RX ADMIN — CARBIDOPA AND LEVODOPA 2 TABLET: 25; 100 TABLET ORAL at 12:03

## 2018-03-11 RX ADMIN — DOCUSATE SODIUM 100 MG: 100 CAPSULE, LIQUID FILLED ORAL at 09:03

## 2018-03-11 RX ADMIN — DIVALPROEX SODIUM 250 MG: 250 TABLET, DELAYED RELEASE ORAL at 09:03

## 2018-03-11 RX ADMIN — QUETIAPINE FUMARATE 50 MG: 25 TABLET ORAL at 09:03

## 2018-03-11 RX ADMIN — RIVASTIGMINE TRANSDERMAL SYSTEM 1 PATCH: 4.6 PATCH, EXTENDED RELEASE TRANSDERMAL at 09:03

## 2018-03-11 RX ADMIN — ENOXAPARIN SODIUM 40 MG: 100 INJECTION SUBCUTANEOUS at 05:03

## 2018-03-11 NOTE — SUBJECTIVE & OBJECTIVE
Interval History: no acute issues    Review of Systems   Constitutional: Negative for activity change, appetite change, chills, diaphoresis, fatigue and fever.   Respiratory: Negative for cough, chest tightness, shortness of breath and wheezing.    Cardiovascular: Negative for chest pain, palpitations and leg swelling.   Gastrointestinal: Negative for abdominal distention, abdominal pain, constipation, diarrhea, nausea and vomiting.   Genitourinary: Negative for dysuria and hematuria.   Musculoskeletal: Negative for joint swelling and neck stiffness.   Neurological: Negative for tremors and weakness.   Psychiatric/Behavioral: Negative for confusion.     Objective:     Vital Signs (Most Recent):  Temp: 98.5 °F (36.9 °C) (03/11/18 0730)  Pulse: 73 (03/11/18 0730)  Resp: 18 (03/11/18 0730)  BP: (!) 97/56 (03/11/18 0730)  SpO2: 97 % (03/11/18 0730) Vital Signs (24h Range):  Temp:  [96.4 °F (35.8 °C)-98.5 °F (36.9 °C)] 98.5 °F (36.9 °C)  Pulse:  [73-97] 73  Resp:  [18-22] 18  SpO2:  [95 %-98 %] 97 %  BP: ()/(56-77) 97/56     Weight: 62.6 kg (138 lb 0.1 oz)  Body mass index is 21.62 kg/m².    Intake/Output Summary (Last 24 hours) at 03/11/18 1018  Last data filed at 03/10/18 1800   Gross per 24 hour   Intake                0 ml   Output             1100 ml   Net            -1100 ml      Physical Exam   Constitutional: He is oriented to person, place, and time. No distress.   Deconditioned; Body mass index is 21.99 kg/m².   HENT:   Head: Normocephalic and atraumatic.   Eyes: Pupils are equal, round, and reactive to light.   Wears glasses; at the bedside   Neck: Normal range of motion. Neck supple.   Cardiovascular: Normal rate.    Pulmonary/Chest: Effort normal. No respiratory distress. He has no wheezes. He has no rales.   Abdominal: Soft. He exhibits no distension. There is no tenderness.   Genitourinary: Rectal exam shows guaiac negative stool.   Musculoskeletal: Normal range of motion. He exhibits no edema or  tenderness.   Neurological: He is alert and oriented to person, place, and time.   Skin: Skin is warm and dry. He is not diaphoretic.   Psychiatric:   requires reorienting often. Stable for days       Significant Labs: All pertinent labs within the past 24 hours have been reviewed.    Significant Imaging: I have reviewed all pertinent imaging results/findings within the past 24 hours.  I have reviewed and interpreted all pertinent imaging results/findings within the past 24 hours.

## 2018-03-11 NOTE — PLAN OF CARE
Problem: Fall Risk (Adult)  Intervention: Reduce Risk/Promote Restraint Free Environment   18   Safety Interventions   Environmental Safety Modification assistive device/personal items within reach;clutter free environment maintained;room organization consistent;room near unit station   Prevent [place] Drop/Fall   Safety/Security Measures bed alarm set     Intervention: Review Medications/Identify Contributors to Fall Risk   18   Safety Interventions   Medication Review/Management medications reviewed     Intervention: Patient Rounds   18   Safety Interventions   Patient Rounds bed in low position;bed wheels locked;clutter free environment maintained;call light in reach;ID band on;placement of personal items at bedside;toileting offered;visualized patient     Intervention: Safety Promotion/Fall Prevention   18   Safety Interventions   Safety Promotion/Fall Prevention commode/urinal/bedpan at bedside;assistive device/personal item within reach;bed alarm set;nonskid shoes/socks when out of bed;room near unit station;/camera at bedside;side rails raised x 3;supervised activity;diversional activities provided       Goal: Identify Related Risk Factors and Signs and Symptoms  Related risk factors and signs and symptoms are identified upon initiation of Human Response Clinical Practice Guideline (CPG)    18   Fall Risk   Related Risk Factors (Fall Risk) gait/mobility problems;fatigue/slow reaction;environment unfamiliar;slipper/uneven surfaces;sleep pattern alteration;confusion/agitation     Goal: Absence of Falls  Patient will demonstrate the desired outcomes by discharge/transition of care.    18   Fall Risk (Adult)   Absence of Falls making progress toward outcome

## 2018-03-11 NOTE — PROGRESS NOTES
[facility] [place] [facility]  Progress Note    Patient Name: Tenzin Ortiz  MRN: [id number]  Patient Class: IP- Inpatient   Admission Date: 2/7/2018  Length of Stay: 12 days  Attending Physician: Olivia Del Rosario MD  Primary Care Provider: Efrain Chavez MD        Subjective:     Principal Problem:Debility    HPI:  Patient is 78 yo male with HTN, HLD, and Parkinson's who presents to ED reportedly for HA. Per ED note patient was complaining of HA but he denies this on my interview. Seems there was some confusion in ED and he was unclear why he was here. During interview with me, patient reports being here for NH placement although he does not appear to be very happy about this. He has been with some issues caring for himself. Currently lives with his girlfriend who reports she can no longer care for him. He states he currently gets around with a walker but sometimes has difficulty getting up out of his chair. Per chart review patient is with Parkinson's and sees neurology, Dr. Castellanos, lastly seen on 2/6/18. He was with hallucinations which were noted to be secondary to dopaminergic agents thus having to significantly reduce dose--she recommended he be admitted for placement at that time but patient declined. He has been attempting placement as an outpatient through PCP but without much luck. He otherwise denies recent illness, fever/chills, CP, SOB, abdominal pain, N/V/D, dysuria. On presentation patient with grossly normal labs/vitals. Placed in observation for what appears to be solely placement although this is something that may need to be continued as outpatient as is not indication for hospitalization.    Hospital Course:  Awake and alert without complaint - severe parkinsons disease and can no longer care for himself - neither can his girlfriend.  CM reports patient with acceptance to [facility]; awaiting legals to be signed by patient POA. No new issues - case management following.    3/1-  updated by CM that the  will not sign paperwork for NH placement, hopefully daughter will be able to assist with placement   3/2-3/5 - no acute changes, placement pending       Interval History: no acute issues    Review of Systems   Constitutional: Negative for activity change, appetite change, chills, diaphoresis, fatigue and fever.   Respiratory: Negative for cough, chest tightness, shortness of breath and wheezing.    Cardiovascular: Negative for chest pain, palpitations and leg swelling.   Gastrointestinal: Negative for abdominal distention, abdominal pain, constipation, diarrhea, nausea and vomiting.   Genitourinary: Negative for dysuria and hematuria.   Musculoskeletal: Negative for joint swelling and neck stiffness.   Neurological: Negative for tremors and weakness.   Psychiatric/Behavioral: Negative for confusion.     Objective:     Vital Signs (Most Recent):  Temp: 98.5 °F (36.9 °C) (03/11/18 0730)  Pulse: 73 (03/11/18 0730)  Resp: 18 (03/11/18 0730)  BP: (!) 97/56 (03/11/18 0730)  SpO2: 97 % (03/11/18 0730) Vital Signs (24h Range):  Temp:  [96.4 °F (35.8 °C)-98.5 °F (36.9 °C)] 98.5 °F (36.9 °C)  Pulse:  [73-97] 73  Resp:  [18-22] 18  SpO2:  [95 %-98 %] 97 %  BP: ()/(56-77) 97/56     Weight: 62.6 kg (138 lb 0.1 oz)  Body mass index is 21.62 kg/m².    Intake/Output Summary (Last 24 hours) at 03/11/18 1018  Last data filed at 03/10/18 1800   Gross per 24 hour   Intake                0 ml   Output             1100 ml   Net            -1100 ml      Physical Exam   Constitutional: He is oriented to person, place, and time. No distress.   Deconditioned; Body mass index is 21.99 kg/m².   HENT:   Head: Normocephalic and atraumatic.   Eyes: Pupils are equal, round, and reactive to light.   Wears glasses; at the bedside   Neck: Normal range of motion. Neck supple.   Cardiovascular: Normal rate.    Pulmonary/Chest: Effort normal. No respiratory distress. He has no wheezes. He has no rales.   Abdominal:  Soft. He exhibits no distension. There is no tenderness.   Genitourinary: Rectal exam shows guaiac negative stool.   Musculoskeletal: Normal range of motion. He exhibits no edema or tenderness.   Neurological: He is alert and oriented to person, place, and time.   Skin: Skin is warm and dry. He is not diaphoretic.   Psychiatric:   requires reorienting often. Stable for days       Significant Labs: All pertinent labs within the past 24 hours have been reviewed.    Significant Imaging: I have reviewed all pertinent imaging results/findings within the past 24 hours.  I have reviewed and interpreted all pertinent imaging results/findings within the past 24 hours.    Assessment/Plan:      * Debility    TN consulted to aid in safe discharge planning. Appears patient has been living with a significant other who can no longer care for him and refusing to take patient home. His somewhat estranged daughter has been contacted and is willing to aid in finding NH placement (she had already begun this process as an outpatient) but there is an issue: patient's power of  (a business friend) refused to sign consent for NH placement. Patient unable to make his own decision regarding this as he is with intermittent confusion +/- previous unclear dementia diagnosis per daughter. Psychiatry consulted for capacity, who agrees that he has no capacity for decision making. Also recommended adding depakote 250mg TID to help with mood stabilization. He is medically cleared for transfer once nursing facility arranged.         Lewy body dementia without behavioral disturbance    Seen by psyche who has since signed off - appreciate recs -  Doing well on current regimen  Continue medication regimen as follows:  Sinemet  QID and Seroquel 50 QHS  Continue Exelon 4.6 mg/24 patch  Divalproex 250 mg to BID  Continue holding trazadone        Essential hypertension    BP well controlled - Continue amlodipine 10 mg daily               Parkinson disease    Seroquel 50 mg; sinemet  mg;   Discontinued taxodione 50 mg  Restarted exelon 4.6 mg in the morning when he is more awake          VTE Risk Mitigation         Ordered     enoxaparin injection 40 mg  Daily     Route:  Subcutaneous        02/13/18 1149     Medium Risk of VTE  Once      02/08/18 0653     Place JOSEFINA hose  Until discontinued      02/08/18 0653     Place sequential compression device  Until discontinued      02/08/18 0653              Olivia Elizalde MD  Department of Hospital Medicine   [facility]

## 2018-03-12 PROCEDURE — 25000003 PHARM REV CODE 250: Performed by: NURSE PRACTITIONER

## 2018-03-12 PROCEDURE — 63600175 PHARM REV CODE 636 W HCPCS: Performed by: NURSE PRACTITIONER

## 2018-03-12 PROCEDURE — 21400001 HC TELEMETRY ROOM

## 2018-03-12 PROCEDURE — 25000003 PHARM REV CODE 250: Performed by: HOSPITALIST

## 2018-03-12 PROCEDURE — 25000003 PHARM REV CODE 250: Performed by: INTERNAL MEDICINE

## 2018-03-12 RX ADMIN — AMLODIPINE BESYLATE 10 MG: 5 TABLET ORAL at 08:03

## 2018-03-12 RX ADMIN — CARBIDOPA AND LEVODOPA 2 TABLET: 25; 100 TABLET ORAL at 11:03

## 2018-03-12 RX ADMIN — RIVASTIGMINE TRANSDERMAL SYSTEM 1 PATCH: 4.6 PATCH, EXTENDED RELEASE TRANSDERMAL at 08:03

## 2018-03-12 RX ADMIN — DIVALPROEX SODIUM 250 MG: 250 TABLET, DELAYED RELEASE ORAL at 09:03

## 2018-03-12 RX ADMIN — DOCUSATE SODIUM 100 MG: 100 CAPSULE, LIQUID FILLED ORAL at 09:03

## 2018-03-12 RX ADMIN — DIVALPROEX SODIUM 250 MG: 250 TABLET, DELAYED RELEASE ORAL at 08:03

## 2018-03-12 RX ADMIN — ENOXAPARIN SODIUM 40 MG: 100 INJECTION SUBCUTANEOUS at 05:03

## 2018-03-12 RX ADMIN — CARBIDOPA AND LEVODOPA 2 TABLET: 25; 100 TABLET ORAL at 12:03

## 2018-03-12 RX ADMIN — QUETIAPINE FUMARATE 50 MG: 25 TABLET ORAL at 09:03

## 2018-03-12 RX ADMIN — CARBIDOPA AND LEVODOPA 2 TABLET: 25; 100 TABLET ORAL at 05:03

## 2018-03-12 RX ADMIN — DOCUSATE SODIUM 100 MG: 100 CAPSULE, LIQUID FILLED ORAL at 08:03

## 2018-03-12 NOTE — PROGRESS NOTES
[facility] Hospital Medicine  Progress Note    Patient Name: Tenzin Ortiz  MRN: [id number]  Patient Class: IP- Inpatient   Admission Date: 2/7/2018  Length of Stay: 13 days  Attending Physician: Olivia Del Rosario MD  Primary Care Provider: Efrain Chavez MD        Subjective:     Principal Problem:Debility    HPI:  Patient is 76 yo male with HTN, HLD, and Parkinson's who presents to ED reportedly for HA. Per ED note patient was complaining of HA but he denies this on my interview. Seems there was some confusion in ED and he was unclear why he was here. During interview with me, patient reports being here for NH placement although he does not appear to be very happy about this. He has been with some issues caring for himself. Currently lives with his girlfriend who reports she can no longer care for him. He states he currently gets around with a walker but sometimes has difficulty getting up out of his chair. Per chart review patient is with Parkinson's and sees neurology, Dr. Castellanos, lastly seen on 2/6/18. He was with hallucinations which were noted to be secondary to dopaminergic agents thus having to significantly reduce dose--she recommended he be admitted for placement at that time but patient declined. He has been attempting placement as an outpatient through PCP but without much luck. He otherwise denies recent illness, fever/chills, CP, SOB, abdominal pain, N/V/D, dysuria. On presentation patient with grossly normal labs/vitals. Placed in observation for what appears to be solely placement although this is something that may need to be continued as outpatient as is not indication for hospitalization.    Hospital Course:  Awake and alert without complaint - severe parkinsons disease and can no longer care for himself - neither can his girlfriend.  CM reports patient with acceptance to [facility]; awaiting legals to be signed by patient POA.       Interval History: no acute issues    Review of  Systems   Constitutional: Negative for activity change, appetite change, chills, diaphoresis, fatigue and fever.   Respiratory: Negative for cough, chest tightness, shortness of breath and wheezing.    Cardiovascular: Negative for chest pain, palpitations and leg swelling.   Gastrointestinal: Negative for abdominal distention, abdominal pain, constipation, diarrhea, nausea and vomiting.   Genitourinary: Negative for dysuria and hematuria.   Musculoskeletal: Negative for joint swelling and neck stiffness.   Neurological: Negative for tremors and weakness.   Psychiatric/Behavioral: Negative for confusion.     Objective:     Vital Signs (Most Recent):  Temp: 97.9 °F (36.6 °C) (03/12/18 1622)  Pulse: 88 (03/12/18 1622)  Resp: 16 (03/12/18 1622)  BP: (!) 144/66 (03/12/18 1622)  SpO2: 97 % (03/12/18 1622) Vital Signs (24h Range):  Temp:  [97.4 °F (36.3 °C)-98.6 °F (37 °C)] 97.9 °F (36.6 °C)  Pulse:  [75-90] 88  Resp:  [16-22] 16  SpO2:  [95 %-98 %] 97 %  BP: (117-145)/(63-72) 144/66     Weight: 62.1 kg (136 lb 14.5 oz)  Body mass index is 21.44 kg/m².    Intake/Output Summary (Last 24 hours) at 03/12/18 1657  Last data filed at 03/12/18 1245   Gross per 24 hour   Intake              600 ml   Output              350 ml   Net              250 ml      Physical Exam   Constitutional: He is oriented to person, place, and time. No distress.   Deconditioned; Body mass index is 21.99 kg/m².   HENT:   Head: Normocephalic and atraumatic.   Eyes: Pupils are equal, round, and reactive to light.   Wears glasses; at the bedside   Neck: Normal range of motion. Neck supple.   Cardiovascular: Normal rate.    Pulmonary/Chest: Effort normal. No respiratory distress. He has no wheezes. He has no rales.   Abdominal: Soft. He exhibits no distension. There is no tenderness.   Genitourinary: Rectal exam shows guaiac negative stool.   Musculoskeletal: Normal range of motion. He exhibits no edema or tenderness.   Neurological: He is alert and  oriented to person, place, and time.   Skin: Skin is warm and dry. He is not diaphoretic.   Psychiatric:   requires reorienting often. Stable for days       Significant Labs: All pertinent labs within the past 24 hours have been reviewed.    Significant Imaging: I have reviewed all pertinent imaging results/findings within the past 24 hours.  I have reviewed and interpreted all pertinent imaging results/findings within the past 24 hours.    Assessment/Plan:      * Debility    TN consulted to aid in safe discharge planning. Appears patient has been living with a significant other who can no longer care for him and refusing to take patient home. His somewhat estranged daughter has been contacted and is willing to aid in finding NH placement (she had already begun this process as an outpatient) but there is an issue: patient's power of  (a business friend) refused to sign consent for NH placement. Patient unable to make his own decision regarding this as he is with intermittent confusion +/- previous unclear dementia diagnosis per daughter. Psychiatry consulted for capacity, who agrees that he has no capacity for decision making. Also recommended adding depakote 250mg TID to help with mood stabilization. He is medically cleared for transfer once nursing facility arranged.         Lewy body dementia without behavioral disturbance    Seen by psyche who has since signed off - appreciate recs -  Doing well on current regimen  Continue medication regimen as follows:  Sinemet  QID and Seroquel 50 QHS  Continue Exelon 4.6 mg/24 patch  Divalproex 250 mg to BID  Continue holding trazadone        Essential hypertension    BP well controlled - Continue amlodipine 10 mg daily              Parkinson disease    Seroquel 50 mg; sinemet  mg;   Discontinued taxodione 50 mg  Restarted exelon 4.6 mg in the morning when he is more awake          VTE Risk Mitigation         Ordered     enoxaparin injection 40 mg  Daily      Route:  Subcutaneous        02/13/18 1149     Medium Risk of VTE  Once      02/08/18 0653     Place JOSEFINA hose  Until discontinued      02/08/18 0653     Place sequential compression device  Until discontinued      02/08/18 0653        Medically cleared for transfer once snf NH set up at [facility] (accepting facility). Pending paperwork to be completed by POA. He has not reported to [facility] as of today.       Olivia Elizalde MD  Department of Hospital Medicine   [facility]

## 2018-03-12 NOTE — SUBJECTIVE & OBJECTIVE
Interval History: no acute issues    Review of Systems   Constitutional: Negative for activity change, appetite change, chills, diaphoresis, fatigue and fever.   Respiratory: Negative for cough, chest tightness, shortness of breath and wheezing.    Cardiovascular: Negative for chest pain, palpitations and leg swelling.   Gastrointestinal: Negative for abdominal distention, abdominal pain, constipation, diarrhea, nausea and vomiting.   Genitourinary: Negative for dysuria and hematuria.   Musculoskeletal: Negative for joint swelling and neck stiffness.   Neurological: Negative for tremors and weakness.   Psychiatric/Behavioral: Negative for confusion.     Objective:     Vital Signs (Most Recent):  Temp: 97.9 °F (36.6 °C) (03/12/18 1622)  Pulse: 88 (03/12/18 1622)  Resp: 16 (03/12/18 1622)  BP: (!) 144/66 (03/12/18 1622)  SpO2: 97 % (03/12/18 1622) Vital Signs (24h Range):  Temp:  [97.4 °F (36.3 °C)-98.6 °F (37 °C)] 97.9 °F (36.6 °C)  Pulse:  [75-90] 88  Resp:  [16-22] 16  SpO2:  [95 %-98 %] 97 %  BP: (117-145)/(63-72) 144/66     Weight: 62.1 kg (136 lb 14.5 oz)  Body mass index is 21.44 kg/m².    Intake/Output Summary (Last 24 hours) at 03/12/18 1657  Last data filed at 03/12/18 1245   Gross per 24 hour   Intake              600 ml   Output              350 ml   Net              250 ml      Physical Exam   Constitutional: He is oriented to person, place, and time. No distress.   Deconditioned; Body mass index is 21.99 kg/m².   HENT:   Head: Normocephalic and atraumatic.   Eyes: Pupils are equal, round, and reactive to light.   Wears glasses; at the bedside   Neck: Normal range of motion. Neck supple.   Cardiovascular: Normal rate.    Pulmonary/Chest: Effort normal. No respiratory distress. He has no wheezes. He has no rales.   Abdominal: Soft. He exhibits no distension. There is no tenderness.   Genitourinary: Rectal exam shows guaiac negative stool.   Musculoskeletal: Normal range of motion. He exhibits no edema or  tenderness.   Neurological: He is alert and oriented to person, place, and time.   Skin: Skin is warm and dry. He is not diaphoretic.   Psychiatric:   requires reorienting often. Stable for days       Significant Labs: All pertinent labs within the past 24 hours have been reviewed.    Significant Imaging: I have reviewed all pertinent imaging results/findings within the past 24 hours.  I have reviewed and interpreted all pertinent imaging results/findings within the past 24 hours.

## 2018-03-12 NOTE — PLAN OF CARE
Rachel (admissions coordinator) with [facility] informed [facility] Friday 3/9/18 @ 4:33pm POA had not reported to nursing home to sign admission paperwork for patient to admit into nursing home. [facility] provided update to Josselin Valadez (Care Management Director). Hospital may possibly move forward with interdiction.         03/12/18 8670   Discharge Reassessment   Assessment Type Discharge Planning Reassessment   Provided patient/caregiver education on the expected discharge date and the discharge plan No   Do you have any problems affording any of your prescribed medications? No   Discharge Plan A New Nursing Home placement - detention care facility   Discharge Plan B New Nursing Home placement - detention care facility   Patient choice form signed by patient/caregiver N/A   Can the patient answer the patient profile reliably? No, cognitively impaired   How does the patient rate their overall health at the present time? Fair   Describe the patient's ability to walk at the present time. Walks with the help of equipment   How often would a person be available to care for the patient? Never

## 2018-03-12 NOTE — PLAN OF CARE
Problem: Confusion, Acute (Adult)  Intervention: Monitor/Assist with Self Care   03/12/18 1050   Activity   Activity Assistance Provided assistance, 2 people     Intervention: Reduce Risk/Promote Restraint Free Environment   03/12/18 1050   Safety Interventions   Environmental Safety Modification assistive device/personal items within reach       Goal: Identify Related Risk Factors and Signs and Symptoms  Related risk factors and signs and symptoms are identified upon initiation of Human Response Clinical Practice Guideline (CPG)   Outcome: Ongoing (interventions implemented as appropriate)   03/12/18 1050   Confusion, Acute   Related Risk Factors (Acute Confusion) cognitive impairment     Goal: Cognitive/Functional Impairments Minimized  Patient will demonstrate the desired outcomes by discharge/transition of care.   Outcome: Ongoing (interventions implemented as appropriate)   03/12/18 1050   Confusion, Acute (Adult)   Cognitive/Functional Impairments Minimized making progress toward outcome     Goal: Safety  Patient will demonstrate the desired outcomes by discharge/transition of care.   Outcome: Ongoing (interventions implemented as appropriate)   03/12/18 1050   Confusion, Acute (Adult)   Safety making progress toward outcome

## 2018-03-13 PROBLEM — G31.83 LEWY BODY DEMENTIA WITHOUT BEHAVIORAL DISTURBANCE: Chronic | Status: ACTIVE | Noted: 2018-02-12

## 2018-03-13 PROBLEM — F02.80 LEWY BODY DEMENTIA WITHOUT BEHAVIORAL DISTURBANCE: Chronic | Status: ACTIVE | Noted: 2018-02-12

## 2018-03-13 LAB — POCT GLUCOSE: 98 MG/DL (ref 70–110)

## 2018-03-13 PROCEDURE — 25000003 PHARM REV CODE 250: Performed by: NURSE PRACTITIONER

## 2018-03-13 PROCEDURE — 25000003 PHARM REV CODE 250: Performed by: HOSPITALIST

## 2018-03-13 PROCEDURE — 21400001 HC TELEMETRY ROOM

## 2018-03-13 PROCEDURE — 25000003 PHARM REV CODE 250: Performed by: INTERNAL MEDICINE

## 2018-03-13 PROCEDURE — 63600175 PHARM REV CODE 636 W HCPCS: Performed by: NURSE PRACTITIONER

## 2018-03-13 RX ADMIN — ACETAMINOPHEN 650 MG: 325 TABLET, FILM COATED ORAL at 06:03

## 2018-03-13 RX ADMIN — AMLODIPINE BESYLATE 10 MG: 5 TABLET ORAL at 09:03

## 2018-03-13 RX ADMIN — DOCUSATE SODIUM AND SENNOSIDES 1 TABLET: 8.6; 5 TABLET, FILM COATED ORAL at 09:03

## 2018-03-13 RX ADMIN — DIVALPROEX SODIUM 250 MG: 250 TABLET, DELAYED RELEASE ORAL at 09:03

## 2018-03-13 RX ADMIN — QUETIAPINE FUMARATE 50 MG: 25 TABLET ORAL at 09:03

## 2018-03-13 RX ADMIN — RIVASTIGMINE TRANSDERMAL SYSTEM 1 PATCH: 4.6 PATCH, EXTENDED RELEASE TRANSDERMAL at 09:03

## 2018-03-13 RX ADMIN — DOCUSATE SODIUM 100 MG: 100 CAPSULE, LIQUID FILLED ORAL at 09:03

## 2018-03-13 RX ADMIN — ENOXAPARIN SODIUM 40 MG: 100 INJECTION SUBCUTANEOUS at 05:03

## 2018-03-13 RX ADMIN — CARBIDOPA AND LEVODOPA 2 TABLET: 25; 100 TABLET ORAL at 05:03

## 2018-03-13 RX ADMIN — POLYETHYLENE GLYCOL 3350 17 G: 17 POWDER, FOR SOLUTION ORAL at 09:03

## 2018-03-13 RX ADMIN — CARBIDOPA AND LEVODOPA 2 TABLET: 25; 100 TABLET ORAL at 12:03

## 2018-03-13 NOTE — PT/OT/SLP PROGRESS
"Physical Therapy      Patient Name:  Tenzin Ortiz   MRN:  [id number]    1st Attempt 1206:Patient not seen today secondary to Patient unwilling to participate (Pt states " Just leave me alone okay.  I will feel better if you just leave me alone!!"). Will follow-up as able.    2nd Attempt 1553: Pt unwilling to participate despite encouragement.  Pt states " I don't want to get up because I have been home for the last two days.  I am tired."    Citlali Dennis, PTA    "

## 2018-03-13 NOTE — PROGRESS NOTES
[facility] contacted  to report they are unable to accept patient due to not having bed availability. [facility] is still willing to accept patient and request for updated clinicals. SW uploaded MD notes, therapy notes, MARS, and labs via [facility] to [facility].

## 2018-03-13 NOTE — PLAN OF CARE
Problem: Confusion, Acute (Adult)  Intervention: Monitor/Assist with Self Care   18 [phone] 18 [id number] 18   Activity   Activity Assistance Provided --  --  assistance, 1 person   Daily Care Interventions   Self-Care Promotion independence encouraged --  --    Functional Level Current   Ambulation --  2 - assistive person --    Transferring --  2 - assistive person --    Toileting --  2 - assistive person --    Bathing --  2 - assistive person --    Dressing --  2 - assistive person --    Eating --  2 - assistive person --    Communication --  0 - understands/communicates without difficulty --    Swallowing --  0 - swallows foods/liquids without difficulty --      Intervention: Reduce Risk/Promote Restraint Free Environment   18   Safety Interventions   Environmental Safety Modification assistive device/personal items within reach;clutter free environment maintained;room near unit station   Prevent [place] Drop/Fall   Safety/Security Measures bed alarm set     Intervention: Evaluate Medications/Identify Contributors to Confusion   18   Safety Interventions   Medication Review/Management medications reviewed     Intervention: Optimize Communication   18   Cognitive Interventions   Communication Enhancement Strategies call light answered in person     Intervention: Promote Familiarity/Consistency   18   Coping/Psychosocial Interventions   Environment Familiarity/Consistency daily routine followed

## 2018-03-14 LAB — POCT GLUCOSE: 86 MG/DL (ref 70–110)

## 2018-03-14 PROCEDURE — 25000003 PHARM REV CODE 250: Performed by: NURSE PRACTITIONER

## 2018-03-14 PROCEDURE — 25000003 PHARM REV CODE 250: Performed by: HOSPITALIST

## 2018-03-14 PROCEDURE — 21400001 HC TELEMETRY ROOM

## 2018-03-14 PROCEDURE — 63600175 PHARM REV CODE 636 W HCPCS: Performed by: INTERNAL MEDICINE

## 2018-03-14 PROCEDURE — 25000003 PHARM REV CODE 250: Performed by: INTERNAL MEDICINE

## 2018-03-14 RX ORDER — RIVASTIGMINE 4.6 MG/24H
1 PATCH, EXTENDED RELEASE TRANSDERMAL DAILY
Status: DISCONTINUED | OUTPATIENT
Start: 2018-03-15 | End: 2018-04-09

## 2018-03-14 RX ORDER — ENOXAPARIN SODIUM 100 MG/ML
40 INJECTION SUBCUTANEOUS EVERY 24 HOURS
Status: DISCONTINUED | OUTPATIENT
Start: 2018-03-14 | End: 2018-04-09

## 2018-03-14 RX ORDER — DIVALPROEX SODIUM 250 MG/1
250 TABLET, DELAYED RELEASE ORAL EVERY 12 HOURS
Status: DISCONTINUED | OUTPATIENT
Start: 2018-03-14 | End: 2018-04-09

## 2018-03-14 RX ADMIN — CARBIDOPA AND LEVODOPA 2 TABLET: 25; 100 TABLET ORAL at 11:03

## 2018-03-14 RX ADMIN — DOCUSATE SODIUM 100 MG: 100 CAPSULE, LIQUID FILLED ORAL at 08:03

## 2018-03-14 RX ADMIN — DIVALPROEX SODIUM 250 MG: 250 TABLET, DELAYED RELEASE ORAL at 08:03

## 2018-03-14 RX ADMIN — ENOXAPARIN SODIUM 40 MG: 100 INJECTION SUBCUTANEOUS at 04:03

## 2018-03-14 RX ADMIN — CARBIDOPA AND LEVODOPA 2 TABLET: 25; 100 TABLET ORAL at 12:03

## 2018-03-14 RX ADMIN — CARBIDOPA AND LEVODOPA 2 TABLET: 25; 100 TABLET ORAL at 05:03

## 2018-03-14 RX ADMIN — QUETIAPINE FUMARATE 50 MG: 25 TABLET ORAL at 08:03

## 2018-03-14 RX ADMIN — POLYETHYLENE GLYCOL 3350 17 G: 17 POWDER, FOR SOLUTION ORAL at 08:03

## 2018-03-14 RX ADMIN — RIVASTIGMINE TRANSDERMAL SYSTEM 1 PATCH: 4.6 PATCH, EXTENDED RELEASE TRANSDERMAL at 08:03

## 2018-03-14 RX ADMIN — AMLODIPINE BESYLATE 10 MG: 5 TABLET ORAL at 08:03

## 2018-03-14 NOTE — SUBJECTIVE & OBJECTIVE
Interval History: no acute issues    Review of Systems   Constitutional: Negative for activity change, appetite change, chills, diaphoresis, fatigue and fever.   Respiratory: Negative for cough, chest tightness, shortness of breath and wheezing.    Cardiovascular: Negative for chest pain, palpitations and leg swelling.   Gastrointestinal: Negative for abdominal distention, abdominal pain, constipation, diarrhea, nausea and vomiting.   Genitourinary: Negative for dysuria and hematuria.   Musculoskeletal: Negative for joint swelling and neck stiffness.   Neurological: Negative for tremors and weakness.   Psychiatric/Behavioral: Negative for confusion.     Objective:     Vital Signs (Most Recent):  Temp: 99.1 °F (37.3 °C) (03/13/18 1915)  Pulse: 80 (03/13/18 1915)  Resp: 18 (03/13/18 1915)  BP: (!) 144/67 (03/13/18 1915)  SpO2: 96 % (03/13/18 1915) Vital Signs (24h Range):  Temp:  [97.5 °F (36.4 °C)-99.1 °F (37.3 °C)] 99.1 °F (37.3 °C)  Pulse:  [75-91] 80  Resp:  [18] 18  SpO2:  [95 %-99 %] 96 %  BP: (114-158)/(58-72) 144/67     Weight: 64 kg (141 lb 1.5 oz)  Body mass index is 22.1 kg/m².    Intake/Output Summary (Last 24 hours) at 03/13/18 2007  Last data filed at 03/13/18 1800   Gross per 24 hour   Intake             1000 ml   Output              550 ml   Net              450 ml      Physical Exam   Constitutional: No distress.   Deconditioned   HENT:   Head: Normocephalic and atraumatic.   Eyes: Pupils are equal, round, and reactive to light.   Wears glasses   Neck: Normal range of motion. Neck supple.   Cardiovascular: Normal rate.    Pulmonary/Chest: Effort normal. No respiratory distress. He has no wheezes. He has no rales.   Abdominal: Soft. He exhibits no distension. There is no tenderness.   Genitourinary: Rectal exam shows guaiac negative stool.   Musculoskeletal: Normal range of motion. He exhibits no edema or tenderness.   Neurological: He is alert.   Pill roll tremor   Skin: Skin is warm and dry. He is  not diaphoretic.   Psychiatric:   requires reorienting often. Stable for days       Significant Labs: All pertinent labs within the past 24 hours have been reviewed.    Significant Imaging: I have reviewed all pertinent imaging results/findings within the past 24 hours.  I have reviewed and interpreted all pertinent imaging results/findings within the past 24 hours.

## 2018-03-14 NOTE — ASSESSMENT & PLAN NOTE
Per ED patient admitted for dehydration. His BUN is elevated with normal creatinine--may be mild clinical dehydration.

## 2018-03-14 NOTE — ASSESSMENT & PLAN NOTE
TN consulted to aid in safe discharge planning. He has been living with a significant other who can no longer care for him and refusing to take patient home. His somewhat estranged daughter has been contacted and is willing to aid in finding NH placement (she had already begun this process as an outpatient) but there is an issue: patient's power of  (a business friend) refused to sign consent for NH placement. Patient unable to make his own decision regarding this as he is with intermittent confusion +/- previous unclear dementia diagnosis per daughter. Psychiatry consulted for capacity, who agrees that he has no capacity for decision making. Also recommended adding depakote 250mg TID to help with mood stabilization. He is medically cleared for transfer once nursing facility arranged.

## 2018-03-14 NOTE — PROGRESS NOTES
[facility] Hospital Medicine  Progress Note    Patient Name: Tenzin Ortiz  MRN: [id number]  Patient Class: IP- Inpatient   Admission Date: 2/7/2018  Length of Stay: 15 days  Attending Physician: Cami Crow MD  Primary Care Provider: Efrain Chavez MD        Subjective:     Principal Problem:Debility    HPI:  Patient is 78 yo male with HTN, HLD, and Parkinson's who presents to ED reportedly for HA. Per ED note patient was complaining of HA but he denies this on my interview. Seems there was some confusion in ED and he was unclear why he was here. During interview with me, patient reports being here for NH placement although he does not appear to be very happy about this. He has been with some issues caring for himself. Currently lives with his girlfriend who reports she can no longer care for him. He states he currently gets around with a walker but sometimes has difficulty getting up out of his chair. Per chart review patient is with Parkinson's and sees neurology, Dr. Castellanos, lastly seen on 2/6/18. He was with hallucinations which were noted to be secondary to dopaminergic agents thus having to significantly reduce dose--she recommended he be admitted for placement at that time but patient declined. He has been attempting placement as an outpatient through PCP but without much luck. He otherwise denies recent illness, fever/chills, CP, SOB, abdominal pain, N/V/D, dysuria. On presentation patient with grossly normal labs/vitals. Placed in observation for what appears to be solely placement although this is something that may need to be continued as outpatient as is not indication for hospitalization.    Hospital Course:  Awake and alert without complaint - severe Parkinsons dementia and can no longer care for himself - neither can his girlfriend.  CM reports patient with acceptance to [facility]; awaiting paperwork to be signed by patient POA. Legal involved. Appreciate case management's  assistance.    Interval History: no acute issues    Review of Systems   Constitutional: Negative for activity change, appetite change, chills, diaphoresis, fatigue and fever.   Respiratory: Negative for cough, chest tightness, shortness of breath and wheezing.    Cardiovascular: Negative for chest pain, palpitations and leg swelling.   Gastrointestinal: Negative for abdominal distention, abdominal pain, constipation, diarrhea, nausea and vomiting.   Genitourinary: Negative for dysuria and hematuria.   Musculoskeletal: Negative for joint swelling and neck stiffness.   Neurological: Negative for tremors and weakness.   Psychiatric/Behavioral: Negative for confusion.     Objective:     Vital Signs (Most Recent):  Temp: 97.3 °F (36.3 °C) (03/14/18 1603)  Pulse: 83 (03/14/18 1603)  Resp: 18 (03/14/18 1603)  BP: (!) 118/56 (03/14/18 1603)  SpO2: 99 % (03/14/18 1603) Vital Signs (24h Range):  Temp:  [97.3 °F (36.3 °C)-99.1 °F (37.3 °C)] 97.3 °F (36.3 °C)  Pulse:  [71-94] 83  Resp:  [18-20] 18  SpO2:  [95 %-99 %] 99 %  BP: (109-161)/(56-77) 118/56     Weight: 64 kg (141 lb 1.5 oz)  Body mass index is 22.1 kg/m².    Intake/Output Summary (Last 24 hours) at 03/14/18 1739  Last data filed at 03/14/18 1730   Gross per 24 hour   Intake              960 ml   Output              775 ml   Net              185 ml      Physical Exam   Constitutional: No distress.   Deconditioned   HENT:   Head: Normocephalic and atraumatic.   Eyes: Pupils are equal, round, and reactive to light.   Wears glasses   Neck: Normal range of motion. Neck supple.   Cardiovascular: Normal rate.    Pulmonary/Chest: Effort normal. No respiratory distress. He has no wheezes. He has no rales.   Abdominal: Soft. He exhibits no distension. There is no tenderness.   Genitourinary: Rectal exam shows guaiac negative stool.   Musculoskeletal: Normal range of motion. He exhibits no edema or tenderness.   Neurological: He is alert.   Pill roll tremor   Skin: Skin is warm  and dry. He is not diaphoretic.   Psychiatric:   requires reorienting often. Stable for days       Significant Labs: All pertinent labs within the past 24 hours have been reviewed.    Significant Imaging: I have reviewed all pertinent imaging results/findings within the past 24 hours.  I have reviewed and interpreted all pertinent imaging results/findings within the past 24 hours.    Assessment/Plan:      * Debility    TN consulted to aid in safe discharge planning. He has been living with a significant other who can no longer care for him and refusing to take patient home. His somewhat estranged daughter has been contacted and is willing to aid in finding NH placement (she had already begun this process as an outpatient) but there is an issue: patient's power of  (a business friend) refused to sign consent for NH placement. Patient unable to make his own decision regarding this as he is with intermittent confusion +/- previous unclear dementia diagnosis per daughter. Psychiatry consulted for capacity, who agrees that he has no capacity for decision making. Also recommended adding depakote 250mg TID to help with mood stabilization. He is medically cleared for transfer once nursing facility arranged.         Parkinson disease    Seroquel 50 mg; sinemet  mg;   Discontinued taxodione 50 mg  Restarted exelon 4.6 mg in the morning when he was more awake        Lewy body dementia without behavioral disturbance    Seen by psych who has since signed off - appreciate recs -  Doing well on current regimen  Continue medication regimen as follows:  Sinemet  QID and Seroquel 50 QHS  Continue Exelon 4.6 mg/24 patch  Divalproex 250 mg to BID  Held trazodone        Essential hypertension    BP well controlled - Continue amlodipine 10 mg daily          VTE Risk Mitigation         Ordered     enoxaparin injection 40 mg  Daily     Route:  Subcutaneous        03/14/18 1102     Medium Risk of VTE  Once      02/08/18  0653     Place JOSEFINA hose  Until discontinued      02/08/18 0653     Place sequential compression device  Until discontinued      02/08/18 0653              Cami Crow MD  Department of Hospital Medicine   [facility]

## 2018-03-14 NOTE — PLAN OF CARE
Problem: Confusion, Acute (Adult)  Intervention: Monitor/Assist with Self Care   03/14/18 1446   Activity   Activity Assistance Provided assistance, 2 people     Intervention: Reduce Risk/Promote Restraint Free Environment   03/14/18 1446   Safety Interventions   Environmental Safety Modification clutter free environment maintained       Goal: Identify Related Risk Factors and Signs and Symptoms  Related risk factors and signs and symptoms are identified upon initiation of Human Response Clinical Practice Guideline (CPG)   Outcome: Ongoing (interventions implemented as appropriate)   03/14/18 1446   Confusion, Acute   Related Risk Factors (Acute Confusion) advanced age;cognitive impairment     Goal: Cognitive/Functional Impairments Minimized  Patient will demonstrate the desired outcomes by discharge/transition of care.   Outcome: Ongoing (interventions implemented as appropriate)   03/14/18 1446   Confusion, Acute (Adult)   Cognitive/Functional Impairments Minimized making progress toward outcome     Goal: Safety  Patient will demonstrate the desired outcomes by discharge/transition of care.   Outcome: Ongoing (interventions implemented as appropriate)   03/14/18 1446   Confusion, Acute (Adult)   Safety making progress toward outcome

## 2018-03-14 NOTE — ASSESSMENT & PLAN NOTE
Seroquel 50 mg; sinemet  mg;   Discontinued taxodione 50 mg  Restarted exelon 4.6 mg in the morning when he was more awake

## 2018-03-14 NOTE — ASSESSMENT & PLAN NOTE
Seen by psych who has since signed off - appreciate recs -  Doing well on current regimen  Continue medication regimen as follows:  Sinemet  QID and Seroquel 50 QHS  Continue Exelon 4.6 mg/24 patch  Divalproex 250 mg to BID  Held trazadone

## 2018-03-14 NOTE — PROGRESS NOTES
[facility] Hospital Medicine  Progress Note    Patient Name: Tenzin Ortiz  MRN: [id number]  Patient Class: IP- Inpatient   Admission Date: 2/7/2018  Length of Stay: 14 days  Attending Physician: Cami Crow MD  Primary Care Provider: Efrain Chavez MD        Subjective:     Principal Problem:Debility    HPI:  Patient is 78 yo male with HTN, HLD, and Parkinson's who presents to ED reportedly for HA. Per ED note patient was complaining of HA but he denies this on my interview. Seems there was some confusion in ED and he was unclear why he was here. During interview with me, patient reports being here for NH placement although he does not appear to be very happy about this. He has been with some issues caring for himself. Currently lives with his girlfriend who reports she can no longer care for him. He states he currently gets around with a walker but sometimes has difficulty getting up out of his chair. Per chart review patient is with Parkinson's and sees neurology, Dr. Castellanos, lastly seen on 2/6/18. He was with hallucinations which were noted to be secondary to dopaminergic agents thus having to significantly reduce dose--she recommended he be admitted for placement at that time but patient declined. He has been attempting placement as an outpatient through PCP but without much luck. He otherwise denies recent illness, fever/chills, CP, SOB, abdominal pain, N/V/D, dysuria. On presentation patient with grossly normal labs/vitals. Placed in observation for what appears to be solely placement although this is something that may need to be continued as outpatient as is not indication for hospitalization.    Hospital Course:  Awake and alert without complaint - severe parkinsons disease and can no longer care for himself - neither can his girlfriend.  CM reports patient with acceptance to [facility]; awaiting legals to be signed by patient POA.       Interval History: no acute issues    Review of  Systems   Constitutional: Negative for activity change, appetite change, chills, diaphoresis, fatigue and fever.   Respiratory: Negative for cough, chest tightness, shortness of breath and wheezing.    Cardiovascular: Negative for chest pain, palpitations and leg swelling.   Gastrointestinal: Negative for abdominal distention, abdominal pain, constipation, diarrhea, nausea and vomiting.   Genitourinary: Negative for dysuria and hematuria.   Musculoskeletal: Negative for joint swelling and neck stiffness.   Neurological: Negative for tremors and weakness.   Psychiatric/Behavioral: Negative for confusion.     Objective:     Vital Signs (Most Recent):  Temp: 99.1 °F (37.3 °C) (03/13/18 1915)  Pulse: 80 (03/13/18 1915)  Resp: 18 (03/13/18 1915)  BP: (!) 144/67 (03/13/18 1915)  SpO2: 96 % (03/13/18 1915) Vital Signs (24h Range):  Temp:  [97.5 °F (36.4 °C)-99.1 °F (37.3 °C)] 99.1 °F (37.3 °C)  Pulse:  [75-91] 80  Resp:  [18] 18  SpO2:  [95 %-99 %] 96 %  BP: (114-158)/(58-72) 144/67     Weight: 64 kg (141 lb 1.5 oz)  Body mass index is 22.1 kg/m².    Intake/Output Summary (Last 24 hours) at 03/13/18 2007  Last data filed at 03/13/18 1800   Gross per 24 hour   Intake             1000 ml   Output              550 ml   Net              450 ml      Physical Exam   Constitutional: No distress.   Deconditioned   HENT:   Head: Normocephalic and atraumatic.   Eyes: Pupils are equal, round, and reactive to light.   Wears glasses   Neck: Normal range of motion. Neck supple.   Cardiovascular: Normal rate.    Pulmonary/Chest: Effort normal. No respiratory distress. He has no wheezes. He has no rales.   Abdominal: Soft. He exhibits no distension. There is no tenderness.   Genitourinary: Rectal exam shows guaiac negative stool.   Musculoskeletal: Normal range of motion. He exhibits no edema or tenderness.   Neurological: He is alert.   Pill roll tremor   Skin: Skin is warm and dry. He is not diaphoretic.   Psychiatric:   requires  reorienting often. Stable for days       Significant Labs: All pertinent labs within the past 24 hours have been reviewed.    Significant Imaging: I have reviewed all pertinent imaging results/findings within the past 24 hours.  I have reviewed and interpreted all pertinent imaging results/findings within the past 24 hours.    Assessment/Plan:      * Debility    TN consulted to aid in safe discharge planning. He has been living with a significant other who can no longer care for him and refusing to take patient home. His somewhat estranged daughter has been contacted and is willing to aid in finding NH placement (she had already begun this process as an outpatient) but there is an issue: patient's power of  (a business friend) refused to sign consent for NH placement. Patient unable to make his own decision regarding this as he is with intermittent confusion +/- previous unclear dementia diagnosis per daughter. Psychiatry consulted for capacity, who agrees that he has no capacity for decision making. Also recommended adding depakote 250mg TID to help with mood stabilization. He is medically cleared for transfer once nursing facility arranged.         Parkinson disease    Seroquel 50 mg; sinemet  mg;   Discontinued taxodione 50 mg  Restarted exelon 4.6 mg in the morning when he was more awake        Lewy body dementia without behavioral disturbance    Seen by psych who has since signed off - appreciate recs -  Doing well on current regimen  Continue medication regimen as follows:  Sinemet  QID and Seroquel 50 QHS  Continue Exelon 4.6 mg/24 patch  Divalproex 250 mg to BID  Held trazadone        Essential hypertension    BP well controlled - Continue amlodipine 10 mg daily          VTE Risk Mitigation         Ordered     enoxaparin injection 40 mg  Daily     Route:  Subcutaneous        02/13/18 1149     Medium Risk of VTE  Once      02/08/18 0653     Place JOSEFINA hose  Until discontinued      02/08/18  0653     Place sequential compression device  Until discontinued      02/08/18 0653              Cami Crow MD  Department of Hospital Medicine   [facility]

## 2018-03-14 NOTE — SUBJECTIVE & OBJECTIVE
Interval History: no acute issues    Review of Systems   Constitutional: Negative for activity change, appetite change, chills, diaphoresis, fatigue and fever.   Respiratory: Negative for cough, chest tightness, shortness of breath and wheezing.    Cardiovascular: Negative for chest pain, palpitations and leg swelling.   Gastrointestinal: Negative for abdominal distention, abdominal pain, constipation, diarrhea, nausea and vomiting.   Genitourinary: Negative for dysuria and hematuria.   Musculoskeletal: Negative for joint swelling and neck stiffness.   Neurological: Negative for tremors and weakness.   Psychiatric/Behavioral: Negative for confusion.     Objective:     Vital Signs (Most Recent):  Temp: 97.3 °F (36.3 °C) (03/14/18 1603)  Pulse: 83 (03/14/18 1603)  Resp: 18 (03/14/18 1603)  BP: (!) 118/56 (03/14/18 1603)  SpO2: 99 % (03/14/18 1603) Vital Signs (24h Range):  Temp:  [97.3 °F (36.3 °C)-99.1 °F (37.3 °C)] 97.3 °F (36.3 °C)  Pulse:  [71-94] 83  Resp:  [18-20] 18  SpO2:  [95 %-99 %] 99 %  BP: (109-161)/(56-77) 118/56     Weight: 64 kg (141 lb 1.5 oz)  Body mass index is 22.1 kg/m².    Intake/Output Summary (Last 24 hours) at 03/14/18 1739  Last data filed at 03/14/18 1730   Gross per 24 hour   Intake              960 ml   Output              775 ml   Net              185 ml      Physical Exam   Constitutional: No distress.   Deconditioned   HENT:   Head: Normocephalic and atraumatic.   Eyes: Pupils are equal, round, and reactive to light.   Wears glasses   Neck: Normal range of motion. Neck supple.   Cardiovascular: Normal rate.    Pulmonary/Chest: Effort normal. No respiratory distress. He has no wheezes. He has no rales.   Abdominal: Soft. He exhibits no distension. There is no tenderness.   Genitourinary: Rectal exam shows guaiac negative stool.   Musculoskeletal: Normal range of motion. He exhibits no edema or tenderness.   Neurological: He is alert.   Pill roll tremor   Skin: Skin is warm and dry. He is  not diaphoretic.   Psychiatric:   requires reorienting often. Stable for days       Significant Labs: All pertinent labs within the past 24 hours have been reviewed.    Significant Imaging: I have reviewed all pertinent imaging results/findings within the past 24 hours.  I have reviewed and interpreted all pertinent imaging results/findings within the past 24 hours.

## 2018-03-14 NOTE — ASSESSMENT & PLAN NOTE
Seen by psych who has since signed off - appreciate recs -  Doing well on current regimen  Continue medication regimen as follows:  Sinemet  QID and Seroquel 50 QHS  Continue Exelon 4.6 mg/24 patch  Divalproex 250 mg to BID  Held trazodone

## 2018-03-14 NOTE — UM SECONDARY REVIEW
[facility]    IP Extended Stay > 10    LOS: approved an agreement with D/C plan   [facility] in progress. Dr. Snell approving ernst stay

## 2018-03-15 LAB — POCT GLUCOSE: 93 MG/DL (ref 70–110)

## 2018-03-15 PROCEDURE — 25000003 PHARM REV CODE 250: Performed by: HOSPITALIST

## 2018-03-15 PROCEDURE — 25000003 PHARM REV CODE 250: Performed by: INTERNAL MEDICINE

## 2018-03-15 PROCEDURE — 25000003 PHARM REV CODE 250: Performed by: NURSE PRACTITIONER

## 2018-03-15 PROCEDURE — 63600175 PHARM REV CODE 636 W HCPCS: Performed by: INTERNAL MEDICINE

## 2018-03-15 PROCEDURE — 21400001 HC TELEMETRY ROOM

## 2018-03-15 RX ADMIN — CARBIDOPA AND LEVODOPA 2 TABLET: 25; 100 TABLET ORAL at 05:03

## 2018-03-15 RX ADMIN — DIVALPROEX SODIUM 250 MG: 250 TABLET, DELAYED RELEASE ORAL at 09:03

## 2018-03-15 RX ADMIN — DOCUSATE SODIUM 100 MG: 100 CAPSULE, LIQUID FILLED ORAL at 09:03

## 2018-03-15 RX ADMIN — QUETIAPINE FUMARATE 50 MG: 25 TABLET ORAL at 09:03

## 2018-03-15 RX ADMIN — ENOXAPARIN SODIUM 40 MG: 100 INJECTION SUBCUTANEOUS at 05:03

## 2018-03-15 RX ADMIN — CARBIDOPA AND LEVODOPA 2 TABLET: 25; 100 TABLET ORAL at 11:03

## 2018-03-15 RX ADMIN — RIVASTIGMINE TRANSDERMAL SYSTEM 1 PATCH: 4.6 PATCH, EXTENDED RELEASE TRANSDERMAL at 09:03

## 2018-03-15 RX ADMIN — DOCUSATE SODIUM AND SENNOSIDES 1 TABLET: 8.6; 5 TABLET, FILM COATED ORAL at 09:03

## 2018-03-15 RX ADMIN — POLYETHYLENE GLYCOL 3350 17 G: 17 POWDER, FOR SOLUTION ORAL at 09:03

## 2018-03-15 NOTE — PROGRESS NOTES
"   [facility]  Adult Nutrition  Progress Note    SUMMARY       Recommendations    Recommendation/Intervention: 1. Continue current diet 2. RD to monitor  Goals: Meet >85% EEN x 7 days  Nutrition Goal Status: goal met  Communication of RD Recs: reviewed with RN    Reason for Assessment    Reason for Assessment: RD follow-up  Diagnosis:  (Debility)  Relevant Medical History: HTN, Parkinsosn  General Information Comments: Patient consuming 75 - 100% of meals with tolerance. No reports of chewing or swallowing difficulties. Patient denies abdominal pain, nausea, vomiting, GI discomfort.     Nutrition Discharge Planning: Patient to discharge on a Regular diet.     Nutrition Risk Screen    Nutrition Risk Screen: no indicators present    Nutrition/Diet History    Food Preferences: No cultural, Taoism or ethnic food preferences.   Do you have any cultural, spiritual, Taoism conflicts, given your current situation?: None  Food Allergies: other (see comments) (NKFA)    Anthropometrics    Temp: 97.8 °F (36.6 °C)  Height Method: Stated  Height: 5' 7" (170.2 cm)  Height (inches): 67 in  Weight Method: Bed Scale  Weight: 64.4 kg (141 lb 15.6 oz)  Weight (lb): 141.98 lb  Ideal Body Weight (IBW), Male: 148 lb  % Ideal Body Weight, Male (lb): 95.93 lb  BMI (Calculated): 22.3  BMI Grade: 18.5-24.9 - normal    Lab/Procedures/Meds    Pertinent Labs Reviewed: reviewed  Pertinent Medications Reviewed: reviewed    Physical Findings/Assessment    Overall Physical Appearance: nourished  Oral/Mouth Cavity: tooth/teeth missing  Skin: intact (Mike Score 16)    Estimated/Assessed Needs    Weight Used For Calorie Calculations: 62.6 kg (138 lb 0.1 oz)  Height: 5' 7" (170.2 cm)  Energy Calorie Requirements (kcal): 7331-4246 kcal  Energy Need Method: [place]-St Jeor  RMR ([place]-St. Jeor Equation): 1314.62  Protein Requirements: 65-75g  Weight Used For Protein Calculations: 62.6 kg (138 lb 0.1 oz)  Fluid Need Method: RDA " Method  RDA Method (mL): 1600       Nutrition Prescription Ordered    Current Diet Order: Regular    Evaluation of Received Nutrient/Fluid Intake    I/O: 640/975  Energy Calories Required: meeting needs  Protein Required: meeting needs  Fluid Required: meeting needs  Comments: LBM: 03/14  Tolerance: tolerating  % Intake of Estimated Energy Needs: 75 - 100 %  % Meal Intake: 75 - 100 %    Nutrition Risk    Level of Risk/Frequency of Follow-up:  (f/u 1x weekly)     Assessment and Plan    No nutrition diagnosis at this time.        Monitor and Evaluation    Food and Nutrient Intake: energy intake, food and beverage intake  Food and Nutrient Adminstration: diet order  Knowledge/Beliefs/Attitudes: food and nutrition knowledge/skill  Physical Activity and Function: nutrition-related ADLs and IADLs  Anthropometric Measurements: weight, weight change  Biochemical Data, Medical Tests and Procedures: electrolyte and renal panel  Nutrition-Focused Physical Findings: overall appearance     Nutrition Follow-Up    RD Follow-up?: Yes

## 2018-03-15 NOTE — SUBJECTIVE & OBJECTIVE
Interval History: no acute issues, continues to hallucinate    Review of Systems   Constitutional: Negative for activity change, appetite change, chills, diaphoresis, fatigue and fever.   Respiratory: Negative for cough, chest tightness, shortness of breath and wheezing.    Cardiovascular: Negative for chest pain, palpitations and leg swelling.   Gastrointestinal: Negative for abdominal distention, abdominal pain, constipation, diarrhea, nausea and vomiting.   Genitourinary: Negative for dysuria and hematuria.   Musculoskeletal: Negative for joint swelling and neck stiffness.   Neurological: Negative for tremors and weakness.   Psychiatric/Behavioral: Negative for confusion.     Objective:     Vital Signs (Most Recent):  Temp: 97.1 °F (36.2 °C) (03/15/18 1159)  Pulse: 91 (03/15/18 1159)  Resp: 18 (03/15/18 1159)  BP: 127/60 (03/15/18 1159)  SpO2: 98 % (03/15/18 1159) Vital Signs (24h Range):  Temp:  [97.1 °F (36.2 °C)-98.4 °F (36.9 °C)] 97.1 °F (36.2 °C)  Pulse:  [78-99] 91  Resp:  [18] 18  SpO2:  [94 %-99 %] 98 %  BP: ()/(52-63) 127/60     Weight: 64.4 kg (141 lb 15.6 oz)  Body mass index is 22.24 kg/m².    Intake/Output Summary (Last 24 hours) at 03/15/18 1515  Last data filed at 03/15/18 0700   Gross per 24 hour   Intake              100 ml   Output              700 ml   Net             -600 ml      Physical Exam   Constitutional: No distress.   Deconditioned   HENT:   Head: Normocephalic and atraumatic.   Eyes: Pupils are equal, round, and reactive to light.   Wears glasses   Neck: Normal range of motion. Neck supple.   Cardiovascular: Normal rate.    Pulmonary/Chest: Effort normal. No respiratory distress. He has no wheezes. He has no rales.   Abdominal: Soft. He exhibits no distension. There is no tenderness.   Genitourinary: Rectal exam shows guaiac negative stool.   Musculoskeletal: Normal range of motion. He exhibits no edema or tenderness.   Neurological: He is alert.   Pill roll tremor   Skin: Skin  is warm and dry. He is not diaphoretic.   Psychiatric:   requires reorienting often. Stable for days       Significant Labs: All pertinent labs within the past 24 hours have been reviewed.    Significant Imaging: I have reviewed all pertinent imaging results/findings within the past 24 hours.  I have reviewed and interpreted all pertinent imaging results/findings within the past 24 hours.

## 2018-03-15 NOTE — PROGRESS NOTES
[facility] Hospital Medicine  Progress Note    Patient Name: Tenzin Ortiz  MRN: [id number]  Patient Class: IP- Inpatient   Admission Date: 2/7/2018  Length of Stay: 16 days  Attending Physician: Cami Crow MD  Primary Care Provider: Efrain Chavez MD        Subjective:     Principal Problem:Debility    HPI:  Patient is 78 yo male with HTN, HLD, and Parkinson's who presents to ED reportedly for HA. Per ED note patient was complaining of HA but he denies this on my interview. Seems there was some confusion in ED and he was unclear why he was here. During interview with me, patient reports being here for NH placement although he does not appear to be very happy about this. He has been with some issues caring for himself. Currently lives with his girlfriend who reports she can no longer care for him. He states he currently gets around with a walker but sometimes has difficulty getting up out of his chair. Per chart review patient is with Parkinson's and sees neurology, Dr. Castellanos, lastly seen on 2/6/18. He was with hallucinations which were noted to be secondary to dopaminergic agents thus having to significantly reduce dose--she recommended he be admitted for placement at that time but patient declined. He has been attempting placement as an outpatient through PCP but without much luck. He otherwise denies recent illness, fever/chills, CP, SOB, abdominal pain, N/V/D, dysuria. On presentation patient with grossly normal labs/vitals. Placed in observation for what appears to be solely placement although this is something that may need to be continued as outpatient as is not indication for hospitalization.    Hospital Course:  Awake and alert without complaint - severe Parkinsons dementia and can no longer care for himself - neither can his girlfriend.  CM reports patient with acceptance to [facility]; awaiting paperwork to be signed by patient POA. Legal involved. Appreciate case management's  assistance.    Interval History: no acute issues, continues to hallucinate    Review of Systems   Constitutional: Negative for activity change, appetite change, chills, diaphoresis, fatigue and fever.   Respiratory: Negative for cough, chest tightness, shortness of breath and wheezing.    Cardiovascular: Negative for chest pain, palpitations and leg swelling.   Gastrointestinal: Negative for abdominal distention, abdominal pain, constipation, diarrhea, nausea and vomiting.   Genitourinary: Negative for dysuria and hematuria.   Musculoskeletal: Negative for joint swelling and neck stiffness.   Neurological: Negative for tremors and weakness.   Psychiatric/Behavioral: Negative for confusion.     Objective:     Vital Signs (Most Recent):  Temp: 97.1 °F (36.2 °C) (03/15/18 1159)  Pulse: 91 (03/15/18 1159)  Resp: 18 (03/15/18 1159)  BP: 127/60 (03/15/18 1159)  SpO2: 98 % (03/15/18 1159) Vital Signs (24h Range):  Temp:  [97.1 °F (36.2 °C)-98.4 °F (36.9 °C)] 97.1 °F (36.2 °C)  Pulse:  [78-99] 91  Resp:  [18] 18  SpO2:  [94 %-99 %] 98 %  BP: ()/(52-63) 127/60     Weight: 64.4 kg (141 lb 15.6 oz)  Body mass index is 22.24 kg/m².    Intake/Output Summary (Last 24 hours) at 03/15/18 1515  Last data filed at 03/15/18 0700   Gross per 24 hour   Intake              100 ml   Output              700 ml   Net             -600 ml      Physical Exam   Constitutional: No distress.   Deconditioned   HENT:   Head: Normocephalic and atraumatic.   Eyes: Pupils are equal, round, and reactive to light.   Wears glasses   Neck: Normal range of motion. Neck supple.   Cardiovascular: Normal rate.    Pulmonary/Chest: Effort normal. No respiratory distress. He has no wheezes. He has no rales.   Abdominal: Soft. He exhibits no distension. There is no tenderness.   Genitourinary: Rectal exam shows guaiac negative stool.   Musculoskeletal: Normal range of motion. He exhibits no edema or tenderness.   Neurological: He is alert.   Pill roll  tremor   Skin: Skin is warm and dry. He is not diaphoretic.   Psychiatric:   requires reorienting often. Stable for days       Significant Labs: All pertinent labs within the past 24 hours have been reviewed.    Significant Imaging: I have reviewed all pertinent imaging results/findings within the past 24 hours.  I have reviewed and interpreted all pertinent imaging results/findings within the past 24 hours.    Assessment/Plan:      * Debility    TN consulted to aid in safe discharge planning. He has been living with a significant other who can no longer care for him and refusing to take patient home. His somewhat estranged daughter has been contacted and is willing to aid in finding NH placement (she had already begun this process as an outpatient) but there is an issue: patient's power of  (a business friend) refused to sign consent for NH placement. Patient unable to make his own decision regarding this as he is with intermittent confusion +/- previous unclear dementia diagnosis per daughter. Psychiatry consulted for capacity, who agrees that he has no capacity for decision making. Also recommended adding depakote 250mg TID to help with mood stabilization. He is medically cleared for transfer once nursing facility arranged.         Parkinson disease    Seroquel 50 mg; sinemet  mg;   Discontinued taxodione 50 mg  Restarted exelon 4.6 mg in the morning when he was more awake        Lewy body dementia without behavioral disturbance    Seen by psych who has since signed off - appreciate recs -  Doing well on current regimen  Continue medication regimen as follows:  Sinemet  QID and Seroquel 50 QHS  Continue Exelon 4.6 mg/24 patch  Divalproex 250 mg to BID  Held trazodone        Essential hypertension    BP well controlled - Continue amlodipine 10 mg daily          VTE Risk Mitigation         Ordered     enoxaparin injection 40 mg  Daily     Route:  Subcutaneous        03/14/18 1102     Medium Risk  of VTE  Once      02/08/18 0653     Place JOSEFINA hose  Until discontinued      02/08/18 0653     Place sequential compression device  Until discontinued      02/08/18 0653              Cami Crow MD  Department of Hospital Medicine   [facility]

## 2018-03-15 NOTE — PLAN OF CARE
Problem: Fall Risk (Adult)  Intervention: Monitor/Assist with Self Care   03/15/18 0036   Activity   Activity Assistance Provided assistance, 1 person   Daily Care Interventions   Self-Care Promotion independence encouraged;BADL personal objects within reach   Functional Level Current   Ambulation 2 - assistive person   Transferring 2 - assistive person   Toileting 2 - assistive person   Bathing 2 - assistive person   Dressing 2 - assistive person   Eating 2 - assistive person   Communication 0 - understands/communicates without difficulty   Swallowing 0 - swallows foods/liquids without difficulty     Intervention: Reduce Risk/Promote Restraint Free Environment   03/15/18 0036   Safety Interventions   Environmental Safety Modification assistive device/personal items within reach;clutter free environment maintained   Prevent [place] Drop/Fall   Safety/Security Measures bed alarm set   Safety Interventions   Safety Precautions emergency equipment at bedside     Intervention: Review Medications/Identify Contributors to Fall Risk   03/15/18 0036   Safety Interventions   Medication Review/Management medications reviewed     Intervention: Patient Rounds   03/15/18 0036   Safety Interventions   Patient Rounds bed in low position;bed wheels locked;call light in reach;clutter free environment maintained;ID band on;placement of personal items at bedside;toileting offered;visualized patient     Intervention: Safety Promotion/Fall Prevention   03/15/18 0036   Safety Interventions   Safety Promotion/Fall Prevention assistive device/personal item within reach;bed alarm set;diversional activities provided;commode/urinal/bedpan at bedside;Fall Risk reviewed with patient/family;Fall Risk signage in place;medications reviewed;lighting adjusted;nonskid shoes/socks when out of bed;/camera at bedside;side rails raised x 3;instructed to call staff for mobility     Intervention: Safety Precautions   03/15/18 0036   Safety  Interventions   Safety Precautions emergency equipment at bedside       Goal: Identify Related Risk Factors and Signs and Symptoms  Related risk factors and signs and symptoms are identified upon initiation of Human Response Clinical Practice Guideline (CPG)   Outcome: Ongoing (interventions implemented as appropriate)   03/15/18 0036   Fall Risk   Related Risk Factors (Fall Risk) gait/mobility problems;objects hard to reach     Goal: Absence of Falls  Patient will demonstrate the desired outcomes by discharge/transition of care.   Outcome: Ongoing (interventions implemented as appropriate)   03/15/18 0036   Fall Risk (Adult)   Absence of Falls making progress toward outcome

## 2018-03-15 NOTE — PLAN OF CARE
Hospital legal team involved with patient's POA to obtain nursing home placement for patient. It's still questionable if hospital will have to interdict patient. SW will continue to assist as needed.         03/15/18 1512   Discharge Reassessment   Assessment Type Discharge Planning Reassessment   Provided patient/caregiver education on the expected discharge date and the discharge plan No   Do you have any problems affording any of your prescribed medications? No   Discharge Plan A New Nursing Home placement - senior living care facility   Discharge Plan B New Nursing Home placement - senior living care facility   Patient choice form signed by patient/caregiver N/A   Can the patient answer the patient profile reliably? No, cognitively impaired   How does the patient rate their overall health at the present time? Fair   Describe the patient's ability to walk at the present time. Walks with the help of equipment   How often would a person be available to care for the patient? Never

## 2018-03-15 NOTE — PLAN OF CARE
Problem: Fall Risk (Adult)  Intervention: Monitor/Assist with Self Care   03/15/18 0036 03/15/18 0951 03/15/18 1200   Activity   Activity Assistance Provided --  --  assistance, 1 person   Daily Care Interventions   Self-Care Promotion --  BADL personal objects within reach;BADL personal routines maintained;meal setup provided;safe use of adaptive equipment encouraged --    Functional Level Current   Ambulation 2 - assistive person --  --    Transferring 2 - assistive person --  --    Toileting 2 - assistive person --  --    Bathing 2 - assistive person --  --    Dressing 2 - assistive person --  --    Eating 2 - assistive person --  --    Communication 0 - understands/communicates without difficulty --  --    Swallowing 0 - swallows foods/liquids without difficulty --  --      Intervention: Reduce Risk/Promote Restraint Free Environment   03/15/18 0036 03/15/18 0618   Safety Interventions   Environmental Safety Modification assistive device/personal items within reach;clutter free environment maintained --    Prevent  Drop/Fall   Safety/Security Measures bed alarm set --    Safety Interventions   Safety Precautions --  emergency equipment at bedside     Intervention: Review Medications/Identify Contributors to Fall Risk   03/15/18 0036   Safety Interventions   Medication Review/Management medications reviewed     Intervention: Patient Rounds   03/15/18 1200   Safety Interventions   Patient Rounds bed in low position;bed wheels locked;call light in reach;clutter free environment maintained;placement of personal items at bedside;ID band on;visualized patient;toileting offered     Intervention: Safety Promotion/Fall Prevention   03/15/18 1200   Safety Interventions   Safety Promotion/Fall Prevention assistive device/personal item within reach;bed alarm set;side rails raised x 2     Intervention: Safety Precautions   03/15/18 0618   Safety Interventions   Safety Precautions emergency equipment at bedside        Goal: Identify Related Risk Factors and Signs and Symptoms  Related risk factors and signs and symptoms are identified upon initiation of Human Response Clinical Practice Guideline (CPG)   Outcome: Ongoing (interventions implemented as appropriate)   18 0209 03/15/18 0036   Fall Risk   Related Risk Factors (Fall Risk) --  gait/mobility problems;objects hard to reach   Signs and Symptoms (Fall Risk) presence of risk factors --      Goal: Absence of Falls  Patient will demonstrate the desired outcomes by discharge/transition of care.   Outcome: Ongoing (interventions implemented as appropriate)   03/15/18 0036   Fall Risk (Adult)   Absence of Falls making progress toward outcome       Problem: Confusion, Acute (Adult)  Intervention: Monitor/Assist with Self Care   03/15/18 0036 03/15/18 0951 03/15/18 1200   Activity   Activity Assistance Provided --  --  assistance, 1 person   Daily Care Interventions   Self-Care Promotion --  BADL personal objects within reach;BADL personal routines maintained;meal setup provided;safe use of adaptive equipment encouraged --    Functional Level Current   Ambulation 2 - assistive person --  --    Transferring 2 - assistive person --  --    Toileting 2 - assistive person --  --    Bathing 2 - assistive person --  --    Dressing 2 - assistive person --  --    Eating 2 - assistive person --  --    Communication 0 - understands/communicates without difficulty --  --    Swallowing 0 - swallows foods/liquids without difficulty --  --      Intervention: Reduce Risk/Promote Restraint Free Environment   03/15/18 0036 03/15/18 0618   Safety Interventions   Environmental Safety Modification assistive device/personal items within reach;clutter free environment maintained --    Prevent [place] Drop/Fall   Safety/Security Measures bed alarm set --    Safety Interventions   Safety Precautions --  emergency equipment at bedside     Intervention: Evaluate Medications/Identify Contributors  to Confusion   03/15/18 0036   Safety Interventions   Medication Review/Management medications reviewed     Intervention: Optimize Communication   03/13/18 2000   Cognitive Interventions   Communication Enhancement Strategies call light answered in person     Intervention: Promote Familiarity/Consistency   03/13/18 2000   Coping/Psychosocial Interventions   Environment Familiarity/Consistency daily routine followed     Intervention: Provide Frequent Orientation/Reorientation   03/13/18 2000   Cognitive Interventions   Reorientation Measures clock in view;glasses encouraged;reorientation provided   Sensory Stimulation Regulation care clustered;lighting decreased;quiet environment promoted       Goal: Identify Related Risk Factors and Signs and Symptoms  Related risk factors and signs and symptoms are identified upon initiation of Human Response Clinical Practice Guideline (CPG)   Outcome: Ongoing (interventions implemented as appropriate)   03/11/18 0503 03/14/18 1446   Confusion, Acute   Related Risk Factors (Acute Confusion) --  advanced age;cognitive impairment   Signs and Symptoms (Acute Confusion) communication disturbed;emotional/behavioral disturbances;perceptions disturbed;understanding disturbed --      Goal: Cognitive/Functional Impairments Minimized  Patient will demonstrate the desired outcomes by discharge/transition of care.   Outcome: Ongoing (interventions implemented as appropriate)   03/14/18 1446   Confusion, Acute (Adult)   Cognitive/Functional Impairments Minimized making progress toward outcome     Goal: Safety  Patient will demonstrate the desired outcomes by discharge/transition of care.   Outcome: Ongoing (interventions implemented as appropriate)   03/14/18 1446   Confusion, Acute (Adult)   Safety making progress toward outcome

## 2018-03-16 LAB — POCT GLUCOSE: 84 MG/DL (ref 70–110)

## 2018-03-16 PROCEDURE — 25000003 PHARM REV CODE 250: Performed by: NURSE PRACTITIONER

## 2018-03-16 PROCEDURE — 63600175 PHARM REV CODE 636 W HCPCS: Performed by: INTERNAL MEDICINE

## 2018-03-16 PROCEDURE — 25000003 PHARM REV CODE 250: Performed by: HOSPITALIST

## 2018-03-16 PROCEDURE — 21400001 HC TELEMETRY ROOM

## 2018-03-16 PROCEDURE — 25000003 PHARM REV CODE 250: Performed by: INTERNAL MEDICINE

## 2018-03-16 RX ORDER — QUETIAPINE FUMARATE 100 MG/1
100 TABLET, FILM COATED ORAL NIGHTLY
Status: DISCONTINUED | OUTPATIENT
Start: 2018-03-16 | End: 2018-04-09

## 2018-03-16 RX ORDER — OLANZAPINE 5 MG/1
5 TABLET, ORALLY DISINTEGRATING ORAL EVERY 8 HOURS PRN
Status: DISCONTINUED | OUTPATIENT
Start: 2018-03-16 | End: 2018-04-09

## 2018-03-16 RX ADMIN — CARBIDOPA AND LEVODOPA 2 TABLET: 25; 100 TABLET ORAL at 05:03

## 2018-03-16 RX ADMIN — AMLODIPINE BESYLATE 10 MG: 5 TABLET ORAL at 09:03

## 2018-03-16 RX ADMIN — QUETIAPINE FUMARATE 100 MG: 100 TABLET ORAL at 09:03

## 2018-03-16 RX ADMIN — DOCUSATE SODIUM 100 MG: 100 CAPSULE, LIQUID FILLED ORAL at 09:03

## 2018-03-16 RX ADMIN — RIVASTIGMINE TRANSDERMAL SYSTEM 1 PATCH: 4.6 PATCH, EXTENDED RELEASE TRANSDERMAL at 09:03

## 2018-03-16 RX ADMIN — DIVALPROEX SODIUM 250 MG: 250 TABLET, DELAYED RELEASE ORAL at 09:03

## 2018-03-16 RX ADMIN — POLYETHYLENE GLYCOL 3350 17 G: 17 POWDER, FOR SOLUTION ORAL at 09:03

## 2018-03-16 RX ADMIN — ENOXAPARIN SODIUM 40 MG: 100 INJECTION SUBCUTANEOUS at 05:03

## 2018-03-16 RX ADMIN — CARBIDOPA AND LEVODOPA 2 TABLET: 25; 100 TABLET ORAL at 12:03

## 2018-03-16 NOTE — SUBJECTIVE & OBJECTIVE
Interval History: no acute issues, continues to hallucinate    Review of Systems   Constitutional: Negative for activity change, appetite change, chills, diaphoresis, fatigue and fever.   Respiratory: Negative for cough, chest tightness, shortness of breath and wheezing.    Cardiovascular: Negative for chest pain, palpitations and leg swelling.   Gastrointestinal: Negative for abdominal distention, abdominal pain, constipation, diarrhea, nausea and vomiting.   Genitourinary: Negative for dysuria and hematuria.   Musculoskeletal: Negative for joint swelling and neck stiffness.   Neurological: Negative for tremors and weakness.   Psychiatric/Behavioral: Negative for confusion.     Objective:     Vital Signs (Most Recent):  Temp: 97.6 °F (36.4 °C) (03/16/18 0742)  Pulse: 94 (03/16/18 0742)  Resp: 18 (03/16/18 0742)  BP: (!) 111/57 (03/16/18 0742)  SpO2: 96 % (03/16/18 0742) Vital Signs (24h Range):  Temp:  [96.9 °F (36.1 °C)-98.2 °F (36.8 °C)] 97.6 °F (36.4 °C)  Pulse:  [] 94  Resp:  [17-19] 18  SpO2:  [96 %-98 %] 96 %  BP: (106-157)/(51-73) 111/57     Weight: 61.9 kg (136 lb 7.4 oz)  Body mass index is 21.37 kg/m².    Intake/Output Summary (Last 24 hours) at 03/16/18 1002  Last data filed at 03/15/18 1200   Gross per 24 hour   Intake              120 ml   Output                0 ml   Net              120 ml      Physical Exam   Constitutional: No distress.   Deconditioned   HENT:   Head: Normocephalic and atraumatic.   Eyes: Pupils are equal, round, and reactive to light.   Wears glasses   Neck: Normal range of motion. Neck supple.   Cardiovascular: Normal rate.    Pulmonary/Chest: Effort normal. No respiratory distress. He has no wheezes. He has no rales.   Abdominal: Soft. He exhibits no distension. There is no tenderness.   Genitourinary: Rectal exam shows guaiac negative stool.   Musculoskeletal: Normal range of motion. He exhibits no edema or tenderness.   Neurological: He is alert.   Pill roll tremor    Skin: Skin is warm and dry. He is not diaphoretic.   Psychiatric:   requires reorienting often. Stable for days       Significant Labs: All pertinent labs within the past 24 hours have been reviewed.    Significant Imaging: I have reviewed all pertinent imaging results/findings within the past 24 hours.  I have reviewed and interpreted all pertinent imaging results/findings within the past 24 hours.

## 2018-03-16 NOTE — PLAN OF CARE
Problem: Fall Risk (Adult)  Intervention: Monitor/Assist with Self Care   03/15/18 1922 18 0817 18 1000   Activity   Activity Assistance Provided --  --  assistance, 1 person   Daily Care Interventions   Self-Care Promotion --  BADL personal objects within reach;BADL personal routines maintained;meal setup provided;safe use of adaptive equipment encouraged --    Functional Level Current   Ambulation 2 - assistive person --  --    Transferring 2 - assistive person --  --    Toileting 2 - assistive person --  --    Bathing 2 - assistive person --  --    Dressing 2 - assistive person --  --    Eating 2 - assistive person --  --    Communication 0 - understands/communicates without difficulty --  --    Swallowing 0 - swallows foods/liquids without difficulty --  --      Intervention: Reduce Risk/Promote Restraint Free Environment   03/15/18 0036 18 0638   Safety Interventions   Environmental Safety Modification assistive device/personal items within reach;clutter free environment maintained --    Prevent  Drop/Fall   Safety/Security Measures bed alarm set --    Safety Interventions   Safety Precautions --  emergency equipment at bedside     Intervention: Review Medications/Identify Contributors to Fall Risk   03/15/18 0036   Safety Interventions   Medication Review/Management medications reviewed     Intervention: Patient Rounds   18 1000   Safety Interventions   Patient Rounds bed in low position;bed wheels locked;call light in reach;clutter free environment maintained;placement of personal items at bedside;ID band on;visualized patient;toileting offered     Intervention: Safety Promotion/Fall Prevention   18 1000   Safety Interventions   Safety Promotion/Fall Prevention assistive device/personal item within reach;bed alarm set;side rails raised x 2     Intervention: Safety Precautions   18 0638   Safety Interventions   Safety Precautions emergency equipment at bedside       18 0638   Safety Interventions   Safety Precautions emergency equipment at bedside       Goal: Identify Related Risk Factors and Signs and Symptoms  Related risk factors and signs and symptoms are identified upon initiation of Human Response Clinical Practice Guideline (CPG)   Outcome: Ongoing (interventions implemented as appropriate)   18 0209 18   Fall Risk   Related Risk Factors (Fall Risk) --  bladder function altered;confusion/agitation;gait/mobility problems   Signs and Symptoms (Fall Risk) presence of risk factors --      Goal: Absence of Falls  Patient will demonstrate the desired outcomes by discharge/transition of care.   Outcome: Ongoing (interventions implemented as appropriate)   18   Fall Risk (Adult)   Absence of Falls making progress toward outcome       Problem: Confusion, Acute (Adult)  Intervention: Monitor/Assist with Self Care   03/15/18 19218 0817 18 1000   Activity   Activity Assistance Provided --  --  assistance, 1 person   Daily Care Interventions   Self-Care Promotion --  BADL personal objects within reach;BADL personal routines maintained;meal setup provided;safe use of adaptive equipment encouraged --    Functional Level Current   Ambulation 2 - assistive person --  --    Transferring 2 - assistive person --  --    Toileting 2 - assistive person --  --    Bathing 2 - assistive person --  --    Dressing 2 - assistive person --  --    Eating 2 - assistive person --  --    Communication 0 - understands/communicates without difficulty --  --    Swallowing 0 - swallows foods/liquids without difficulty --  --      Intervention: Reduce Risk/Promote Restraint Free Environment   03/15/18 0036 18 0638   Safety Interventions   Environmental Safety Modification assistive device/personal items within reach;clutter free environment maintained --    Prevent  Drop/Fall   Safety/Security Measures bed alarm set --    Safety Interventions    Safety Precautions --  emergency equipment at bedside     Intervention: Evaluate Medications/Identify Contributors to Confusion   03/15/18 0036   Safety Interventions   Medication Review/Management medications reviewed     Intervention: Optimize Communication   03/13/18 2000   Cognitive Interventions   Communication Enhancement Strategies call light answered in person     Intervention: Promote Familiarity/Consistency   03/13/18 2000   Coping/Psychosocial Interventions   Environment Familiarity/Consistency daily routine followed     Intervention: Provide Frequent Orientation/Reorientation   03/13/18 2000 03/15/18 1922   Cognitive Interventions   Reorientation Measures --  calendar in view;clock in view   Sensory Stimulation Regulation care clustered;lighting decreased;quiet environment promoted --        Goal: Identify Related Risk Factors and Signs and Symptoms  Related risk factors and signs and symptoms are identified upon initiation of Human Response Clinical Practice Guideline (CPG)   Outcome: Ongoing (interventions implemented as appropriate)   03/11/18 0503 03/14/18 1446   Confusion, Acute   Related Risk Factors (Acute Confusion) --  advanced age;cognitive impairment   Signs and Symptoms (Acute Confusion) communication disturbed;emotional/behavioral disturbances;perceptions disturbed;understanding disturbed --      Goal: Cognitive/Functional Impairments Minimized  Patient will demonstrate the desired outcomes by discharge/transition of care.   Outcome: Ongoing (interventions implemented as appropriate)   03/14/18 1446   Confusion, Acute (Adult)   Cognitive/Functional Impairments Minimized making progress toward outcome     Goal: Safety  Patient will demonstrate the desired outcomes by discharge/transition of care.   Outcome: Ongoing (interventions implemented as appropriate)   03/14/18 1446   Confusion, Acute (Adult)   Safety making progress toward outcome

## 2018-03-16 NOTE — PROGRESS NOTES
[facility] Hospital Medicine  Progress Note    Patient Name: Tenzin Ortiz  MRN: [id number]  Patient Class: IP- Inpatient   Admission Date: 2/7/2018  Length of Stay: 17 days  Attending Physician: Cami Crow MD  Primary Care Provider: Efrain Chavez MD        Subjective:     Principal Problem:Debility    HPI:  Patient is 76 yo male with HTN, HLD, and Parkinson's who presents to ED reportedly for HA. Per ED note patient was complaining of HA but he denies this on my interview. Seems there was some confusion in ED and he was unclear why he was here. During interview with me, patient reports being here for NH placement although he does not appear to be very happy about this. He has been with some issues caring for himself. Currently lives with his girlfriend who reports she can no longer care for him. He states he currently gets around with a walker but sometimes has difficulty getting up out of his chair. Per chart review patient is with Parkinson's and sees neurology, Dr. Castellanos, lastly seen on 2/6/18. He was with hallucinations which were noted to be secondary to dopaminergic agents thus having to significantly reduce dose--she recommended he be admitted for placement at that time but patient declined. He has been attempting placement as an outpatient through PCP but without much luck. He otherwise denies recent illness, fever/chills, CP, SOB, abdominal pain, N/V/D, dysuria. On presentation patient with grossly normal labs/vitals. Placed in observation for what appears to be solely placement although this is something that may need to be continued as outpatient as is not indication for hospitalization.    Hospital Course:  Awake and alert without complaint - severe Parkinsons dementia and can no longer care for himself - neither can his girlfriend.  CM reports patient with acceptance to [facility]; awaiting paperwork to be signed by patient POA. Legal involved. Appreciate case management's  assistance.    Interval History: no acute issues, continues to hallucinate    Review of Systems   Constitutional: Negative for activity change, appetite change, chills, diaphoresis, fatigue and fever.   Respiratory: Negative for cough, chest tightness, shortness of breath and wheezing.    Cardiovascular: Negative for chest pain, palpitations and leg swelling.   Gastrointestinal: Negative for abdominal distention, abdominal pain, constipation, diarrhea, nausea and vomiting.   Genitourinary: Negative for dysuria and hematuria.   Musculoskeletal: Negative for joint swelling and neck stiffness.   Neurological: Negative for tremors and weakness.   Psychiatric/Behavioral: Negative for confusion.     Objective:     Vital Signs (Most Recent):  Temp: 97.6 °F (36.4 °C) (03/16/18 0742)  Pulse: 94 (03/16/18 0742)  Resp: 18 (03/16/18 0742)  BP: (!) 111/57 (03/16/18 0742)  SpO2: 96 % (03/16/18 0742) Vital Signs (24h Range):  Temp:  [96.9 °F (36.1 °C)-98.2 °F (36.8 °C)] 97.6 °F (36.4 °C)  Pulse:  [] 94  Resp:  [17-19] 18  SpO2:  [96 %-98 %] 96 %  BP: (106-157)/(51-73) 111/57     Weight: 61.9 kg (136 lb 7.4 oz)  Body mass index is 21.37 kg/m².    Intake/Output Summary (Last 24 hours) at 03/16/18 1002  Last data filed at 03/15/18 1200   Gross per 24 hour   Intake              120 ml   Output                0 ml   Net              120 ml      Physical Exam   Constitutional: No distress.   Deconditioned   HENT:   Head: Normocephalic and atraumatic.   Eyes: Pupils are equal, round, and reactive to light.   Wears glasses   Neck: Normal range of motion. Neck supple.   Cardiovascular: Normal rate.    Pulmonary/Chest: Effort normal. No respiratory distress. He has no wheezes. He has no rales.   Abdominal: Soft. He exhibits no distension. There is no tenderness.   Genitourinary: Rectal exam shows guaiac negative stool.   Musculoskeletal: Normal range of motion. He exhibits no edema or tenderness.   Neurological: He is alert.   Pill  roll tremor   Skin: Skin is warm and dry. He is not diaphoretic.   Psychiatric:   requires reorienting often. Stable for days       Significant Labs: All pertinent labs within the past 24 hours have been reviewed.    Significant Imaging: I have reviewed all pertinent imaging results/findings within the past 24 hours.  I have reviewed and interpreted all pertinent imaging results/findings within the past 24 hours.    Assessment/Plan:      * Debility    TN consulted to aid in safe discharge planning. He has been living with a significant other who can no longer care for him and refusing to take patient home. His somewhat estranged daughter has been contacted and is willing to aid in finding NH placement (she had already begun this process as an outpatient) but there is an issue: patient's power of  (a business friend) refused to sign consent for NH placement. Patient unable to make his own decision regarding this as he is with intermittent confusion +/- previous unclear dementia diagnosis per daughter. Psychiatry consulted for capacity, who agrees that he has no capacity for decision making. Also recommended adding depakote 250mg TID to help with mood stabilization. He is medically cleared for transfer once nursing facility arranged.         Parkinson disease    Seroquel 50 mg; sinemet  mg;   Discontinued taxodione 50 mg  Restarted exelon 4.6 mg in the morning when he was more awake        Lewy body dementia without behavioral disturbance    Seen by psych who has since signed off - appreciate recs -  Doing well on current regimen  Continue medication regimen as follows:  Sinemet  QID and Seroquel 50 QHS  Continue Exelon 4.6 mg/24 patch  Divalproex 250 mg to BID  Held trazodone        Essential hypertension    BP well controlled - Continue amlodipine 10 mg daily          VTE Risk Mitigation         Ordered     enoxaparin injection 40 mg  Daily     Route:  Subcutaneous        03/14/18 1102     Medium  Risk of VTE  Once      02/08/18 0653     Place JOSEFINA hose  Until discontinued      02/08/18 0653     Place sequential compression device  Until discontinued      02/08/18 0653              Cami Crow MD  Department of Hospital Medicine   [facility]

## 2018-03-16 NOTE — PT/OT/SLP PROGRESS
Physical Therapy      Patient Name:  Tenzin Ortiz   MRN:  [id number]    Patient not seen today secondary to on nsg hold due to pt with increased agitation per nurse Maame. Will follow-up as able..    Christopher Martinez, PTA

## 2018-03-16 NOTE — PLAN OF CARE
Problem: Fall Risk (Adult)  Intervention: Patient Rounds   03/16/18 0427   Safety Interventions   Patient Rounds bed in low position;call light in reach;placement of personal items at bedside;clutter free environment maintained;bed wheels locked;ID band on;toileting offered;visualized patient     Intervention: Safety Promotion/Fall Prevention   03/16/18 0427   Safety Interventions   Safety Promotion/Fall Prevention assistive device/personal item within reach;bed alarm set;commode/urinal/bedpan at bedside;diversional activities provided;Fall Risk reviewed with patient/family;Fall Risk signage in place;lighting adjusted;medications reviewed;nonskid shoes/socks when out of bed;room near unit station;/camera at bedside;supervised activity;side rails raised x 3;instructed to call staff for mobility     Intervention: Safety Precautions   03/16/18 0427   Safety Interventions   Safety Precautions emergency equipment at bedside       Goal: Identify Related Risk Factors and Signs and Symptoms  Related risk factors and signs and symptoms are identified upon initiation of Human Response Clinical Practice Guideline (CPG)   Outcome: Ongoing (interventions implemented as appropriate)   03/16/18 0427   Fall Risk   Related Risk Factors (Fall Risk) bladder function altered;confusion/agitation;gait/mobility problems     Goal: Absence of Falls  Patient will demonstrate the desired outcomes by discharge/transition of care.   Outcome: Ongoing (interventions implemented as appropriate)   03/16/18 0427   Fall Risk (Adult)   Absence of Falls making progress toward outcome

## 2018-03-17 PROCEDURE — 25000003 PHARM REV CODE 250: Performed by: NURSE PRACTITIONER

## 2018-03-17 PROCEDURE — 25000003 PHARM REV CODE 250: Performed by: INTERNAL MEDICINE

## 2018-03-17 PROCEDURE — 25000003 PHARM REV CODE 250: Performed by: HOSPITALIST

## 2018-03-17 PROCEDURE — 63600175 PHARM REV CODE 636 W HCPCS: Performed by: INTERNAL MEDICINE

## 2018-03-17 PROCEDURE — 21400001 HC TELEMETRY ROOM

## 2018-03-17 RX ADMIN — DIVALPROEX SODIUM 250 MG: 250 TABLET, DELAYED RELEASE ORAL at 09:03

## 2018-03-17 RX ADMIN — AMLODIPINE BESYLATE 10 MG: 5 TABLET ORAL at 09:03

## 2018-03-17 RX ADMIN — CARBIDOPA AND LEVODOPA 2 TABLET: 25; 100 TABLET ORAL at 01:03

## 2018-03-17 RX ADMIN — DOCUSATE SODIUM 100 MG: 100 CAPSULE, LIQUID FILLED ORAL at 09:03

## 2018-03-17 RX ADMIN — CARBIDOPA AND LEVODOPA 2 TABLET: 25; 100 TABLET ORAL at 12:03

## 2018-03-17 RX ADMIN — CARBIDOPA AND LEVODOPA 2 TABLET: 25; 100 TABLET ORAL at 05:03

## 2018-03-17 RX ADMIN — CARBIDOPA AND LEVODOPA 2 TABLET: 25; 100 TABLET ORAL at 06:03

## 2018-03-17 RX ADMIN — DOCUSATE SODIUM AND SENNOSIDES 1 TABLET: 8.6; 5 TABLET, FILM COATED ORAL at 09:03

## 2018-03-17 RX ADMIN — QUETIAPINE FUMARATE 100 MG: 100 TABLET ORAL at 09:03

## 2018-03-17 RX ADMIN — RIVASTIGMINE TRANSDERMAL SYSTEM 1 PATCH: 4.6 PATCH, EXTENDED RELEASE TRANSDERMAL at 09:03

## 2018-03-17 RX ADMIN — POLYETHYLENE GLYCOL 3350 17 G: 17 POWDER, FOR SOLUTION ORAL at 09:03

## 2018-03-17 RX ADMIN — ENOXAPARIN SODIUM 40 MG: 100 INJECTION SUBCUTANEOUS at 06:03

## 2018-03-17 NOTE — NURSING
Received report from Efrain COLLINS. Pt is lying supine in bed with the Television on. Pt denies any pain. Bed is locked, lowered, and alarmed. Call light within reach. Will continue to monitor.

## 2018-03-17 NOTE — PLAN OF CARE
Problem: Pressure Ulcer Risk (Mike Scale) (Adult,Obstetrics,Pediatric)  Intervention: Promote/Optimize Nutrition   03/13/18 2000   Nutrition Interventions   Oral Nutrition Promotion rest periods promoted     Intervention: Prevent/Manage Excess Moisture   03/13/18 2000 03/15/18 1200 03/16/18 0231   Hygiene Care   Perineal Care --  perineum cleansed --    Bathing/Skin Care --  --  bath, complete;incontinence care;linen changed   Skin Interventions   Skin Protection Electrode sites;Adhesive use limited --  --      Intervention: Maintain Head of Bed Elevation Less Than 30 Degrees as Tolerated   03/16/18 1800   Positioning   Head of Bed (HOB) HOB at 30 degrees

## 2018-03-17 NOTE — SUBJECTIVE & OBJECTIVE
Interval History: no acute issues, continues to hallucinate    Review of Systems   Constitutional: Negative for activity change, appetite change, chills, diaphoresis, fatigue and fever.   Respiratory: Negative for cough, chest tightness, shortness of breath and wheezing.    Cardiovascular: Negative for chest pain, palpitations and leg swelling.   Gastrointestinal: Negative for abdominal distention, abdominal pain, constipation, diarrhea, nausea and vomiting.   Genitourinary: Negative for dysuria and hematuria.   Musculoskeletal: Negative for joint swelling and neck stiffness.   Neurological: Negative for tremors and weakness.   Psychiatric/Behavioral: Negative for confusion.     Objective:     Vital Signs (Most Recent):  Temp: 97.7 °F (36.5 °C) (03/17/18 1233)  Pulse: 78 (03/17/18 1233)  Resp: 16 (03/17/18 1233)  BP: 104/66 (03/17/18 1233)  SpO2: 95 % (03/17/18 1233) Vital Signs (24h Range):  Temp:  [97.4 °F (36.3 °C)-98.8 °F (37.1 °C)] 97.7 °F (36.5 °C)  Pulse:  [76-79] 78  Resp:  [16-18] 16  SpO2:  [93 %-98 %] 95 %  BP: (104-124)/(55-75) 104/66     Weight: 63 kg (138 lb 14.2 oz)  Body mass index is 21.75 kg/m².    Intake/Output Summary (Last 24 hours) at 03/17/18 1336  Last data filed at 03/17/18 1200   Gross per 24 hour   Intake              600 ml   Output                0 ml   Net              600 ml      Physical Exam   Constitutional: No distress.   Deconditioned   HENT:   Head: Atraumatic.   Right Ear: External ear normal.   Left Ear: External ear normal.   Nose: Nose normal.   Eyes: EOM are normal.   Wears glasses   Neck: Normal range of motion. Neck supple.   Pulmonary/Chest: Effort normal. No respiratory distress.   Abdominal: Soft. He exhibits no distension.   Musculoskeletal: Normal range of motion. He exhibits no edema.   Neurological: He is alert.   Pill roll tremor   Skin: Skin is warm and dry. He is not diaphoretic.   Psychiatric:   requires reorienting often. Stable for days       Significant Labs:  All pertinent labs within the past 24 hours have been reviewed.    Significant Imaging: I have reviewed all pertinent imaging results/findings within the past 24 hours.  I have reviewed and interpreted all pertinent imaging results/findings within the past 24 hours.

## 2018-03-17 NOTE — NURSING
Received report from Shauna. Care assumed. Pt is lying supine in bed with the Television on. Pt denies any discomfort. Bed is locked, lowered, and alarmed. Call light within reach. Will continue to monitor.

## 2018-03-17 NOTE — PROGRESS NOTES
[facility] Hospital Medicine  Progress Note    Patient Name: Tenzin Ortiz  MRN: [id number]  Patient Class: IP- Inpatient   Admission Date: 2/7/2018  Length of Stay: 18 days  Attending Physician: Cami Crow MD  Primary Care Provider: Efrain Chavez MD        Subjective:     Principal Problem:Debility    HPI:  Patient is 78 yo male with HTN, HLD, and Parkinson's who presents to ED reportedly for HA. Per ED note patient was complaining of HA but he denies this on my interview. Seems there was some confusion in ED and he was unclear why he was here. During interview with me, patient reports being here for NH placement although he does not appear to be very happy about this. He has been with some issues caring for himself. Currently lives with his girlfriend who reports she can no longer care for him. He states he currently gets around with a walker but sometimes has difficulty getting up out of his chair. Per chart review patient is with Parkinson's and sees neurology, Dr. Castellanos, lastly seen on 2/6/18. He was with hallucinations which were noted to be secondary to dopaminergic agents thus having to significantly reduce dose--she recommended he be admitted for placement at that time but patient declined. He has been attempting placement as an outpatient through PCP but without much luck. He otherwise denies recent illness, fever/chills, CP, SOB, abdominal pain, N/V/D, dysuria. On presentation patient with grossly normal labs/vitals. Placed in observation for what appears to be solely placement although this is something that may need to be continued as outpatient as is not indication for hospitalization.    Hospital Course:  Awake and alert without complaint - severe Parkinsons dementia and can no longer care for himself - neither can his girlfriend.  CM reports patient with acceptance to [place]; awaiting paperwork to be signed by patient POA. Legal involved. Appreciate case management's  assistance.    Interval History: no acute issues, continues to hallucinate    Review of Systems   Constitutional: Negative for activity change, appetite change, chills, diaphoresis, fatigue and fever.   Respiratory: Negative for cough, chest tightness, shortness of breath and wheezing.    Cardiovascular: Negative for chest pain, palpitations and leg swelling.   Gastrointestinal: Negative for abdominal distention, abdominal pain, constipation, diarrhea, nausea and vomiting.   Genitourinary: Negative for dysuria and hematuria.   Musculoskeletal: Negative for joint swelling and neck stiffness.   Neurological: Negative for tremors and weakness.   Psychiatric/Behavioral: Negative for confusion.     Objective:     Vital Signs (Most Recent):  Temp: 97.7 °F (36.5 °C) (03/17/18 1233)  Pulse: 78 (03/17/18 1233)  Resp: 16 (03/17/18 1233)  BP: 104/66 (03/17/18 1233)  SpO2: 95 % (03/17/18 1233) Vital Signs (24h Range):  Temp:  [97.4 °F (36.3 °C)-98.8 °F (37.1 °C)] 97.7 °F (36.5 °C)  Pulse:  [76-79] 78  Resp:  [16-18] 16  SpO2:  [93 %-98 %] 95 %  BP: (104-124)/(55-75) 104/66     Weight: 63 kg (138 lb 14.2 oz)  Body mass index is 21.75 kg/m².    Intake/Output Summary (Last 24 hours) at 03/17/18 1336  Last data filed at 03/17/18 1200   Gross per 24 hour   Intake              600 ml   Output                0 ml   Net              600 ml      Physical Exam   Constitutional: No distress.   Deconditioned   HENT:   Head: Atraumatic.   Right Ear: External ear normal.   Left Ear: External ear normal.   Nose: Nose normal.   Eyes: EOM are normal.   Wears glasses   Neck: Normal range of motion. Neck supple.   Pulmonary/Chest: Effort normal. No respiratory distress.   Abdominal: Soft. He exhibits no distension.   Musculoskeletal: Normal range of motion. He exhibits no edema.   Neurological: He is alert.   Pill roll tremor   Skin: Skin is warm and dry. He is not diaphoretic.   Psychiatric:   requires reorienting often. Stable for days        Significant Labs: All pertinent labs within the past 24 hours have been reviewed.    Significant Imaging: I have reviewed all pertinent imaging results/findings within the past 24 hours.  I have reviewed and interpreted all pertinent imaging results/findings within the past 24 hours.    Assessment/Plan:      * Debility    TN consulted to aid in safe discharge planning. He has been living with a significant other who can no longer care for him and refusing to take patient home. His somewhat estranged daughter has been contacted and is willing to aid in finding NH placement (she had already begun this process as an outpatient) but there is an issue: patient's power of  (a business friend) refused to sign consent for NH placement. Patient unable to make his own decision regarding this as he is with intermittent confusion +/- previous unclear dementia diagnosis per daughter. Psychiatry consulted for capacity, who agrees that he has no capacity for decision making. Also recommended adding depakote 250mg TID to help with mood stabilization. He is medically cleared for transfer once nursing facility arranged.         Parkinson disease    Seroquel 50 mg; sinemet  mg;   Discontinued taxodione 50 mg  Restarted exelon 4.6 mg in the morning when he was more awake        Lewy body dementia without behavioral disturbance    Seen by psych who has since signed off - appreciate recs -  Doing well on current regimen  Continue medication regimen as follows:  Sinemet  QID and Seroquel 50 QHS  Continue Exelon 4.6 mg/24 patch  Divalproex 250 mg to BID  Held trazodone        Essential hypertension    BP well controlled - Continue amlodipine 10 mg daily          VTE Risk Mitigation         Ordered     enoxaparin injection 40 mg  Daily     Route:  Subcutaneous        03/14/18 1102     Medium Risk of VTE  Once      02/08/18 0653     Place JOSEFINA hose  Until discontinued      02/08/18 0653     Place sequential  compression device  Until discontinued      02/08/18 0653              Cami Crow MD  Department of Hospital Medicine   [facility]

## 2018-03-18 PROCEDURE — 63600175 PHARM REV CODE 636 W HCPCS: Performed by: INTERNAL MEDICINE

## 2018-03-18 PROCEDURE — 25000003 PHARM REV CODE 250: Performed by: HOSPITALIST

## 2018-03-18 PROCEDURE — 21400001 HC TELEMETRY ROOM

## 2018-03-18 PROCEDURE — 25000003 PHARM REV CODE 250: Performed by: INTERNAL MEDICINE

## 2018-03-18 RX ADMIN — CARBIDOPA AND LEVODOPA 2 TABLET: 25; 100 TABLET ORAL at 11:03

## 2018-03-18 RX ADMIN — DOCUSATE SODIUM 100 MG: 100 CAPSULE, LIQUID FILLED ORAL at 09:03

## 2018-03-18 RX ADMIN — CARBIDOPA AND LEVODOPA 2 TABLET: 25; 100 TABLET ORAL at 05:03

## 2018-03-18 RX ADMIN — DIVALPROEX SODIUM 250 MG: 250 TABLET, DELAYED RELEASE ORAL at 09:03

## 2018-03-18 RX ADMIN — ENOXAPARIN SODIUM 40 MG: 100 INJECTION SUBCUTANEOUS at 05:03

## 2018-03-18 RX ADMIN — CARBIDOPA AND LEVODOPA 2 TABLET: 25; 100 TABLET ORAL at 12:03

## 2018-03-18 RX ADMIN — RIVASTIGMINE TRANSDERMAL SYSTEM 1 PATCH: 4.6 PATCH, EXTENDED RELEASE TRANSDERMAL at 08:03

## 2018-03-18 RX ADMIN — DIVALPROEX SODIUM 250 MG: 250 TABLET, DELAYED RELEASE ORAL at 08:03

## 2018-03-18 RX ADMIN — QUETIAPINE FUMARATE 100 MG: 100 TABLET ORAL at 09:03

## 2018-03-18 RX ADMIN — AMLODIPINE BESYLATE 10 MG: 5 TABLET ORAL at 08:03

## 2018-03-18 RX ADMIN — DOCUSATE SODIUM 100 MG: 100 CAPSULE, LIQUID FILLED ORAL at 08:03

## 2018-03-18 NOTE — PLAN OF CARE
Problem: Patient Care Overview  Goal: Plan of Care Review   03/17/18 1945   Coping/Psychosocial   Plan Of Care Reviewed With patient   Pt remained free of falls during current shift. Denied pain and did not receive any prn pain medications. Avasys at pt's bedside. Admistering pt's antiparkinson's medication per MD order. Awaiting pt's placement to nursing home. Plan of care and fall precautions reviewed with pt and verbalized understanding. Bed locked, lowered, SR up x2 and call light placed within reach.

## 2018-03-18 NOTE — PLAN OF CARE
Problem: Confusion, Acute (Adult)  Intervention: Monitor/Assist with Self Care   03/18/18 1847   Activity   Activity Assistance Provided assistance, 2 people     Intervention: Reduce Risk/Promote Restraint Free Environment   03/18/18 1847   Safety Interventions   Environmental Safety Modification assistive device/personal items within reach;clutter free environment maintained     Intervention: Evaluate Medications/Identify Contributors to Confusion   03/18/18 1847   Safety Interventions   Medication Review/Management medications reviewed       Goal: Identify Related Risk Factors and Signs and Symptoms  Related risk factors and signs and symptoms are identified upon initiation of Human Response Clinical Practice Guideline (CPG)   Outcome: Ongoing (interventions implemented as appropriate)   03/18/18 1847   Confusion, Acute   Related Risk Factors (Acute Confusion) cognitive impairment     Goal: Cognitive/Functional Impairments Minimized  Patient will demonstrate the desired outcomes by discharge/transition of care.   Outcome: Ongoing (interventions implemented as appropriate)   03/18/18 1847   Confusion, Acute (Adult)   Cognitive/Functional Impairments Minimized making progress toward outcome     Goal: Safety  Patient will demonstrate the desired outcomes by discharge/transition of care.   Outcome: Ongoing (interventions implemented as appropriate)   03/18/18 1847   Confusion, Acute (Adult)   Safety making progress toward outcome

## 2018-03-18 NOTE — SUBJECTIVE & OBJECTIVE
Interval History: no acute issues, continues to hallucinate    Review of Systems   Constitutional: Negative for activity change, appetite change, chills, diaphoresis, fatigue and fever.   Respiratory: Negative for cough, chest tightness, shortness of breath and wheezing.    Cardiovascular: Negative for chest pain, palpitations and leg swelling.   Gastrointestinal: Negative for abdominal distention, abdominal pain, constipation, diarrhea, nausea and vomiting.   Genitourinary: Negative for dysuria and hematuria.   Musculoskeletal: Negative for joint swelling and neck stiffness.   Neurological: Negative for tremors and weakness.   Psychiatric/Behavioral: Negative for confusion.     Objective:     Vital Signs (Most Recent):  Temp: 97.9 °F (36.6 °C) (03/18/18 1108)  Pulse: 70 (03/18/18 1108)  Resp: 18 (03/18/18 1108)  BP: 132/61 (03/18/18 1108)  SpO2: 96 % (03/18/18 1108) Vital Signs (24h Range):  Temp:  [97.8 °F (36.6 °C)-98.1 °F (36.7 °C)] 97.9 °F (36.6 °C)  Pulse:  [70-82] 70  Resp:  [18] 18  SpO2:  [94 %-97 %] 96 %  BP: (105-134)/(58-64) 132/61     Weight: 62.5 kg (137 lb 12.6 oz)  Body mass index is 21.58 kg/m².    Intake/Output Summary (Last 24 hours) at 03/18/18 1503  Last data filed at 03/18/18 0900   Gross per 24 hour   Intake              540 ml   Output              250 ml   Net              290 ml      Physical Exam   Constitutional: No distress.   Deconditioned   HENT:   Head: Atraumatic.   Right Ear: External ear normal.   Left Ear: External ear normal.   Nose: Nose normal.   Eyes: EOM are normal.   Wears glasses   Neck: Normal range of motion. Neck supple.   Pulmonary/Chest: Effort normal. No respiratory distress.   Abdominal: Soft. He exhibits no distension.   Musculoskeletal: Normal range of motion. He exhibits no edema.   Neurological: He is alert.   Pill roll tremor   Skin: Skin is warm and dry. He is not diaphoretic.   Psychiatric:   requires reorienting often. Stable for weeks       Significant Labs:  All pertinent labs within the past 24 hours have been reviewed.    Significant Imaging: I have reviewed all pertinent imaging results/findings within the past 24 hours.  I have reviewed and interpreted all pertinent imaging results/findings within the past 24 hours.

## 2018-03-18 NOTE — PROGRESS NOTES
[facility] Hospital Medicine  Progress Note    Patient Name: Tenzin Ortiz  MRN: [id number]  Patient Class: IP- Inpatient   Admission Date: 2/7/2018  Length of Stay: 19 days  Attending Physician: Cami Crow MD  Primary Care Provider: Efrain Chavez MD        Subjective:     Principal Problem:Debility    HPI:  Patient is 76 yo male with HTN, HLD, and Parkinson's who presents to ED reportedly for HA. Per ED note patient was complaining of HA but he denies this on my interview. Seems there was some confusion in ED and he was unclear why he was here. During interview with me, patient reports being here for NH placement although he does not appear to be very happy about this. He has been with some issues caring for himself. Currently lives with his girlfriend who reports she can no longer care for him. He states he currently gets around with a walker but sometimes has difficulty getting up out of his chair. Per chart review patient is with Parkinson's and sees neurology, Dr. Castellanos, lastly seen on 2/6/18. He was with hallucinations which were noted to be secondary to dopaminergic agents thus having to significantly reduce dose--she recommended he be admitted for placement at that time but patient declined. He has been attempting placement as an outpatient through PCP but without much luck. He otherwise denies recent illness, fever/chills, CP, SOB, abdominal pain, N/V/D, dysuria. On presentation patient with grossly normal labs/vitals. Placed in observation for what appears to be solely placement although this is something that may need to be continued as outpatient as is not indication for hospitalization.    Hospital Course:  Awake and alert without complaint - severe Parkinsons dementia and can no longer care for himself - neither can his girlfriend.  CM reports patient with acceptance to [facility]; awaiting paperwork to be signed by patient POA. Legal involved. Appreciate case management's  assistance.    Interval History: no acute issues, continues to hallucinate    Review of Systems   Constitutional: Negative for activity change, appetite change, chills, diaphoresis, fatigue and fever.   Respiratory: Negative for cough, chest tightness, shortness of breath and wheezing.    Cardiovascular: Negative for chest pain, palpitations and leg swelling.   Gastrointestinal: Negative for abdominal distention, abdominal pain, constipation, diarrhea, nausea and vomiting.   Genitourinary: Negative for dysuria and hematuria.   Musculoskeletal: Negative for joint swelling and neck stiffness.   Neurological: Negative for tremors and weakness.   Psychiatric/Behavioral: Negative for confusion.     Objective:     Vital Signs (Most Recent):  Temp: 97.9 °F (36.6 °C) (03/18/18 1108)  Pulse: 70 (03/18/18 1108)  Resp: 18 (03/18/18 1108)  BP: 132/61 (03/18/18 1108)  SpO2: 96 % (03/18/18 1108) Vital Signs (24h Range):  Temp:  [97.8 °F (36.6 °C)-98.1 °F (36.7 °C)] 97.9 °F (36.6 °C)  Pulse:  [70-82] 70  Resp:  [18] 18  SpO2:  [94 %-97 %] 96 %  BP: (105-134)/(58-64) 132/61     Weight: 62.5 kg (137 lb 12.6 oz)  Body mass index is 21.58 kg/m².    Intake/Output Summary (Last 24 hours) at 03/18/18 1503  Last data filed at 03/18/18 0900   Gross per 24 hour   Intake              540 ml   Output              250 ml   Net              290 ml      Physical Exam   Constitutional: No distress.   Deconditioned   HENT:   Head: Atraumatic.   Right Ear: External ear normal.   Left Ear: External ear normal.   Nose: Nose normal.   Eyes: EOM are normal.   Wears glasses   Neck: Normal range of motion. Neck supple.   Pulmonary/Chest: Effort normal. No respiratory distress.   Abdominal: Soft. He exhibits no distension.   Musculoskeletal: Normal range of motion. He exhibits no edema.   Neurological: He is alert.   Pill roll tremor   Skin: Skin is warm and dry. He is not diaphoretic.   Psychiatric:   requires reorienting often. Stable for weeks        Significant Labs: All pertinent labs within the past 24 hours have been reviewed.    Significant Imaging: I have reviewed all pertinent imaging results/findings within the past 24 hours.  I have reviewed and interpreted all pertinent imaging results/findings within the past 24 hours.    Assessment/Plan:      * Debility    TN consulted to aid in safe discharge planning. He has been living with a significant other who can no longer care for him and refusing to take patient home. His somewhat estranged daughter has been contacted and is willing to aid in finding NH placement (she had already begun this process as an outpatient) but there is an issue: patient's power of  (a business friend) refused to sign consent for NH placement. Patient unable to make his own decision regarding this as he is with intermittent confusion +/- previous unclear dementia diagnosis per daughter. Psychiatry consulted for capacity, who agrees that he has no capacity for decision making. Also recommended adding depakote 250mg TID to help with mood stabilization. He is medically cleared for transfer once nursing facility arranged.         Parkinson disease    Seroquel 50 mg; sinemet  mg;   Discontinued taxodione 50 mg  Restarted exelon 4.6 mg in the morning when he was more awake        Lewy body dementia without behavioral disturbance    Seen by psych who has since signed off - appreciate recs -  Doing well on current regimen  Continue medication regimen as follows:  Sinemet  QID and Seroquel 50 QHS  Continue Exelon 4.6 mg/24 patch  Divalproex 250 mg to BID  Held trazodone        Essential hypertension    BP well controlled - Continue amlodipine 10 mg daily          VTE Risk Mitigation         Ordered     enoxaparin injection 40 mg  Daily     Route:  Subcutaneous        03/14/18 1102     Medium Risk of VTE  Once      02/08/18 0653     Place JOSEFINA hose  Until discontinued      02/08/18 0653     Place sequential  compression device  Until discontinued      02/08/18 0653              Cami Crow MD  Department of Hospital Medicine   [facility]

## 2018-03-18 NOTE — NURSING
PER handoff received from WM Ellsworth RN. Responds to voice and is oriented x4. Tremors present and Avasys at pt's bedside. Denies having any pain and is in no apparent distress. Assessment completed per Doc Flowsheets; reference if needed. Fall and safety precautions maintained. Bed alarm activated and audible. Bed locked in lowest position, with side rails up x2. Call bell and personal items within reach. Will continue to monitor pt for any changes.

## 2018-03-19 PROCEDURE — 21400001 HC TELEMETRY ROOM

## 2018-03-19 PROCEDURE — 63600175 PHARM REV CODE 636 W HCPCS: Performed by: INTERNAL MEDICINE

## 2018-03-19 PROCEDURE — 25000003 PHARM REV CODE 250: Performed by: INTERNAL MEDICINE

## 2018-03-19 PROCEDURE — 25000003 PHARM REV CODE 250: Performed by: HOSPITALIST

## 2018-03-19 PROCEDURE — 25000003 PHARM REV CODE 250: Performed by: NURSE PRACTITIONER

## 2018-03-19 RX ORDER — POLYETHYLENE GLYCOL 3350 17 G/17G
17 POWDER, FOR SOLUTION ORAL DAILY
Status: DISCONTINUED | OUTPATIENT
Start: 2018-03-20 | End: 2018-04-13

## 2018-03-19 RX ORDER — ACETAMINOPHEN 325 MG/1
650 TABLET ORAL EVERY 8 HOURS PRN
Status: DISCONTINUED | OUTPATIENT
Start: 2018-03-19 | End: 2018-04-13

## 2018-03-19 RX ORDER — AMOXICILLIN 250 MG
1 CAPSULE ORAL EVERY OTHER DAY
Status: DISCONTINUED | OUTPATIENT
Start: 2018-03-21 | End: 2018-04-13

## 2018-03-19 RX ADMIN — DOCUSATE SODIUM 100 MG: 100 CAPSULE, LIQUID FILLED ORAL at 09:03

## 2018-03-19 RX ADMIN — RIVASTIGMINE TRANSDERMAL SYSTEM 1 PATCH: 4.6 PATCH, EXTENDED RELEASE TRANSDERMAL at 09:03

## 2018-03-19 RX ADMIN — POLYETHYLENE GLYCOL 3350 17 G: 17 POWDER, FOR SOLUTION ORAL at 09:03

## 2018-03-19 RX ADMIN — CARBIDOPA AND LEVODOPA 2 TABLET: 25; 100 TABLET ORAL at 06:03

## 2018-03-19 RX ADMIN — CARBIDOPA AND LEVODOPA 2 TABLET: 25; 100 TABLET ORAL at 05:03

## 2018-03-19 RX ADMIN — DIVALPROEX SODIUM 250 MG: 250 TABLET, DELAYED RELEASE ORAL at 09:03

## 2018-03-19 RX ADMIN — ENOXAPARIN SODIUM 40 MG: 100 INJECTION SUBCUTANEOUS at 06:03

## 2018-03-19 RX ADMIN — QUETIAPINE FUMARATE 100 MG: 100 TABLET ORAL at 09:03

## 2018-03-19 RX ADMIN — CARBIDOPA AND LEVODOPA 2 TABLET: 25; 100 TABLET ORAL at 12:03

## 2018-03-19 RX ADMIN — OLANZAPINE 5 MG: 5 TABLET, ORALLY DISINTEGRATING ORAL at 10:03

## 2018-03-19 RX ADMIN — CARBIDOPA AND LEVODOPA 2 TABLET: 25; 100 TABLET ORAL at 11:03

## 2018-03-19 RX ADMIN — AMLODIPINE BESYLATE 10 MG: 5 TABLET ORAL at 09:03

## 2018-03-19 RX ADMIN — DOCUSATE SODIUM AND SENNOSIDES 1 TABLET: 8.6; 5 TABLET, FILM COATED ORAL at 09:03

## 2018-03-19 RX ADMIN — OLANZAPINE 5 MG: 5 TABLET, ORALLY DISINTEGRATING ORAL at 02:03

## 2018-03-19 NOTE — NURSING
Received report from Jeannette COLLINS. Care assumed. Pt is lying supine in bed with the covers over him. Pt denies any discomfort. Bed is locked, lowered, and alarmed. Call light within reach. Will continue to monitor.

## 2018-03-19 NOTE — NURSING
Patient refused VS and refused to eat lunch. Patient is aggravated and states he doesn't want to be bothered. Emotional support provided.

## 2018-03-19 NOTE — PROGRESS NOTES
[facility] Hospital Medicine  Progress Note    Patient Name: Tenzin Ortiz  MRN: [id number]  Patient Class: IP- Inpatient   Admission Date: 2/7/2018  Length of Stay: 20 days  Attending Physician: Cami Crow MD  Primary Care Provider: Efrain Chavez MD        Subjective:     Principal Problem:Debility    HPI:  Patient is 78 yo male with HTN, HLD, and Parkinson's who presents to ED reportedly for HA. Per ED note patient was complaining of HA but he denies this on my interview. Seems there was some confusion in ED and he was unclear why he was here. During interview with me, patient reports being here for NH placement although he does not appear to be very happy about this. He has been with some issues caring for himself. Currently lives with his girlfriend who reports she can no longer care for him. He states he currently gets around with a walker but sometimes has difficulty getting up out of his chair. Per chart review patient is with Parkinson's and sees neurology, Dr. Castellanos, lastly seen on 2/6/18. He was with hallucinations which were noted to be secondary to dopaminergic agents thus having to significantly reduce dose--she recommended he be admitted for placement at that time but patient declined. He has been attempting placement as an outpatient through PCP but without much luck. He otherwise denies recent illness, fever/chills, CP, SOB, abdominal pain, N/V/D, dysuria. On presentation patient with grossly normal labs/vitals. Placed in observation for what appears to be solely placement although this is something that may need to be continued as outpatient as is not indication for hospitalization.    Hospital Course:  Awake and alert without complaint - severe Parkinsons dementia and can no longer care for himself - neither can his girlfriend.  CM reports patient with acceptance to [facility]; awaiting paperwork to be signed by patient POA. Legal involved. Appreciate case management's  assistance.    Interval History: no acute issues, continues to hallucinate    Review of Systems   Constitutional: Negative for activity change, appetite change, chills, diaphoresis, fatigue and fever.   Respiratory: Negative for cough, chest tightness, shortness of breath and wheezing.    Cardiovascular: Negative for chest pain, palpitations and leg swelling.   Gastrointestinal: Negative for abdominal distention, abdominal pain, constipation, diarrhea, nausea and vomiting.   Genitourinary: Negative for dysuria and hematuria.   Musculoskeletal: Negative for joint swelling and neck stiffness.   Neurological: Negative for tremors and weakness.   Psychiatric/Behavioral: Negative for confusion.     Objective:     Vital Signs (Most Recent):  Temp: 96.3 °F (35.7 °C) (03/19/18 0721)  Pulse: 73 (03/19/18 0721)  Resp: 16 (03/19/18 0721)  BP: (!) 119/56 (03/19/18 0721)  SpO2: 97 % (03/19/18 0721) Vital Signs (24h Range):  Temp:  [96.3 °F (35.7 °C)-98.5 °F (36.9 °C)] 96.3 °F (35.7 °C)  Pulse:  [73-87] 73  Resp:  [16-18] 16  SpO2:  [96 %-98 %] 97 %  BP: (119-137)/(56-67) 119/56     Weight: 61.6 kg (135 lb 12.9 oz)  Body mass index is 21.27 kg/m².    Intake/Output Summary (Last 24 hours) at 03/19/18 1647  Last data filed at 03/19/18 1239   Gross per 24 hour   Intake              200 ml   Output              700 ml   Net             -500 ml      Physical Exam   Constitutional: No distress.   Deconditioned   HENT:   Head: Atraumatic.   Right Ear: External ear normal.   Left Ear: External ear normal.   Nose: Nose normal.   Eyes: EOM are normal.   Wears glasses   Neck: Normal range of motion. Neck supple.   Pulmonary/Chest: Effort normal. No respiratory distress.   Abdominal: Soft. He exhibits no distension.   Musculoskeletal: Normal range of motion. He exhibits no edema.   Neurological: He is alert.   Pill roll tremor   Skin: Skin is warm and dry. He is not diaphoretic.   Psychiatric:   requires reorienting often. Stable for weeks        Significant Labs: All pertinent labs within the past 24 hours have been reviewed.    Significant Imaging: I have reviewed all pertinent imaging results/findings within the past 24 hours.  I have reviewed and interpreted all pertinent imaging results/findings within the past 24 hours.    Assessment/Plan:      * Debility    TN consulted to aid in safe discharge planning. He has been living with a significant other who can no longer care for him and refusing to take patient home. His somewhat estranged daughter has been contacted and is willing to aid in finding NH placement (she had already begun this process as an outpatient) but there is an issue: patient's power of  (a business friend) refused to sign consent for NH placement. Patient unable to make his own decision regarding this as he is with intermittent confusion +/- previous unclear dementia diagnosis per daughter. Psychiatry consulted for capacity, who agrees that he has no capacity for decision making. Also recommended adding depakote 250mg TID to help with mood stabilization. He is medically cleared for transfer once nursing facility arranged.         Parkinson disease    Seroquel 50 mg; sinemet  mg;   Discontinued taxodione 50 mg  Restarted exelon 4.6 mg in the morning when he was more awake        Lewy body dementia without behavioral disturbance    Seen by psych who has since signed off - appreciate recs -  Doing well on current regimen  Continue medication regimen as follows:  Sinemet  QID and Seroquel 50 QHS  Continue Exelon 4.6 mg/24 patch  Divalproex 250 mg to BID  Held trazodone        Essential hypertension    BP well controlled - Continue amlodipine 10 mg daily          VTE Risk Mitigation         Ordered     enoxaparin injection 40 mg  Daily     Route:  Subcutaneous        03/14/18 1102     Medium Risk of VTE  Once      02/08/18 0653     Place JOSEFINA hose  Until discontinued      02/08/18 0653     Place sequential  compression device  Until discontinued      02/08/18 0653              Cami Crow MD  Department of Hospital Medicine   [facility]

## 2018-03-19 NOTE — PT/OT/SLP PROGRESS
"Physical Therapy      Patient Name:  Tenzin Ortiz   MRN:  [id number]    Patient not seen today secondary to Patient unwilling to participate (states he is tired; PT states to come back this pm but pt states "Don't come back here this afternoon"). Will follow-up.    Abraham Andrea, PT    "

## 2018-03-19 NOTE — PLAN OF CARE
Problem: Fall Risk (Adult)  Intervention: Monitor/Assist with Self Care   18 0710 18 1929 18 0200   Activity   Activity Assistance Provided --  --  assistance, 1 person   Daily Care Interventions   Self-Care Promotion --  independence encouraged --    Functional Level Current   Ambulation 2 - assistive person --  --    Transferring 2 - assistive person --  --    Toileting 2 - assistive person --  --    Bathing 2 - assistive person --  --    Dressing 2 - assistive person --  --    Eating 0 - independent --  --    Communication 0 - understands/communicates without difficulty --  --    Swallowing 0 - swallows foods/liquids without difficulty --  --      Intervention: Reduce Risk/Promote Restraint Free Environment   03/15/18 0036 18 0600 18 1847   Safety Interventions   Environmental Safety Modification --  --  assistive device/personal items within reach;clutter free environment maintained   Prevent [place] Drop/Fall   Safety/Security Measures bed alarm set --  --    Safety Interventions   Safety Precautions --  emergency equipment at bedside --

## 2018-03-19 NOTE — SUBJECTIVE & OBJECTIVE
Interval History: no acute issues, continues to hallucinate    Review of Systems   Constitutional: Negative for activity change, appetite change, chills, diaphoresis, fatigue and fever.   Respiratory: Negative for cough, chest tightness, shortness of breath and wheezing.    Cardiovascular: Negative for chest pain, palpitations and leg swelling.   Gastrointestinal: Negative for abdominal distention, abdominal pain, constipation, diarrhea, nausea and vomiting.   Genitourinary: Negative for dysuria and hematuria.   Musculoskeletal: Negative for joint swelling and neck stiffness.   Neurological: Negative for tremors and weakness.   Psychiatric/Behavioral: Negative for confusion.     Objective:     Vital Signs (Most Recent):  Temp: 96.3 °F (35.7 °C) (03/19/18 0721)  Pulse: 73 (03/19/18 0721)  Resp: 16 (03/19/18 0721)  BP: (!) 119/56 (03/19/18 0721)  SpO2: 97 % (03/19/18 0721) Vital Signs (24h Range):  Temp:  [96.3 °F (35.7 °C)-98.5 °F (36.9 °C)] 96.3 °F (35.7 °C)  Pulse:  [73-87] 73  Resp:  [16-18] 16  SpO2:  [96 %-98 %] 97 %  BP: (119-137)/(56-67) 119/56     Weight: 61.6 kg (135 lb 12.9 oz)  Body mass index is 21.27 kg/m².    Intake/Output Summary (Last 24 hours) at 03/19/18 1647  Last data filed at 03/19/18 1239   Gross per 24 hour   Intake              200 ml   Output              700 ml   Net             -500 ml      Physical Exam   Constitutional: No distress.   Deconditioned   HENT:   Head: Atraumatic.   Right Ear: External ear normal.   Left Ear: External ear normal.   Nose: Nose normal.   Eyes: EOM are normal.   Wears glasses   Neck: Normal range of motion. Neck supple.   Pulmonary/Chest: Effort normal. No respiratory distress.   Abdominal: Soft. He exhibits no distension.   Musculoskeletal: Normal range of motion. He exhibits no edema.   Neurological: He is alert.   Pill roll tremor   Skin: Skin is warm and dry. He is not diaphoretic.   Psychiatric:   requires reorienting often. Stable for weeks        Significant Labs: All pertinent labs within the past 24 hours have been reviewed.    Significant Imaging: I have reviewed all pertinent imaging results/findings within the past 24 hours.  I have reviewed and interpreted all pertinent imaging results/findings within the past 24 hours.

## 2018-03-19 NOTE — PLAN OF CARE
Hospital legal team involved with patient's POA to obtain nursing home placement for patient. It's still questionable if hospital will have to interdict patient. SW will continue to assist as needed.         03/19/18 1327   Discharge Reassessment   Assessment Type Discharge Planning Reassessment   Provided patient/caregiver education on the expected discharge date and the discharge plan No   Do you have any problems affording any of your prescribed medications? No   Discharge Plan A New Nursing Home placement - care home care facility   Discharge Plan B New Nursing Home placement - care home care facility   Patient choice form signed by patient/caregiver N/A   Can the patient answer the patient profile reliably? No, cognitively impaired   How does the patient rate their overall health at the present time? Fair   Describe the patient's ability to walk at the present time. Walks with the help of equipment   How often would a person be available to care for the patient? Never

## 2018-03-20 PROCEDURE — 63600175 PHARM REV CODE 636 W HCPCS: Performed by: INTERNAL MEDICINE

## 2018-03-20 PROCEDURE — 21400001 HC TELEMETRY ROOM

## 2018-03-20 PROCEDURE — 25000003 PHARM REV CODE 250: Performed by: INTERNAL MEDICINE

## 2018-03-20 PROCEDURE — 25000003 PHARM REV CODE 250: Performed by: HOSPITALIST

## 2018-03-20 RX ADMIN — CARBIDOPA AND LEVODOPA 2 TABLET: 25; 100 TABLET ORAL at 01:03

## 2018-03-20 RX ADMIN — CARBIDOPA AND LEVODOPA 2 TABLET: 25; 100 TABLET ORAL at 06:03

## 2018-03-20 RX ADMIN — OLANZAPINE 5 MG: 5 TABLET, ORALLY DISINTEGRATING ORAL at 09:03

## 2018-03-20 RX ADMIN — AMLODIPINE BESYLATE 10 MG: 5 TABLET ORAL at 08:03

## 2018-03-20 RX ADMIN — QUETIAPINE FUMARATE 100 MG: 100 TABLET ORAL at 09:03

## 2018-03-20 RX ADMIN — DIVALPROEX SODIUM 250 MG: 250 TABLET, DELAYED RELEASE ORAL at 08:03

## 2018-03-20 RX ADMIN — POLYETHYLENE GLYCOL 3350 17 G: 17 POWDER, FOR SOLUTION ORAL at 08:03

## 2018-03-20 RX ADMIN — DOCUSATE SODIUM 100 MG: 100 CAPSULE, LIQUID FILLED ORAL at 09:03

## 2018-03-20 RX ADMIN — DIVALPROEX SODIUM 250 MG: 250 TABLET, DELAYED RELEASE ORAL at 09:03

## 2018-03-20 RX ADMIN — DOCUSATE SODIUM 100 MG: 100 CAPSULE, LIQUID FILLED ORAL at 08:03

## 2018-03-20 RX ADMIN — ENOXAPARIN SODIUM 40 MG: 100 INJECTION SUBCUTANEOUS at 05:03

## 2018-03-20 RX ADMIN — RIVASTIGMINE TRANSDERMAL SYSTEM 1 PATCH: 4.6 PATCH, EXTENDED RELEASE TRANSDERMAL at 09:03

## 2018-03-20 NOTE — ASSESSMENT & PLAN NOTE
TN consulted to aid in safe discharge planning. He has been living with a significant other who can no longer care for him and refusing to take patient home. His somewhat estranged daughter has been contacted and is willing to aid in finding NH placement (she had already begun this process as an outpatient) but there is an issue: patient's power of  (a business friend) refused to sign consent for NH placement. Patient unable to make his own decision regarding this as he is with intermittent confusion +/- previous unclear dementia diagnosis per daughter. Psychiatry consulted for capacity, who agrees that he has no capacity for decision making. Also recommended adding depakote 250mg TID to help with mood stabilization. He is medically cleared for transfer once nursing facility arranged. Pending placement.

## 2018-03-20 NOTE — PT/OT/SLP PROGRESS
Physical Therapy      Patient Name:  Tenzin Ortiz   MRN:  [id number]    Patient not seen today secondary to  (Pt with decreased cognition.  Pt is very confused, unable to redirect pt, and pt unable to open eyes.)  Pt's nurse, Faye, notified.. Will follow-up as able.    Citlali Dennis, PTA

## 2018-03-20 NOTE — PROGRESS NOTES
[facility] Hospital Medicine  Progress Note    Patient Name: Tenzin Ortiz  MRN: [id number]  Patient Class: IP- Inpatient   Admission Date: 2/7/2018  Length of Stay: 21 days  Attending Physician: Norma Raymond MD  Primary Care Provider: Efrain Chavez MD        Subjective:     Principal Problem:Debility    HPI:  Patient is 76 yo male with HTN, HLD, and Parkinson's who presents to ED reportedly for HA. Per ED note patient was complaining of HA but he denies this on my interview. Seems there was some confusion in ED and he was unclear why he was here. During interview with me, patient reports being here for NH placement although he does not appear to be very happy about this. He has been with some issues caring for himself. Currently lives with his girlfriend who reports she can no longer care for him. He states he currently gets around with a walker but sometimes has difficulty getting up out of his chair. Per chart review patient is with Parkinson's and sees neurology, Dr. Castellanos, lastly seen on 2/6/18. He was with hallucinations which were noted to be secondary to dopaminergic agents thus having to significantly reduce dose--she recommended he be admitted for placement at that time but patient declined. He has been attempting placement as an outpatient through PCP but without much luck. He otherwise denies recent illness, fever/chills, CP, SOB, abdominal pain, N/V/D, dysuria. On presentation patient with grossly normal labs/vitals. Placed in observation for what appears to be solely placement although this is something that may need to be continued as outpatient as is not indication for hospitalization.    Hospital Course:  Awake and alert without complaint - severe Parkinsons dementia and can no longer care for himself - neither can his girlfriend.  CM reports patient with acceptance to [facility]; awaiting paperwork to be signed by patient POA. Legal involved. Appreciate case management's  assistance.pending placement.    Interval History: no acute issues, continues to hallucinate    Review of Systems   Constitutional: Negative for activity change, appetite change, chills, diaphoresis, fatigue and fever.   Respiratory: Negative for cough, chest tightness, shortness of breath and wheezing.    Cardiovascular: Negative for chest pain, palpitations and leg swelling.   Gastrointestinal: Negative for abdominal distention, abdominal pain, constipation, diarrhea, nausea and vomiting.   Genitourinary: Negative for dysuria and hematuria.   Musculoskeletal: Negative for joint swelling and neck stiffness.   Neurological: Negative for tremors and weakness.   Psychiatric/Behavioral: Negative for confusion.     Objective:     Vital Signs (Most Recent):  Temp: 98.5 °F (36.9 °C) (03/20/18 0754)  Pulse: 81 (03/20/18 0754)  Resp: 16 (03/20/18 0754)  BP: 122/63 (03/20/18 0754)  SpO2: 95 % (03/20/18 0754) Vital Signs (24h Range):  Temp:  [97.9 °F (36.6 °C)-98.5 °F (36.9 °C)] 98.5 °F (36.9 °C)  Pulse:  [81-95] 81  Resp:  [16-20] 16  SpO2:  [94 %-98 %] 95 %  BP: (101-132)/(59-77) 122/63     Weight: 61.6 kg (135 lb 12.9 oz)  Body mass index is 21.27 kg/m².    Intake/Output Summary (Last 24 hours) at 03/20/18 0815  Last data filed at 03/19/18 1239   Gross per 24 hour   Intake                0 ml   Output              400 ml   Net             -400 ml      Physical Exam   Constitutional: No distress.   Deconditioned   HENT:   Head: Atraumatic.   Right Ear: External ear normal.   Left Ear: External ear normal.   Nose: Nose normal.   Eyes: EOM are normal.   Wears glasses   Neck: Normal range of motion. Neck supple.   Pulmonary/Chest: Effort normal. No respiratory distress.   Abdominal: Soft. He exhibits no distension.   Musculoskeletal: Normal range of motion. He exhibits no edema.   Neurological: He is alert.   Pill roll tremor   Skin: Skin is warm and dry. He is not diaphoretic.   Psychiatric:   requires reorienting often.  Stable for weeks       Significant Labs: All pertinent labs within the past 24 hours have been reviewed.    Significant Imaging: I have reviewed all pertinent imaging results/findings within the past 24 hours.  I have reviewed and interpreted all pertinent imaging results/findings within the past 24 hours.    Assessment/Plan:      * Debility    TN consulted to aid in safe discharge planning. He has been living with a significant other who can no longer care for him and refusing to take patient home. His somewhat estranged daughter has been contacted and is willing to aid in finding NH placement (she had already begun this process as an outpatient) but there is an issue: patient's power of  (a business friend) refused to sign consent for NH placement. Patient unable to make his own decision regarding this as he is with intermittent confusion +/- previous unclear dementia diagnosis per daughter. Psychiatry consulted for capacity, who agrees that he has no capacity for decision making. Also recommended adding depakote 250mg TID to help with mood stabilization. He is medically cleared for transfer once nursing facility arranged. Pending placement.        Parkinson disease    Seroquel 50 mg; sinemet  mg;   Discontinued taxodione 50 mg  Restarted exelon 4.6 mg in the morning when he was more awake        Lewy body dementia without behavioral disturbance    Seen by psych who has since signed off - appreciate recs -  Doing well on current regimen  Continue medication regimen as follows:  Sinemet  QID and Seroquel 50 QHS  Continue Exelon 4.6 mg/24 patch  Divalproex 250 mg to BID  Held trazodone        Essential hypertension    BP well controlled - Continue amlodipine 10 mg daily          VTE Risk Mitigation         Ordered     enoxaparin injection 40 mg  Daily     Route:  Subcutaneous        03/14/18 1102     Medium Risk of VTE  Once      02/08/18 0653     Place JOSEFINA hose  Until discontinued      02/08/18  0653     Place sequential compression device  Until discontinued      02/08/18 0653              Norma Raymond MD  Department of Hospital Medicine   [facility]

## 2018-03-20 NOTE — PLAN OF CARE
Problem: Patient Care Overview  Goal: Plan of Care Review   18   Coping/Psychosocial   Plan Of Care Reviewed With patient     Goal: Interdisciplinary Rounds/Family Conf   18   Interdisciplinary Rounds/Family Conf   Participants patient;nursing       Problem: Pressure Ulcer Risk (Mike Scale) (Adult,Obstetrics,Pediatric)  Intervention: Prevent/Manage Excess Moisture   18   Hygiene Care   Perineal Care absorbent pad changed;diaper changed;perineum cleansed     Intervention: Maintain Head of Bed Elevation Less Than 30 Degrees as Tolerated   18   Positioning   Head of Bed (HOB) HOB elevated     Intervention: Prevent/Minimize Sheer/Friction Injuries   18   Skin Interventions   Pressure Reduction Techniques frequent weight shift encouraged     Intervention: Turn/Reposition Often   18   Skin Interventions   Pressure Reduction Techniques frequent weight shift encouraged       Goal: Skin Integrity  Patient will demonstrate the desired outcomes by discharge/transition of care.    18   Pressure Ulcer Risk (Mike Scale) (Adult,Obstetrics,Pediatric)   Skin Integrity making progress toward outcome       Problem: Confusion, Acute (Adult)  Intervention: Reduce Risk/Promote Restraint Free Environment   18   Safety Interventions   Environmental Safety Modification clutter free environment maintained;room near unit station;lighting adjusted;room organization consistent   Prevent  Drop/Fall   Safety/Security Measures bed alarm set     Intervention: Evaluate Medications/Identify Contributors to Confusion   18   Safety Interventions   Medication Review/Management medications reviewed     Intervention: Optimize Communication   18   Cognitive Interventions   Communication Enhancement Strategies call light answered in person;extra time allowed for response     Intervention: Provide Frequent Orientation/Reorientation    03/19/18 1943   Cognitive Interventions   Reorientation Measures calendar in view;clock in view   Sensory Stimulation Regulation lighting decreased;music/television provided for relaxation       Goal: Cognitive/Functional Impairments Minimized  Patient will demonstrate the desired outcomes by discharge/transition of care.    03/19/18 1943   Confusion, Acute (Adult)   Cognitive/Functional Impairments Minimized making progress toward outcome     Goal: Safety  Patient will demonstrate the desired outcomes by discharge/transition of care.    03/19/18 1943   Confusion, Acute (Adult)   Safety making progress toward outcome

## 2018-03-20 NOTE — NURSING
Bedside report done with KARINA Myles. Pt is awake and appears to be in no distress. Safety maintained.

## 2018-03-20 NOTE — SUBJECTIVE & OBJECTIVE
Interval History: no acute issues, continues to hallucinate    Review of Systems   Constitutional: Negative for activity change, appetite change, chills, diaphoresis, fatigue and fever.   Respiratory: Negative for cough, chest tightness, shortness of breath and wheezing.    Cardiovascular: Negative for chest pain, palpitations and leg swelling.   Gastrointestinal: Negative for abdominal distention, abdominal pain, constipation, diarrhea, nausea and vomiting.   Genitourinary: Negative for dysuria and hematuria.   Musculoskeletal: Negative for joint swelling and neck stiffness.   Neurological: Negative for tremors and weakness.   Psychiatric/Behavioral: Negative for confusion.     Objective:     Vital Signs (Most Recent):  Temp: 98.5 °F (36.9 °C) (03/20/18 0754)  Pulse: 81 (03/20/18 0754)  Resp: 16 (03/20/18 0754)  BP: 122/63 (03/20/18 0754)  SpO2: 95 % (03/20/18 0754) Vital Signs (24h Range):  Temp:  [97.9 °F (36.6 °C)-98.5 °F (36.9 °C)] 98.5 °F (36.9 °C)  Pulse:  [81-95] 81  Resp:  [16-20] 16  SpO2:  [94 %-98 %] 95 %  BP: (101-132)/(59-77) 122/63     Weight: 61.6 kg (135 lb 12.9 oz)  Body mass index is 21.27 kg/m².    Intake/Output Summary (Last 24 hours) at 03/20/18 0815  Last data filed at 03/19/18 1239   Gross per 24 hour   Intake                0 ml   Output              400 ml   Net             -400 ml      Physical Exam   Constitutional: No distress.   Deconditioned   HENT:   Head: Atraumatic.   Right Ear: External ear normal.   Left Ear: External ear normal.   Nose: Nose normal.   Eyes: EOM are normal.   Wears glasses   Neck: Normal range of motion. Neck supple.   Pulmonary/Chest: Effort normal. No respiratory distress.   Abdominal: Soft. He exhibits no distension.   Musculoskeletal: Normal range of motion. He exhibits no edema.   Neurological: He is alert.   Pill roll tremor   Skin: Skin is warm and dry. He is not diaphoretic.   Psychiatric:   requires reorienting often. Stable for weeks       Significant  Labs: All pertinent labs within the past 24 hours have been reviewed.    Significant Imaging: I have reviewed all pertinent imaging results/findings within the past 24 hours.  I have reviewed and interpreted all pertinent imaging results/findings within the past 24 hours.

## 2018-03-21 PROCEDURE — 25000003 PHARM REV CODE 250: Performed by: HOSPITALIST

## 2018-03-21 PROCEDURE — 63600175 PHARM REV CODE 636 W HCPCS: Performed by: INTERNAL MEDICINE

## 2018-03-21 PROCEDURE — 21400001 HC TELEMETRY ROOM

## 2018-03-21 PROCEDURE — 25000003 PHARM REV CODE 250: Performed by: INTERNAL MEDICINE

## 2018-03-21 RX ADMIN — DIVALPROEX SODIUM 250 MG: 250 TABLET, DELAYED RELEASE ORAL at 09:03

## 2018-03-21 RX ADMIN — CARBIDOPA AND LEVODOPA 2 TABLET: 25; 100 TABLET ORAL at 05:03

## 2018-03-21 RX ADMIN — QUETIAPINE FUMARATE 100 MG: 100 TABLET ORAL at 09:03

## 2018-03-21 RX ADMIN — DOCUSATE SODIUM 100 MG: 100 CAPSULE, LIQUID FILLED ORAL at 09:03

## 2018-03-21 RX ADMIN — DOCUSATE SODIUM AND SENNOSIDES 1 TABLET: 8.6; 5 TABLET, FILM COATED ORAL at 09:03

## 2018-03-21 RX ADMIN — CARBIDOPA AND LEVODOPA 2 TABLET: 25; 100 TABLET ORAL at 02:03

## 2018-03-21 RX ADMIN — CARBIDOPA AND LEVODOPA 2 TABLET: 25; 100 TABLET ORAL at 12:03

## 2018-03-21 RX ADMIN — ENOXAPARIN SODIUM 40 MG: 100 INJECTION SUBCUTANEOUS at 05:03

## 2018-03-21 RX ADMIN — RIVASTIGMINE TRANSDERMAL SYSTEM 1 PATCH: 4.6 PATCH, EXTENDED RELEASE TRANSDERMAL at 09:03

## 2018-03-21 RX ADMIN — POLYETHYLENE GLYCOL 3350 17 G: 17 POWDER, FOR SOLUTION ORAL at 09:03

## 2018-03-21 NOTE — PROGRESS NOTES
[facility] Hospital Medicine  Progress Note    Patient Name: Tenzin Ortiz  MRN: [id number]  Patient Class: IP- Inpatient   Admission Date: 2/7/2018  Length of Stay: 22 days  Attending Physician: Norma Raymond MD  Primary Care Provider: Efrain Chavez MD        Subjective:     Principal Problem:Debility    HPI:  Patient is 76 yo male with HTN, HLD, and Parkinson's who presents to ED reportedly for HA. Per ED note patient was complaining of HA but he denies this on my interview. Seems there was some confusion in ED and he was unclear why he was here. During interview with me, patient reports being here for NH placement although he does not appear to be very happy about this. He has been with some issues caring for himself. Currently lives with his girlfriend who reports she can no longer care for him. He states he currently gets around with a walker but sometimes has difficulty getting up out of his chair. Per chart review patient is with Parkinson's and sees neurology, Dr. Castellanos, lastly seen on 2/6/18. He was with hallucinations which were noted to be secondary to dopaminergic agents thus having to significantly reduce dose--she recommended he be admitted for placement at that time but patient declined. He has been attempting placement as an outpatient through PCP but without much luck. He otherwise denies recent illness, fever/chills, CP, SOB, abdominal pain, N/V/D, dysuria. On presentation patient with grossly normal labs/vitals. Placed in observation for what appears to be solely placement although this is something that may need to be continued as outpatient as is not indication for hospitalization.    Hospital Course:  Awake and alert without complaint - severe Parkinsons dementia and can no longer care for himself - neither can his girlfriend.  CM reports patient with acceptance to [facility]; awaiting paperwork to be signed by patient POA. Legal involved. Appreciate case management's  assistance.pending placement.    Interval History: no acute issues, continues to hallucinate    Review of Systems   Constitutional: Negative for activity change, appetite change, chills, diaphoresis, fatigue and fever.   Respiratory: Negative for cough, chest tightness, shortness of breath and wheezing.    Cardiovascular: Negative for chest pain, palpitations and leg swelling.   Gastrointestinal: Negative for abdominal distention, abdominal pain, constipation, diarrhea, nausea and vomiting.   Genitourinary: Negative for dysuria and hematuria.   Musculoskeletal: Negative for joint swelling and neck stiffness.   Neurological: Negative for tremors and weakness.   Psychiatric/Behavioral: Negative for confusion.     Objective:     Vital Signs (Most Recent):  Temp: 97.6 °F (36.4 °C) (03/21/18 0755)  Pulse: 79 (03/21/18 0755)  Resp: 18 (03/21/18 0755)  BP: (!) 98/55 (03/21/18 0755)  SpO2: 97 % (03/21/18 0755) Vital Signs (24h Range):  Temp:  [97.6 °F (36.4 °C)-98.2 °F (36.8 °C)] 97.6 °F (36.4 °C)  Pulse:  [65-85] 79  Resp:  [16-18] 18  SpO2:  [94 %-97 %] 97 %  BP: ()/(53-67) 98/55     Weight: 66.4 kg (146 lb 6.2 oz)  Body mass index is 22.93 kg/m².    Intake/Output Summary (Last 24 hours) at 03/21/18 0836  Last data filed at 03/20/18 1800   Gross per 24 hour   Intake              120 ml   Output                0 ml   Net              120 ml      Physical Exam   Constitutional: No distress.   Deconditioned   HENT:   Head: Atraumatic.   Right Ear: External ear normal.   Left Ear: External ear normal.   Nose: Nose normal.   Eyes: EOM are normal.   Wears glasses   Neck: Normal range of motion. Neck supple.   Pulmonary/Chest: Effort normal. No respiratory distress.   Abdominal: Soft. He exhibits no distension.   Musculoskeletal: Normal range of motion. He exhibits no edema.   Neurological: He is alert.   Pill roll tremor   Skin: Skin is warm and dry. He is not diaphoretic.   Psychiatric:   requires reorienting often.  Stable for weeks       Significant Labs: All pertinent labs within the past 24 hours have been reviewed.    Significant Imaging: I have reviewed all pertinent imaging results/findings within the past 24 hours.  I have reviewed and interpreted all pertinent imaging results/findings within the past 24 hours.    Assessment/Plan:      * Debility    TN consulted to aid in safe discharge planning. He has been living with a significant other who can no longer care for him and refusing to take patient home. His somewhat estranged daughter has been contacted and is willing to aid in finding NH placement (she had already begun this process as an outpatient) but there is an issue: patient's power of  (a business friend) refused to sign consent for NH placement. Patient unable to make his own decision regarding this as he is with intermittent confusion +/- previous unclear dementia diagnosis per daughter. Psychiatry consulted for capacity, who agrees that he has no capacity for decision making. Also recommended adding depakote 250mg TID to help with mood stabilization. He is medically cleared for transfer once nursing facility arranged. Pending placement.        Parkinson disease    Seroquel 50 mg; sinemet  mg;   Discontinued taxodione 50 mg  Restarted exelon 4.6 mg in the morning when he was more awake        Lewy body dementia without behavioral disturbance    Seen by psych who has since signed off - appreciate recs -  Doing well on current regimen  Continue medication regimen as follows:  Sinemet  QID and Seroquel 50 QHS  Continue Exelon 4.6 mg/24 patch  Divalproex 250 mg to BID  Held trazodone        Essential hypertension    BP borderline low,DC amlodipine,continue monitor.          VTE Risk Mitigation         Ordered     enoxaparin injection 40 mg  Daily     Route:  Subcutaneous        03/14/18 1102     Medium Risk of VTE  Once      02/08/18 0653     Place JOSEFINA hose  Until discontinued      02/08/18  0653     Place sequential compression device  Until discontinued      02/08/18 0653              Norma Raymond MD  Department of Hospital Medicine   [facility]

## 2018-03-21 NOTE — PLAN OF CARE
03/21/18 1800   Activity Intolerance (Adult)   Oral Nutrition Promotion rest periods promoted   Self-Care Promotion independence encouraged;BADL personal objects within reach;BADL personal routines maintained   Sleep/Rest Enhancement consistent schedule promoted   Confusion, Acute (Adult)   Safety making progress toward outcome   Communication Enhancement Strategies call light answered in person;communication board used;extra time allowed for response;repetition utilized   Reorientation Measures reorientation provided   Pressure Ulcer Risk (Mike Q Scale) (Pediatric)   Pressure Reduction Devices Pressure-redistributing mattress utilized   Pressure Reduction Techniques frequent weight shift encouraged;heels elevated off bed   Plan of Care Review   Plan Of Care Reviewed With patient

## 2018-03-21 NOTE — NURSING
Received report from KARINA Myles. Pt denies any Pain.  Bed is locked, lowered, and alarmed. Call light within reach. Will continue to monitor.

## 2018-03-21 NOTE — UM SECONDARY REVIEW
Medical Affairs    IP Extended Stay > 10    LOS: approved an agreement with D/C plan   22 DAY LOS, DR STYLES APPROVING  [facility]ARTH STAY.

## 2018-03-21 NOTE — SUBJECTIVE & OBJECTIVE
Interval History: no acute issues, continues to hallucinate    Review of Systems   Constitutional: Negative for activity change, appetite change, chills, diaphoresis, fatigue and fever.   Respiratory: Negative for cough, chest tightness, shortness of breath and wheezing.    Cardiovascular: Negative for chest pain, palpitations and leg swelling.   Gastrointestinal: Negative for abdominal distention, abdominal pain, constipation, diarrhea, nausea and vomiting.   Genitourinary: Negative for dysuria and hematuria.   Musculoskeletal: Negative for joint swelling and neck stiffness.   Neurological: Negative for tremors and weakness.   Psychiatric/Behavioral: Negative for confusion.     Objective:     Vital Signs (Most Recent):  Temp: 97.6 °F (36.4 °C) (03/21/18 0755)  Pulse: 79 (03/21/18 0755)  Resp: 18 (03/21/18 0755)  BP: (!) 98/55 (03/21/18 0755)  SpO2: 97 % (03/21/18 0755) Vital Signs (24h Range):  Temp:  [97.6 °F (36.4 °C)-98.2 °F (36.8 °C)] 97.6 °F (36.4 °C)  Pulse:  [65-85] 79  Resp:  [16-18] 18  SpO2:  [94 %-97 %] 97 %  BP: ()/(53-67) 98/55     Weight: 66.4 kg (146 lb 6.2 oz)  Body mass index is 22.93 kg/m².    Intake/Output Summary (Last 24 hours) at 03/21/18 0836  Last data filed at 03/20/18 1800   Gross per 24 hour   Intake              120 ml   Output                0 ml   Net              120 ml      Physical Exam   Constitutional: No distress.   Deconditioned   HENT:   Head: Atraumatic.   Right Ear: External ear normal.   Left Ear: External ear normal.   Nose: Nose normal.   Eyes: EOM are normal.   Wears glasses   Neck: Normal range of motion. Neck supple.   Pulmonary/Chest: Effort normal. No respiratory distress.   Abdominal: Soft. He exhibits no distension.   Musculoskeletal: Normal range of motion. He exhibits no edema.   Neurological: He is alert.   Pill roll tremor   Skin: Skin is warm and dry. He is not diaphoretic.   Psychiatric:   requires reorienting often. Stable for weeks       Significant  Labs: All pertinent labs within the past 24 hours have been reviewed.    Significant Imaging: I have reviewed all pertinent imaging results/findings within the past 24 hours.  I have reviewed and interpreted all pertinent imaging results/findings within the past 24 hours.

## 2018-03-22 PROCEDURE — 25000003 PHARM REV CODE 250: Performed by: INTERNAL MEDICINE

## 2018-03-22 PROCEDURE — 25000003 PHARM REV CODE 250: Performed by: HOSPITALIST

## 2018-03-22 PROCEDURE — 21400001 HC TELEMETRY ROOM

## 2018-03-22 PROCEDURE — 63600175 PHARM REV CODE 636 W HCPCS: Performed by: INTERNAL MEDICINE

## 2018-03-22 RX ADMIN — DIVALPROEX SODIUM 250 MG: 250 TABLET, DELAYED RELEASE ORAL at 09:03

## 2018-03-22 RX ADMIN — CARBIDOPA AND LEVODOPA 2 TABLET: 25; 100 TABLET ORAL at 05:03

## 2018-03-22 RX ADMIN — QUETIAPINE FUMARATE 100 MG: 100 TABLET ORAL at 08:03

## 2018-03-22 RX ADMIN — CARBIDOPA AND LEVODOPA 2 TABLET: 25; 100 TABLET ORAL at 01:03

## 2018-03-22 RX ADMIN — CARBIDOPA AND LEVODOPA 2 TABLET: 25; 100 TABLET ORAL at 12:03

## 2018-03-22 RX ADMIN — POLYETHYLENE GLYCOL 3350 17 G: 17 POWDER, FOR SOLUTION ORAL at 09:03

## 2018-03-22 RX ADMIN — DIVALPROEX SODIUM 250 MG: 250 TABLET, DELAYED RELEASE ORAL at 08:03

## 2018-03-22 RX ADMIN — DOCUSATE SODIUM 100 MG: 100 CAPSULE, LIQUID FILLED ORAL at 09:03

## 2018-03-22 RX ADMIN — ENOXAPARIN SODIUM 40 MG: 100 INJECTION SUBCUTANEOUS at 05:03

## 2018-03-22 RX ADMIN — RIVASTIGMINE TRANSDERMAL SYSTEM 1 PATCH: 4.6 PATCH, EXTENDED RELEASE TRANSDERMAL at 09:03

## 2018-03-22 RX ADMIN — DOCUSATE SODIUM 100 MG: 100 CAPSULE, LIQUID FILLED ORAL at 08:03

## 2018-03-22 NOTE — PLAN OF CARE
Problem: Pressure Ulcer Risk (Mike Scale) (Adult,Obstetrics,Pediatric)  Intervention: Promote/Optimize Nutrition   03/22/18 0525   Nutrition Interventions   Oral Nutrition Promotion calorie dense foods provided     Intervention: Prevent/Manage Excess Moisture   03/22/18 0525   Hygiene Care   Perineal Care absorbent pad changed;skin sealant/barrier applied     Intervention: Maintain Head of Bed Elevation Less Than 30 Degrees as Tolerated   03/22/18 0525   Positioning   Head of Bed (HOB) HOB at 30-45 degrees     Intervention: Prevent/Minimize Sheer/Friction Injuries   03/22/18 0525   Positioning   Positioning/Transfer Devices pillows;in use   Skin Interventions   Pressure Reduction Devices Pressure-redistributing mattress utilized   Pressure Reduction Techniques frequent weight shift encouraged;weight shift assistance provided     Intervention: Turn/Reposition Often   03/22/18 0525   Positioning   Body Position positioned/repositioned independently   Skin Interventions   Pressure Reduction Techniques frequent weight shift encouraged;weight shift assistance provided       Goal: Skin Integrity  Patient will demonstrate the desired outcomes by discharge/transition of care.   Outcome: Ongoing (interventions implemented as appropriate)   03/22/18 0525   Pressure Ulcer Risk (Mike Scale) (Adult,Obstetrics,Pediatric)   Skin Integrity making progress toward outcome

## 2018-03-22 NOTE — PROGRESS NOTES
[facility] Hospital Medicine  Progress Note    Patient Name: Tenzin Ortiz  MRN: [id number]  Patient Class: IP- Inpatient   Admission Date: 2/7/2018  Length of Stay: 23 days  Attending Physician: Norma Raymond MD  Primary Care Provider: Efrain Chavez MD        Subjective:     Principal Problem:Debility    HPI:  Patient is 78 yo male with HTN, HLD, and Parkinson's who presents to ED reportedly for HA. Per ED note patient was complaining of HA but he denies this on my interview. Seems there was some confusion in ED and he was unclear why he was here. During interview with me, patient reports being here for NH placement although he does not appear to be very happy about this. He has been with some issues caring for himself. Currently lives with his girlfriend who reports she can no longer care for him. He states he currently gets around with a walker but sometimes has difficulty getting up out of his chair. Per chart review patient is with Parkinson's and sees neurology, Dr. Castellanos, lastly seen on 2/6/18. He was with hallucinations which were noted to be secondary to dopaminergic agents thus having to significantly reduce dose--she recommended he be admitted for placement at that time but patient declined. He has been attempting placement as an outpatient through PCP but without much luck. He otherwise denies recent illness, fever/chills, CP, SOB, abdominal pain, N/V/D, dysuria. On presentation patient with grossly normal labs/vitals. Placed in observation for what appears to be solely placement although this is something that may need to be continued as outpatient as is not indication for hospitalization.    Hospital Course:  Awake and alert without complaint - severe Parkinsons dementia and can no longer care for himself - neither can his girlfriend.  CM reports patient with acceptance to [facility]; awaiting paperwork to be signed by patient POA. Legal involved. Appreciate case management's  assistance.pending placement.    Interval History: no acute issues, continues to hallucinate    Review of Systems   Constitutional: Negative for activity change, appetite change, chills, diaphoresis, fatigue and fever.   Respiratory: Negative for cough, chest tightness, shortness of breath and wheezing.    Cardiovascular: Negative for chest pain, palpitations and leg swelling.   Gastrointestinal: Negative for abdominal distention, abdominal pain, constipation, diarrhea, nausea and vomiting.   Genitourinary: Negative for dysuria and hematuria.   Musculoskeletal: Negative for joint swelling and neck stiffness.   Neurological: Negative for tremors and weakness.   Psychiatric/Behavioral: Negative for confusion.     Objective:     Vital Signs (Most Recent):  Temp: 97 °F (36.1 °C) (03/22/18 0732)  Pulse: 71 (03/22/18 0732)  Resp: 20 (03/22/18 0732)  BP: 109/70 (03/22/18 0732)  SpO2: (!) 94 % (03/22/18 0732) Vital Signs (24h Range):  Temp:  [97 °F (36.1 °C)-98.5 °F (36.9 °C)] 97 °F (36.1 °C)  Pulse:  [64-82] 71  Resp:  [16-20] 20  SpO2:  [94 %-98 %] 94 %  BP: ()/(51-70) 109/70     Weight: 60.9 kg (134 lb 4.2 oz)  Body mass index is 21.03 kg/m².    Intake/Output Summary (Last 24 hours) at 03/22/18 0944  Last data filed at 03/22/18 0900   Gross per 24 hour   Intake              956 ml   Output              250 ml   Net              706 ml      Physical Exam   Constitutional: No distress.   Deconditioned   HENT:   Head: Atraumatic.   Right Ear: External ear normal.   Left Ear: External ear normal.   Nose: Nose normal.   Eyes: EOM are normal.   Wears glasses   Neck: Normal range of motion. Neck supple.   Pulmonary/Chest: Effort normal. No respiratory distress.   Abdominal: Soft. He exhibits no distension.   Musculoskeletal: Normal range of motion. He exhibits no edema.   Neurological: He is alert.   Pill roll tremor   Skin: Skin is warm and dry. He is not diaphoretic.   Psychiatric:   requires reorienting often. Stable  for weeks       Significant Labs: All pertinent labs within the past 24 hours have been reviewed.    Significant Imaging: I have reviewed all pertinent imaging results/findings within the past 24 hours.  I have reviewed and interpreted all pertinent imaging results/findings within the past 24 hours.    Assessment/Plan:      * Debility    TN consulted to aid in safe discharge planning. He has been living with a significant other who can no longer care for him and refusing to take patient home. His somewhat estranged daughter has been contacted and is willing to aid in finding NH placement (she had already begun this process as an outpatient) but there is an issue: patient's power of  (a business friend) refused to sign consent for NH placement. Patient unable to make his own decision regarding this as he is with intermittent confusion +/- previous unclear dementia diagnosis per daughter. Psychiatry consulted for capacity, who agrees that he has no capacity for decision making. Also recommended adding depakote 250mg TID to help with mood stabilization. He is medically cleared for transfer once nursing facility arranged. Pending placement.        Parkinson disease    Seroquel 50 mg; sinemet  mg;   Discontinued taxodione 50 mg  Restarted exelon 4.6 mg in the morning when he was more awake        Lewy body dementia without behavioral disturbance    Seen by psych who has since signed off - appreciate recs -  Doing well on current regimen  Continue medication regimen as follows:  Sinemet  QID and Seroquel 50 QHS  Continue Exelon 4.6 mg/24 patch  Divalproex 250 mg to BID  Held trazodone        Essential hypertension    BP borderline low,DC amlodipine,continue monitor.stable.          VTE Risk Mitigation         Ordered     enoxaparin injection 40 mg  Daily     Route:  Subcutaneous        03/14/18 1102     Medium Risk of VTE  Once      02/08/18 0653     Place JOSEFINA hose  Until discontinued      02/08/18  0653     Place sequential compression device  Until discontinued      02/08/18 0653              Norma Raymond MD  Department of Hospital Medicine   [facility]

## 2018-03-22 NOTE — SUBJECTIVE & OBJECTIVE
Interval History: no acute issues, continues to hallucinate    Review of Systems   Constitutional: Negative for activity change, appetite change, chills, diaphoresis, fatigue and fever.   Respiratory: Negative for cough, chest tightness, shortness of breath and wheezing.    Cardiovascular: Negative for chest pain, palpitations and leg swelling.   Gastrointestinal: Negative for abdominal distention, abdominal pain, constipation, diarrhea, nausea and vomiting.   Genitourinary: Negative for dysuria and hematuria.   Musculoskeletal: Negative for joint swelling and neck stiffness.   Neurological: Negative for tremors and weakness.   Psychiatric/Behavioral: Negative for confusion.     Objective:     Vital Signs (Most Recent):  Temp: 97 °F (36.1 °C) (03/22/18 0732)  Pulse: 71 (03/22/18 0732)  Resp: 20 (03/22/18 0732)  BP: 109/70 (03/22/18 0732)  SpO2: (!) 94 % (03/22/18 0732) Vital Signs (24h Range):  Temp:  [97 °F (36.1 °C)-98.5 °F (36.9 °C)] 97 °F (36.1 °C)  Pulse:  [64-82] 71  Resp:  [16-20] 20  SpO2:  [94 %-98 %] 94 %  BP: ()/(51-70) 109/70     Weight: 60.9 kg (134 lb 4.2 oz)  Body mass index is 21.03 kg/m².    Intake/Output Summary (Last 24 hours) at 03/22/18 0944  Last data filed at 03/22/18 0900   Gross per 24 hour   Intake              956 ml   Output              250 ml   Net              706 ml      Physical Exam   Constitutional: No distress.   Deconditioned   HENT:   Head: Atraumatic.   Right Ear: External ear normal.   Left Ear: External ear normal.   Nose: Nose normal.   Eyes: EOM are normal.   Wears glasses   Neck: Normal range of motion. Neck supple.   Pulmonary/Chest: Effort normal. No respiratory distress.   Abdominal: Soft. He exhibits no distension.   Musculoskeletal: Normal range of motion. He exhibits no edema.   Neurological: He is alert.   Pill roll tremor   Skin: Skin is warm and dry. He is not diaphoretic.   Psychiatric:   requires reorienting often. Stable for weeks       Significant Labs:  All pertinent labs within the past 24 hours have been reviewed.    Significant Imaging: I have reviewed all pertinent imaging results/findings within the past 24 hours.  I have reviewed and interpreted all pertinent imaging results/findings within the past 24 hours.

## 2018-03-22 NOTE — PLAN OF CARE
03/22/18 1700   Activity Intolerance (Adult)   Self-Care Promotion independence encouraged;BADL personal objects within reach   Confusion, Acute (Adult)   Safety making progress toward outcome   Communication Enhancement Strategies call light answered in person;communication board used;extra time allowed for response;repetition utilized   Reorientation Measures reorientation provided   Pressure Ulcer Risk (Mike Q Scale) (Pediatric)   Pressure Reduction Devices Pressure-redistributing mattress utilized   Pressure Reduction Techniques frequent weight shift encouraged;heels elevated off bed   Bariatric Surgery (Open/Laparoscopic) (Adult)   VTE Required Core Measure Pharmacological prophylaxis initiated/maintained   Plan of Care Review   Plan Of Care Reviewed With patient

## 2018-03-22 NOTE — PROGRESS NOTES
"  [facility]  Adult Nutrition  Consult Note    SUMMARY     Recommendations    Recommendation/Intervention:   1. Boost plus bid   -encourage intake of po/supplements.  2. Honor preferences as able   3. Weights weekly   4. RD to monitor    Goals: Meet >85% EEN  Nutrition Goal Status: progressing towards goal  Communication of RD Recs:  (sign and hold)    Reason for Assessment    Reason for Assessment: RD follow-up  Diagnosis:  (Debility)  Relevant Medical History: HTN, Parkinsosn  General Information Comments: Patient asleep at time of visit. Noted decrease in po intake over past week (60% intake as average). Patient with some confusion. Patient with weight loss per bed scale weights.     Nutrition Discharge Planning: Patient to discharge on a Regular diet with supplements as needed.    Nutrition Risk Screen    Nutrition Risk Screen: no indicators present    Nutrition/Diet History    Food Preferences: No cultural, Mandaeism or ethnic food preferences.   Do you have any cultural, spiritual, Mandaeism conflicts, given your current situation?: None  Food Allergies: other (see comments) (NKFA)    Anthropometrics    Temp: 97 °F (36.1 °C)  Height Method: Stated  Height: 5' 7" (170.2 cm)  Height (inches): 67 in  Weight Method: Bed Scale  Weight: 60.9 kg (134 lb 4.2 oz)  Weight (lb): 134.26 lb  Ideal Body Weight (IBW), Male: 148 lb  % Ideal Body Weight, Male (lb): 95.93 lb  BMI (Calculated): 22.3  BMI Grade: 18.5-24.9 - normal    Lab/Procedures/Meds    Pertinent Labs Reviewed: reviewed  Pertinent Medications Reviewed: reviewed    Physical Findings/Assessment    Overall Physical Appearance: nourished  Oral/Mouth Cavity: tooth/teeth missing  Skin: intact (Mike Score 16)    Estimated/Assessed Needs    Weight Used For Calorie Calculations: 62.6 kg (138 lb 0.1 oz)  Height: 5' 7" (170.2 cm)  Energy Calorie Requirements (kcal): 2213-4385 kcal  Energy Need Method: Page-St [place]el[place]    RMR (Page-StMeagan Gaona " Equation): 1314.62  Protein Requirements: 65-75g  Weight Used For Protein Calculations: 62.6 kg (138 lb 0.1 oz)    Fluid Need Method: RDA Method  RDA Method (mL): 1600       Nutrition Prescription Ordered    Current Diet Order: Regular    Evaluation of Received Nutrient/Fluid Intake    I/O: not fully recorded  Energy Calories Required: not meeting needs  Protein Required: not meeting needs  Fluid Required:  (TAWANDA)  Comments: LBM: 3/20  Tolerance: tolerating    % Intake of Estimated Energy Needs: 50-75%  % Meal Intake: 60%    Nutrition Risk    Level of Risk/Frequency of Follow-up:  (f/u 1x weekly)     Assessment and Plan    Nutrition Dx: Inadequate Energy Intake r/t decreased appetite; confusion as evidenced by: 60% po intake on average with meals/weight loss since admission.   Nutrition Dx Status: new       Monitor and Evaluation    Food and Nutrient Intake: energy intake, food and beverage intake  Food and Nutrient Adminstration: diet order  Knowledge/Beliefs/Attitudes: food and nutrition knowledge/skill  Physical Activity and Function: nutrition-related ADLs and IADLs  Anthropometric Measurements: weight, weight change  Biochemical Data, Medical Tests and Procedures: electrolyte and renal panel  Nutrition-Focused Physical Findings: overall appearance     Nutrition Follow-Up    RD Follow-up?: Yes

## 2018-03-23 PROCEDURE — 21400001 HC TELEMETRY ROOM

## 2018-03-23 PROCEDURE — 97530 THERAPEUTIC ACTIVITIES: CPT

## 2018-03-23 PROCEDURE — 97110 THERAPEUTIC EXERCISES: CPT

## 2018-03-23 PROCEDURE — 25000003 PHARM REV CODE 250: Performed by: INTERNAL MEDICINE

## 2018-03-23 PROCEDURE — 63600175 PHARM REV CODE 636 W HCPCS: Performed by: INTERNAL MEDICINE

## 2018-03-23 PROCEDURE — 25000003 PHARM REV CODE 250: Performed by: HOSPITALIST

## 2018-03-23 RX ADMIN — DIVALPROEX SODIUM 250 MG: 250 TABLET, DELAYED RELEASE ORAL at 08:03

## 2018-03-23 RX ADMIN — OLANZAPINE 5 MG: 5 TABLET, ORALLY DISINTEGRATING ORAL at 09:03

## 2018-03-23 RX ADMIN — CARBIDOPA AND LEVODOPA 2 TABLET: 25; 100 TABLET ORAL at 12:03

## 2018-03-23 RX ADMIN — RIVASTIGMINE TRANSDERMAL SYSTEM 1 PATCH: 4.6 PATCH, EXTENDED RELEASE TRANSDERMAL at 08:03

## 2018-03-23 RX ADMIN — CARBIDOPA AND LEVODOPA 2 TABLET: 25; 100 TABLET ORAL at 11:03

## 2018-03-23 RX ADMIN — CARBIDOPA AND LEVODOPA 2 TABLET: 25; 100 TABLET ORAL at 05:03

## 2018-03-23 RX ADMIN — DOCUSATE SODIUM 100 MG: 100 CAPSULE, LIQUID FILLED ORAL at 08:03

## 2018-03-23 RX ADMIN — QUETIAPINE FUMARATE 100 MG: 100 TABLET ORAL at 08:03

## 2018-03-23 RX ADMIN — DOCUSATE SODIUM AND SENNOSIDES 1 TABLET: 8.6; 5 TABLET, FILM COATED ORAL at 08:03

## 2018-03-23 RX ADMIN — ENOXAPARIN SODIUM 40 MG: 100 INJECTION SUBCUTANEOUS at 05:03

## 2018-03-23 NOTE — PT/OT/SLP PROGRESS
Physical Therapy Treatment    Patient Name:  Tenzin Ortiz   MRN:  [id number]    Recommendations:     Discharge Recommendations:      Discharge Equipment Recommendations: none   Barriers to discharge: Decreased caregiver support and family unable to care for patient    Assessment:     Tenzin Ortiz is a 77 y.o. male admitted with a medical diagnosis of Debility.  He presents with the following impairments/functional limitations:  weakness, gait instability, decreased upper extremity function, decreased ROM, impaired cardiopulmonary response to activity, impaired endurance, impaired balance, decreased lower extremity function, impaired coordination, decreased safety awareness, impaired fine motor, impaired self care skills, impaired cognition, impaired functional mobilty, decreased coordination, impaired muscle length .    Rehab Prognosis:  f; patient would benefit from acute skilled PT services to address these deficits and reach maximum level of function.      Recent Surgery: * No surgery found *      Plan:     During this hospitalization, patient to be seen 3 x/week to address the above listed problems via gait training, therapeutic activities, therapeutic exercises  · Plan of Care Expires:  04/09/18   Plan of Care Reviewed with: patient    Subjective     Communicated with nurse prior to session.  Patient found right side lying upon PT entry to room, agreeable to treatment.      Chief Complaint: none  Patient comments/goals: pt agreed to therapy  Pain/Comfort:  · Pain Rating 1: 0/10    Patients cultural, spiritual, Catholic conflicts given the current situation: none    Objective:     Patient found with: peripheral IV     General Precautions: Standard, fall   Orthopedic Precautions:N/A   Braces: N/A     Functional Mobility:  · Bed Mobility:     · Rolling Left:  stand by assistance  · Scooting: contact guard assistance  · Supine to Sit: minimum assistance  · Sit to Supine: stand by assistance  · Transfers:      · Sit to Stand:  contact guard assistance with rolling walker  · Gait: 4-6 steps with rw with CGA. Pt withshuffling gt pattern with decreased step height and length.  pt with tremors and unable to advance steps  · Balance: standing F, sitting F+      AM-PAC 6 CLICK MOBILITY  Turning over in bed (including adjusting bedclothes, sheets and blankets)?: 4  Sitting down on and standing up from a chair with arms (e.g., wheelchair, bedside commode, etc.): 3  Moving from lying on back to sitting on the side of the bed?: 4  Moving to and from a bed to a chair (including a wheelchair)?: 3  Need to walk in hospital room?: 3  Climbing 3-5 steps with a railing?: 2  Total Score: 19       Therapeutic Activities and Exercises:   BLE Mari seated 10-15, LAQ, HIP FLEX, TT, HR, PILLOW SQUEEZES    Patient left right sidelying with all lines intact, call button in reach, bed alarm on and NURSE notified..    GOALS:    Physical Therapy Goals        Problem: Physical Therapy Goal    Goal Priority Disciplines Outcome Goal Variances Interventions   Physical Therapy Goal     PT/OT, PT Ongoing (interventions implemented as appropriate)     Description:  Goals to be met by: 18    Patient will increase functional independence with mobility by performin. Supine to sit with supervision  2. Sit to stand transfer with Stand-by Assistance  3. Gait  x 300 feet with Stand-by Assistance using Rolling Walker  4. Lower extremity exercise program x30 reps per handout, with assistance as needed                      Time Tracking:     PT Received On: 18  PT Start Time: 1500     PT Stop Time: 1525  PT Total Time (min): 25 min     Billable Minutes: Therapeutic Activity 15 and Therapeutic Exercise 10    Treatment Type: Treatment  PT/PTA: PTA     PTA Visit Number: 1     Christopher Martinez PTA  2018

## 2018-03-23 NOTE — PROGRESS NOTES
[facility] Hospital Medicine  Progress Note    Patient Name: Tenzin Ortiz  MRN: [id number]  Patient Class: IP- Inpatient   Admission Date: 2/7/2018  Length of Stay: 24 days  Attending Physician: Norma Raymond MD  Primary Care Provider: Efrain Chavez MD        Subjective:     Principal Problem:Debility    HPI:  Patient is 78 yo male with HTN, HLD, and Parkinson's who presents to ED reportedly for HA. Per ED note patient was complaining of HA but he denies this on my interview. Seems there was some confusion in ED and he was unclear why he was here. During interview with me, patient reports being here for NH placement although he does not appear to be very happy about this. He has been with some issues caring for himself. Currently lives with his girlfriend who reports she can no longer care for him. He states he currently gets around with a walker but sometimes has difficulty getting up out of his chair. Per chart review patient is with Parkinson's and sees neurology, Dr. Castellanos, lastly seen on 2/6/18. He was with hallucinations which were noted to be secondary to dopaminergic agents thus having to significantly reduce dose--she recommended he be admitted for placement at that time but patient declined. He has been attempting placement as an outpatient through PCP but without much luck. He otherwise denies recent illness, fever/chills, CP, SOB, abdominal pain, N/V/D, dysuria. On presentation patient with grossly normal labs/vitals. Placed in observation for what appears to be solely placement although this is something that may need to be continued as outpatient as is not indication for hospitalization.    Hospital Course:  Awake and alert without complaint - severe Parkinsons dementia and can no longer care for himself - neither can his girlfriend.  CM reports patient with acceptance to [facility]; awaiting paperwork to be signed by patient POA. Legal involved. Appreciate case management's  assistance.pending placement.    Interval History: no acute issues, continues to hallucinate    Review of Systems   Constitutional: Negative for activity change, appetite change, chills, diaphoresis, fatigue and fever.   Respiratory: Negative for cough, chest tightness, shortness of breath and wheezing.    Cardiovascular: Negative for chest pain, palpitations and leg swelling.   Gastrointestinal: Negative for abdominal distention, abdominal pain, constipation, diarrhea, nausea and vomiting.   Genitourinary: Negative for dysuria and hematuria.   Musculoskeletal: Negative for joint swelling and neck stiffness.   Neurological: Negative for tremors and weakness.   Psychiatric/Behavioral: Negative for confusion.     Objective:     Vital Signs (Most Recent):  Temp: 98.1 °F (36.7 °C) (03/23/18 1110)  Pulse: 83 (03/23/18 1110)  Resp: 18 (03/23/18 1110)  BP: 131/63 (03/23/18 1110)  SpO2: 96 % (03/23/18 1110) Vital Signs (24h Range):  Temp:  [97.1 °F (36.2 °C)-98.3 °F (36.8 °C)] 98.1 °F (36.7 °C)  Pulse:  [72-92] 83  Resp:  [12-20] 18  SpO2:  [94 %-99 %] 96 %  BP: (109-131)/(57-63) 131/63     Weight: 61.3 kg (135 lb 2.3 oz)  Body mass index is 21.17 kg/m².    Intake/Output Summary (Last 24 hours) at 03/23/18 1121  Last data filed at 03/23/18 0900   Gross per 24 hour   Intake              558 ml   Output              451 ml   Net              107 ml      Physical Exam   Constitutional: No distress.   Deconditioned   HENT:   Head: Atraumatic.   Right Ear: External ear normal.   Left Ear: External ear normal.   Nose: Nose normal.   Eyes: EOM are normal.   Wears glasses   Neck: Normal range of motion. Neck supple.   Pulmonary/Chest: Effort normal. No respiratory distress.   Abdominal: Soft. He exhibits no distension.   Musculoskeletal: Normal range of motion. He exhibits no edema.   Neurological: He is alert.   Pill roll tremor   Skin: Skin is warm and dry. He is not diaphoretic.   Psychiatric:   requires reorienting often.  Stable for weeks       Significant Labs: All pertinent labs within the past 24 hours have been reviewed.    Significant Imaging: I have reviewed all pertinent imaging results/findings within the past 24 hours.  I have reviewed and interpreted all pertinent imaging results/findings within the past 24 hours.    Assessment/Plan:      * Debility    TN consulted to aid in safe discharge planning. He has been living with a significant other who can no longer care for him and refusing to take patient home. His somewhat estranged daughter has been contacted and is willing to aid in finding NH placement (she had already begun this process as an outpatient) but there is an issue: patient's power of  (a business friend) refused to sign consent for NH placement. Patient unable to make his own decision regarding this as he is with intermittent confusion +/- previous unclear dementia diagnosis per daughter. Psychiatry consulted for capacity, who agrees that he has no capacity for decision making. Also recommended adding depakote 250mg TID to help with mood stabilization. He is medically cleared for transfer once nursing facility arranged. Pending placement.        Parkinson disease    Seroquel 50 mg; sinemet  mg;   Discontinued taxodione 50 mg  Restarted exelon 4.6 mg in the morning when he was more awake        Lewy body dementia without behavioral disturbance    Seen by psych who has since signed off - appreciate recs -  Doing well on current regimen  Continue medication regimen as follows:  Sinemet  QID and Seroquel 50 QHS  Continue Exelon 4.6 mg/24 patch  Divalproex 250 mg to BID  Held trazodone        Essential hypertension    BP borderline low,DC amlodipine,continue monitor.stable.          VTE Risk Mitigation         Ordered     enoxaparin injection 40 mg  Daily     Route:  Subcutaneous        03/14/18 1102     Medium Risk of VTE  Once      02/08/18 0653     Place JOSEFINA hose  Until discontinued       02/08/18 0653     Place sequential compression device  Until discontinued      02/08/18 0653              Norma Raymond MD  Department of Hospital Medicine   [facility]

## 2018-03-23 NOTE — PLAN OF CARE
Problem: Physical Therapy Goal  Goal: Physical Therapy Goal  Goals to be met by: 18    Patient will increase functional independence with mobility by performin. Supine to sit with supervision  2. Sit to stand transfer with Stand-by Assistance  3. Gait  x 300 feet with Stand-by Assistance using Rolling Walker  4. Lower extremity exercise program x30 reps per handout, with assistance as needed     Outcome: Ongoing (interventions implemented as appropriate)  Pt working towards goals

## 2018-03-23 NOTE — SUBJECTIVE & OBJECTIVE
Interval History: no acute issues, continues to hallucinate    Review of Systems   Constitutional: Negative for activity change, appetite change, chills, diaphoresis, fatigue and fever.   Respiratory: Negative for cough, chest tightness, shortness of breath and wheezing.    Cardiovascular: Negative for chest pain, palpitations and leg swelling.   Gastrointestinal: Negative for abdominal distention, abdominal pain, constipation, diarrhea, nausea and vomiting.   Genitourinary: Negative for dysuria and hematuria.   Musculoskeletal: Negative for joint swelling and neck stiffness.   Neurological: Negative for tremors and weakness.   Psychiatric/Behavioral: Negative for confusion.     Objective:     Vital Signs (Most Recent):  Temp: 98.1 °F (36.7 °C) (03/23/18 1110)  Pulse: 83 (03/23/18 1110)  Resp: 18 (03/23/18 1110)  BP: 131/63 (03/23/18 1110)  SpO2: 96 % (03/23/18 1110) Vital Signs (24h Range):  Temp:  [97.1 °F (36.2 °C)-98.3 °F (36.8 °C)] 98.1 °F (36.7 °C)  Pulse:  [72-92] 83  Resp:  [12-20] 18  SpO2:  [94 %-99 %] 96 %  BP: (109-131)/(57-63) 131/63     Weight: 61.3 kg (135 lb 2.3 oz)  Body mass index is 21.17 kg/m².    Intake/Output Summary (Last 24 hours) at 03/23/18 1121  Last data filed at 03/23/18 0900   Gross per 24 hour   Intake              558 ml   Output              451 ml   Net              107 ml      Physical Exam   Constitutional: No distress.   Deconditioned   HENT:   Head: Atraumatic.   Right Ear: External ear normal.   Left Ear: External ear normal.   Nose: Nose normal.   Eyes: EOM are normal.   Wears glasses   Neck: Normal range of motion. Neck supple.   Pulmonary/Chest: Effort normal. No respiratory distress.   Abdominal: Soft. He exhibits no distension.   Musculoskeletal: Normal range of motion. He exhibits no edema.   Neurological: He is alert.   Pill roll tremor   Skin: Skin is warm and dry. He is not diaphoretic.   Psychiatric:   requires reorienting often. Stable for weeks       Significant  Labs: All pertinent labs within the past 24 hours have been reviewed.    Significant Imaging: I have reviewed all pertinent imaging results/findings within the past 24 hours.  I have reviewed and interpreted all pertinent imaging results/findings within the past 24 hours.

## 2018-03-23 NOTE — PLAN OF CARE
POA has been working with Danielito to transition patient to nursing home. KIMO will continue to assist as needed.       03/23/18 2604   Discharge Reassessment   Assessment Type Discharge Planning Reassessment   Provided patient/caregiver education on the expected discharge date and the discharge plan No   Do you have any problems affording any of your prescribed medications? No   Discharge Plan A New Nursing Home placement - skilled nursing care facility   Discharge Plan B New Nursing Home placement - skilled nursing care facility   Patient choice form signed by patient/caregiver N/A   Can the patient answer the patient profile reliably? No, cognitively impaired   How does the patient rate their overall health at the present time? Fair   Describe the patient's ability to walk at the present time. Walks with the help of equipment   How often would a person be available to care for the patient? Never

## 2018-03-24 LAB
POCT GLUCOSE: 73 MG/DL (ref 70–110)
POCT GLUCOSE: 93 MG/DL (ref 70–110)

## 2018-03-24 PROCEDURE — 63600175 PHARM REV CODE 636 W HCPCS: Performed by: INTERNAL MEDICINE

## 2018-03-24 PROCEDURE — 25000003 PHARM REV CODE 250: Performed by: INTERNAL MEDICINE

## 2018-03-24 PROCEDURE — 21400001 HC TELEMETRY ROOM

## 2018-03-24 PROCEDURE — 25000003 PHARM REV CODE 250: Performed by: HOSPITALIST

## 2018-03-24 RX ORDER — AMLODIPINE BESYLATE 5 MG/1
10 TABLET ORAL DAILY
Status: DISCONTINUED | OUTPATIENT
Start: 2018-03-24 | End: 2018-04-13

## 2018-03-24 RX ADMIN — ENOXAPARIN SODIUM 40 MG: 100 INJECTION SUBCUTANEOUS at 04:03

## 2018-03-24 RX ADMIN — POLYETHYLENE GLYCOL 3350 17 G: 17 POWDER, FOR SOLUTION ORAL at 08:03

## 2018-03-24 RX ADMIN — CARBIDOPA AND LEVODOPA 2 TABLET: 25; 100 TABLET ORAL at 05:03

## 2018-03-24 RX ADMIN — DIVALPROEX SODIUM 250 MG: 250 TABLET, DELAYED RELEASE ORAL at 08:03

## 2018-03-24 RX ADMIN — RIVASTIGMINE TRANSDERMAL SYSTEM 1 PATCH: 4.6 PATCH, EXTENDED RELEASE TRANSDERMAL at 08:03

## 2018-03-24 RX ADMIN — DOCUSATE SODIUM 100 MG: 100 CAPSULE, LIQUID FILLED ORAL at 08:03

## 2018-03-24 RX ADMIN — AMLODIPINE BESYLATE 10 MG: 5 TABLET ORAL at 12:03

## 2018-03-24 RX ADMIN — CARBIDOPA AND LEVODOPA 2 TABLET: 25; 100 TABLET ORAL at 06:03

## 2018-03-24 RX ADMIN — DOCUSATE SODIUM 100 MG: 100 CAPSULE, LIQUID FILLED ORAL at 12:03

## 2018-03-24 RX ADMIN — CARBIDOPA AND LEVODOPA 2 TABLET: 25; 100 TABLET ORAL at 12:03

## 2018-03-24 RX ADMIN — QUETIAPINE FUMARATE 100 MG: 100 TABLET ORAL at 08:03

## 2018-03-24 NOTE — PLAN OF CARE
Problem: Confusion, Acute (Adult)  Intervention: Monitor/Assist with Self Care   03/24/18 0044   Activity   Activity Assistance Provided assistance, 1 person   Daily Care Interventions   Self-Care Promotion BADL personal objects within reach   Functional Level Current   Ambulation 2 - assistive person   Transferring 2 - assistive person   Toileting 2 - assistive person   Bathing 4 - completely dependent   Dressing 4 - completely dependent   Eating 2 - assistive person   Communication 0 - understands/communicates without difficulty   Swallowing 0 - swallows foods/liquids without difficulty       Goal: Identify Related Risk Factors and Signs and Symptoms  Related risk factors and signs and symptoms are identified upon initiation of Human Response Clinical Practice Guideline (CPG)   Outcome: Ongoing (interventions implemented as appropriate)   03/24/18 0044   Confusion, Acute   Related Risk Factors (Acute Confusion) advanced age;cognitive impairment;mobility decreased;neurological impairment   Signs and Symptoms (Acute Confusion) disorientation;emotional/behavioral disturbances;thought process diminished/disorganized     Goal: Cognitive/Functional Impairments Minimized  Patient will demonstrate the desired outcomes by discharge/transition of care.   Outcome: Ongoing (interventions implemented as appropriate)   03/24/18 0044   Confusion, Acute (Adult)   Cognitive/Functional Impairments Minimized making progress toward outcome     Goal: Safety  Patient will demonstrate the desired outcomes by discharge/transition of care.   Outcome: Ongoing (interventions implemented as appropriate)   03/24/18 0044   Confusion, Acute (Adult)   Safety making progress toward outcome

## 2018-03-24 NOTE — PROGRESS NOTES
[facility] Hospital Medicine  Progress Note    Patient Name: Tenzin Ortiz  MRN: [id number]  Patient Class: IP- Inpatient   Admission Date: 2/7/2018  Length of Stay: 25 days  Attending Physician: Norma Raymond MD  Primary Care Provider: Efrain Chavez MD        Subjective:     Principal Problem:Debility    HPI:  Patient is 76 yo male with HTN, HLD, and Parkinson's who presents to ED reportedly for HA. Per ED note patient was complaining of HA but he denies this on my interview. Seems there was some confusion in ED and he was unclear why he was here. During interview with me, patient reports being here for NH placement although he does not appear to be very happy about this. He has been with some issues caring for himself. Currently lives with his girlfriend who reports she can no longer care for him. He states he currently gets around with a walker but sometimes has difficulty getting up out of his chair. Per chart review patient is with Parkinson's and sees neurology, Dr. Castellanos, lastly seen on 2/6/18. He was with hallucinations which were noted to be secondary to dopaminergic agents thus having to significantly reduce dose--she recommended he be admitted for placement at that time but patient declined. He has been attempting placement as an outpatient through PCP but without much luck. He otherwise denies recent illness, fever/chills, CP, SOB, abdominal pain, N/V/D, dysuria. On presentation patient with grossly normal labs/vitals. Placed in observation for what appears to be solely placement although this is something that may need to be continued as outpatient as is not indication for hospitalization.    Hospital Course:  Awake and alert without complaint - severe Parkinsons dementia and can no longer care for himself - neither can his girlfriend.  CM reports patient with acceptance to [facility]; awaiting paperwork to be signed by patient POA. Legal involved. Appreciate case management's  assistance.pending placement.    Interval History: no acute issues, continues to hallucinate    Review of Systems   Constitutional: Negative for activity change, appetite change, chills, diaphoresis, fatigue and fever.   Respiratory: Negative for cough, chest tightness, shortness of breath and wheezing.    Cardiovascular: Negative for chest pain, palpitations and leg swelling.   Gastrointestinal: Negative for abdominal distention, abdominal pain, constipation, diarrhea, nausea and vomiting.   Genitourinary: Negative for dysuria and hematuria.   Musculoskeletal: Negative for joint swelling and neck stiffness.   Neurological: Negative for tremors and weakness.   Psychiatric/Behavioral: Negative for confusion.     Objective:     Vital Signs (Most Recent):  Temp: 98.1 °F (36.7 °C) (03/24/18 0734)  Pulse: 87 (03/24/18 0734)  Resp: 18 (03/24/18 0734)  BP: (!) 171/70 (03/24/18 0734)  SpO2: (!) 94 % (03/24/18 0734) Vital Signs (24h Range):  Temp:  [97.6 °F (36.4 °C)-98.5 °F (36.9 °C)] 98.1 °F (36.7 °C)  Pulse:  [83-92] 87  Resp:  [18-20] 18  SpO2:  [94 %-98 %] 94 %  BP: (121-181)/(58-84) 171/70     Weight: 61.5 kg (135 lb 9.3 oz)  Body mass index is 21.25 kg/m².    Intake/Output Summary (Last 24 hours) at 03/24/18 1110  Last data filed at 03/23/18 1300   Gross per 24 hour   Intake              240 ml   Output                0 ml   Net              240 ml      Physical Exam   Constitutional: No distress.   Deconditioned   HENT:   Head: Atraumatic.   Right Ear: External ear normal.   Left Ear: External ear normal.   Nose: Nose normal.   Eyes: EOM are normal.   Wears glasses   Neck: Normal range of motion. Neck supple.   Pulmonary/Chest: Effort normal. No respiratory distress.   Abdominal: Soft. He exhibits no distension.   Musculoskeletal: Normal range of motion. He exhibits no edema.   Neurological: He is alert.   Pill roll tremor   Skin: Skin is warm and dry. He is not diaphoretic.   Psychiatric:   requires reorienting  often. Stable for weeks       Significant Labs: All pertinent labs within the past 24 hours have been reviewed.    Significant Imaging: I have reviewed all pertinent imaging results/findings within the past 24 hours.  I have reviewed and interpreted all pertinent imaging results/findings within the past 24 hours.    Assessment/Plan:      * Debility    TN consulted to aid in safe discharge planning. He has been living with a significant other who can no longer care for him and refusing to take patient home. His somewhat estranged daughter has been contacted and is willing to aid in finding NH placement (she had already begun this process as an outpatient) but there is an issue: patient's power of  (a business friend) refused to sign consent for NH placement. Patient unable to make his own decision regarding this as he is with intermittent confusion +/- previous unclear dementia diagnosis per daughter. Psychiatry consulted for capacity, who agrees that he has no capacity for decision making. Also recommended adding depakote 250mg TID to help with mood stabilization. He is medically cleared for transfer once nursing facility arranged. Pending placement.        Parkinson disease    Seroquel 50 mg; sinemet  mg;   Discontinued taxodione 50 mg  Restarted exelon 4.6 mg in the morning when he was more awake        Lewy body dementia without behavioral disturbance    Seen by psych who has since signed off - appreciate recs -  Doing well on current regimen  Continue medication regimen as follows:  Sinemet  QID and Seroquel 50 QHS  Continue Exelon 4.6 mg/24 patch  Divalproex 250 mg to BID  Held trazodone        Essential hypertension    Resume norvasc.          VTE Risk Mitigation         Ordered     enoxaparin injection 40 mg  Daily     Route:  Subcutaneous        03/14/18 1102     Medium Risk of VTE  Once      02/08/18 0653     Place JOSEFINA hose  Until discontinued      02/08/18 0653     Place sequential  compression device  Until discontinued      02/08/18 0653              Norma Raymond MD  Department of Hospital Medicine   [facility]

## 2018-03-24 NOTE — PLAN OF CARE
Problem: Fall Risk (Adult)  Goal: Identify Related Risk Factors and Signs and Symptoms  Related risk factors and signs and symptoms are identified upon initiation of Human Response Clinical Practice Guideline (CPG)    03/24/18 1541   Fall Risk   Related Risk Factors (Fall Risk) confusion/agitation;depression/anxiety;sensory deficits;environment unfamiliar     Goal: Absence of Falls  Patient will demonstrate the desired outcomes by discharge/transition of care.    03/24/18 1541   Fall Risk (Adult)   Absence of Falls making progress toward outcome

## 2018-03-24 NOTE — PLAN OF CARE
03/23/18 1900   Activity Intolerance (Adult)   Self-Care Promotion independence encouraged   Confusion, Acute (Adult)   Safety making progress toward outcome   Communication Enhancement Strategies call light answered in person;communication board used;repetition utilized   Pressure Ulcer Risk (Mike Q Scale) (Pediatric)   Pressure Reduction Devices Pressure-redistributing mattress utilized   Pressure Reduction Techniques frequent weight shift encouraged;heels elevated off bed   Bariatric Surgery (Open/Laparoscopic) (Adult)   VTE Required Core Measure Pharmacological prophylaxis initiated/maintained   Plan of Care Review   Plan Of Care Reviewed With patient

## 2018-03-24 NOTE — SUBJECTIVE & OBJECTIVE
Interval History: no acute issues, continues to hallucinate    Review of Systems   Constitutional: Negative for activity change, appetite change, chills, diaphoresis, fatigue and fever.   Respiratory: Negative for cough, chest tightness, shortness of breath and wheezing.    Cardiovascular: Negative for chest pain, palpitations and leg swelling.   Gastrointestinal: Negative for abdominal distention, abdominal pain, constipation, diarrhea, nausea and vomiting.   Genitourinary: Negative for dysuria and hematuria.   Musculoskeletal: Negative for joint swelling and neck stiffness.   Neurological: Negative for tremors and weakness.   Psychiatric/Behavioral: Negative for confusion.     Objective:     Vital Signs (Most Recent):  Temp: 98.1 °F (36.7 °C) (03/24/18 0734)  Pulse: 87 (03/24/18 0734)  Resp: 18 (03/24/18 0734)  BP: (!) 171/70 (03/24/18 0734)  SpO2: (!) 94 % (03/24/18 0734) Vital Signs (24h Range):  Temp:  [97.6 °F (36.4 °C)-98.5 °F (36.9 °C)] 98.1 °F (36.7 °C)  Pulse:  [83-92] 87  Resp:  [18-20] 18  SpO2:  [94 %-98 %] 94 %  BP: (121-181)/(58-84) 171/70     Weight: 61.5 kg (135 lb 9.3 oz)  Body mass index is 21.25 kg/m².    Intake/Output Summary (Last 24 hours) at 03/24/18 1110  Last data filed at 03/23/18 1300   Gross per 24 hour   Intake              240 ml   Output                0 ml   Net              240 ml      Physical Exam   Constitutional: No distress.   Deconditioned   HENT:   Head: Atraumatic.   Right Ear: External ear normal.   Left Ear: External ear normal.   Nose: Nose normal.   Eyes: EOM are normal.   Wears glasses   Neck: Normal range of motion. Neck supple.   Pulmonary/Chest: Effort normal. No respiratory distress.   Abdominal: Soft. He exhibits no distension.   Musculoskeletal: Normal range of motion. He exhibits no edema.   Neurological: He is alert.   Pill roll tremor   Skin: Skin is warm and dry. He is not diaphoretic.   Psychiatric:   requires reorienting often. Stable for weeks        Significant Labs: All pertinent labs within the past 24 hours have been reviewed.    Significant Imaging: I have reviewed all pertinent imaging results/findings within the past 24 hours.  I have reviewed and interpreted all pertinent imaging results/findings within the past 24 hours.

## 2018-03-25 LAB — POCT GLUCOSE: 92 MG/DL (ref 70–110)

## 2018-03-25 PROCEDURE — 21400001 HC TELEMETRY ROOM

## 2018-03-25 PROCEDURE — 25000003 PHARM REV CODE 250: Performed by: HOSPITALIST

## 2018-03-25 PROCEDURE — 63600175 PHARM REV CODE 636 W HCPCS: Performed by: INTERNAL MEDICINE

## 2018-03-25 PROCEDURE — 25000003 PHARM REV CODE 250: Performed by: INTERNAL MEDICINE

## 2018-03-25 RX ADMIN — CARBIDOPA AND LEVODOPA 2 TABLET: 25; 100 TABLET ORAL at 05:03

## 2018-03-25 RX ADMIN — POLYETHYLENE GLYCOL 3350 17 G: 17 POWDER, FOR SOLUTION ORAL at 09:03

## 2018-03-25 RX ADMIN — QUETIAPINE FUMARATE 100 MG: 100 TABLET ORAL at 09:03

## 2018-03-25 RX ADMIN — CARBIDOPA AND LEVODOPA 2 TABLET: 25; 100 TABLET ORAL at 12:03

## 2018-03-25 RX ADMIN — CARBIDOPA AND LEVODOPA 2 TABLET: 25; 100 TABLET ORAL at 11:03

## 2018-03-25 RX ADMIN — DIVALPROEX SODIUM 250 MG: 250 TABLET, DELAYED RELEASE ORAL at 09:03

## 2018-03-25 RX ADMIN — RIVASTIGMINE TRANSDERMAL SYSTEM 1 PATCH: 4.6 PATCH, EXTENDED RELEASE TRANSDERMAL at 09:03

## 2018-03-25 RX ADMIN — DOCUSATE SODIUM 100 MG: 100 CAPSULE, LIQUID FILLED ORAL at 09:03

## 2018-03-25 RX ADMIN — ENOXAPARIN SODIUM 40 MG: 100 INJECTION SUBCUTANEOUS at 05:03

## 2018-03-25 NOTE — PROGRESS NOTES
Note that I attempted to swab patient's mouth however, he is confused and resistant; Swabbed as much as possible as oral mucosa is dry; Applied ointment to lips;   Meds crushed in applesauce;  Did another set of V/S which have improved;   Will continue to f/u;

## 2018-03-25 NOTE — PROGRESS NOTES
[facility] Hospital Medicine  Progress Note    Patient Name: Tenzin Ortiz  MRN: [id number]  Patient Class: IP- Inpatient   Admission Date: 2/7/2018  Length of Stay: 26 days  Attending Physician: Norma Raymond MD  Primary Care Provider: Efrain Chavez MD        Subjective:     Principal Problem:Debility    HPI:  Patient is 78 yo male with HTN, HLD, and Parkinson's who presents to ED reportedly for HA. Per ED note patient was complaining of HA but he denies this on my interview. Seems there was some confusion in ED and he was unclear why he was here. During interview with me, patient reports being here for NH placement although he does not appear to be very happy about this. He has been with some issues caring for himself. Currently lives with his girlfriend who reports she can no longer care for him. He states he currently gets around with a walker but sometimes has difficulty getting up out of his chair. Per chart review patient is with Parkinson's and sees neurology, Dr. Castellanos, lastly seen on 2/6/18. He was with hallucinations which were noted to be secondary to dopaminergic agents thus having to significantly reduce dose--she recommended he be admitted for placement at that time but patient declined. He has been attempting placement as an outpatient through PCP but without much luck. He otherwise denies recent illness, fever/chills, CP, SOB, abdominal pain, N/V/D, dysuria. On presentation patient with grossly normal labs/vitals. Placed in observation for what appears to be solely placement although this is something that may need to be continued as outpatient as is not indication for hospitalization.    Hospital Course:  Awake and alert without complaint - severe Parkinsons dementia and can no longer care for himself - neither can his girlfriend.  CM reports patient with acceptance to [facility]; awaiting paperwork to be signed by patient POA. Legal involved. Appreciate case management's  assistance.pending placement.    Interval History: no acute issues, continues to hallucinate    Review of Systems   Constitutional: Negative for activity change, appetite change, chills, diaphoresis, fatigue and fever.   Respiratory: Negative for cough, chest tightness, shortness of breath and wheezing.    Cardiovascular: Negative for chest pain, palpitations and leg swelling.   Gastrointestinal: Negative for abdominal distention, abdominal pain, constipation, diarrhea, nausea and vomiting.   Genitourinary: Negative for dysuria and hematuria.   Musculoskeletal: Negative for joint swelling and neck stiffness.   Neurological: Negative for tremors and weakness.   Psychiatric/Behavioral: Negative for confusion.     Objective:     Vital Signs (Most Recent):  Temp: 98.5 °F (36.9 °C) (03/25/18 0847)  Pulse: 72 (03/25/18 0847)  Resp: 18 (03/25/18 0847)  BP: (!) 115/56 (03/25/18 0847)  SpO2: 96 % (03/25/18 0847) Vital Signs (24h Range):  Temp:  [98 °F (36.7 °C)-98.5 °F (36.9 °C)] 98.5 °F (36.9 °C)  Pulse:  [72-87] 72  Resp:  [18] 18  SpO2:  [96 %-98 %] 96 %  BP: ()/(43-70) 115/56     Weight: 61.2 kg (134 lb 14.7 oz)  Body mass index is 21.15 kg/m².    Intake/Output Summary (Last 24 hours) at 03/25/18 1024  Last data filed at 03/24/18 1300   Gross per 24 hour   Intake              120 ml   Output                0 ml   Net              120 ml      Physical Exam   Constitutional: No distress.   Deconditioned   HENT:   Head: Atraumatic.   Right Ear: External ear normal.   Left Ear: External ear normal.   Nose: Nose normal.   Eyes: EOM are normal.   Wears glasses   Neck: Normal range of motion. Neck supple.   Pulmonary/Chest: Effort normal. No respiratory distress.   Abdominal: Soft. He exhibits no distension.   Musculoskeletal: Normal range of motion. He exhibits no edema.   Neurological: He is alert.   Pill roll tremor   Skin: Skin is warm and dry. He is not diaphoretic.   Psychiatric:   requires reorienting often. Stable  for weeks       Significant Labs: All pertinent labs within the past 24 hours have been reviewed.    Significant Imaging: I have reviewed all pertinent imaging results/findings within the past 24 hours.  I have reviewed and interpreted all pertinent imaging results/findings within the past 24 hours.    Assessment/Plan:      * Debility    TN consulted to aid in safe discharge planning. He has been living with a significant other who can no longer care for him and refusing to take patient home. His somewhat estranged daughter has been contacted and is willing to aid in finding NH placement (she had already begun this process as an outpatient) but there is an issue: patient's power of  (a business friend) refused to sign consent for NH placement. Patient unable to make his own decision regarding this as he is with intermittent confusion +/- previous unclear dementia diagnosis per daughter. Psychiatry consulted for capacity, who agrees that he has no capacity for decision making. Also recommended adding depakote 250mg TID to help with mood stabilization. He is medically cleared for transfer once nursing facility arranged. Pending placement.        Parkinson disease    Seroquel 50 mg; sinemet  mg;   Discontinued taxodione 50 mg  Restarted exelon 4.6 mg in the morning when he was more awake        Lewy body dementia without behavioral disturbance    Seen by psych who has since signed off - appreciate recs -  Doing well on current regimen  Continue medication regimen as follows:  Sinemet  QID and Seroquel 50 QHS  Continue Exelon 4.6 mg/24 patch  Divalproex 250 mg to BID  Held trazodone        Essential hypertension    Resume norvasc.          VTE Risk Mitigation         Ordered     enoxaparin injection 40 mg  Daily     Route:  Subcutaneous        03/14/18 1102     Medium Risk of VTE  Once      02/08/18 0653     Place JOSEFINA hose  Until discontinued      02/08/18 0653     Place sequential compression device   Until discontinued      02/08/18 0653              Norma Raymond MD  Department of Hospital Medicine   [facility]

## 2018-03-25 NOTE — SUBJECTIVE & OBJECTIVE
Interval History: no acute issues, continues to hallucinate    Review of Systems   Constitutional: Negative for activity change, appetite change, chills, diaphoresis, fatigue and fever.   Respiratory: Negative for cough, chest tightness, shortness of breath and wheezing.    Cardiovascular: Negative for chest pain, palpitations and leg swelling.   Gastrointestinal: Negative for abdominal distention, abdominal pain, constipation, diarrhea, nausea and vomiting.   Genitourinary: Negative for dysuria and hematuria.   Musculoskeletal: Negative for joint swelling and neck stiffness.   Neurological: Negative for tremors and weakness.   Psychiatric/Behavioral: Negative for confusion.     Objective:     Vital Signs (Most Recent):  Temp: 98.5 °F (36.9 °C) (03/25/18 0847)  Pulse: 72 (03/25/18 0847)  Resp: 18 (03/25/18 0847)  BP: (!) 115/56 (03/25/18 0847)  SpO2: 96 % (03/25/18 0847) Vital Signs (24h Range):  Temp:  [98 °F (36.7 °C)-98.5 °F (36.9 °C)] 98.5 °F (36.9 °C)  Pulse:  [72-87] 72  Resp:  [18] 18  SpO2:  [96 %-98 %] 96 %  BP: ()/(43-70) 115/56     Weight: 61.2 kg (134 lb 14.7 oz)  Body mass index is 21.15 kg/m².    Intake/Output Summary (Last 24 hours) at 03/25/18 1024  Last data filed at 03/24/18 1300   Gross per 24 hour   Intake              120 ml   Output                0 ml   Net              120 ml      Physical Exam   Constitutional: No distress.   Deconditioned   HENT:   Head: Atraumatic.   Right Ear: External ear normal.   Left Ear: External ear normal.   Nose: Nose normal.   Eyes: EOM are normal.   Wears glasses   Neck: Normal range of motion. Neck supple.   Pulmonary/Chest: Effort normal. No respiratory distress.   Abdominal: Soft. He exhibits no distension.   Musculoskeletal: Normal range of motion. He exhibits no edema.   Neurological: He is alert.   Pill roll tremor   Skin: Skin is warm and dry. He is not diaphoretic.   Psychiatric:   requires reorienting often. Stable for weeks       Significant  Labs: All pertinent labs within the past 24 hours have been reviewed.    Significant Imaging: I have reviewed all pertinent imaging results/findings within the past 24 hours.  I have reviewed and interpreted all pertinent imaging results/findings within the past 24 hours.

## 2018-03-25 NOTE — PROGRESS NOTES
Note that patient continues as confused however interacting more with staff (using call-light);   Also note that patient consumed 75% of dinner and 50% of lunch;    Requires total feeding;  Given bed-bath and clean linen;  Will continue to f/u;

## 2018-03-25 NOTE — PLAN OF CARE
Problem: Pressure Ulcer Risk (Mike Scale) (Adult,Obstetrics,Pediatric)  Goal: Identify Related Risk Factors and Signs and Symptoms  Related risk factors and signs and symptoms are identified upon initiation of Human Response Clinical Practice Guideline (CPG)    03/25/18 1325   Pressure Ulcer Risk (Mike Scale)   Related Risk Factors (Pressure Ulcer Risk (Mike Scale)) cognitive impairment;excretions/secretions;fluid intake inadequate;hospitalization prolonged;mechanical forces;medication;mental impairment;mobility impaired;nutritional deficiencies;tissue perfusion altered     Goal: Skin Integrity  Patient will demonstrate the desired outcomes by discharge/transition of care.    03/25/18 1325   Pressure Ulcer Risk (Mike Scale) (Adult,Obstetrics,Pediatric)   Skin Integrity making progress toward outcome

## 2018-03-26 LAB — POCT GLUCOSE: 107 MG/DL (ref 70–110)

## 2018-03-26 PROCEDURE — 25000003 PHARM REV CODE 250: Performed by: HOSPITALIST

## 2018-03-26 PROCEDURE — 25000003 PHARM REV CODE 250: Performed by: INTERNAL MEDICINE

## 2018-03-26 PROCEDURE — 21400001 HC TELEMETRY ROOM

## 2018-03-26 PROCEDURE — 97116 GAIT TRAINING THERAPY: CPT

## 2018-03-26 PROCEDURE — 63600175 PHARM REV CODE 636 W HCPCS: Performed by: INTERNAL MEDICINE

## 2018-03-26 RX ADMIN — DOCUSATE SODIUM 100 MG: 100 CAPSULE, LIQUID FILLED ORAL at 09:03

## 2018-03-26 RX ADMIN — QUETIAPINE FUMARATE 100 MG: 100 TABLET ORAL at 09:03

## 2018-03-26 RX ADMIN — DIVALPROEX SODIUM 250 MG: 250 TABLET, DELAYED RELEASE ORAL at 09:03

## 2018-03-26 RX ADMIN — CARBIDOPA AND LEVODOPA 2 TABLET: 25; 100 TABLET ORAL at 05:03

## 2018-03-26 RX ADMIN — CARBIDOPA AND LEVODOPA 2 TABLET: 25; 100 TABLET ORAL at 11:03

## 2018-03-26 RX ADMIN — OLANZAPINE 5 MG: 5 TABLET, ORALLY DISINTEGRATING ORAL at 09:03

## 2018-03-26 RX ADMIN — ENOXAPARIN SODIUM 40 MG: 100 INJECTION SUBCUTANEOUS at 05:03

## 2018-03-26 RX ADMIN — RIVASTIGMINE TRANSDERMAL SYSTEM 1 PATCH: 4.6 PATCH, EXTENDED RELEASE TRANSDERMAL at 09:03

## 2018-03-26 NOTE — PLAN OF CARE
POA has been working with Danielito and  to transition patient to nursing home. KIMO will continue to assist as needed. KIMO contacted Isidra Bowman (EPS Specialist) @ [phone] to return phone call, KIMO left message on voicemail. KIMO will continue to assist as needed.         03/26/18 1601   Discharge Reassessment   Assessment Type Discharge Planning Reassessment   Provided patient/caregiver education on the expected discharge date and the discharge plan No   Do you have any problems affording any of your prescribed medications? No   Discharge Plan A New Nursing Home placement - long term care facility   Discharge Plan B New Nursing Home placement - long term care facility   Patient choice form signed by patient/caregiver N/A   Can the patient answer the patient profile reliably? No, cognitively impaired   How does the patient rate their overall health at the present time? Fair   Describe the patient's ability to walk at the present time. Walks with the help of equipment   How often would a person be available to care for the patient? Never

## 2018-03-26 NOTE — PLAN OF CARE
Problem: Physical Therapy Goal  Goal: Physical Therapy Goal  Goals to be met by: 18    Patient will increase functional independence with mobility by performin. Supine to sit with supervision  2. Sit to stand transfer with Stand-by Assistance  3. Gait  x 300 feet with Stand-by Assistance using Rolling Walker  4. Lower extremity exercise program x30 reps per handout, with assistance as needed       Patient tolerated ambulation well today.

## 2018-03-26 NOTE — PT/OT/SLP PROGRESS
"Physical Therapy Treatment    Patient Name:  Tenzin Ortiz   MRN:  [id number]    Recommendations:     Discharge Recommendations:  nursing facility, basic   Discharge Equipment Recommendations: none   Barriers to discharge: Decreased caregiver support    Assessment:     Tenzin Ortiz is a 77 y.o. male admitted with a medical diagnosis of Debility.  He presents with the following impairments/functional limitations:  weakness, impaired functional mobilty, impaired balance, decreased lower extremity function, decreased upper extremity function, decreased safety awareness, impaired cognition, pain, decreased coordination, impaired self care skills, gait instability.    Rehab Prognosis:  fair; patient would benefit from acute skilled PT services to address these deficits and reach maximum level of function.      Recent Surgery: * No surgery found *      Plan:     During this hospitalization, patient to be seen 3 x/week to address the above listed problems via gait training, therapeutic activities, therapeutic exercises  · Plan of Care Expires:  04/09/18   Plan of Care Reviewed with: patient    Subjective     Communicated with nurse Orantes prior to session. Patient found standing in room with PCT after walking to the bathroom upon PT entry to room, agreeable to treatment.      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   Pain/Comfort:  · Pain Rating 1: bottom of feet but did not rate with number   · Pain Addressed 1: Distraction, Cessation of Activity    Objective:     Patient found with: peripheral IV (Avasys)     General Precautions: Standard, fall   Orthopedic Precautions:N/A   Braces: N/A     Functional Mobility:  · Transfers:     · Sit to Stand:  minimum assistance with rolling walker x2 trials from bedside chair with verbal cues for safe technique  · Gait:  Patient ambulated 150ft with 3 seated rest breaks due to fatigue using Rolling Walker and " CGA using swing-to gait. Patient demonstrated decreased nneka, increased time in double stance, decreased velocity of limb motion, decreased step length, decreased toe-to-floor clearance and decreased weight-shifting ability during gait due to impaired balance, impaired coordination, impaired motor control, impaired postural control and decreased strength. He demonstrated shuffling gait and had multiple freezing episodes where he needed tactile cues to resume ambulation.     Patient left up in chair with all lines intact, call button in reach, nurse Rolanda notified and setup for lunch and [employer]asys on.    GOALS:    Physical Therapy Goals        Problem: Physical Therapy Goal    Goal Priority Disciplines Outcome Goal Variances Interventions   Physical Therapy Goal     PT/OT, PT Ongoing (interventions implemented as appropriate)     Description:  Goals to be met by: 18    Patient will increase functional independence with mobility by performin. Supine to sit with supervision  2. Sit to stand transfer with Stand-by Assistance  3. Gait  x 300 feet with Stand-by Assistance using Rolling Walker  4. Lower extremity exercise program x30 reps per handout, with assistance as needed                      Time Tracking:     PT Received On: 18  PT Start Time: 1213     PT Stop Time: 1228  PT Total Time (min): 15 min     Billable Minutes: Gait Training  15    Treatment Type: Treatment  PT/PTA: PT     PTA Visit Number: 0     Dominga Esquivel, PT  2018

## 2018-03-26 NOTE — PLAN OF CARE
Problem: Patient Care Overview  Goal: Plan of Care Review  Outcome: Ongoing (interventions implemented as appropriate)   03/26/18 1037   Coping/Psychosocial   Plan Of Care Reviewed With patient       Problem: Fall Risk (Adult)  Goal: Absence of Falls  Patient will demonstrate the desired outcomes by discharge/transition of care.   Outcome: Ongoing (interventions implemented as appropriate)   03/26/18 1037   Fall Risk (Adult)   Absence of Falls making progress toward outcome       Problem: Pressure Ulcer Risk (Mike Scale) (Adult,Obstetrics,Pediatric)  Goal: Skin Integrity  Patient will demonstrate the desired outcomes by discharge/transition of care.   Outcome: Ongoing (interventions implemented as appropriate)   03/26/18 1037   Pressure Ulcer Risk (Mike Scale) (Adult,Obstetrics,Pediatric)   Skin Integrity making progress toward outcome       Problem: Confusion, Acute (Adult)  Goal: Cognitive/Functional Impairments Minimized  Patient will demonstrate the desired outcomes by discharge/transition of care.   Outcome: Ongoing (interventions implemented as appropriate)   03/26/18 1037   Confusion, Acute (Adult)   Cognitive/Functional Impairments Minimized making progress toward outcome     Goal: Safety  Patient will demonstrate the desired outcomes by discharge/transition of care.   Outcome: Ongoing (interventions implemented as appropriate)   03/26/18 1037   Confusion, Acute (Adult)   Safety making progress toward outcome

## 2018-03-26 NOTE — PROGRESS NOTES
[facility] Hospital Medicine  Progress Note    Patient Name: Tenzin Ortiz  MRN: [id number]  Patient Class: IP- Inpatient   Admission Date: 2/7/2018  Length of Stay: 27 days  Attending Physician: Norma Raymond MD  Primary Care Provider: Efrain Chavez MD        Subjective:     Principal Problem:Debility    HPI:  Patient is 78 yo male with HTN, HLD, and Parkinson's who presents to ED reportedly for HA. Per ED note patient was complaining of HA but he denies this on my interview. Seems there was some confusion in ED and he was unclear why he was here. During interview with me, patient reports being here for NH placement although he does not appear to be very happy about this. He has been with some issues caring for himself. Currently lives with his girlfriend who reports she can no longer care for him. He states he currently gets around with a walker but sometimes has difficulty getting up out of his chair. Per chart review patient is with Parkinson's and sees neurology, Dr. Castellanos, lastly seen on 2/6/18. He was with hallucinations which were noted to be secondary to dopaminergic agents thus having to significantly reduce dose--she recommended he be admitted for placement at that time but patient declined. He has been attempting placement as an outpatient through PCP but without much luck. He otherwise denies recent illness, fever/chills, CP, SOB, abdominal pain, N/V/D, dysuria. On presentation patient with grossly normal labs/vitals. Placed in observation for what appears to be solely placement although this is something that may need to be continued as outpatient as is not indication for hospitalization.    Hospital Course:  Awake and alert without complaint - severe Parkinsons dementia and can no longer care for himself - neither can his girlfriend.  CM reports patient with acceptance to [facility]; awaiting paperwork to be signed by patient POA. Legal involved. Appreciate case management's  assistance.pending placement.    Interval History: no acute issues, continues to hallucinate    Review of Systems   Constitutional: Negative for activity change, appetite change, chills, diaphoresis, fatigue and fever.   Respiratory: Negative for cough, chest tightness, shortness of breath and wheezing.    Cardiovascular: Negative for chest pain, palpitations and leg swelling.   Gastrointestinal: Negative for abdominal distention, abdominal pain, constipation, diarrhea, nausea and vomiting.   Genitourinary: Negative for dysuria and hematuria.   Musculoskeletal: Negative for joint swelling and neck stiffness.   Neurological: Negative for tremors and weakness.   Psychiatric/Behavioral: Negative for confusion.     Objective:     Vital Signs (Most Recent):  Temp: 98 °F (36.7 °C) (03/26/18 0728)  Pulse: 78 (03/26/18 0728)  Resp: 18 (03/26/18 0728)  BP: (!) 111/57 (03/26/18 0728)  SpO2: 96 % (03/26/18 0728) Vital Signs (24h Range):  Temp:  [96.1 °F (35.6 °C)-98 °F (36.7 °C)] 98 °F (36.7 °C)  Pulse:  [77-85] 78  Resp:  [18-20] 18  SpO2:  [96 %-100 %] 96 %  BP: (111-150)/(57-67) 111/57     Weight: 61 kg (134 lb 7.7 oz)  Body mass index is 21.08 kg/m².    Intake/Output Summary (Last 24 hours) at 03/26/18 1000  Last data filed at 03/26/18 0700   Gross per 24 hour   Intake              440 ml   Output              501 ml   Net              -61 ml      Physical Exam   Constitutional: No distress.   Deconditioned   HENT:   Head: Atraumatic.   Right Ear: External ear normal.   Left Ear: External ear normal.   Nose: Nose normal.   Eyes: EOM are normal.   Wears glasses   Neck: Normal range of motion. Neck supple.   Pulmonary/Chest: Effort normal. No respiratory distress.   Abdominal: Soft. He exhibits no distension.   Musculoskeletal: Normal range of motion. He exhibits no edema.   Neurological: He is alert.   Pill roll tremor   Skin: Skin is warm and dry. He is not diaphoretic.   Psychiatric:   requires reorienting often. Stable  for weeks       Significant Labs: All pertinent labs within the past 24 hours have been reviewed.    Significant Imaging: I have reviewed all pertinent imaging results/findings within the past 24 hours.  I have reviewed and interpreted all pertinent imaging results/findings within the past 24 hours.    Assessment/Plan:      * Debility    TN consulted to aid in safe discharge planning. He has been living with a significant other who can no longer care for him and refusing to take patient home. His somewhat estranged daughter has been contacted and is willing to aid in finding NH placement (she had already begun this process as an outpatient) but there is an issue: patient's power of  (a business friend) refused to sign consent for NH placement. Patient unable to make his own decision regarding this as he is with intermittent confusion +/- previous unclear dementia diagnosis per daughter. Psychiatry consulted for capacity, who agrees that he has no capacity for decision making. Also recommended adding depakote 250mg TID to help with mood stabilization. He is medically cleared for transfer once nursing facility arranged. Pending placement.        Parkinson disease    Seroquel 50 mg; sinemet  mg;   Discontinued taxodione 50 mg  Restarted exelon 4.6 mg in the morning when he was more awake        Lewy body dementia without behavioral disturbance    Seen by psych who has since signed off - appreciate recs -  Doing well on current regimen  Continue medication regimen as follows:  Sinemet  QID and Seroquel 50 QHS  Continue Exelon 4.6 mg/24 patch  Divalproex 250 mg to BID  Held trazodone        Essential hypertension    Resume norvasc.          VTE Risk Mitigation         Ordered     enoxaparin injection 40 mg  Daily     Route:  Subcutaneous        03/14/18 1102     Medium Risk of VTE  Once      02/08/18 0653     Place JOSEFINA hose  Until discontinued      02/08/18 0653     Place sequential compression device   Until discontinued      02/08/18 0653              Norma Raymond MD  Department of Hospital Medicine   [facility]

## 2018-03-26 NOTE — SUBJECTIVE & OBJECTIVE
Interval History: no acute issues, continues to hallucinate    Review of Systems   Constitutional: Negative for activity change, appetite change, chills, diaphoresis, fatigue and fever.   Respiratory: Negative for cough, chest tightness, shortness of breath and wheezing.    Cardiovascular: Negative for chest pain, palpitations and leg swelling.   Gastrointestinal: Negative for abdominal distention, abdominal pain, constipation, diarrhea, nausea and vomiting.   Genitourinary: Negative for dysuria and hematuria.   Musculoskeletal: Negative for joint swelling and neck stiffness.   Neurological: Negative for tremors and weakness.   Psychiatric/Behavioral: Negative for confusion.     Objective:     Vital Signs (Most Recent):  Temp: 98 °F (36.7 °C) (03/26/18 0728)  Pulse: 78 (03/26/18 0728)  Resp: 18 (03/26/18 0728)  BP: (!) 111/57 (03/26/18 0728)  SpO2: 96 % (03/26/18 0728) Vital Signs (24h Range):  Temp:  [96.1 °F (35.6 °C)-98 °F (36.7 °C)] 98 °F (36.7 °C)  Pulse:  [77-85] 78  Resp:  [18-20] 18  SpO2:  [96 %-100 %] 96 %  BP: (111-150)/(57-67) 111/57     Weight: 61 kg (134 lb 7.7 oz)  Body mass index is 21.08 kg/m².    Intake/Output Summary (Last 24 hours) at 03/26/18 1000  Last data filed at 03/26/18 0700   Gross per 24 hour   Intake              440 ml   Output              501 ml   Net              -61 ml      Physical Exam   Constitutional: No distress.   Deconditioned   HENT:   Head: Atraumatic.   Right Ear: External ear normal.   Left Ear: External ear normal.   Nose: Nose normal.   Eyes: EOM are normal.   Wears glasses   Neck: Normal range of motion. Neck supple.   Pulmonary/Chest: Effort normal. No respiratory distress.   Abdominal: Soft. He exhibits no distension.   Musculoskeletal: Normal range of motion. He exhibits no edema.   Neurological: He is alert.   Pill roll tremor   Skin: Skin is warm and dry. He is not diaphoretic.   Psychiatric:   requires reorienting often. Stable for weeks       Significant Labs:  All pertinent labs within the past 24 hours have been reviewed.    Significant Imaging: I have reviewed all pertinent imaging results/findings within the past 24 hours.  I have reviewed and interpreted all pertinent imaging results/findings within the past 24 hours.

## 2018-03-27 LAB — POCT GLUCOSE: 83 MG/DL (ref 70–110)

## 2018-03-27 PROCEDURE — 25000003 PHARM REV CODE 250: Performed by: HOSPITALIST

## 2018-03-27 PROCEDURE — 63600175 PHARM REV CODE 636 W HCPCS: Performed by: INTERNAL MEDICINE

## 2018-03-27 PROCEDURE — 97530 THERAPEUTIC ACTIVITIES: CPT

## 2018-03-27 PROCEDURE — 97116 GAIT TRAINING THERAPY: CPT

## 2018-03-27 PROCEDURE — 21400001 HC TELEMETRY ROOM

## 2018-03-27 PROCEDURE — 25000003 PHARM REV CODE 250: Performed by: INTERNAL MEDICINE

## 2018-03-27 RX ADMIN — POLYETHYLENE GLYCOL 3350 17 G: 17 POWDER, FOR SOLUTION ORAL at 09:03

## 2018-03-27 RX ADMIN — DOCUSATE SODIUM AND SENNOSIDES 1 TABLET: 8.6; 5 TABLET, FILM COATED ORAL at 09:03

## 2018-03-27 RX ADMIN — DOCUSATE SODIUM 100 MG: 100 CAPSULE, LIQUID FILLED ORAL at 09:03

## 2018-03-27 RX ADMIN — RIVASTIGMINE TRANSDERMAL SYSTEM 1 PATCH: 4.6 PATCH, EXTENDED RELEASE TRANSDERMAL at 09:03

## 2018-03-27 RX ADMIN — AMLODIPINE BESYLATE 10 MG: 5 TABLET ORAL at 09:03

## 2018-03-27 RX ADMIN — CARBIDOPA AND LEVODOPA 2 TABLET: 25; 100 TABLET ORAL at 05:03

## 2018-03-27 RX ADMIN — CARBIDOPA AND LEVODOPA 2 TABLET: 25; 100 TABLET ORAL at 12:03

## 2018-03-27 RX ADMIN — QUETIAPINE FUMARATE 100 MG: 100 TABLET ORAL at 09:03

## 2018-03-27 RX ADMIN — CARBIDOPA AND LEVODOPA 2 TABLET: 25; 100 TABLET ORAL at 06:03

## 2018-03-27 RX ADMIN — ENOXAPARIN SODIUM 40 MG: 100 INJECTION SUBCUTANEOUS at 05:03

## 2018-03-27 RX ADMIN — DIVALPROEX SODIUM 250 MG: 250 TABLET, DELAYED RELEASE ORAL at 09:03

## 2018-03-27 RX ADMIN — CARBIDOPA AND LEVODOPA 2 TABLET: 25; 100 TABLET ORAL at 11:03

## 2018-03-27 NOTE — PROGRESS NOTES
[facility] Hospital Medicine  Progress Note    Patient Name: Tenzin Ortiz  MRN: [id number]  Patient Class: IP- Inpatient   Admission Date: 2/7/2018  Length of Stay: 28 days  Attending Physician: Pietro Paige MD  Primary Care Provider: Efrain Chavez MD        Subjective:     Principal Problem:Debility    HPI:  Patient is 76 yo male with HTN, HLD, and Parkinson's who presents to ED reportedly for HA. Per ED note patient was complaining of HA but he denies this on my interview. Seems there was some confusion in ED and he was unclear why he was here. During interview with me, patient reports being here for NH placement although he does not appear to be very happy about this. He has been with some issues caring for himself. Currently lives with his girlfriend who reports she can no longer care for him. He states he currently gets around with a walker but sometimes has difficulty getting up out of his chair. Per chart review patient is with Parkinson's and sees neurology, Dr. Castellanos, lastly seen on 2/6/18. He was with hallucinations which were noted to be secondary to dopaminergic agents thus having to significantly reduce dose--she recommended he be admitted for placement at that time but patient declined. He has been attempting placement as an outpatient through PCP but without much luck. He otherwise denies recent illness, fever/chills, CP, SOB, abdominal pain, N/V/D, dysuria. On presentation patient with grossly normal labs/vitals. Placed in observation for what appears to be solely placement although this is something that may need to be continued as outpatient as is not indication for hospitalization.    Hospital Course:  Awake and alert without complaint - severe Parkinsons dementia and can no longer care for himself - neither can his girlfriend.  CM reports patient with acceptance to [facility]; awaiting paperwork to be signed by patient POA. Legal involved. Appreciate case management's  assistance.pending placement.    Interval History:  No new issues.     Review of Systems   Unable to perform ROS: Dementia   Constitutional: Negative for appetite change.     Objective:     Vital Signs (Most Recent):  Temp: 97.7 °F (36.5 °C) (03/27/18 0729)  Pulse: 89 (03/27/18 0729)  Resp: 18 (03/27/18 0729)  BP: (!) 120/57 (03/27/18 0729)  SpO2: (!) 94 % (03/27/18 0729) Vital Signs (24h Range):  Temp:  [97.7 °F (36.5 °C)-98.5 °F (36.9 °C)] 97.7 °F (36.5 °C)  Pulse:  [84-97] 89  Resp:  [18] 18  SpO2:  [94 %-96 %] 94 %  BP: (100-137)/(50-70) 120/57     Weight: 62.2 kg (137 lb 2 oz)  Body mass index is 21.5 kg/m².    Intake/Output Summary (Last 24 hours) at 03/27/18 1128  Last data filed at 03/27/18 0746   Gross per 24 hour   Intake              120 ml   Output              400 ml   Net             -280 ml      Physical Exam   Constitutional: He appears well-developed and well-nourished.   HENT:   Head: Normocephalic and atraumatic.   Neurological: He is alert.   Vitals reviewed.      Significant Labs: BMP: No results for input(s): GLU, NA, K, CL, CO2, BUN, CREATININE, CALCIUM, MG in the last 48 hours.  CBC: No results for input(s): WBC, HGB, HCT, PLT in the last 48 hours.    Significant Imaging:    Assessment/Plan:      * Debility    TN consulted to aid in safe discharge planning. He has been living with a significant other who can no longer care for him and refusing to take patient home. His somewhat estranged daughter has been contacted and is willing to aid in finding NH placement (she had already begun this process as an outpatient) but there is an issue: patient's power of  (a business friend) refused to sign consent for NH placement. Patient unable to make his own decision regarding this as he is with intermittent confusion +/- previous unclear dementia diagnosis per daughter. Psychiatry consulted for capacity, who agrees that he has no capacity for decision making. Also recommended adding depakote  250mg TID to help with mood stabilization. He is medically cleared for transfer once nursing facility arranged. Pending placement.        Lewy body dementia without behavioral disturbance    Seen by psych who has since signed off - appreciate recs -  Doing well on current regimen  Continue medication regimen as follows:  Sinemet  QID and Seroquel 50 QHS  Continue Exelon 4.6 mg/24 patch  Divalproex 250 mg to BID  Held trazodone        Essential hypertension    Resume norvasc.        Parkinson disease    Seroquel 50 mg; sinemet  mg;   Discontinued taxodione 50 mg  Restarted exelon 4.6 mg in the morning when he was more awake          VTE Risk Mitigation         Ordered     enoxaparin injection 40 mg  Daily     Route:  Subcutaneous        03/14/18 1102     Medium Risk of VTE  Once      02/08/18 0653     Place JOSEFINA hose  Until discontinued      02/08/18 0653     Place sequential compression device  Until discontinued      02/08/18 0653        Discussed with  this am. Placement issue. Will d/c when arranged.        Pietro Trinh MD  Department of Hospital Medicine   [facility]

## 2018-03-27 NOTE — PLAN OF CARE
Problem: Physical Therapy Goal  Goal: Physical Therapy Goal  Goals to be met by: 18    Patient will increase functional independence with mobility by performin. Supine to sit with supervision  2. Sit to stand transfer with Stand-by Assistance  3. Gait  x 300 feet with Stand-by Assistance using Rolling Walker  4. Lower extremity exercise program x30 reps per handout, with assistance as needed     Outcome: Ongoing (interventions implemented as appropriate)    Pt with a decrease in function since yesterday's treatment session.  Pt with increased BUE tremors and difficulty with initiation, while ambulating.  Pt very confused and requires cues for redirection.  Needs assistance for all OOB mobility 2/2 fall risk.

## 2018-03-27 NOTE — SUBJECTIVE & OBJECTIVE
Interval History:  No new issues.     Review of Systems   Unable to perform ROS: Dementia   Constitutional: Negative for appetite change.     Objective:     Vital Signs (Most Recent):  Temp: 97.7 °F (36.5 °C) (03/27/18 0729)  Pulse: 89 (03/27/18 0729)  Resp: 18 (03/27/18 0729)  BP: (!) 120/57 (03/27/18 0729)  SpO2: (!) 94 % (03/27/18 0729) Vital Signs (24h Range):  Temp:  [97.7 °F (36.5 °C)-98.5 °F (36.9 °C)] 97.7 °F (36.5 °C)  Pulse:  [84-97] 89  Resp:  [18] 18  SpO2:  [94 %-96 %] 94 %  BP: (100-137)/(50-70) 120/57     Weight: 62.2 kg (137 lb 2 oz)  Body mass index is 21.5 kg/m².    Intake/Output Summary (Last 24 hours) at 03/27/18 1128  Last data filed at 03/27/18 0746   Gross per 24 hour   Intake              120 ml   Output              400 ml   Net             -280 ml      Physical Exam   Constitutional: He appears well-developed and well-nourished.   HENT:   Head: Normocephalic and atraumatic.   Neurological: He is alert.   Vitals reviewed.      Significant Labs: BMP: No results for input(s): GLU, NA, K, CL, CO2, BUN, CREATININE, CALCIUM, MG in the last 48 hours.  CBC: No results for input(s): WBC, HGB, HCT, PLT in the last 48 hours.    Significant Imaging:

## 2018-03-27 NOTE — PT/OT/SLP PROGRESS
"Physical Therapy Treatment    Patient Name:  Tenzin Ortiz   MRN:  [id number]    Recommendations:     Discharge Recommendations:  nursing facility, basic   Discharge Equipment Recommendations: none   Barriers to discharge: Decreased caregiver support    Assessment:     Teznin Ortiz is a 77 y.o. male admitted with a medical diagnosis of Debility.  He presents with the following impairments/functional limitations:  weakness, impaired endurance, gait instability, impaired functional mobilty, impaired self care skills, impaired balance, decreased coordination, decreased safety awareness, impaired cognition, decreased lower extremity function, pain, decreased ROM, impaired skin, impaired coordination, impaired fine motor, impaired joint extensibility, impaired muscle length.  Pt with a decrease in function since yesterday's treatment session.  Pt with increased BUE tremors and difficulty with initiation, while ambulating.  Pt very confused and requires cues for redirection.  Needs assistance for all OOB mobility 2/2 fall risk.    Rehab Prognosis:  fair; patient would benefit from acute skilled PT services to address these deficits and reach maximum level of function.      Recent Surgery: * No surgery found *      Plan:     During this hospitalization, patient to be seen 3 x/week to address the above listed problems via gait training, therapeutic activities, therapeutic exercises  · Plan of Care Expires:  04/09/18   Plan of Care Reviewed with: patient    Subjective     Communicated with pt's nurse, Mila/Judith prior to session.  Patient found supine upon PT entry to room, agreeable to treatment.      Chief Complaint: Needing to make phone calls  Patient comments/goals: Pt states " It hurts me to walk."  Pain/Comfort:  · Pain Rating 1:  (yes, did not rate)  · Location 1:  (BLE)  · Pain Addressed 1: Distraction, Reposition, Cessation of Activity    Patients cultural, spiritual, Temple conflicts given the current " situation: none    Objective:     Patient found with: bed alarm, peripheral IV (AVASYS)     General Precautions: Standard, fall   Orthopedic Precautions:N/A   Braces: N/A     Functional Mobility: Pt requires cues for proper hand placement with BM and transfers.  · Bed Mobility:     · Scooting: contact guard assistance  · Supine to Sit: minimum assistance  · Sit to Supine: Minimum assistance  · Transfers:x3 from BSchair, x1 from BScommode, and x1 from EOB     · Sit to Stand:  minimum assistance with rolling walker  · BSchair <> BScommode: Moderate assistance with no AD and stand pivot transfer  · BSchair to EOB: Moderate asssitance with no AD and stand pivot transfer  · Gait: Pt ambulated ~8ft with RW, CGA with max difficulty for initiation.  Required asssitance for advancement of RLE.  Pt ambulating on B forefeet.  Displays shufling gait with decreased nneka, step length, step width, postural control, velocity of limb, and safety awareness.  · Balance: good sitting and fair- standing      AM-PAC 6 CLICK MOBILITY  Turning over in bed (including adjusting bedclothes, sheets and blankets)?: 4  Sitting down on and standing up from a chair with arms (e.g., wheelchair, bedside commode, etc.): 3  Moving from lying on back to sitting on the side of the bed?: 3  Moving to and from a bed to a chair (including a wheelchair)?: 3  Need to walk in hospital room?: 3  Climbing 3-5 steps with a railing?: 2  Total Score: 18       Therapeutic Activities and Exercises:   Pt performed BM, transfers, and ambulation.    Patient left supine with all lines intact, call button in reach, bed alarm on, pt's nurse, Judith, notified and AVASYS present..    GOALS:    Physical Therapy Goals        Problem: Physical Therapy Goal    Goal Priority Disciplines Outcome Goal Variances Interventions   Physical Therapy Goal     PT/OT, PT Ongoing (interventions implemented as appropriate)     Description:  Goals to be met by: 4/9/18    Patient will  increase functional independence with mobility by performin. Supine to sit with supervision  2. Sit to stand transfer with Stand-by Assistance  3. Gait  x 300 feet with Stand-by Assistance using Rolling Walker  4. Lower extremity exercise program x30 reps per handout, with assistance as needed                      Time Tracking:     PT Received On: 18  PT Start Time: 1022     PT Stop Time: 1050  PT Total Time (min): 28 min     Billable Minutes: Gait Training 8 and Therapeutic Activity 20    Treatment Type: Treatment  PT/PTA: PTA     PTA Visit Number: 1     Citlalisonia Dennis, PTA  2018

## 2018-03-28 PROCEDURE — 63600175 PHARM REV CODE 636 W HCPCS: Performed by: INTERNAL MEDICINE

## 2018-03-28 PROCEDURE — 25000003 PHARM REV CODE 250: Performed by: HOSPITALIST

## 2018-03-28 PROCEDURE — 25000003 PHARM REV CODE 250: Performed by: INTERNAL MEDICINE

## 2018-03-28 PROCEDURE — 21400001 HC TELEMETRY ROOM

## 2018-03-28 RX ORDER — DOCUSATE SODIUM 100 MG/1
100 CAPSULE, LIQUID FILLED ORAL 2 TIMES DAILY
Status: DISCONTINUED | OUTPATIENT
Start: 2018-03-28 | End: 2018-04-23

## 2018-03-28 RX ORDER — CARBIDOPA AND LEVODOPA 25; 100 MG/1; MG/1
2 TABLET ORAL 4 TIMES DAILY
Status: DISCONTINUED | OUTPATIENT
Start: 2018-03-28 | End: 2018-04-23

## 2018-03-28 RX ADMIN — AMLODIPINE BESYLATE 10 MG: 5 TABLET ORAL at 08:03

## 2018-03-28 RX ADMIN — CARBIDOPA AND LEVODOPA 2 TABLET: 25; 100 TABLET ORAL at 04:03

## 2018-03-28 RX ADMIN — ENOXAPARIN SODIUM 40 MG: 100 INJECTION SUBCUTANEOUS at 04:03

## 2018-03-28 RX ADMIN — DOCUSATE SODIUM 100 MG: 100 CAPSULE, LIQUID FILLED ORAL at 09:03

## 2018-03-28 RX ADMIN — DOCUSATE SODIUM 100 MG: 100 CAPSULE, LIQUID FILLED ORAL at 08:03

## 2018-03-28 RX ADMIN — QUETIAPINE FUMARATE 100 MG: 100 TABLET ORAL at 09:03

## 2018-03-28 RX ADMIN — DIVALPROEX SODIUM 250 MG: 250 TABLET, DELAYED RELEASE ORAL at 08:03

## 2018-03-28 RX ADMIN — CARBIDOPA AND LEVODOPA 2 TABLET: 25; 100 TABLET ORAL at 09:03

## 2018-03-28 RX ADMIN — DIVALPROEX SODIUM 250 MG: 250 TABLET, DELAYED RELEASE ORAL at 09:03

## 2018-03-28 RX ADMIN — CARBIDOPA AND LEVODOPA 2 TABLET: 25; 100 TABLET ORAL at 05:03

## 2018-03-28 RX ADMIN — RIVASTIGMINE TRANSDERMAL SYSTEM 1 PATCH: 4.6 PATCH, EXTENDED RELEASE TRANSDERMAL at 08:03

## 2018-03-28 NOTE — UM SECONDARY REVIEW
[facility]    IP Extended Stay > 10    LOS: approved an agreement with D/C plan   29 DAY LOS, CONTIN STAY APPROVED BY DR. STYLES

## 2018-03-28 NOTE — PROGRESS NOTES
[facility] Hospital Medicine  Progress Note    Patient Name: Tenzin Ortiz  MRN: [id number]  Patient Class: IP- Inpatient   Admission Date: 2/7/2018  Length of Stay: 29 days  Attending Physician: Pietro Paige MD  Primary Care Provider: Efrain Chavez MD        Subjective:     Principal Problem:Debility    HPI:  Patient is 78 yo male with HTN, HLD, and Parkinson's who presents to ED reportedly for HA. Per ED note patient was complaining of HA but he denies this on my interview. Seems there was some confusion in ED and he was unclear why he was here. During interview with me, patient reports being here for NH placement although he does not appear to be very happy about this. He has been with some issues caring for himself. Currently lives with his girlfriend who reports she can no longer care for him. He states he currently gets around with a walker but sometimes has difficulty getting up out of his chair. Per chart review patient is with Parkinson's and sees neurology, Dr. Castellanos, lastly seen on 2/6/18. He was with hallucinations which were noted to be secondary to dopaminergic agents thus having to significantly reduce dose--she recommended he be admitted for placement at that time but patient declined. He has been attempting placement as an outpatient through PCP but without much luck. He otherwise denies recent illness, fever/chills, CP, SOB, abdominal pain, N/V/D, dysuria. On presentation patient with grossly normal labs/vitals. Placed in observation for what appears to be solely placement although this is something that may need to be continued as outpatient as is not indication for hospitalization.    Hospital Course:  Awake and alert without complaint - severe Parkinsons dementia and can no longer care for himself - neither can his girlfriend.  CM reports patient with acceptance to [facility]; awaiting paperwork to be signed by patient POA. Legal involved. Appreciate case management's  assistance.pending placement.    Interval History: No new issues.     Review of Systems   Unable to perform ROS: Dementia   Constitutional: Negative for appetite change.     Objective:     Vital Signs (Most Recent):  Temp: 97.1 °F (36.2 °C) (03/28/18 0754)  Pulse: 76 (03/28/18 0754)  Resp: 19 (03/28/18 0754)  BP: 116/65 (03/28/18 0754)  SpO2: 97 % (03/28/18 0754) Vital Signs (24h Range):  Temp:  [97.1 °F (36.2 °C)-98.7 °F (37.1 °C)] 97.1 °F (36.2 °C)  Pulse:  [] 76  Resp:  [18-19] 19  SpO2:  [96 %-98 %] 97 %  BP: (108-144)/(55-65) 116/65     Weight: 62.2 kg (137 lb 2 oz)  Body mass index is 21.5 kg/m².  No intake or output data in the 24 hours ending 03/28/18 1100   Physical Exam   Constitutional: He appears well-developed and well-nourished.   HENT:   Head: Normocephalic and atraumatic.   Neurological: He is alert.   Vitals reviewed.      Significant Labs: BMP: No results for input(s): GLU, NA, K, CL, CO2, BUN, CREATININE, CALCIUM, MG in the last 48 hours.  CBC: No results for input(s): WBC, HGB, HCT, PLT in the last 48 hours.    Significant Imaging:    Assessment/Plan:      * Debility    TN consulted to aid in safe discharge planning. He has been living with a significant other who can no longer care for him and refusing to take patient home. His somewhat estranged daughter has been contacted and is willing to aid in finding NH placement (she had already begun this process as an outpatient) but there is an issue: patient's power of  (a business friend) refused to sign consent for NH placement. Patient unable to make his own decision regarding this as he is with intermittent confusion +/- previous unclear dementia diagnosis per daughter. Psychiatry consulted for capacity, who agrees that he has no capacity for decision making. Also recommended adding depakote 250mg TID to help with mood stabilization. He is medically cleared for transfer once nursing facility arranged. Pending placement.        Lewy  body dementia without behavioral disturbance    Seen by psych who has since signed off - appreciate recs -  Doing well on current regimen  Continue medication regimen as follows:  Sinemet  QID and Seroquel 50 QHS  Continue Exelon 4.6 mg/24 patch  Divalproex 250 mg to BID  Held trazodone        Essential hypertension    Resume norvasc.        Parkinson disease    Seroquel 50 mg; sinemet  mg;   Discontinued taxodione 50 mg  Restarted exelon 4.6 mg in the morning when he was more awake          VTE Risk Mitigation         Ordered     enoxaparin injection 40 mg  Daily     Route:  Subcutaneous        03/14/18 1102     Medium Risk of VTE  Once      02/08/18 0653     Place JOSEFINA hose  Until discontinued      02/08/18 0653     Place sequential compression device  Until discontinued      02/08/18 0653        Awaiting placement. BMP in am..       Pietro Trinh MD  Department of Hospital Medicine   [facility]

## 2018-03-28 NOTE — SUBJECTIVE & OBJECTIVE
Interval History: No new issues.     Review of Systems   Unable to perform ROS: Dementia   Constitutional: Negative for appetite change.     Objective:     Vital Signs (Most Recent):  Temp: 97.1 °F (36.2 °C) (03/28/18 0754)  Pulse: 76 (03/28/18 0754)  Resp: 19 (03/28/18 0754)  BP: 116/65 (03/28/18 0754)  SpO2: 97 % (03/28/18 0754) Vital Signs (24h Range):  Temp:  [97.1 °F (36.2 °C)-98.7 °F (37.1 °C)] 97.1 °F (36.2 °C)  Pulse:  [] 76  Resp:  [18-19] 19  SpO2:  [96 %-98 %] 97 %  BP: (108-144)/(55-65) 116/65     Weight: 62.2 kg (137 lb 2 oz)  Body mass index is 21.5 kg/m².  No intake or output data in the 24 hours ending 03/28/18 1100   Physical Exam   Constitutional: He appears well-developed and well-nourished.   HENT:   Head: Normocephalic and atraumatic.   Neurological: He is alert.   Vitals reviewed.      Significant Labs: BMP: No results for input(s): GLU, NA, K, CL, CO2, BUN, CREATININE, CALCIUM, MG in the last 48 hours.  CBC: No results for input(s): WBC, HGB, HCT, PLT in the last 48 hours.    Significant Imaging:

## 2018-03-29 LAB
ANION GAP SERPL CALC-SCNC: 9 MMOL/L
BUN SERPL-MCNC: 22 MG/DL
CALCIUM SERPL-MCNC: 9.2 MG/DL
CHLORIDE SERPL-SCNC: 107 MMOL/L
CO2 SERPL-SCNC: 24 MMOL/L
CREAT SERPL-MCNC: 1 MG/DL
EST. GFR  (AFRICAN AMERICAN): >60 ML/MIN/1.73 M^2
EST. GFR  (NON AFRICAN AMERICAN): >60 ML/MIN/1.73 M^2
GLUCOSE SERPL-MCNC: 93 MG/DL
POTASSIUM SERPL-SCNC: 3.7 MMOL/L
SODIUM SERPL-SCNC: 140 MMOL/L

## 2018-03-29 PROCEDURE — 36415 COLL VENOUS BLD VENIPUNCTURE: CPT

## 2018-03-29 PROCEDURE — 97116 GAIT TRAINING THERAPY: CPT

## 2018-03-29 PROCEDURE — 97110 THERAPEUTIC EXERCISES: CPT

## 2018-03-29 PROCEDURE — 25000003 PHARM REV CODE 250: Performed by: INTERNAL MEDICINE

## 2018-03-29 PROCEDURE — 25000003 PHARM REV CODE 250: Performed by: HOSPITALIST

## 2018-03-29 PROCEDURE — 80048 BASIC METABOLIC PNL TOTAL CA: CPT

## 2018-03-29 PROCEDURE — 63600175 PHARM REV CODE 636 W HCPCS: Performed by: INTERNAL MEDICINE

## 2018-03-29 PROCEDURE — 21400001 HC TELEMETRY ROOM

## 2018-03-29 RX ADMIN — DIVALPROEX SODIUM 250 MG: 250 TABLET, DELAYED RELEASE ORAL at 09:03

## 2018-03-29 RX ADMIN — QUETIAPINE FUMARATE 100 MG: 100 TABLET ORAL at 09:03

## 2018-03-29 RX ADMIN — DOCUSATE SODIUM AND SENNOSIDES 1 TABLET: 8.6; 5 TABLET, FILM COATED ORAL at 08:03

## 2018-03-29 RX ADMIN — POLYETHYLENE GLYCOL 3350 17 G: 17 POWDER, FOR SOLUTION ORAL at 08:03

## 2018-03-29 RX ADMIN — DIVALPROEX SODIUM 250 MG: 250 TABLET, DELAYED RELEASE ORAL at 08:03

## 2018-03-29 RX ADMIN — CARBIDOPA AND LEVODOPA 2 TABLET: 25; 100 TABLET ORAL at 09:03

## 2018-03-29 RX ADMIN — AMLODIPINE BESYLATE 10 MG: 5 TABLET ORAL at 08:03

## 2018-03-29 RX ADMIN — CARBIDOPA AND LEVODOPA 2 TABLET: 25; 100 TABLET ORAL at 04:03

## 2018-03-29 RX ADMIN — ENOXAPARIN SODIUM 40 MG: 100 INJECTION SUBCUTANEOUS at 04:03

## 2018-03-29 RX ADMIN — DOCUSATE SODIUM 100 MG: 100 CAPSULE, LIQUID FILLED ORAL at 09:03

## 2018-03-29 RX ADMIN — DOCUSATE SODIUM 100 MG: 100 CAPSULE, LIQUID FILLED ORAL at 08:03

## 2018-03-29 RX ADMIN — CARBIDOPA AND LEVODOPA 2 TABLET: 25; 100 TABLET ORAL at 08:03

## 2018-03-29 RX ADMIN — RIVASTIGMINE TRANSDERMAL SYSTEM 1 PATCH: 4.6 PATCH, EXTENDED RELEASE TRANSDERMAL at 08:03

## 2018-03-29 RX ADMIN — CARBIDOPA AND LEVODOPA 2 TABLET: 25; 100 TABLET ORAL at 12:03

## 2018-03-29 NOTE — SUBJECTIVE & OBJECTIVE
Interval History: No new issues. sleeping    Review of Systems   Unable to perform ROS: Dementia   Constitutional: Negative for appetite change.     Objective:     Vital Signs (Most Recent):  Temp: 98.2 °F (36.8 °C) (03/29/18 0732)  Pulse: 79 (03/29/18 0732)  Resp: 18 (03/29/18 0732)  BP: (!) 120/57 (03/29/18 0732)  SpO2: 96 % (03/29/18 0732) Vital Signs (24h Range):  Temp:  [96.8 °F (36 °C)-98.2 °F (36.8 °C)] 98.2 °F (36.8 °C)  Pulse:  [57-87] 79  Resp:  [18-19] 18  SpO2:  [96 %-98 %] 96 %  BP: (106-128)/(51-79) 120/57     Weight: 62.4 kg (137 lb 9.1 oz)  Body mass index is 21.57 kg/m².    Intake/Output Summary (Last 24 hours) at 03/29/18 0749  Last data filed at 03/29/18 0600   Gross per 24 hour   Intake              480 ml   Output                0 ml   Net              480 ml      Physical Exam   Constitutional: He appears well-developed and well-nourished.   HENT:   Head: Normocephalic and atraumatic.   Neurological: He is alert.   Vitals reviewed.      Significant Labs:   BMP:   Recent Labs  Lab 03/29/18  0518   GLU 93      K 3.7      CO2 24   BUN 22   CREATININE 1.0   CALCIUM 9.2     CBC: No results for input(s): WBC, HGB, HCT, PLT in the last 48 hours.    Significant Imaging

## 2018-03-29 NOTE — PLAN OF CARE
Problem: Physical Therapy Goal  Goal: Physical Therapy Goal  Goals to be met by: 18    Patient will increase functional independence with mobility by performin. Supine to sit with supervision  2. Sit to stand transfer with Stand-by Assistance  3. Gait  x 300 feet with Stand-by Assistance using Rolling Walker  4. Lower extremity exercise program x30 reps per handout, with assistance as needed     Outcome: Ongoing (interventions implemented as appropriate)  Pt was able to amb today 100ft with rw with shuffling gt pattern, decreased nneka, step length, step height, toe walking

## 2018-03-29 NOTE — PROGRESS NOTES
" [facility]  Adult Nutrition  Progress Note    SUMMARY       Recommendations    Recommendation/Intervention:   1. Will resend boost plus tid (chocolate)   2. Monitor weights weekly   3. RD to monitor    Goals: Meet >85% EEN  Nutrition Goal Status: progressing towards goal  Communication of RD Recs: reviewed with RN    D/C planning: Regular diet with supplements    Reason for Assessment    Reason for Assessment: RD follow-up  Diagnosis:  (Debility)  Relevant Medical History: HTN, Parkinsons    General Information Comments: Patient with 25-50% intake with meals; not receiving boost. Checked with diet office and boost order not active. Will re-order. Patient awaing transfer.   Nutrition Discharge Planning: Patient to discharge on a Regular diet.     Nutrition Risk Screen    Nutrition Risk Screen: no indicators present    Nutrition/Diet History    Food Preferences: No cultural, Caodaism or ethnic food preferences.   Do you have any cultural, spiritual, Caodaism conflicts, given your current situation?: none  Food Allergies: other (see comments) (NKFA)    Anthropometrics    Temp: 98.2 °F (36.8 °C)  Height Method: Stated  Height: 5' 6.97" (170.1 cm)  Height (inches): 66.97 in  Weight Method: Bed Scale  Weight: 62.4 kg (137 lb 9.1 oz)  Weight (lb): 137.57 lb  Ideal Body Weight (IBW), Male: 147.82 lb  % Ideal Body Weight, Male (lb): 91.27 lb  BMI (Calculated): 21.2  BMI Grade: 18.5-24.9 - normal    Lab/Procedures/Meds    Pertinent Labs Reviewed: reviewed  Pertinent Medications Reviewed: reviewed    Physical Findings/Assessment    Overall Physical Appearance: nourished  Oral/Mouth Cavity: tooth/teeth missing  Skin: intact (Mike Score 16)    Estimated/Assessed Needs    Weight Used For Calorie Calculations: 62.6 kg (138 lb 0.1 oz)  Height: 5' 6.97" (170.1 cm)  Energy Calorie Requirements (kcal): 4019-9578 kcal  Energy Need Method: [place]    RMR ([place] Equation): 1314.62  Protein " Requirements: 65-75g  Weight Used For Protein Calculations: 62.6 kg (138 lb 0.1 oz)    Fluid Need Method: RDA Method  RDA Method (mL): 1600       Nutrition Prescription Ordered    Current Diet Order: Regular    Evaluation of Received Nutrient/Fluid Intake    I/O: not fully recorded  Energy Calories Required: not meeting needs  Protein Required: not meeting needs  Fluid Required:  (TAWANDA)  Comments: LBM: 3/26  Tolerance: tolerating    % Intake of Estimated Energy Needs: 25-50%  % Meal Intake: 25-50%    Nutrition Risk    Level of Risk/Frequency of Follow-up:  (f/u 1x weekly)     Assessment and Plan    Nutrition Dx: Inadequate Energy Intake r/t decreased appetite as evidenced by: 25-50% po intake  Nutrition Dx Status: Continues       Monitor and Evaluation    Food and Nutrient Intake: energy intake, food and beverage intake  Food and Nutrient Adminstration: diet order  Knowledge/Beliefs/Attitudes: food and nutrition knowledge/skill  Physical Activity and Function: nutrition-related ADLs and IADLs  Anthropometric Measurements: weight, weight change  Biochemical Data, Medical Tests and Procedures: electrolyte and renal panel  Nutrition-Focused Physical Findings: overall appearance     Nutrition Follow-Up    RD Follow-up?: Yes

## 2018-03-29 NOTE — PROGRESS NOTES
[facility] Hospital Medicine  Progress Note    Patient Name: Tenzin Ortiz  MRN: [id number]  Patient Class: IP- Inpatient   Admission Date: 2/7/2018  Length of Stay: 30 days  Attending Physician: Pietro Paige MD  Primary Care Provider: Efrain Chavez MD        Subjective:     Principal Problem:Debility    HPI:  Patient is 78 yo male with HTN, HLD, and Parkinson's who presents to ED reportedly for HA. Per ED note patient was complaining of HA but he denies this on my interview. Seems there was some confusion in ED and he was unclear why he was here. During interview with me, patient reports being here for NH placement although he does not appear to be very happy about this. He has been with some issues caring for himself. Currently lives with his girlfriend who reports she can no longer care for him. He states he currently gets around with a walker but sometimes has difficulty getting up out of his chair. Per chart review patient is with Parkinson's and sees neurology, Dr. Castellanos, lastly seen on 2/6/18. He was with hallucinations which were noted to be secondary to dopaminergic agents thus having to significantly reduce dose--she recommended he be admitted for placement at that time but patient declined. He has been attempting placement as an outpatient through PCP but without much luck. He otherwise denies recent illness, fever/chills, CP, SOB, abdominal pain, N/V/D, dysuria. On presentation patient with grossly normal labs/vitals. Placed in observation for what appears to be solely placement although this is something that may need to be continued as outpatient as is not indication for hospitalization.    Hospital Course:  Awake and alert without complaint - severe Parkinsons dementia and can no longer care for himself - neither can his girlfriend.  CM reports patient with acceptance to [facility]; awaiting paperwork to be signed by patient POA. Legal involved. Appreciate case management's  assistance.pending placement.    Interval History: No new issues. sleeping    Review of Systems   Unable to perform ROS: Dementia   Constitutional: Negative for appetite change.     Objective:     Vital Signs (Most Recent):  Temp: 98.2 °F (36.8 °C) (03/29/18 0732)  Pulse: 79 (03/29/18 0732)  Resp: 18 (03/29/18 0732)  BP: (!) 120/57 (03/29/18 0732)  SpO2: 96 % (03/29/18 0732) Vital Signs (24h Range):  Temp:  [96.8 °F (36 °C)-98.2 °F (36.8 °C)] 98.2 °F (36.8 °C)  Pulse:  [57-87] 79  Resp:  [18-19] 18  SpO2:  [96 %-98 %] 96 %  BP: (106-128)/(51-79) 120/57     Weight: 62.4 kg (137 lb 9.1 oz)  Body mass index is 21.57 kg/m².    Intake/Output Summary (Last 24 hours) at 03/29/18 0749  Last data filed at 03/29/18 0600   Gross per 24 hour   Intake              480 ml   Output                0 ml   Net              480 ml      Physical Exam   Constitutional: He appears well-developed and well-nourished.   HENT:   Head: Normocephalic and atraumatic.   Neurological: He is alert.   Vitals reviewed.      Significant Labs:   BMP:   Recent Labs  Lab 03/29/18  0518   GLU 93      K 3.7      CO2 24   BUN 22   CREATININE 1.0   CALCIUM 9.2     CBC: No results for input(s): WBC, HGB, HCT, PLT in the last 48 hours.    Significant Imaging    Assessment/Plan:      * Debility    TN consulted to aid in safe discharge planning. He has been living with a significant other who can no longer care for him and refusing to take patient home. His somewhat estranged daughter has been contacted and is willing to aid in finding NH placement (she had already begun this process as an outpatient) but there is an issue: patient's power of  (a business friend) refused to sign consent for NH placement. Patient unable to make his own decision regarding this as he is with intermittent confusion +/- previous unclear dementia diagnosis per daughter. Psychiatry consulted for capacity, who agrees that he has no capacity for decision making. Also  recommended adding depakote 250mg TID to help with mood stabilization. He is medically cleared for transfer once nursing facility arranged. Pending placement.        Lewy body dementia without behavioral disturbance    Seen by psych who has since signed off - appreciate recs -  Doing well on current regimen  Continue medication regimen as follows:  Sinemet  QID and Seroquel 50 QHS  Continue Exelon 4.6 mg/24 patch  Divalproex 250 mg to BID  Held trazodone        Essential hypertension    Resume norvasc.        Parkinson disease    Seroquel 50 mg; sinemet  mg;   Discontinued taxodione 50 mg  Restarted exelon 4.6 mg in the morning when he was more awake          VTE Risk Mitigation         Ordered     enoxaparin injection 40 mg  Daily     Route:  Subcutaneous        03/14/18 1102     Medium Risk of VTE  Once      02/08/18 0653     Place JOSEFINA hose  Until discontinued      02/08/18 0653     Place sequential compression device  Until discontinued      02/08/18 0653        Awaiting placement       Pietro Trinh MD  Department of Hospital Medicine   [facility]

## 2018-03-29 NOTE — PT/OT/SLP PROGRESS
Physical Therapy Treatment    Patient Name:  Tenzin Ortiz   MRN:  [id number]    Recommendations:     Discharge Recommendations:  nursing facility, skilled   Discharge Equipment Recommendations: none   Barriers to discharge: Decreased caregiver support    Assessment:     Tenzin Ortiz is a 77 y.o. male admitted with a medical diagnosis of Debility.  He presents with the following impairments/functional limitations:  weakness, gait instability, decreased upper extremity function, impaired endurance, impaired balance, decreased lower extremity function, impaired coordination, decreased safety awareness, impaired fine motor, impaired self care skills, impaired cognition .    Rehab Prognosis:  F; patient would benefit from acute skilled PT services to address these deficits and reach maximum level of function.      Recent Surgery: * No surgery found *      Plan:     During this hospitalization, patient to be seen 3 x/week to address the above listed problems via gait training, therapeutic activities, therapeutic exercises  · Plan of Care Expires:  04/09/18   Plan of Care Reviewed with: patient    Subjective     Communicated with nurse Bryson prior to session.  Patient found right sidelying upon PT entry to room, agreeable to treatment.      Chief Complaint: none  Patient comments/goals: pt agreed to therapy  Pain/Comfort:  · Pain Rating 1: 0/10    Patients cultural, spiritual, Yarsanism conflicts given the current situation: none    Objective:     Patient found with: bed alarm, peripheral IV (AVYSYS)     General Precautions: Standard, fall   Orthopedic Precautions:N/A   Braces: N/A     Functional Mobility:  · Bed Mobility:     · Scooting: stand by assistance  · Supine to Sit: stand by assistance  · Sit to Supine: stand by assistance  · Transfers:     · Sit to Stand:  minimum assistance with rolling walker  · Bed to Chair: minimum assistance with  rolling walker  using  Stand Pivot  · Gait: 100 ft with rw with shuffling  gt, decreased step and height length  · Balance: F standing f+ sitting      AM-PAC 6 CLICK MOBILITY  Turning over in bed (including adjusting bedclothes, sheets and blankets)?: 4  Sitting down on and standing up from a chair with arms (e.g., wheelchair, bedside commode, etc.): 3  Moving from lying on back to sitting on the side of the bed?: 3  Moving to and from a bed to a chair (including a wheelchair)?: 3  Need to walk in hospital room?: 3  Climbing 3-5 steps with a railing?: 2  Total Score: 18       Therapeutic Activities and Exercises:   BLE Mari 3x10; LAQ, HIP FLEXION, HR,TT    Patient left supine with HOB EOB with call button in reach, bed alarm on and NURSE notified..    GOALS:    Physical Therapy Goals        Problem: Physical Therapy Goal    Goal Priority Disciplines Outcome Goal Variances Interventions   Physical Therapy Goal     PT/OT, PT Ongoing (interventions implemented as appropriate)     Description:  Goals to be met by: 18    Patient will increase functional independence with mobility by performin. Supine to sit with supervision  2. Sit to stand transfer with Stand-by Assistance  3. Gait  x 300 feet with Stand-by Assistance using Rolling Walker  4. Lower extremity exercise program x30 reps per handout, with assistance as needed                      Time Tracking:     PT Received On: 18  PT Start Time: 1613     PT Stop Time: 1640  PT Total Time (min): 27 min     Billable Minutes: Gait Training 10 and Therapeutic Exercise 17    Treatment Type: Treatment  PT/PTA: PTA     PTA Visit Number: 2     Christopher Martinez, PTA  2018

## 2018-03-29 NOTE — PLAN OF CARE
Problem: Fall Risk (Adult)  Intervention: Reduce Risk/Promote Restraint Free Environment   18   Safety Interventions   Environmental Safety Modification assistive device/personal items within reach;clutter free environment maintained;room near unit station;lighting adjusted;room organization consistent   Prevent  Drop/Fall   Safety/Security Measures bed alarm set     Intervention: Review Medications/Identify Contributors to Fall Risk   18   Safety Interventions   Medication Review/Management medications reviewed     Intervention: Patient Rounds   18   Safety Interventions   Patient Rounds bed in low position;bed wheels locked;call light in reach;clutter free environment maintained;placement of personal items at bedside;visualized patient     Intervention: Safety Promotion/Fall Prevention   18   Safety Interventions   Safety Promotion/Fall Prevention assistive device/personal item within reach;bed alarm set;side rails raised x 2;nonskid shoes/socks when out of bed;instructed to call staff for mobility;medications reviewed       Goal: Identify Related Risk Factors and Signs and Symptoms  Related risk factors and signs and symptoms are identified upon initiation of Human Response Clinical Practice Guideline (CPG)    18   Fall Risk   Related Risk Factors (Fall Risk) slipper/uneven surfaces;sleep pattern alteration;sensory deficits;environment unfamiliar;polypharmacy;inadequate lighting;impaired vision;gait/mobility problems;fatigue/slow reaction;confusion/agitation     Goal: Absence of Falls  Patient will demonstrate the desired outcomes by discharge/transition of care.    18   Fall Risk (Adult)   Absence of Falls making progress toward outcome

## 2018-03-30 PROCEDURE — 63600175 PHARM REV CODE 636 W HCPCS: Performed by: INTERNAL MEDICINE

## 2018-03-30 PROCEDURE — 25000003 PHARM REV CODE 250: Performed by: INTERNAL MEDICINE

## 2018-03-30 PROCEDURE — 21400001 HC TELEMETRY ROOM

## 2018-03-30 PROCEDURE — 25000003 PHARM REV CODE 250: Performed by: HOSPITALIST

## 2018-03-30 RX ORDER — ONDANSETRON 2 MG/ML
4 INJECTION INTRAMUSCULAR; INTRAVENOUS EVERY 8 HOURS PRN
Status: DISCONTINUED | OUTPATIENT
Start: 2018-03-30 | End: 2018-04-24

## 2018-03-30 RX ADMIN — DOCUSATE SODIUM 100 MG: 100 CAPSULE, LIQUID FILLED ORAL at 09:03

## 2018-03-30 RX ADMIN — AMLODIPINE BESYLATE 10 MG: 5 TABLET ORAL at 09:03

## 2018-03-30 RX ADMIN — CARBIDOPA AND LEVODOPA 2 TABLET: 25; 100 TABLET ORAL at 01:03

## 2018-03-30 RX ADMIN — CARBIDOPA AND LEVODOPA 2 TABLET: 25; 100 TABLET ORAL at 08:03

## 2018-03-30 RX ADMIN — CARBIDOPA AND LEVODOPA 2 TABLET: 25; 100 TABLET ORAL at 09:03

## 2018-03-30 RX ADMIN — ENOXAPARIN SODIUM 40 MG: 100 INJECTION SUBCUTANEOUS at 05:03

## 2018-03-30 RX ADMIN — DIVALPROEX SODIUM 250 MG: 250 TABLET, DELAYED RELEASE ORAL at 08:03

## 2018-03-30 RX ADMIN — CARBIDOPA AND LEVODOPA 2 TABLET: 25; 100 TABLET ORAL at 05:03

## 2018-03-30 RX ADMIN — OLANZAPINE 5 MG: 5 TABLET, ORALLY DISINTEGRATING ORAL at 09:03

## 2018-03-30 RX ADMIN — OLANZAPINE 5 MG: 5 TABLET, ORALLY DISINTEGRATING ORAL at 05:03

## 2018-03-30 RX ADMIN — DOCUSATE SODIUM 100 MG: 100 CAPSULE, LIQUID FILLED ORAL at 08:03

## 2018-03-30 RX ADMIN — RIVASTIGMINE TRANSDERMAL SYSTEM 1 PATCH: 4.6 PATCH, EXTENDED RELEASE TRANSDERMAL at 09:03

## 2018-03-30 RX ADMIN — ACETAMINOPHEN 650 MG: 325 TABLET ORAL at 09:03

## 2018-03-30 RX ADMIN — QUETIAPINE FUMARATE 100 MG: 100 TABLET ORAL at 08:03

## 2018-03-30 RX ADMIN — POLYETHYLENE GLYCOL 3350 17 G: 17 POWDER, FOR SOLUTION ORAL at 09:03

## 2018-03-30 RX ADMIN — DIVALPROEX SODIUM 250 MG: 250 TABLET, DELAYED RELEASE ORAL at 09:03

## 2018-03-30 NOTE — NURSING
Report received from KARINA Manzano. Rounds and bedside shift report completed. Pt in no acute distress. Care assumed.

## 2018-03-30 NOTE — PROGRESS NOTES
PT care assumed, pt aaox4 with no c/o of pain or any discomfort. Pt with telemetry monitor in place with alarms. Pt noted with tremor to lt hand. Pt saline lock in place site clear. Pt personal item within reach and safety maintained with bed alarm and Avasys monitor . Bed low side rails upx3.

## 2018-03-30 NOTE — SUBJECTIVE & OBJECTIVE
Interval History: No new issues.     Review of Systems   Unable to perform ROS: Dementia   Constitutional: Negative for appetite change.     Objective:     Vital Signs (Most Recent):  Temp: 97.7 °F (36.5 °C) (03/30/18 0740)  Pulse: 81 (03/30/18 0740)  Resp: 16 (03/30/18 0740)  BP: (!) 148/70 (03/30/18 0740)  SpO2: 97 % (03/30/18 0740) Vital Signs (24h Range):  Temp:  [97.7 °F (36.5 °C)-98.7 °F (37.1 °C)] 97.7 °F (36.5 °C)  Pulse:  [74-85] 81  Resp:  [16-18] 16  SpO2:  [95 %-97 %] 97 %  BP: ()/(54-70) 148/70     Weight: 62.4 kg (137 lb 9.1 oz)  Body mass index is 21.57 kg/m².    Intake/Output Summary (Last 24 hours) at 03/30/18 1018  Last data filed at 03/30/18 0900   Gross per 24 hour   Intake              240 ml   Output              100 ml   Net              140 ml      Physical Exam   Constitutional: He appears well-developed and well-nourished.   HENT:   Head: Normocephalic and atraumatic.   Neurological: He is alert.   Vitals reviewed.      Significant Labs:   BMP:   Recent Labs  Lab 03/29/18  0518   GLU 93      K 3.7      CO2 24   BUN 22   CREATININE 1.0   CALCIUM 9.2     CBC: No results for input(s): WBC, HGB, HCT, PLT in the last 48 hours.    Significant Imaging:

## 2018-03-30 NOTE — PLAN OF CARE
Problem: Fall Risk (Adult)  Intervention: Monitor/Assist with Self Care   18 1950 18   Activity   Activity Assistance Provided --  --  assistance, 1 person   Daily Care Interventions   Self-Care Promotion --  independence encouraged;BADL personal objects within reach;BADL personal routines maintained --    Functional Level Current   Ambulation --  2 - assistive person --    Transferring --  2 - assistive person --    Toileting --  2 - assistive person --    Bathing --  2 - assistive person --    Dressing --  2 - assistive person --    Eating --  2 - assistive person --    Communication --  0 - understands/communicates without difficulty --    Swallowing 0 - swallows foods/liquids without difficulty --  --       18 1950 18   Activity   Activity Assistance Provided --  --  assistance, 1 person   Daily Care Interventions   Self-Care Promotion --  independence encouraged;BADL personal objects within reach;BADL personal routines maintained --    Functional Level Current   Ambulation --  2 - assistive person --    Transferring --  2 - assistive person --    Toileting --  2 - assistive person --    Bathing --  2 - assistive person --    Dressing --  2 - assistive person --    Eating --  2 - assistive person --    Communication --  0 - understands/communicates without difficulty --    Swallowing 0 - swallows foods/liquids without difficulty --  --      Intervention: Reduce Risk/Promote Restraint Free Environment   18 0553   Safety Interventions   Environmental Safety Modification assistive device/personal items within reach;clutter free environment maintained;lighting adjusted;room near unit station;room organization consistent   Prevent  Drop/Fall   Safety/Security Measures bed alarm set     Intervention: Review Medications/Identify Contributors to Fall Risk   18 0553   Safety Interventions   Medication Review/Management dosing  adjusted;medications reviewed;high risk medications identified     Intervention: Patient Rounds   03/30/18 0553   Safety Interventions   Patient Rounds bed in low position;bed wheels locked;call light in reach;clutter free environment maintained;ID band on;placement of personal items at bedside;toileting offered;visualized patient     Intervention: Safety Promotion/Fall Prevention   03/30/18 0508   Safety Interventions   Safety Promotion/Fall Prevention assistive device/personal item within reach;bed alarm set;commode/urinal/bedpan at bedside;Fall Risk reviewed with patient/family;medications reviewed;nonskid shoes/socks when out of bed;/camera at bedside;side rails raised x 3;supervised activity;toileting scheduled         Comments: Pt has been free of injury this shift. Bedside camera remains in use and bed alarm also set. Pt has been orientated and follows direction well.

## 2018-03-30 NOTE — PLAN OF CARE
Problem: Patient Care Overview  Goal: Plan of Care Review  Outcome: Ongoing (interventions implemented as appropriate)  No falls or injury. Fall and safety precautions maintained. No new skin breakdown noted. Incontinence care provided. Pt turned in bed. Avasys at bedside. No PT/OT this shift. Delirium and fall precautions maintained. Continue plan of care. Family member by early this shift.

## 2018-03-30 NOTE — PROGRESS NOTES
[facility] Hospital Medicine  Progress Note    Patient Name: Tenzin Ortiz  MRN: [id number]  Patient Class: IP- Inpatient   Admission Date: 2/7/2018  Length of Stay: 31 days  Attending Physician: Pietro Paige MD  Primary Care Provider: Efrain Chavez MD        Subjective:     Principal Problem:Debility    HPI:  Patient is 76 yo male with HTN, HLD, and Parkinson's who presents to ED reportedly for HA. Per ED note patient was complaining of HA but he denies this on my interview. Seems there was some confusion in ED and he was unclear why he was here. During interview with me, patient reports being here for NH placement although he does not appear to be very happy about this. He has been with some issues caring for himself. Currently lives with his girlfriend who reports she can no longer care for him. He states he currently gets around with a walker but sometimes has difficulty getting up out of his chair. Per chart review patient is with Parkinson's and sees neurology, Dr. Castellanos, lastly seen on 2/6/18. He was with hallucinations which were noted to be secondary to dopaminergic agents thus having to significantly reduce dose--she recommended he be admitted for placement at that time but patient declined. He has been attempting placement as an outpatient through PCP but without much luck. He otherwise denies recent illness, fever/chills, CP, SOB, abdominal pain, N/V/D, dysuria. On presentation patient with grossly normal labs/vitals. Placed in observation for what appears to be solely placement although this is something that may need to be continued as outpatient as is not indication for hospitalization.    Hospital Course:  Awake and alert without complaint - severe Parkinsons dementia and can no longer care for himself - neither can his girlfriend.  CM reports patient with acceptance to [facility]; awaiting paperwork to be signed by patient POA. Legal involved. Appreciate case management's  assistance.pending placement.    Interval History: No new issues.     Review of Systems   Unable to perform ROS: Dementia   Constitutional: Negative for appetite change.     Objective:     Vital Signs (Most Recent):  Temp: 97.7 °F (36.5 °C) (03/30/18 0740)  Pulse: 81 (03/30/18 0740)  Resp: 16 (03/30/18 0740)  BP: (!) 148/70 (03/30/18 0740)  SpO2: 97 % (03/30/18 0740) Vital Signs (24h Range):  Temp:  [97.7 °F (36.5 °C)-98.7 °F (37.1 °C)] 97.7 °F (36.5 °C)  Pulse:  [74-85] 81  Resp:  [16-18] 16  SpO2:  [95 %-97 %] 97 %  BP: ()/(54-70) 148/70     Weight: 62.4 kg (137 lb 9.1 oz)  Body mass index is 21.57 kg/m².    Intake/Output Summary (Last 24 hours) at 03/30/18 1018  Last data filed at 03/30/18 0900   Gross per 24 hour   Intake              240 ml   Output              100 ml   Net              140 ml      Physical Exam   Constitutional: He appears well-developed and well-nourished.   HENT:   Head: Normocephalic and atraumatic.   Neurological: He is alert.   Vitals reviewed.      Significant Labs:   BMP:   Recent Labs  Lab 03/29/18  0518   GLU 93      K 3.7      CO2 24   BUN 22   CREATININE 1.0   CALCIUM 9.2     CBC: No results for input(s): WBC, HGB, HCT, PLT in the last 48 hours.    Significant Imaging:    Assessment/Plan:      * Debility    TN consulted to aid in safe discharge planning. He has been living with a significant other who can no longer care for him and refusing to take patient home. His somewhat estranged daughter has been contacted and is willing to aid in finding NH placement (she had already begun this process as an outpatient) but there is an issue: patient's power of  (a business friend) refused to sign consent for NH placement. Patient unable to make his own decision regarding this as he is with intermittent confusion +/- previous unclear dementia diagnosis per daughter. Psychiatry consulted for capacity, who agrees that he has no capacity for decision making. Also  recommended adding depakote 250mg TID to help with mood stabilization. He is medically cleared for transfer once nursing facility arranged. Pending placement.        Lewy body dementia without behavioral disturbance    Seen by psych who has since signed off - appreciate recs -  Doing well on current regimen  Continue medication regimen as follows:  Sinemet  QID and Seroquel 50 QHS  Continue Exelon 4.6 mg/24 patch  Divalproex 250 mg to BID  Held trazodone        Essential hypertension    Resume norvasc.        Parkinson disease    Seroquel 50 mg; sinemet  mg;   Discontinued taxodione 50 mg  Restarted exelon 4.6 mg in the morning when he was more awake          VTE Risk Mitigation         Ordered     enoxaparin injection 40 mg  Daily     Route:  Subcutaneous        03/14/18 1102     Medium Risk of VTE  Once      02/08/18 0653     Place JOSEFINA hose  Until discontinued      02/08/18 0653     Place sequential compression device  Until discontinued      02/08/18 0653        Awaiting placement.     Pietro Trinh MD  Department of Hospital Medicine   [facility] [place]

## 2018-03-31 PROCEDURE — 63600175 PHARM REV CODE 636 W HCPCS: Performed by: INTERNAL MEDICINE

## 2018-03-31 PROCEDURE — 21400001 HC TELEMETRY ROOM

## 2018-03-31 PROCEDURE — 25000003 PHARM REV CODE 250: Performed by: HOSPITALIST

## 2018-03-31 PROCEDURE — 25000003 PHARM REV CODE 250: Performed by: INTERNAL MEDICINE

## 2018-03-31 RX ADMIN — RIVASTIGMINE TRANSDERMAL SYSTEM 1 PATCH: 4.6 PATCH, EXTENDED RELEASE TRANSDERMAL at 11:03

## 2018-03-31 RX ADMIN — DIVALPROEX SODIUM 250 MG: 250 TABLET, DELAYED RELEASE ORAL at 01:03

## 2018-03-31 RX ADMIN — AMLODIPINE BESYLATE 10 MG: 5 TABLET ORAL at 10:03

## 2018-03-31 RX ADMIN — ENOXAPARIN SODIUM 40 MG: 100 INJECTION SUBCUTANEOUS at 06:03

## 2018-03-31 RX ADMIN — DIVALPROEX SODIUM 250 MG: 250 TABLET, DELAYED RELEASE ORAL at 09:03

## 2018-03-31 RX ADMIN — CARBIDOPA AND LEVODOPA 2 TABLET: 25; 100 TABLET ORAL at 06:03

## 2018-03-31 RX ADMIN — QUETIAPINE FUMARATE 100 MG: 100 TABLET ORAL at 09:03

## 2018-03-31 RX ADMIN — POLYETHYLENE GLYCOL 3350 17 G: 17 POWDER, FOR SOLUTION ORAL at 01:03

## 2018-03-31 RX ADMIN — DOCUSATE SODIUM AND SENNOSIDES 1 TABLET: 8.6; 5 TABLET, FILM COATED ORAL at 01:03

## 2018-03-31 RX ADMIN — DOCUSATE SODIUM 100 MG: 100 CAPSULE, LIQUID FILLED ORAL at 01:03

## 2018-03-31 RX ADMIN — DOCUSATE SODIUM 100 MG: 100 CAPSULE, LIQUID FILLED ORAL at 09:03

## 2018-03-31 RX ADMIN — CARBIDOPA AND LEVODOPA 2 TABLET: 25; 100 TABLET ORAL at 12:03

## 2018-03-31 RX ADMIN — OLANZAPINE 5 MG: 5 TABLET, ORALLY DISINTEGRATING ORAL at 09:03

## 2018-03-31 RX ADMIN — CARBIDOPA AND LEVODOPA 2 TABLET: 25; 100 TABLET ORAL at 09:03

## 2018-03-31 NOTE — PROGRESS NOTES
Pt care assumed, pt lying in supine position with hob elevated. Pt with confusion today only oriented to self and he reorients with difficulty. Pt has bedside camera in use for safety along with bed alarm . Pt personal items within reach.  Pt bed low side rails up x3.

## 2018-03-31 NOTE — PROGRESS NOTES
[facility] Hospital Medicine  Progress Note    Patient Name: Tenzin Ortiz  MRN: [id number]  Patient Class: IP- Inpatient   Admission Date: 2/7/2018  Length of Stay: 32 days  Attending Physician: Pietro Paige MD  Primary Care Provider: Efrain Chavez MD        Subjective:     Principal Problem:Debility    HPI:  Patient is 76 yo male with HTN, HLD, and Parkinson's who presents to ED reportedly for HA. Per ED note patient was complaining of HA but he denies this on my interview. Seems there was some confusion in ED and he was unclear why he was here. During interview with me, patient reports being here for NH placement although he does not appear to be very happy about this. He has been with some issues caring for himself. Currently lives with his girlfriend who reports she can no longer care for him. He states he currently gets around with a walker but sometimes has difficulty getting up out of his chair. Per chart review patient is with Parkinson's and sees neurology, Dr. Castellanos, lastly seen on 2/6/18. He was with hallucinations which were noted to be secondary to dopaminergic agents thus having to significantly reduce dose--she recommended he be admitted for placement at that time but patient declined. He has been attempting placement as an outpatient through PCP but without much luck. He otherwise denies recent illness, fever/chills, CP, SOB, abdominal pain, N/V/D, dysuria. On presentation patient with grossly normal labs/vitals. Placed in observation for what appears to be solely placement although this is something that may need to be continued as outpatient as is not indication for hospitalization.    Hospital Course:  Awake and alert without complaint - severe Parkinsons dementia and can no longer care for himself - neither can his girlfriend.  CM reports patient with acceptance to [facility]; awaiting paperwork to be signed by patient POA. Legal involved. Appreciate case management's  assistance.pending placement.    Interval History: No new reported issues.       Review of Systems   Unable to perform ROS: Dementia   Constitutional: Negative for appetite change.     Objective:     Vital Signs (Most Recent):  Temp: 98.5 °F (36.9 °C) (03/31/18 0902)  Pulse: 90 (03/31/18 0902)  Resp: 16 (03/31/18 0902)  BP: (!) 155/70 (03/31/18 0902)  SpO2: 98 % (03/31/18 0902) Vital Signs (24h Range):  Temp:  [96.2 °F (35.7 °C)-98.5 °F (36.9 °C)] 98.5 °F (36.9 °C)  Pulse:  [81-90] 90  Resp:  [16-18] 16  SpO2:  [96 %-98 %] 98 %  BP: (124-155)/(60-70) 155/70     Weight: 62.4 kg (137 lb 9.1 oz)  Body mass index is 21.57 kg/m².    Intake/Output Summary (Last 24 hours) at 03/31/18 1035  Last data filed at 03/31/18 0100   Gross per 24 hour   Intake              240 ml   Output              700 ml   Net             -460 ml      Physical Exam   Constitutional: He appears well-developed and well-nourished.   HENT:   Head: Normocephalic and atraumatic.   Neurological: He is alert.   Vitals reviewed.      Significant Labs: BMP: No results for input(s): GLU, NA, K, CL, CO2, BUN, CREATININE, CALCIUM, MG in the last 48 hours.  CBC: No results for input(s): WBC, HGB, HCT, PLT in the last 48 hours.    Significant Imaging    Assessment/Plan:      * Debility    TN consulted to aid in safe discharge planning. He has been living with a significant other who can no longer care for him and refusing to take patient home. His somewhat estranged daughter has been contacted and is willing to aid in finding NH placement (she had already begun this process as an outpatient) but there is an issue: patient's power of  (a business friend) refused to sign consent for NH placement. Patient unable to make his own decision regarding this as he is with intermittent confusion +/- previous unclear dementia diagnosis per daughter. Psychiatry consulted for capacity, who agrees that he has no capacity for decision making. Also recommended adding  depakote 250mg TID to help with mood stabilization. He is medically cleared for transfer once nursing facility arranged. Pending placement.        Lewy body dementia without behavioral disturbance    Seen by psych who has since signed off - appreciate recs -  Doing well on current regimen  Continue medication regimen as follows:  Sinemet  QID and Seroquel 50 QHS  Continue Exelon 4.6 mg/24 patch  Divalproex 250 mg to BID  Held trazodone        Essential hypertension    Resume norvasc.        Parkinson disease    Seroquel 50 mg; sinemet  mg;   Discontinued taxodione 50 mg  Restarted exelon 4.6 mg in the morning when he was more awake          VTE Risk Mitigation         Ordered     enoxaparin injection 40 mg  Daily     Route:  Subcutaneous        03/14/18 1102     Medium Risk of VTE  Once      02/08/18 0653     Place JOSEFINA hose  Until discontinued      02/08/18 0653     Place sequential compression device  Until discontinued      02/08/18 0653        Awaiting placement       Pietro Trinh MD  Department of Hospital Medicine   [facility]

## 2018-03-31 NOTE — NURSING
Report given to KARINA Manzano. Round and bedside shift report completed. Pt in no acute distress.

## 2018-03-31 NOTE — SUBJECTIVE & OBJECTIVE
Interval History: No new reported issues.       Review of Systems   Unable to perform ROS: Dementia   Constitutional: Negative for appetite change.     Objective:     Vital Signs (Most Recent):  Temp: 98.5 °F (36.9 °C) (03/31/18 0902)  Pulse: 90 (03/31/18 0902)  Resp: 16 (03/31/18 0902)  BP: (!) 155/70 (03/31/18 0902)  SpO2: 98 % (03/31/18 0902) Vital Signs (24h Range):  Temp:  [96.2 °F (35.7 °C)-98.5 °F (36.9 °C)] 98.5 °F (36.9 °C)  Pulse:  [81-90] 90  Resp:  [16-18] 16  SpO2:  [96 %-98 %] 98 %  BP: (124-155)/(60-70) 155/70     Weight: 62.4 kg (137 lb 9.1 oz)  Body mass index is 21.57 kg/m².    Intake/Output Summary (Last 24 hours) at 03/31/18 1035  Last data filed at 03/31/18 0100   Gross per 24 hour   Intake              240 ml   Output              700 ml   Net             -460 ml      Physical Exam   Constitutional: He appears well-developed and well-nourished.   HENT:   Head: Normocephalic and atraumatic.   Neurological: He is alert.   Vitals reviewed.      Significant Labs: BMP: No results for input(s): GLU, NA, K, CL, CO2, BUN, CREATININE, CALCIUM, MG in the last 48 hours.  CBC: No results for input(s): WBC, HGB, HCT, PLT in the last 48 hours.    Significant Imaging

## 2018-04-01 LAB — POCT GLUCOSE: 101 MG/DL (ref 70–110)

## 2018-04-01 PROCEDURE — 25000003 PHARM REV CODE 250: Performed by: HOSPITALIST

## 2018-04-01 PROCEDURE — 25000003 PHARM REV CODE 250: Performed by: INTERNAL MEDICINE

## 2018-04-01 PROCEDURE — 63600175 PHARM REV CODE 636 W HCPCS: Performed by: INTERNAL MEDICINE

## 2018-04-01 PROCEDURE — 21400001 HC TELEMETRY ROOM

## 2018-04-01 RX ORDER — SODIUM CHLORIDE 9 MG/ML
INJECTION, SOLUTION INTRAVENOUS ONCE
Status: COMPLETED | OUTPATIENT
Start: 2018-04-01 | End: 2018-04-01

## 2018-04-01 RX ADMIN — RIVASTIGMINE TRANSDERMAL SYSTEM 1 PATCH: 4.6 PATCH, EXTENDED RELEASE TRANSDERMAL at 09:04

## 2018-04-01 RX ADMIN — DOCUSATE SODIUM 100 MG: 100 CAPSULE, LIQUID FILLED ORAL at 09:04

## 2018-04-01 RX ADMIN — QUETIAPINE FUMARATE 100 MG: 100 TABLET ORAL at 09:04

## 2018-04-01 RX ADMIN — CARBIDOPA AND LEVODOPA 2 TABLET: 25; 100 TABLET ORAL at 04:04

## 2018-04-01 RX ADMIN — CARBIDOPA AND LEVODOPA 2 TABLET: 25; 100 TABLET ORAL at 09:04

## 2018-04-01 RX ADMIN — CARBIDOPA AND LEVODOPA 2 TABLET: 25; 100 TABLET ORAL at 12:04

## 2018-04-01 RX ADMIN — SODIUM CHLORIDE 1000 ML/HR: 0.9 INJECTION, SOLUTION INTRAVENOUS at 01:04

## 2018-04-01 RX ADMIN — ENOXAPARIN SODIUM 40 MG: 100 INJECTION SUBCUTANEOUS at 04:04

## 2018-04-01 RX ADMIN — DIVALPROEX SODIUM 250 MG: 250 TABLET, DELAYED RELEASE ORAL at 09:04

## 2018-04-01 NOTE — PROGRESS NOTES
[facility] Hospital Medicine  Progress Note    Patient Name: Tenzin Ortiz  MRN: [id number]  Patient Class: IP- Inpatient   Admission Date: 2/7/2018  Length of Stay: 33 days  Attending Physician: Pietro Paige MD  Primary Care Provider: Efrain Chavez MD        Subjective:     Principal Problem:Debility    HPI:  Patient is 78 yo male with HTN, HLD, and Parkinson's who presents to ED reportedly for HA. Per ED note patient was complaining of HA but he denies this on my interview. Seems there was some confusion in ED and he was unclear why he was here. During interview with me, patient reports being here for NH placement although he does not appear to be very happy about this. He has been with some issues caring for himself. Currently lives with his girlfriend who reports she can no longer care for him. He states he currently gets around with a walker but sometimes has difficulty getting up out of his chair. Per chart review patient is with Parkinson's and sees neurology, Dr. Castellanos, lastly seen on 2/6/18. He was with hallucinations which were noted to be secondary to dopaminergic agents thus having to significantly reduce dose--she recommended he be admitted for placement at that time but patient declined. He has been attempting placement as an outpatient through PCP but without much luck. He otherwise denies recent illness, fever/chills, CP, SOB, abdominal pain, N/V/D, dysuria. On presentation patient with grossly normal labs/vitals. Placed in observation for what appears to be solely placement although this is something that may need to be continued as outpatient as is not indication for hospitalization.    Hospital Course:  Awake and alert without complaint - severe Parkinsons dementia and can no longer care for himself - neither can his girlfriend.  CM reports patient with acceptance to [facility]; awaiting paperwork to be signed by patient POA. Legal involved. Appreciate case management's  assistance.pending placement.    Interval History: No new issues.     Review of Systems   Unable to perform ROS: Dementia   Constitutional: Negative for appetite change.     Objective:     Vital Signs (Most Recent):  Temp: 98.5 °F (36.9 °C) (04/01/18 0742)  Pulse: 77 (04/01/18 0742)  Resp: 18 (04/01/18 0742)  BP: (!) 140/63 (04/01/18 0742)  SpO2: 97 % (04/01/18 0742) Vital Signs (24h Range):  Temp:  [97.8 °F (36.6 °C)-98.9 °F (37.2 °C)] 98.5 °F (36.9 °C)  Pulse:  [73-93] 77  Resp:  [16-18] 18  SpO2:  [91 %-98 %] 97 %  BP: ()/(42-66) 140/63     Weight: 62.4 kg (137 lb 9.1 oz)  Body mass index is 21.57 kg/m².    Intake/Output Summary (Last 24 hours) at 04/01/18 1128  Last data filed at 04/01/18 0600   Gross per 24 hour   Intake             1360 ml   Output                0 ml   Net             1360 ml      Physical Exam   Constitutional: He appears well-developed and well-nourished.   HENT:   Head: Normocephalic and atraumatic.   Neurological: He is alert.   Vitals reviewed.      Significant Labs:     Assessment/Plan:      * Debility    TN consulted to aid in safe discharge planning. He has been living with a significant other who can no longer care for him and refusing to take patient home. His somewhat estranged daughter has been contacted and is willing to aid in finding NH placement (she had already begun this process as an outpatient) but there is an issue: patient's power of  (a business friend) refused to sign consent for NH placement. Patient unable to make his own decision regarding this as he is with intermittent confusion +/- previous unclear dementia diagnosis per daughter. Psychiatry consulted for capacity, who agrees that he has no capacity for decision making. Also recommended adding depakote 250mg TID to help with mood stabilization. He is medically cleared for transfer once nursing facility arranged. Pending placement.        Lewy body dementia without behavioral disturbance    Seen by  psych who has since signed off - appreciate recs -  Doing well on current regimen  Continue medication regimen as follows:  Sinemet  QID and Seroquel 50 QHS  Continue Exelon 4.6 mg/24 patch  Divalproex 250 mg to BID  Held trazodone        Essential hypertension    Resume norvasc.        Parkinson disease    Seroquel 50 mg; sinemet  mg;   Discontinued taxodione 50 mg  Restarted exelon 4.6 mg in the morning when he was more awake          VTE Risk Mitigation         Ordered     enoxaparin injection 40 mg  Daily     Route:  Subcutaneous        03/14/18 1102     Medium Risk of VTE  Once      02/08/18 0653     Place JOSEFINA hose  Until discontinued      02/08/18 0653     Place sequential compression device  Until discontinued      02/08/18 0653      awaiting placement          Pietro Trinh MD  Department of Hospital Medicine   [facility]

## 2018-04-01 NOTE — PROGRESS NOTES
Pt b/p 74/42 with automatic b/p  And 66/40 with manual b/p. Pt moaning and grim icing when touched. sao2 91 and blood sugar 101. Dr. Matute was called and awaiting return call, Jamshid Rn Charge Nurse notified, HOUSTON Freeman Rn ICU Charge Nurse was called for a code help. Pt was stated on a normal saline bolus as per protocol. 0030 Dr. Matute returned call and he was informed of above orders noted to continue bolus. Pt b/p at this time was 80/40.

## 2018-04-01 NOTE — PROGRESS NOTES
Pt care assumed, pt with confusion noted and he is oriented to self  And the month on occasion. Pt reoriented with some difficulty. He does follow simple commands at times. Pt with personal items within reach .safety maintained with bed alarm,and Avasys bedside camera.bed low side rails up x3.

## 2018-04-01 NOTE — SUBJECTIVE & OBJECTIVE
Interval History: No new issues.     Review of Systems   Unable to perform ROS: Dementia   Constitutional: Negative for appetite change.     Objective:     Vital Signs (Most Recent):  Temp: 98.5 °F (36.9 °C) (04/01/18 0742)  Pulse: 77 (04/01/18 0742)  Resp: 18 (04/01/18 0742)  BP: (!) 140/63 (04/01/18 0742)  SpO2: 97 % (04/01/18 0742) Vital Signs (24h Range):  Temp:  [97.8 °F (36.6 °C)-98.9 °F (37.2 °C)] 98.5 °F (36.9 °C)  Pulse:  [73-93] 77  Resp:  [16-18] 18  SpO2:  [91 %-98 %] 97 %  BP: ()/(42-66) 140/63     Weight: 62.4 kg (137 lb 9.1 oz)  Body mass index is 21.57 kg/m².    Intake/Output Summary (Last 24 hours) at 04/01/18 1128  Last data filed at 04/01/18 0600   Gross per 24 hour   Intake             1360 ml   Output                0 ml   Net             1360 ml      Physical Exam   Constitutional: He appears well-developed and well-nourished.   HENT:   Head: Normocephalic and atraumatic.   Neurological: He is alert.   Vitals reviewed.      Significant Labs:

## 2018-04-01 NOTE — PLAN OF CARE
Problem: Confusion, Acute (Adult)  Intervention: Monitor/Assist with Self Care   04/01/18 1655   Activity   Activity Assistance Provided assistance, 2 people     Intervention: Reduce Risk/Promote Restraint Free Environment   04/01/18 1655   Safety Interventions   Environmental Safety Modification assistive device/personal items within reach     Intervention: Evaluate Medications/Identify Contributors to Confusion   04/01/18 1655   Safety Interventions   Medication Review/Management medications reviewed     Intervention: Optimize Communication   04/01/18 1655   Cognitive Interventions   Communication Enhancement Strategies call light answered in person       Goal: Identify Related Risk Factors and Signs and Symptoms  Related risk factors and signs and symptoms are identified upon initiation of Human Response Clinical Practice Guideline (CPG)   Outcome: Ongoing (interventions implemented as appropriate)   04/01/18 1655   Confusion, Acute   Related Risk Factors (Acute Confusion) advanced age;cognitive impairment     Goal: Cognitive/Functional Impairments Minimized  Patient will demonstrate the desired outcomes by discharge/transition of care.   Outcome: Ongoing (interventions implemented as appropriate)   04/01/18 1655   Confusion, Acute (Adult)   Cognitive/Functional Impairments Minimized making progress toward outcome     Goal: Safety  Patient will demonstrate the desired outcomes by discharge/transition of care.   Outcome: Ongoing (interventions implemented as appropriate)   04/01/18 1655   Confusion, Acute (Adult)   Safety making progress toward outcome

## 2018-04-01 NOTE — PLAN OF CARE
Problem: Confusion, Acute (Adult)  Intervention: Evaluate Medications/Identify Contributors to Confusion   04/01/18 0621   Safety Interventions   Medication Review/Management medications reviewed;dosing adjusted;high risk medications identified     Intervention: Optimize Communication   04/01/18 0621   Cognitive Interventions   Communication Enhancement Strategies verbal communication attempts encouraged     Intervention: Promote Familiarity/Consistency   03/31/18 1945   Coping/Psychosocial Interventions   Environment Familiarity/Consistency daily routine followed;familiar objects from home provided;personal clothing/items utilized     Intervention: Provide Frequent Orientation/Reorientation   04/01/18 0621   Cognitive Interventions   Reorientation Measures calendar in view;clock in view;glasses encouraged   Sensory Stimulation Regulation care clustered;lighting decreased;quiet environment promoted         Comments: Pt continues with having confusion and he sometimes have increase agitation. Pt was given xyprexa po for relief.[employer] camera remains in use for safety along with bed alarm.

## 2018-04-02 PROCEDURE — 21400001 HC TELEMETRY ROOM

## 2018-04-02 PROCEDURE — 25000003 PHARM REV CODE 250: Performed by: INTERNAL MEDICINE

## 2018-04-02 PROCEDURE — 94761 N-INVAS EAR/PLS OXIMETRY MLT: CPT

## 2018-04-02 PROCEDURE — 25000003 PHARM REV CODE 250: Performed by: HOSPITALIST

## 2018-04-02 PROCEDURE — 63600175 PHARM REV CODE 636 W HCPCS: Performed by: INTERNAL MEDICINE

## 2018-04-02 RX ADMIN — RIVASTIGMINE TRANSDERMAL SYSTEM 1 PATCH: 4.6 PATCH, EXTENDED RELEASE TRANSDERMAL at 09:04

## 2018-04-02 RX ADMIN — POLYETHYLENE GLYCOL 3350 17 G: 17 POWDER, FOR SOLUTION ORAL at 09:04

## 2018-04-02 RX ADMIN — CARBIDOPA AND LEVODOPA 2 TABLET: 25; 100 TABLET ORAL at 12:04

## 2018-04-02 RX ADMIN — CARBIDOPA AND LEVODOPA 2 TABLET: 25; 100 TABLET ORAL at 08:04

## 2018-04-02 RX ADMIN — DOCUSATE SODIUM AND SENNOSIDES 1 TABLET: 8.6; 5 TABLET, FILM COATED ORAL at 09:04

## 2018-04-02 RX ADMIN — DIVALPROEX SODIUM 250 MG: 250 TABLET, DELAYED RELEASE ORAL at 09:04

## 2018-04-02 RX ADMIN — DOCUSATE SODIUM 100 MG: 100 CAPSULE, LIQUID FILLED ORAL at 09:04

## 2018-04-02 RX ADMIN — QUETIAPINE FUMARATE 100 MG: 100 TABLET ORAL at 08:04

## 2018-04-02 RX ADMIN — AMLODIPINE BESYLATE 10 MG: 5 TABLET ORAL at 09:04

## 2018-04-02 RX ADMIN — CARBIDOPA AND LEVODOPA 2 TABLET: 25; 100 TABLET ORAL at 09:04

## 2018-04-02 RX ADMIN — ENOXAPARIN SODIUM 40 MG: 100 INJECTION SUBCUTANEOUS at 04:04

## 2018-04-02 RX ADMIN — CARBIDOPA AND LEVODOPA 2 TABLET: 25; 100 TABLET ORAL at 04:04

## 2018-04-02 RX ADMIN — DOCUSATE SODIUM 100 MG: 100 CAPSULE, LIQUID FILLED ORAL at 08:04

## 2018-04-02 RX ADMIN — DIVALPROEX SODIUM 250 MG: 250 TABLET, DELAYED RELEASE ORAL at 08:04

## 2018-04-02 NOTE — SUBJECTIVE & OBJECTIVE
Interval History: No new issues.     Review of Systems   Unable to perform ROS: Dementia   Constitutional: Negative for appetite change.     Objective:     Vital Signs (Most Recent):  Temp: 98 °F (36.7 °C) (04/02/18 0434)  Pulse: 76 (04/02/18 0434)  Resp: 18 (04/02/18 0434)  BP: 138/65 (04/02/18 0434)  SpO2: (!) 94 % (04/02/18 0434) Vital Signs (24h Range):  Temp:  [97.5 °F (36.4 °C)-98.8 °F (37.1 °C)] 98 °F (36.7 °C)  Pulse:  [70-91] 76  Resp:  [18] 18  SpO2:  [93 %-97 %] 94 %  BP: ()/(53-65) 138/65     Weight: 61 kg (134 lb 7.7 oz)  Body mass index is 21.08 kg/m².    Intake/Output Summary (Last 24 hours) at 04/02/18 0715  Last data filed at 04/02/18 0434   Gross per 24 hour   Intake              700 ml   Output                0 ml   Net              700 ml      Physical Exam   Constitutional: He appears well-developed and well-nourished.   HENT:   Head: Normocephalic and atraumatic.   Neurological: He is alert.   Vitals reviewed.      Significant Labs: BMP: No results for input(s): GLU, NA, K, CL, CO2, BUN, CREATININE, CALCIUM, MG in the last 48 hours.  CBC: No results for input(s): WBC, HGB, HCT, PLT in the last 48 hours.    Significant Imaging:

## 2018-04-02 NOTE — NURSING
Pt care assumed from Jeannette Bacon. Iv site is [facility]. Pt lying on his right side with HOB slightly elevated. Pt can tell me his name and birthday, but not away of place.  Pt denies any discomfort or pain. Bed is lowered, locked, and alarmed. Call light within reach. Will continue to monitor.

## 2018-04-02 NOTE — PLAN OF CARE
Problem: Pressure Ulcer Risk (Mike Scale) (Adult,Obstetrics,Pediatric)  Intervention: Promote/Optimize Nutrition   03/22/18 0525   Nutrition Interventions   Oral Nutrition Promotion calorie dense foods provided     Intervention: Prevent/Manage Excess Moisture   03/29/18 0930 03/31/18 0708 03/31/18 1400   Hygiene Care   Perineal Care absorbent pad changed --  --    Bathing/Skin Care --  incontinence care;linen changed --    Skin Interventions   Skin Protection --  --  Tubing/devices free from skin contact;Urinary containment device;Incontinence pads

## 2018-04-02 NOTE — PROGRESS NOTES
[facility] Hospital Medicine  Progress Note    Patient Name: Tenzin Ortiz  MRN: [id number]  Patient Class: IP- Inpatient   Admission Date: 2/7/2018  Length of Stay: 34 days  Attending Physician: Pietro Paige MD  Primary Care Provider: Efrain Chavez MD        Subjective:     Principal Problem:Debility    HPI:  Patient is 76 yo male with HTN, HLD, and Parkinson's who presents to ED reportedly for HA. Per ED note patient was complaining of HA but he denies this on my interview. Seems there was some confusion in ED and he was unclear why he was here. During interview with me, patient reports being here for NH placement although he does not appear to be very happy about this. He has been with some issues caring for himself. Currently lives with his girlfriend who reports she can no longer care for him. He states he currently gets around with a walker but sometimes has difficulty getting up out of his chair. Per chart review patient is with Parkinson's and sees neurology, Dr. Castellanos, lastly seen on 2/6/18. He was with hallucinations which were noted to be secondary to dopaminergic agents thus having to significantly reduce dose--she recommended he be admitted for placement at that time but patient declined. He has been attempting placement as an outpatient through PCP but without much luck. He otherwise denies recent illness, fever/chills, CP, SOB, abdominal pain, N/V/D, dysuria. On presentation patient with grossly normal labs/vitals. Placed in observation for what appears to be solely placement although this is something that may need to be continued as outpatient as is not indication for hospitalization.    Hospital Course:  Awake and alert without complaint - severe Parkinsons dementia and can no longer care for himself - neither can his girlfriend.  CM reports patient with acceptance to [facility]; awaiting paperwork to be signed by patient POA. Legal involved. Appreciate case management's  assistance.pending placement.    Interval History: No new issues.     Review of Systems   Unable to perform ROS: Dementia   Constitutional: Negative for appetite change.     Objective:     Vital Signs (Most Recent):  Temp: 98 °F (36.7 °C) (04/02/18 0434)  Pulse: 76 (04/02/18 0434)  Resp: 18 (04/02/18 0434)  BP: 138/65 (04/02/18 0434)  SpO2: (!) 94 % (04/02/18 0434) Vital Signs (24h Range):  Temp:  [97.5 °F (36.4 °C)-98.8 °F (37.1 °C)] 98 °F (36.7 °C)  Pulse:  [70-91] 76  Resp:  [18] 18  SpO2:  [93 %-97 %] 94 %  BP: ()/(53-65) 138/65     Weight: 61 kg (134 lb 7.7 oz)  Body mass index is 21.08 kg/m².    Intake/Output Summary (Last 24 hours) at 04/02/18 0715  Last data filed at 04/02/18 0434   Gross per 24 hour   Intake              700 ml   Output                0 ml   Net              700 ml      Physical Exam   Constitutional: He appears well-developed and well-nourished.   HENT:   Head: Normocephalic and atraumatic.   Neurological: He is alert.   Vitals reviewed.      Significant Labs: BMP: No results for input(s): GLU, NA, K, CL, CO2, BUN, CREATININE, CALCIUM, MG in the last 48 hours.  CBC: No results for input(s): WBC, HGB, HCT, PLT in the last 48 hours.    Significant Imaging:     Assessment/Plan:      * Debility    TN consulted to aid in safe discharge planning. He has been living with a significant other who can no longer care for him and refusing to take patient home. His somewhat estranged daughter has been contacted and is willing to aid in finding NH placement (she had already begun this process as an outpatient) but there is an issue: patient's power of  (a business friend) refused to sign consent for NH placement. Patient unable to make his own decision regarding this as he is with intermittent confusion +/- previous unclear dementia diagnosis per daughter. Psychiatry consulted for capacity, who agrees that he has no capacity for decision making. Also recommended adding depakote 250mg TID  to help with mood stabilization. He is medically cleared for transfer once nursing facility arranged. Pending placement.        Parkinson disease    Seroquel 50 mg; sinemet  mg;   Discontinued taxodione 50 mg  Restarted exelon 4.6 mg in the morning when he was more awake        Lewy body dementia without behavioral disturbance    Seen by psych who has since signed off - appreciate recs -  Doing well on current regimen  Continue medication regimen as follows:  Sinemet  QID and Seroquel 50 QHS  Continue Exelon 4.6 mg/24 patch  Divalproex 250 mg to BID  Held trazodone        Essential hypertension    Resume norvasc.          VTE Risk Mitigation         Ordered     enoxaparin injection 40 mg  Daily     Route:  Subcutaneous        03/14/18 1102     Medium Risk of VTE  Once      02/08/18 0653     Place JOSEFINA hose  Until discontinued      02/08/18 0653     Place sequential compression device  Until discontinued      02/08/18 0653        Awaiting placement.  PT eval for any needs.       Pietro Trinh MD  Department of Hospital Medicine   [facility] [place]

## 2018-04-02 NOTE — PLAN OF CARE
PT/OT just recently evaluated patient and recommending SNF for discharge, [facility] willing to accept patient once POA has signed admission paperwork to facility. POA has been working with [facility] and  to transition patient to nursing home. KIMO will continue to assist as needed. KIMO contacted Isidra Bowman (EPS Specialist) @ [phone] to return phone call, KIMO left message on voicemail. KIMO will continue to assist as needed.       04/02/18 7609   Discharge Reassessment   Assessment Type Discharge Planning Reassessment   Provided patient/caregiver education on the expected discharge date and the discharge plan No   Do you have any problems affording any of your prescribed medications? No   Discharge Plan A New Nursing Home placement - longterm care facility   Discharge Plan B New Nursing Home placement - longterm care facility   Patient choice form signed by patient/caregiver N/A   Can the patient answer the patient profile reliably? No, cognitively impaired   How does the patient rate their overall health at the present time? Fair   Describe the patient's ability to walk at the present time. Walks with the help of equipment   How often would a person be available to care for the patient? Never

## 2018-04-03 PROCEDURE — 21400001 HC TELEMETRY ROOM

## 2018-04-03 PROCEDURE — 25000003 PHARM REV CODE 250: Performed by: HOSPITALIST

## 2018-04-03 PROCEDURE — 25000003 PHARM REV CODE 250: Performed by: INTERNAL MEDICINE

## 2018-04-03 PROCEDURE — 63600175 PHARM REV CODE 636 W HCPCS: Performed by: INTERNAL MEDICINE

## 2018-04-03 RX ADMIN — DOCUSATE SODIUM 100 MG: 100 CAPSULE, LIQUID FILLED ORAL at 09:04

## 2018-04-03 RX ADMIN — AMLODIPINE BESYLATE 10 MG: 5 TABLET ORAL at 09:04

## 2018-04-03 RX ADMIN — CARBIDOPA AND LEVODOPA 2 TABLET: 25; 100 TABLET ORAL at 12:04

## 2018-04-03 RX ADMIN — POLYETHYLENE GLYCOL 3350 17 G: 17 POWDER, FOR SOLUTION ORAL at 09:04

## 2018-04-03 RX ADMIN — CARBIDOPA AND LEVODOPA 2 TABLET: 25; 100 TABLET ORAL at 09:04

## 2018-04-03 RX ADMIN — QUETIAPINE FUMARATE 100 MG: 100 TABLET ORAL at 09:04

## 2018-04-03 RX ADMIN — DIVALPROEX SODIUM 250 MG: 250 TABLET, DELAYED RELEASE ORAL at 09:04

## 2018-04-03 RX ADMIN — CARBIDOPA AND LEVODOPA 2 TABLET: 25; 100 TABLET ORAL at 05:04

## 2018-04-03 RX ADMIN — RIVASTIGMINE TRANSDERMAL SYSTEM 1 PATCH: 4.6 PATCH, EXTENDED RELEASE TRANSDERMAL at 09:04

## 2018-04-03 RX ADMIN — ENOXAPARIN SODIUM 40 MG: 100 INJECTION SUBCUTANEOUS at 05:04

## 2018-04-03 NOTE — PLAN OF CARE
Problem: Confusion, Acute (Adult)  Intervention: Monitor/Assist with Self Care   04/02/18 1944   Activity   Activity Assistance Provided assistance, 1 person     Intervention: Reduce Risk/Promote Restraint Free Environment   04/02/18 1944   Safety Interventions   Environmental Safety Modification assistive device/personal items within reach;clutter free environment maintained     Intervention: Evaluate Medications/Identify Contributors to Confusion   04/02/18 1944   Safety Interventions   Medication Review/Management medications reviewed     Intervention: Optimize Communication   04/02/18 1944   Cognitive Interventions   Communication Enhancement Strategies call light answered in person       Goal: Identify Related Risk Factors and Signs and Symptoms  Related risk factors and signs and symptoms are identified upon initiation of Human Response Clinical Practice Guideline (CPG)   Outcome: Ongoing (interventions implemented as appropriate)   04/02/18 1944   Confusion, Acute   Related Risk Factors (Acute Confusion) advanced age;cognitive impairment     Goal: Cognitive/Functional Impairments Minimized  Patient will demonstrate the desired outcomes by discharge/transition of care.   Outcome: Ongoing (interventions implemented as appropriate)   04/02/18 1944   Confusion, Acute (Adult)   Cognitive/Functional Impairments Minimized making progress toward outcome     Goal: Safety  Patient will demonstrate the desired outcomes by discharge/transition of care.   Outcome: Ongoing (interventions implemented as appropriate)   04/02/18 1944   Confusion, Acute (Adult)   Safety making progress toward outcome

## 2018-04-03 NOTE — SUBJECTIVE & OBJECTIVE
Interval History: No new issues.     Review of Systems   Unable to perform ROS: Dementia   Constitutional: Negative for appetite change.     Objective:     Vital Signs (Most Recent):  Temp: 98.8 °F (37.1 °C) (04/03/18 0702)  Pulse: 75 (04/03/18 0702)  Resp: 18 (04/03/18 0702)  BP: (!) 145/69 (04/03/18 0702)  SpO2: 95 % (04/03/18 0702) Vital Signs (24h Range):  Temp:  [97.8 °F (36.6 °C)-98.8 °F (37.1 °C)] 98.8 °F (37.1 °C)  Pulse:  [72-80] 75  Resp:  [16-18] 18  SpO2:  [94 %-96 %] 95 %  BP: (122-146)/(58-69) 145/69     Weight: 61 kg (134 lb 7.7 oz)  Body mass index is 21.08 kg/m².    Intake/Output Summary (Last 24 hours) at 04/03/18 1145  Last data filed at 04/03/18 1000   Gross per 24 hour   Intake              700 ml   Output              325 ml   Net              375 ml      Physical Exam   Constitutional: He appears well-developed and well-nourished.   HENT:   Head: Normocephalic and atraumatic.   Neurological: He is alert.   Vitals reviewed.      Significant Labs:

## 2018-04-03 NOTE — PLAN OF CARE
Problem: Patient Care Overview  Goal: Plan of Care Review   18   Coping/Psychosocial   Plan Of Care Reviewed With patient     Goal: Interdisciplinary Rounds/Family Conf   18   Interdisciplinary Rounds/Family Conf   Participants nursing;physician       Problem: Fall Risk (Adult)  Intervention: Reduce Risk/Promote Restraint Free Environment   18   Prevent  Drop/Fall   Safety/Security Measures bed alarm set     Intervention: Review Medications/Identify Contributors to Fall Risk   18   Safety Interventions   Medication Review/Management medications reviewed     Intervention: Patient Rounds   18 151   Safety Interventions   Patient Rounds bed wheels locked;bed in low position;placement of personal items at bedside;call light in reach;ID band on;clutter free environment maintained;toileting offered;visualized patient     Intervention: Safety Promotion/Fall Prevention   18 151   Safety Interventions   Safety Promotion/Fall Prevention side rails raised x 3;medications reviewed;room near unit station       Goal: Absence of Falls  Patient will demonstrate the desired outcomes by discharge/transition of care.    18 151   Fall Risk (Adult)   Absence of Falls making progress toward outcome       Problem: Pressure Ulcer Risk (Mike Scale) (Adult,Obstetrics,Pediatric)  Intervention: Prevent/Manage Excess Moisture   18 151   Hygiene Care   Perineal Care absorbent pad changed;perineum cleansed;diaper changed   Skin Interventions   Skin Protection Adhesive use limited     Intervention: Maintain Head of Bed Elevation Less Than 30 Degrees as Tolerated   18   Positioning   Head of Bed (HOB) HOB elevated     Intervention: Prevent/Minimize Sheer/Friction Injuries   18   Positioning   Positioning/Transfer Devices pillows   Skin Interventions   Pressure Reduction Techniques frequent weight shift encouraged       Goal: Skin  Integrity  Patient will demonstrate the desired outcomes by discharge/transition of care.    04/03/18 1511   Pressure Ulcer Risk (Mike Scale) (Adult,Obstetrics,Pediatric)   Skin Integrity making progress toward outcome

## 2018-04-03 NOTE — PROGRESS NOTES
[facility] Hospital Medicine  Progress Note    Patient Name: Tenzin Ortiz  MRN: [id number]  Patient Class: IP- Inpatient   Admission Date: 2/7/2018  Length of Stay: 35 days  Attending Physician: Norma Raymond MD  Primary Care Provider: Efrain Chavez MD        Subjective:     Principal Problem:Debility    HPI:  Patient is 76 yo male with HTN, HLD, and Parkinson's who presents to ED reportedly for HA. Per ED note patient was complaining of HA but he denies this on my interview. Seems there was some confusion in ED and he was unclear why he was here. During interview with me, patient reports being here for NH placement although he does not appear to be very happy about this. He has been with some issues caring for himself. Currently lives with his girlfriend who reports she can no longer care for him. He states he currently gets around with a walker but sometimes has difficulty getting up out of his chair. Per chart review patient is with Parkinson's and sees neurology, Dr. Castellanos, lastly seen on 2/6/18. He was with hallucinations which were noted to be secondary to dopaminergic agents thus having to significantly reduce dose--she recommended he be admitted for placement at that time but patient declined. He has been attempting placement as an outpatient through PCP but without much luck. He otherwise denies recent illness, fever/chills, CP, SOB, abdominal pain, N/V/D, dysuria. On presentation patient with grossly normal labs/vitals. Placed in observation for what appears to be solely placement although this is something that may need to be continued as outpatient as is not indication for hospitalization.    Hospital Course:  Awake and alert without complaint - severe Parkinsons dementia and can no longer care for himself - neither can his girlfriend.  CM reports patient with acceptance to [facility]; awaiting paperwork to be signed by patient POA. Legal involved. Appreciate case management's  assistance.pending placement.    Interval History: No new issues.     Review of Systems   Unable to perform ROS: Dementia   Constitutional: Negative for appetite change.     Objective:     Vital Signs (Most Recent):  Temp: 98.8 °F (37.1 °C) (04/03/18 0702)  Pulse: 75 (04/03/18 0702)  Resp: 18 (04/03/18 0702)  BP: (!) 145/69 (04/03/18 0702)  SpO2: 95 % (04/03/18 0702) Vital Signs (24h Range):  Temp:  [97.8 °F (36.6 °C)-98.8 °F (37.1 °C)] 98.8 °F (37.1 °C)  Pulse:  [72-80] 75  Resp:  [16-18] 18  SpO2:  [94 %-96 %] 95 %  BP: (122-146)/(58-69) 145/69     Weight: 61 kg (134 lb 7.7 oz)  Body mass index is 21.08 kg/m².    Intake/Output Summary (Last 24 hours) at 04/03/18 1145  Last data filed at 04/03/18 1000   Gross per 24 hour   Intake              700 ml   Output              325 ml   Net              375 ml      Physical Exam   Constitutional: He appears well-developed and well-nourished.   HENT:   Head: Normocephalic and atraumatic.   Neurological: He is alert.   Vitals reviewed.      Significant Labs:     Assessment/Plan:      * Debility    TN consulted to aid in safe discharge planning. He has been living with a significant other who can no longer care for him and refusing to take patient home. His somewhat estranged daughter has been contacted and is willing to aid in finding NH placement (she had already begun this process as an outpatient) but there is an issue: patient's power of  (a business friend) refused to sign consent for NH placement. Patient unable to make his own decision regarding this as he is with intermittent confusion +/- previous unclear dementia diagnosis per daughter. Psychiatry consulted for capacity, who agrees that he has no capacity for decision making. Also recommended adding depakote 250mg TID to help with mood stabilization. He is medically cleared for transfer once nursing facility arranged. Pending placement.        Parkinson disease    Seroquel 50 mg; sinemet  mg;    Discontinued taxodione 50 mg  Restarted exelon 4.6 mg in the morning when he was more awake        Lewy body dementia without behavioral disturbance    Seen by psych who has since signed off - appreciate recs -  Doing well on current regimen  Continue medication regimen as follows:  Sinemet  QID and Seroquel 50 QHS  Continue Exelon 4.6 mg/24 patch  Divalproex 250 mg to BID  Held trazodone        Essential hypertension    Resume norvasc.          VTE Risk Mitigation         Ordered     enoxaparin injection 40 mg  Daily     Route:  Subcutaneous        03/14/18 1102     Medium Risk of VTE  Once      02/08/18 0653     Place JOSEFINA hose  Until discontinued      02/08/18 0653     Place sequential compression device  Until discontinued      02/08/18 0653              Norma Raymond MD  Department of Hospital Medicine   [facility]

## 2018-04-04 PROCEDURE — 21400001 HC TELEMETRY ROOM

## 2018-04-04 PROCEDURE — 25000003 PHARM REV CODE 250: Performed by: INTERNAL MEDICINE

## 2018-04-04 PROCEDURE — 63600175 PHARM REV CODE 636 W HCPCS: Performed by: INTERNAL MEDICINE

## 2018-04-04 PROCEDURE — 97116 GAIT TRAINING THERAPY: CPT

## 2018-04-04 PROCEDURE — 25000003 PHARM REV CODE 250: Performed by: HOSPITALIST

## 2018-04-04 PROCEDURE — 97110 THERAPEUTIC EXERCISES: CPT

## 2018-04-04 RX ADMIN — DIVALPROEX SODIUM 250 MG: 250 TABLET, DELAYED RELEASE ORAL at 09:04

## 2018-04-04 RX ADMIN — DOCUSATE SODIUM 100 MG: 100 CAPSULE, LIQUID FILLED ORAL at 08:04

## 2018-04-04 RX ADMIN — DOCUSATE SODIUM AND SENNOSIDES 1 TABLET: 8.6; 5 TABLET, FILM COATED ORAL at 09:04

## 2018-04-04 RX ADMIN — DIVALPROEX SODIUM 250 MG: 250 TABLET, DELAYED RELEASE ORAL at 08:04

## 2018-04-04 RX ADMIN — CARBIDOPA AND LEVODOPA 2 TABLET: 25; 100 TABLET ORAL at 09:04

## 2018-04-04 RX ADMIN — CARBIDOPA AND LEVODOPA 2 TABLET: 25; 100 TABLET ORAL at 08:04

## 2018-04-04 RX ADMIN — AMLODIPINE BESYLATE 10 MG: 5 TABLET ORAL at 09:04

## 2018-04-04 RX ADMIN — CARBIDOPA AND LEVODOPA 2 TABLET: 25; 100 TABLET ORAL at 01:04

## 2018-04-04 RX ADMIN — ENOXAPARIN SODIUM 40 MG: 100 INJECTION SUBCUTANEOUS at 05:04

## 2018-04-04 RX ADMIN — QUETIAPINE FUMARATE 100 MG: 100 TABLET ORAL at 08:04

## 2018-04-04 RX ADMIN — POLYETHYLENE GLYCOL 3350 17 G: 17 POWDER, FOR SOLUTION ORAL at 09:04

## 2018-04-04 RX ADMIN — CARBIDOPA AND LEVODOPA 2 TABLET: 25; 100 TABLET ORAL at 05:04

## 2018-04-04 RX ADMIN — RIVASTIGMINE TRANSDERMAL SYSTEM 1 PATCH: 4.6 PATCH, EXTENDED RELEASE TRANSDERMAL at 09:04

## 2018-04-04 RX ADMIN — DOCUSATE SODIUM 100 MG: 100 CAPSULE, LIQUID FILLED ORAL at 09:04

## 2018-04-04 NOTE — UM SECONDARY REVIEW
[facility]    IP Extended Stay > 10     Needs NH placement. POA not cooperative. Per bed huddle this am interdiction was to be started    {[facility] Review Outcome:[id number]}

## 2018-04-04 NOTE — PLAN OF CARE
Problem: Physical Therapy Goal  Goal: Physical Therapy Goal  Goals to be met by: 18    Patient will increase functional independence with mobility by performin. Supine to sit with supervision  2. Sit to stand transfer with Stand-by Assistance  3. Gait  x 300 feet with Stand-by Assistance using Rolling Walker  4. Lower extremity exercise program x30 reps per handout, with assistance as needed     Outcome: Ongoing (interventions implemented as appropriate)   Pt alert and talkative today.  Pt ambulated ~44ft x2 trials with RW, CGA with seated break in between.  Requires tennis with RW for ambulation.  Assistance for all OOB mobility.

## 2018-04-04 NOTE — PT/OT/SLP PROGRESS
"Physical Therapy Treatment    Patient Name:  Tenzin Ortiz   MRN:  [id number]    Recommendations:     Discharge Recommendations:  nursing facility, basic   Discharge Equipment Recommendations: none   Barriers to discharge: Decreased caregiver support    Assessment:     Tenzin Ortiz is a 77 y.o. male admitted with a medical diagnosis of Debility.  He presents with the following impairments/functional limitations:  weakness, impaired endurance, impaired self care skills, impaired functional mobilty, impaired balance, gait instability, decreased coordination, decreased lower extremity function, impaired cognition, decreased safety awareness, pain, decreased ROM, impaired joint extensibility, impaired muscle length, impaired coordination.  Pt alert and talkative today.  Pt ambulated ~44ft x2 trials with RW, CGA with seated break in between.  Requires tennis with RW for ambulation.  Assistance for all OOB mobility.    Rehab Prognosis:  good; patient would benefit from acute skilled PT services to address these deficits and reach maximum level of function.      Recent Surgery: * No surgery found *      Plan:     During this hospitalization, patient to be seen 3 x/week to address the above listed problems via gait training, therapeutic activities, therapeutic exercises  · Plan of Care Expires:  04/09/18   Plan of Care Reviewed with: patient    Subjective     Communicated with pt's nurse, Judith, prior to session.  Patient found reclined in BSchair upon PT entry to room, agreeable to treatment.      Chief Complaint: wanting to get back to bed after ambulation  Patient comments/goals: Pt states " I am glad you came."  Pain/Comfort:  · Pain Rating 1:  (yes, but did not rate)  · Location - Side 1: Bilateral  · Location 1:  (B feet)  · Pain Addressed 1: Reposition, Distraction, Nurse notified, Cessation of Activity (only with walking)    Patients cultural, spiritual, Congregational conflicts given the current situation: " none    Objective:     Patient found with: peripheral IV (AVASYS)     General Precautions: Standard, fall   Orthopedic Precautions:N/A   Braces: N/A     Functional Mobility:  · Bed Mobility:     · Scooting: stand by assistance  · Sit to Supine: stand by assistance and contact guard assistance  · Transfers: from EOB and BSchair (pt with posterior leaning and requires time for placement of BLEs    · Sit to Stand:  minimum assistance with rolling walker  · Gait: Pt ambulated ~44ft x2 trials with RW, CGA with seated rest break in between due to fatigue.  Pt with shuffling gait with decreased initiation and requires assistance for advancement with RLE at end of 2nd trial.  Displays decreased nneka, step length, velocity of limb, weight shifting ability, foot clearance, and safety awareness  · Balance: good sitting and fair standing      AM-PAC 6 CLICK MOBILITY  Turning over in bed (including adjusting bedclothes, sheets and blankets)?: 4  Sitting down on and standing up from a chair with arms (e.g., wheelchair, bedside commode, etc.): 3  Moving from lying on back to sitting on the side of the bed?: 3  Moving to and from a bed to a chair (including a wheelchair)?: 3  Need to walk in hospital room?: 3  Climbing 3-5 steps with a railing?: 2  Total Score: 18       Therapeutic Activities and Exercises:   Pt performed seated therex to BLEs x10 reps AROM including: hip flexion, LAQs, gravity eliminated hip abduction/adduction, ankle pumps    Patient left up in chair with all lines intact, call button in reach, bed alarm on, pt's nurse, Judith, notified and AVASYS present..    GOALS:    Physical Therapy Goals        Problem: Physical Therapy Goal    Goal Priority Disciplines Outcome Goal Variances Interventions   Physical Therapy Goal     PT/OT, PT Ongoing (interventions implemented as appropriate)     Description:  Goals to be met by: 18    Patient will increase functional independence with mobility by performin.  Supine to sit with supervision  2. Sit to stand transfer with Stand-by Assistance  3. Gait  x 300 feet with Stand-by Assistance using Rolling Walker  4. Lower extremity exercise program x30 reps per handout, with assistance as needed                      Time Tracking:     PT Received On: 04/04/18  PT Start Time: 1113     PT Stop Time: 1136  PT Total Time (min): 23 min     Billable Minutes: Gait Training 13 and Therapeutic Exercise 10    Treatment Type: Treatment  PT/PTA: PTA     PTA Visit Number: 3     Citlali Dennis, PTA  04/04/2018

## 2018-04-04 NOTE — SUBJECTIVE & OBJECTIVE
Interval History: No new issues.     Review of Systems   Unable to perform ROS: Dementia   Constitutional: Negative for appetite change.     Objective:     Vital Signs (Most Recent):  Temp: 96.7 °F (35.9 °C) (04/04/18 0722)  Pulse: 76 (04/04/18 0722)  Resp: 16 (04/04/18 0722)  BP: (!) 157/70 (04/04/18 0722)  SpO2: 98 % (04/04/18 0722) Vital Signs (24h Range):  Temp:  [96.7 °F (35.9 °C)-98.7 °F (37.1 °C)] 96.7 °F (35.9 °C)  Pulse:  [76-77] 76  Resp:  [16-18] 16  SpO2:  [97 %-98 %] 98 %  BP: (145-157)/(66-70) 157/70     Weight: 61.3 kg (135 lb 2.3 oz)  Body mass index is 21.19 kg/m².    Intake/Output Summary (Last 24 hours) at 04/04/18 1115  Last data filed at 04/04/18 0900   Gross per 24 hour   Intake              717 ml   Output              800 ml   Net              -83 ml      Physical Exam   Constitutional: He appears well-developed and well-nourished.   HENT:   Head: Normocephalic and atraumatic.   Neurological: He is alert.   Vitals reviewed.      Significant Labs:

## 2018-04-04 NOTE — PROGRESS NOTES
[facility] Hospital Medicine  Progress Note    Patient Name: Tenzin Ortiz  MRN: [id number]  Patient Class: IP- Inpatient   Admission Date: 2/7/2018  Length of Stay: 36 days  Attending Physician: Norma Raymond MD  Primary Care Provider: Efrain Chavez MD        Subjective:     Principal Problem:Debility    HPI:  Patient is 78 yo male with HTN, HLD, and Parkinson's who presents to ED reportedly for HA. Per ED note patient was complaining of HA but he denies this on my interview. Seems there was some confusion in ED and he was unclear why he was here. During interview with me, patient reports being here for NH placement although he does not appear to be very happy about this. He has been with some issues caring for himself. Currently lives with his girlfriend who reports she can no longer care for him. He states he currently gets around with a walker but sometimes has difficulty getting up out of his chair. Per chart review patient is with Parkinson's and sees neurology, Dr. Castellanos, lastly seen on 2/6/18. He was with hallucinations which were noted to be secondary to dopaminergic agents thus having to significantly reduce dose--she recommended he be admitted for placement at that time but patient declined. He has been attempting placement as an outpatient through PCP but without much luck. He otherwise denies recent illness, fever/chills, CP, SOB, abdominal pain, N/V/D, dysuria. On presentation patient with grossly normal labs/vitals. Placed in observation for what appears to be solely placement although this is something that may need to be continued as outpatient as is not indication for hospitalization.    Hospital Course:  Awake and alert without complaint - severe Parkinsons dementia and can no longer care for himself - neither can his girlfriend.  CM reports patient with acceptance to [facility]; awaiting paperwork to be signed by patient POA. Legal involved. Appreciate case management's  assistance.pending placement.    Interval History: No new issues.     Review of Systems   Unable to perform ROS: Dementia   Constitutional: Negative for appetite change.     Objective:     Vital Signs (Most Recent):  Temp: 96.7 °F (35.9 °C) (04/04/18 0722)  Pulse: 76 (04/04/18 0722)  Resp: 16 (04/04/18 0722)  BP: (!) 157/70 (04/04/18 0722)  SpO2: 98 % (04/04/18 0722) Vital Signs (24h Range):  Temp:  [96.7 °F (35.9 °C)-98.7 °F (37.1 °C)] 96.7 °F (35.9 °C)  Pulse:  [76-77] 76  Resp:  [16-18] 16  SpO2:  [97 %-98 %] 98 %  BP: (145-157)/(66-70) 157/70     Weight: 61.3 kg (135 lb 2.3 oz)  Body mass index is 21.19 kg/m².    Intake/Output Summary (Last 24 hours) at 04/04/18 1115  Last data filed at 04/04/18 0900   Gross per 24 hour   Intake              717 ml   Output              800 ml   Net              -83 ml      Physical Exam   Constitutional: He appears well-developed and well-nourished.   HENT:   Head: Normocephalic and atraumatic.   Neurological: He is alert.   Vitals reviewed.      Significant Labs:     Assessment/Plan:      * Debility    TN consulted to aid in safe discharge planning. He has been living with a significant other who can no longer care for him and refusing to take patient home. His somewhat estranged daughter has been contacted and is willing to aid in finding NH placement (she had already begun this process as an outpatient) but there is an issue: patient's power of  (a business friend) refused to sign consent for NH placement. Patient unable to make his own decision regarding this as he is with intermittent confusion +/- previous unclear dementia diagnosis per daughter. Psychiatry consulted for capacity, who agrees that he has no capacity for decision making. Also recommended adding depakote 250mg TID to help with mood stabilization. He is medically cleared for transfer once nursing facility arranged. Pending placement.        Parkinson disease    Seroquel 50 mg; sinemet  mg;    Discontinued taxodione 50 mg  Restarted exelon 4.6 mg in the morning when he was more awake        Lewy body dementia without behavioral disturbance    Seen by psych who has since signed off - appreciate recs -  Doing well on current regimen  Continue medication regimen as follows:  Sinemet  QID and Seroquel 50 QHS  Continue Exelon 4.6 mg/24 patch  Divalproex 250 mg to BID  Held trazodone        Essential hypertension    Resume norvasc.          VTE Risk Mitigation         Ordered     enoxaparin injection 40 mg  Daily     Route:  Subcutaneous        03/14/18 1102     Medium Risk of VTE  Once      02/08/18 0653     Place JOSEFINA hose  Until discontinued      02/08/18 0653     Place sequential compression device  Until discontinued      02/08/18 0653              Norma Raymond MD  Department of Hospital Medicine   [facility]

## 2018-04-04 NOTE — PLAN OF CARE
04/04/18 1500   Activity Intolerance (Adult)   Self-Care Promotion independence encouraged;BADL personal objects within reach;BADL personal routines maintained;meal setup provided   Sleep/Rest Enhancement noise level reduced   Confusion, Acute (Adult)   Safety making progress toward outcome   Communication Enhancement Strategies call light answered in person;communication board used;extra time allowed for response   Reorientation Measures glasses encouraged;reorientation provided   Diarrhea (Adult)   Skin Protection Incontinence pads   Pressure Ulcer Risk (Mike Q Scale) (Pediatric)   Pressure Reduction Devices Pressure-redistributing mattress utilized   Pressure Reduction Techniques frequent weight shift encouraged;heels elevated off bed

## 2018-04-04 NOTE — NURSING
Received report from KARINA Myles. Pt is lying supine in bed with the Television on. Pt denies any discomfort. Bed is locked, lowered, and alarmed. Call light within reach. Will continue to monitor.

## 2018-04-05 PROCEDURE — 21400001 HC TELEMETRY ROOM

## 2018-04-05 PROCEDURE — 97116 GAIT TRAINING THERAPY: CPT

## 2018-04-05 PROCEDURE — 63600175 PHARM REV CODE 636 W HCPCS: Performed by: INTERNAL MEDICINE

## 2018-04-05 PROCEDURE — 25000003 PHARM REV CODE 250: Performed by: INTERNAL MEDICINE

## 2018-04-05 PROCEDURE — 25000003 PHARM REV CODE 250: Performed by: HOSPITALIST

## 2018-04-05 PROCEDURE — 97530 THERAPEUTIC ACTIVITIES: CPT

## 2018-04-05 RX ADMIN — AMLODIPINE BESYLATE 10 MG: 5 TABLET ORAL at 09:04

## 2018-04-05 RX ADMIN — DOCUSATE SODIUM 100 MG: 100 CAPSULE, LIQUID FILLED ORAL at 09:04

## 2018-04-05 RX ADMIN — CARBIDOPA AND LEVODOPA 2 TABLET: 25; 100 TABLET ORAL at 09:04

## 2018-04-05 RX ADMIN — POLYETHYLENE GLYCOL 3350 17 G: 17 POWDER, FOR SOLUTION ORAL at 09:04

## 2018-04-05 RX ADMIN — ENOXAPARIN SODIUM 40 MG: 100 INJECTION SUBCUTANEOUS at 06:04

## 2018-04-05 RX ADMIN — DIVALPROEX SODIUM 250 MG: 250 TABLET, DELAYED RELEASE ORAL at 09:04

## 2018-04-05 RX ADMIN — QUETIAPINE FUMARATE 100 MG: 100 TABLET ORAL at 09:04

## 2018-04-05 RX ADMIN — CARBIDOPA AND LEVODOPA 2 TABLET: 25; 100 TABLET ORAL at 03:04

## 2018-04-05 RX ADMIN — RIVASTIGMINE TRANSDERMAL SYSTEM 1 PATCH: 4.6 PATCH, EXTENDED RELEASE TRANSDERMAL at 09:04

## 2018-04-05 NOTE — PROGRESS NOTES
" [facility]  Adult Nutrition  Progress Note    SUMMARY       Recommendations    Recommendation/Intervention:   1. Continue diet and supplements   2. Monitor weight weekly   3. RD to monitor    Goals: Meet >85% EEN  Nutrition Goal Status: goal met  Communication of RD Recs: reviewed with RN      Reason for Assessment    Reason for Assessment: RD follow-up  Diagnosis:  (Debility)  Relevant Medical History: HTN, Parkinsosn    General Information Comments: Patient receiving boost and drinking tid. Appetite/intake improved. Patient denies issues with n/v.     Nutrition Discharge Planning: Patient to discharge on a Regular diet.     Nutrition Risk Screen    Nutrition Risk Screen: no indicators present    Nutrition/Diet History    Food Preferences: No cultural, Tenriism or ethnic food preferences.   Do you have any cultural, spiritual, Tenriism conflicts, given your current situation?: none  Food Allergies: other (see comments) (NKFA)    Anthropometrics    Temp: 97.6 °F (36.4 °C)  Height Method: Stated  Height: 5' 6.97" (170.1 cm)  Height (inches): 66.97 in  Weight Method: Bed Scale  Weight: 61.3 kg (135 lb 2.3 oz)  Weight (lb): 135.14 lb  Ideal Body Weight (IBW), Male: 147.82 lb  % Ideal Body Weight, Male (lb): 91.27 lb  BMI (Calculated): 21.2  BMI Grade: 18.5-24.9 - normal       Lab/Procedures/Meds    Pertinent Labs Reviewed: reviewed  Pertinent Medications Reviewed: reviewed    Physical Findings/Assessment    Overall Physical Appearance: nourished  Oral/Mouth Cavity: tooth/teeth missing  Skin: intact (Mike Score 16)    Estimated/Assessed Needs    Weight Used For Calorie Calculations: 62.6 kg (138 lb 0.1 oz)  Energy Calorie Requirements (kcal): 8721-9582 kcal  Energy Need Method: [place]  Protein Requirements: 65-75g  Weight Used For Protein Calculations: 62.6 kg (138 lb 0.1 oz)     Fluid Need Method: RDA Method  RDA Method (mL): 1600         Nutrition Prescription Ordered    Current Diet " Order: Regular    Evaluation of Received Nutrient/Fluid Intake    I/O: 1312/1100  Energy Calories Required: meeting needs  Protein Required: meeting needs  Fluid Required: meeting needs  Comments: LBM: 4/3  Tolerance: tolerating    % Intake of Estimated Energy Needs: %  % Meal Intake: 75% + supplements    Nutrition Risk    Level of Risk/Frequency of Follow-up:  (f/u 1x weekly)     Assessment and Plan    Nutrition Dx: Inadequate Energy Intake r/t decreased appetite as evidenced by 25-50% po intake  Nutrition Dx Status: Improved       Monitor and Evaluation    Food and Nutrient Intake: energy intake, food and beverage intake  Food and Nutrient Adminstration: diet order  Knowledge/Beliefs/Attitudes: food and nutrition knowledge/skill  Physical Activity and Function: nutrition-related ADLs and IADLs  Anthropometric Measurements: weight, weight change  Biochemical Data, Medical Tests and Procedures: electrolyte and renal panel  Nutrition-Focused Physical Findings: overall appearance     Nutrition Follow-Up    RD Follow-up?: Yes

## 2018-04-05 NOTE — NURSING
Bedside rounding report given to KARINA Zuniga  on patients progress. No acute events this shift.

## 2018-04-05 NOTE — PLAN OF CARE
04/05/18 1537   Discharge Reassessment   Assessment Type Discharge Planning Reassessment   Provided patient/caregiver education on the expected discharge date and the discharge plan No   Do you have any problems affording any of your prescribed medications? No   Discharge Plan A New Nursing Home placement - intermediate care facility   Discharge Plan B New Nursing Home placement - intermediate care facility  (Interdiction in place for nursing home placement)   Patient choice form signed by patient/caregiver N/A   Can the patient answer the patient profile reliably? No, cognitively impaired   How does the patient rate their overall health at the present time? Fair   Describe the patient's ability to walk at the present time. Walks with the help of equipment   How often would a person be available to care for the patient? Never   Number of comorbid conditions (as recorded on the chart) Five or more   During the past month, has the patient often been bothered by feeling down, depressed or hopeless? No   During the past month, has the patient often been bothered by little interest or pleasure in doing things? No

## 2018-04-05 NOTE — PT/OT/SLP PROGRESS
"Physical Therapy Treatment    Patient Name:  Tenzin Ortiz   MRN:  [id number]    Recommendations:     Discharge Recommendations:  nursing facility, basic   Discharge Equipment Recommendations: none   Barriers to discharge: Decreased caregiver support    Assessment:     Tenzin Ortiz is a 77 y.o. male admitted with a medical diagnosis of Debility.  He presents with the following impairments/functional limitations:  weakness, impaired endurance, impaired self care skills, impaired functional mobilty, decreased safety awareness, decreased coordination, gait instability, impaired coordination, impaired joint extensibility, impaired balance, decreased lower extremity function, decreased upper extremity function, abnormal tone, decreased ROM.  Pt motivated to work with physical therapy today.  Pt requires assistance for all OOB mobility 2/2 safety.  Required decreased tactile cues today for RLE advancement.    Rehab Prognosis:  good; patient would benefit from acute skilled PT services to address these deficits and reach maximum level of function.      Recent Surgery: * No surgery found *      Plan:     During this hospitalization, patient to be seen 3 x/week to address the above listed problems via gait training, therapeutic activities, therapeutic exercises  · Plan of Care Expires:  04/09/18   Plan of Care Reviewed with: patient    Subjective     Communicated with pt's nurse, Cami, prior to session.  Patient found seated on BScommod with nurse, Cami and student nurses present. upon PT entry to room, agreeable to treatment.      Chief Complaint: needing to use bathroom  Patient comments/goals: Pt states " I like it here.  I should live here."  Pain/Comfort:  · Pain Rating 1: 0/10    Patients cultural, spiritual, Hinduism conflicts given the current situation: none    Objective:     Patient found with: peripheral IV (AVASYS)     General Precautions: Standard, fall   Orthopedic Precautions:N/A   Braces: N/A "     Functional Mobility:  · Transfers:     · Sit to Stand:  Min A (posterior leaning) with rolling walker  · BScommode to BSChair: contact guard assistance with  rolling walker  using  Step Transfer  · Gait: Pt ambulated ~70ft x2 trials with RW, CGA with seated rest break in between.  Displays decreased initiation to BLEs mostly with increased ambulation and with turning.  Requires tapping to RLE for advancement.  Demonstrates shuffling gait with decreased heel strike, foot clearance, velocity of limb, postural control, nneka, and step length.  Pt ambulates on B forefeet.  · Balance: good sitting and fair standing      AM-PAC 6 CLICK MOBILITY  Turning over in bed (including adjusting bedclothes, sheets and blankets)?: 4  Sitting down on and standing up from a chair with arms (e.g., wheelchair, bedside commode, etc.): 3  Moving from lying on back to sitting on the side of the bed?: 3  Moving to and from a bed to a chair (including a wheelchair)?: 3  Need to walk in hospital room?: 3  Climbing 3-5 steps with a railing?: 2  Total Score: 18       Therapeutic Activities and Exercises:   Pt tolerated static standing for ~2 minutes with RW, CGA for perineal cleaning.    Patient left reclined in BSchair with all lines intact, call button in reach, (attempted contact to KARINA Almanza, however, unable to obtain. x2 Student nurses notified and avasys present..    GOALS:    Physical Therapy Goals        Problem: Physical Therapy Goal    Goal Priority Disciplines Outcome Goal Variances Interventions   Physical Therapy Goal     PT/OT, PT Ongoing (interventions implemented as appropriate)     Description:  Goals to be met by: 18    Patient will increase functional independence with mobility by performin. Supine to sit with supervision  2. Sit to stand transfer with Stand-by Assistance  3. Gait  x 300 feet with Stand-by Assistance using Rolling Walker  4. Lower extremity exercise program x30 reps per handout, with  assistance as needed                      Time Tracking:     PT Received On: 04/05/18  PT Start Time: 1310 (1310)     PT Stop Time: 1333  PT Total Time (min): 23 min     Billable Minutes: Gait Training 13 and Therapeutic Activity 10    Treatment Type: Treatment  PT/PTA: PTA     PTA Visit Number: 4     Citlali Dennis, PTA  04/05/2018

## 2018-04-05 NOTE — PLAN OF CARE
Problem: Pressure Ulcer Risk (Mike Scale) (Adult,Obstetrics,Pediatric)  Intervention: Promote/Optimize Nutrition   03/22/18 0545   Nutrition Interventions   Oral Nutrition Promotion calorie dense foods provided     Intervention: Prevent/Manage Excess Moisture   04/04/18 1942   Hygiene Care   Perineal Care absorbent pad changed   Bathing/Skin Care incontinence care   Skin Interventions   Skin Protection Incontinence pads     Intervention: Maintain Head of Bed Elevation Less Than 30 Degrees as Tolerated   04/04/18 2000   Positioning   Head of Bed (HOB) HOB elevated

## 2018-04-05 NOTE — PROGRESS NOTES
[facility] Hospital Medicine  Progress Note    Patient Name: Tenzin Ortiz  MRN: [id number]  Patient Class: IP- Inpatient   Admission Date: 2/7/2018  Length of Stay: 37 days  Attending Physician: Norma Raymond MD  Primary Care Provider: Efrain Chavez MD        Subjective:     Principal Problem:Debility    HPI:  Patient is 76 yo male with HTN, HLD, and Parkinson's who presents to ED reportedly for HA. Per ED note patient was complaining of HA but he denies this on my interview. Seems there was some confusion in ED and he was unclear why he was here. During interview with me, patient reports being here for NH placement although he does not appear to be very happy about this. He has been with some issues caring for himself. Currently lives with his girlfriend who reports she can no longer care for him. He states he currently gets around with a walker but sometimes has difficulty getting up out of his chair. Per chart review patient is with Parkinson's and sees neurology, Dr. Castellanos, lastly seen on 2/6/18. He was with hallucinations which were noted to be secondary to dopaminergic agents thus having to significantly reduce dose--she recommended he be admitted for placement at that time but patient declined. He has been attempting placement as an outpatient through PCP but without much luck. He otherwise denies recent illness, fever/chills, CP, SOB, abdominal pain, N/V/D, dysuria. On presentation patient with grossly normal labs/vitals. Placed in observation for what appears to be solely placement although this is something that may need to be continued as outpatient as is not indication for hospitalization.    Hospital Course:  Awake and alert without complaint - severe Parkinsons dementia and can no longer care for himself - neither can his girlfriend.  CM reports patient with acceptance to [facility]; awaiting paperwork to be signed by patient POA. Legal involved. Appreciate case management's  assistance.pending placement.    Interval History: No new issues.     Review of Systems   Unable to perform ROS: Dementia   Constitutional: Negative for appetite change.     Objective:     Vital Signs (Most Recent):  Temp: 97.6 °F (36.4 °C) (04/05/18 0847)  Pulse: 81 (04/05/18 0847)  Resp: 11 (04/05/18 0847)  BP: 123/60 (04/05/18 0847)  SpO2: (!) 93 % (04/05/18 0847) Vital Signs (24h Range):  Temp:  [97.6 °F (36.4 °C)-98.5 °F (36.9 °C)] 97.6 °F (36.4 °C)  Pulse:  [70-82] 81  Resp:  [11-20] 11  SpO2:  [93 %-97 %] 93 %  BP: (100-144)/(55-66) 123/60     Weight: 61.3 kg (135 lb 2.3 oz)  Body mass index is 21.19 kg/m².    Intake/Output Summary (Last 24 hours) at 04/05/18 1231  Last data filed at 04/05/18 0500   Gross per 24 hour   Intake              835 ml   Output              700 ml   Net              135 ml      Physical Exam   Constitutional: He appears well-developed and well-nourished.   HENT:   Head: Normocephalic and atraumatic.   Neurological: He is alert.   Vitals reviewed.      Significant Labs:     Assessment/Plan:      * Debility    TN consulted to aid in safe discharge planning. He has been living with a significant other who can no longer care for him and refusing to take patient home. His somewhat estranged daughter has been contacted and is willing to aid in finding NH placement (she had already begun this process as an outpatient) but there is an issue: patient's power of  (a business friend) refused to sign consent for NH placement. Patient unable to make his own decision regarding this as he is with intermittent confusion +/- previous unclear dementia diagnosis per daughter. Psychiatry consulted for capacity, who agrees that he has no capacity for decision making. Also recommended adding depakote 250mg TID to help with mood stabilization. He is medically cleared for transfer once nursing facility arranged. Pending placement.        Parkinson disease    Seroquel 50 mg; sinemet  mg;    Discontinued taxodione 50 mg  Restarted exelon 4.6 mg in the morning when he was more awake        Lewy body dementia without behavioral disturbance    Seen by psych who has since signed off - appreciate recs -  Doing well on current regimen  Continue medication regimen as follows:  Sinemet  QID and Seroquel 50 QHS  Continue Exelon 4.6 mg/24 patch  Divalproex 250 mg to BID  Held trazodone        Essential hypertension    Resume norvasc.          VTE Risk Mitigation         Ordered     enoxaparin injection 40 mg  Daily     Route:  Subcutaneous        03/14/18 1102     Medium Risk of VTE  Once      02/08/18 0653     Place JOSEFINA hose  Until discontinued      02/08/18 0653     Place sequential compression device  Until discontinued      02/08/18 0653              Norma Raymond MD  Department of Hospital Medicine   [facility]

## 2018-04-05 NOTE — PLAN OF CARE
Problem: Physical Therapy Goal  Goal: Physical Therapy Goal  Goals to be met by: 18    Patient will increase functional independence with mobility by performin. Supine to sit with supervision  2. Sit to stand transfer with Stand-by Assistance  3. Gait  x 300 feet with Stand-by Assistance using Rolling Walker  4. Lower extremity exercise program x30 reps per handout, with assistance as needed     Outcome: Ongoing (interventions implemented as appropriate)  · Pt ambulated ~70ft x2 trials with RW, CGA with seated rest break in between.  Displays decreased initiation to BLEs mostly with increased ambulation and with turning.  Requires tapping to RLE for advancement.  Demonstrates shuffling gait with decreased heel strike, foot clearance, velocity of limb, postural control, nneka, and step length.  Pt ambulates on B forefeet.

## 2018-04-05 NOTE — SUBJECTIVE & OBJECTIVE
Interval History: No new issues.     Review of Systems   Unable to perform ROS: Dementia   Constitutional: Negative for appetite change.     Objective:     Vital Signs (Most Recent):  Temp: 97.6 °F (36.4 °C) (04/05/18 0847)  Pulse: 81 (04/05/18 0847)  Resp: 11 (04/05/18 0847)  BP: 123/60 (04/05/18 0847)  SpO2: (!) 93 % (04/05/18 0847) Vital Signs (24h Range):  Temp:  [97.6 °F (36.4 °C)-98.5 °F (36.9 °C)] 97.6 °F (36.4 °C)  Pulse:  [70-82] 81  Resp:  [11-20] 11  SpO2:  [93 %-97 %] 93 %  BP: (100-144)/(55-66) 123/60     Weight: 61.3 kg (135 lb 2.3 oz)  Body mass index is 21.19 kg/m².    Intake/Output Summary (Last 24 hours) at 04/05/18 1231  Last data filed at 04/05/18 0500   Gross per 24 hour   Intake              835 ml   Output              700 ml   Net              135 ml      Physical Exam   Constitutional: He appears well-developed and well-nourished.   HENT:   Head: Normocephalic and atraumatic.   Neurological: He is alert.   Vitals reviewed.      Significant Labs:

## 2018-04-06 PROCEDURE — 97116 GAIT TRAINING THERAPY: CPT

## 2018-04-06 PROCEDURE — 63600175 PHARM REV CODE 636 W HCPCS: Performed by: INTERNAL MEDICINE

## 2018-04-06 PROCEDURE — 25000003 PHARM REV CODE 250: Performed by: HOSPITALIST

## 2018-04-06 PROCEDURE — 25000003 PHARM REV CODE 250: Performed by: INTERNAL MEDICINE

## 2018-04-06 PROCEDURE — 21400001 HC TELEMETRY ROOM

## 2018-04-06 PROCEDURE — 97110 THERAPEUTIC EXERCISES: CPT

## 2018-04-06 RX ADMIN — CARBIDOPA AND LEVODOPA 2 TABLET: 25; 100 TABLET ORAL at 09:04

## 2018-04-06 RX ADMIN — CARBIDOPA AND LEVODOPA 2 TABLET: 25; 100 TABLET ORAL at 05:04

## 2018-04-06 RX ADMIN — DIVALPROEX SODIUM 250 MG: 250 TABLET, DELAYED RELEASE ORAL at 09:04

## 2018-04-06 RX ADMIN — DOCUSATE SODIUM AND SENNOSIDES 1 TABLET: 8.6; 5 TABLET, FILM COATED ORAL at 09:04

## 2018-04-06 RX ADMIN — AMLODIPINE BESYLATE 10 MG: 5 TABLET ORAL at 09:04

## 2018-04-06 RX ADMIN — POLYETHYLENE GLYCOL 3350 17 G: 17 POWDER, FOR SOLUTION ORAL at 09:04

## 2018-04-06 RX ADMIN — ENOXAPARIN SODIUM 40 MG: 100 INJECTION SUBCUTANEOUS at 05:04

## 2018-04-06 RX ADMIN — CARBIDOPA AND LEVODOPA 2 TABLET: 25; 100 TABLET ORAL at 01:04

## 2018-04-06 RX ADMIN — DOCUSATE SODIUM 100 MG: 100 CAPSULE, LIQUID FILLED ORAL at 09:04

## 2018-04-06 RX ADMIN — RIVASTIGMINE TRANSDERMAL SYSTEM 1 PATCH: 4.6 PATCH, EXTENDED RELEASE TRANSDERMAL at 09:04

## 2018-04-06 RX ADMIN — QUETIAPINE FUMARATE 100 MG: 100 TABLET ORAL at 09:04

## 2018-04-06 NOTE — PLAN OF CARE
Problem: Physical Therapy Goal  Goal: Physical Therapy Goal  Goals to be met by: 18    Patient will increase functional independence with mobility by performin. Supine to sit with supervision  2. Sit to stand transfer with Stand-by Assistance  3. Gait  x 300 feet with Stand-by Assistance using Rolling Walker  4. Lower extremity exercise program x30 reps per handout, with assistance as needed     Outcome: Ongoing (interventions implemented as appropriate)  Pt will benefit from further therapy in order to get back to PLOF.

## 2018-04-06 NOTE — SUBJECTIVE & OBJECTIVE
Interval History: No new issues.     Review of Systems   Unable to perform ROS: Dementia   Constitutional: Negative for appetite change.     Objective:     Vital Signs (Most Recent):  Temp: 97.5 °F (36.4 °C) (04/06/18 0730)  Pulse: 76 (04/06/18 0730)  Resp: 18 (04/06/18 0730)  BP: 136/62 (04/06/18 0730)  SpO2: 96 % (04/06/18 0730) Vital Signs (24h Range):  Temp:  [97.5 °F (36.4 °C)-98.6 °F (37 °C)] 97.5 °F (36.4 °C)  Pulse:  [76-92] 76  Resp:  [18] 18  SpO2:  [94 %-97 %] 96 %  BP: (117-136)/(55-70) 136/62     Weight: 63.3 kg (139 lb 8.8 oz)  Body mass index is 21.88 kg/m².    Intake/Output Summary (Last 24 hours) at 04/06/18 0909  Last data filed at 04/05/18 1300   Gross per 24 hour   Intake              480 ml   Output                0 ml   Net              480 ml      Physical Exam   Constitutional: He appears well-developed and well-nourished.   HENT:   Head: Normocephalic and atraumatic.   Neurological: He is alert.   Vitals reviewed.      Significant Labs:

## 2018-04-06 NOTE — PROGRESS NOTES
[facility] Hospital Medicine  Progress Note    Patient Name: Tenzin Ortiz  MRN: [id number]  Patient Class: IP- Inpatient   Admission Date: 2/7/2018  Length of Stay: 38 days  Attending Physician: Norma Raymond MD  Primary Care Provider: Efrain Chavez MD        Subjective:     Principal Problem:Debility    HPI:  Patient is 76 yo male with HTN, HLD, and Parkinson's who presents to ED reportedly for HA. Per ED note patient was complaining of HA but he denies this on my interview. Seems there was some confusion in ED and he was unclear why he was here. During interview with me, patient reports being here for NH placement although he does not appear to be very happy about this. He has been with some issues caring for himself. Currently lives with his girlfriend who reports she can no longer care for him. He states he currently gets around with a walker but sometimes has difficulty getting up out of his chair. Per chart review patient is with Parkinson's and sees neurology, Dr. Castellanos, lastly seen on 2/6/18. He was with hallucinations which were noted to be secondary to dopaminergic agents thus having to significantly reduce dose--she recommended he be admitted for placement at that time but patient declined. He has been attempting placement as an outpatient through PCP but without much luck. He otherwise denies recent illness, fever/chills, CP, SOB, abdominal pain, N/V/D, dysuria. On presentation patient with grossly normal labs/vitals. Placed in observation for what appears to be solely placement although this is something that may need to be continued as outpatient as is not indication for hospitalization.    Hospital Course:  Awake and alert without complaint - severe Parkinsons dementia and can no longer care for himself - neither can his girlfriend.  CM reports patient with acceptance to [facility]; awaiting paperwork to be signed by patient POA. Legal involved. Appreciate case management's  assistance.pending placement.    Interval History: No new issues.     Review of Systems   Unable to perform ROS: Dementia   Constitutional: Negative for appetite change.     Objective:     Vital Signs (Most Recent):  Temp: 97.5 °F (36.4 °C) (04/06/18 0730)  Pulse: 76 (04/06/18 0730)  Resp: 18 (04/06/18 0730)  BP: 136/62 (04/06/18 0730)  SpO2: 96 % (04/06/18 0730) Vital Signs (24h Range):  Temp:  [97.5 °F (36.4 °C)-98.6 °F (37 °C)] 97.5 °F (36.4 °C)  Pulse:  [76-92] 76  Resp:  [18] 18  SpO2:  [94 %-97 %] 96 %  BP: (117-136)/(55-70) 136/62     Weight: 63.3 kg (139 lb 8.8 oz)  Body mass index is 21.88 kg/m².    Intake/Output Summary (Last 24 hours) at 04/06/18 0909  Last data filed at 04/05/18 1300   Gross per 24 hour   Intake              480 ml   Output                0 ml   Net              480 ml      Physical Exam   Constitutional: He appears well-developed and well-nourished.   HENT:   Head: Normocephalic and atraumatic.   Neurological: He is alert.   Vitals reviewed.      Significant Labs:     Assessment/Plan:      * Debility    TN consulted to aid in safe discharge planning. He has been living with a significant other who can no longer care for him and refusing to take patient home. His somewhat estranged daughter has been contacted and is willing to aid in finding NH placement (she had already begun this process as an outpatient) but there is an issue: patient's power of  (a business friend) refused to sign consent for NH placement. Patient unable to make his own decision regarding this as he is with intermittent confusion +/- previous unclear dementia diagnosis per daughter. Psychiatry consulted for capacity, who agrees that he has no capacity for decision making. Also recommended adding depakote 250mg TID to help with mood stabilization. He is medically cleared for transfer once nursing facility arranged. Pending placement.        Parkinson disease    Seroquel 50 mg; sinemet  mg;   Discontinued  taxodione 50 mg  Restarted exelon 4.6 mg in the morning when he was more awake        Lewy body dementia without behavioral disturbance    Seen by psych who has since signed off - appreciate recs -  Doing well on current regimen  Continue medication regimen as follows:  Sinemet  QID and Seroquel 50 QHS  Continue Exelon 4.6 mg/24 patch  Divalproex 250 mg to BID  Held trazodone        Essential hypertension    Resume norvasc.          VTE Risk Mitigation         Ordered     enoxaparin injection 40 mg  Daily     Route:  Subcutaneous        03/14/18 1102     Medium Risk of VTE  Once      02/08/18 0653     Place JOSEFINA hose  Until discontinued      02/08/18 0653     Place sequential compression device  Until discontinued      02/08/18 0653              Norma Raymond MD  Department of Hospital Medicine   [facility]

## 2018-04-06 NOTE — PLAN OF CARE
Hospital has decided to pursue interdiction. SW will continue to assist as needed.         04/06/18 1622   Discharge Reassessment   Assessment Type Discharge Planning Reassessment   Provided patient/caregiver education on the expected discharge date and the discharge plan No   Do you have any problems affording any of your prescribed medications? No   Discharge Plan A New Nursing Home placement - [facility] care facility   Discharge Plan B New Nursing Home placement - [facility] care facility   Patient choice form signed by patient/caregiver N/A   Can the patient answer the patient profile reliably? No, cognitively impaired   How does the patient rate their overall health at the present time? Fair   Describe the patient's ability to walk at the present time. Walks with the help of equipment   How often would a person be available to care for the patient? Never

## 2018-04-06 NOTE — PLAN OF CARE
Problem: Fall Risk (Adult)  Intervention: Monitor/Assist with Self Care   18 [id number]   Activity   Activity Assistance Provided assistance, 1 person --    Daily Care Interventions   Self-Care Promotion BADL personal objects within reach;BADL personal routines maintained;safe use of adaptive equipment encouraged --    Functional Level Current   Ambulation --  3 - assistive equipment and person   Transferring --  2 - assistive person   Toileting --  3 - assistive equipment and person   Bathing --  3 - assistive equipment and person   Dressing --  2 - assistive person   Eating --  0 - independent   Communication --  0 - understands/communicates without difficulty   Swallowing --  0 - swallows foods/liquids without difficulty     Intervention: Reduce Risk/Promote Restraint Free Environment   18 034   Safety Interventions   Environmental Safety Modification assistive device/personal items within reach;clutter free environment maintained;room near unit station;room organization consistent   Prevent  Drop/Fall   Safety/Security Measures bed alarm set     Intervention: Review Medications/Identify Contributors to Fall Risk   18   Safety Interventions   Medication Review/Management medications reviewed     Intervention: Patient Rounds   18 010   Safety Interventions   Patient Rounds bed in low position;bed wheels locked;call light in reach;clutter free environment maintained;ID band on;placement of personal items at bedside;toileting offered;visualized patient     Intervention: Safety Promotion/Fall Prevention   18   Safety Interventions   Safety Promotion/Fall Prevention bed alarm set;commode/urinal/bedpan at bedside;diversional activities provided;Fall Risk reviewed with patient/family;Fall Risk signage in place;high risk medications identified;lighting adjusted;medications reviewed;nonskid shoes/socks when out of bed;room near unit station;/camera  at bedside;upper side rails raised x 2, lower siderails raised x 1 (Peds only);instructed to call staff for mobility

## 2018-04-06 NOTE — PT/OT/SLP PROGRESS
Physical Therapy Treatment    Patient Name:  Tenzin Ortiz   MRN:  [id number]    Recommendations:     Discharge Recommendations:  nursing facility, basic   Discharge Equipment Recommendations:   None   Barriers to discharge: Decreased caregiver support    Assessment:     Tenzin Ortiz is a 77 y.o. male admitted with a medical diagnosis of Debility.  He presents with the following impairments/functional limitations:  weakness, impaired endurance, impaired self care skills, gait instability, impaired balance, decreased lower extremity function, decreased upper extremity function, decreased coordination, decreased safety awareness, decreased ROM . Pt required V/T cues for safety technique and sequence during therapy.    Rehab Prognosis:  Fair+ ; patient would benefit from acute skilled PT services to address these deficits and reach maximum level of function.      Recent Surgery: * No surgery found *      Plan:     During this hospitalization, patient to be seen 3 x/week to address the above listed problems via gait training, therapeutic activities, therapeutic exercises  · Plan of Care Expires:  04/09/18   Plan of Care Reviewed with: patient    Subjective     Communicated with nurse Zaragoza prior to session.  Patient found seated in bedside chair upon PT entry to room, agreeable to treatment.      Chief Complaint: n/a  Patient comments/goals: pt requested to get back to bed.   Pain/Comfort:  · Pain Rating 1: 0/10  · Pain Rating Post-Intervention 1: 0/10    Patients cultural, spiritual, Yarsanism conflicts given the current situation: none    Objective:     Patient found with: telemetry , [employer] monitor     General Precautions: Standard, fall   Orthopedic Precautions:N/A   Braces: N/A     Functional Mobility:  · Bed Mobility:     · Scooting: contact guard assistance, using bed head rails to scoot higher in bed.    · Bridging: stand by assistance and contact guard assistance  · Sit to Supine: stand by  assistance  · Transfers:   Pt initial with posterior lean upon standing.   · Sit to Stand:  minimum assistance from bedside chair with rolling walker  · Gait:    Patient ambulated  80 ft x 2   feet on level tile with Rolling Walker with Contact Guard Assistance.  Pt with demonstarting a  swing-through gait with decreased nneka, increased time in double stance, decreased velocity of limb motion, decreased step length, decreased stride length, decreased swing-to-stance ratio, decreased toe-to-floor clearance and decreased weight-shifting ability.Impairments contributing to gait deviations include impaired balance, impaired coordination, decreased flexibility, decreased ROM and decreased strength. Noted with narrow ALETHEA, difficult advancing RLE. Pt required V/T cues for safety technique and walker management.     · Balance: fair       AM-PAC 6 CLICK MOBILITY  Turning over in bed (including adjusting bedclothes, sheets and blankets)?: 4  Sitting down on and standing up from a chair with arms (e.g., wheelchair, bedside commode, etc.): 3  Moving from lying on back to sitting on the side of the bed?: 3  Moving to and from a bed to a chair (including a wheelchair)?: 3  Need to walk in hospital room?: 3  Climbing 3-5 steps with a railing?: 3  Total Score: 19       Therapeutic Activities and Exercises:   pt performed bed mobility, transfer and gait training as above.  Pt performed seated BLE x 15 reps : heel/toes raises, LAQ,HS, hip flexion and pillow squeezes. V/T cues for proper technique and sequence. Pt tolerated well .     Patient left with bed in chair position with all lines intact, call button in reach, bed alarm on, nurse  notified and [employer] monitor  present..    GOALS:    Physical Therapy Goals        Problem: Physical Therapy Goal    Goal Priority Disciplines Outcome Goal Variances Interventions   Physical Therapy Goal     PT/OT, PT Ongoing (interventions implemented as appropriate)     Description:  Goals to be  met by: 18    Patient will increase functional independence with mobility by performin. Supine to sit with supervision  2. Sit to stand transfer with Stand-by Assistance  3. Gait  x 300 feet with Stand-by Assistance using Rolling Walker  4. Lower extremity exercise program x30 reps per handout, with assistance as needed                      Time Tracking:     PT Received On: 18  PT Start Time: 1533     PT Stop Time: 1557  PT Total Time (min): 24 min     Billable Minutes: Gait Training 14 and Therapeutic Exercise 10    Treatment Type: Treatment  PT/PTA: PTA     PTA Visit Number: 5     Tram KADI Rodríguez, PTA  2018

## 2018-04-07 PROCEDURE — 63600175 PHARM REV CODE 636 W HCPCS: Performed by: INTERNAL MEDICINE

## 2018-04-07 PROCEDURE — 25000003 PHARM REV CODE 250: Performed by: INTERNAL MEDICINE

## 2018-04-07 PROCEDURE — 25000003 PHARM REV CODE 250: Performed by: HOSPITALIST

## 2018-04-07 PROCEDURE — 21400001 HC TELEMETRY ROOM

## 2018-04-07 RX ADMIN — CARBIDOPA AND LEVODOPA 2 TABLET: 25; 100 TABLET ORAL at 09:04

## 2018-04-07 RX ADMIN — ACETAMINOPHEN 650 MG: 325 TABLET ORAL at 12:04

## 2018-04-07 RX ADMIN — DIVALPROEX SODIUM 250 MG: 250 TABLET, DELAYED RELEASE ORAL at 09:04

## 2018-04-07 RX ADMIN — POLYETHYLENE GLYCOL 3350 17 G: 17 POWDER, FOR SOLUTION ORAL at 08:04

## 2018-04-07 RX ADMIN — AMLODIPINE BESYLATE 10 MG: 5 TABLET ORAL at 08:04

## 2018-04-07 RX ADMIN — CARBIDOPA AND LEVODOPA 2 TABLET: 25; 100 TABLET ORAL at 04:04

## 2018-04-07 RX ADMIN — CARBIDOPA AND LEVODOPA 2 TABLET: 25; 100 TABLET ORAL at 12:04

## 2018-04-07 RX ADMIN — DOCUSATE SODIUM 100 MG: 100 CAPSULE, LIQUID FILLED ORAL at 08:04

## 2018-04-07 RX ADMIN — ENOXAPARIN SODIUM 40 MG: 100 INJECTION SUBCUTANEOUS at 04:04

## 2018-04-07 RX ADMIN — QUETIAPINE FUMARATE 100 MG: 100 TABLET ORAL at 09:04

## 2018-04-07 RX ADMIN — DIVALPROEX SODIUM 250 MG: 250 TABLET, DELAYED RELEASE ORAL at 08:04

## 2018-04-07 RX ADMIN — RIVASTIGMINE TRANSDERMAL SYSTEM 1 PATCH: 4.6 PATCH, EXTENDED RELEASE TRANSDERMAL at 08:04

## 2018-04-07 RX ADMIN — DOCUSATE SODIUM 100 MG: 100 CAPSULE, LIQUID FILLED ORAL at 09:04

## 2018-04-07 RX ADMIN — CARBIDOPA AND LEVODOPA 2 TABLET: 25; 100 TABLET ORAL at 08:04

## 2018-04-07 NOTE — PROGRESS NOTES
[facility] Hospital Medicine  Progress Note    Patient Name: Tenzin Ortiz  MRN: [id number]  Patient Class: IP- Inpatient   Admission Date: 2/7/2018  Length of Stay: 39 days  Attending Physician: Norma Raymond MD  Primary Care Provider: Efrain Chavez MD        Subjective:     Principal Problem:Debility    HPI:  Patient is 76 yo male with HTN, HLD, and Parkinson's who presents to ED reportedly for HA. Per ED note patient was complaining of HA but he denies this on my interview. Seems there was some confusion in ED and he was unclear why he was here. During interview with me, patient reports being here for NH placement although he does not appear to be very happy about this. He has been with some issues caring for himself. Currently lives with his girlfriend who reports she can no longer care for him. He states he currently gets around with a walker but sometimes has difficulty getting up out of his chair. Per chart review patient is with Parkinson's and sees neurology, Dr. Castellanos, lastly seen on 2/6/18. He was with hallucinations which were noted to be secondary to dopaminergic agents thus having to significantly reduce dose--she recommended he be admitted for placement at that time but patient declined. He has been attempting placement as an outpatient through PCP but without much luck. He otherwise denies recent illness, fever/chills, CP, SOB, abdominal pain, N/V/D, dysuria. On presentation patient with grossly normal labs/vitals. Placed in observation for what appears to be solely placement although this is something that may need to be continued as outpatient as is not indication for hospitalization.    Hospital Course:  Awake and alert without complaint - severe Parkinsons dementia and can no longer care for himself - neither can his girlfriend.  CM reports patient with acceptance to [facility]; awaiting paperwork to be signed by patient POA. Legal involved. Appreciate case management's  assistance.pending placement.    Interval History: No new issues.     Review of Systems   Unable to perform ROS: Dementia   Constitutional: Negative for appetite change.     Objective:     Vital Signs (Most Recent):  Temp: 97.8 °F (36.6 °C) (04/07/18 0731)  Pulse: 73 (04/07/18 0731)  Resp: 18 (04/07/18 0731)  BP: (!) 147/65 (04/07/18 0731)  SpO2: 98 % (04/07/18 0731) Vital Signs (24h Range):  Temp:  [97.6 °F (36.4 °C)-98.5 °F (36.9 °C)] 97.8 °F (36.6 °C)  Pulse:  [70-89] 73  Resp:  [16-20] 18  SpO2:  [95 %-98 %] 98 %  BP: ()/(55-66) 147/65     Weight: 62.6 kg (138 lb 0.1 oz)  Body mass index is 21.64 kg/m².    Intake/Output Summary (Last 24 hours) at 04/07/18 1021  Last data filed at 04/07/18 0900   Gross per 24 hour   Intake              520 ml   Output             1000 ml   Net             -480 ml      Physical Exam   Constitutional: He appears well-developed and well-nourished.   HENT:   Head: Normocephalic and atraumatic.   Neurological: He is alert.   Vitals reviewed.      Significant Labs:     Assessment/Plan:      * Debility    TN consulted to aid in safe discharge planning. He has been living with a significant other who can no longer care for him and refusing to take patient home. His somewhat estranged daughter has been contacted and is willing to aid in finding NH placement (she had already begun this process as an outpatient) but there is an issue: patient's power of  (a business friend) refused to sign consent for NH placement. Patient unable to make his own decision regarding this as he is with intermittent confusion +/- previous unclear dementia diagnosis per daughter. Psychiatry consulted for capacity, who agrees that he has no capacity for decision making. Also recommended adding depakote 250mg TID to help with mood stabilization. He is medically cleared for transfer once nursing facility arranged. Pending placement.        Parkinson disease    Seroquel 50 mg; sinemet  mg;    Discontinued taxodione 50 mg  Restarted exelon 4.6 mg in the morning when he was more awake        Lewy body dementia without behavioral disturbance    Seen by psych who has since signed off - appreciate recs -  Doing well on current regimen  Continue medication regimen as follows:  Sinemet  QID and Seroquel 50 QHS  Continue Exelon 4.6 mg/24 patch  Divalproex 250 mg to BID  Held trazodone        Essential hypertension    Resume norvasc.          VTE Risk Mitigation         Ordered     enoxaparin injection 40 mg  Daily     Route:  Subcutaneous        03/14/18 1102     Medium Risk of VTE  Once      02/08/18 0653     Place JOSEFINA hose  Until discontinued      02/08/18 0653     Place sequential compression device  Until discontinued      02/08/18 0653              Norma Raymond MD  Department of Hospital Medicine   [facility]

## 2018-04-07 NOTE — PLAN OF CARE
Problem: Patient Care Overview  Goal: Plan of Care Review   18   Coping/Psychosocial   Plan Of Care Reviewed With patient     Goal: Interdisciplinary Rounds/Family Conf   18   Interdisciplinary Rounds/Family Conf   Participants nursing;patient       Problem: Fall Risk (Adult)  Intervention: Reduce Risk/Promote Restraint Free Environment   18   Safety Interventions   Environmental Safety Modification lighting adjusted;clutter free environment maintained;room near unit station   Prevent [place] Drop/Fall   Safety/Security Measures bed alarm set     Intervention: Review Medications/Identify Contributors to Fall Risk   18   Safety Interventions   Medication Review/Management medications reviewed     Intervention: Patient Rounds   18   Safety Interventions   Patient Rounds bed in low position;bed wheels locked;clutter free environment maintained;visualized patient;toileting offered;call light in reach;placement of personal items at bedside;ID band on     Intervention: Safety Promotion/Fall Prevention   18   Safety Interventions   Safety Promotion/Fall Prevention bed alarm set;Fall Risk reviewed with patient/family;medications reviewed;room near unit station       Goal: Absence of Falls  Patient will demonstrate the desired outcomes by discharge/transition of care.    18   Fall Risk (Adult)   Absence of Falls making progress toward outcome       Problem: Confusion, Acute (Adult)  Intervention: Reduce Risk/Promote Restraint Free Environment   18   Safety Interventions   Environmental Safety Modification lighting adjusted;clutter free environment maintained;room near unit station   Prevent [place] Drop/Fall   Safety/Security Measures bed alarm set     Intervention: Evaluate Medications/Identify Contributors to Confusion   18   Safety Interventions   Medication Review/Management medications reviewed     Intervention: Optimize  Communication   04/07/18 1813   Cognitive Interventions   Communication Enhancement Strategies call light answered in person     Intervention: Provide Frequent Orientation/Reorientation   04/07/18 1813   Cognitive Interventions   Reorientation Measures calendar in view;glasses encouraged   Sensory Stimulation Regulation care clustered;lighting decreased;quiet environment promoted;music/television provided for relaxation       Goal: Cognitive/Functional Impairments Minimized  Patient will demonstrate the desired outcomes by discharge/transition of care.    04/07/18 1813   Confusion, Acute (Adult)   Cognitive/Functional Impairments Minimized making progress toward outcome     Goal: Safety  Patient will demonstrate the desired outcomes by discharge/transition of care.    04/07/18 1813   Confusion, Acute (Adult)   Safety making progress toward outcome

## 2018-04-07 NOTE — SUBJECTIVE & OBJECTIVE
Interval History: No new issues.     Review of Systems   Unable to perform ROS: Dementia   Constitutional: Negative for appetite change.     Objective:     Vital Signs (Most Recent):  Temp: 97.8 °F (36.6 °C) (04/07/18 0731)  Pulse: 73 (04/07/18 0731)  Resp: 18 (04/07/18 0731)  BP: (!) 147/65 (04/07/18 0731)  SpO2: 98 % (04/07/18 0731) Vital Signs (24h Range):  Temp:  [97.6 °F (36.4 °C)-98.5 °F (36.9 °C)] 97.8 °F (36.6 °C)  Pulse:  [70-89] 73  Resp:  [16-20] 18  SpO2:  [95 %-98 %] 98 %  BP: ()/(55-66) 147/65     Weight: 62.6 kg (138 lb 0.1 oz)  Body mass index is 21.64 kg/m².    Intake/Output Summary (Last 24 hours) at 04/07/18 1021  Last data filed at 04/07/18 0900   Gross per 24 hour   Intake              520 ml   Output             1000 ml   Net             -480 ml      Physical Exam   Constitutional: He appears well-developed and well-nourished.   HENT:   Head: Normocephalic and atraumatic.   Neurological: He is alert.   Vitals reviewed.      Significant Labs:

## 2018-04-08 PROCEDURE — 21400001 HC TELEMETRY ROOM

## 2018-04-08 PROCEDURE — 63600175 PHARM REV CODE 636 W HCPCS: Performed by: INTERNAL MEDICINE

## 2018-04-08 PROCEDURE — 25000003 PHARM REV CODE 250: Performed by: INTERNAL MEDICINE

## 2018-04-08 PROCEDURE — 25000003 PHARM REV CODE 250: Performed by: HOSPITALIST

## 2018-04-08 RX ADMIN — DIVALPROEX SODIUM 250 MG: 250 TABLET, DELAYED RELEASE ORAL at 09:04

## 2018-04-08 RX ADMIN — CARBIDOPA AND LEVODOPA 2 TABLET: 25; 100 TABLET ORAL at 12:04

## 2018-04-08 RX ADMIN — CARBIDOPA AND LEVODOPA 2 TABLET: 25; 100 TABLET ORAL at 05:04

## 2018-04-08 RX ADMIN — CARBIDOPA AND LEVODOPA 2 TABLET: 25; 100 TABLET ORAL at 09:04

## 2018-04-08 RX ADMIN — RIVASTIGMINE TRANSDERMAL SYSTEM 1 PATCH: 4.6 PATCH, EXTENDED RELEASE TRANSDERMAL at 09:04

## 2018-04-08 RX ADMIN — ENOXAPARIN SODIUM 40 MG: 100 INJECTION SUBCUTANEOUS at 05:04

## 2018-04-08 RX ADMIN — POLYETHYLENE GLYCOL 3350 17 G: 17 POWDER, FOR SOLUTION ORAL at 09:04

## 2018-04-08 RX ADMIN — ACETAMINOPHEN 650 MG: 325 TABLET ORAL at 09:04

## 2018-04-08 RX ADMIN — DOCUSATE SODIUM 100 MG: 100 CAPSULE, LIQUID FILLED ORAL at 09:04

## 2018-04-08 RX ADMIN — QUETIAPINE FUMARATE 100 MG: 100 TABLET ORAL at 09:04

## 2018-04-08 RX ADMIN — AMLODIPINE BESYLATE 10 MG: 5 TABLET ORAL at 09:04

## 2018-04-08 NOTE — PROGRESS NOTES
[facility] Hospital Medicine  Progress Note    Patient Name: Tenzin Ortiz  MRN: [id number]  Patient Class: IP- Inpatient   Admission Date: 2/7/2018  Length of Stay: 40 days  Attending Physician: Norma Raymond MD  Primary Care Provider: Efrain Chavez MD        Subjective:     Principal Problem:Debility    HPI:  Patient is 78 yo male with HTN, HLD, and Parkinson's who presents to ED reportedly for HA. Per ED note patient was complaining of HA but he denies this on my interview. Seems there was some confusion in ED and he was unclear why he was here. During interview with me, patient reports being here for NH placement although he does not appear to be very happy about this. He has been with some issues caring for himself. Currently lives with his girlfriend who reports she can no longer care for him. He states he currently gets around with a walker but sometimes has difficulty getting up out of his chair. Per chart review patient is with Parkinson's and sees neurology, Dr. Castellanos, lastly seen on 2/6/18. He was with hallucinations which were noted to be secondary to dopaminergic agents thus having to significantly reduce dose--she recommended he be admitted for placement at that time but patient declined. He has been attempting placement as an outpatient through PCP but without much luck. He otherwise denies recent illness, fever/chills, CP, SOB, abdominal pain, N/V/D, dysuria. On presentation patient with grossly normal labs/vitals. Placed in observation for what appears to be solely placement although this is something that may need to be continued as outpatient as is not indication for hospitalization.    Hospital Course:  Awake and alert without complaint - severe Parkinsons dementia and can no longer care for himself - neither can his girlfriend.  CM reports patient with acceptance to [facility]; awaiting paperwork to be signed by patient POA. Legal involved. Appreciate case management's  assistance.pending placement.    Interval History: No new issues.     Review of Systems   Unable to perform ROS: Dementia   Constitutional: Negative for appetite change.     Objective:     Vital Signs (Most Recent):  Temp: 98.7 °F (37.1 °C) (04/08/18 0812)  Pulse: 78 (04/08/18 0812)  Resp: 18 (04/08/18 0812)  BP: (!) 140/66 (04/08/18 0812)  SpO2: 98 % (04/08/18 0812) Vital Signs (24h Range):  Temp:  [98.2 °F (36.8 °C)-98.7 °F (37.1 °C)] 98.7 °F (37.1 °C)  Pulse:  [73-78] 78  Resp:  [18] 18  SpO2:  [95 %-98 %] 98 %  BP: (109-182)/(58-74) 140/66     Weight: 62.6 kg (138 lb 0.1 oz)  Body mass index is 21.64 kg/m².    Intake/Output Summary (Last 24 hours) at 04/08/18 1050  Last data filed at 04/08/18 0400   Gross per 24 hour   Intake                0 ml   Output              375 ml   Net             -375 ml      Physical Exam   Constitutional: He appears well-developed and well-nourished.   HENT:   Head: Normocephalic and atraumatic.   Neurological: He is alert.   Vitals reviewed.      Significant Labs:     Assessment/Plan:      * Debility    TN consulted to aid in safe discharge planning. He has been living with a significant other who can no longer care for him and refusing to take patient home. His somewhat estranged daughter has been contacted and is willing to aid in finding NH placement (she had already begun this process as an outpatient) but there is an issue: patient's power of  (a business friend) refused to sign consent for NH placement. Patient unable to make his own decision regarding this as he is with intermittent confusion +/- previous unclear dementia diagnosis per daughter. Psychiatry consulted for capacity, who agrees that he has no capacity for decision making. Also recommended adding depakote 250mg TID to help with mood stabilization. He is medically cleared for transfer once nursing facility arranged. Pending placement.        Parkinson disease    Seroquel 50 mg; sinemet  mg;    Discontinued taxodione 50 mg  Restarted exelon 4.6 mg in the morning when he was more awake        Lewy body dementia without behavioral disturbance    Seen by psych who has since signed off - appreciate recs -  Doing well on current regimen  Continue medication regimen as follows:  Sinemet  QID and Seroquel 50 QHS  Continue Exelon 4.6 mg/24 patch  Divalproex 250 mg to BID  Held trazodone        Essential hypertension    Resume norvasc.          VTE Risk Mitigation         Ordered     enoxaparin injection 40 mg  Daily     Route:  Subcutaneous        03/14/18 1102     Medium Risk of VTE  Once      02/08/18 0653     Place JOSEFINA hose  Until discontinued      02/08/18 0653     Place sequential compression device  Until discontinued      02/08/18 0653              Norma Raymond MD  Department of Hospital Medicine   [facility]

## 2018-04-08 NOTE — SUBJECTIVE & OBJECTIVE
Interval History: No new issues.     Review of Systems   Unable to perform ROS: Dementia   Constitutional: Negative for appetite change.     Objective:     Vital Signs (Most Recent):  Temp: 98.7 °F (37.1 °C) (04/08/18 0812)  Pulse: 78 (04/08/18 0812)  Resp: 18 (04/08/18 0812)  BP: (!) 140/66 (04/08/18 0812)  SpO2: 98 % (04/08/18 0812) Vital Signs (24h Range):  Temp:  [98.2 °F (36.8 °C)-98.7 °F (37.1 °C)] 98.7 °F (37.1 °C)  Pulse:  [73-78] 78  Resp:  [18] 18  SpO2:  [95 %-98 %] 98 %  BP: (109-182)/(58-74) 140/66     Weight: 62.6 kg (138 lb 0.1 oz)  Body mass index is 21.64 kg/m².    Intake/Output Summary (Last 24 hours) at 04/08/18 1050  Last data filed at 04/08/18 0400   Gross per 24 hour   Intake                0 ml   Output              375 ml   Net             -375 ml      Physical Exam   Constitutional: He appears well-developed and well-nourished.   HENT:   Head: Normocephalic and atraumatic.   Neurological: He is alert.   Vitals reviewed.      Significant Labs:

## 2018-04-09 LAB
ANION GAP SERPL CALC-SCNC: 5 MMOL/L
BASOPHILS # BLD AUTO: 0.01 K/UL
BASOPHILS NFR BLD: 0.2 %
BUN SERPL-MCNC: 22 MG/DL
CALCIUM SERPL-MCNC: 8.9 MG/DL
CHLORIDE SERPL-SCNC: 106 MMOL/L
CO2 SERPL-SCNC: 27 MMOL/L
CREAT SERPL-MCNC: 1 MG/DL
DIFFERENTIAL METHOD: ABNORMAL
EOSINOPHIL # BLD AUTO: 0.4 K/UL
EOSINOPHIL NFR BLD: 5.9 %
ERYTHROCYTE [DISTWIDTH] IN BLOOD BY AUTOMATED COUNT: 15 %
EST. GFR  (AFRICAN AMERICAN): >60 ML/MIN/1.73 M^2
EST. GFR  (NON AFRICAN AMERICAN): >60 ML/MIN/1.73 M^2
GLUCOSE SERPL-MCNC: 98 MG/DL
HCT VFR BLD AUTO: 36.6 %
HGB BLD-MCNC: 11.7 G/DL
LYMPHOCYTES # BLD AUTO: 1.5 K/UL
LYMPHOCYTES NFR BLD: 23.1 %
MCH RBC QN AUTO: 27.7 PG
MCHC RBC AUTO-ENTMCNC: 32 G/DL
MCV RBC AUTO: 87 FL
MONOCYTES # BLD AUTO: 0.9 K/UL
MONOCYTES NFR BLD: 13.4 %
NEUTROPHILS # BLD AUTO: 3.8 K/UL
NEUTROPHILS NFR BLD: 57.4 %
PLATELET # BLD AUTO: 222 K/UL
PMV BLD AUTO: 9.7 FL
POTASSIUM SERPL-SCNC: 4.6 MMOL/L
RBC # BLD AUTO: 4.23 M/UL
SODIUM SERPL-SCNC: 138 MMOL/L
WBC # BLD AUTO: 6.66 K/UL

## 2018-04-09 PROCEDURE — 80048 BASIC METABOLIC PNL TOTAL CA: CPT

## 2018-04-09 PROCEDURE — 85025 COMPLETE CBC W/AUTO DIFF WBC: CPT

## 2018-04-09 PROCEDURE — 25000003 PHARM REV CODE 250: Performed by: INTERNAL MEDICINE

## 2018-04-09 PROCEDURE — 63600175 PHARM REV CODE 636 W HCPCS: Performed by: HOSPITALIST

## 2018-04-09 PROCEDURE — 97116 GAIT TRAINING THERAPY: CPT

## 2018-04-09 PROCEDURE — 99231 SBSQ HOSP IP/OBS SF/LOW 25: CPT | Mod: ,,, | Performed by: PSYCHIATRY & NEUROLOGY

## 2018-04-09 PROCEDURE — 36415 COLL VENOUS BLD VENIPUNCTURE: CPT

## 2018-04-09 PROCEDURE — 21400001 HC TELEMETRY ROOM

## 2018-04-09 PROCEDURE — 25000003 PHARM REV CODE 250: Performed by: HOSPITALIST

## 2018-04-09 RX ORDER — OLANZAPINE 5 MG/1
5 TABLET, ORALLY DISINTEGRATING ORAL EVERY 8 HOURS PRN
Status: DISCONTINUED | OUTPATIENT
Start: 2018-04-09 | End: 2018-05-05 | Stop reason: HOSPADM

## 2018-04-09 RX ORDER — DIVALPROEX SODIUM 250 MG/1
250 TABLET, DELAYED RELEASE ORAL EVERY 12 HOURS
Status: DISCONTINUED | OUTPATIENT
Start: 2018-04-09 | End: 2018-05-05 | Stop reason: HOSPADM

## 2018-04-09 RX ORDER — RIVASTIGMINE 4.6 MG/24H
1 PATCH, EXTENDED RELEASE TRANSDERMAL DAILY
Status: DISCONTINUED | OUTPATIENT
Start: 2018-04-10 | End: 2018-05-05 | Stop reason: HOSPADM

## 2018-04-09 RX ORDER — ENOXAPARIN SODIUM 100 MG/ML
40 INJECTION SUBCUTANEOUS EVERY 24 HOURS
Status: DISCONTINUED | OUTPATIENT
Start: 2018-04-09 | End: 2018-05-05 | Stop reason: HOSPADM

## 2018-04-09 RX ORDER — QUETIAPINE FUMARATE 100 MG/1
100 TABLET, FILM COATED ORAL NIGHTLY
Status: DISCONTINUED | OUTPATIENT
Start: 2018-04-09 | End: 2018-04-25

## 2018-04-09 RX ADMIN — QUETIAPINE FUMARATE 100 MG: 100 TABLET ORAL at 09:04

## 2018-04-09 RX ADMIN — CARBIDOPA AND LEVODOPA 2 TABLET: 25; 100 TABLET ORAL at 10:04

## 2018-04-09 RX ADMIN — DIVALPROEX SODIUM 250 MG: 250 TABLET, DELAYED RELEASE ORAL at 09:04

## 2018-04-09 RX ADMIN — DIVALPROEX SODIUM 250 MG: 250 TABLET, DELAYED RELEASE ORAL at 12:04

## 2018-04-09 RX ADMIN — CARBIDOPA AND LEVODOPA 2 TABLET: 25; 100 TABLET ORAL at 02:04

## 2018-04-09 RX ADMIN — CARBIDOPA AND LEVODOPA 2 TABLET: 25; 100 TABLET ORAL at 09:04

## 2018-04-09 RX ADMIN — RIVASTIGMINE TRANSDERMAL SYSTEM 1 PATCH: 4.6 PATCH, EXTENDED RELEASE TRANSDERMAL at 01:04

## 2018-04-09 RX ADMIN — ENOXAPARIN SODIUM 40 MG: 100 INJECTION SUBCUTANEOUS at 06:04

## 2018-04-09 RX ADMIN — POLYETHYLENE GLYCOL 3350 17 G: 17 POWDER, FOR SOLUTION ORAL at 12:04

## 2018-04-09 RX ADMIN — AMLODIPINE BESYLATE 10 MG: 5 TABLET ORAL at 12:04

## 2018-04-09 RX ADMIN — DOCUSATE SODIUM 100 MG: 100 CAPSULE, LIQUID FILLED ORAL at 09:04

## 2018-04-09 RX ADMIN — DOCUSATE SODIUM 100 MG: 100 CAPSULE, LIQUID FILLED ORAL at 12:04

## 2018-04-09 NOTE — CLINICAL REVIEW
Physical Therapy   6th Visit    Tenzin Ortiz   MRN: [id number]     PT/PTA met face to face to discuss patient's treatment plan and progress towards established goals.  Treatment will be continued as described in initial report/eval and progress notes.  Patient will be seen by physical therapist every sixth visit and minimally once per month.    Dominga Esquivel, PT

## 2018-04-09 NOTE — PROGRESS NOTES
[facility] Hospital Medicine  Progress Note    Patient Name: Tenzin Ortiz  MRN: [id number]  Patient Class: IP- Inpatient   Admission Date: 2/7/2018  Length of Stay: 41 days  Attending Physician: Norma Raymond MD  Primary Care Provider: Efrain Chavez MD        Subjective:     Principal Problem:Debility    HPI:  Patient is 78 yo male with HTN, HLD, and Parkinson's who presents to ED reportedly for HA. Per ED note patient was complaining of HA but he denies this on my interview. Seems there was some confusion in ED and he was unclear why he was here. During interview with me, patient reports being here for NH placement although he does not appear to be very happy about this. He has been with some issues caring for himself. Currently lives with his girlfriend who reports she can no longer care for him. He states he currently gets around with a walker but sometimes has difficulty getting up out of his chair. Per chart review patient is with Parkinson's and sees neurology, Dr. Castellanos, lastly seen on 2/6/18. He was with hallucinations which were noted to be secondary to dopaminergic agents thus having to significantly reduce dose--she recommended he be admitted for placement at that time but patient declined. He has been attempting placement as an outpatient through PCP but without much luck. He otherwise denies recent illness, fever/chills, CP, SOB, abdominal pain, N/V/D, dysuria. On presentation patient with grossly normal labs/vitals. Placed in observation for what appears to be solely placement although this is something that may need to be continued as outpatient as is not indication for hospitalization.    Hospital Course:  Awake and alert without complaint - severe Parkinsons dementia and can no longer care for himself - neither can his girlfriend.  CM reports patient with acceptance to [facility]; awaiting paperwork to be signed by patient POA. Legal involved. Appreciate case management's  assistance.pending placement.    Interval History: No new issues.     Review of Systems   Unable to perform ROS: Dementia   Constitutional: Negative for appetite change.     Objective:     Vital Signs (Most Recent):  Temp: 98.2 °F (36.8 °C) (04/09/18 0746)  Pulse: 81 (04/09/18 0746)  Resp: 18 (04/09/18 0746)  BP: 129/62 (04/09/18 0746)  SpO2: 98 % (04/09/18 0746) Vital Signs (24h Range):  Temp:  [96.8 °F (36 °C)-98.2 °F (36.8 °C)] 98.2 °F (36.8 °C)  Pulse:  [73-82] 81  Resp:  [17-20] 18  SpO2:  [97 %-98 %] 98 %  BP: (100-135)/(56-66) 129/62     Weight: 62.6 kg (138 lb 0.1 oz)  Body mass index is 21.64 kg/m².    Intake/Output Summary (Last 24 hours) at 04/09/18 1121  Last data filed at 04/09/18 0600   Gross per 24 hour   Intake              600 ml   Output             1450 ml   Net             -850 ml      Physical Exam   Constitutional: He appears well-developed and well-nourished.   HENT:   Head: Normocephalic and atraumatic.   Neurological: He is alert.   Vitals reviewed.      Significant Labs:     Assessment/Plan:      * Debility    TN consulted to aid in safe discharge planning. He has been living with a significant other who can no longer care for him and refusing to take patient home. His somewhat estranged daughter has been contacted and is willing to aid in finding NH placement (she had already begun this process as an outpatient) but there is an issue: patient's power of  (a business friend) refused to sign consent for NH placement. Patient unable to make his own decision regarding this as he is with intermittent confusion +/- previous unclear dementia diagnosis per daughter. Psychiatry consulted for capacity, who agrees that he has no capacity for decision making. Also recommended adding depakote 250mg TID to help with mood stabilization. He is medically cleared for transfer once nursing facility arranged. Pending placement.        Parkinson disease    Seroquel 50 mg; sinemet  mg;    Discontinued taxodione 50 mg  Restarted exelon 4.6 mg in the morning when he was more awake        Lewy body dementia without behavioral disturbance    Seen by psych who has since signed off - appreciate recs -  Doing well on current regimen  Continue medication regimen as follows:  Sinemet  QID and Seroquel 50 QHS  Continue Exelon 4.6 mg/24 patch  Divalproex 250 mg to BID  Held trazodone        Essential hypertension    Resume norvasc.          VTE Risk Mitigation         Ordered     enoxaparin injection 40 mg  Daily     Route:  Subcutaneous        03/14/18 1102     Medium Risk of VTE  Once      02/08/18 0653     Place JOSEFINA hose  Until discontinued      02/08/18 0653     Place sequential compression device  Until discontinued      02/08/18 0653              Norma Raymond MD  Department of Hospital Medicine   [facility]

## 2018-04-09 NOTE — PROGRESS NOTES
"[facility] [place]  Psychiatry  Progress Note    Patient Name: Tenzin Ortiz  MRN: [id number]   Code Status: Full Code  Admission Date: 2/7/2018  Hospital Length of Stay: 41 days  Expected Discharge Date:   Attending Physician: Norma Raymond MD  Primary Care Provider: Efrain Chavez MD    Current Legal Status: N/A    Patient information was obtained from patient.     Subjective:     Principal Problem:Debility    Chief Complaint: memory loss    HPI: Patient presents to hospital complaining of a headache. Patient is alert and awake and dressed in hospital gown. Patient also noted to have a significant hand tremor. Patient reports that he is not sure why he is in the hospital. Per chart review patient presented to the ED with a headache that he denied upon examination. ED provider noted some confusion. Chart review also states that patient lives with girlfriend and she is unable to care for him any longer and family is looking to get patient placed in a nursing home. Apparently does not have a great relationship with his children.  Patient is having problems ambulating and caring for himself. He is requiring frequent redirection from nursing staff.  Patient denies symptoms of depression and anxiety. Denies history of seeing a psychiatrist or therapist. Patient is not wanting to engage much in interview and asks me to leave, stating "I am done with your questions".  He is not answering questions about orientation and not being rational in his responses or engagement.  Review of records indicate a Lewy Body Dementia without behavioral component and Parkinson's disease.    Hospital Course: 04/09/2018 - patient was seen in his room as a follow up of care for determination of capacity.  He is oriented to name, date, and "hospital".  He is not sure of why he is in the hospital or what care he is getting.  States that he is not depressed.  Not able to recall his medical problems but acknowledges them when " being informed.  States that he would like to return home to live but not aware of anyone to help him at home.  Appears to be very simple in his responses.  Nursing reports cooperative care and compliance.  Nursing also reports that he is very confused most of the time.  He has told nursing at times that he asks to leave to get his son from school, go run some errands, or other things.  He is working with PT on a regular basis.  Still getting depakote 250mg TID and quetiapine 100mg nightly.  Sleeping well.    Interval History: improved    Family History     Problem Relation (Age of Onset)    No Known Problems Mother, Father, Sister, Brother, Maternal Aunt, Maternal Uncle, Paternal Aunt, Paternal Uncle, Maternal Grandmother, Maternal Grandfather, Paternal Grandmother, Paternal Grandfather        Social History Main Topics    Smoking status: Former Smoker     Years: 10.00    Smokeless tobacco: Never Used      Comment: Quit 40 years ago    Alcohol use No    Drug use: No    Sexual activity: Not Currently     Psychotherapeutics     Start     Stop Route Frequency Ordered    03/16/18 2100  QUEtiapine tablet 100 mg      -- Oral Nightly 03/16/18 1018    03/16/18 1118  olanzapine zydis disintegrating tablet 5 mg      -- Oral Every 8 hours PRN 03/16/18 1018           Review of Systems   Constitutional: Positive for activity change. Negative for appetite change.   Respiratory: Negative for shortness of breath.    Cardiovascular: Negative for chest pain.   Gastrointestinal: Negative for nausea.   Musculoskeletal: Positive for gait problem.   Psychiatric/Behavioral: Negative for dysphoric mood and sleep disturbance. The patient is not nervous/anxious.      Objective:     Vital Signs (Most Recent):  Temp: 98.2 °F (36.8 °C) (04/09/18 0746)  Pulse: 81 (04/09/18 0746)  Resp: 18 (04/09/18 0746)  BP: 129/62 (04/09/18 0746)  SpO2: 98 % (04/09/18 0746) Vital Signs (24h Range):  Temp:  [96.8 °F (36 °C)-98.2 °F (36.8 °C)] 98.2 °F  "(36.8 °C)  Pulse:  [73-82] 81  Resp:  [18-20] 18  SpO2:  [97 %-98 %] 98 %  BP: (100-135)/(56-66) 129/62     Height: 5' 6.97" (170.1 cm)  Weight: 62.6 kg (138 lb 0.1 oz)  Body mass index is 21.64 kg/m².      Intake/Output Summary (Last 24 hours) at 04/09/18 1344  Last data filed at 04/09/18 0600   Gross per 24 hour   Intake              600 ml   Output             1450 ml   Net             -850 ml       Physical Exam   Psychiatric:   EXAMINATION    CONSTITUTIONAL  General Appearance: hospital attire    MUSCULOSKELETAL  Muscle Strength and Tone: normal  Abnormal Involuntary Movements: significant tremor to arms (R>L) and legs  Gait and Station: not observed    PSYCHIATRIC MENTAL STATUS EXAM   Level of Consciousness: awake and alert  Orientation: name, date, "hospital"  Grooming: limited  Psychomotor Behavior: restless and pill rolling movements of hands  Speech: not pressured, soft volume  Language: no abnormalities  Mood: will not answer  Affect: blunted, flattened  Thought Process: concrete  Associations: intact  Thought Content: denies suicidal/homicidal/psychosis  Memory: intact to remote, poor to recent  Attention: easily distracted  Fund of Knowledge: intact for conversation  Insight: poor towards medical problems or ability to care for self  Judgment: fair towards cooperation         Significant Labs:   Last 24 Hours:   Recent Lab Results     None        All pertinent labs within the past 24 hours have been reviewed.    Significant Imaging: I have reviewed all pertinent imaging results/findings within the past 24 hours.    Assessment/Plan:     Lewy body dementia without behavioral disturbance    Documented history of Lewy Body dementia by his outpatient neurologist.  Now has likely evolved into a behavioral component.  Would do best with some mood calming medications due to his irritability and lack of redirection.  Continue quetiapine 100mg nightly, although watch closely that his hallucinations and/or mood do " not worsen due to dopaminergic component.  Continue depakote 250mg PO TID as a mood stabilizer.  Continue rivastigmine 4.6mg daily patch which may help with his dementia as well as calm his mood somewhat.      Capacity: the ability to accept or refuse treatment recommendations. Capacity is determined by a clinician upon specific elements of a mental status exam (does not have to be performed by a psychiatrist). To demonstrate capacity, a patient must be able to cognitively utilize information provided to them:   Communicate and Express a choice that is stable over time - able to communicate and express choice to go home  Understand the relevant information - not expressing an understanding of his current situation or why he is at the hospital  Appreciate the consequences of the decision (risks vs benefits) - not able to express a risk vs benefit of placement options; he would like to return home where there is no care  Manipulate all of the data in a logical fashion (rationalize) - not making functional decision as to his long term care options; thus is not rational; nursing also reports irrational thinking at times      -Based on the above assessment, pt Tenzin Ortiz appears to lack capacity to make medical decisions about his care of place of living at this time.     Please have his family act in his best interest to help with medical decision making.  He does not appear to be able to live in a home environment unless he were to have 24 hour care and redirection.  If family is not able to take care of patient, it would be ethically in his best interest to be interdicted to a nursing facility.                     Need for Continued Hospitalization:   No need for inpatient psychiatric hospitalization. Continue medical care as per the primary team.    Anticipated Disposition: Nursing Facility     Total time:  15 with greater than 50% of this time spent in counseling and/or coordination of care.       Usman MEDINA  MD Suzi   Psychiatry  [facility]

## 2018-04-09 NOTE — PLAN OF CARE
Hospital has decided to pursue interdiction. SW will continue to assist as needed.       04/09/18 1447   Discharge Reassessment   Assessment Type Discharge Planning Reassessment   Provided patient/caregiver education on the expected discharge date and the discharge plan No   Do you have any problems affording any of your prescribed medications? No   Discharge Plan A New Nursing Home placement - group home care facility   Discharge Plan B New Nursing Home placement - group home care facility   Patient choice form signed by patient/caregiver N/A   Can the patient answer the patient profile reliably? No, cognitively impaired   How does the patient rate their overall health at the present time? Fair   Describe the patient's ability to walk at the present time. Walks with the help of equipment   How often would a person be available to care for the patient? Never

## 2018-04-09 NOTE — PT/OT/SLP PROGRESS
Physical Therapy Treatment    Patient Name:  Tenzin Ortiz   MRN:  [id number]    Recommendations:     Discharge Recommendations:  nursing facility, basic   Discharge Equipment Recommendations: none   Barriers to discharge: Decreased caregiver support (family unable to care for patient)    Assessment:     Tenzin Ortiz is a 77 y.o. male admitted with a medical diagnosis of Debility.  He presents with the following impairments/functional limitations:  weakness, impaired endurance, impaired functional mobilty, gait instability, impaired balance, decreased lower extremity function, decreased safety awareness, decreased upper extremity function, impaired cognition, impaired coordination, decreased ROM.    Rehab Prognosis:  fair; patient would benefit from acute skilled PT services to address these deficits and reach maximum level of function.      Recent Surgery: * No surgery found *      Plan:     During this hospitalization, patient to be seen 3 x/week to address the above listed problems via gait training, therapeutic activities, therapeutic exercises  · Plan of Care Expires:  05/09/18   Plan of Care Reviewed with: patient    Subjective     Communicated with nurse Almanza prior to session.  Patient found seated on bedside commode with nursing staff upon PT entry to room, agreeable to treatment.      Chief Complaint: Weakness.   Patient comments/goals: To walk.   Pain/Comfort:  · Pain Rating 1: 0/10    Objective:     Patient found with: telemetry ([employer])     General Precautions: Standard, fall   Orthopedic Precautions:N/A   Braces: N/A     Functional Mobility:  · Transfers:     · Sit to Stand:  stand by assistance with rolling walker x2 trials   · Gait:  Patient ambulated 125ft x2 trials with Rolling Walker and CGA using swing-to gait/shuffling gait. Patient demonstrated decreased nneka, increased time in double stance, decreased velocity of limb motion, decreased step length and decreased toe-to-floor clearance during  gait due to impaired balance, impaired coordination, impaired motor control, impaired postural control and decreased strength.    AM-PAC 6 CLICK MOBILITY  Turning over in bed (including adjusting bedclothes, sheets and blankets)?: 4  Sitting down on and standing up from a chair with arms (e.g., wheelchair, bedside commode, etc.): 3  Moving from lying on back to sitting on the side of the bed?: 3  Moving to and from a bed to a chair (including a wheelchair)?: 3  Need to walk in hospital room?: 3  Climbing 3-5 steps with a railing?: 3  Total Score: 19     Patient left up in chair with all lines intact, call button in reach, nurse notified, [employer] present and setup for lunch ..    GOALS:    Physical Therapy Goals        Problem: Physical Therapy Goal    Goal Priority Disciplines Outcome Goal Variances Interventions   Physical Therapy Goal     PT/OT, PT Ongoing (interventions implemented as appropriate)     Description:  Goals to be met by: 18    Patient will increase functional independence with mobility by performin. Supine to sit with supervision  2. Sit to stand transfer with Stand-by Assistance  3. Gait  x 300 feet with Stand-by Assistance using Rolling Walker  4. Lower extremity exercise program x30 reps per handout, with assistance as needed                       Time Tracking:     PT Received On: 18  PT Start Time: 1137     PT Stop Time: 1147  PT Total Time (min): 10 min     Billable Minutes: Gait Training  10    Treatment Type: 6th Visit, Treatment  PT/PTA: PT     PTA Visit Number: 0     Dominga Esquivel, PT  2018

## 2018-04-09 NOTE — SUBJECTIVE & OBJECTIVE
Interval History: No new issues.     Review of Systems   Unable to perform ROS: Dementia   Constitutional: Negative for appetite change.     Objective:     Vital Signs (Most Recent):  Temp: 98.2 °F (36.8 °C) (04/09/18 0746)  Pulse: 81 (04/09/18 0746)  Resp: 18 (04/09/18 0746)  BP: 129/62 (04/09/18 0746)  SpO2: 98 % (04/09/18 0746) Vital Signs (24h Range):  Temp:  [96.8 °F (36 °C)-98.2 °F (36.8 °C)] 98.2 °F (36.8 °C)  Pulse:  [73-82] 81  Resp:  [17-20] 18  SpO2:  [97 %-98 %] 98 %  BP: (100-135)/(56-66) 129/62     Weight: 62.6 kg (138 lb 0.1 oz)  Body mass index is 21.64 kg/m².    Intake/Output Summary (Last 24 hours) at 04/09/18 1121  Last data filed at 04/09/18 0600   Gross per 24 hour   Intake              600 ml   Output             1450 ml   Net             -850 ml      Physical Exam   Constitutional: He appears well-developed and well-nourished.   HENT:   Head: Normocephalic and atraumatic.   Neurological: He is alert.   Vitals reviewed.      Significant Labs:

## 2018-04-09 NOTE — SUBJECTIVE & OBJECTIVE
"Interval History: improved    Family History     Problem Relation (Age of Onset)    No Known Problems Mother, Father, Sister, Brother, Maternal Aunt, Maternal Uncle, Paternal Aunt, Paternal Uncle, Maternal Grandmother, Maternal Grandfather, Paternal Grandmother, Paternal Grandfather        Social History Main Topics    Smoking status: Former Smoker     Years: 10.00    Smokeless tobacco: Never Used      Comment: Quit 40 years ago    Alcohol use No    Drug use: No    Sexual activity: Not Currently     Psychotherapeutics     Start     Stop Route Frequency Ordered    03/16/18 2100  QUEtiapine tablet 100 mg      -- Oral Nightly 03/16/18 1018    03/16/18 1118  olanzapine zydis disintegrating tablet 5 mg      -- Oral Every 8 hours PRN 03/16/18 1018           Review of Systems   Constitutional: Positive for activity change. Negative for appetite change.   Respiratory: Negative for shortness of breath.    Cardiovascular: Negative for chest pain.   Gastrointestinal: Negative for nausea.   Musculoskeletal: Positive for gait problem.   Psychiatric/Behavioral: Negative for dysphoric mood and sleep disturbance. The patient is not nervous/anxious.      Objective:     Vital Signs (Most Recent):  Temp: 98.2 °F (36.8 °C) (04/09/18 0746)  Pulse: 81 (04/09/18 0746)  Resp: 18 (04/09/18 0746)  BP: 129/62 (04/09/18 0746)  SpO2: 98 % (04/09/18 0746) Vital Signs (24h Range):  Temp:  [96.8 °F (36 °C)-98.2 °F (36.8 °C)] 98.2 °F (36.8 °C)  Pulse:  [73-82] 81  Resp:  [18-20] 18  SpO2:  [97 %-98 %] 98 %  BP: (100-135)/(56-66) 129/62     Height: 5' 6.97" (170.1 cm)  Weight: 62.6 kg (138 lb 0.1 oz)  Body mass index is 21.64 kg/m².      Intake/Output Summary (Last 24 hours) at 04/09/18 1344  Last data filed at 04/09/18 0600   Gross per 24 hour   Intake              600 ml   Output             1450 ml   Net             -850 ml       Physical Exam   Psychiatric:   EXAMINATION    CONSTITUTIONAL  General Appearance: hospital " "attire    MUSCULOSKELETAL  Muscle Strength and Tone: normal  Abnormal Involuntary Movements: significant tremor to arms (R>L) and legs  Gait and Station: not observed    PSYCHIATRIC MENTAL STATUS EXAM   Level of Consciousness: awake and alert  Orientation: name, date, "hospital"  Grooming: limited  Psychomotor Behavior: restless and pill rolling movements of hands  Speech: not pressured, soft volume  Language: no abnormalities  Mood: will not answer  Affect: blunted, flattened  Thought Process: concrete  Associations: intact  Thought Content: denies suicidal/homicidal/psychosis  Memory: intact to remote, poor to recent  Attention: easily distracted  Fund of Knowledge: intact for conversation  Insight: poor towards medical problems or ability to care for self  Judgment: fair towards cooperation         Significant Labs:   Last 24 Hours:   Recent Lab Results     None        All pertinent labs within the past 24 hours have been reviewed.    Significant Imaging: I have reviewed all pertinent imaging results/findings within the past 24 hours.  "

## 2018-04-09 NOTE — PLAN OF CARE
Problem: Physical Therapy Goal  Goal: Physical Therapy Goal  Goals to be met by: 18    Patient will increase functional independence with mobility by performin. Supine to sit with supervision  2. Sit to stand transfer with Stand-by Assistance  3. Gait  x 300 feet with Stand-by Assistance using Rolling Walker  4. Lower extremity exercise program x30 reps per handout, with assistance as needed     Outcome: Ongoing (interventions implemented as appropriate)    PT performed 6th visit today. Goals remain appropriate.

## 2018-04-09 NOTE — ASSESSMENT & PLAN NOTE
Documented history of Lewy Body dementia by his outpatient neurologist.  Now has likely evolved into a behavioral component.  Would do best with some mood calming medications due to his irritability and lack of redirection.  Continue quetiapine 100mg nightly, although watch closely that his hallucinations and/or mood do not worsen due to dopaminergic component.  Continue depakote 250mg PO TID as a mood stabilizer.  Continue rivastigmine 4.6mg daily patch which may help with his dementia as well as calm his mood somewhat.      Capacity: the ability to accept or refuse treatment recommendations. Capacity is determined by a clinician upon specific elements of a mental status exam (does not have to be performed by a psychiatrist). To demonstrate capacity, a patient must be able to cognitively utilize information provided to them:   Communicate and Express a choice that is stable over time - able to communicate and express choice to go home  Understand the relevant information - not expressing an understanding of his current situation or why he is at the hospital  Appreciate the consequences of the decision (risks vs benefits) - not able to express a risk vs benefit of placement options; he would like to return home where there is no care  Manipulate all of the data in a logical fashion (rationalize) - not making functional decision as to his long term care options; thus is not rational; nursing also reports irrational thinking at times      -Based on the above assessment, pt Tenzin Ortiz appears to lack capacity to make medical decisions about his care of place of living at this time.     Please have his family act in his best interest to help with medical decision making.  He does not appear to be able to live in a home environment unless he were to have 24 hour care and redirection.  If family is not able to take care of patient, it would be ethically in his best interest to be interdicted to a nursing facility.

## 2018-04-09 NOTE — HOSPITAL COURSE
"04/09/2018 - patient was seen in his room as a follow up of care for determination of capacity.  He is oriented to name, date, and "hospital".  He is not sure of why he is in the hospital or what care he is getting.  States that he is not depressed.  Not able to recall his medical problems but acknowledges them when being informed.  States that he would like to return home to live but not aware of anyone to help him at home.  Appears to be very simple in his responses.  Nursing reports cooperative care and compliance.  Nursing also reports that he is very confused most of the time.  He has told nursing at times that he asks to leave to get his son from school, go run some errands, or other things.  He is working with PT on a regular basis.  Still getting depakote 250mg TID and quetiapine 100mg nightly.  Sleeping well.    04/23/2018 - patient was seen in his room with the court appointment  at bedside.  He is still oriented to name and hospital but has difficulty with the city and date.  He is able to express his choice but doesn't fully understand why he is in need of placement.  He tells me that he can still drive and that he just went for a ride yesterday (has been inpatient for 55 days).  Also states that he can get up and care for himself.  He is unable to get around or out of bed on his own.  Has court interdiction proceedings tomorrow.  "

## 2018-04-10 PROCEDURE — 97116 GAIT TRAINING THERAPY: CPT

## 2018-04-10 PROCEDURE — 63600175 PHARM REV CODE 636 W HCPCS: Performed by: HOSPITALIST

## 2018-04-10 PROCEDURE — 25000003 PHARM REV CODE 250: Performed by: INTERNAL MEDICINE

## 2018-04-10 PROCEDURE — 25000003 PHARM REV CODE 250: Performed by: HOSPITALIST

## 2018-04-10 PROCEDURE — 97110 THERAPEUTIC EXERCISES: CPT

## 2018-04-10 PROCEDURE — 21400001 HC TELEMETRY ROOM

## 2018-04-10 RX ADMIN — DIVALPROEX SODIUM 250 MG: 250 TABLET, DELAYED RELEASE ORAL at 10:04

## 2018-04-10 RX ADMIN — ENOXAPARIN SODIUM 40 MG: 100 INJECTION SUBCUTANEOUS at 06:04

## 2018-04-10 RX ADMIN — POLYETHYLENE GLYCOL 3350 17 G: 17 POWDER, FOR SOLUTION ORAL at 10:04

## 2018-04-10 RX ADMIN — DOCUSATE SODIUM 100 MG: 100 CAPSULE, LIQUID FILLED ORAL at 10:04

## 2018-04-10 RX ADMIN — CARBIDOPA AND LEVODOPA 2 TABLET: 25; 100 TABLET ORAL at 02:04

## 2018-04-10 RX ADMIN — DOCUSATE SODIUM 100 MG: 100 CAPSULE, LIQUID FILLED ORAL at 09:04

## 2018-04-10 RX ADMIN — AMLODIPINE BESYLATE 10 MG: 5 TABLET ORAL at 10:04

## 2018-04-10 RX ADMIN — CARBIDOPA AND LEVODOPA 2 TABLET: 25; 100 TABLET ORAL at 06:04

## 2018-04-10 RX ADMIN — CARBIDOPA AND LEVODOPA 2 TABLET: 25; 100 TABLET ORAL at 10:04

## 2018-04-10 RX ADMIN — QUETIAPINE FUMARATE 100 MG: 100 TABLET ORAL at 09:04

## 2018-04-10 RX ADMIN — DIVALPROEX SODIUM 250 MG: 250 TABLET, DELAYED RELEASE ORAL at 09:04

## 2018-04-10 RX ADMIN — RIVASTIGMINE TRANSDERMAL SYSTEM 1 PATCH: 4.6 PATCH, EXTENDED RELEASE TRANSDERMAL at 10:04

## 2018-04-10 NOTE — PROGRESS NOTES
SW uploaded clinicals via [facility] to [facility] enclosed: MD notes, labs, MARS, and PT notes.

## 2018-04-10 NOTE — PT/OT/SLP PROGRESS
Physical Therapy Treatment    Patient Name:  Tenzin Ortiz   MRN:  [id number]    Recommendations:     Discharge Recommendations:  nursing facility, basic   Discharge Equipment Recommendations:   None   Barriers to discharge: Decreased caregiver support    Assessment:     Tenzin Ortiz is a 77 y.o. male admitted with a medical diagnosis of Debility.  He presents with the following impairments/functional limitations:  weakness, impaired endurance, decreased lower extremity function, decreased upper extremity function, decreased coordination, gait instability, pain, decreased safety awareness, decreased ROM, impaired functional mobilty .    Rehab Prognosis:  Fair +; patient would benefit from acute skilled PT services to address these deficits and reach maximum level of function.      Recent Surgery: * No surgery found *      Plan:     During this hospitalization, patient to be seen 3 x/week to address the above listed problems via gait training, therapeutic activities, therapeutic exercises  · Plan of Care Expires:  05/09/18   Plan of Care Reviewed with: patient    Subjective     Communicated with nurse Zaragoza prior to session.  Patient found in supine in bed  upon PT entry to room, agreeable to treatment.      Chief Complaint: B toes pain .   Patient comments/goals: to walk.   Pain/Comfort:  · Pain Rating 1: 3/10  · Location 1: foot  · Pain Addressed 1: Pre-medicate for activity, Cessation of Activity, Nurse notified  · Pain Rating Post-Intervention 1: 3/10    Patients cultural, spiritual, Sikhism conflicts given the current situation: none    Objective:     Patient found with: telemetry, bed alarm     General Precautions: Standard, fall   Orthopedic Precautions:N/A   Braces: N/A     Functional Mobility:  · Bed Mobility:     · Rolling Left:  supervision  · Scooting: stand by assistance  · Bridging: supervision  · Supine to Sit: stand by assistance, HOB elevated.   · Transfers:     · Sit to Stand:  contact guard  assistance and minimum assistance with rolling walker  · Gait:    Patient ambulated 140 ft on level tile with Rolling Walker ( B tennis shoes) with Contact Guard Assistance.  Pt with demonstarting a  swing-to gait with decreased nneka, increased time in double stance, decreased velocity of limb motion, decreased step length, decreased swing-to-stance ratio, decreased toe-to-floor clearance and decreased weight-shifting ability.Impairments contributing to gait deviations include impaired balance, impaired coordination, pain, decreased ROM and decreased strength. Noted with shuffling gait. VC's to increased step height/step length. V/T cues for safety technique and walker management. Limited with gait distance today 2* pt c/o B toes pain, nurse Almanza notified.      · Balance: good with sitting balance, fair with satding balance.       AM-PAC 6 CLICK MOBILITY  Turning over in bed (including adjusting bedclothes, sheets and blankets)?: 4  Sitting down on and standing up from a chair with arms (e.g., wheelchair, bedside commode, etc.): 3  Moving from lying on back to sitting on the side of the bed?: 4  Moving to and from a bed to a chair (including a wheelchair)?: 3  Need to walk in hospital room?: 3  Climbing 3-5 steps with a railing?: 3  Total Score: 20       Therapeutic Activities and Exercises:   pt performed seated BLE x 20 reps : AP, LAQ, HS, hip flexion and pillow squeezes .   Pt performed seated BUE x 15 reps : shoulder ABD/ADD and shoulder flexion/extension. Pt tolerated well.     Patient left up in reclined chair on green air cushion with all lines intact, call button in reach and nurse Cami and Gayle monitor  present..    GOALS:    Physical Therapy Goals        Problem: Physical Therapy Goal    Goal Priority Disciplines Outcome Goal Variances Interventions   Physical Therapy Goal     PT/OT, PT Ongoing (interventions implemented as appropriate)     Description:  Goals to be met by: 5/9/18    Patient will  increase functional independence with mobility by performin. Supine to sit with supervision  2. Sit to stand transfer with Stand-by Assistance  3. Gait  x 300 feet with Stand-by Assistance using Rolling Walker  4. Lower extremity exercise program x30 reps per handout, with assistance as needed                       Time Tracking:     PT Received On: 04/10/18  PT Start Time: 1022     PT Stop Time: 1045  PT Total Time (min): 23 min     Billable Minutes: Gait Training 12 and Therapeutic Exercise 11    Treatment Type: Treatment  PT/PTA: PTA     PTA Visit Number: 1     Gina Rodríguez, PTA  04/10/2018

## 2018-04-11 PROCEDURE — 97116 GAIT TRAINING THERAPY: CPT

## 2018-04-11 PROCEDURE — 21400001 HC TELEMETRY ROOM

## 2018-04-11 PROCEDURE — 63600175 PHARM REV CODE 636 W HCPCS: Performed by: HOSPITALIST

## 2018-04-11 PROCEDURE — 25000003 PHARM REV CODE 250: Performed by: INTERNAL MEDICINE

## 2018-04-11 PROCEDURE — 97110 THERAPEUTIC EXERCISES: CPT

## 2018-04-11 PROCEDURE — 25000003 PHARM REV CODE 250: Performed by: HOSPITALIST

## 2018-04-11 RX ADMIN — ENOXAPARIN SODIUM 40 MG: 100 INJECTION SUBCUTANEOUS at 04:04

## 2018-04-11 RX ADMIN — DIVALPROEX SODIUM 250 MG: 250 TABLET, DELAYED RELEASE ORAL at 09:04

## 2018-04-11 RX ADMIN — AMLODIPINE BESYLATE 10 MG: 5 TABLET ORAL at 09:04

## 2018-04-11 RX ADMIN — CARBIDOPA AND LEVODOPA 2 TABLET: 25; 100 TABLET ORAL at 09:04

## 2018-04-11 RX ADMIN — CARBIDOPA AND LEVODOPA 2 TABLET: 25; 100 TABLET ORAL at 04:04

## 2018-04-11 RX ADMIN — CARBIDOPA AND LEVODOPA 2 TABLET: 25; 100 TABLET ORAL at 12:04

## 2018-04-11 RX ADMIN — QUETIAPINE FUMARATE 100 MG: 100 TABLET ORAL at 09:04

## 2018-04-11 RX ADMIN — RIVASTIGMINE TRANSDERMAL SYSTEM 1 PATCH: 4.6 PATCH, EXTENDED RELEASE TRANSDERMAL at 09:04

## 2018-04-11 RX ADMIN — DOCUSATE SODIUM 100 MG: 100 CAPSULE, LIQUID FILLED ORAL at 09:04

## 2018-04-11 NOTE — PLAN OF CARE
Problem: Nutrition, Imbalanced: Inadequate Oral Intake (Adult)  Intervention: Explore Physical/Psychosocial/Treatment-related Factors Impacting Oral Intake   04/11/18 1813   Coping/Psychosocial Interventions   Supportive Measures active listening utilized     Intervention: Promote/Optimize Nutrition   04/11/18 1813   Nutrition Interventions   Oral Nutrition Promotion calorie dense foods provided;physical activity promoted       Goal: Identify Related Risk Factors and Signs and Symptoms  Related risk factors and signs and symptoms are identified upon initiation of Human Response Clinical Practice Guideline (CPG)   Outcome: Ongoing (interventions implemented as appropriate)   04/11/18 1813   Nutrition, Imbalanced: Inadequate Oral Intake   Related Risk Factors (Nutrition Imbalance, Inadequate Oral Intake) chronic illness/infection;knowledge deficit     Goal: Improved Oral Intake  Patient will demonstrate the desired outcomes by discharge/transition of care.   Outcome: Ongoing (interventions implemented as appropriate)   04/11/18 1813   Nutrition, Imbalanced: Inadequate Oral Intake (Adult)   Improved Oral Intake making progress toward outcome     Goal: Prevent Further Weight Loss  Patient will demonstrate the desired outcomes by discharge/transition of care.   Outcome: Ongoing (interventions implemented as appropriate)   04/11/18 1813   Nutrition, Imbalanced: Inadequate Oral Intake (Adult)   Prevent Further Weight Loss making progress toward outcome

## 2018-04-11 NOTE — PROGRESS NOTES
[facility] Hospital Medicine  Progress Note    Patient Name: Tenzin Ortiz  MRN: [id number]  Patient Class: IP- Inpatient   Admission Date: 2/7/2018  Length of Stay: 43 days  Attending Physician: Kassandra Alonso MD  Primary Care Provider: Efrain Chavez MD        Subjective:     Principal Problem:Debility    HPI:  Patient is 78 yo male with HTN, HLD, and Parkinson's who presents to ED reportedly for HA. Per ED note patient was complaining of HA but he denies this on my interview. Seems there was some confusion in ED and he was unclear why he was here. During interview with me, patient reports being here for NH placement although he does not appear to be very happy about this. He has been with some issues caring for himself. Currently lives with his girlfriend who reports she can no longer care for him. He states he currently gets around with a walker but sometimes has difficulty getting up out of his chair. Per chart review patient is with Parkinson's and sees neurology, Dr. Castellanos, lastly seen on 2/6/18. He was with hallucinations which were noted to be secondary to dopaminergic agents thus having to significantly reduce dose--she recommended he be admitted for placement at that time but patient declined. He has been attempting placement as an outpatient through PCP but without much luck. He otherwise denies recent illness, fever/chills, CP, SOB, abdominal pain, N/V/D, dysuria. On presentation patient with grossly normal labs/vitals. Placed in observation for what appears to be solely placement although this is something that may need to be continued as outpatient as is not indication for hospitalization.    Hospital Course:  Awake and alert without complaint - severe Parkinsons dementia and can no longer care for himself - neither can his girlfriend.  CM reports patient with acceptance to [facility]; awaiting paperwork to be signed by patient POA. Legal involved. Interdiction process ongoing for  placement.    Interval History: Pt with no new c/o; no problems overnight.    Review of Systems   Constitutional: Negative for fever.   Respiratory: Negative for shortness of breath.    Cardiovascular: Negative for chest pain.     Objective:     Vital Signs (Most Recent):  Temp: 97.7 °F (36.5 °C) (04/11/18 0458)  Pulse: 74 (04/11/18 0458)  Resp: 18 (04/11/18 0458)  BP: 127/60 (04/11/18 0458)  SpO2: 95 % (04/11/18 0458) Vital Signs (24h Range):  Temp:  [97.5 °F (36.4 °C)-98.5 °F (36.9 °C)] 97.7 °F (36.5 °C)  Pulse:  [74-88] 74  Resp:  [16-18] 18  SpO2:  [94 %-98 %] 95 %  BP: (101-151)/(58-77) 127/60     Weight: 62.3 kg (137 lb 5.6 oz)  Body mass index is 21.53 kg/m².    Intake/Output Summary (Last 24 hours) at 04/11/18 0727  Last data filed at 04/11/18 0541   Gross per 24 hour   Intake                0 ml   Output              850 ml   Net             -850 ml      Physical Exam   Constitutional: He appears well-developed and well-nourished. No distress.   HENT:   Head: Normocephalic and atraumatic.   Eyes: EOM are normal. Pupils are equal, round, and reactive to light.   Neck: Normal range of motion. Neck supple.   Cardiovascular: Normal rate and regular rhythm.    Pulmonary/Chest: Effort normal and breath sounds normal.   Abdominal: Soft. Bowel sounds are normal.   Musculoskeletal: Normal range of motion.   Neurological: He is alert.   Oriented x 2, and cooperative   Skin: Skin is warm and dry.   Psychiatric: His affect is blunt. His speech is delayed. Cognition and memory are impaired.       Significant Labs: None    Assessment/Plan:      * Debility    TN consulted to aid in safe discharge planning. He has been living with a significant other who can no longer care for him and refusing to take patient home. His somewhat estranged daughter has been contacted and is willing to aid in finding NH placement (she had already begun this process as an outpatient) but there is an issue: patient's power of  (a  business friend) refused to sign consent for NH placement. Patient unable to make his own decision regarding this as he is with intermittent confusion +/- previous unclear dementia diagnosis per daughter. Psychiatry consulted for capacity, who agrees that he has no capacity for decision making. Also recommended adding depakote 250mg TID to help with mood stabilization. He is medically cleared for transfer once nursing facility arranged. Interdiction process ongoing with pending placement.        Lewy body dementia without behavioral disturbance    Seen by psych who has since signed off - appreciate recs -  Doing well on current regimen  Continue medication regimen as follows:  Sinemet  QID and Seroquel 50 QHS  Continue Exelon 4.6 mg/24 patch  Divalproex 250 mg to BID  Held trazodone        Essential hypertension    Resume norvasc.        Parkinson disease    Seroquel 50 mg; sinemet  mg;   Discontinued taxodione 50 mg  On exelon 4.6 mg in the morning when he was more awake          VTE Risk Mitigation         Ordered     enoxaparin injection 40 mg  Daily     Route:  Subcutaneous        04/09/18 1423     Medium Risk of VTE  Once      02/08/18 0653     Place JOSEFINA hose  Until discontinued      02/08/18 0653     Place sequential compression device  Until discontinued      02/08/18 0653              Kassandra Alonso MD  Department of Hospital Medicine   [facility]

## 2018-04-11 NOTE — SUBJECTIVE & OBJECTIVE
Interval History: Pt with no new c/o; no problems overnight.    Review of Systems   Constitutional: Negative for fever.   Respiratory: Negative for shortness of breath.    Cardiovascular: Negative for chest pain.     Objective:     Vital Signs (Most Recent):  Temp: 97.7 °F (36.5 °C) (04/11/18 0458)  Pulse: 74 (04/11/18 0458)  Resp: 18 (04/11/18 0458)  BP: 127/60 (04/11/18 0458)  SpO2: 95 % (04/11/18 0458) Vital Signs (24h Range):  Temp:  [97.5 °F (36.4 °C)-98.5 °F (36.9 °C)] 97.7 °F (36.5 °C)  Pulse:  [74-88] 74  Resp:  [16-18] 18  SpO2:  [94 %-98 %] 95 %  BP: (101-151)/(58-77) 127/60     Weight: 62.3 kg (137 lb 5.6 oz)  Body mass index is 21.53 kg/m².    Intake/Output Summary (Last 24 hours) at 04/11/18 0727  Last data filed at 04/11/18 0541   Gross per 24 hour   Intake                0 ml   Output              850 ml   Net             -850 ml      Physical Exam   Constitutional: He appears well-developed and well-nourished. No distress.   HENT:   Head: Normocephalic and atraumatic.   Eyes: EOM are normal. Pupils are equal, round, and reactive to light.   Neck: Normal range of motion. Neck supple.   Cardiovascular: Normal rate and regular rhythm.    Pulmonary/Chest: Effort normal and breath sounds normal.   Abdominal: Soft. Bowel sounds are normal.   Musculoskeletal: Normal range of motion.   Neurological: He is alert.   Oriented x 2, and cooperative   Skin: Skin is warm and dry.   Psychiatric: His affect is blunt. His speech is delayed. Cognition and memory are impaired.       Significant Labs: None

## 2018-04-11 NOTE — ASSESSMENT & PLAN NOTE
Seroquel 50 mg; sinemet  mg;   Discontinued taxodione 50 mg  On exelon 4.6 mg in the morning when he was more awake

## 2018-04-11 NOTE — PROGRESS NOTES
KIMO was contacted by Helene Patino CM Manager to report hearing for interdiction has been scheduled for 4/24/18 @ 10:00am at [facility].

## 2018-04-11 NOTE — UM SECONDARY REVIEW
[facility]    IP Extended Stay > 10    LOS: approved an agreement with D/C plan   AMS has Troy Body Dementia. Not safe discharge home as he will be alone and requires 24hr supervision. Patient requires NHP. His POA has not been cooperative with providing necessary information to the nursing home. Interdiction is in process. Continued stay approved based on  committee guidelines.

## 2018-04-11 NOTE — PT/OT/SLP PROGRESS
"Physical Therapy Treatment    Patient Name:  Tenzin Ortiz   MRN:  [id number]    Recommendations:     Discharge Recommendations:  nursing facility, basic   Discharge Equipment Recommendations:   none   Barriers to discharge: Decreased caregiver support    Assessment:     Tenzin Ortiz is a 77 y.o. male admitted with a medical diagnosis of Debility.  He presents with the following impairments/functional limitations:  weakness, impaired self care skills, gait instability, impaired balance, decreased lower extremity function, decreased upper extremity function, decreased coordination, decreased safety awareness, decreased ROM .    Rehab Prognosis: fair +; patient would benefit from acute skilled PT services to address these deficits and reach maximum level of function.      Recent Surgery: * No surgery found *      Plan:     During this hospitalization, patient to be seen 3 x/week to address the above listed problems via gait training, therapeutic activities, therapeutic exercises  · Plan of Care Expires:  05/09/18   Plan of Care Reviewed with: patient    Subjective     Communicated with nurse Machado prior to session.  Patient found in supine in bed upon PT entry to room, agreeable to treatment.      Chief Complaint: none  Patient comments/goals: " when will they release me from here?  Pain/Comfort:  · Pain Rating 1: 0/10  · Pain Rating Post-Intervention 1: 0/10    Patients cultural, spiritual, Presybeterian conflicts given the current situation: none    Objective:     Patient found with: bed alarm, telemetry     General Precautions: Standard, fall   Orthopedic Precautions:N/A   Braces: N/A     Functional Mobility:  · Bed Mobility:     · Rolling Left:  stand by assistance  · Scooting: stand by assistance  · Supine to Sit: stand by assistance and contact guard assistance  · Transfers:     · Sit to Stand:  contact guard assistance with rolling walker  · Gait:    Patient ambulated  250   feet on level tile with Rolling Walker " with Contact Guard Assistance.  Pt with demonstarting a  swing-to gait with decreased nneka, increased time in double stance, decreased velocity of limb motion, decreased step length, decreased stride length, decreased swing-to-stance ratio, decreased toe-to-floor clearance and decreased weight-shifting ability.Impairments contributing to gait deviations include impaired balance, decreased flexibility, decreased ROM and decreased strength. VC's for safety technique and sequence.     · Balance: good with sitting balance, fair with standing balance.       AM-PAC 6 CLICK MOBILITY  Turning over in bed (including adjusting bedclothes, sheets and blankets)?: 4  Sitting down on and standing up from a chair with arms (e.g., wheelchair, bedside commode, etc.): 4  Moving from lying on back to sitting on the side of the bed?: 3  Moving to and from a bed to a chair (including a wheelchair)?: 3  Need to walk in hospital room?: 3  Climbing 3-5 steps with a railing?: 3  Total Score: 20       Therapeutic Activities and Exercises:   pt performed BLE x 20 reps : AP,LAQ, HS, hip flexion, pillow squeeze and BUE x 20 reps : elbow/shoulder flexion/extension and shoulder horizontal  ADB/ADD. VC's for proper technique and sequence, pt tolerated well.     Patient left up in reclined chair on green air cushion with all lines intact, call button in reach, nurse notified and [employer] monitor present..    GOALS:    Physical Therapy Goals        Problem: Physical Therapy Goal    Goal Priority Disciplines Outcome Goal Variances Interventions   Physical Therapy Goal     PT/OT, PT Ongoing (interventions implemented as appropriate)     Description:  Goals to be met by: 18    Patient will increase functional independence with mobility by performin. Supine to sit with supervision  2. Sit to stand transfer with Stand-by Assistance  3. Gait  x 300 feet with Stand-by Assistance using Rolling Walker  4. Lower extremity exercise program x30 reps  per handout, with assistance as needed                       Time Tracking:     PT Received On: 04/11/18  PT Start Time: 1115     PT Stop Time: 1140  PT Total Time (min): 25 min     Billable Minutes: Gait Training 14 and Therapeutic Exercise 10    Treatment Type: Treatment  PT/PTA: PTA     PTA Visit Number: 2     Gina Pikeh, PTA  04/11/2018

## 2018-04-11 NOTE — ASSESSMENT & PLAN NOTE
TN consulted to aid in safe discharge planning. He has been living with a significant other who can no longer care for him and refusing to take patient home. His somewhat estranged daughter has been contacted and is willing to aid in finding NH placement (she had already begun this process as an outpatient) but there is an issue: patient's power of  (a business friend) refused to sign consent for NH placement. Patient unable to make his own decision regarding this as he is with intermittent confusion +/- previous unclear dementia diagnosis per daughter. Psychiatry consulted for capacity, who agrees that he has no capacity for decision making. Also recommended adding depakote 250mg TID to help with mood stabilization. He is medically cleared for transfer once nursing facility arranged. Interdiction process ongoing with pending placement.

## 2018-04-12 LAB — POCT GLUCOSE: 97 MG/DL (ref 70–110)

## 2018-04-12 PROCEDURE — 25000003 PHARM REV CODE 250: Performed by: INTERNAL MEDICINE

## 2018-04-12 PROCEDURE — 63600175 PHARM REV CODE 636 W HCPCS: Performed by: HOSPITALIST

## 2018-04-12 PROCEDURE — 21400001 HC TELEMETRY ROOM

## 2018-04-12 PROCEDURE — 25000003 PHARM REV CODE 250: Performed by: HOSPITALIST

## 2018-04-12 RX ADMIN — DIVALPROEX SODIUM 250 MG: 250 TABLET, DELAYED RELEASE ORAL at 09:04

## 2018-04-12 RX ADMIN — POLYETHYLENE GLYCOL 3350 17 G: 17 POWDER, FOR SOLUTION ORAL at 09:04

## 2018-04-12 RX ADMIN — CARBIDOPA AND LEVODOPA 2 TABLET: 25; 100 TABLET ORAL at 09:04

## 2018-04-12 RX ADMIN — RIVASTIGMINE TRANSDERMAL SYSTEM 1 PATCH: 4.6 PATCH, EXTENDED RELEASE TRANSDERMAL at 09:04

## 2018-04-12 RX ADMIN — AMLODIPINE BESYLATE 10 MG: 5 TABLET ORAL at 09:04

## 2018-04-12 RX ADMIN — DOCUSATE SODIUM AND SENNOSIDES 1 TABLET: 8.6; 5 TABLET, FILM COATED ORAL at 09:04

## 2018-04-12 RX ADMIN — CARBIDOPA AND LEVODOPA 2 TABLET: 25; 100 TABLET ORAL at 04:04

## 2018-04-12 RX ADMIN — DOCUSATE SODIUM 100 MG: 100 CAPSULE, LIQUID FILLED ORAL at 09:04

## 2018-04-12 RX ADMIN — ENOXAPARIN SODIUM 40 MG: 100 INJECTION SUBCUTANEOUS at 04:04

## 2018-04-12 RX ADMIN — QUETIAPINE FUMARATE 100 MG: 100 TABLET ORAL at 09:04

## 2018-04-12 NOTE — PROGRESS NOTES
Kesha (admissions coordinator) with [facility] informed SW facility still willing to accept patient for admission after interdiction is completed.

## 2018-04-12 NOTE — PROGRESS NOTES
" [facility]  Adult Nutrition  Progress Note    SUMMARY       Recommendations    Recommendation/Intervention:   1. Encourage po and supplements   2. Monitor weight weekly   3. RD to monitor    Goals: Meet >85% EEN  Nutrition Goal Status: goal met  Communication of RD Recs: reviewed with RN    Reason for Assessment    Reason for Assessment: RD follow-up  Diagnosis:  (Debility)  Relevant Medical History: HTN, Parkinsosn  General Information Comments: Patient receiving boost. Eating % of meals and drinking supplements. No issues with n/v.   Nutrition Discharge Planning: Patient to discharge on a Regular diet with supplements as needed.     Nutrition Risk Screen    Nutrition Risk Screen: no indicators present    Nutrition/Diet History    Food Preferences: No cultural, Orthodox or ethnic food preferences.   Do you have any cultural, spiritual, Orthodox conflicts, given your current situation?: none  Food Allergies: other (see comments) (NKFA)    Anthropometrics    Temp: 97.5 °F (36.4 °C)  Height Method: Stated  Height: 5' 6.97" (170.1 cm)  Height (inches): 66.97 in  Weight Method: Bed Scale  Weight: 62.3 kg (137 lb 5.6 oz)  Weight (lb): 137.35 lb  Ideal Body Weight (IBW), Male: 147.82 lb  % Ideal Body Weight, Male (lb): 91.27 lb  BMI (Calculated): 21.2  BMI Grade: 18.5-24.9 - normal       Lab/Procedures/Meds    Pertinent Labs Reviewed: reviewed  Pertinent Medications Reviewed: reviewed    Physical Findings/Assessment    Overall Physical Appearance: nourished  Oral/Mouth Cavity: tooth/teeth missing  Skin: intact (Mike Score 16)    Estimated/Assessed Needs    Weight Used For Calorie Calculations: 62.6 kg (138 lb 0.1 oz)  Energy Calorie Requirements (kcal): 9720-3407 kcal  Energy Need Method: Lane-St Jeor  Protein Requirements: 65-75g  Weight Used For Protein Calculations: 62.6 kg (138 lb 0.1 oz)     Fluid Need Method: RDA Method  RDA Method (mL): 1600     Nutrition Prescription " Ordered    Current Diet Order: Regular    Evaluation of Received Nutrient/Fluid Intake    I/O: 1260/1600  Energy Calories Required: meeting needs  Protein Required: meeting needs  Fluid Required: meeting needs  Comments: LBM: 4/11  Tolerance: tolerating    % Intake of Estimated Energy Needs: %  % Meal Intake: 75% + supplements    Nutrition Risk    Level of Risk/Frequency of Follow-up:  (f/u 1x weekly)     Assessment and Plan    Nutrition Dx: Inadequate Energy Intake r/t decreased appetite/lethargy as evidenced by: 25-50% po intake with meals  Nutrition Dx Status: Improved.        Monitor and Evaluation    Food and Nutrient Intake: energy intake, food and beverage intake  Food and Nutrient Adminstration: diet order  Knowledge/Beliefs/Attitudes: food and nutrition knowledge/skill  Physical Activity and Function: nutrition-related ADLs and IADLs  Anthropometric Measurements: weight, weight change  Biochemical Data, Medical Tests and Procedures: electrolyte and renal panel  Nutrition-Focused Physical Findings: overall appearance     Nutrition Follow-Up    RD Follow-up?: Yes

## 2018-04-13 PROCEDURE — 63600175 PHARM REV CODE 636 W HCPCS: Performed by: HOSPITALIST

## 2018-04-13 PROCEDURE — 25000003 PHARM REV CODE 250: Performed by: HOSPITALIST

## 2018-04-13 PROCEDURE — 25000003 PHARM REV CODE 250: Performed by: INTERNAL MEDICINE

## 2018-04-13 PROCEDURE — 21400001 HC TELEMETRY ROOM

## 2018-04-13 RX ORDER — POLYETHYLENE GLYCOL 3350 17 G/17G
17 POWDER, FOR SOLUTION ORAL DAILY
Status: DISCONTINUED | OUTPATIENT
Start: 2018-04-14 | End: 2018-05-05 | Stop reason: HOSPADM

## 2018-04-13 RX ORDER — ACETAMINOPHEN 325 MG/1
650 TABLET ORAL EVERY 8 HOURS PRN
Status: DISCONTINUED | OUTPATIENT
Start: 2018-04-13 | End: 2018-05-05 | Stop reason: HOSPADM

## 2018-04-13 RX ORDER — AMLODIPINE BESYLATE 5 MG/1
10 TABLET ORAL DAILY
Status: DISCONTINUED | OUTPATIENT
Start: 2018-04-14 | End: 2018-05-05 | Stop reason: HOSPADM

## 2018-04-13 RX ORDER — AMOXICILLIN 250 MG
1 CAPSULE ORAL EVERY OTHER DAY
Status: DISCONTINUED | OUTPATIENT
Start: 2018-04-14 | End: 2018-05-05 | Stop reason: HOSPADM

## 2018-04-13 RX ADMIN — ENOXAPARIN SODIUM 40 MG: 100 INJECTION SUBCUTANEOUS at 05:04

## 2018-04-13 RX ADMIN — DOCUSATE SODIUM 100 MG: 100 CAPSULE, LIQUID FILLED ORAL at 08:04

## 2018-04-13 RX ADMIN — RIVASTIGMINE TRANSDERMAL SYSTEM 1 PATCH: 4.6 PATCH, EXTENDED RELEASE TRANSDERMAL at 08:04

## 2018-04-13 RX ADMIN — DOCUSATE SODIUM 100 MG: 100 CAPSULE, LIQUID FILLED ORAL at 09:04

## 2018-04-13 RX ADMIN — CARBIDOPA AND LEVODOPA 2 TABLET: 25; 100 TABLET ORAL at 01:04

## 2018-04-13 RX ADMIN — QUETIAPINE FUMARATE 100 MG: 100 TABLET ORAL at 09:04

## 2018-04-13 RX ADMIN — DIVALPROEX SODIUM 250 MG: 250 TABLET, DELAYED RELEASE ORAL at 08:04

## 2018-04-13 RX ADMIN — CARBIDOPA AND LEVODOPA 2 TABLET: 25; 100 TABLET ORAL at 05:04

## 2018-04-13 RX ADMIN — AMLODIPINE BESYLATE 10 MG: 5 TABLET ORAL at 08:04

## 2018-04-13 RX ADMIN — CARBIDOPA AND LEVODOPA 2 TABLET: 25; 100 TABLET ORAL at 09:04

## 2018-04-13 RX ADMIN — DIVALPROEX SODIUM 250 MG: 250 TABLET, DELAYED RELEASE ORAL at 09:04

## 2018-04-13 RX ADMIN — OLANZAPINE 5 MG: 5 TABLET, ORALLY DISINTEGRATING ORAL at 11:04

## 2018-04-13 RX ADMIN — CARBIDOPA AND LEVODOPA 2 TABLET: 25; 100 TABLET ORAL at 08:04

## 2018-04-13 RX ADMIN — POLYETHYLENE GLYCOL 3350 17 G: 17 POWDER, FOR SOLUTION ORAL at 08:04

## 2018-04-13 NOTE — PLAN OF CARE
Hospital has decided to pursue interdiction. SW will continue to assist as needed.         04/13/18 8023   Discharge Reassessment   Assessment Type Discharge Planning Reassessment   Provided patient/caregiver education on the expected discharge date and the discharge plan No   Do you have any problems affording any of your prescribed medications? No   Discharge Plan A New Nursing Home placement - [facility] facility   Discharge Plan B New Nursing Home placement - [facility] care facility   Patient choice form signed by patient/caregiver N/A   Can the patient answer the patient profile reliably? No, cognitively impaired   How does the patient rate their overall health at the present time? Fair   Describe the patient's ability to walk at the present time. Walks with the help of equipment   How often would a person be available to care for the patient? Never

## 2018-04-13 NOTE — PROGRESS NOTES
[facility] Hospital Medicine  Progress Note    Patient Name: Tenzin Ortiz  MRN: [id number]  Patient Class: IP- Inpatient   Admission Date: 2/7/2018  Length of Stay: 45 days  Attending Physician: Kassandra Alonso MD  Primary Care Provider: Efrain Chavez MD        Subjective:     Principal Problem:Debility    HPI:  Patient is 76 yo male with HTN, HLD, and Parkinson's who presents to ED reportedly for HA. Per ED note patient was complaining of HA but he denies this on my interview. Seems there was some confusion in ED and he was unclear why he was here. During interview with me, patient reports being here for NH placement although he does not appear to be very happy about this. He has been with some issues caring for himself. Currently lives with his girlfriend who reports she can no longer care for him. He states he currently gets around with a walker but sometimes has difficulty getting up out of his chair. Per chart review patient is with Parkinson's and sees neurology, Dr. Castellanos, lastly seen on 2/6/18. He was with hallucinations which were noted to be secondary to dopaminergic agents thus having to significantly reduce dose--she recommended he be admitted for placement at that time but patient declined. He has been attempting placement as an outpatient through PCP but without much luck. He otherwise denies recent illness, fever/chills, CP, SOB, abdominal pain, N/V/D, dysuria. On presentation patient with grossly normal labs/vitals. Placed in observation for what appears to be solely placement although this is something that may need to be continued as outpatient as is not indication for hospitalization.    Hospital Course:  Awake and alert without complaint - severe Parkinsons dementia and can no longer care for himself - neither can his girlfriend.  CM reports patient with acceptance to [facility]; awaiting paperwork to be signed by patient POA. Legal involved. Interdiction process ongoing for  placement.    Interval History: Pt with no new c/o; no problems overnight.    Review of Systems   Constitutional: Negative for fever.   Respiratory: Negative for shortness of breath.    Cardiovascular: Negative for chest pain.     Objective:     Vital Signs (Most Recent):  Temp: 97.7 °F (36.5 °C) (04/13/18 0726)  Pulse: 79 (04/13/18 0726)  Resp: 18 (04/13/18 0726)  BP: (!) 146/68 (04/13/18 0726)  SpO2: 96 % (04/13/18 0726) Vital Signs (24h Range):  Temp:  [96.9 °F (36.1 °C)-98.2 °F (36.8 °C)] 97.7 °F (36.5 °C)  Pulse:  [79-87] 79  Resp:  [18] 18  SpO2:  [96 %-97 %] 96 %  BP: (100-146)/(50-68) 146/68     Weight: 62.3 kg (137 lb 5.6 oz)  Body mass index is 21.53 kg/m².    Intake/Output Summary (Last 24 hours) at 04/13/18 0917  Last data filed at 04/13/18 0400   Gross per 24 hour   Intake                0 ml   Output              975 ml   Net             -975 ml      Physical Exam   Constitutional: He appears well-developed and well-nourished. No distress.   HENT:   Head: Normocephalic and atraumatic.   Eyes: EOM are normal. Pupils are equal, round, and reactive to light.   Neck: Normal range of motion. Neck supple.   Cardiovascular: Normal rate and regular rhythm.    Pulmonary/Chest: Effort normal and breath sounds normal.   Abdominal: Soft. Bowel sounds are normal.   Musculoskeletal: Normal range of motion.   Neurological: He is alert.   Oriented x 2, and cooperative   Skin: Skin is warm and dry.   Psychiatric: His affect is blunt. His speech is delayed. Cognition and memory are impaired.       Significant Labs: None    Assessment/Plan:      * Debility    TN consulted to aid in safe discharge planning. He has been living with a significant other who can no longer care for him and refusing to take patient home. His somewhat estranged daughter has been contacted and is willing to aid in finding NH placement (she had already begun this process as an outpatient) but there is an issue: patient's power of  (a  business friend) refused to sign consent for NH placement. Patient unable to make his own decision regarding this as he is with intermittent confusion +/- previous unclear dementia diagnosis per daughter. Psychiatry consulted for capacity, who agrees that he has no capacity for decision making. Also recommended adding depakote 250mg TID to help with mood stabilization. He is medically cleared for transfer once nursing facility arranged. Interdiction process ongoing with pending placement.        Lewy body dementia without behavioral disturbance    Seen by psych who has since signed off - appreciate recs -  Doing well on current regimen  Continue medication regimen as follows:  Sinemet  QID and Seroquel 50 QHS  Continue Exelon 4.6 mg/24 patch  Divalproex 250 mg to BID  Held trazodone        Essential hypertension    Resume norvasc.        Parkinson disease    Seroquel 50 mg; sinemet  mg;   Discontinued taxodione 50 mg  On exelon 4.6 mg in the morning when he was more awake          VTE Risk Mitigation         Ordered     enoxaparin injection 40 mg  Daily     Route:  Subcutaneous        04/09/18 1423     Medium Risk of VTE  Once      02/08/18 0653     Place JOSEFINA hose  Until discontinued      02/08/18 0653     Place sequential compression device  Until discontinued      02/08/18 0653              Kassandra Alonso MD  Department of Hospital Medicine   [facility]

## 2018-04-13 NOTE — PROGRESS NOTES
Pt care assumed,pt aaox3 lying in supine position looking out of the window watching the sunset with eye glasses in place. Pt with no c/o of pain or any discomfort. Pt informed of md orders and plan of care for this pm. Pt personal items are within reach.safety maintained with bed alarm and avasys monitor.

## 2018-04-13 NOTE — PLAN OF CARE
Problem: Fall Risk (Adult)  Intervention: Monitor/Assist with Self Care   18 1932 18 06   Activity   Activity Assistance Provided --  assistance, 2 people   Daily Care Interventions   Self-Care Promotion independence encouraged;BADL personal objects within reach;BADL personal routines maintained --    Functional Level Current   Ambulation 3 - assistive equipment and person --    Transferring 3 - assistive equipment and person --    Toileting 2 - assistive person --    Bathing 2 - assistive person --    Dressing 2 - assistive person --    Eating 2 - assistive person --    Communication 0 - understands/communicates without difficulty --    Swallowing 0 - swallows foods/liquids without difficulty --      Intervention: Reduce Risk/Promote Restraint Free Environment   18   Safety Interventions   Environmental Safety Modification assistive device/personal items within reach;clutter free environment maintained;lighting adjusted;room near unit station;room organization consistent   Prevent  Drop/Fall   Safety/Security Measures bed alarm set   Safety Interventions   Safety Precautions emergency equipment at bedside     Intervention: Patient Rounds   18   Safety Interventions   Patient Rounds bed in low position;bed wheels locked;call light in reach;clutter free environment maintained;ID band on;placement of personal items at bedside;toileting offered;visualized patient     Intervention: Safety Promotion/Fall Prevention   18   Safety Interventions   Safety Promotion/Fall Prevention bed alarm set;Fall Risk reviewed with patient/family;diversional activities provided;medications reviewed;nonskid shoes/socks when out of bed;/camera at bedside;side rails raised x 3;supervised activity     Intervention: Safety Precautions   18   Safety Interventions   Safety Precautions emergency equipment at bedside         Comments: Pt continues to be monitored with  safety camera and bed alarm in use. Pt has been free of fall or injury. Pt has needed redirecting as the shift progressed due to confusion .

## 2018-04-13 NOTE — SUBJECTIVE & OBJECTIVE
Interval History: Pt with no new c/o; no problems overnight.    Review of Systems   Constitutional: Negative for fever.   Respiratory: Negative for shortness of breath.    Cardiovascular: Negative for chest pain.     Objective:     Vital Signs (Most Recent):  Temp: 97.7 °F (36.5 °C) (04/13/18 0726)  Pulse: 79 (04/13/18 0726)  Resp: 18 (04/13/18 0726)  BP: (!) 146/68 (04/13/18 0726)  SpO2: 96 % (04/13/18 0726) Vital Signs (24h Range):  Temp:  [96.9 °F (36.1 °C)-98.2 °F (36.8 °C)] 97.7 °F (36.5 °C)  Pulse:  [79-87] 79  Resp:  [18] 18  SpO2:  [96 %-97 %] 96 %  BP: (100-146)/(50-68) 146/68     Weight: 62.3 kg (137 lb 5.6 oz)  Body mass index is 21.53 kg/m².    Intake/Output Summary (Last 24 hours) at 04/13/18 0917  Last data filed at 04/13/18 0400   Gross per 24 hour   Intake                0 ml   Output              975 ml   Net             -975 ml      Physical Exam   Constitutional: He appears well-developed and well-nourished. No distress.   HENT:   Head: Normocephalic and atraumatic.   Eyes: EOM are normal. Pupils are equal, round, and reactive to light.   Neck: Normal range of motion. Neck supple.   Cardiovascular: Normal rate and regular rhythm.    Pulmonary/Chest: Effort normal and breath sounds normal.   Abdominal: Soft. Bowel sounds are normal.   Musculoskeletal: Normal range of motion.   Neurological: He is alert.   Oriented x 2, and cooperative   Skin: Skin is warm and dry.   Psychiatric: His affect is blunt. His speech is delayed. Cognition and memory are impaired.       Significant Labs: None

## 2018-04-14 PROCEDURE — 25000003 PHARM REV CODE 250: Performed by: HOSPITALIST

## 2018-04-14 PROCEDURE — 25000003 PHARM REV CODE 250: Performed by: INTERNAL MEDICINE

## 2018-04-14 PROCEDURE — 63600175 PHARM REV CODE 636 W HCPCS: Performed by: HOSPITALIST

## 2018-04-14 PROCEDURE — 21400001 HC TELEMETRY ROOM

## 2018-04-14 RX ADMIN — CARBIDOPA AND LEVODOPA 2 TABLET: 25; 100 TABLET ORAL at 01:04

## 2018-04-14 RX ADMIN — DIVALPROEX SODIUM 250 MG: 250 TABLET, DELAYED RELEASE ORAL at 09:04

## 2018-04-14 RX ADMIN — RIVASTIGMINE TRANSDERMAL SYSTEM 1 PATCH: 4.6 PATCH, EXTENDED RELEASE TRANSDERMAL at 10:04

## 2018-04-14 RX ADMIN — CARBIDOPA AND LEVODOPA 2 TABLET: 25; 100 TABLET ORAL at 09:04

## 2018-04-14 RX ADMIN — DOCUSATE SODIUM 100 MG: 100 CAPSULE, LIQUID FILLED ORAL at 10:04

## 2018-04-14 RX ADMIN — DOCUSATE SODIUM 100 MG: 100 CAPSULE, LIQUID FILLED ORAL at 09:04

## 2018-04-14 RX ADMIN — DOCUSATE SODIUM AND SENNOSIDES 1 TABLET: 8.6; 5 TABLET, FILM COATED ORAL at 10:04

## 2018-04-14 RX ADMIN — AMLODIPINE BESYLATE 10 MG: 5 TABLET ORAL at 10:04

## 2018-04-14 RX ADMIN — CARBIDOPA AND LEVODOPA 2 TABLET: 25; 100 TABLET ORAL at 10:04

## 2018-04-14 RX ADMIN — QUETIAPINE FUMARATE 100 MG: 100 TABLET ORAL at 09:04

## 2018-04-14 RX ADMIN — DIVALPROEX SODIUM 250 MG: 250 TABLET, DELAYED RELEASE ORAL at 10:04

## 2018-04-14 RX ADMIN — CARBIDOPA AND LEVODOPA 2 TABLET: 25; 100 TABLET ORAL at 05:04

## 2018-04-14 RX ADMIN — ENOXAPARIN SODIUM 40 MG: 100 INJECTION SUBCUTANEOUS at 05:04

## 2018-04-14 RX ADMIN — POLYETHYLENE GLYCOL 3350 17 G: 17 POWDER, FOR SOLUTION ORAL at 10:04

## 2018-04-14 NOTE — NURSING
Received report from Natacha. Patient resting in bed quietly, Avasys at bedside, no distress noted.

## 2018-04-14 NOTE — PLAN OF CARE
Problem: Confusion, Acute (Adult)  Intervention: Monitor/Assist with Self Care   18   Activity   Activity Assistance Provided --  --  assistance, 1 person   Daily Care Interventions   Self-Care Promotion --  independence encouraged;BADL personal objects within reach;BADL personal routines maintained --    Functional Level Current   Ambulation 3 - assistive equipment and person --  --    Transferring 3 - assistive equipment and person --  --    Toileting 2 - assistive person --  --    Bathing 2 - assistive person --  --    Dressing 2 - assistive person --  --    Eating 2 - assistive person --  --    Communication 0 - understands/communicates without difficulty --  --    Swallowing 0 - swallows foods/liquids without difficulty --  --      Intervention: Reduce Risk/Promote Restraint Free Environment   18   Safety Interventions   Environmental Safety Modification assistive device/personal items within reach;clutter free environment maintained;lighting adjusted;room near unit station;room organization consistent   Prevent  Drop/Fall   Safety/Security Measures bed alarm set   Safety Interventions   Safety Precautions emergency equipment at bedside     Intervention: Evaluate Medications/Identify Contributors to Confusion   18   Safety Interventions   Medication Review/Management medications reviewed;high risk medications identified     Intervention: Optimize Communication   18   Cognitive Interventions   Communication Enhancement Strategies call light answered in person     Intervention: Promote Familiarity/Consistency   18   Coping/Psychosocial Interventions   Environment Familiarity/Consistency daily routine followed;familiar objects from home provided     Intervention: Provide Frequent Orientation/Reorientation   18   Cognitive Interventions   Reorientation Measures --  calendar in view;clock in  view;reorientation provided   Sensory Stimulation Regulation care clustered;quiet environment promoted --          Comments: Pt continues with confusion he occasionally knows where he is  And the current situation. Pt can also  Occasionally be redirected without difficulty. He has attempted to get out of bed on several occasions this shift . Bedside camera and bed alarm remains in use.personal items within reach.

## 2018-04-14 NOTE — PROGRESS NOTES
[facility] Hospital Medicine  Progress Note    Patient Name: Tenzin Ortiz  MRN: [id number]  Patient Class: IP- Inpatient   Admission Date: 2/7/2018  Length of Stay: 46 days  Attending Physician: Kassandra Alonso MD  Primary Care Provider: Efrain Chavez MD        Subjective:     Principal Problem:Debility    HPI:  Patient is 78 yo male with HTN, HLD, and Parkinson's who presents to ED reportedly for HA. Per ED note patient was complaining of HA but he denies this on my interview. Seems there was some confusion in ED and he was unclear why he was here. During interview with me, patient reports being here for NH placement although he does not appear to be very happy about this. He has been with some issues caring for himself. Currently lives with his girlfriend who reports she can no longer care for him. He states he currently gets around with a walker but sometimes has difficulty getting up out of his chair. Per chart review patient is with Parkinson's and sees neurology, Dr. Castellanos, lastly seen on 2/6/18. He was with hallucinations which were noted to be secondary to dopaminergic agents thus having to significantly reduce dose--she recommended he be admitted for placement at that time but patient declined. He has been attempting placement as an outpatient through PCP but without much luck. He otherwise denies recent illness, fever/chills, CP, SOB, abdominal pain, N/V/D, dysuria. On presentation patient with grossly normal labs/vitals. Placed in observation for what appears to be solely placement although this is something that may need to be continued as outpatient as is not indication for hospitalization.    Hospital Course:  Awake and alert without complaint - severe Parkinsons dementia and can no longer care for himself - neither can his girlfriend.  CM reports patient with acceptance to [facility]; awaiting paperwork to be signed by patient POA. Legal involved. Interdiction process ongoing for  placement.    Interval History: Pt with no new c/o; no problems overnight.    Review of Systems   Constitutional: Negative for fever.   Respiratory: Negative for shortness of breath.    Cardiovascular: Negative for chest pain.     Objective:     Vital Signs (Most Recent):  Temp: 98.8 °F (37.1 °C) (04/14/18 0752)  Pulse: 84 (04/14/18 0752)  Resp: 18 (04/14/18 0752)  BP: 135/64 (04/14/18 0752)  SpO2: (!) 82 % (04/14/18 0752) Vital Signs (24h Range):  Temp:  [97.5 °F (36.4 °C)-98.8 °F (37.1 °C)] 98.8 °F (37.1 °C)  Pulse:  [75-84] 84  Resp:  [18] 18  SpO2:  [82 %-96 %] 82 %  BP: (122-142)/(60-66) 135/64     Weight: 62.3 kg (137 lb 5.6 oz)  Body mass index is 21.53 kg/m².    Intake/Output Summary (Last 24 hours) at 04/14/18 1014  Last data filed at 04/14/18 0700   Gross per 24 hour   Intake                0 ml   Output              750 ml   Net             -750 ml      Physical Exam   Constitutional: He appears well-developed and well-nourished. No distress.   HENT:   Head: Normocephalic and atraumatic.   Eyes: EOM are normal. Pupils are equal, round, and reactive to light.   Neck: Normal range of motion. Neck supple.   Cardiovascular: Normal rate and regular rhythm.    Pulmonary/Chest: Effort normal and breath sounds normal.   Abdominal: Soft. Bowel sounds are normal.   Musculoskeletal: Normal range of motion.   Neurological: He is alert.   Oriented x 2, and cooperative   Skin: Skin is warm and dry.   Psychiatric: His affect is blunt. His speech is delayed. Cognition and memory are impaired.       Significant Labs: None    Assessment/Plan:      * Debility    TN consulted to aid in safe discharge planning. He has been living with a significant other who can no longer care for him and refusing to take patient home. His somewhat estranged daughter has been contacted and is willing to aid in finding NH placement (she had already begun this process as an outpatient) but there is an issue: patient's power of  (a  business friend) refused to sign consent for NH placement. Patient unable to make his own decision regarding this as he is with intermittent confusion +/- previous unclear dementia diagnosis per daughter. Psychiatry consulted for capacity, who agrees that he has no capacity for decision making. Also recommended adding depakote 250mg TID to help with mood stabilization. He is medically cleared for transfer once nursing facility arranged. Interdiction process ongoing with pending placement.        Lewy body dementia without behavioral disturbance    Seen by psych who has since signed off - appreciate recs -  Doing well on current regimen  Continue medication regimen as follows:  Sinemet  QID and Seroquel 50 QHS  Continue Exelon 4.6 mg/24 patch  Divalproex 250 mg to BID  Held trazodone        Essential hypertension    Resume norvasc.        Parkinson disease    Seroquel 50 mg; sinemet  mg;   Discontinued taxodione 50 mg  On exelon 4.6 mg in the morning when he was more awake          VTE Risk Mitigation         Ordered     enoxaparin injection 40 mg  Daily     Route:  Subcutaneous        04/09/18 1423     Medium Risk of VTE  Once      02/08/18 0653     Place JOSEFINA hose  Until discontinued      02/08/18 0653     Place sequential compression device  Until discontinued      02/08/18 0653              Kassandra Alonso MD  Department of Hospital Medicine   [facility]

## 2018-04-14 NOTE — PROGRESS NOTES
Pt care assumed, pt lying in supine position  Oriented to self and place intermittently , pt also follow verbal direction at times.pt talking to himself at times with visual hallucinations.safety maintained with bedside camera and bed alarm. Bed low side rails up x3

## 2018-04-15 PROCEDURE — 63600175 PHARM REV CODE 636 W HCPCS: Performed by: HOSPITALIST

## 2018-04-15 PROCEDURE — 21400001 HC TELEMETRY ROOM

## 2018-04-15 PROCEDURE — 25000003 PHARM REV CODE 250: Performed by: HOSPITALIST

## 2018-04-15 PROCEDURE — 25000003 PHARM REV CODE 250: Performed by: INTERNAL MEDICINE

## 2018-04-15 RX ADMIN — CARBIDOPA AND LEVODOPA 2 TABLET: 25; 100 TABLET ORAL at 10:04

## 2018-04-15 RX ADMIN — CARBIDOPA AND LEVODOPA 2 TABLET: 25; 100 TABLET ORAL at 08:04

## 2018-04-15 RX ADMIN — DOCUSATE SODIUM 100 MG: 100 CAPSULE, LIQUID FILLED ORAL at 10:04

## 2018-04-15 RX ADMIN — DIVALPROEX SODIUM 250 MG: 250 TABLET, DELAYED RELEASE ORAL at 08:04

## 2018-04-15 RX ADMIN — AMLODIPINE BESYLATE 10 MG: 5 TABLET ORAL at 10:04

## 2018-04-15 RX ADMIN — QUETIAPINE FUMARATE 100 MG: 100 TABLET ORAL at 08:04

## 2018-04-15 RX ADMIN — DIVALPROEX SODIUM 250 MG: 250 TABLET, DELAYED RELEASE ORAL at 10:04

## 2018-04-15 RX ADMIN — RIVASTIGMINE TRANSDERMAL SYSTEM 1 PATCH: 4.6 PATCH, EXTENDED RELEASE TRANSDERMAL at 10:04

## 2018-04-15 RX ADMIN — POLYETHYLENE GLYCOL 3350 17 G: 17 POWDER, FOR SOLUTION ORAL at 10:04

## 2018-04-15 RX ADMIN — DOCUSATE SODIUM 100 MG: 100 CAPSULE, LIQUID FILLED ORAL at 08:04

## 2018-04-15 RX ADMIN — ENOXAPARIN SODIUM 40 MG: 100 INJECTION SUBCUTANEOUS at 05:04

## 2018-04-15 NOTE — PROGRESS NOTES
"Pt care assumed, pt with confusion stating "Im leaving I need to go and finish the book." pt taking his clothing off and attempting to get out of bed.pt is redirected with difficulty.safety maintained with bed alarm and bedside camera. Bed low side rail up x3  "

## 2018-04-15 NOTE — PLAN OF CARE
Problem: Fall Risk (Adult)  Intervention: Reduce Risk/Promote Restraint Free Environment   1818 04/15/18 0526   Safety Interventions   Environmental Safety Modification --  assistive device/personal items within reach;clutter free environment maintained;lighting adjusted;mobility aid in reach;room near unit station;room organization consistent   Prevent [place] Drop/Fall   Safety/Security Measures --  bed alarm set   Safety Interventions   Safety Precautions emergency equipment at bedside --      Intervention: Review Medications/Identify Contributors to Fall Risk   04/15/18 0526   Safety Interventions   Medication Review/Management medications reviewed;high risk medications identified     Intervention: Patient Rounds   04/15/18 040   Safety Interventions   Patient Rounds bed in low position;bed wheels locked;call light in reach;clutter free environment maintained;ID band on;placement of personal items at bedside;toileting offered;visualized patient     Intervention: Safety Promotion/Fall Prevention   04/15/18 0526   Safety Interventions   Safety Promotion/Fall Prevention bed alarm set;Fall Risk reviewed with patient/family;diversional activities provided;medications reviewed;lighting adjusted;/camera at bedside;side rails raised x 3         Comments: Pt remains with confusion.and he has been free of fall or injury this shift. Pt bed alarm remains in use and bedside camera is also in use. Bed low side rails upx3

## 2018-04-16 PROCEDURE — 25000003 PHARM REV CODE 250: Performed by: INTERNAL MEDICINE

## 2018-04-16 PROCEDURE — 63600175 PHARM REV CODE 636 W HCPCS: Performed by: HOSPITALIST

## 2018-04-16 PROCEDURE — 21400001 HC TELEMETRY ROOM

## 2018-04-16 PROCEDURE — 25000003 PHARM REV CODE 250: Performed by: HOSPITALIST

## 2018-04-16 RX ADMIN — DOCUSATE SODIUM AND SENNOSIDES 1 TABLET: 8.6; 5 TABLET, FILM COATED ORAL at 09:04

## 2018-04-16 RX ADMIN — QUETIAPINE FUMARATE 100 MG: 100 TABLET ORAL at 09:04

## 2018-04-16 RX ADMIN — CARBIDOPA AND LEVODOPA 2 TABLET: 25; 100 TABLET ORAL at 09:04

## 2018-04-16 RX ADMIN — CARBIDOPA AND LEVODOPA 2 TABLET: 25; 100 TABLET ORAL at 12:04

## 2018-04-16 RX ADMIN — DIVALPROEX SODIUM 250 MG: 250 TABLET, DELAYED RELEASE ORAL at 09:04

## 2018-04-16 RX ADMIN — CARBIDOPA AND LEVODOPA 2 TABLET: 25; 100 TABLET ORAL at 05:04

## 2018-04-16 RX ADMIN — POLYETHYLENE GLYCOL 3350 17 G: 17 POWDER, FOR SOLUTION ORAL at 09:04

## 2018-04-16 RX ADMIN — DOCUSATE SODIUM 100 MG: 100 CAPSULE, LIQUID FILLED ORAL at 09:04

## 2018-04-16 RX ADMIN — RIVASTIGMINE TRANSDERMAL SYSTEM 1 PATCH: 4.6 PATCH, EXTENDED RELEASE TRANSDERMAL at 09:04

## 2018-04-16 RX ADMIN — ENOXAPARIN SODIUM 40 MG: 100 INJECTION SUBCUTANEOUS at 05:04

## 2018-04-16 RX ADMIN — AMLODIPINE BESYLATE 10 MG: 5 TABLET ORAL at 09:04

## 2018-04-16 NOTE — PLAN OF CARE
04/16/18 1004   Discharge Reassessment   Assessment Type Discharge Planning Reassessment   Provided patient/caregiver education on the expected discharge date and the discharge plan No   Do you have any problems affording any of your prescribed medications? No   Discharge Plan A New Nursing Home placement - long-term care facility   Discharge Plan B New Nursing Home placement - long-term care facility  (Currently going through interadiction process. Court date is on 4/24/2018)   Patient choice form signed by patient/caregiver N/A   Can the patient answer the patient profile reliably? No, cognitively impaired   How does the patient rate their overall health at the present time? Fair   Describe the patient's ability to walk at the present time. Walks with the help of equipment   How often would a person be available to care for the patient? Never  (HAS IVELISSE Mensah [phone])   Number of comorbid conditions (as recorded on the chart) Five or more

## 2018-04-16 NOTE — PT/OT/SLP PROGRESS
"Physical Therapy      Patient Name:  Tenzin Ortiz   MRN:  [id number]    Patient not seen today secondary to Other (pt is eating 1444 pm  ) , second attempt 1518 pm Patient unwilling to participate, pt stated " I am not ready , I don't want to do it today " . Will follow-up as able .    Gina Rodríguez, PTA    "

## 2018-04-16 NOTE — PROGRESS NOTES
Pt care assumed, pt lying in supine  Position with hob elevated pt eyes closed and easily aroused. Pt and no agitation or anxiety noted.pt oriented to self but not environment, pt was reoriented.pt safety maintained with bed alarm and bedside camera. Personal items within reach.

## 2018-04-16 NOTE — PLAN OF CARE
Problem: Pressure Ulcer Risk (Mike Scale) (Adult,Obstetrics,Pediatric)  Goal: Identify Related Risk Factors and Signs and Symptoms  Related risk factors and signs and symptoms are identified upon initiation of Human Response Clinical Practice Guideline (CPG)    04/16/18 1422   Pressure Ulcer Risk (Mike Scale)   Related Risk Factors (Pressure Ulcer Risk (Mike Scale)) cognitive impairment;excretions/secretions;hospitalization prolonged;mechanical forces;mental impairment;mobility impaired;nutritional deficiencies     Goal: Skin Integrity  Patient will demonstrate the desired outcomes by discharge/transition of care.    04/16/18 1422   Pressure Ulcer Risk (Mike Scale) (Adult,Obstetrics,Pediatric)   Skin Integrity making progress toward outcome

## 2018-04-16 NOTE — PLAN OF CARE
Problem: Fall Risk (Adult)  Intervention: Reduce Risk/Promote Restraint Free Environment   1818 04/15/18 05   Safety Interventions   Environmental Safety Modification --  assistive device/personal items within reach;clutter free environment maintained;lighting adjusted;mobility aid in reach;room near unit station;room organization consistent   Prevent [place] Drop/Fall   Safety/Security Measures --  bed alarm set   Safety Interventions   Safety Precautions emergency equipment at bedside --      Intervention: Review Medications/Identify Contributors to Fall Risk   18 05   Safety Interventions   Medication Review/Management medications reviewed;high risk medications identified     Intervention: Patient Rounds   18 0400   Safety Interventions   Patient Rounds bed in low position;bed wheels locked;call light in reach;clutter free environment maintained;ID band on;placement of personal items at bedside;toileting offered;visualized patient     Intervention: Safety Promotion/Fall Prevention   18 05   Safety Interventions   Safety Promotion/Fall Prevention bed alarm set;medications reviewed;/camera at bedside;nonskid shoes/socks when out of bed;lighting adjusted         Comments: Pt has been free of fall or injury this shift. Pt with decrease anxiety and agitation. Safety maintained with bed alarm and bedside camera.

## 2018-04-17 PROCEDURE — 97116 GAIT TRAINING THERAPY: CPT

## 2018-04-17 PROCEDURE — 63600175 PHARM REV CODE 636 W HCPCS: Performed by: HOSPITALIST

## 2018-04-17 PROCEDURE — 25000003 PHARM REV CODE 250: Performed by: INTERNAL MEDICINE

## 2018-04-17 PROCEDURE — 21400001 HC TELEMETRY ROOM

## 2018-04-17 PROCEDURE — 25000003 PHARM REV CODE 250: Performed by: HOSPITALIST

## 2018-04-17 RX ADMIN — OLANZAPINE 5 MG: 5 TABLET, ORALLY DISINTEGRATING ORAL at 11:04

## 2018-04-17 RX ADMIN — DOCUSATE SODIUM 100 MG: 100 CAPSULE, LIQUID FILLED ORAL at 08:04

## 2018-04-17 RX ADMIN — ENOXAPARIN SODIUM 40 MG: 100 INJECTION SUBCUTANEOUS at 05:04

## 2018-04-17 RX ADMIN — QUETIAPINE FUMARATE 100 MG: 100 TABLET ORAL at 08:04

## 2018-04-17 RX ADMIN — CARBIDOPA AND LEVODOPA 2 TABLET: 25; 100 TABLET ORAL at 08:04

## 2018-04-17 RX ADMIN — CARBIDOPA AND LEVODOPA 2 TABLET: 25; 100 TABLET ORAL at 05:04

## 2018-04-17 RX ADMIN — AMLODIPINE BESYLATE 10 MG: 5 TABLET ORAL at 08:04

## 2018-04-17 RX ADMIN — CARBIDOPA AND LEVODOPA 2 TABLET: 25; 100 TABLET ORAL at 12:04

## 2018-04-17 RX ADMIN — DIVALPROEX SODIUM 250 MG: 250 TABLET, DELAYED RELEASE ORAL at 08:04

## 2018-04-17 RX ADMIN — POLYETHYLENE GLYCOL 3350 17 G: 17 POWDER, FOR SOLUTION ORAL at 08:04

## 2018-04-17 RX ADMIN — RIVASTIGMINE TRANSDERMAL SYSTEM 1 PATCH: 4.6 PATCH, EXTENDED RELEASE TRANSDERMAL at 09:04

## 2018-04-17 NOTE — SUBJECTIVE & OBJECTIVE
Interval History: Pt with no new c/o; no problems overnight. More sleepy this morning.    Review of Systems   Constitutional: Negative for fever.   Respiratory: Negative for shortness of breath.    Cardiovascular: Negative for chest pain.     Objective:     Vital Signs (Most Recent):  Temp: 97.7 °F (36.5 °C) (04/1941)  Pulse: 78 (04/1941)  Resp: 18 (04/1941)  BP: (!) 110/53 (04/1941)  SpO2: 96 % (04/1941) Vital Signs (24h Range):  Temp:  [97.7 °F (36.5 °C)-98.1 °F (36.7 °C)] 97.7 °F (36.5 °C)  Pulse:  [71-80] 78  Resp:  [18] 18  SpO2:  [96 %-97 %] 96 %  BP: (105-130)/(53-73) 110/53     Weight: 58.5 kg (128 lb 15.5 oz)  Body mass index is 20.19 kg/m².    Intake/Output Summary (Last 24 hours) at 04/16/18 2132  Last data filed at 04/16/18 1800   Gross per 24 hour   Intake              320 ml   Output                0 ml   Net              320 ml      Physical Exam   Constitutional: He appears well-developed and well-nourished. No distress.   HENT:   Head: Normocephalic and atraumatic.   Eyes: EOM are normal. Pupils are equal, round, and reactive to light.   Neck: Normal range of motion. Neck supple.   Cardiovascular: Normal rate and regular rhythm.    Pulmonary/Chest: Effort normal and breath sounds normal.   Abdominal: Soft. Bowel sounds are normal.   Musculoskeletal: Normal range of motion.   Neurological: He is alert.   Oriented x 2, and cooperative   Skin: Skin is warm and dry.   Psychiatric: His affect is blunt. His speech is delayed. Cognition and memory are impaired.       Significant Labs: None

## 2018-04-17 NOTE — PLAN OF CARE
Problem: Nutrition, Imbalanced: Inadequate Oral Intake (Adult)  Goal: Improved Oral Intake  Patient will demonstrate the desired outcomes by discharge/transition of care.   Outcome: Ongoing (interventions implemented as appropriate)   04/17/18 0327   Nutrition, Imbalanced: Inadequate Oral Intake (Adult)   Improved Oral Intake making progress toward outcome

## 2018-04-17 NOTE — PLAN OF CARE
Problem: Confusion, Acute (Adult)  Goal: Identify Related Risk Factors and Signs and Symptoms  Related risk factors and signs and symptoms are identified upon initiation of Human Response Clinical Practice Guideline (CPG)    04/17/18 1155   Confusion, Acute   Related Risk Factors (Acute Confusion) advanced age;cognitive impairment;malnutrition   Signs and Symptoms (Acute Confusion) disorientation;emotional/behavioral disturbances;memory disturbed;thought process diminished/disorganized     Goal: Cognitive/Functional Impairments Minimized  Patient will demonstrate the desired outcomes by discharge/transition of care.    04/17/18 1155   Confusion, Acute (Adult)   Cognitive/Functional Impairments Minimized making progress toward outcome

## 2018-04-17 NOTE — PROGRESS NOTES
[facility] Hospital Medicine  Progress Note    Patient Name: Tenzin Ortiz  MRN: [id number]  Patient Class: IP- Inpatient   Admission Date: 2/7/2018  Attending Physician: Kassandra Alonso MD  Primary Care Provider: Efrain Chavez MD        Subjective:     Principal Problem:Debility    HPI:  Patient is 78 yo male with HTN, HLD, and Parkinson's who presents to ED reportedly for HA. Per ED note patient was complaining of HA but he denies this on my interview. Seems there was some confusion in ED and he was unclear why he was here. During interview with me, patient reports being here for NH placement although he does not appear to be very happy about this. He has been with some issues caring for himself. Currently lives with his girlfriend who reports she can no longer care for him. He states he currently gets around with a walker but sometimes has difficulty getting up out of his chair. Per chart review patient is with Parkinson's and sees neurology, Dr. Castellanos, lastly seen on 2/6/18. He was with hallucinations which were noted to be secondary to dopaminergic agents thus having to significantly reduce dose--she recommended he be admitted for placement at that time but patient declined. He has been attempting placement as an outpatient through PCP but without much luck. He otherwise denies recent illness, fever/chills, CP, SOB, abdominal pain, N/V/D, dysuria. On presentation patient with grossly normal labs/vitals. Placed in observation for what appears to be solely placement although this is something that may need to be continued as outpatient as is not indication for hospitalization.    Hospital Course:  Mr. Ortiz was admitted with severe Parkinsons dementia and can no longer care for himself. He was living with his girlfriend, but she can no longer care for the patient. Case management found an accepting facility at [place]; however, POA declined signing the necessary paperwork for placement. Legal  was consulted and judicial guardianship of the patient in process with court date set for April 24, 2018.    Interval History: Pt with no new c/o; no problems overnight. More sleepy this morning.    Review of Systems   Constitutional: Negative for fever.   Respiratory: Negative for shortness of breath.    Cardiovascular: Negative for chest pain.     Objective:     Vital signs: Reviewed  Weight: 58.5 kg (128 lb 15.5 oz)  Body mass index is 20.19 kg/m².  Intake/Output Summary: Reviewed    Physical Exam   Constitutional: He appears well-developed and well-nourished. No distress.   HENT:   Head: Normocephalic and atraumatic.   Eyes: EOM are normal. Pupils are equal, round, and reactive to light.   Neck: Normal range of motion. Neck supple.   Cardiovascular: Normal rate and regular rhythm.    Pulmonary/Chest: Effort normal and breath sounds normal.   Abdominal: Soft. Bowel sounds are normal.   Musculoskeletal: Normal range of motion.   Neurological: He is alert.   Oriented x 2, and cooperative   Skin: Skin is warm and dry.   Psychiatric: His affect is blunt. His speech is delayed. Cognition and memory are impaired.       Significant Labs: None    Assessment/Plan:      * Debility    Pt was living with a significant other who can no longer care for him  His estranged daughter was contacted and was willing to aid in finding NH placement (she had already begun this process as an outpatient) but patient's POA (a business friend) refused to sign consent for NH placement.   Patient has no capacity for decision making as per Psych  Depakote 250mg TID to help with mood stabilization  Guardianship process ongoing with court date for 4/24/18  He is medically cleared for transfer once nursing facility arranged        Lewy body dementia without behavioral disturbance    Seen by Psych   Continue Sinemet  QID and Seroquel 50 QHS  Continue Exelon 4.6 mg/24 patch  Divalproex 250 mg to BID  Held trazodone        Essential  hypertension    Resume norvasc.        Parkinson disease    Seroquel 50 mg; sinemet  mg  Discontinued taxodione 50 mg  On exelon 4.6 mg in the morning when he was more awake          VTE Risk Mitigation         Ordered     enoxaparin injection 40 mg  Daily     Route:  Subcutaneous        04/09/18 1423     Medium Risk of VTE  Once      02/08/18 0653     Place JOSEFINA hose  Until discontinued      02/08/18 0653     Place sequential compression device  Until discontinued      02/08/18 0653              Kassandra Alonso MD  Department of Hospital Medicine   [facility]

## 2018-04-17 NOTE — PT/OT/SLP PROGRESS
"Physical Therapy Treatment    Patient Name:  Tenzin Ortiz   MRN:  [id number]    Recommendations:     Discharge Recommendations:  nursing facility, basic   Discharge Equipment Recommendations:   none   Barriers to discharge: Decreased caregiver support    Assessment:     Tenzin Ortiz is a 77 y.o. male admitted with a medical diagnosis of Debility.  He presents with the following impairments/functional limitations:  weakness, impaired endurance, impaired balance, gait instability, decreased coordination, decreased upper extremity function, decreased lower extremity function, decreased safety awareness . Pt increased confusions today, pt required constant VC's to redirect , nurse Markham notified.    Rehab Prognosis:  Fair +; patient would benefit from acute skilled PT services to address these deficits and reach maximum level of function.      Recent Surgery: * No surgery found *      Plan:     During this hospitalization, patient to be seen 3 x/week to address the above listed problems via gait training, therapeutic activities, therapeutic exercises  · Plan of Care Expires:  05/09/18   Plan of Care Reviewed with: patient    Subjective     Communicated with nurse Markham prior to session.  Patient found in supine in bed upon PT entry to room, agreeable to treatment.      Chief Complaint: " I have to go somewhere "   Patient comments/goals: " I have to pay my bill "   Pain/Comfort:  · Pain Rating 1: 0/10  · Pain Rating Post-Intervention 1: 0/10    Patients cultural, spiritual, Alevism conflicts given the current situation: none    Objective:     Patient found with: telemetry, bed alarm     General Precautions: Standard, fall   Orthopedic Precautions:N/A   Braces: N/A     Functional Mobility:  · Bed Mobility:     · Rolling Right: stand by assistance  · Scooting: contact guard assistance  · Bridging: contact guard assistance  · Supine to Sit: contact guard assistance  · Transfers:     · Sit to Stand:  contact guard " assistance with rolling walker. VC's for safety technique and walker management,   · Gait:    Patient ambulated  260 ft on level tile with Rolling Walker with Contact Guard Assistance.  Pt with demonstarting a  swing-to gait with decreased nneka, increased time in double stance, decreased velocity of limb motion, decreased step length, decreased stride length, decreased toe-to-floor clearance and decreased weight-shifting ability.Impairments contributing to gait deviations include impaired balance, decreased flexibility, decreased ROM and decreased strength. Pt required 3 standing rest breaks during gait training 2* fatigue. Pt O2 SATS after gait training 97% . Pt required VC's to increase step height / step length . VC's for safety awareness .     · Balance: good with sitting balance, fair with standing .       AM-PAC 6 CLICK MOBILITY  Turning over in bed (including adjusting bedclothes, sheets and blankets)?: 4  Sitting down on and standing up from a chair with arms (e.g., wheelchair, bedside commode, etc.): 4  Moving from lying on back to sitting on the side of the bed?: 4  Moving to and from a bed to a chair (including a wheelchair)?: 3  Need to walk in hospital room?: 3  Climbing 3-5 steps with a railing?: 3  Total Score: 21       Therapeutic Activities and Exercises:   pt performed bed mobility, transfer and gait training as above.   Donned B shoes while sitting EOB with MIN A     Patient left up in reclined  chair with all lines intact, call button in reach, Nurse Shahrzad  notified and nursing student and Avasy monitor  present..    GOALS:    Physical Therapy Goals        Problem: Physical Therapy Goal    Goal Priority Disciplines Outcome Goal Variances Interventions   Physical Therapy Goal     PT/OT, PT Ongoing (interventions implemented as appropriate)     Description:  Goals to be met by: 18    Patient will increase functional independence with mobility by performin. Supine to sit with  supervision  2. Sit to stand transfer with Stand-by Assistance  3. Gait  x 300 feet with Stand-by Assistance using Rolling Walker  4. Lower extremity exercise program x30 reps per handout, with assistance as needed                       Time Tracking:     PT Received On: 04/17/18  PT Start Time: 1058     PT Stop Time: 1121  PT Total Time (min): 23 min     Billable Minutes: Gait Training 23    Treatment Type: Treatment  PT/PTA: PTA     PTA Visit Number: 3     Gina Rodríguez, PTA  04/17/2018

## 2018-04-17 NOTE — PROGRESS NOTES
"SW met with pt at bedside. Pt was upset due to being served paperwork today for interdiction. Pt kept saying " I need to leave because I have to pay my car insurance". SW informed pt that he could not leave the hospital and asked pt how he was doing. Pt stated that he was leaving because he has been in the hospital for 4 months and he needs air . SW explained again that he is not allowed to leave at this time. SW tried to explain to pt that he was served court documents for interdiction. Pt stated " I dont owe [facility] nothing, they owe me for keeping me here". SW stated that she was there to explain his paperwork. Pt stated that he is going home to one of his many houses and his POA will take care of it. KIMO voiced understanding. Paperwork brought to Josselin, Director, for review and will be placed in pt's chart once scanned to media.   "

## 2018-04-17 NOTE — PROGRESS NOTES
[facility] Hospital Medicine  Progress Note    Patient Name: Tenzin Ortiz  MRN: [id number]  Patient Class: IP- Inpatient   Admission Date: 2/7/2018  Length of Stay: 49 days  Attending Physician: Olivia Del Rosario MD  Primary Care Provider: Efrain Chavez MD        Subjective:     Principal Problem:Debility    HPI:  Patient is 76 yo male with HTN, HLD, and Parkinson's who presents to ED reportedly for HA. Per ED note patient was complaining of HA but he denies this on my interview. Seems there was some confusion in ED and he was unclear why he was here. During interview with me, patient reports being here for NH placement although he does not appear to be very happy about this. He has been with some issues caring for himself. Currently lives with his girlfriend who reports she can no longer care for him. He states he currently gets around with a walker but sometimes has difficulty getting up out of his chair. Per chart review patient is with Parkinson's and sees neurology, Dr. Castellanos, lastly seen on 2/6/18. He was with hallucinations which were noted to be secondary to dopaminergic agents thus having to significantly reduce dose--she recommended he be admitted for placement at that time but patient declined. He has been attempting placement as an outpatient through PCP but without much luck. He otherwise denies recent illness, fever/chills, CP, SOB, abdominal pain, N/V/D, dysuria. On presentation patient with grossly normal labs/vitals. Placed in observation for what appears to be solely placement although this is something that may need to be continued as outpatient as is not indication for hospitalization.    Hospital Course:  Mr. Ortiz was admitted with severe Parkinsons dementia and can no longer care for himself. He was living with his girlfriend, but she can no longer care for the patient. Case management found an accepting facility at [place]; however, POA declined signing the necessary  paperwork for placement. Legal was consulted and judicial guardianship of the patient in process with court date set for April 24, 2018.    Interval History: no events. Clinically unchanged    Review of Systems   Constitutional: Negative for fever.   Respiratory: Negative for shortness of breath.    Cardiovascular: Negative for chest pain.     Objective:     Vital Signs (Most Recent):  Temp: 97.7 °F (36.5 °C) (04/17/18 0801)  Pulse: 78 (04/17/18 0801)  Resp: 15 (04/17/18 0801)  BP: (!) 143/62 (04/17/18 0801)  SpO2: (!) 92 % (04/17/18 0801) Vital Signs (24h Range):  Temp:  [97.7 °F (36.5 °C)] 97.7 °F (36.5 °C)  Pulse:  [71-78] 78  Resp:  [15-18] 15  SpO2:  [92 %-96 %] 92 %  BP: (110-143)/(53-62) 143/62     Weight: 58.5 kg (128 lb 15.5 oz)  Body mass index is 20.22 kg/m².    Intake/Output Summary (Last 24 hours) at 04/17/18 1343  Last data filed at 04/17/18 0645   Gross per 24 hour   Intake              320 ml   Output              300 ml   Net               20 ml      Physical Exam   Constitutional: He appears well-developed and well-nourished. No distress.   HENT:   Head: Normocephalic and atraumatic.   Eyes: EOM are normal. Pupils are equal, round, and reactive to light.   Neck: Normal range of motion. Neck supple.   Cardiovascular: Normal rate and regular rhythm.    Pulmonary/Chest: Effort normal and breath sounds normal.   Abdominal: Soft. Bowel sounds are normal.   Musculoskeletal: Normal range of motion.   Neurological: He is alert.   Oriented x 2, and cooperative   Skin: Skin is warm and dry.   Psychiatric: His affect is blunt. His speech is delayed. Cognition and memory are impaired.       Significant Labs: None    Assessment/Plan:      * Debility    Pt was living with a significant other who can no longer care for him  His estranged daughter was contacted and was willing to aid in finding NH placement (she had already begun this process as an outpatient) but patient's POA (a business friend) refused to sign  consent for NH placement.   Patient has no capacity for decision making as per Psych  Depakote 250mg TID to help with mood stabilization  Guardianship process ongoing with court date for 4/24/18  He is medically cleared for transfer once nursing facility arranged        Lewy body dementia without behavioral disturbance    Seen by Psych   Continue Sinemet  QID and Seroquel 50 QHS  Continue Exelon 4.6 mg/24 patch  Divalproex 250 mg to BID  Held trazodone        Essential hypertension    Resume norvasc.        Parkinson disease    Seroquel 50 mg; sinemet  mg  Discontinued taxodione 50 mg  On exelon 4.6 mg in the morning when he was more awake          VTE Risk Mitigation         Ordered     enoxaparin injection 40 mg  Daily     Route:  Subcutaneous        04/09/18 1423     Medium Risk of VTE  Once      02/08/18 0653     Place JOSEFINA hose  Until discontinued      02/08/18 0653     Place sequential compression device  Until discontinued      02/08/18 0653              Olivia Elizalde MD  Department of Hospital Medicine   [facility]

## 2018-04-17 NOTE — PLAN OF CARE
Problem: Patient Care Overview  Goal: Plan of Care Review  Outcome: Ongoing (interventions implemented as appropriate)   04/17/18 0326   Coping/Psychosocial   Plan Of Care Reviewed With patient       Problem: Fall Risk (Adult)  Goal: Absence of Falls  Patient will demonstrate the desired outcomes by discharge/transition of care.   Outcome: Ongoing (interventions implemented as appropriate)   04/17/18 0326   Fall Risk (Adult)   Absence of Falls making progress toward outcome       Problem: Pressure Ulcer Risk (Mike Scale) (Adult,Obstetrics,Pediatric)  Goal: Skin Integrity  Patient will demonstrate the desired outcomes by discharge/transition of care.   Outcome: Ongoing (interventions implemented as appropriate)   04/17/18 0326   Pressure Ulcer Risk (Mike Scale) (Adult,Obstetrics,Pediatric)   Skin Integrity making progress toward outcome       Problem: Confusion, Acute (Adult)  Goal: Safety  Patient will demonstrate the desired outcomes by discharge/transition of care.   Outcome: Ongoing (interventions implemented as appropriate)   04/17/18 0326   Confusion, Acute (Adult)   Safety making progress toward outcome       Problem: Functional Deficit (Adult,Obstetrics,Pediatric)  Goal: Signs and Symptoms of Listed Potential Problems Will be Absent, Minimized or Managed (Functional Deficit)  Signs and symptoms of listed potential problems will be absent, minimized or managed by discharge/transition of care (reference Functional Deficit (Adult,Obstetrics,Pediatric) CPG).   Outcome: Ongoing (interventions implemented as appropriate)   04/17/18 0326   Functional Deficit   Problems Assessed (Functional Deficit) muscle strength impairment   Problems Present (Functional Deficit) muscle strength impairment       Problem: Nutrition, Imbalanced: Inadequate Oral Intake (Adult)  Goal: Improved Oral Intake  Patient will demonstrate the desired outcomes by discharge/transition of care.   Outcome: Ongoing (interventions implemented as  appropriate)   04/17/18 0326   Nutrition, Imbalanced: Inadequate Oral Intake (Adult)   Improved Oral Intake making progress toward outcome

## 2018-04-17 NOTE — SUBJECTIVE & OBJECTIVE
Interval History: Pt with no new c/o; no problems overnight.    Review of Systems   Constitutional: Negative for fever.   Respiratory: Negative for shortness of breath.    Cardiovascular: Negative for chest pain.     Objective:     Vital Signs (Most Recent):  Temp: 97.7 °F (36.5 °C) (04/1941)  Pulse: 78 (04/1941)  Resp: 18 (04/1941)  BP: (!) 110/53 (04/1941)  SpO2: 96 % (04/1941) Vital Signs (24h Range):  Temp:  [97.7 °F (36.5 °C)-98.1 °F (36.7 °C)] 97.7 °F (36.5 °C)  Pulse:  [71-80] 78  Resp:  [18] 18  SpO2:  [96 %-97 %] 96 %  BP: (105-130)/(53-73) 110/53     Weight: 58.5 kg (128 lb 15.5 oz)  Body mass index is 20.19 kg/m².    Intake/Output Summary (Last 24 hours) at 04/16/18 2125  Last data filed at 04/16/18 1800   Gross per 24 hour   Intake              320 ml   Output                0 ml   Net              320 ml      Physical Exam   Constitutional: He appears well-developed and well-nourished. No distress.   HENT:   Head: Normocephalic and atraumatic.   Eyes: EOM are normal. Pupils are equal, round, and reactive to light.   Neck: Normal range of motion. Neck supple.   Cardiovascular: Normal rate and regular rhythm.    Pulmonary/Chest: Effort normal and breath sounds normal.   Abdominal: Soft. Bowel sounds are normal.   Musculoskeletal: Normal range of motion.   Neurological: He is alert.   Oriented x 2, and cooperative   Skin: Skin is warm and dry.   Psychiatric: His affect is blunt. His speech is delayed. Cognition and memory are impaired.       Significant Labs: None

## 2018-04-17 NOTE — PROGRESS NOTES
[facility] Hospital Medicine  Progress Note    Patient Name: Tenzin Ortiz  MRN: [id number]  Patient Class: IP- Inpatient   Admission Date: 2/7/2018  Length of Stay: 48 days  Attending Physician: Kassandra Alonso MD  Primary Care Provider: Efrain Chavez MD        Subjective:     Principal Problem:Debility    HPI:  Patient is 78 yo male with HTN, HLD, and Parkinson's who presents to ED reportedly for HA. Per ED note patient was complaining of HA but he denies this on my interview. Seems there was some confusion in ED and he was unclear why he was here. During interview with me, patient reports being here for NH placement although he does not appear to be very happy about this. He has been with some issues caring for himself. Currently lives with his girlfriend who reports she can no longer care for him. He states he currently gets around with a walker but sometimes has difficulty getting up out of his chair. Per chart review patient is with Parkinson's and sees neurology, Dr. Castellanos, lastly seen on 2/6/18. He was with hallucinations which were noted to be secondary to dopaminergic agents thus having to significantly reduce dose--she recommended he be admitted for placement at that time but patient declined. He has been attempting placement as an outpatient through PCP but without much luck. He otherwise denies recent illness, fever/chills, CP, SOB, abdominal pain, N/V/D, dysuria. On presentation patient with grossly normal labs/vitals. Placed in observation for what appears to be solely placement although this is something that may need to be continued as outpatient as is not indication for hospitalization.    Hospital Course:  Mr. Ortiz was admitted with severe Parkinsons dementia and can no longer care for himself. He was living with his girlfriend, but she can no longer care for the patient. Case management found an accepting facility at [place]; however, POA declined signing the necessary  paperwork for placement. Legal was consulted and judicial guardianship of the patient in process with court date set for April 24, 2018.    Interval History: Pt with no new c/o; no problems overnight.    Review of Systems   Constitutional: Negative for fever.   Respiratory: Negative for shortness of breath.    Cardiovascular: Negative for chest pain.     Objective:     Vital Signs (Most Recent):  Temp: 97.7 °F (36.5 °C) (04/1940)  Pulse: 78 (04/1940)  Resp: 18 (04/1940)  BP: (!) 110/53 (04/1940)  SpO2: 96 % (04/1940) Vital Signs (24h Range):  Temp:  [97.7 °F (36.5 °C)-98.1 °F (36.7 °C)] 97.7 °F (36.5 °C)  Pulse:  [71-80] 78  Resp:  [18] 18  SpO2:  [96 %-97 %] 96 %  BP: (105-130)/(53-73) 110/53     Weight: 58.5 kg (128 lb 15.5 oz)  Body mass index is 20.19 kg/m².    Intake/Output Summary (Last 24 hours) at 04/16/18 2125  Last data filed at 04/16/18 1800   Gross per 24 hour   Intake              320 ml   Output                0 ml   Net              320 ml      Physical Exam   Constitutional: He appears well-developed and well-nourished. No distress.   HENT:   Head: Normocephalic and atraumatic.   Eyes: EOM are normal. Pupils are equal, round, and reactive to light.   Neck: Normal range of motion. Neck supple.   Cardiovascular: Normal rate and regular rhythm.    Pulmonary/Chest: Effort normal and breath sounds normal.   Abdominal: Soft. Bowel sounds are normal.   Musculoskeletal: Normal range of motion.   Neurological: He is alert.   Oriented x 2, and cooperative   Skin: Skin is warm and dry.   Psychiatric: His affect is blunt. His speech is delayed. Cognition and memory are impaired.       Significant Labs: None    Assessment/Plan:      * Debility    Pt was living with a significant other who can no longer care for him  His estranged daughter was contacted and was willing to aid in finding NH placement (she had already begun this process as an outpatient) but patient's POA (a business  friend) refused to sign consent for NH placement.   Patient has no capacity for decision making as per Psych  Depakote 250mg TID to help with mood stabilization  Guardianship process ongoing with court date for 4/24/18  He is medically cleared for transfer once nursing facility arranged        Lewy body dementia without behavioral disturbance    Seen by Psych   Continue Sinemet  QID and Seroquel 50 QHS  Continue Exelon 4.6 mg/24 patch  Divalproex 250 mg to BID  Held trazodone        Essential hypertension    Resume norvasc.        Parkinson disease    Seroquel 50 mg; sinemet  mg  Discontinued taxodione 50 mg  On exelon 4.6 mg in the morning when he was more awake          VTE Risk Mitigation         Ordered     enoxaparin injection 40 mg  Daily     Route:  Subcutaneous        04/09/18 1423     Medium Risk of VTE  Once      02/08/18 0653     Place JOSEFINA hose  Until discontinued      02/08/18 0653     Place sequential compression device  Until discontinued      02/08/18 0653              Kassandra Alonso MD  Department of Hospital Medicine   [facility]

## 2018-04-17 NOTE — ASSESSMENT & PLAN NOTE
Pt was living with a significant other who can no longer care for him  His estranged daughter was contacted and was willing to aid in finding NH placement (she had already begun this process as an outpatient) but patient's POA (a business friend) refused to sign consent for NH placement.   Patient has no capacity for decision making as per Psych  Depakote 250mg TID to help with mood stabilization  Guardianship process ongoing with court date for 4/24/18  He is medically cleared for transfer once nursing facility arranged

## 2018-04-17 NOTE — PLAN OF CARE
Problem: Physical Therapy Goal  Goal: Physical Therapy Goal  Goals to be met by: 18    Patient will increase functional independence with mobility by performin. Supine to sit with supervision  2. Sit to stand transfer with Stand-by Assistance  3. Gait  x 300 feet with Stand-by Assistance using Rolling Walker  4. Lower extremity exercise program x30 reps per handout, with assistance as needed      Outcome: Ongoing (interventions implemented as appropriate)  Pt required encouragement to participate in therapy today. Noted with increased confusion today, nurse Markham notified.

## 2018-04-17 NOTE — SUBJECTIVE & OBJECTIVE
Interval History: no events. Clinically unchanged    Review of Systems   Constitutional: Negative for fever.   Respiratory: Negative for shortness of breath.    Cardiovascular: Negative for chest pain.     Objective:     Vital Signs (Most Recent):  Temp: 97.7 °F (36.5 °C) (04/17/18 0801)  Pulse: 78 (04/17/18 0801)  Resp: 15 (04/17/18 0801)  BP: (!) 143/62 (04/17/18 0801)  SpO2: (!) 92 % (04/17/18 0801) Vital Signs (24h Range):  Temp:  [97.7 °F (36.5 °C)] 97.7 °F (36.5 °C)  Pulse:  [71-78] 78  Resp:  [15-18] 15  SpO2:  [92 %-96 %] 92 %  BP: (110-143)/(53-62) 143/62     Weight: 58.5 kg (128 lb 15.5 oz)  Body mass index is 20.22 kg/m².    Intake/Output Summary (Last 24 hours) at 04/17/18 1343  Last data filed at 04/17/18 0645   Gross per 24 hour   Intake              320 ml   Output              300 ml   Net               20 ml      Physical Exam   Constitutional: He appears well-developed and well-nourished. No distress.   HENT:   Head: Normocephalic and atraumatic.   Eyes: EOM are normal. Pupils are equal, round, and reactive to light.   Neck: Normal range of motion. Neck supple.   Cardiovascular: Normal rate and regular rhythm.    Pulmonary/Chest: Effort normal and breath sounds normal.   Abdominal: Soft. Bowel sounds are normal.   Musculoskeletal: Normal range of motion.   Neurological: He is alert.   Oriented x 2, and cooperative   Skin: Skin is warm and dry.   Psychiatric: His affect is blunt. His speech is delayed. Cognition and memory are impaired.       Significant Labs: None

## 2018-04-17 NOTE — PROGRESS NOTES
EDGAR Gu ([facility])  provide Mr. Tenzin Ortiz with a summon announcing legal proceeding initiated by [facility]. Two other nurses present in patient's room during summoning.

## 2018-04-17 NOTE — ASSESSMENT & PLAN NOTE
Seen by Psych   Continue Sinemet  QID and Seroquel 50 QHS  Continue Exelon 4.6 mg/24 patch  Divalproex 250 mg to BID  Held trazodone

## 2018-04-17 NOTE — ASSESSMENT & PLAN NOTE
Seroquel 50 mg; sinemet  mg  Discontinued taxodione 50 mg  On exelon 4.6 mg in the morning when he was more awake

## 2018-04-18 PROCEDURE — 21400001 HC TELEMETRY ROOM

## 2018-04-18 PROCEDURE — 25000003 PHARM REV CODE 250: Performed by: INTERNAL MEDICINE

## 2018-04-18 PROCEDURE — 25000003 PHARM REV CODE 250: Performed by: HOSPITALIST

## 2018-04-18 PROCEDURE — 63600175 PHARM REV CODE 636 W HCPCS: Performed by: HOSPITALIST

## 2018-04-18 RX ADMIN — ENOXAPARIN SODIUM 40 MG: 100 INJECTION SUBCUTANEOUS at 04:04

## 2018-04-18 RX ADMIN — AMLODIPINE BESYLATE 10 MG: 5 TABLET ORAL at 09:04

## 2018-04-18 RX ADMIN — OLANZAPINE 5 MG: 5 TABLET, ORALLY DISINTEGRATING ORAL at 08:04

## 2018-04-18 RX ADMIN — DOCUSATE SODIUM 100 MG: 100 CAPSULE, LIQUID FILLED ORAL at 08:04

## 2018-04-18 RX ADMIN — RIVASTIGMINE TRANSDERMAL SYSTEM 1 PATCH: 4.6 PATCH, EXTENDED RELEASE TRANSDERMAL at 09:04

## 2018-04-18 RX ADMIN — ACETAMINOPHEN 650 MG: 325 TABLET ORAL at 08:04

## 2018-04-18 RX ADMIN — DOCUSATE SODIUM 100 MG: 100 CAPSULE, LIQUID FILLED ORAL at 09:04

## 2018-04-18 RX ADMIN — DIVALPROEX SODIUM 250 MG: 250 TABLET, DELAYED RELEASE ORAL at 08:04

## 2018-04-18 RX ADMIN — QUETIAPINE FUMARATE 100 MG: 100 TABLET ORAL at 08:04

## 2018-04-18 RX ADMIN — CARBIDOPA AND LEVODOPA 2 TABLET: 25; 100 TABLET ORAL at 04:04

## 2018-04-18 RX ADMIN — CARBIDOPA AND LEVODOPA 2 TABLET: 25; 100 TABLET ORAL at 12:04

## 2018-04-18 RX ADMIN — ACETAMINOPHEN 650 MG: 325 TABLET ORAL at 12:04

## 2018-04-18 RX ADMIN — CARBIDOPA AND LEVODOPA 2 TABLET: 25; 100 TABLET ORAL at 08:04

## 2018-04-18 RX ADMIN — DIVALPROEX SODIUM 250 MG: 250 TABLET, DELAYED RELEASE ORAL at 09:04

## 2018-04-18 RX ADMIN — CARBIDOPA AND LEVODOPA 2 TABLET: 25; 100 TABLET ORAL at 09:04

## 2018-04-18 RX ADMIN — OLANZAPINE 5 MG: 5 TABLET, ORALLY DISINTEGRATING ORAL at 12:04

## 2018-04-18 RX ADMIN — POLYETHYLENE GLYCOL 3350 17 G: 17 POWDER, FOR SOLUTION ORAL at 09:04

## 2018-04-18 NOTE — PLAN OF CARE
Problem: Fall Risk (Adult)  Goal: Identify Related Risk Factors and Signs and Symptoms  Related risk factors and signs and symptoms are identified upon initiation of Human [employer] Clinical Practice Guideline (CPG)    04/18/18 3881   Fall Risk   Related Risk Factors (Fall Risk) age-related changes;confusion/agitation;gait/mobility problems;polypharmacy;sleep pattern alteration

## 2018-04-18 NOTE — PT/OT/SLP PROGRESS
"Physical Therapy      Patient Name:  Tenzin Ortiz   MRN:  [id number]    Patient not seen today x 2 attempts secondary to Other (pt increased confusion today, pt keeps repeating " I have to go " while trying to get out off bed despite max VC's from nursing staff ). Will follow-up as able later hour/day .    Gina Rodríguez PTA    "

## 2018-04-18 NOTE — PROGRESS NOTES
[facility] Hospital Medicine  Progress Note    Patient Name: Tenzin Ortiz  MRN: [id number]  Patient Class: IP- Inpatient   Admission Date: 2/7/2018  Length of Stay: 50 days  Attending Physician: Olivia Del Rosario MD  Primary Care Provider: Efrain Chavez MD        Subjective:     Principal Problem:Debility    HPI:  Patient is 78 yo male with HTN, HLD, and Parkinson's who presents to ED reportedly for HA. Per ED note patient was complaining of HA but he denies this on my interview. Seems there was some confusion in ED and he was unclear why he was here. During interview with me, patient reports being here for NH placement although he does not appear to be very happy about this. He has been with some issues caring for himself. Currently lives with his girlfriend who reports she can no longer care for him. He states he currently gets around with a walker but sometimes has difficulty getting up out of his chair. Per chart review patient is with Parkinson's and sees neurology, Dr. Castellanos, lastly seen on 2/6/18. He was with hallucinations which were noted to be secondary to dopaminergic agents thus having to significantly reduce dose--she recommended he be admitted for placement at that time but patient declined. He has been attempting placement as an outpatient through PCP but without much luck. He otherwise denies recent illness, fever/chills, CP, SOB, abdominal pain, N/V/D, dysuria. On presentation patient with grossly normal labs/vitals. Placed in observation for what appears to be solely placement although this is something that may need to be continued as outpatient as is not indication for hospitalization.    Hospital Course:  Mr. Ortiz was admitted with severe Parkinsons dementia and can no longer care for himself. He was living with his girlfriend, but she can no longer care for the patient. Case management found an accepting facility at [place]; however, POA declined signing the necessary  paperwork for placement. Legal was consulted and judicial guardianship of the patient in process with court date set for April 24, 2018.    Interval History: no events. Clinically unchanged    Review of Systems   Constitutional: Negative for fever.   Respiratory: Negative for shortness of breath.    Cardiovascular: Negative for chest pain.     Objective:     Vital Signs (Most Recent):  Temp: 97.5 °F (36.4 °C) (04/18/18 0734)  Pulse: 88 (04/18/18 0734)  Resp: 18 (04/18/18 0734)  BP: (!) 138/56 (04/18/18 0734)  SpO2: 98 % (04/18/18 0734) Vital Signs (24h Range):  Temp:  [97.4 °F (36.3 °C)-98.1 °F (36.7 °C)] 97.5 °F (36.4 °C)  Pulse:  [75-93] 88  Resp:  [18] 18  SpO2:  [95 %-98 %] 98 %  BP: (122-160)/(56-72) 138/56     Weight: 59.8 kg (131 lb 13.4 oz)  Body mass index is 20.67 kg/m².    Intake/Output Summary (Last 24 hours) at 04/18/18 1536  Last data filed at 04/18/18 0600   Gross per 24 hour   Intake              360 ml   Output              800 ml   Net             -440 ml      Physical Exam   Constitutional: He appears well-developed and well-nourished. No distress.   HENT:   Head: Normocephalic and atraumatic.   Eyes: EOM are normal. Pupils are equal, round, and reactive to light.   Neck: Normal range of motion. Neck supple.   Cardiovascular: Normal rate and regular rhythm.    Pulmonary/Chest: Effort normal and breath sounds normal.   Abdominal: Soft. Bowel sounds are normal.   Musculoskeletal: Normal range of motion.   Neurological: He is alert.   Oriented x 2, and cooperative   Skin: Skin is warm and dry.   Psychiatric: His affect is blunt. His speech is delayed. Cognition and memory are impaired.       Significant Labs: None    Assessment/Plan:      * Debility    Pt was living with a significant other who can no longer care for him  His estranged daughter was contacted and was willing to aid in finding NH placement (she had already begun this process as an outpatient) but patient's POA (a business friend)  refused to sign consent for NH placement.   Patient has no capacity for decision making as per Psych  Depakote 250mg TID to help with mood stabilization  Guardianship process ongoing with court date for 4/24/18  He is medically cleared for transfer once nursing facility arranged        Lewy body dementia without behavioral disturbance    Seen by Psych   Continue Sinemet  QID and Seroquel 50 QHS  Continue Exelon 4.6 mg/24 patch  Divalproex 250 mg to BID  Held trazodone        Essential hypertension    Resume norvasc.        Parkinson disease    Seroquel 50 mg; sinemet  mg  Discontinued taxodione 50 mg  On exelon 4.6 mg in the morning when he was more awake          VTE Risk Mitigation         Ordered     enoxaparin injection 40 mg  Daily      04/09/18 1423     Medium Risk of VTE  Once      02/08/18 0653     Place JOSEFINA hose  Until discontinued      02/08/18 0653     Place sequential compression device  Until discontinued      02/08/18 0653              Olivia Elizalde MD  Department of Hospital Medicine   [facility]

## 2018-04-18 NOTE — NURSING
Report received; pt awake and alert, eating at this time, resp even and unlabored no acute distress noted; denies pain, no needs voiced at this time

## 2018-04-18 NOTE — UM SECONDARY REVIEW
[facility]    IP Extended Stay > 10    LOS: approved an agreement with D/C plan     50 day LOS, Not safe discharge needs NHP. POA not willing to sign the patient into a NH. Being interdicted. Court date set for 4/24. Continued stay approved per  committee guideline.

## 2018-04-18 NOTE — SUBJECTIVE & OBJECTIVE
Interval History: no events. Clinically unchanged    Review of Systems   Constitutional: Negative for fever.   Respiratory: Negative for shortness of breath.    Cardiovascular: Negative for chest pain.     Objective:     Vital Signs (Most Recent):  Temp: 97.5 °F (36.4 °C) (04/18/18 0734)  Pulse: 88 (04/18/18 0734)  Resp: 18 (04/18/18 0734)  BP: (!) 138/56 (04/18/18 0734)  SpO2: 98 % (04/18/18 0734) Vital Signs (24h Range):  Temp:  [97.4 °F (36.3 °C)-98.1 °F (36.7 °C)] 97.5 °F (36.4 °C)  Pulse:  [75-93] 88  Resp:  [18] 18  SpO2:  [95 %-98 %] 98 %  BP: (122-160)/(56-72) 138/56     Weight: 59.8 kg (131 lb 13.4 oz)  Body mass index is 20.67 kg/m².    Intake/Output Summary (Last 24 hours) at 04/18/18 1536  Last data filed at 04/18/18 0600   Gross per 24 hour   Intake              360 ml   Output              800 ml   Net             -440 ml      Physical Exam   Constitutional: He appears well-developed and well-nourished. No distress.   HENT:   Head: Normocephalic and atraumatic.   Eyes: EOM are normal. Pupils are equal, round, and reactive to light.   Neck: Normal range of motion. Neck supple.   Cardiovascular: Normal rate and regular rhythm.    Pulmonary/Chest: Effort normal and breath sounds normal.   Abdominal: Soft. Bowel sounds are normal.   Musculoskeletal: Normal range of motion.   Neurological: He is alert.   Oriented x 2, and cooperative   Skin: Skin is warm and dry.   Psychiatric: His affect is blunt. His speech is delayed. Cognition and memory are impaired.       Significant Labs: None

## 2018-04-19 PROCEDURE — 63600175 PHARM REV CODE 636 W HCPCS: Performed by: HOSPITALIST

## 2018-04-19 PROCEDURE — 25000003 PHARM REV CODE 250: Performed by: HOSPITALIST

## 2018-04-19 PROCEDURE — 25000003 PHARM REV CODE 250: Performed by: INTERNAL MEDICINE

## 2018-04-19 PROCEDURE — 21400001 HC TELEMETRY ROOM

## 2018-04-19 RX ADMIN — CARBIDOPA AND LEVODOPA 2 TABLET: 25; 100 TABLET ORAL at 09:04

## 2018-04-19 RX ADMIN — DOCUSATE SODIUM 100 MG: 100 CAPSULE, LIQUID FILLED ORAL at 09:04

## 2018-04-19 RX ADMIN — DIVALPROEX SODIUM 250 MG: 250 TABLET, DELAYED RELEASE ORAL at 09:04

## 2018-04-19 RX ADMIN — CARBIDOPA AND LEVODOPA 2 TABLET: 25; 100 TABLET ORAL at 01:04

## 2018-04-19 RX ADMIN — CARBIDOPA AND LEVODOPA 2 TABLET: 25; 100 TABLET ORAL at 05:04

## 2018-04-19 RX ADMIN — RIVASTIGMINE TRANSDERMAL SYSTEM 1 PATCH: 4.6 PATCH, EXTENDED RELEASE TRANSDERMAL at 09:04

## 2018-04-19 RX ADMIN — QUETIAPINE FUMARATE 100 MG: 100 TABLET ORAL at 09:04

## 2018-04-19 RX ADMIN — AMLODIPINE BESYLATE 10 MG: 5 TABLET ORAL at 09:04

## 2018-04-19 RX ADMIN — OLANZAPINE 5 MG: 5 TABLET, ORALLY DISINTEGRATING ORAL at 03:04

## 2018-04-19 RX ADMIN — ENOXAPARIN SODIUM 40 MG: 100 INJECTION SUBCUTANEOUS at 05:04

## 2018-04-19 NOTE — PROGRESS NOTES
[facility] Hospital Medicine  Progress Note    Patient Name: Tenzin Ortiz  MRN: [id number]  Patient Class: IP- Inpatient   Admission Date: 2/7/2018  Length of Stay: 51 days  Attending Physician: Olivia Del Rosario MD  Primary Care Provider: Efrain Chavez MD        Subjective:     Principal Problem:Debility    HPI:  Patient is 78 yo male with HTN, HLD, and Parkinson's who presents to ED reportedly for HA. Per ED note patient was complaining of HA but he denies this on my interview. Seems there was some confusion in ED and he was unclear why he was here. During interview with me, patient reports being here for NH placement although he does not appear to be very happy about this. He has been with some issues caring for himself. Currently lives with his girlfriend who reports she can no longer care for him. He states he currently gets around with a walker but sometimes has difficulty getting up out of his chair. Per chart review patient is with Parkinson's and sees neurology, Dr. Castellanos, lastly seen on 2/6/18. He was with hallucinations which were noted to be secondary to dopaminergic agents thus having to significantly reduce dose--she recommended he be admitted for placement at that time but patient declined. He has been attempting placement as an outpatient through PCP but without much luck. He otherwise denies recent illness, fever/chills, CP, SOB, abdominal pain, N/V/D, dysuria. On presentation patient with grossly normal labs/vitals. Placed in observation for what appears to be solely placement although this is something that may need to be continued as outpatient as is not indication for hospitalization.    Hospital Course:  Mr. Ortiz was admitted with severe Parkinsons dementia and can no longer care for himself. He was living with his girlfriend, but she can no longer care for the patient. Case management found an accepting facility at [place]; however, POA declined signing the necessary  paperwork for placement. Legal was consulted and judicial guardianship of the patient in process with court date set for April 24, 2018.    Interval History: no events. Clinically unchanged    Review of Systems   Constitutional: Negative for fever.   Respiratory: Negative for shortness of breath.    Cardiovascular: Negative for chest pain.     Objective:     Vital Signs (Most Recent):  Temp: 97.5 °F (36.4 °C) (04/19/18 1110)  Pulse: 95 (04/19/18 1110)  Resp: 18 (04/19/18 1110)  BP: (!) 131/55 (04/19/18 1110)  SpO2: 97 % (04/19/18 1110) Vital Signs (24h Range):  Temp:  [96.7 °F (35.9 °C)-97.8 °F (36.6 °C)] 97.5 °F (36.4 °C)  Pulse:  [71-95] 95  Resp:  [18] 18  SpO2:  [96 %-99 %] 97 %  BP: (131-161)/(55-81) 131/55     Weight: 59.8 kg (131 lb 13.4 oz)  Body mass index is 20.67 kg/m².    Intake/Output Summary (Last 24 hours) at 04/19/18 1506  Last data filed at 04/19/18 0900   Gross per 24 hour   Intake              240 ml   Output                0 ml   Net              240 ml      Physical Exam   Constitutional: He appears well-developed and well-nourished. No distress.   HENT:   Head: Normocephalic and atraumatic.   Eyes: EOM are normal. Pupils are equal, round, and reactive to light.   Neck: Normal range of motion. Neck supple.   Cardiovascular: Normal rate and regular rhythm.    Pulmonary/Chest: Effort normal and breath sounds normal.   Abdominal: Soft. Bowel sounds are normal.   Musculoskeletal: Normal range of motion.   Neurological: He is alert.   Oriented x 2, and cooperative   Skin: Skin is warm and dry.   Psychiatric: His affect is blunt. His speech is delayed. Cognition and memory are impaired.       Significant Labs: None    Assessment/Plan:      * Debility    Pt was living with a significant other who can no longer care for him  His estranged daughter was contacted and was willing to aid in finding NH placement (she had already begun this process as an outpatient) but patient's POA (a business friend)  refused to sign consent for NH placement.   Patient has no capacity for decision making as per Psych  Depakote 250mg TID to help with mood stabilization  Guardianship process ongoing with court date for 4/24/18  He is medically cleared for transfer once nursing facility arranged        Lewy body dementia without behavioral disturbance    Seen by Psych   Continue Sinemet  QID and Seroquel 50 QHS  Continue Exelon 4.6 mg/24 patch  Divalproex 250 mg to BID  Held trazodone        Essential hypertension    Resume norvasc.        Parkinson disease    Seroquel 50 mg; sinemet  mg  Discontinued taxodione 50 mg  On exelon 4.6 mg in the morning when he was more awake          VTE Risk Mitigation         Ordered     enoxaparin injection 40 mg  Daily      04/09/18 1423     Medium Risk of VTE  Once      02/08/18 0653     Place JOSEFINA hose  Until discontinued      02/08/18 0653     Place sequential compression device  Until discontinued      02/08/18 0653          Pending court date 4/24    Olivia Elizalde MD  Department of Hospital Medicine   [facility]

## 2018-04-19 NOTE — SUBJECTIVE & OBJECTIVE
Interval History: no events. Clinically unchanged    Review of Systems   Constitutional: Negative for fever.   Respiratory: Negative for shortness of breath.    Cardiovascular: Negative for chest pain.     Objective:     Vital Signs (Most Recent):  Temp: 97.5 °F (36.4 °C) (04/19/18 1110)  Pulse: 95 (04/19/18 1110)  Resp: 18 (04/19/18 1110)  BP: (!) 131/55 (04/19/18 1110)  SpO2: 97 % (04/19/18 1110) Vital Signs (24h Range):  Temp:  [96.7 °F (35.9 °C)-97.8 °F (36.6 °C)] 97.5 °F (36.4 °C)  Pulse:  [71-95] 95  Resp:  [18] 18  SpO2:  [96 %-99 %] 97 %  BP: (131-161)/(55-81) 131/55     Weight: 59.8 kg (131 lb 13.4 oz)  Body mass index is 20.67 kg/m².    Intake/Output Summary (Last 24 hours) at 04/19/18 1506  Last data filed at 04/19/18 0900   Gross per 24 hour   Intake              240 ml   Output                0 ml   Net              240 ml      Physical Exam   Constitutional: He appears well-developed and well-nourished. No distress.   HENT:   Head: Normocephalic and atraumatic.   Eyes: EOM are normal. Pupils are equal, round, and reactive to light.   Neck: Normal range of motion. Neck supple.   Cardiovascular: Normal rate and regular rhythm.    Pulmonary/Chest: Effort normal and breath sounds normal.   Abdominal: Soft. Bowel sounds are normal.   Musculoskeletal: Normal range of motion.   Neurological: He is alert.   Oriented x 2, and cooperative   Skin: Skin is warm and dry.   Psychiatric: His affect is blunt. His speech is delayed. Cognition and memory are impaired.       Significant Labs: None

## 2018-04-19 NOTE — PT/OT/SLP PROGRESS
Physical Therapy      Patient Name:  Tenzin Ortiz   MRN:  [id number]    Patient not seen today secondary to Patient unwilling to participate, Increased confusions ( unable to redirect for safety treatment . Assisted  pt to scoot HOB using bed rails, MOD A. Nurse notified  Will follow-up as able .    Gina Rodríguez, PTA

## 2018-04-19 NOTE — PLAN OF CARE
Problem: Patient Care Overview  Goal: Plan of Care Review  04/19/2018     Recommendations    Recommendation/Intervention: 1) Continue Regular diet as tolerated with total feeding assistance 2) Continue nutritional supplements 3) Monitor oral intake and tolerance 4) Monitor weight weekly  Goals: 1) Patient to continue to consume >=85% of EEN and EPN with tolerance x7 days  Nutrition Goal Status: new  Communication of RD Recs: reviewed with KARINA Trejo, MPH, RD, LDN

## 2018-04-19 NOTE — PROGRESS NOTES
"   [facility]  Adult Nutrition  Progress Note    SUMMARY       Recommendations    Recommendation/Intervention: 1) Continue Regular diet as tolerated with total feeding assistance 2) Continue nutritional supplements 3) Monitor oral intake and tolerance 4) Monitor weight weekly  Goals: 1) Patient to continue to consume >=85% of EEN and EPN with tolerance x7 days  Nutrition Goal Status: new  Communication of RD Recs: reviewed with RN    Reason for Assessment    Reason for Assessment: RD follow-up  Diagnosis:  (Debility)  Relevant Medical History: HTN, Parkinsons  General Information Comments: Patient receiving total feeding assistance consuming 50 - 100% of meals with ONS TID. RN reports he is tolerating well.     Nutrition Discharge Planning: Patient to discharge on a Regular diet with ONS prn.     Nutrition Risk Screen    Nutrition Risk Screen: no indicators present    Nutrition/Diet History    Food Preferences: No cultural, Spiritism or ethnic food preferences.   Do you have any cultural, spiritual, Spiritism conflicts, given your current situation?: No cultural, Spiritism or ethnic food preferences.   Food Allergies: NKFA  Factors Affecting Nutritional Intake: impaired cognitive status/motor control, inability to feed self    Anthropometrics    Temp: 97.5 °F (36.4 °C)  Height Method: Stated  Height: 5' 6.97" (170.1 cm)  Height (inches): 66.97 in  Weight Method: Bed Scale  Weight: 59.8 kg (131 lb 13.4 oz)  Weight (lb): 131.84 lb  Ideal Body Weight (IBW), Male: 147.82 lb  % Ideal Body Weight, Male (lb): 87.25 lb  BMI (Calculated): 20.3  BMI Grade: 18.5-24.9 - normal       Lab/Procedures/Meds    Pertinent Labs Reviewed: reviewed  Pertinent Medications Reviewed: reviewed    Physical Findings/Assessment    Overall Physical Appearance: nourished  Oral/Mouth Cavity: tooth/teeth missing  Skin: intact (Mike Score 20)    Estimated/Assessed Needs    Weight Used For Calorie Calculations: 62.6 kg (138 lb " 0.1 oz)  Energy Calorie Requirements (kcal): 7699-1885 kcal  Energy Need Method: [place] Jeor  Protein Requirements: 65-75g  Weight Used For Protein Calculations: 62.6 kg (138 lb 0.1 oz)  Fluid Need Method: RDA Method (or per MD)  RDA Method (mL): 1600    CHO Requirement:  N/A         Nutrition Prescription Ordered    Current Diet Order: Regular  Oral Nutrition Supplement: Boost Plus TID    Evaluation of Received Nutrient/Fluid Intake    I/O: 120/500  Energy Calories Required: meeting needs  Protein Required: meeting needs  Fluid Required: meeting needs  Comments: LBM: 4/17  Tolerance: tolerating  % Intake of Estimated Energy Needs: Other: 75%  % Meal Intake: Other: 50 - 100% + ONS TID    Nutrition Risk    Level of Risk/Frequency of Follow-up:  (F/U 1x weekly)     Assessment and Plan    No new Assessment & Plan notes have been filed under this hospital service since the last note was generated.  Service: Nutrition   Assessment and Plan     Nutrition Diagnosis:  Problem:  Inadequate Energy Intake   Etiology: decreased appetite/lethargy   Signs/Symptoms: 25-50% po intake with meals    Status: improving (consuming 50 - 100% of meals, drinking nutritional supplements)     Monitor and Evaluation    Food and Nutrient Intake: energy intake, food and beverage intake  Food and Nutrient Adminstration: diet order, other (specify) (supplement order)  Knowledge/Beliefs/Attitudes: food and nutrition knowledge/skill  Physical Activity and Function: nutrition-related ADLs and IADLs  Anthropometric Measurements: weight, weight change  Biochemical Data, Medical Tests and Procedures: electrolyte and renal panel  Nutrition-Focused Physical Findings: overall appearance     Nutrition Follow-Up    RD Follow-up?: Yes

## 2018-04-19 NOTE — PROGRESS NOTES
Pt attempted to get out of bed, JACKY Clay and Rolanda RN and myself attempted to get pt to lay back down, pt was assisted in the resting position, meds passed.  Pt said some foul words and impolitely asked us to leave.  Avasys in place, pt in bed, siderails x 4 up.  Will continue to monitor.

## 2018-04-20 PROCEDURE — 63600175 PHARM REV CODE 636 W HCPCS: Performed by: HOSPITALIST

## 2018-04-20 PROCEDURE — 21400001 HC TELEMETRY ROOM

## 2018-04-20 PROCEDURE — 25000003 PHARM REV CODE 250: Performed by: INTERNAL MEDICINE

## 2018-04-20 PROCEDURE — 25000003 PHARM REV CODE 250: Performed by: HOSPITALIST

## 2018-04-20 RX ADMIN — DOCUSATE SODIUM 100 MG: 100 CAPSULE, LIQUID FILLED ORAL at 10:04

## 2018-04-20 RX ADMIN — RIVASTIGMINE TRANSDERMAL SYSTEM 1 PATCH: 4.6 PATCH, EXTENDED RELEASE TRANSDERMAL at 01:04

## 2018-04-20 RX ADMIN — DIVALPROEX SODIUM 250 MG: 250 TABLET, DELAYED RELEASE ORAL at 10:04

## 2018-04-20 RX ADMIN — CARBIDOPA AND LEVODOPA 2 TABLET: 25; 100 TABLET ORAL at 04:04

## 2018-04-20 RX ADMIN — CARBIDOPA AND LEVODOPA 2 TABLET: 25; 100 TABLET ORAL at 10:04

## 2018-04-20 RX ADMIN — QUETIAPINE FUMARATE 100 MG: 100 TABLET ORAL at 10:04

## 2018-04-20 RX ADMIN — ENOXAPARIN SODIUM 40 MG: 100 INJECTION SUBCUTANEOUS at 04:04

## 2018-04-20 NOTE — PROGRESS NOTES
[facility] Hospital Medicine  Progress Note    Patient Name: Tenzin Ortiz  MRN: [id number]  Patient Class: IP- Inpatient   Admission Date: 2/7/2018  Length of Stay: 52 days  Attending Physician: Olivia Del Rosario MD  Primary Care Provider: Efrain Chavez MD        Subjective:     Principal Problem:Debility    HPI:  Patient is 78 yo male with HTN, HLD, and Parkinson's who presents to ED reportedly for HA. Per ED note patient was complaining of HA but he denies this on my interview. Seems there was some confusion in ED and he was unclear why he was here. During interview with me, patient reports being here for NH placement although he does not appear to be very happy about this. He has been with some issues caring for himself. Currently lives with his girlfriend who reports she can no longer care for him. He states he currently gets around with a walker but sometimes has difficulty getting up out of his chair. Per chart review patient is with Parkinson's and sees neurology, Dr. Castellanos, lastly seen on 2/6/18. He was with hallucinations which were noted to be secondary to dopaminergic agents thus having to significantly reduce dose--she recommended he be admitted for placement at that time but patient declined. He has been attempting placement as an outpatient through PCP but without much luck. He otherwise denies recent illness, fever/chills, CP, SOB, abdominal pain, N/V/D, dysuria. On presentation patient with grossly normal labs/vitals. Placed in observation for what appears to be solely placement although this is something that may need to be continued as outpatient as is not indication for hospitalization.    Hospital Course:  Mr. Ortiz was admitted with severe Parkinsons dementia and can no longer care for himself. He was living with his girlfriend, but she can no longer care for the patient. Case management found an accepting facility at [place]; however, POA declined signing the necessary  paperwork for placement. Legal was consulted and judicial guardianship of the patient in process with court date set for April 24, 2018.    Interval History: no events. Clinically unchanged    Review of Systems   Constitutional: Negative for fever.   Respiratory: Negative for shortness of breath.    Cardiovascular: Negative for chest pain.     Objective:     Vital Signs (Most Recent):  Temp: 98.9 °F (37.2 °C) (04/20/18 0742)  Pulse: 95 (04/20/18 0742)  Resp: 18 (04/20/18 0742)  BP: (!) 140/60 (04/20/18 0742)  SpO2: 96 % (04/20/18 0742) Vital Signs (24h Range):  Temp:  [97.8 °F (36.6 °C)-98.9 °F (37.2 °C)] 98.9 °F (37.2 °C)  Pulse:  [81-95] 95  Resp:  [18] 18  SpO2:  [96 %-97 %] 96 %  BP: (128-153)/(56-67) 140/60     Weight: 59.8 kg (131 lb 13.4 oz)  Body mass index is 20.67 kg/m².    Intake/Output Summary (Last 24 hours) at 04/20/18 1425  Last data filed at 04/19/18 1700   Gross per 24 hour   Intake               50 ml   Output                0 ml   Net               50 ml      Physical Exam   Constitutional: He appears well-developed and well-nourished. No distress.   HENT:   Head: Normocephalic and atraumatic.   Eyes: EOM are normal. Pupils are equal, round, and reactive to light.   Neck: Normal range of motion. Neck supple.   Cardiovascular: Normal rate and regular rhythm.    Pulmonary/Chest: Effort normal and breath sounds normal.   Abdominal: Soft. Bowel sounds are normal.   Musculoskeletal: Normal range of motion.   Neurological: He is alert.   Oriented x 2, and cooperative   Skin: Skin is warm and dry.   Psychiatric: His affect is blunt. His speech is delayed. Cognition and memory are impaired.       Significant Labs: None    Assessment/Plan:      * Debility    Pt was living with a significant other who can no longer care for him  His estranged daughter was contacted and was willing to aid in finding NH placement (she had already begun this process as an outpatient) but patient's POA (a business friend)  refused to sign consent for NH placement.   Patient has no capacity for decision making as per Psych  Depakote 250mg TID to help with mood stabilization  Guardianship process ongoing with court date for 4/24/18  He is medically cleared for transfer once nursing facility arranged        Lewy body dementia without behavioral disturbance    Seen by Psych   Continue Sinemet  QID and Seroquel 50 QHS  Continue Exelon 4.6 mg/24 patch  Divalproex 250 mg to BID  Held trazodone        Essential hypertension    Resume norvasc.        Parkinson disease    Seroquel 50 mg; sinemet  mg  Discontinued taxodione 50 mg  On exelon 4.6 mg in the morning when he was more awake          VTE Risk Mitigation         Ordered     enoxaparin injection 40 mg  Daily      04/09/18 1423     Medium Risk of VTE  Once      02/08/18 0653     Place JOSEFINA hose  Until discontinued      02/08/18 0653     Place sequential compression device  Until discontinued      02/08/18 0653        No change in management. Court date 4/24      Olivia Elizalde MD  Department of Hospital Medicine   [facility]

## 2018-04-20 NOTE — PLAN OF CARE
Problem: Patient Care Overview  Goal: Plan of Care Review  Outcome: Ongoing (interventions implemented as appropriate)   04/20/18 1123   Coping/Psychosocial   Plan Of Care Reviewed With patient       Problem: Fall Risk (Adult)  Goal: Absence of Falls  Patient will demonstrate the desired outcomes by discharge/transition of care.   Outcome: Ongoing (interventions implemented as appropriate)   04/20/18 1123   Fall Risk (Adult)   Absence of Falls making progress toward outcome       Problem: Pressure Ulcer Risk (Mike Scale) (Adult,Obstetrics,Pediatric)  Goal: Skin Integrity  Patient will demonstrate the desired outcomes by discharge/transition of care.   Outcome: Ongoing (interventions implemented as appropriate)   04/20/18 1123   Pressure Ulcer Risk (Mike Scale) (Adult,Obstetrics,Pediatric)   Skin Integrity making progress toward outcome       Problem: Confusion, Acute (Adult)  Goal: Cognitive/Functional Impairments Minimized  Patient will demonstrate the desired outcomes by discharge/transition of care.   Outcome: Ongoing (interventions implemented as appropriate)   04/20/18 1123   Confusion, Acute (Adult)   Cognitive/Functional Impairments Minimized making progress toward outcome     Goal: Safety  Patient will demonstrate the desired outcomes by discharge/transition of care.   Outcome: Ongoing (interventions implemented as appropriate)   04/20/18 1123   Confusion, Acute (Adult)   Safety making progress toward outcome       Problem: Functional Deficit (Adult,Obstetrics,Pediatric)  Goal: Signs and Symptoms of Listed Potential Problems Will be Absent, Minimized or Managed (Functional Deficit)  Signs and symptoms of listed potential problems will be absent, minimized or managed by discharge/transition of care (reference Functional Deficit (Adult,Obstetrics,Pediatric) CPG).   Outcome: Ongoing (interventions implemented as appropriate)   04/20/18 1123   Functional Deficit   Problems Assessed (Functional Deficit) all    Problems Present (Functional Deficit) coordination impairment;muscle strength impairment       Problem: Nutrition, Imbalanced: Inadequate Oral Intake (Adult)  Goal: Improved Oral Intake  Patient will demonstrate the desired outcomes by discharge/transition of care.   Outcome: Ongoing (interventions implemented as appropriate)   04/20/18 1123   Nutrition, Imbalanced: Inadequate Oral Intake (Adult)   Improved Oral Intake making progress toward outcome     Goal: Prevent Further Weight Loss  Patient will demonstrate the desired outcomes by discharge/transition of care.   Outcome: Ongoing (interventions implemented as appropriate)   04/20/18 1123   Nutrition, Imbalanced: Inadequate Oral Intake (Adult)   Prevent Further Weight Loss making progress toward outcome       Problem: Sleep Pattern Disturbance (Adult)  Goal: Adequate Sleep/Rest  Patient will demonstrate the desired outcomes by discharge/transition of care.   Outcome: Ongoing (interventions implemented as appropriate)   04/20/18 1123   Sleep Pattern Disturbance (Adult)   Adequate Sleep/Rest making progress toward outcome

## 2018-04-20 NOTE — SUBJECTIVE & OBJECTIVE
Interval History: no events. Clinically unchanged    Review of Systems   Constitutional: Negative for fever.   Respiratory: Negative for shortness of breath.    Cardiovascular: Negative for chest pain.     Objective:     Vital Signs (Most Recent):  Temp: 98.9 °F (37.2 °C) (04/20/18 0742)  Pulse: 95 (04/20/18 0742)  Resp: 18 (04/20/18 0742)  BP: (!) 140/60 (04/20/18 0742)  SpO2: 96 % (04/20/18 0742) Vital Signs (24h Range):  Temp:  [97.8 °F (36.6 °C)-98.9 °F (37.2 °C)] 98.9 °F (37.2 °C)  Pulse:  [81-95] 95  Resp:  [18] 18  SpO2:  [96 %-97 %] 96 %  BP: (128-153)/(56-67) 140/60     Weight: 59.8 kg (131 lb 13.4 oz)  Body mass index is 20.67 kg/m².    Intake/Output Summary (Last 24 hours) at 04/20/18 1425  Last data filed at 04/19/18 1700   Gross per 24 hour   Intake               50 ml   Output                0 ml   Net               50 ml      Physical Exam   Constitutional: He appears well-developed and well-nourished. No distress.   HENT:   Head: Normocephalic and atraumatic.   Eyes: EOM are normal. Pupils are equal, round, and reactive to light.   Neck: Normal range of motion. Neck supple.   Cardiovascular: Normal rate and regular rhythm.    Pulmonary/Chest: Effort normal and breath sounds normal.   Abdominal: Soft. Bowel sounds are normal.   Musculoskeletal: Normal range of motion.   Neurological: He is alert.   Oriented x 2, and cooperative   Skin: Skin is warm and dry.   Psychiatric: His affect is blunt. His speech is delayed. Cognition and memory are impaired.       Significant Labs: None

## 2018-04-20 NOTE — PLAN OF CARE
Problem: Patient Care Overview  Goal: Plan of Care Review  Outcome: Ongoing (interventions implemented as appropriate)   04/19/18 1926   Coping/Psychosocial   Plan Of Care Reviewed With other (see comments)  (no family at bedside, pt unable to comprehend)

## 2018-04-20 NOTE — PLAN OF CARE
Problem: Fall Risk (Adult)  Intervention: Reduce Risk/Promote Restraint Free Environment   18 0533   Safety Interventions   Environmental Safety Modification room organization consistent;room near unit station   Prevent [place] Drop/Fall   Safety/Security Measures bed alarm set

## 2018-04-21 PROCEDURE — 25000003 PHARM REV CODE 250: Performed by: HOSPITALIST

## 2018-04-21 PROCEDURE — 21400001 HC TELEMETRY ROOM

## 2018-04-21 PROCEDURE — 63600175 PHARM REV CODE 636 W HCPCS: Performed by: HOSPITALIST

## 2018-04-21 PROCEDURE — 25000003 PHARM REV CODE 250: Performed by: INTERNAL MEDICINE

## 2018-04-21 RX ADMIN — CARBIDOPA AND LEVODOPA 2 TABLET: 25; 100 TABLET ORAL at 04:04

## 2018-04-21 RX ADMIN — ENOXAPARIN SODIUM 40 MG: 100 INJECTION SUBCUTANEOUS at 04:04

## 2018-04-21 RX ADMIN — RIVASTIGMINE TRANSDERMAL SYSTEM 1 PATCH: 4.6 PATCH, EXTENDED RELEASE TRANSDERMAL at 08:04

## 2018-04-21 RX ADMIN — CARBIDOPA AND LEVODOPA 2 TABLET: 25; 100 TABLET ORAL at 08:04

## 2018-04-21 RX ADMIN — AMLODIPINE BESYLATE 10 MG: 5 TABLET ORAL at 08:04

## 2018-04-21 RX ADMIN — POLYETHYLENE GLYCOL 3350 17 G: 17 POWDER, FOR SOLUTION ORAL at 08:04

## 2018-04-21 RX ADMIN — QUETIAPINE FUMARATE 100 MG: 100 TABLET ORAL at 08:04

## 2018-04-21 RX ADMIN — DIVALPROEX SODIUM 250 MG: 250 TABLET, DELAYED RELEASE ORAL at 08:04

## 2018-04-21 RX ADMIN — DOCUSATE SODIUM 100 MG: 100 CAPSULE, LIQUID FILLED ORAL at 08:04

## 2018-04-21 RX ADMIN — CARBIDOPA AND LEVODOPA 2 TABLET: 25; 100 TABLET ORAL at 01:04

## 2018-04-21 NOTE — PLAN OF CARE
Problem: Fall Risk (Adult)  Intervention: Patient Rounds   04/21/18 0539   Safety Interventions   Patient Rounds bed in low position;bed wheels locked;call light in reach;clutter free environment maintained;placement of personal items at bedside;toileting offered;visualized patient     Intervention: Safety Promotion/Fall Prevention   04/21/18 0539   Safety Interventions   Safety Promotion/Fall Prevention assistive device/personal item within reach;bed alarm set;diversional activities provided;Fall Risk reviewed with patient/family;Fall Risk signage in place;lighting adjusted;medications reviewed;muscle strengthening facilitated;nonskid shoes/socks when out of bed;room near unit station;side rails raised x 3;instructed to call staff for mobility     Intervention: Safety Precautions   04/21/18 0539   Safety Interventions   Safety Precautions emergency equipment at bedside       Goal: Absence of Falls  Patient will demonstrate the desired outcomes by discharge/transition of care.   Outcome: Ongoing (interventions implemented as appropriate)   04/21/18 0539   Fall Risk (Adult)   Absence of Falls making progress toward outcome

## 2018-04-21 NOTE — SUBJECTIVE & OBJECTIVE
Interval History: no events. Clinically unchanged    Review of Systems   Constitutional: Negative for fever.   Respiratory: Negative for shortness of breath.    Cardiovascular: Negative for chest pain.     Objective:     Vital Signs (Most Recent):  Temp: 97.6 °F (36.4 °C) (04/21/18 0723)  Pulse: 89 (04/21/18 0723)  Resp: 18 (04/21/18 0723)  BP: (!) 134/58 (04/21/18 0723)  SpO2: 98 % (04/21/18 0723) Vital Signs (24h Range):  Temp:  [97.6 °F (36.4 °C)-99.1 °F (37.3 °C)] 97.6 °F (36.4 °C)  Pulse:  [86-95] 89  Resp:  [18-20] 18  SpO2:  [97 %-99 %] 98 %  BP: (118-144)/(55-65) 134/58     Weight: 59.5 kg (131 lb 2.8 oz)  Body mass index is 20.56 kg/m².    Intake/Output Summary (Last 24 hours) at 04/21/18 1158  Last data filed at 04/20/18 1700   Gross per 24 hour   Intake              250 ml   Output              200 ml   Net               50 ml      Physical Exam   Constitutional: He appears well-developed and well-nourished. No distress.   HENT:   Head: Normocephalic and atraumatic.   Eyes: EOM are normal. Pupils are equal, round, and reactive to light.   Neck: Normal range of motion. Neck supple.   Cardiovascular: Normal rate and regular rhythm.    Pulmonary/Chest: Effort normal and breath sounds normal.   Abdominal: Soft. Bowel sounds are normal.   Musculoskeletal: Normal range of motion.   Neurological: He is alert.   Oriented x 2, and cooperative   Skin: Skin is warm and dry.   Psychiatric: His affect is blunt. His speech is delayed. Cognition and memory are impaired.       Significant Labs: None

## 2018-04-21 NOTE — PROGRESS NOTES
[facility] Hospital Medicine  Progress Note    Patient Name: Tenzin Ortiz  MRN: [id number]  Patient Class: IP- Inpatient   Admission Date: 2/7/2018  Length of Stay: 53 days  Attending Physician: Olivia Del Rosario MD  Primary Care Provider: Efrain Chavez MD        Subjective:     Principal Problem:Debility    HPI:  Patient is 78 yo male with HTN, HLD, and Parkinson's who presents to ED reportedly for HA. Per ED note patient was complaining of HA but he denies this on my interview. Seems there was some confusion in ED and he was unclear why he was here. During interview with me, patient reports being here for NH placement although he does not appear to be very happy about this. He has been with some issues caring for himself. Currently lives with his girlfriend who reports she can no longer care for him. He states he currently gets around with a walker but sometimes has difficulty getting up out of his chair. Per chart review patient is with Parkinson's and sees neurology, Dr. Castellanos, lastly seen on 2/6/18. He was with hallucinations which were noted to be secondary to dopaminergic agents thus having to significantly reduce dose--she recommended he be admitted for placement at that time but patient declined. He has been attempting placement as an outpatient through PCP but without much luck. He otherwise denies recent illness, fever/chills, CP, SOB, abdominal pain, N/V/D, dysuria. On presentation patient with grossly normal labs/vitals. Placed in observation for what appears to be solely placement although this is something that may need to be continued as outpatient as is not indication for hospitalization.    Hospital Course:  Mr. Ortiz was admitted with severe Parkinsons dementia and can no longer care for himself. He was living with his girlfriend, but she can no longer care for the patient. Case management found an accepting facility at [place]; however, POA declined signing the necessary  paperwork for placement. Legal was consulted and judicial guardianship of the patient in process with court date set for April 24, 2018.    Interval History: no events. Clinically unchanged    Review of Systems   Constitutional: Negative for fever.   Respiratory: Negative for shortness of breath.    Cardiovascular: Negative for chest pain.     Objective:     Vital Signs (Most Recent):  Temp: 97.6 °F (36.4 °C) (04/21/18 0723)  Pulse: 89 (04/21/18 0723)  Resp: 18 (04/21/18 0723)  BP: (!) 134/58 (04/21/18 0723)  SpO2: 98 % (04/21/18 0723) Vital Signs (24h Range):  Temp:  [97.6 °F (36.4 °C)-99.1 °F (37.3 °C)] 97.6 °F (36.4 °C)  Pulse:  [86-95] 89  Resp:  [18-20] 18  SpO2:  [97 %-99 %] 98 %  BP: (118-144)/(55-65) 134/58     Weight: 59.5 kg (131 lb 2.8 oz)  Body mass index is 20.56 kg/m².    Intake/Output Summary (Last 24 hours) at 04/21/18 1158  Last data filed at 04/20/18 1700   Gross per 24 hour   Intake              250 ml   Output              200 ml   Net               50 ml      Physical Exam   Constitutional: He appears well-developed and well-nourished. No distress.   HENT:   Head: Normocephalic and atraumatic.   Eyes: EOM are normal. Pupils are equal, round, and reactive to light.   Neck: Normal range of motion. Neck supple.   Cardiovascular: Normal rate and regular rhythm.    Pulmonary/Chest: Effort normal and breath sounds normal.   Abdominal: Soft. Bowel sounds are normal.   Musculoskeletal: Normal range of motion.   Neurological: He is alert.   Oriented x 2, and cooperative   Skin: Skin is warm and dry.   Psychiatric: His affect is blunt. His speech is delayed. Cognition and memory are impaired.       Significant Labs: None    Assessment/Plan:      * Debility    Pt was living with a significant other who can no longer care for him  His estranged daughter was contacted and was willing to aid in finding NH placement (she had already begun this process as an outpatient) but patient's POA (a business friend)  refused to sign consent for NH placement.   Patient has no capacity for decision making as per Psych  Depakote 250mg TID to help with mood stabilization  Guardianship process ongoing with court date for 4/24/18  He is medically cleared for transfer once nursing facility arranged        Lewy body dementia without behavioral disturbance    Seen by Psych   Continue Sinemet  QID and Seroquel 50 QHS  Continue Exelon 4.6 mg/24 patch  Divalproex 250 mg to BID  Held trazodone        Essential hypertension    Resume norvasc.        Parkinson disease    Seroquel 50 mg; sinemet  mg  Discontinued taxodione 50 mg  On exelon 4.6 mg in the morning when he was more awake          VTE Risk Mitigation         Ordered     enoxaparin injection 40 mg  Daily      04/09/18 1423     Medium Risk of VTE  Once      02/08/18 0653     Place JOSEFINA hose  Until discontinued      02/08/18 0653     Place sequential compression device  Until discontinued      02/08/18 0653          Will add a set of routine labs in AM. Otherwise no change in management. Court date 4/24    Olivia Elizalde MD  Department of Hospital Medicine   [facility]

## 2018-04-21 NOTE — PLAN OF CARE
Problem: Fall Risk (Adult)  Goal: Identify Related Risk Factors and Signs and Symptoms  Related risk factors and signs and symptoms are identified upon initiation of Human Response Clinical Practice Guideline (CPG)    04/21/18 1104   Fall Risk   Related Risk Factors (Fall Risk) confusion/agitation;gait/mobility problems;polypharmacy   Signs and Symptoms (Fall Risk) presence of risk    04/21/18 1104   Fall Risk   Related Risk Factors (Fall Risk) confusion/agitation;gait/mobility problems;polypharmacy   Signs and Symptoms (Fall Risk) presence of risk factors   factors       Problem: Functional Deficit (Adult,Obstetrics,Pediatric)  Goal: Signs and Symptoms of Listed Potential Problems Will be Absent, Minimized or Managed (Functional Deficit)  Signs and symptoms of listed potential problems will be absent, minimized or managed by discharge/transition of care (reference Functional Deficit (Adult,Obstetrics,Pediatric) CPG).    04/21/18 1104   Functional Deficit   Problems Assessed (Functional Deficit) functional activity tolerance impairment;muscle strength impairment   Problems Present (Functional Deficit) balance impairment;coordination impairment;functional activity tolerance impairment;muscle strength impairment

## 2018-04-22 LAB
ANION GAP SERPL CALC-SCNC: 12 MMOL/L
BASOPHILS # BLD AUTO: 0.01 K/UL
BASOPHILS NFR BLD: 0.2 %
BUN SERPL-MCNC: 26 MG/DL
CALCIUM SERPL-MCNC: 8.8 MG/DL
CHLORIDE SERPL-SCNC: 106 MMOL/L
CO2 SERPL-SCNC: 20 MMOL/L
CREAT SERPL-MCNC: 0.9 MG/DL
DIFFERENTIAL METHOD: ABNORMAL
EOSINOPHIL # BLD AUTO: 0.3 K/UL
EOSINOPHIL NFR BLD: 5.7 %
ERYTHROCYTE [DISTWIDTH] IN BLOOD BY AUTOMATED COUNT: 15.5 %
EST. GFR  (AFRICAN AMERICAN): >60 ML/MIN/1.73 M^2
EST. GFR  (NON AFRICAN AMERICAN): >60 ML/MIN/1.73 M^2
GLUCOSE SERPL-MCNC: 71 MG/DL
HCT VFR BLD AUTO: 36.5 %
HGB BLD-MCNC: 11.9 G/DL
LYMPHOCYTES # BLD AUTO: 1.5 K/UL
LYMPHOCYTES NFR BLD: 31.7 %
MCH RBC QN AUTO: 28.3 PG
MCHC RBC AUTO-ENTMCNC: 32.6 G/DL
MCV RBC AUTO: 87 FL
MONOCYTES # BLD AUTO: 1.1 K/UL
MONOCYTES NFR BLD: 23 %
NEUTROPHILS # BLD AUTO: 1.8 K/UL
NEUTROPHILS NFR BLD: 38.7 %
PLATELET # BLD AUTO: 206 K/UL
PMV BLD AUTO: 10.9 FL
POTASSIUM SERPL-SCNC: 4.3 MMOL/L
RBC # BLD AUTO: 4.21 M/UL
SODIUM SERPL-SCNC: 138 MMOL/L
WBC # BLD AUTO: 4.57 K/UL

## 2018-04-22 PROCEDURE — 21400001 HC TELEMETRY ROOM

## 2018-04-22 PROCEDURE — 36415 COLL VENOUS BLD VENIPUNCTURE: CPT

## 2018-04-22 PROCEDURE — 63600175 PHARM REV CODE 636 W HCPCS: Performed by: HOSPITALIST

## 2018-04-22 PROCEDURE — 25000003 PHARM REV CODE 250: Performed by: HOSPITALIST

## 2018-04-22 PROCEDURE — 85025 COMPLETE CBC W/AUTO DIFF WBC: CPT

## 2018-04-22 PROCEDURE — 25000003 PHARM REV CODE 250: Performed by: INTERNAL MEDICINE

## 2018-04-22 PROCEDURE — 80048 BASIC METABOLIC PNL TOTAL CA: CPT

## 2018-04-22 RX ORDER — BENZONATATE 100 MG/1
100 CAPSULE ORAL 3 TIMES DAILY PRN
Status: DISCONTINUED | OUTPATIENT
Start: 2018-04-22 | End: 2018-05-05 | Stop reason: HOSPADM

## 2018-04-22 RX ADMIN — CARBIDOPA AND LEVODOPA 2 TABLET: 25; 100 TABLET ORAL at 08:04

## 2018-04-22 RX ADMIN — BENZONATATE 100 MG: 100 CAPSULE ORAL at 11:04

## 2018-04-22 RX ADMIN — AMLODIPINE BESYLATE 10 MG: 5 TABLET ORAL at 08:04

## 2018-04-22 RX ADMIN — CARBIDOPA AND LEVODOPA 2 TABLET: 25; 100 TABLET ORAL at 12:04

## 2018-04-22 RX ADMIN — DOCUSATE SODIUM 100 MG: 100 CAPSULE, LIQUID FILLED ORAL at 08:04

## 2018-04-22 RX ADMIN — RIVASTIGMINE TRANSDERMAL SYSTEM 1 PATCH: 4.6 PATCH, EXTENDED RELEASE TRANSDERMAL at 08:04

## 2018-04-22 RX ADMIN — ENOXAPARIN SODIUM 40 MG: 100 INJECTION SUBCUTANEOUS at 04:04

## 2018-04-22 RX ADMIN — DIVALPROEX SODIUM 250 MG: 250 TABLET, DELAYED RELEASE ORAL at 08:04

## 2018-04-22 RX ADMIN — POLYETHYLENE GLYCOL 3350 17 G: 17 POWDER, FOR SOLUTION ORAL at 08:04

## 2018-04-22 RX ADMIN — DOCUSATE SODIUM AND SENNOSIDES 1 TABLET: 8.6; 5 TABLET, FILM COATED ORAL at 08:04

## 2018-04-22 RX ADMIN — CARBIDOPA AND LEVODOPA 2 TABLET: 25; 100 TABLET ORAL at 04:04

## 2018-04-22 RX ADMIN — QUETIAPINE FUMARATE 100 MG: 100 TABLET ORAL at 08:04

## 2018-04-22 NOTE — PROGRESS NOTES
Note that patient arousing to voice this afternoon;      Given PRN cough medication this am;  Cough infrequent and non-productive;     Although he is willing to take his oral medication, he continues to refuse to eat;  I attempted to feed him but he spit the food out;      He asked for lemonade earlier this afternoon and I called nutrition with the request;  Lemonade was brought to the floor and patient had lemonade with his dinner tray;  He seemed to enjoy the lemonade.     Will continue to f/u;

## 2018-04-22 NOTE — PLAN OF CARE
Problem: Patient Care Overview  Goal: Plan of Care Review  Outcome: Ongoing (interventions implemented as appropriate)   18 0330   Coping/Psychosocial   Plan Of Care Reviewed With patient       Problem: Fall Risk (Adult)  Intervention: Monitor/Assist with Self Care   18 19418   Activity   Activity Assistance Provided --  assistance, 1 person   Daily Care Interventions   Self-Care Promotion independence encouraged;BADL personal objects within reach;BADL personal routines maintained;safe use of adaptive equipment encouraged --    Functional Level Current   Ambulation 3 - assistive equipment and person --    Transferring 2 - assistive person --    Toileting 3 - assistive equipment and person --    Bathing 2 - assistive person --    Dressing 2 - assistive person --    Eating 2 - assistive person --    Communication 0 - understands/communicates without difficulty --    Swallowing 0 - swallows foods/liquids without difficulty --      Intervention: Reduce Risk/Promote Restraint Free Environment   18 0533 18   Safety Interventions   Environmental Safety Modification room organization consistent;room near unit station --    Prevent Franklin Drop/Fall   Safety/Security Measures bed alarm set --    Safety Interventions   Safety Precautions --  emergency equipment at bedside     Intervention: Review Medications/Identify Contributors to Fall Risk   18 0533   Safety Interventions   Medication Review/Management medications reviewed     Intervention: Patient Rounds   18   Safety Interventions   Patient Rounds bed in low position;bed wheels locked;call light in reach;clutter free environment maintained;ID band on;placement of personal items at bedside;toileting offered;visualized patient     Intervention: Safety Promotion/Fall Prevention   18   Safety Interventions   Safety Promotion/Fall Prevention assistive device/personal item within reach;bed alarm set;high  risk medications identified;lighting adjusted;medications reviewed;nonskid shoes/socks when out of bed;room near unit station;/camera at bedside;side rails raised x 2     Intervention: Safety Precautions   04/22/18 0208   Safety Interventions   Safety Precautions emergency equipment at bedside       Goal: Identify Related Risk Factors and Signs and Symptoms  Related risk factors and signs and symptoms are identified upon initiation of Human Response Clinical Practice Guideline (CPG)   Outcome: Ongoing (interventions implemented as appropriate)   04/21/18 1104   Fall Risk   Related Risk Factors (Fall Risk) confusion/agitation;gait/mobility problems;polypharmacy   Signs and Symptoms (Fall Risk) presence of risk factors     Goal: Absence of Falls  Patient will demonstrate the desired outcomes by discharge/transition of care.   Outcome: Ongoing (interventions implemented as appropriate)   04/22/18 0330   Fall Risk (Adult)   Absence of Falls making progress toward outcome       Problem: Pressure Ulcer Risk (Mike Scale) (Adult,Obstetrics,Pediatric)  Intervention: Prevent/Manage Excess Moisture   04/19/18 2000 04/21/18 1940   Hygiene Care   Perineal Care --  perineum cleansed;diaper changed   Bathing/Skin Care --  incontinence care   Skin Interventions   Skin Protection Incontinence pads --      Intervention: Maintain Head of Bed Elevation Less Than 30 Degrees as Tolerated   04/22/18 0208   Positioning   Head of Bed (HOB) HOB at 20-30 degrees     Intervention: Prevent/Minimize Sheer/Friction Injuries   04/20/18 1942 04/21/18 1940   Positioning   Positioning/Transfer Devices --  pillows   Skin Interventions   Pressure Reduction Devices Pressure-redistributing mattress utilized --    Pressure Reduction Techniques --  frequent weight shift encouraged;weight shift assistance provided     Intervention: Turn/Reposition Often   04/21/18 1940 04/22/18 0208   Positioning   Body Position --  positioned/repositioned  independently   Skin Interventions   Pressure Reduction Techniques frequent weight shift encouraged;weight shift assistance provided --        Goal: Identify Related Risk Factors and Signs and Symptoms  Related risk factors and signs and symptoms are identified upon initiation of Human Response Clinical Practice Guideline (CPG)   Outcome: Ongoing (interventions implemented as appropriate)   04/16/18 1422   Pressure Ulcer Risk (Mike Scale)   Related Risk Factors (Pressure Ulcer Risk (Mike Scale)) cognitive impairment;excretions/secretions;hospitalization prolonged;mechanical forces;mental impairment;mobility impaired;nutritional deficiencies     Goal: Skin Integrity  Patient will demonstrate the desired outcomes by discharge/transition of care.   Outcome: Ongoing (interventions implemented as appropriate)   04/20/18 1123   Pressure Ulcer Risk (Mike Scale) (Adult,Obstetrics,Pediatric)   Skin Integrity making progress toward outcome       Problem: Confusion, Acute (Adult)  Intervention: Monitor/Assist with Self Care   04/21/18 1940 04/22/18 0208   Activity   Activity Assistance Provided --  assistance, 1 person   Daily Care Interventions   Self-Care Promotion independence encouraged;BADL personal objects within reach;BADL personal routines maintained;safe use of adaptive equipment encouraged --    Functional Level Current   Ambulation 3 - assistive equipment and person --    Transferring 2 - assistive person --    Toileting 3 - assistive equipment and person --    Bathing 2 - assistive person --    Dressing 2 - assistive person --    Eating 2 - assistive person --    Communication 0 - understands/communicates without difficulty --    Swallowing 0 - swallows foods/liquids without difficulty --      Intervention: Reduce Risk/Promote Restraint Free Environment   04/20/18 0533 04/22/18 0208   Safety Interventions   Environmental Safety Modification room organization consistent;room near unit station --    Prevent   Drop/Fall   Safety/Security Measures bed alarm set --    Safety Interventions   Safety Precautions --  emergency equipment at bedside     Intervention: Evaluate Medications/Identify Contributors to Confusion   18 0533   Safety Interventions   Medication Review/Management medications reviewed     Intervention: Optimize Communication   18   Cognitive Interventions   Communication Enhancement Strategies call light answered in person;one-step directions provided     Intervention: Promote Familiarity/Consistency   18   Coping/Psychosocial Interventions   Environment Familiarity/Consistency daily routine followed     Intervention: Provide Frequent Orientation/Reorientation   18   Cognitive Interventions   Reorientation Measures clock in view;reorientation provided;glasses encouraged   Sensory Stimulation Regulation care clustered;lighting decreased;music/television provided for relaxation       Goal: Identify Related Risk Factors and Signs and Symptoms  Related risk factors and signs and symptoms are identified upon initiation of Human Response Clinical Practice Guideline (CPG)   Outcome: Ongoing (interventions implemented as appropriate)   18 1155   Confusion, Acute   Related Risk Factors (Acute Confusion) advanced age;cognitive impairment;malnutrition   Signs and Symptoms (Acute Confusion) disorientation;emotional/behavioral disturbances;memory disturbed;thought process diminished/disorganized     Goal: Cognitive/Functional Impairments Minimized  Patient will demonstrate the desired outcomes by discharge/transition of care.   Outcome: Ongoing (interventions implemented as appropriate)   18 0330   Confusion, Acute (Adult)   Cognitive/Functional Impairments Minimized making progress toward outcome     Goal: Safety  Patient will demonstrate the desired outcomes by discharge/transition of care.   Outcome: Ongoing (interventions implemented as appropriate)   18  1123   Confusion, Acute (Adult)   Safety making progress toward outcome       Problem: Functional Deficit (Adult,Obstetrics,Pediatric)  Intervention: Optimize Functional Ability   04/21/18 1940 04/22/18 0208   Activity   Activity Type --  bedrest;activity adjusted per tolerance   Daily Care Interventions   Self-Care Promotion independence encouraged;BADL personal objects within reach;BADL personal routines maintained;safe use of adaptive equipment encouraged --      Intervention: Support Psychosocial Response to Functional Impairment   04/21/18 1940   Coping/Psychosocial Interventions   Supportive Measures verbalization of feelings encouraged     Intervention: Optimize/Manage Energy Expenditure   04/04/18 1942 04/11/18 1813 04/21/18 1940   Nutrition Interventions   Oral Nutrition Promotion --  calorie dense foods provided;physical activity promoted --    Coping/Psychosocial Interventions   Environmental Support --  --  calm environment promoted;distractions minimized;environmental consistency promoted;personal routine supported   Pain/Comfort/Sleep Interventions   Sleep/Rest Enhancement noise level reduced --  --      Intervention: Prevent Injury Related to Sensory Impairment   04/19/18 2000 04/20/18 1942   Skin Interventions   Pressure Reduction Devices --  Pressure-redistributing mattress utilized   Skin Protection Incontinence pads --        Goal: Signs and Symptoms of Listed Potential Problems Will be Absent, Minimized or Managed (Functional Deficit)  Signs and symptoms of listed potential problems will be absent, minimized or managed by discharge/transition of care (reference Functional Deficit (Adult,Obstetrics,Pediatric) CPG).   Outcome: Ongoing (interventions implemented as appropriate)   04/21/18 1104   Functional Deficit   Problems Assessed (Functional Deficit) functional activity tolerance impairment;muscle strength impairment   Problems Present (Functional Deficit) balance impairment;coordination  impairment;functional activity tolerance impairment;muscle strength impairment       Problem: Nutrition, Imbalanced: Inadequate Oral Intake (Adult)  Goal: Identify Related Risk Factors and Signs and Symptoms  Related risk factors and signs and symptoms are identified upon initiation of Human Response Clinical Practice Guideline (CPG)   Outcome: Ongoing (interventions implemented as appropriate)   04/11/18 1813   Nutrition, Imbalanced: Inadequate Oral Intake   Related Risk Factors (Nutrition Imbalance, Inadequate Oral Intake) chronic illness/infection;knowledge deficit     Goal: Improved Oral Intake  Patient will demonstrate the desired outcomes by discharge/transition of care.   Outcome: Ongoing (interventions implemented as appropriate)   04/22/18 0330   Nutrition, Imbalanced: Inadequate Oral Intake (Adult)   Improved Oral Intake making progress toward outcome     Goal: Prevent Further Weight Loss  Patient will demonstrate the desired outcomes by discharge/transition of care.   Outcome: Ongoing (interventions implemented as appropriate)   04/22/18 0330   Nutrition, Imbalanced: Inadequate Oral Intake (Adult)   Prevent Further Weight Loss making progress toward outcome       Problem: Sleep Pattern Disturbance (Adult)  Intervention: Promote Sleep/Rest   04/04/18 1942   Pain/Comfort/Sleep Interventions   Sleep/Rest Enhancement noise level reduced       Goal: Identify Related Risk Factors and Signs and Symptoms  Related risk factors and signs and symptoms are identified upon initiation of Human Response Clinical Practice Guideline (CPG)   Outcome: Ongoing (interventions implemented as appropriate)   04/15/18 2022   Sleep Pattern Disturbance   Related Risk Factors (Sleep Pattern Disturbance) medication effects;age extremes;disease related;hospital routine/care     Goal: Adequate Sleep/Rest  Patient will demonstrate the desired outcomes by discharge/transition of care.   Outcome: Ongoing (interventions implemented as  appropriate)   04/22/18 0330   Sleep Pattern Disturbance (Adult)   Adequate Sleep/Rest making progress toward outcome

## 2018-04-22 NOTE — SUBJECTIVE & OBJECTIVE
Interval History: no events. Clinically unchanged. Labs stable although some mild met acidosis with AG of 12.     Review of Systems   Constitutional: Negative for fever.   Respiratory: Negative for shortness of breath.    Cardiovascular: Negative for chest pain.     Objective:     Vital Signs (Most Recent):  Temp: 99.1 °F (37.3 °C) (04/22/18 1209)  Pulse: 77 (04/22/18 1209)  Resp: 15 (04/22/18 0743)  BP: (!) 105/52 (04/22/18 1209)  SpO2: (!) 90 % (04/22/18 1209) Vital Signs (24h Range):  Temp:  [98.6 °F (37 °C)-99.1 °F (37.3 °C)] 99.1 °F (37.3 °C)  Pulse:  [77-91] 77  Resp:  [15-18] 15  SpO2:  [90 %-95 %] 90 %  BP: (105-144)/(52-76) 105/52     Weight: 60.4 kg (133 lb 2.5 oz)  Body mass index is 20.87 kg/m².    Intake/Output Summary (Last 24 hours) at 04/22/18 1515  Last data filed at 04/22/18 0600   Gross per 24 hour   Intake              597 ml   Output                0 ml   Net              597 ml      Physical Exam   Constitutional: He appears well-developed and well-nourished. No distress.   HENT:   Head: Normocephalic and atraumatic.   Eyes: EOM are normal. Pupils are equal, round, and reactive to light.   Neck: Normal range of motion. Neck supple.   Cardiovascular: Normal rate and regular rhythm.    Pulmonary/Chest: Effort normal and breath sounds normal.   Abdominal: Soft. Bowel sounds are normal.   Musculoskeletal: Normal range of motion.   Neurological: He is alert.   Oriented x 2, and cooperative   Skin: Skin is warm and dry.   Psychiatric: His affect is blunt. His speech is delayed. Cognition and memory are impaired.       Significant Labs: None

## 2018-04-22 NOTE — PROGRESS NOTES
[facility] Hospital Medicine  Progress Note    Patient Name: Tenzin Ortiz  MRN: [id number]  Patient Class: IP- Inpatient   Admission Date: 2/7/2018  Length of Stay: 54 days  Attending Physician: Olivia Del Rosario MD  Primary Care Provider: Efrain Chavez MD        Subjective:     Principal Problem:Debility    HPI:  Patient is 78 yo male with HTN, HLD, and Parkinson's who presents to ED reportedly for HA. Per ED note patient was complaining of HA but he denies this on my interview. Seems there was some confusion in ED and he was unclear why he was here. During interview with me, patient reports being here for NH placement although he does not appear to be very happy about this. He has been with some issues caring for himself. Currently lives with his girlfriend who reports she can no longer care for him. He states he currently gets around with a walker but sometimes has difficulty getting up out of his chair. Per chart review patient is with Parkinson's and sees neurology, Dr. Castellanos, lastly seen on 2/6/18. He was with hallucinations which were noted to be secondary to dopaminergic agents thus having to significantly reduce dose--she recommended he be admitted for placement at that time but patient declined. He has been attempting placement as an outpatient through PCP but without much luck. He otherwise denies recent illness, fever/chills, CP, SOB, abdominal pain, N/V/D, dysuria. On presentation patient with grossly normal labs/vitals. Placed in observation for what appears to be solely placement although this is something that may need to be continued as outpatient as is not indication for hospitalization.    Hospital Course:  Mr. Ortiz was admitted with severe Parkinsons dementia and can no longer care for himself. He was living with his girlfriend, but she can no longer care for the patient. Case management found an accepting facility at [place] [facility]; however, POA declined signing the necessary  paperwork for placement. Legal was consulted and judicial guardianship of the patient in process with court date set for April 24, 2018.    Interval History: no events. Clinically unchanged. Labs stable although some mild met acidosis with AG of 12.     Review of Systems   Constitutional: Negative for fever.   Respiratory: Negative for shortness of breath.    Cardiovascular: Negative for chest pain.     Objective:     Vital Signs (Most Recent):  Temp: 99.1 °F (37.3 °C) (04/22/18 1209)  Pulse: 77 (04/22/18 1209)  Resp: 15 (04/22/18 0743)  BP: (!) 105/52 (04/22/18 1209)  SpO2: (!) 90 % (04/22/18 1209) Vital Signs (24h Range):  Temp:  [98.6 °F (37 °C)-99.1 °F (37.3 °C)] 99.1 °F (37.3 °C)  Pulse:  [77-91] 77  Resp:  [15-18] 15  SpO2:  [90 %-95 %] 90 %  BP: (105-144)/(52-76) 105/52     Weight: 60.4 kg (133 lb 2.5 oz)  Body mass index is 20.87 kg/m².    Intake/Output Summary (Last 24 hours) at 04/22/18 1515  Last data filed at 04/22/18 0600   Gross per 24 hour   Intake              597 ml   Output                0 ml   Net              597 ml      Physical Exam   Constitutional: He appears well-developed and well-nourished. No distress.   HENT:   Head: Normocephalic and atraumatic.   Eyes: EOM are normal. Pupils are equal, round, and reactive to light.   Neck: Normal range of motion. Neck supple.   Cardiovascular: Normal rate and regular rhythm.    Pulmonary/Chest: Effort normal and breath sounds normal.   Abdominal: Soft. Bowel sounds are normal.   Musculoskeletal: Normal range of motion.   Neurological: He is alert.   Oriented x 2, and cooperative   Skin: Skin is warm and dry.   Psychiatric: His affect is blunt. His speech is delayed. Cognition and memory are impaired.       Significant Labs: None    Assessment/Plan:      * Debility    Pt was living with a significant other who can no longer care for him  His estranged daughter was contacted and was willing to aid in finding NH placement (she had already begun this  process as an outpatient) but patient's POA (a business friend) refused to sign consent for NH placement.   Patient has no capacity for decision making as per Psych  Depakote 250mg TID to help with mood stabilization  Guardianship process ongoing with court date for 4/24/18  He is medically cleared for transfer once nursing facility arranged        Lewy body dementia without behavioral disturbance    Seen by Psych   Continue Sinemet  QID and Seroquel 50 QHS  Continue Exelon 4.6 mg/24 patch  Divalproex 250 mg to BID  Held trazodone        Essential hypertension    Resume norvasc.        Parkinson disease    Seroquel 50 mg; sinemet  mg  Discontinued taxodione 50 mg  On exelon 4.6 mg in the morning when he was more awake          VTE Risk Mitigation         Ordered     enoxaparin injection 40 mg  Daily      04/09/18 1423     Medium Risk of VTE  Once      02/08/18 0653     Place JOSEFINA hose  Until discontinued      02/08/18 0653     Place sequential compression device  Until discontinued      02/08/18 0653          Encourage PO intake. Court date 4/24    Olivia Elizalde MD  Department of Hospital Medicine   [facility]

## 2018-04-22 NOTE — PLAN OF CARE
Problem: Pressure Ulcer Risk (Mike Scale) (Adult,Obstetrics,Pediatric)  Goal: Identify Related Risk Factors and Signs and Symptoms  Related risk factors and signs and symptoms are identified upon initiation of Human Response Clinical Practice Guideline (CPG)    04/22/18 1646   Pressure Ulcer Risk (Mike Scale)   Related Risk Factors (Pressure Ulcer Risk (Mike Scale)) cognitive impairment;excretions/secretions;hospitalization prolonged;nutritional deficiencies;tissue perfusion altered     Goal: Skin Integrity  Patient will demonstrate the desired outcomes by discharge/transition of care.    04/22/18 1646   Pressure Ulcer Risk (Mike Scale) (Adult,Obstetrics,Pediatric)   Skin Integrity making progress toward outcome

## 2018-04-22 NOTE — PROGRESS NOTES
Note that patient with eyes closed but easily aroused to speech;  He is disoriented to situation and time, Also has an infrequent nonproductive cough;     Patient refused assistance with breakfast however, asked for ice-water and given same;     He refused assistance with shaving his face as he did yesterday as well;     ANH Gaxiola was able to give a bed-bath to patient however he refused to get out of bed;    He was offered assistance with sitting in a wheelchair and rolling around the hospital however, he refused;  He did state that he would consider getting out of the bed later this afternoon.    Will continue to f/u.

## 2018-04-23 PROCEDURE — 25000003 PHARM REV CODE 250: Performed by: INTERNAL MEDICINE

## 2018-04-23 PROCEDURE — 25000003 PHARM REV CODE 250: Performed by: HOSPITALIST

## 2018-04-23 PROCEDURE — 21400001 HC TELEMETRY ROOM

## 2018-04-23 PROCEDURE — 63600175 PHARM REV CODE 636 W HCPCS: Performed by: HOSPITALIST

## 2018-04-23 PROCEDURE — 99231 SBSQ HOSP IP/OBS SF/LOW 25: CPT | Mod: ,,, | Performed by: PSYCHIATRY & NEUROLOGY

## 2018-04-23 RX ORDER — SODIUM CHLORIDE 9 MG/ML
INJECTION, SOLUTION INTRAVENOUS CONTINUOUS
Status: ACTIVE | OUTPATIENT
Start: 2018-04-23 | End: 2018-04-23

## 2018-04-23 RX ORDER — DOCUSATE SODIUM 100 MG/1
100 CAPSULE, LIQUID FILLED ORAL 2 TIMES DAILY
Status: DISCONTINUED | OUTPATIENT
Start: 2018-04-23 | End: 2018-05-05 | Stop reason: HOSPADM

## 2018-04-23 RX ORDER — CARBIDOPA AND LEVODOPA 25; 100 MG/1; MG/1
2 TABLET ORAL 4 TIMES DAILY
Status: DISCONTINUED | OUTPATIENT
Start: 2018-04-23 | End: 2018-05-05 | Stop reason: HOSPADM

## 2018-04-23 RX ADMIN — DIVALPROEX SODIUM 250 MG: 250 TABLET, DELAYED RELEASE ORAL at 08:04

## 2018-04-23 RX ADMIN — DOCUSATE SODIUM 100 MG: 100 CAPSULE, LIQUID FILLED ORAL at 09:04

## 2018-04-23 RX ADMIN — AMLODIPINE BESYLATE 10 MG: 5 TABLET ORAL at 09:04

## 2018-04-23 RX ADMIN — QUETIAPINE FUMARATE 100 MG: 100 TABLET ORAL at 08:04

## 2018-04-23 RX ADMIN — CARBIDOPA AND LEVODOPA 2 TABLET: 25; 100 TABLET ORAL at 02:04

## 2018-04-23 RX ADMIN — CARBIDOPA AND LEVODOPA 2 TABLET: 25; 100 TABLET ORAL at 05:04

## 2018-04-23 RX ADMIN — CARBIDOPA AND LEVODOPA 2 TABLET: 25; 100 TABLET ORAL at 08:04

## 2018-04-23 RX ADMIN — DIVALPROEX SODIUM 250 MG: 250 TABLET, DELAYED RELEASE ORAL at 09:04

## 2018-04-23 RX ADMIN — SODIUM CHLORIDE: 0.9 INJECTION, SOLUTION INTRAVENOUS at 08:04

## 2018-04-23 RX ADMIN — POLYETHYLENE GLYCOL 3350 17 G: 17 POWDER, FOR SOLUTION ORAL at 09:04

## 2018-04-23 RX ADMIN — SODIUM CHLORIDE: 0.9 INJECTION, SOLUTION INTRAVENOUS at 09:04

## 2018-04-23 RX ADMIN — ENOXAPARIN SODIUM 40 MG: 100 INJECTION SUBCUTANEOUS at 05:04

## 2018-04-23 RX ADMIN — RIVASTIGMINE TRANSDERMAL SYSTEM 1 PATCH: 4.6 PATCH, EXTENDED RELEASE TRANSDERMAL at 09:04

## 2018-04-23 RX ADMIN — CARBIDOPA AND LEVODOPA 2 TABLET: 25; 100 TABLET ORAL at 09:04

## 2018-04-23 NOTE — PLAN OF CARE
Problem: Patient Care Overview  Goal: Plan of Care Review  Outcome: Ongoing (interventions implemented as appropriate)   18 0355   Coping/Psychosocial   Plan Of Care Reviewed With patient       Problem: Fall Risk (Adult)  Intervention: Monitor/Assist with Self Care   18   Activity   Activity Assistance Provided --  assistance, 1 person   Daily Care Interventions   Self-Care Promotion independence encouraged;BADL personal objects within reach;BADL personal routines maintained;safe use of adaptive equipment encouraged --    Functional Level Current   Ambulation --  3 - assistive equipment and person   Transferring --  2 - assistive person   Toileting --  3 - assistive equipment and person   Bathing --  2 - assistive person   Dressing --  2 - assistive person   Eating --  2 - assistive person   Communication --  0 - understands/communicates without difficulty   Swallowing --  0 - swallows foods/liquids without difficulty     Intervention: Reduce Risk/Promote Restraint Free Environment   18 0533 18   Safety Interventions   Environmental Safety Modification room organization consistent;room near unit station --    Prevent [place] Drop/Fall   Safety/Security Measures bed alarm set --    Safety Interventions   Safety Precautions --  emergency equipment at bedside     Intervention: Review Medications/Identify Contributors to Fall Risk   18 0533   Safety Interventions   Medication Review/Management medications reviewed     Intervention: Patient Rounds   18   Safety Interventions   Patient Rounds bed in low position;bed wheels locked;call light in reach;clutter free environment maintained;ID band on;placement of personal items at bedside;toileting offered;visualized patient     Intervention: Safety Promotion/Fall Prevention   18   Safety Interventions   Safety Promotion/Fall Prevention assistive device/personal item within reach;bed alarm set;lighting  adjusted;medications reviewed;nonskid shoes/socks when out of bed;room near unit station;/camera at bedside;side rails raised x 2     Intervention: Safety Precautions   04/22/18 1934   Safety Interventions   Safety Precautions emergency equipment at bedside       Goal: Identify Related Risk Factors and Signs and Symptoms  Related risk factors and signs and symptoms are identified upon initiation of Human Response Clinical Practice Guideline (CPG)   Outcome: Ongoing (interventions implemented as appropriate)   04/21/18 1104   Fall Risk   Related Risk Factors (Fall Risk) confusion/agitation;gait/mobility problems;polypharmacy   Signs and Symptoms (Fall Risk) presence of risk factors     Goal: Absence of Falls  Patient will demonstrate the desired outcomes by discharge/transition of care.   Outcome: Ongoing (interventions implemented as appropriate)   04/22/18 0330   Fall Risk (Adult)   Absence of Falls making progress toward outcome       Problem: Pressure Ulcer Risk (Mike Scale) (Adult,Obstetrics,Pediatric)  Intervention: Prevent/Manage Excess Moisture   04/22/18 1010 04/22/18 1934   Hygiene Care   Perineal Care diaper changed --    Bathing/Skin Care bath, complete --    Skin Interventions   Skin Protection --  Incontinence pads;Tubing/devices free from skin contact     Intervention: Maintain Head of Bed Elevation Less Than 30 Degrees as Tolerated   04/22/18 1934   Positioning   Head of Bed (HOB) HOB at 20-30 degrees     Intervention: Prevent/Minimize Sheer/Friction Injuries   04/22/18 0600 04/22/18 1934   Positioning   Positioning/Transfer Devices --  pillows   Skin Interventions   Pressure Reduction Devices Pressure-redistributing mattress utilized --    Pressure Reduction Techniques --  frequent weight shift encouraged;pressure points protected;weight shift assistance provided     Intervention: Turn/Reposition Often   04/22/18 1934   Positioning   Body Position side-lying, left   Skin  Interventions   Pressure Reduction Techniques frequent weight shift encouraged;pressure points protected;weight shift assistance provided       Goal: Identify Related Risk Factors and Signs and Symptoms  Related risk factors and signs and symptoms are identified upon initiation of Human Response Clinical Practice Guideline (CPG)   Outcome: Ongoing (interventions implemented as appropriate)   18 1646   Pressure Ulcer Risk (Mike Scale)   Related Risk Factors (Pressure Ulcer Risk (Mike Scale)) cognitive impairment;excretions/secretions;hospitalization prolonged;nutritional deficiencies;tissue perfusion altered     Goal: Skin Integrity  Patient will demonstrate the desired outcomes by discharge/transition of care.   Outcome: Ongoing (interventions implemented as appropriate)   18 1646   Pressure Ulcer Risk (Mike Scale) (Adult,Obstetrics,Pediatric)   Skin Integrity making progress toward outcome       Problem: Confusion, Acute (Adult)  Intervention: Monitor/Assist with Self Care   18   Activity   Activity Assistance Provided --  assistance, 1 person   Daily Care Interventions   Self-Care Promotion independence encouraged;BADL personal objects within reach;BADL personal routines maintained;safe use of adaptive equipment encouraged --    Functional Level Current   Ambulation --  3 - assistive equipment and person   Transferring --  2 - assistive person   Toileting --  3 - assistive equipment and person   Bathing --  2 - assistive person   Dressing --  2 - assistive person   Eating --  2 - assistive person   Communication --  0 - understands/communicates without difficulty   Swallowing --  0 - swallows foods/liquids without difficulty     Intervention: Reduce Risk/Promote Restraint Free Environment   1833 18 193   Safety Interventions   Environmental Safety Modification room organization consistent;room near unit station --    Prevent [place] Drop/Fall    Safety/Security Measures bed alarm set --    Safety Interventions   Safety Precautions --  emergency equipment at bedside     Intervention: Evaluate Medications/Identify Contributors to Confusion   04/20/18 0533   Safety Interventions   Medication Review/Management medications reviewed     Intervention: Optimize Communication   04/22/18 1934   Cognitive Interventions   Communication Enhancement Strategies call light answered in person     Intervention: Promote Familiarity/Consistency   04/22/18 1934   Coping/Psychosocial Interventions   Environment Familiarity/Consistency daily routine followed;personal clothing/items utilized     Intervention: Provide Frequent Orientation/Reorientation   04/22/18 1934   Cognitive Interventions   Reorientation Measures clock in view;glasses encouraged;reorientation provided   Sensory Stimulation Regulation care clustered;lighting decreased;music/television provided for relaxation;quiet environment promoted       Goal: Identify Related Risk Factors and Signs and Symptoms  Related risk factors and signs and symptoms are identified upon initiation of Human Response Clinical Practice Guideline (CPG)   Outcome: Ongoing (interventions implemented as appropriate)   04/17/18 1155   Confusion, Acute   Related Risk Factors (Acute Confusion) advanced age;cognitive impairment;malnutrition   Signs and Symptoms (Acute Confusion) disorientation;emotional/behavioral disturbances;memory disturbed;thought process diminished/disorganized     Goal: Cognitive/Functional Impairments Minimized  Patient will demonstrate the desired outcomes by discharge/transition of care.   Outcome: Ongoing (interventions implemented as appropriate)   04/22/18 0330   Confusion, Acute (Adult)   Cognitive/Functional Impairments Minimized making progress toward outcome     Goal: Safety  Patient will demonstrate the desired outcomes by discharge/transition of care.   Outcome: Ongoing (interventions implemented as  appropriate)   04/20/18 1123   Confusion, Acute (Adult)   Safety making progress toward outcome       Problem: Functional Deficit (Adult,Obstetrics,Pediatric)  Intervention: Optimize Functional Ability   04/21/18 1940 04/22/18 1934   Activity   Activity Type --  activity adjusted per tolerance;bedrest   Daily Care Interventions   Self-Care Promotion independence encouraged;BADL personal objects within reach;BADL personal routines maintained;safe use of adaptive equipment encouraged --      Intervention: Support Psychosocial Response to Functional Impairment   04/22/18 1934   Coping/Psychosocial Interventions   Supportive Measures active listening utilized;verbalization of feelings encouraged     Intervention: Optimize/Manage Energy Expenditure   04/11/18 1813 04/22/18 1934   Nutrition Interventions   Oral Nutrition Promotion calorie dense foods provided;physical activity promoted --    Coping/Psychosocial Interventions   Environmental Support --  calm environment promoted;distractions minimized;environmental consistency promoted;personal routine supported   Pain/Comfort/Sleep Interventions   Sleep/Rest Enhancement --  awakenings minimized;consistent schedule promoted;noise level reduced;regular sleep/rest pattern promoted;relaxation techniques promoted     Intervention: Prevent Injury Related to Sensory Impairment   04/22/18 0600 04/22/18 1934   Skin Interventions   Pressure Reduction Devices Pressure-redistributing mattress utilized --    Skin Protection --  Incontinence pads;Tubing/devices free from skin contact       Goal: Signs and Symptoms of Listed Potential Problems Will be Absent, Minimized or Managed (Functional Deficit)  Signs and symptoms of listed potential problems will be absent, minimized or managed by discharge/transition of care (reference Functional Deficit (Adult,Obstetrics,Pediatric) CPG).   Outcome: Ongoing (interventions implemented as appropriate)   04/21/18 1104   Functional Deficit    Problems Assessed (Functional Deficit) functional activity tolerance impairment;muscle strength impairment   Problems Present (Functional Deficit) balance impairment;coordination impairment;functional activity tolerance impairment;muscle strength impairment       Problem: Nutrition, Imbalanced: Inadequate Oral Intake (Adult)  Intervention: Explore Physical/Psychosocial/Treatment-related Factors Impacting Oral Intake   04/22/18 1934   Coping/Psychosocial Interventions   Supportive Measures active listening utilized;verbalization of feelings encouraged       Goal: Identify Related Risk Factors and Signs and Symptoms  Related risk factors and signs and symptoms are identified upon initiation of Human Response Clinical Practice Guideline (CPG)   Outcome: Ongoing (interventions implemented as appropriate)   04/11/18 1813   Nutrition, Imbalanced: Inadequate Oral Intake   Related Risk Factors (Nutrition Imbalance, Inadequate Oral Intake) chronic illness/infection;knowledge deficit     Goal: Improved Oral Intake  Patient will demonstrate the desired outcomes by discharge/transition of care.   Outcome: Ongoing (interventions implemented as appropriate)   04/22/18 0330   Nutrition, Imbalanced: Inadequate Oral Intake (Adult)   Improved Oral Intake making progress toward outcome     Goal: Prevent Further Weight Loss  Patient will demonstrate the desired outcomes by discharge/transition of care.   Outcome: Ongoing (interventions implemented as appropriate)   04/22/18 0330   Nutrition, Imbalanced: Inadequate Oral Intake (Adult)   Prevent Further Weight Loss making progress toward outcome       Problem: Sleep Pattern Disturbance (Adult)  Intervention: Promote Sleep/Rest   04/22/18 1934   Pain/Comfort/Sleep Interventions   Sleep/Rest Enhancement awakenings minimized;consistent schedule promoted;noise level reduced;regular sleep/rest pattern promoted;relaxation techniques promoted       Goal: Identify Related Risk Factors and  Signs and Symptoms  Related risk factors and signs and symptoms are identified upon initiation of Human Response Clinical Practice Guideline (CPG)   Outcome: Ongoing (interventions implemented as appropriate)   04/15/18 2022   Sleep Pattern Disturbance   Related Risk Factors (Sleep Pattern Disturbance) medication effects;age extremes;disease related;hospital routine/care     Goal: Adequate Sleep/Rest  Patient will demonstrate the desired outcomes by discharge/transition of care.   Outcome: Ongoing (interventions implemented as appropriate)   04/22/18 0330   Sleep Pattern Disturbance (Adult)   Adequate Sleep/Rest making progress toward outcome

## 2018-04-23 NOTE — SUBJECTIVE & OBJECTIVE
"Interval History: unchanged    Family History     Problem Relation (Age of Onset)    No Known Problems Mother, Father, Sister, Brother, Maternal Aunt, Maternal Uncle, Paternal Aunt, Paternal Uncle, Maternal Grandmother, Maternal Grandfather, Paternal Grandmother, Paternal Grandfather        Social History Main Topics    Smoking status: Former Smoker     Years: 10.00    Smokeless tobacco: Never Used      Comment: Quit 40 years ago    Alcohol use No    Drug use: No    Sexual activity: Not Currently     Psychotherapeutics     Start     Stop Route Frequency Ordered    04/09/18 2100  QUEtiapine tablet 100 mg      -- Oral Nightly 04/09/18 1425    04/09/18 1524  olanzapine zydis disintegrating tablet 5 mg      -- Oral Every 8 hours PRN 04/09/18 1425           Review of Systems   Constitutional: Positive for activity change. Negative for appetite change.   Respiratory: Negative for shortness of breath.    Cardiovascular: Negative for chest pain.   Gastrointestinal: Negative for nausea.   Musculoskeletal: Positive for gait problem.   Psychiatric/Behavioral: Negative for dysphoric mood and sleep disturbance. The patient is not nervous/anxious.      Objective:     Vital Signs (Most Recent):  Temp: 97.8 °F (36.6 °C) (04/23/18 1525)  Pulse: 77 (04/23/18 1525)  Resp: 18 (04/23/18 1525)  BP: (!) 120/58 (04/23/18 1525)  SpO2: (!) 93 % (04/23/18 1525) Vital Signs (24h Range):  Temp:  [97.6 °F (36.4 °C)-98.9 °F (37.2 °C)] 97.8 °F (36.6 °C)  Pulse:  [73-82] 77  Resp:  [18-20] 18  SpO2:  [92 %-96 %] 93 %  BP: ()/(49-61) 120/58     Height: 5' 6.97" (170.1 cm)  Weight: 59.3 kg (130 lb 11.7 oz)  Body mass index is 20.49 kg/m².      Intake/Output Summary (Last 24 hours) at 04/23/18 1642  Last data filed at 04/23/18 1300   Gross per 24 hour   Intake             1520 ml   Output                1 ml   Net             1519 ml       Physical Exam   Psychiatric:   EXAMINATION    CONSTITUTIONAL  General Appearance: hospital " "attire    MUSCULOSKELETAL  Muscle Strength and Tone: normal  Abnormal Involuntary Movements: significant tremor to arms (R>L) and legs  Gait and Station: not observed    PSYCHIATRIC MENTAL STATUS EXAM   Level of Consciousness: awake and alert  Orientation: name, "hospital"  Grooming: limited  Psychomotor Behavior: restless and pill rolling movements of hands  Speech: not pressured, soft volume  Language: no abnormalities  Mood: will not answer  Affect: blunted, flattened  Thought Process: concrete  Associations: intact  Thought Content: denies suicidal/homicidal/psychosis  Memory: intact to remote, poor to recent  Attention: easily distracted  Fund of Knowledge: intact for conversation  Insight: poor towards medical problems or ability to care for self  Judgment: fair towards cooperation         Significant Labs:   Last 24 Hours:   Recent Lab Results     None        All pertinent labs within the past 24 hours have been reviewed.    Significant Imaging: I have reviewed all pertinent imaging results/findings within the past 24 hours.  "

## 2018-04-23 NOTE — ASSESSMENT & PLAN NOTE
Documented history of Lewy Body dementia by his outpatient neurologist.  Now has likely evolved into a behavioral component.  Would do best with some mood calming medications due to his irritability and lack of redirection.  Continue quetiapine 100mg nightly, although watch closely that his hallucinations and/or mood do not worsen due to dopaminergic component.  Continue depakote 250mg PO TID as a mood stabilizer.  Continue rivastigmine 4.6mg daily patch which may help with his dementia as well as calm his mood somewhat.      Capacity: the ability to accept or refuse treatment recommendations. Capacity is determined by a clinician upon specific elements of a mental status exam (does not have to be performed by a psychiatrist). To demonstrate capacity, a patient must be able to cognitively utilize information provided to them:   Communicate and Express a choice that is stable over time - able to communicate and express choice to go home or to [facility] but as independent living, not nursing home portion  Understand the relevant information - not expressing an understanding of his current situation or why he is at the hospital  Appreciate the consequences of the decision (risks vs benefits) - not able to express a risk vs benefit of placement options; he would like to return home where there is no care  Manipulate all of the data in a logical fashion (rationalize) - not making functional decision as to his long term care options; thus is not rational; nursing also reports irrational thinking at times      -Based on the above assessment, pt Tenzin Ortiz appears to lack capacity to make medical decisions about his care of place of living at this time.     Please have his family act in his best interest to help with medical decision making.  He does not appear to be able to live in a home environment unless he were to have 24 hour care and redirection.  If family is not able to take care of patient, it would be  ethically in his best interest to be interdicted to a nursing facility.

## 2018-04-23 NOTE — PROGRESS NOTES
"[facility]  Psychiatry  Progress Note    Patient Name: Tenzin Ortiz  MRN: [id number]   Code Status: Full Code  Admission Date: 2/7/2018  Hospital Length of Stay: 55 days  Expected Discharge Date:   Attending Physician: Olivia Del Rosario MD  Primary Care Provider: Efrain Chavez MD    Current Legal Status: N/A    Patient information was obtained from patient and court appointed .     Subjective:     Principal Problem:Debility    Chief Complaint: altered mental status, memory problems    HPI: Patient presents to hospital complaining of a headache. Patient is alert and awake and dressed in hospital gown. Patient also noted to have a significant hand tremor. Patient reports that he is not sure why he is in the hospital. Per chart review patient presented to the ED with a headache that he denied upon examination. ED provider noted some confusion. Chart review also states that patient lives with girlfriend and she is unable to care for him any longer and family is looking to get patient placed in a nursing home. Apparently does not have a great relationship with his children.  Patient is having problems ambulating and caring for himself. He is requiring frequent redirection from nursing staff.  Patient denies symptoms of depression and anxiety. Denies history of seeing a psychiatrist or therapist. Patient is not wanting to engage much in interview and asks me to leave, stating "I am done with your questions".  He is not answering questions about orientation and not being rational in his responses or engagement.  Review of records indicate a Lewy Body Dementia without behavioral component and Parkinson's disease.    Hospital Course: 04/09/2018 - patient was seen in his room as a follow up of care for determination of capacity.  He is oriented to name, date, and "hospital".  He is not sure of why he is in the hospital or what care he is getting.  States that he is not depressed.  Not able to recall " his medical problems but acknowledges them when being informed.  States that he would like to return home to live but not aware of anyone to help him at home.  Appears to be very simple in his responses.  Nursing reports cooperative care and compliance.  Nursing also reports that he is very confused most of the time.  He has told nursing at times that he asks to leave to get his son from school, go run some errands, or other things.  He is working with PT on a regular basis.  Still getting depakote 250mg TID and quetiapine 100mg nightly.  Sleeping well.    04/23/2018 - patient was seen in his room with the court appointment  at bedside.  He is still oriented to name and hospital but has difficulty with the city and date.  He is able to express his choice but doesn't fully understand why he is in need of placement.  He tells me that he can still drive and that he just went for a ride yesterday (has been inpatient for 55 days).  Also states that he can get up and care for himself.  He is unable to get around or out of bed on his own.  Has court interdiction proceedings tomorrow.    Interval History: unchanged    Family History     Problem Relation (Age of Onset)    No Known Problems Mother, Father, Sister, Brother, Maternal Aunt, Maternal Uncle, Paternal Aunt, Paternal Uncle, Maternal Grandmother, Maternal Grandfather, Paternal Grandmother, Paternal Grandfather        Social History Main Topics    Smoking status: Former Smoker     Years: 10.00    Smokeless tobacco: Never Used      Comment: Quit 40 years ago    Alcohol use No    Drug use: No    Sexual activity: Not Currently     Psychotherapeutics     Start     Stop Route Frequency Ordered    04/09/18 2100  QUEtiapine tablet 100 mg      -- Oral Nightly 04/09/18 1425    04/09/18 1524  olanzapine zydis disintegrating tablet 5 mg      -- Oral Every 8 hours PRN 04/09/18 1425           Review of Systems   Constitutional: Positive for activity change. Negative  "for appetite change.   Respiratory: Negative for shortness of breath.    Cardiovascular: Negative for chest pain.   Gastrointestinal: Negative for nausea.   Musculoskeletal: Positive for gait problem.   Psychiatric/Behavioral: Negative for dysphoric mood and sleep disturbance. The patient is not nervous/anxious.      Objective:     Vital Signs (Most Recent):  Temp: 97.8 °F (36.6 °C) (04/23/18 1525)  Pulse: 77 (04/23/18 1525)  Resp: 18 (04/23/18 1525)  BP: (!) 120/58 (04/23/18 1525)  SpO2: (!) 93 % (04/23/18 1525) Vital Signs (24h Range):  Temp:  [97.6 °F (36.4 °C)-98.9 °F (37.2 °C)] 97.8 °F (36.6 °C)  Pulse:  [73-82] 77  Resp:  [18-20] 18  SpO2:  [92 %-96 %] 93 %  BP: ()/(49-61) 120/58     Height: 5' 6.97" (170.1 cm)  Weight: 59.3 kg (130 lb 11.7 oz)  Body mass index is 20.49 kg/m².      Intake/Output Summary (Last 24 hours) at 04/23/18 1642  Last data filed at 04/23/18 1300   Gross per 24 hour   Intake             1520 ml   Output                1 ml   Net             1519 ml       Physical Exam   Psychiatric:   EXAMINATION    CONSTITUTIONAL  General Appearance: hospital attire    MUSCULOSKELETAL  Muscle Strength and Tone: normal  Abnormal Involuntary Movements: significant tremor to arms (R>L) and legs  Gait and Station: not observed    PSYCHIATRIC MENTAL STATUS EXAM   Level of Consciousness: awake and alert  Orientation: name, "[facility]"  Grooming: limited  Psychomotor Behavior: restless and pill rolling movements of hands  Speech: not pressured, soft volume  Language: no abnormalities  Mood: will not answer  Affect: blunted, flattened  Thought Process: concrete  Associations: intact  Thought Content: denies suicidal/homicidal/psychosis  Memory: intact to remote, poor to recent  Attention: easily distracted  Fund of Knowledge: intact for conversation  Insight: poor towards medical problems or ability to care for self  Judgment: fair towards cooperation         Significant Labs:   Last 24 Hours:   Recent Lab " Results     None        All pertinent labs within the past 24 hours have been reviewed.    Significant Imaging: I have reviewed all pertinent imaging results/findings within the past 24 hours.    Assessment/Plan:     Lewy body dementia without behavioral disturbance    Documented history of Lewy Body dementia by his outpatient neurologist.  Now has likely evolved into a behavioral component.  Would do best with some mood calming medications due to his irritability and lack of redirection.  Continue quetiapine 100mg nightly, although watch closely that his hallucinations and/or mood do not worsen due to dopaminergic component.  Continue depakote 250mg PO TID as a mood stabilizer.  Continue rivastigmine 4.6mg daily patch which may help with his dementia as well as calm his mood somewhat.      Capacity: the ability to accept or refuse treatment recommendations. Capacity is determined by a clinician upon specific elements of a mental status exam (does not have to be performed by a psychiatrist). To demonstrate capacity, a patient must be able to cognitively utilize information provided to them:   Communicate and Express a choice that is stable over time - able to communicate and express choice to go home or to [facility] but as independent living, not nursing home portion  Understand the relevant information - not expressing an understanding of his current situation or why he is at the hospital  Appreciate the consequences of the decision (risks vs benefits) - not able to express a risk vs benefit of placement options; he would like to return home where there is no care  Manipulate all of the data in a logical fashion (rationalize) - not making functional decision as to his long term care options; thus is not rational; nursing also reports irrational thinking at times      -Based on the above assessment, pt Tenzin Ortiz appears to lack capacity to make medical decisions about his care of place of living at this time.      Please have his family act in his best interest to help with medical decision making.  He does not appear to be able to live in a home environment unless he were to have 24 hour care and redirection.  If family is not able to take care of patient, it would be ethically in his best interest to be interdicted to a nursing facility.                     Need for Continued Hospitalization:   No need for inpatient psychiatric hospitalization. Continue medical care as per the primary team.    Anticipated Disposition: Nursing Facility     Total time:  25 with greater than 50% of this time spent in counseling and/or coordination of care.       Usman Archer MD   Psychiatry  [facility]

## 2018-04-23 NOTE — PROGRESS NOTES
[facility] Hospital Medicine  Progress Note    Patient Name: Tenzin Ortiz  MRN: [id number]  Patient Class: IP- Inpatient   Admission Date: 2/7/2018  Length of Stay: 55 days  Attending Physician: Olivia Del Rosario MD  Primary Care Provider: Efrain Chavez MD        Subjective:     Principal Problem:Debility    HPI:  Patient is 78 yo male with HTN, HLD, and Parkinson's who presents to ED reportedly for HA. Per ED note patient was complaining of HA but he denies this on my interview. Seems there was some confusion in ED and he was unclear why he was here. During interview with me, patient reports being here for NH placement although he does not appear to be very happy about this. He has been with some issues caring for himself. Currently lives with his girlfriend who reports she can no longer care for him. He states he currently gets around with a walker but sometimes has difficulty getting up out of his chair. Per chart review patient is with Parkinson's and sees neurology, Dr. Castellanos, lastly seen on 2/6/18. He was with hallucinations which were noted to be secondary to dopaminergic agents thus having to significantly reduce dose--she recommended he be admitted for placement at that time but patient declined. He has been attempting placement as an outpatient through PCP but without much luck. He otherwise denies recent illness, fever/chills, CP, SOB, abdominal pain, N/V/D, dysuria. On presentation patient with grossly normal labs/vitals. Placed in observation for what appears to be solely placement although this is something that may need to be continued as outpatient as is not indication for hospitalization.    Hospital Course:  Mr. Ortiz was admitted with severe Parkinsons dementia and can no longer care for himself. He was living with his girlfriend, but she can no longer care for the patient. Case management found an accepting facility at [facility]; however, POA declined signing the necessary  paperwork for placement. Legal was consulted and judicial guardianship of the patient in process with court date set for April 24, 2018.    Interval History: no events. Not eating much.     Review of Systems   Constitutional: Negative for fever.   Respiratory: Negative for shortness of breath.    Cardiovascular: Negative for chest pain.     Objective:     Vital Signs (Most Recent):  Temp: 98.4 °F (36.9 °C) (04/23/18 0715)  Pulse: 77 (04/23/18 0715)  Resp: 20 (04/23/18 0715)  BP: 133/61 (04/23/18 0715)  SpO2: 96 % (04/23/18 0715) Vital Signs (24h Range):  Temp:  [98 °F (36.7 °C)-99.1 °F (37.3 °C)] 98.4 °F (36.9 °C)  Pulse:  [73-77] 77  Resp:  [18-20] 20  SpO2:  [90 %-96 %] 96 %  BP: ()/(49-61) 133/61     Weight: 59.3 kg (130 lb 11.7 oz)  Body mass index is 20.49 kg/m².    Intake/Output Summary (Last 24 hours) at 04/23/18 0905  Last data filed at 04/23/18 0700   Gross per 24 hour   Intake             1120 ml   Output                1 ml   Net             1119 ml      Physical Exam   Constitutional: He appears well-developed and well-nourished. No distress.   HENT:   Head: Normocephalic and atraumatic.   Eyes: EOM are normal. Pupils are equal, round, and reactive to light.   Neck: Normal range of motion. Neck supple.   Cardiovascular: Normal rate and regular rhythm.    Pulmonary/Chest: Effort normal and breath sounds normal.   Abdominal: Soft. Bowel sounds are normal.   Musculoskeletal: Normal range of motion.   Neurological: He is alert.   Oriented x 2, and cooperative   Skin: Skin is warm and dry.   Psychiatric: His affect is blunt. His speech is delayed. Cognition and memory are impaired.       Significant Labs: None    Assessment/Plan:      * Debility    Pt was living with a significant other who can no longer care for him  His estranged daughter was contacted and was willing to aid in finding NH placement (she had already begun this process as an outpatient) but patient's POA (a business friend) refused to  sign consent for NH placement.   Patient has no capacity for decision making as per Psych  Depakote 250mg TID to help with mood stabilization  Guardianship process ongoing with court date for 4/24/18  He is medically cleared for transfer once nursing facility arranged        Lewy body dementia without behavioral disturbance    Seen by Psych   Continue Sinemet  QID and Seroquel 50 QHS  Continue Exelon 4.6 mg/24 patch  Divalproex 250 mg to BID  Held trazodone        Essential hypertension    Resume norvasc.        Parkinson disease    Seroquel 50 mg; sinemet  mg  Discontinued taxodione 50 mg  On exelon 4.6 mg in the morning when he was more awake          VTE Risk Mitigation         Ordered     enoxaparin injection 40 mg  Daily      04/09/18 1423     Medium Risk of VTE  Once      02/08/18 0653     Place JOSEFINA hose  Until discontinued      02/08/18 0653     Place sequential compression device  Until discontinued      02/08/18 0653          Encouraged PO intake. Will start some IVFs x 12 hours to rehydrate. Will add CXR and urinalysis to r/o infection. Court date 4/24 (tomorrow)    Olivia Elizalde MD  Department of Hospital Medicine   [facility]

## 2018-04-23 NOTE — SUBJECTIVE & OBJECTIVE
Interval History: no events. Not eating much.     Review of Systems   Constitutional: Negative for fever.   Respiratory: Negative for shortness of breath.    Cardiovascular: Negative for chest pain.     Objective:     Vital Signs (Most Recent):  Temp: 98.4 °F (36.9 °C) (04/23/18 0715)  Pulse: 77 (04/23/18 0715)  Resp: 20 (04/23/18 0715)  BP: 133/61 (04/23/18 0715)  SpO2: 96 % (04/23/18 0715) Vital Signs (24h Range):  Temp:  [98 °F (36.7 °C)-99.1 °F (37.3 °C)] 98.4 °F (36.9 °C)  Pulse:  [73-77] 77  Resp:  [18-20] 20  SpO2:  [90 %-96 %] 96 %  BP: ()/(49-61) 133/61     Weight: 59.3 kg (130 lb 11.7 oz)  Body mass index is 20.49 kg/m².    Intake/Output Summary (Last 24 hours) at 04/23/18 0905  Last data filed at 04/23/18 0700   Gross per 24 hour   Intake             1120 ml   Output                1 ml   Net             1119 ml      Physical Exam   Constitutional: He appears well-developed and well-nourished. No distress.   HENT:   Head: Normocephalic and atraumatic.   Eyes: EOM are normal. Pupils are equal, round, and reactive to light.   Neck: Normal range of motion. Neck supple.   Cardiovascular: Normal rate and regular rhythm.    Pulmonary/Chest: Effort normal and breath sounds normal.   Abdominal: Soft. Bowel sounds are normal.   Musculoskeletal: Normal range of motion.   Neurological: He is alert.   Oriented x 2, and cooperative   Skin: Skin is warm and dry.   Psychiatric: His affect is blunt. His speech is delayed. Cognition and memory are impaired.       Significant Labs: None

## 2018-04-23 NOTE — PT/OT/SLP PROGRESS
Physical Therapy      Patient Name:  Tenzin Ortiz   MRN:  [id number]    Patient not seen today secondary to Patient unwilling to participate  ( X 2 attempts 1125 AM and 1415 PM ) despite encouragement . Will follow-up tomorrow .    Gina Rodríguez, PTA

## 2018-04-23 NOTE — PLAN OF CARE
Problem: Confusion, Acute (Adult)  Intervention: Monitor/Assist with Self Care   18 0925 18 1515   Activity   Activity Assistance Provided --  assistance, 1 person   Daily Care Interventions   Self-Care Promotion independence encouraged --    Functional Level Current   Ambulation 3 - assistive equipment and person --    Transferring 3 - assistive equipment and person --    Toileting 3 - assistive equipment and person --    Bathing 2 - assistive person --    Dressing 2 - assistive person --    Eating 2 - assistive person --    Communication 0 - understands/communicates without difficulty --    Swallowing 0 - swallows foods/liquids without difficulty --      Intervention: Reduce Risk/Promote Restraint Free Environment   18   Safety Interventions   Environmental Safety Modification assistive device/personal items within reach;clutter free environment maintained;room near unit station;room organization consistent   Prevent [place] Drop/Fall   Safety/Security Measures bed alarm set  ([employer] system at bedside)     Intervention: Evaluate Medications/Identify Contributors to Confusion   18   Safety Interventions   Medication Review/Management medications reviewed     Intervention: Optimize Communication   18   Cognitive Interventions   Communication Enhancement Strategies call light answered in person;communication board used     Intervention: Promote Familiarity/Consistency   18   Coping/Psychosocial Interventions   Environment Familiarity/Consistency daily routine followed     Intervention: Provide Frequent Orientation/Reorientation   18   Cognitive Interventions   Reorientation Measures clock in view;glasses encouraged;reorientation provided   Sensory Stimulation Regulation care clustered;music/television provided for relaxation;quiet environment promoted

## 2018-04-24 LAB
BILIRUB UR QL STRIP: NEGATIVE
CLARITY UR: CLEAR
COLOR UR: YELLOW
GLUCOSE UR QL STRIP: NEGATIVE
HGB UR QL STRIP: NEGATIVE
KETONES UR QL STRIP: ABNORMAL
LEUKOCYTE ESTERASE UR QL STRIP: NEGATIVE
NITRITE UR QL STRIP: NEGATIVE
PH UR STRIP: 7 [PH] (ref 5–8)
PROT UR QL STRIP: NEGATIVE
SP GR UR STRIP: 1.01 (ref 1–1.03)
URN SPEC COLLECT METH UR: ABNORMAL
UROBILINOGEN UR STRIP-ACNC: NEGATIVE EU/DL

## 2018-04-24 PROCEDURE — 25000003 PHARM REV CODE 250: Performed by: INTERNAL MEDICINE

## 2018-04-24 PROCEDURE — 81003 URINALYSIS AUTO W/O SCOPE: CPT

## 2018-04-24 PROCEDURE — 25000003 PHARM REV CODE 250: Performed by: HOSPITALIST

## 2018-04-24 PROCEDURE — 63600175 PHARM REV CODE 636 W HCPCS: Performed by: HOSPITALIST

## 2018-04-24 PROCEDURE — 21400001 HC TELEMETRY ROOM

## 2018-04-24 RX ORDER — ONDANSETRON 2 MG/ML
4 INJECTION INTRAMUSCULAR; INTRAVENOUS EVERY 8 HOURS PRN
Status: DISCONTINUED | OUTPATIENT
Start: 2018-04-24 | End: 2018-05-05 | Stop reason: HOSPADM

## 2018-04-24 RX ORDER — AMOXICILLIN AND CLAVULANATE POTASSIUM 875; 125 MG/1; MG/1
1 TABLET, FILM COATED ORAL EVERY 12 HOURS
Status: COMPLETED | OUTPATIENT
Start: 2018-04-24 | End: 2018-04-30

## 2018-04-24 RX ADMIN — DOCUSATE SODIUM 100 MG: 100 CAPSULE, LIQUID FILLED ORAL at 08:04

## 2018-04-24 RX ADMIN — DIVALPROEX SODIUM 250 MG: 250 TABLET, DELAYED RELEASE ORAL at 08:04

## 2018-04-24 RX ADMIN — DIVALPROEX SODIUM 250 MG: 250 TABLET, DELAYED RELEASE ORAL at 09:04

## 2018-04-24 RX ADMIN — RIVASTIGMINE TRANSDERMAL SYSTEM 1 PATCH: 4.6 PATCH, EXTENDED RELEASE TRANSDERMAL at 09:04

## 2018-04-24 RX ADMIN — CARBIDOPA AND LEVODOPA 2 TABLET: 25; 100 TABLET ORAL at 12:04

## 2018-04-24 RX ADMIN — CARBIDOPA AND LEVODOPA 2 TABLET: 25; 100 TABLET ORAL at 09:04

## 2018-04-24 RX ADMIN — ACETAMINOPHEN 650 MG: 325 TABLET ORAL at 08:04

## 2018-04-24 RX ADMIN — CARBIDOPA AND LEVODOPA 2 TABLET: 25; 100 TABLET ORAL at 08:04

## 2018-04-24 RX ADMIN — ENOXAPARIN SODIUM 40 MG: 100 INJECTION SUBCUTANEOUS at 04:04

## 2018-04-24 RX ADMIN — CARBIDOPA AND LEVODOPA 2 TABLET: 25; 100 TABLET ORAL at 04:04

## 2018-04-24 RX ADMIN — AMOXICILLIN AND CLAVULANATE POTASSIUM 1 TABLET: 875; 125 TABLET, FILM COATED ORAL at 08:04

## 2018-04-24 RX ADMIN — AMLODIPINE BESYLATE 10 MG: 5 TABLET ORAL at 09:04

## 2018-04-24 RX ADMIN — AMOXICILLIN AND CLAVULANATE POTASSIUM 1 TABLET: 875; 125 TABLET, FILM COATED ORAL at 09:04

## 2018-04-24 RX ADMIN — OLANZAPINE 5 MG: 5 TABLET, ORALLY DISINTEGRATING ORAL at 12:04

## 2018-04-24 RX ADMIN — OLANZAPINE 5 MG: 5 TABLET, ORALLY DISINTEGRATING ORAL at 08:04

## 2018-04-24 RX ADMIN — QUETIAPINE FUMARATE 100 MG: 100 TABLET ORAL at 08:04

## 2018-04-24 NOTE — NURSING
Patient increasingly attempting to get out of bed and when redirected becomes agitated. Patient verbalizing that he has to leave bed to go to work,  medications,  food. Patient refusing to eat lunch tray. Patient given PRN medication for anxiety. Will continue to monitor.

## 2018-04-24 NOTE — PROGRESS NOTES
9:45am  SW received call from [place], [facility], [place] that she spoke with Mr Hand on today in regards to placement. Soto stated that financials and paperwork had been completed and that she was starting paperwork to provide him with a 24hour caregiver and possible palliative care. KIMO voiced that at this moment, pt's plan is undecided until after court proceedings. KIMO stated that once proceedings are completed she will call for further disposition. Soto voiced understanding.     12:15pm  KIMO spoke with [employer]. James has been appointed as legal guardianship for Diana. James stated that it will take about 7-10 days to in regards to pt's financial and receiving all documentation for pt. KIMO voiced understanding. KIMO informed James that [facility] had denied pt because they no longer had a bed available but [facility] is still under review. James stated that he has two other clients at [facility] as well. KIMO voiced understanding. James provided contact information and court paperwork stating the verdict of the court.     2:20pm  KIMO called in a new LOCET. Competed with [facility]. PASRR completed and faxed to [facility] [phone]. Awaiting [phone].     KIMO spoke with Ashly Rebolledo . Stated that pt looked clinically good and that she will follow up with Olivia to review updated clinicals. KIMO voiced understanding. KIMO also called [facility] and stated that pt will no longer be placed in Independent Living Facility and will be placed in NH.

## 2018-04-24 NOTE — NURSING
Patient straightened patient in bed a reoriented to place and situation. The amount of attempts to get out of bed have decreased. Will continue to monitor and reorient patient as needed.

## 2018-04-24 NOTE — PT/OT/SLP PROGRESS
Physical Therapy      Patient Name:  Tenzin Ortiz   MRN:  [id number]    Patient not seen today secondary to Patient unwilling to participate and pt increased agitated .  Will follow-up tomorow.    Gina Rodríguez, PTA

## 2018-04-24 NOTE — NURSING
Patient assisted upright in bed and lunch tray set-up for patient. Patient feeding self. Will continue to monitor

## 2018-04-24 NOTE — PLAN OF CARE
Problem: Patient Care Overview  Goal: Plan of Care Review  Outcome: Ongoing (interventions implemented as appropriate)   18 192   Coping/Psychosocial   Plan Of Care Reviewed With patient       Problem: Fall Risk (Adult)  Intervention: Monitor/Assist with Self Care   18 0000 18   Activity   Activity Assistance Provided --  --  assistance, 1 person   Daily Care Interventions   Self-Care Promotion independence encouraged --  --    Functional Level Current   Ambulation --  3 - assistive equipment and person --    Transferring --  3 - assistive equipment and person --    Toileting --  2 - assistive person --    Bathing --  2 - assistive person --    Dressing --  2 - assistive person --    Eating --  2 - assistive person --    Communication --  0 - understands/communicates without difficulty --    Swallowing --  0 - swallows foods/liquids without difficulty --      Intervention: Reduce Risk/Promote Restraint Free Environment   18 1626 18   Safety Interventions   Environmental Safety Modification assistive device/personal items within reach;clutter free environment maintained;room near unit station;room organization consistent --    Prevent  Drop/Fall   Safety/Security Measures bed alarm set  (Avasys system at bedside) --    Safety Interventions   Safety Precautions --  emergency equipment at bedside     Intervention: Review Medications/Identify Contributors to Fall Risk   18   Safety Interventions   Medication Review/Management medications reviewed     Intervention: Patient Rounds   18   Safety Interventions   Patient Rounds bed in low position;bed wheels locked;call light in reach;clutter free environment maintained;ID band on;placement of personal items at bedside;toileting offered;visualized patient     Intervention: Safety Promotion/Fall Prevention   18   Safety Interventions   Safety Promotion/Fall Prevention assistive  device/personal item within reach;bed alarm set;lighting adjusted;nonskid shoes/socks when out of bed;room near unit station;/camera at bedside;side rails raised x 3     Intervention: Safety Precautions   04/24/18 0302   Safety Interventions   Safety Precautions emergency equipment at bedside       Goal: Identify Related Risk Factors and Signs and Symptoms  Related risk factors and signs and symptoms are identified upon initiation of Human Response Clinical Practice Guideline (CPG)   Outcome: Ongoing (interventions implemented as appropriate)   04/21/18 1104   Fall Risk   Related Risk Factors (Fall Risk) confusion/agitation;gait/mobility problems;polypharmacy   Signs and Symptoms (Fall Risk) presence of risk factors     Goal: Absence of Falls  Patient will demonstrate the desired outcomes by discharge/transition of care.   Outcome: Ongoing (interventions implemented as appropriate)   04/24/18 0540   Fall Risk (Adult)   Absence of Falls making progress toward outcome       Problem: Pressure Ulcer Risk (Mike Scale) (Adult,Obstetrics,Pediatric)  Intervention: Prevent/Manage Excess Moisture   04/24/18 0000 04/24/18 0118   Hygiene Care   Perineal Care --  absorbent pad changed;diaper changed;perineum cleansed   Bathing/Skin Care --  incontinence care   Skin Interventions   Skin Protection Incontinence pads;Skin sealant/moisture barrier;Tubing/devices free from skin contact --      Intervention: Maintain Head of Bed Elevation Less Than 30 Degrees as Tolerated   04/24/18 0302   Positioning   Head of Bed (HOB) HOB at 15 degrees     Intervention: Prevent/Minimize Sheer/Friction Injuries   04/23/18 0925 04/24/18 0000   Positioning   Positioning/Transfer Devices --  pillows   Skin Interventions   Pressure Reduction Devices Pressure-redistributing mattress utilized --    Pressure Reduction Techniques --  frequent weight shift encouraged;weight shift assistance provided     Intervention: Turn/Reposition Often    04/24/18 0000 04/24/18 0302   Positioning   Body Position --  positioned/repositioned independently  (Pt constantly moving around in bed)   Skin Interventions   Pressure Reduction Techniques frequent weight shift encouraged;weight shift assistance provided --        Goal: Identify Related Risk Factors and Signs and Symptoms  Related risk factors and signs and symptoms are identified upon initiation of Human Response Clinical Practice Guideline (CPG)   Outcome: Ongoing (interventions implemented as appropriate)   04/22/18 1646   Pressure Ulcer Risk (Mike Scale)   Related Risk Factors (Pressure Ulcer Risk (Mike Scale)) cognitive impairment;excretions/secretions;hospitalization prolonged;nutritional deficiencies;tissue perfusion altered     Goal: Skin Integrity  Patient will demonstrate the desired outcomes by discharge/transition of care.   Outcome: Ongoing (interventions implemented as appropriate)   04/22/18 1646   Pressure Ulcer Risk (Mike Scale) (Adult,Obstetrics,Pediatric)   Skin Integrity making progress toward outcome       Problem: Confusion, Acute (Adult)  Intervention: Monitor/Assist with Self Care   04/23/18 0925 04/24/18 0000 04/24/18 0302   Activity   Activity Assistance Provided --  --  assistance, 1 person   Daily Care Interventions   Self-Care Promotion independence encouraged --  --    Functional Level Current   Ambulation --  3 - assistive equipment and person --    Transferring --  3 - assistive equipment and person --    Toileting --  2 - assistive person --    Bathing --  2 - assistive person --    Dressing --  2 - assistive person --    Eating --  2 - assistive person --    Communication --  0 - understands/communicates without difficulty --    Swallowing --  0 - swallows foods/liquids without difficulty --      Intervention: Reduce Risk/Promote Restraint Free Environment   04/23/18 1626 04/24/18 0302   Safety Interventions   Environmental Safety Modification assistive device/personal items  within reach;clutter free environment maintained;room near unit station;room organization consistent --    Prevent  Drop/Fall   Safety/Security Measures bed alarm set  (Avasys system at bedside) --    Safety Interventions   Safety Precautions --  emergency equipment at bedside     Intervention: Evaluate Medications/Identify Contributors to Confusion   18 1626   Safety Interventions   Medication Review/Management medications reviewed     Intervention: Optimize Communication   18   Cognitive Interventions   Communication Enhancement Strategies call light answered in person     Intervention: Promote Familiarity/Consistency   18 192   Coping/Psychosocial Interventions   Environment Familiarity/Consistency daily routine followed     Intervention: Provide Frequent Orientation/Reorientation   18 192   Cognitive Interventions   Reorientation Measures glasses encouraged;reorientation provided   Sensory Stimulation Regulation care clustered;lighting decreased;music/television provided for relaxation;quiet environment promoted       Goal: Identify Related Risk Factors and Signs and Symptoms  Related risk factors and signs and symptoms are identified upon initiation of Human Response Clinical Practice Guideline (CPG)   Outcome: Ongoing (interventions implemented as appropriate)   18 1155   Confusion, Acute   Related Risk Factors (Acute Confusion) advanced age;cognitive impairment;malnutrition   Signs and Symptoms (Acute Confusion) disorientation;emotional/behavioral disturbances;memory disturbed;thought process diminished/disorganized     Goal: Cognitive/Functional Impairments Minimized  Patient will demonstrate the desired outcomes by discharge/transition of care.   Outcome: Ongoing (interventions implemented as appropriate)   18 0330   Confusion, Acute (Adult)   Cognitive/Functional Impairments Minimized making progress toward outcome     Goal: Safety  Patient will demonstrate  the desired outcomes by discharge/transition of care.   Outcome: Ongoing (interventions implemented as appropriate)   04/20/18 1123   Confusion, Acute (Adult)   Safety making progress toward outcome       Problem: Functional Deficit (Adult,Obstetrics,Pediatric)  Intervention: Optimize Functional Ability   04/24/18 0302   Activity   Activity Type bedrest;activity adjusted per tolerance     Intervention: Support Psychosocial Response to Functional Impairment   04/23/18 1921   Coping/Psychosocial Interventions   Supportive Measures verbalization of feelings encouraged     Intervention: Optimize/Manage Energy Expenditure   04/11/18 1813 04/22/18 1934 04/23/18 1921   Nutrition Interventions   Oral Nutrition Promotion calorie dense foods provided;physical activity promoted --  --    Coping/Psychosocial Interventions   Environmental Support --  --  calm environment promoted;distractions minimized;environmental consistency promoted;personal routine supported   Pain/Comfort/Sleep Interventions   Sleep/Rest Enhancement --  awakenings minimized;consistent schedule promoted;noise level reduced;regular sleep/rest pattern promoted;relaxation techniques promoted --      Intervention: Prevent Injury Related to Sensory Impairment   04/23/18 0925 04/24/18 0000   Skin Interventions   Pressure Reduction Devices Pressure-redistributing mattress utilized --    Skin Protection --  Incontinence pads;Skin sealant/moisture barrier;Tubing/devices free from skin contact       Goal: Signs and Symptoms of Listed Potential Problems Will be Absent, Minimized or Managed (Functional Deficit)  Signs and symptoms of listed potential problems will be absent, minimized or managed by discharge/transition of care (reference Functional Deficit (Adult,Obstetrics,Pediatric) CPG).   Outcome: Ongoing (interventions implemented as appropriate)   04/21/18 1104   Functional Deficit   Problems Assessed (Functional Deficit) functional activity tolerance  impairment;muscle strength impairment   Problems Present (Functional Deficit) balance impairment;coordination impairment;functional activity tolerance impairment;muscle strength impairment       Problem: Nutrition, Imbalanced: Inadequate Oral Intake (Adult)  Intervention: Explore Physical/Psychosocial/Treatment-related Factors Impacting Oral Intake   04/24/18 0540   Coping/Psychosocial Interventions   Supportive Measures active listening utilized;verbalization of feelings encouraged       Goal: Identify Related Risk Factors and Signs and Symptoms  Related risk factors and signs and symptoms are identified upon initiation of Human Response Clinical Practice Guideline (CPG)   Outcome: Ongoing (interventions implemented as appropriate)   04/11/18 1813   Nutrition, Imbalanced: Inadequate Oral Intake   Related Risk Factors (Nutrition Imbalance, Inadequate Oral Intake) chronic illness/infection;knowledge deficit     Goal: Improved Oral Intake  Patient will demonstrate the desired outcomes by discharge/transition of care.   Outcome: Ongoing (interventions implemented as appropriate)   04/24/18 0540   Nutrition, Imbalanced: Inadequate Oral Intake (Adult)   Improved Oral Intake making progress toward outcome     Goal: Prevent Further Weight Loss  Patient will demonstrate the desired outcomes by discharge/transition of care.   Outcome: Ongoing (interventions implemented as appropriate)   04/24/18 0540   Nutrition, Imbalanced: Inadequate Oral Intake (Adult)   Prevent Further Weight Loss making progress toward outcome       Problem: Sleep Pattern Disturbance (Adult)  Goal: Identify Related Risk Factors and Signs and Symptoms  Related risk factors and signs and symptoms are identified upon initiation of Human Response Clinical Practice Guideline (CPG)   Outcome: Ongoing (interventions implemented as appropriate)   04/15/18 2022   Sleep Pattern Disturbance   Related Risk Factors (Sleep Pattern Disturbance) medication effects;age  extremes;disease related;hospital routine/care

## 2018-04-24 NOTE — PROGRESS NOTES
[facility] contacted in regards to patient home pt/inr meter education. He has been hospitalized since January. Meter has been returned to [facility]. It will be held until patient discharge.

## 2018-04-24 NOTE — PLAN OF CARE
Problem: Confusion, Acute (Adult)  Intervention: Monitor/Assist with Self Care   18 0000 18 0900 18   Activity   Activity Assistance Provided --  --  assistance, 1 person   Daily Care Interventions   Self-Care Promotion --  independence encouraged --    Functional Level Current   Ambulation 3 - assistive equipment and person --  --    Transferring 3 - assistive equipment and person --  --    Toileting 2 - assistive person --  --    Bathing 2 - assistive person --  --    Dressing 2 - assistive person --  --    Eating 2 - assistive person --  --    Communication 0 - understands/communicates without difficulty --  --    Swallowing 0 - swallows foods/liquids without difficulty --  --      Intervention: Reduce Risk/Promote Restraint Free Environment   18   Safety Interventions   Environmental Safety Modification assistive device/personal items within reach;clutter free environment maintained;room near unit station;room organization consistent   Prevent  Drop/Fall   Safety/Security Measures bed alarm set;other (see comments)  (Avasys at bedside)     Intervention: Evaluate Medications/Identify Contributors to Confusion   18   Safety Interventions   Medication Review/Management medications reviewed     Intervention: Optimize Communication   18   Cognitive Interventions   Communication Enhancement Strategies call light answered in person;communication board used     Intervention: Provide Frequent Orientation/Reorientation   18   Cognitive Interventions   Reorientation Measures glasses encouraged;reorientation provided   Sensory Stimulation Regulation care clustered;lighting decreased;quiet environment promoted

## 2018-04-25 PROCEDURE — 63600175 PHARM REV CODE 636 W HCPCS: Performed by: HOSPITALIST

## 2018-04-25 PROCEDURE — 21400001 HC TELEMETRY ROOM

## 2018-04-25 PROCEDURE — 25000003 PHARM REV CODE 250: Performed by: INTERNAL MEDICINE

## 2018-04-25 PROCEDURE — 25000003 PHARM REV CODE 250: Performed by: HOSPITALIST

## 2018-04-25 RX ORDER — QUETIAPINE FUMARATE 25 MG/1
25 TABLET, FILM COATED ORAL NIGHTLY
Status: DISCONTINUED | OUTPATIENT
Start: 2018-04-25 | End: 2018-05-05 | Stop reason: HOSPADM

## 2018-04-25 RX ORDER — IPRATROPIUM BROMIDE AND ALBUTEROL SULFATE 2.5; .5 MG/3ML; MG/3ML
3 SOLUTION RESPIRATORY (INHALATION) EVERY 8 HOURS
Status: COMPLETED | OUTPATIENT
Start: 2018-04-26 | End: 2018-04-27

## 2018-04-25 RX ADMIN — DIVALPROEX SODIUM 250 MG: 250 TABLET, DELAYED RELEASE ORAL at 08:04

## 2018-04-25 RX ADMIN — CARBIDOPA AND LEVODOPA 2 TABLET: 25; 100 TABLET ORAL at 08:04

## 2018-04-25 RX ADMIN — AMLODIPINE BESYLATE 10 MG: 5 TABLET ORAL at 08:04

## 2018-04-25 RX ADMIN — AMOXICILLIN AND CLAVULANATE POTASSIUM 1 TABLET: 875; 125 TABLET, FILM COATED ORAL at 08:04

## 2018-04-25 RX ADMIN — CARBIDOPA AND LEVODOPA 2 TABLET: 25; 100 TABLET ORAL at 04:04

## 2018-04-25 RX ADMIN — QUETIAPINE FUMARATE 25 MG: 25 TABLET ORAL at 08:04

## 2018-04-25 RX ADMIN — POLYETHYLENE GLYCOL 3350 17 G: 17 POWDER, FOR SOLUTION ORAL at 08:04

## 2018-04-25 RX ADMIN — RIVASTIGMINE TRANSDERMAL SYSTEM 1 PATCH: 4.6 PATCH, EXTENDED RELEASE TRANSDERMAL at 08:04

## 2018-04-25 RX ADMIN — DOCUSATE SODIUM 100 MG: 100 CAPSULE, LIQUID FILLED ORAL at 08:04

## 2018-04-25 RX ADMIN — ENOXAPARIN SODIUM 40 MG: 100 INJECTION SUBCUTANEOUS at 04:04

## 2018-04-25 NOTE — PLAN OF CARE
Problem: Infection, Risk/Actual (Adult)  Intervention: Manage Suspected/Actual Infection   04/25/18 0629   Safety Interventions   Isolation Precautions environmental surveillance   Infection Management aseptic technique maintained     Intervention: Prevent Infection/Maximize Resistance   04/25/18 0629   Hygiene Care   Bathing/Skin Care incontinence care   Respiratory Interventions   Airway/Ventilation Management airway patency maintained   Pain/Comfort/Sleep Interventions   Sleep/Rest Enhancement awakenings minimized;regular sleep/rest pattern promoted;relaxation techniques promoted       Goal: Identify Related Risk Factors and Signs and Symptoms  Related risk factors and signs and symptoms are identified upon initiation of Human Response Clinical Practice Guideline (CPG)    04/25/18 0629   Infection, Risk/Actual   Related Risk Factors (Infection, Risk/Actual) prolonged hospitalization     Goal: Infection Prevention/Resolution  Patient will demonstrate the desired outcomes by discharge/transition of care.    04/25/18 0629   Infection, Risk/Actual (Adult)   Infection Prevention/Resolution making progress toward outcome       Comments: Pt was started on po augmentin on 4/24 ; chest x-ray showed possible beginning stages of pneumonia; no fever noted this shift; last wbc 4.57; will continue to monitor

## 2018-04-25 NOTE — SUBJECTIVE & OBJECTIVE
Interval History: pt restless since visit with  this morning, multiple attempts to get out of bed     Review of Systems   Constitutional: Negative for fever.   Respiratory: Negative for shortness of breath.    Cardiovascular: Negative for chest pain.     Objective:     Vital Signs (Most Recent):  Temp: 98 °F (36.7 °C) (04/24/18 1918)  Pulse: 86 (04/24/18 1918)  Resp: 18 (04/24/18 1918)  BP: (!) 157/68 (04/24/18 1918)  SpO2: (!) 93 % (04/24/18 1918) Vital Signs (24h Range):  Temp:  [98 °F (36.7 °C)-98.9 °F (37.2 °C)] 98 °F (36.7 °C)  Pulse:  [72-97] 86  Resp:  [18-20] 18  SpO2:  [92 %-98 %] 93 %  BP: (126-157)/(58-68) 157/68     Weight: 59 kg (130 lb 1.1 oz)  Body mass index is 20.39 kg/m².    Intake/Output Summary (Last 24 hours) at 04/25/18 0637  Last data filed at 04/24/18 2002   Gross per 24 hour   Intake              920 ml   Output              350 ml   Net              570 ml      Physical Exam   Constitutional: He appears well-developed and well-nourished. No distress.   HENT:   Head: Normocephalic and atraumatic.   Eyes: EOM are normal. Pupils are equal, round, and reactive to light.   Neck: Normal range of motion. Neck supple.   Cardiovascular: Normal rate and regular rhythm.    Pulmonary/Chest: Effort normal and breath sounds normal.   Abdominal: Soft. Bowel sounds are normal.   Musculoskeletal: Normal range of motion.   Neurological: He is alert.   Oriented x 2, and cooperative   Skin: Skin is warm and dry.   Psychiatric: His affect is blunt. His speech is delayed. Cognition and memory are impaired.       Significant Labs: All pertinent labs within the past 24 hours have been reviewed.    Significant Imaging: I have reviewed all pertinent imaging results/findings within the past 24 hours.

## 2018-04-25 NOTE — NURSING
Report received; pt awake and alert, resp even and unlabored no acute distress noted, bed alarm on, Avasys in use; denies pain, no needs voiced at this time

## 2018-04-25 NOTE — SUBJECTIVE & OBJECTIVE
Interval History: pt with hallucinations and cough     Review of Systems   Constitutional: Negative for fever.   Respiratory: Negative for shortness of breath.    Cardiovascular: Negative for chest pain.     Objective:     Vital Signs (Most Recent):  Temp: 97.3 °F (36.3 °C) (04/25/18 1535)  Pulse: 88 (04/25/18 1535)  Resp: 20 (04/25/18 1535)  BP: 139/63 (04/25/18 1535)  SpO2: (!) 94 % (04/25/18 1535) Vital Signs (24h Range):  Temp:  [97.3 °F (36.3 °C)-98.7 °F (37.1 °C)] 97.3 °F (36.3 °C)  Pulse:  [72-88] 88  Resp:  [18-20] 20  SpO2:  [93 %-94 %] 94 %  BP: (129-162)/(62-69) 139/63     Weight: 59 kg (130 lb 1.1 oz)  Body mass index is 20.39 kg/m².    Intake/Output Summary (Last 24 hours) at 04/25/18 1800  Last data filed at 04/25/18 0830   Gross per 24 hour   Intake              440 ml   Output              150 ml   Net              290 ml      Physical Exam   Constitutional: He appears well-developed and well-nourished. No distress.   HENT:   Head: Normocephalic and atraumatic.   Eyes: EOM are normal. Pupils are equal, round, and reactive to light.   Neck: Normal range of motion. Neck supple.   Cardiovascular: Normal rate and regular rhythm.    Pulmonary/Chest: Effort normal and breath sounds normal.   Bronchial BS   Abdominal: Soft. Bowel sounds are normal.   Musculoskeletal: Normal range of motion.   Neurological: He is alert.   Oriented x 2, and cooperative   Skin: Skin is warm and dry.   Psychiatric: His affect is blunt. His speech is delayed. Cognition and memory are impaired.       Significant Labs: All pertinent labs within the past 24 hours have been reviewed.    Significant Imaging: I have reviewed and interpreted all pertinent imaging results/findings within the past 24 hours.

## 2018-04-25 NOTE — PROGRESS NOTES
[facility] Hospital Medicine  Progress Note    Patient Name: Tenzin Ortiz  MRN: [id number]  Patient Class: IP- Inpatient   Admission Date: 2/7/2018  Length of Stay: 57 days  Attending Physician: Sidra Rivas MD  Primary Care Provider: Efrain Chavez MD        Subjective:     Principal Problem:Debility    HPI:  Patient is 78 yo male with HTN, HLD, and Parkinson's who presents to ED reportedly for HA. Per ED note patient was complaining of HA but he denies this on my interview. Seems there was some confusion in ED and he was unclear why he was here. During interview with me, patient reports being here for NH placement although he does not appear to be very happy about this. He has been with some issues caring for himself. Currently lives with his girlfriend who reports she can no longer care for him. He states he currently gets around with a walker but sometimes has difficulty getting up out of his chair. Per chart review patient is with Parkinson's and sees neurology, Dr. Castellanos, lastly seen on 2/6/18. He was with hallucinations which were noted to be secondary to dopaminergic agents thus having to significantly reduce dose--she recommended he be admitted for placement at that time but patient declined. He has been attempting placement as an outpatient through PCP but without much luck. He otherwise denies recent illness, fever/chills, CP, SOB, abdominal pain, N/V/D, dysuria. On presentation patient with grossly normal labs/vitals. Placed in observation for what appears to be solely placement although this is something that may need to be continued as outpatient as is not indication for hospitalization.    Hospital Course:  Mr. Ortiz was admitted with severe Parkinsons dementia and can no longer care for himself. He was living with his girlfriend, but she can no longer care for the patient. Case management found an accepting facility at [facility]; however, POA declined signing the necessary  paperwork for placement. Legal was consulted and judicial guardianship of the patient in process with court date set for April 24, 2018.    Await settling of financials and plan for NH placement next week.     Interval History: pt restless since visit with  this morning, multiple attempts to get out of bed     Review of Systems   Constitutional: Negative for fever.   Respiratory: Negative for shortness of breath.    Cardiovascular: Negative for chest pain.     Objective:     Vital Signs (Most Recent):  Temp: 98 °F (36.7 °C) (04/24/18 1918)  Pulse: 86 (04/24/18 1918)  Resp: 18 (04/24/18 1918)  BP: (!) 157/68 (04/24/18 1918)  SpO2: (!) 93 % (04/24/18 1918) Vital Signs (24h Range):  Temp:  [98 °F (36.7 °C)-98.9 °F (37.2 °C)] 98 °F (36.7 °C)  Pulse:  [72-97] 86  Resp:  [18-20] 18  SpO2:  [92 %-98 %] 93 %  BP: (126-157)/(58-68) 157/68     Weight: 59 kg (130 lb 1.1 oz)  Body mass index is 20.39 kg/m².    Intake/Output Summary (Last 24 hours) at 04/25/18 0637  Last data filed at 04/24/18 2002   Gross per 24 hour   Intake              920 ml   Output              350 ml   Net              570 ml      Physical Exam   Constitutional: He appears well-developed and well-nourished. No distress.   HENT:   Head: Normocephalic and atraumatic.   Eyes: EOM are normal. Pupils are equal, round, and reactive to light.   Neck: Normal range of motion. Neck supple.   Cardiovascular: Normal rate and regular rhythm.    Pulmonary/Chest: Effort normal and breath sounds normal.   Abdominal: Soft. Bowel sounds are normal.   Musculoskeletal: Normal range of motion.   Neurological: He is alert.   Oriented x 2, and cooperative   Skin: Skin is warm and dry.   Psychiatric: His affect is blunt. His speech is delayed. Cognition and memory are impaired.       Significant Labs: All pertinent labs within the past 24 hours have been reviewed.    Significant Imaging: I have reviewed all pertinent imaging results/findings within the past 24  hours.    Assessment/Plan:      * Debility    Pt was living with a significant other who can no longer care for him  His estranged daughter was contacted and was willing to aid in finding NH placement (she had already begun this process as an outpatient) but patient's POA (a business friend) refused to sign consent for NH placement.   Patient has no capacity for decision making as per Psych  Depakote 250mg TID to help with mood stabilization  Guardianship process ongoing with court date for 4/24/18  He is medically cleared for transfer once nursing facility arranged        Lewy body dementia without behavioral disturbance    Seen by Psych   Continue Sinemet  QID and Seroquel 50 QHS  Continue Exelon 4.6 mg/24 patch  Divalproex 250 mg to BID  Held trazodone        Essential hypertension    Resume norvasc.        Parkinson disease    Seroquel 50 mg; sinemet  mg  Discontinued taxodione 50 mg  On exelon 4.6 mg in the morning when he was more awake          VTE Risk Mitigation         Ordered     enoxaparin injection 40 mg  Daily      04/09/18 1423     Medium Risk of VTE  Once      02/08/18 0653     Place JOSEFINA hose  Until discontinued      02/08/18 0653     Place sequential compression device  Until discontinued      02/08/18 0653              Sidra Rivas MD  Department of Hospital Medicine   [facility]

## 2018-04-25 NOTE — PROGRESS NOTES
[facility] Hospital Medicine  Progress Note    Patient Name: Tenzin Ortiz  MRN: [id number]  Patient Class: IP- Inpatient   Admission Date: 2/7/2018  Length of Stay: 57 days  Attending Physician: Sidra Rivas MD  Primary Care Provider: Efrain Chavez MD        Subjective:     Principal Problem:Debility    HPI:  Patient is 76 yo male with HTN, HLD, and Parkinson's who presents to ED reportedly for HA. Per ED note patient was complaining of HA but he denies this on my interview. Seems there was some confusion in ED and he was unclear why he was here. During interview with me, patient reports being here for NH placement although he does not appear to be very happy about this. He has been with some issues caring for himself. Currently lives with his girlfriend who reports she can no longer care for him. He states he currently gets around with a walker but sometimes has difficulty getting up out of his chair. Per chart review patient is with Parkinson's and sees neurology, Dr. Castellanos, lastly seen on 2/6/18. He was with hallucinations which were noted to be secondary to dopaminergic agents thus having to significantly reduce dose--she recommended he be admitted for placement at that time but patient declined. He has been attempting placement as an outpatient through PCP but without much luck. He otherwise denies recent illness, fever/chills, CP, SOB, abdominal pain, N/V/D, dysuria. On presentation patient with grossly normal labs/vitals. Placed in observation for what appears to be solely placement although this is something that may need to be continued as outpatient as is not indication for hospitalization.    Hospital Course:  Mr. Ortiz was admitted with severe Parkinsons dementia and can no longer care for himself. He was living with his girlfriend, but she can no longer care for the patient. Case management found an accepting facility at [facility]; however, POA declined signing the necessary  paperwork for placement. Legal was consulted and judicial guardianship of the patient in process with court date set for April 24, 2018.    Await settling of financials and plan for NH placement next week. Will plan for a few days of resp neb treatments with increased cough and faint exp wheeze that changed from yesterday.    Interval History: pt with hallucinations and cough     Review of Systems   Constitutional: Negative for fever.   Respiratory: Negative for shortness of breath.    Cardiovascular: Negative for chest pain.     Objective:     Vital Signs (Most Recent):  Temp: 97.3 °F (36.3 °C) (04/25/18 1535)  Pulse: 88 (04/25/18 1535)  Resp: 20 (04/25/18 1535)  BP: 139/63 (04/25/18 1535)  SpO2: (!) 94 % (04/25/18 1535) Vital Signs (24h Range):  Temp:  [97.3 °F (36.3 °C)-98.7 °F (37.1 °C)] 97.3 °F (36.3 °C)  Pulse:  [72-88] 88  Resp:  [18-20] 20  SpO2:  [93 %-94 %] 94 %  BP: (129-162)/(62-69) 139/63     Weight: 59 kg (130 lb 1.1 oz)  Body mass index is 20.39 kg/m².    Intake/Output Summary (Last 24 hours) at 04/25/18 1800  Last data filed at 04/25/18 0830   Gross per 24 hour   Intake              440 ml   Output              150 ml   Net              290 ml      Physical Exam   Constitutional: He appears well-developed and well-nourished. No distress.   HENT:   Head: Normocephalic and atraumatic.   Eyes: EOM are normal. Pupils are equal, round, and reactive to light.   Neck: Normal range of motion. Neck supple.   Cardiovascular: Normal rate and regular rhythm.    Pulmonary/Chest: Effort normal and breath sounds normal.   Bronchial BS   Abdominal: Soft. Bowel sounds are normal.   Musculoskeletal: Normal range of motion.   Neurological: He is alert.   Oriented x 2, and cooperative   Skin: Skin is warm and dry.   Psychiatric: His affect is blunt. His speech is delayed. Cognition and memory are impaired.       Significant Labs: All pertinent labs within the past 24 hours have been reviewed.    Significant Imaging: I  have reviewed and interpreted all pertinent imaging results/findings within the past 24 hours.    Assessment/Plan:      * Debility    Pt was living with a significant other who can no longer care for him  His estranged daughter was contacted and was willing to aid in finding NH placement (she had already begun this process as an outpatient) but patient's POA (a business friend) refused to sign consent for NH placement.   Patient has no capacity for decision making as per Psych  Depakote 250mg TID to help with mood stabilization  Guardianship process ongoing with court date for 4/24/18  He is medically cleared for transfer once nursing facility arranged        Lewy body dementia without behavioral disturbance    Seen by Psych   Continue Sinemet  QID and Seroquel 25mg Qhs - hallucinations pronounced x 2 days   Continue Exelon 4.6 mg/24 patch  Divalproex 250 mg to BID  Held trazodone        Essential hypertension    Resume norvasc.        Parkinson disease    Seroquel 50 mg; sinemet  mg  Discontinued taxodione 50 mg  On exelon 4.6 mg in the morning when he was more awake          VTE Risk Mitigation         Ordered     enoxaparin injection 40 mg  Daily      04/09/18 1423     Medium Risk of VTE  Once      02/08/18 0653     Place JOSEFINA hose  Until discontinued      02/08/18 0653     Place sequential compression device  Until discontinued      02/08/18 0653              Sidra Rivas MD  Department of Hospital Medicine   [facility]

## 2018-04-25 NOTE — ASSESSMENT & PLAN NOTE
Seen by Psych   Continue Sinemet  QID and Seroquel 25mg Qhs - hallucinations pronounced x 2 days   Continue Exelon 4.6 mg/24 patch  Divalproex 250 mg to BID  Held trazodone

## 2018-04-25 NOTE — UM SECONDARY REVIEW
[facility]    IP Extended Stay > 10    LOS: approved an agreement with D/C plan   Hospital day#78-Patient is interdicted. NHP in progress. Continued stay approved per  committee policy.

## 2018-04-26 PROCEDURE — 94761 N-INVAS EAR/PLS OXIMETRY MLT: CPT

## 2018-04-26 PROCEDURE — 25000242 PHARM REV CODE 250 ALT 637 W/ HCPCS: Performed by: HOSPITALIST

## 2018-04-26 PROCEDURE — 25000003 PHARM REV CODE 250: Performed by: HOSPITALIST

## 2018-04-26 PROCEDURE — 94640 AIRWAY INHALATION TREATMENT: CPT

## 2018-04-26 PROCEDURE — 63600175 PHARM REV CODE 636 W HCPCS: Performed by: HOSPITALIST

## 2018-04-26 PROCEDURE — 25000003 PHARM REV CODE 250: Performed by: INTERNAL MEDICINE

## 2018-04-26 PROCEDURE — 21400001 HC TELEMETRY ROOM

## 2018-04-26 RX ADMIN — POLYETHYLENE GLYCOL 3350 17 G: 17 POWDER, FOR SOLUTION ORAL at 08:04

## 2018-04-26 RX ADMIN — RIVASTIGMINE TRANSDERMAL SYSTEM 1 PATCH: 4.6 PATCH, EXTENDED RELEASE TRANSDERMAL at 08:04

## 2018-04-26 RX ADMIN — IPRATROPIUM BROMIDE AND ALBUTEROL SULFATE 3 ML: .5; 2.5 SOLUTION RESPIRATORY (INHALATION) at 12:04

## 2018-04-26 RX ADMIN — DIVALPROEX SODIUM 250 MG: 250 TABLET, DELAYED RELEASE ORAL at 09:04

## 2018-04-26 RX ADMIN — DOCUSATE SODIUM 100 MG: 100 CAPSULE, LIQUID FILLED ORAL at 09:04

## 2018-04-26 RX ADMIN — CARBIDOPA AND LEVODOPA 2 TABLET: 25; 100 TABLET ORAL at 09:04

## 2018-04-26 RX ADMIN — CARBIDOPA AND LEVODOPA 2 TABLET: 25; 100 TABLET ORAL at 04:04

## 2018-04-26 RX ADMIN — DOCUSATE SODIUM AND SENNOSIDES 1 TABLET: 8.6; 5 TABLET, FILM COATED ORAL at 09:04

## 2018-04-26 RX ADMIN — QUETIAPINE FUMARATE 25 MG: 25 TABLET ORAL at 09:04

## 2018-04-26 RX ADMIN — AMOXICILLIN AND CLAVULANATE POTASSIUM 1 TABLET: 875; 125 TABLET, FILM COATED ORAL at 09:04

## 2018-04-26 RX ADMIN — CARBIDOPA AND LEVODOPA 2 TABLET: 25; 100 TABLET ORAL at 01:04

## 2018-04-26 RX ADMIN — IPRATROPIUM BROMIDE AND ALBUTEROL SULFATE 3 ML: .5; 2.5 SOLUTION RESPIRATORY (INHALATION) at 04:04

## 2018-04-26 RX ADMIN — ENOXAPARIN SODIUM 40 MG: 100 INJECTION SUBCUTANEOUS at 04:04

## 2018-04-26 RX ADMIN — IPRATROPIUM BROMIDE AND ALBUTEROL SULFATE 3 ML: .5; 2.5 SOLUTION RESPIRATORY (INHALATION) at 07:04

## 2018-04-26 RX ADMIN — AMLODIPINE BESYLATE 10 MG: 5 TABLET ORAL at 09:04

## 2018-04-26 NOTE — SUBJECTIVE & OBJECTIVE
Interval History: pt more alert today and not as restless    Review of Systems   Constitutional: Negative for fever.   Respiratory: Negative for shortness of breath.    Cardiovascular: Negative for chest pain.     Objective:     Vital Signs (Most Recent):  Temp: 97 °F (36.1 °C) (04/26/18 0737)  Pulse: 74 (04/26/18 0752)  Resp: 18 (04/26/18 0752)  BP: (!) 127/58 (04/26/18 0737)  SpO2: (!) 94 % (04/26/18 0752) Vital Signs (24h Range):  Temp:  [97 °F (36.1 °C)-99 °F (37.2 °C)] 97 °F (36.1 °C)  Pulse:  [72-88] 74  Resp:  [18-20] 18  SpO2:  [93 %-96 %] 94 %  BP: (127-139)/(58-63) 127/58     Weight: 59 kg (130 lb 1.1 oz)  Body mass index is 20.39 kg/m².    Intake/Output Summary (Last 24 hours) at 04/26/18 1111  Last data filed at 04/25/18 2000   Gross per 24 hour   Intake              240 ml   Output                0 ml   Net              240 ml      Physical Exam   Constitutional: He appears well-developed and well-nourished. No distress.   HENT:   Head: Normocephalic and atraumatic.   Eyes: EOM are normal. Pupils are equal, round, and reactive to light.   Neck: Normal range of motion. Neck supple.   Cardiovascular: Normal rate and regular rhythm.    Pulmonary/Chest: Effort normal and breath sounds normal.   Bronchial BS   Abdominal: Soft. Bowel sounds are normal.   Musculoskeletal: Normal range of motion.   Neurological: He is alert.   Oriented x 2, and cooperative   Skin: Skin is warm and dry.   Psychiatric: His affect is blunt. His speech is delayed. Cognition and memory are impaired.       Significant Labs: All pertinent labs within the past 24 hours have been reviewed.    Significant Imaging: I have reviewed all pertinent imaging results/findings within the past 24 hours.

## 2018-04-26 NOTE — PROGRESS NOTES
" [facility]  Adult Nutrition  Progress Note    SUMMARY       Recommendations    Recommendation/Intervention:   1. Should po intake/alertness remain poor- consider NGT with TF:   -Isosource 1.5 initiated @ 10 ml/hr and advance 10 ml q 4 hrs to goal rate of 50 ml/hr.   -Fluid flushes for normal fluid intake: 150 ml fluid flushes q 4 hrs or per MD.   -Hold TF for n/v/abd discomfort or residuals >500 ml; HOB >30   - TF to provide: 1800 kcal, 82 g pro, 917 ml fluid   2. Encourage po intake and supplements    Goals: 1) Patient to continue to consume >=85% of EEN and EPN with tolerance x7 days  Nutrition Goal Status: goal not met  Communication of RD Recs: reviewed with RN    D/C planning: Regular diet with supplements    Reason for Assessment    Reason for Assessment: RD follow-up  Diagnosis:  (Debility)  Relevant Medical History: HTN, Parkinsosn    General Information Comments: Patient with decreased intake over past several days (4/21) with increased lethargy. Patient asleep at time of visit with tray at bedside uneaten. + ketones in UA noted.     Nutrition Risk Screen    Nutrition Risk Screen: no indicators present    Nutrition/Diet History    Food Preferences: No cultural, Evangelical or ethnic food preferences.   Do you have any cultural, spiritual, Evangelical conflicts, given your current situation?: No cultural, Evangelical or ethnic food preferences.   Food Allergies: NKFA  Factors Affecting Nutritional Intake: impaired cognitive status/motor control, inability to feed self    Anthropometrics    Temp: 98.3 °F (36.8 °C)  Height Method: Stated  Height: 5' 6.97" (170.1 cm)  Height (inches): 66.97 in  Weight Method: Bed Scale  Weight: 59 kg (130 lb 1.1 oz)  Weight (lb): 130.07 lb  Ideal Body Weight (IBW), Male: 147.82 lb  % Ideal Body Weight, Male (lb): 90.08 lb  BMI (Calculated): 20.9  BMI Grade: 18.5-24.9 - normal       Lab/Procedures/Meds    Pertinent Labs Reviewed: reviewed  Pertinent Labs Comments: " UA: + ketones  Pertinent Medications Reviewed: reviewed    Physical Findings/Assessment    Overall Physical Appearance: loss of muscle mass (lower extremities of note)  Oral/Mouth Cavity: tooth/teeth missing  Skin: intact (Mike Score 20)    Estimated/Assessed Needs    Weight Used For Calorie Calculations: 62.6 kg (138 lb 0.1 oz)  Energy Calorie Requirements (kcal): 1010-4755 kcal  Energy Need Method: [place]-St Jeor  Protein Requirements: 65-75g  Weight Used For Protein Calculations: 62.6 kg (138 lb 0.1 oz)     Fluid Need Method: RDA Method  RDA Method (mL): 1600  CHO Requirement: N/A      Nutrition Prescription Ordered    Current Diet Order: Regular  Oral Nutrition Supplement: Boost Plus TID    Evaluation of Received Nutrient/Fluid Intake    I/O: 920/150  Energy Calories Required: not meeting needs  Protein Required: not meeting needs  Fluid Required: not meeting needs  Comments: LBM: 4/23  Tolerance:  (missing most meals due to excessive sleeping)  % Intake of Estimated Energy Needs: 0-25%  % Meal Intake: 0-25%    Nutrition Risk    Level of Risk/Frequency of Follow-up:  (2 x week)     Assessment and Plan    Nutrition Dx: Inadequate Energy Intake due to lethargy as evidenced by: Ketones in urine; 0-25% po intake of meals  Nutrition Dx Status: New       Monitor and Evaluation    Food and Nutrient Intake: energy intake, food and beverage intake  Food and Nutrient Adminstration: diet order, other (specify) (supplement order)  Knowledge/Beliefs/Attitudes: food and nutrition knowledge/skill  Physical Activity and Function: nutrition-related ADLs and IADLs  Anthropometric Measurements: weight, weight change  Biochemical Data, Medical Tests and Procedures: electrolyte and renal panel  Nutrition-Focused Physical Findings: overall appearance     Nutrition Follow-Up    RD Follow-up?: Yes

## 2018-04-26 NOTE — PROGRESS NOTES
[facility] Hospital Medicine  Progress Note    Patient Name: Tenzin Ortiz  MRN: [id number]  Patient Class: IP- Inpatient   Admission Date: 2/7/2018  Length of Stay: 58 days  Attending Physician: Sidra Rivas MD  Primary Care Provider: Efrain Chavez MD        Subjective:     Principal Problem:Debility    HPI:  Patient is 76 yo male with HTN, HLD, and Parkinson's who presents to ED reportedly for HA. Per ED note patient was complaining of HA but he denies this on my interview. Seems there was some confusion in ED and he was unclear why he was here. During interview with me, patient reports being here for NH placement although he does not appear to be very happy about this. He has been with some issues caring for himself. Currently lives with his girlfriend who reports she can no longer care for him. He states he currently gets around with a walker but sometimes has difficulty getting up out of his chair. Per chart review patient is with Parkinson's and sees neurology, Dr. Castellanos, lastly seen on 2/6/18. He was with hallucinations which were noted to be secondary to dopaminergic agents thus having to significantly reduce dose--she recommended he be admitted for placement at that time but patient declined. He has been attempting placement as an outpatient through PCP but without much luck. He otherwise denies recent illness, fever/chills, CP, SOB, abdominal pain, N/V/D, dysuria. On presentation patient with grossly normal labs/vitals. Placed in observation for what appears to be solely placement although this is something that may need to be continued as outpatient as is not indication for hospitalization.    Hospital Course:  Mr. Ortiz was admitted with severe Parkinsons dementia and can no longer care for himself. He was living with his girlfriend, but she can no longer care for the patient. Case management found an accepting facility at [place]; however, POA declined signing the necessary  paperwork for placement. Legal was consulted and judicial guardianship of the patient in process with court date set for April 24, 2018.    Await settling of financials and plan for NH placement next week. Will plan for a few days of resp neb treatments with increased cough and faint exp wheeze that changed from yesterday.    Resp status improved, lowered seroquel at night secondary to hallucinations and increased drowsiness, not awake for meals    Interval History: pt more alert today and not as restless    Review of Systems   Constitutional: Negative for fever.   Respiratory: Negative for shortness of breath.    Cardiovascular: Negative for chest pain.     Objective:     Vital Signs (Most Recent):  Temp: 97 °F (36.1 °C) (04/26/18 0737)  Pulse: 74 (04/26/18 0752)  Resp: 18 (04/26/18 0752)  BP: (!) 127/58 (04/26/18 0737)  SpO2: (!) 94 % (04/26/18 0752) Vital Signs (24h Range):  Temp:  [97 °F (36.1 °C)-99 °F (37.2 °C)] 97 °F (36.1 °C)  Pulse:  [72-88] 74  Resp:  [18-20] 18  SpO2:  [93 %-96 %] 94 %  BP: (127-139)/(58-63) 127/58     Weight: 59 kg (130 lb 1.1 oz)  Body mass index is 20.39 kg/m².    Intake/Output Summary (Last 24 hours) at 04/26/18 1111  Last data filed at 04/25/18 2000   Gross per 24 hour   Intake              240 ml   Output                0 ml   Net              240 ml      Physical Exam   Constitutional: He appears well-developed and well-nourished. No distress.   HENT:   Head: Normocephalic and atraumatic.   Eyes: EOM are normal. Pupils are equal, round, and reactive to light.   Neck: Normal range of motion. Neck supple.   Cardiovascular: Normal rate and regular rhythm.    Pulmonary/Chest: Effort normal and breath sounds normal.   Bronchial BS   Abdominal: Soft. Bowel sounds are normal.   Musculoskeletal: Normal range of motion.   Neurological: He is alert.   Oriented x 2, and cooperative   Skin: Skin is warm and dry.   Psychiatric: His affect is blunt. His speech is delayed. Cognition and memory  are impaired.       Significant Labs: All pertinent labs within the past 24 hours have been reviewed.    Significant Imaging: I have reviewed all pertinent imaging results/findings within the past 24 hours.    Assessment/Plan:      * Debility    Pt was living with a significant other who can no longer care for him  His estranged daughter was contacted and was willing to aid in finding NH placement (she had already begun this process as an outpatient) but patient's POA (a business friend) refused to sign consent for NH placement.   Patient has no capacity for decision making as per Psych  Depakote 250mg TID to help with mood stabilization  Guardianship process ongoing with court date for 4/24/18  He is medically cleared for transfer once nursing facility arranged        Lewy body dementia without behavioral disturbance    Seen by Psych   Continue Sinemet  QID and Seroquel 25mg Qhs - hallucinations pronounced x 2 days   Continue Exelon 4.6 mg/24 patch  Divalproex 250 mg to BID  Held trazodone        Essential hypertension    Resume norvasc.        Parkinson disease    Seroquel 50 mg; sinemet  mg  Discontinued taxodione 50 mg  On exelon 4.6 mg in the morning when he was more awake          VTE Risk Mitigation         Ordered     enoxaparin injection 40 mg  Daily      04/09/18 1423     Medium Risk of VTE  Once      02/08/18 0653     Place JOSEFINA hose  Until discontinued      02/08/18 0653     Place sequential compression device  Until discontinued      02/08/18 0653              Sidra Rivas MD  Department of Hospital Medicine   [facility]

## 2018-04-26 NOTE — PLAN OF CARE
Recommendations     Recommendation/Intervention:   1. Should po intake/alertness remain poor- consider NGT with TF:   -Isosource 1.5 initiated @ 10 ml/hr and advance 10 ml q 4 hrs to goal rate of 50 ml/hr.   -Fluid flushes for normal fluid intake: 150 ml fluid flushes q 4 hrs or per MD.   -Hold TF for n/v/abd discomfort or residuals >500 ml; HOB >30   - TF to provide: 1800 kcal, 82 g pro, 917 ml fluid   2. Encourage po intake and supplements     Goals: 1) Patient to continue to consume >=85% of EEN and EPN with tolerance x7 days  Nutrition Goal Status: goal not met  Communication of RD Recs: reviewed with RN     D/C planning: Regular diet with supplements

## 2018-04-26 NOTE — PLAN OF CARE
Problem: Confusion, Acute (Adult)  Intervention: Reduce Risk/Promote Restraint Free Environment   18   Safety Interventions   Environmental Safety Modification assistive device/personal items within reach;clutter free environment maintained;lighting adjusted;room near unit station   Prevent [place] Drop/Fall   Safety/Security Measures bed alarm set     Intervention: Evaluate Medications/Identify Contributors to Confusion   18   Safety Interventions   Medication Review/Management medications reviewed     Intervention: Promote Familiarity/Consistency   18   Coping/Psychosocial Interventions   Environment Familiarity/Consistency daily routine followed;familiar objects from home provided     Intervention: Provide Frequent Orientation/Reorientation   18   Cognitive Interventions   Reorientation Measures glasses encouraged;reorientation provided   Sensory Stimulation Regulation care clustered;quiet environment promoted;music/television provided for stimulation       Goal: Identify Related Risk Factors and Signs and Symptoms  Related risk factors and signs and symptoms are identified upon initiation of Human Response Clinical Practice Guideline (CPG)    18   Confusion, Acute   Related Risk Factors (Acute Confusion) cognitive impairment;advanced age   Signs and Symptoms (Acute Confusion) reasoning/planning disturbed     Goal: Cognitive/Functional Impairments Minimized  Patient will demonstrate the desired outcomes by discharge/transition of care.    18   Confusion, Acute (Adult)   Cognitive/Functional Impairments Minimized making progress toward outcome     Goal: Safety  Patient will demonstrate the desired outcomes by discharge/transition of care.    18   Confusion, Acute (Adult)   Safety making progress toward outcome

## 2018-04-27 PROCEDURE — 94640 AIRWAY INHALATION TREATMENT: CPT

## 2018-04-27 PROCEDURE — 25000242 PHARM REV CODE 250 ALT 637 W/ HCPCS: Performed by: HOSPITALIST

## 2018-04-27 PROCEDURE — 94761 N-INVAS EAR/PLS OXIMETRY MLT: CPT

## 2018-04-27 PROCEDURE — 25000003 PHARM REV CODE 250: Performed by: HOSPITALIST

## 2018-04-27 PROCEDURE — 21400001 HC TELEMETRY ROOM

## 2018-04-27 PROCEDURE — 63600175 PHARM REV CODE 636 W HCPCS: Performed by: HOSPITALIST

## 2018-04-27 PROCEDURE — 25000003 PHARM REV CODE 250: Performed by: INTERNAL MEDICINE

## 2018-04-27 RX ADMIN — CARBIDOPA AND LEVODOPA 2 TABLET: 25; 100 TABLET ORAL at 09:04

## 2018-04-27 RX ADMIN — DIVALPROEX SODIUM 250 MG: 250 TABLET, DELAYED RELEASE ORAL at 09:04

## 2018-04-27 RX ADMIN — CARBIDOPA AND LEVODOPA 2 TABLET: 25; 100 TABLET ORAL at 08:04

## 2018-04-27 RX ADMIN — AMOXICILLIN AND CLAVULANATE POTASSIUM 1 TABLET: 875; 125 TABLET, FILM COATED ORAL at 08:04

## 2018-04-27 RX ADMIN — IPRATROPIUM BROMIDE AND ALBUTEROL SULFATE 3 ML: .5; 2.5 SOLUTION RESPIRATORY (INHALATION) at 01:04

## 2018-04-27 RX ADMIN — ENOXAPARIN SODIUM 40 MG: 100 INJECTION SUBCUTANEOUS at 05:04

## 2018-04-27 RX ADMIN — AMLODIPINE BESYLATE 10 MG: 5 TABLET ORAL at 08:04

## 2018-04-27 RX ADMIN — RIVASTIGMINE TRANSDERMAL SYSTEM 1 PATCH: 4.6 PATCH, EXTENDED RELEASE TRANSDERMAL at 08:04

## 2018-04-27 RX ADMIN — POLYETHYLENE GLYCOL 3350 17 G: 17 POWDER, FOR SOLUTION ORAL at 08:04

## 2018-04-27 RX ADMIN — CARBIDOPA AND LEVODOPA 2 TABLET: 25; 100 TABLET ORAL at 12:04

## 2018-04-27 RX ADMIN — AMOXICILLIN AND CLAVULANATE POTASSIUM 1 TABLET: 875; 125 TABLET, FILM COATED ORAL at 09:04

## 2018-04-27 RX ADMIN — DIVALPROEX SODIUM 250 MG: 250 TABLET, DELAYED RELEASE ORAL at 08:04

## 2018-04-27 RX ADMIN — DOCUSATE SODIUM 100 MG: 100 CAPSULE, LIQUID FILLED ORAL at 08:04

## 2018-04-27 RX ADMIN — QUETIAPINE FUMARATE 25 MG: 25 TABLET ORAL at 09:04

## 2018-04-27 RX ADMIN — CARBIDOPA AND LEVODOPA 2 TABLET: 25; 100 TABLET ORAL at 05:04

## 2018-04-27 RX ADMIN — DOCUSATE SODIUM 100 MG: 100 CAPSULE, LIQUID FILLED ORAL at 09:04

## 2018-04-27 NOTE — PLAN OF CARE
Problem: Nutrition, Imbalanced: Inadequate Oral Intake (Adult)  Intervention: Explore Physical/Psychosocial/Treatment-related Factors Impacting Oral Intake   04/27/18 0730   Coping/Psychosocial Interventions   Supportive Measures active listening utilized;positive reinforcement provided;self-care encouraged;relaxation techniques promoted;verbalization of feelings encouraged     Intervention: Promote/Optimize Nutrition   04/27/18 0730   Nutrition Interventions   Oral Nutrition Promotion rest periods promoted;safe use of adaptive equipment encouraged;medicated;physical activity promoted         Comments: Patient encouraged to eat well, and to verbalize food likes and dislikes. Patient will continue to be reoriented on the importance of physical activity and eating healthy.

## 2018-04-27 NOTE — PROGRESS NOTES
Received report from KARINA Bryson. Patient awake resting in bed quietly. IV Rt forearm intact, clean, and dry saline-locked. Call light and personal items within reach. No distress noted.

## 2018-04-27 NOTE — PROGRESS NOTES
Received a phone call from Cornel Payton, (office [phone], cell [phone]), pt's guardian.  He requested a copy of pt's facesheet with demographics to aid in requesting bank statements.  SW Supervisor provided him with the requested information.  Mr. Payton plans to go to patient's bank on Monday, 4-30-18 to access financial documents and then he plans to meet with [facility] admissions staff to coordinate admissions paperwork and financials.

## 2018-04-27 NOTE — PLAN OF CARE
Problem: Confusion, Acute (Adult)  Intervention: Reduce Risk/Promote Restraint Free Environment   18 163   Safety Interventions   Environmental Safety Modification room organization consistent;clutter free environment maintained;assistive device/personal items within reach;room near unit station   Prevent [place] Drop/Fall   Safety/Security Measures bed alarm set     Intervention: Evaluate Medications/Identify Contributors to Confusion   18 163   Safety Interventions   Medication Review/Management medications reviewed     Intervention: Optimize Communication   18 163   Cognitive Interventions   Communication Enhancement Strategies call light answered in person;extra time allowed for response     Intervention: Promote Familiarity/Consistency   18 163   Coping/Psychosocial Interventions   Environment Familiarity/Consistency daily routine followed;personal clothing/items utilized       Goal: Identify Related Risk Factors and Signs and Symptoms  Related risk factors and signs and symptoms are identified upon initiation of Human Response Clinical Practice Guideline (CPG)    18 163   Confusion, Acute   Related Risk Factors (Acute Confusion) cognitive impairment;infection   Signs and Symptoms (Acute Confusion) disorientation;reasoning/planning disturbed;understanding disturbed     Goal: Cognitive/Functional Impairments Minimized  Patient will demonstrate the desired outcomes by discharge/transition of care.    18 163   Confusion, Acute (Adult)   Cognitive/Functional Impairments Minimized making progress toward outcome     Goal: Safety  Patient will demonstrate the desired outcomes by discharge/transition of care.    18 163   Confusion, Acute (Adult)   Safety making progress toward outcome

## 2018-04-27 NOTE — PLAN OF CARE
Problem: Physical Therapy Goal  Goal: Physical Therapy Goal  Goals to be met by: 18    Patient will increase functional independence with mobility by performin. Supine to sit with supervision  2. Sit to stand transfer with Stand-by Assistance  3. Gait  x 300 feet with Stand-by Assistance using Rolling Walker  4. Lower extremity exercise program x30 reps per handout, with assistance as needed      Outcome: Ongoing (interventions implemented as appropriate)  Limited with gait training today 2* fatigue and weakness. Pt will benefit from further therapy in order to get back to PLOF.

## 2018-04-27 NOTE — PROGRESS NOTES
[facility] Hospital Medicine  Progress Note    Patient Name: Tenzin Ortiz  MRN: [id number]  Patient Class: IP- Inpatient   Admission Date: 2/7/2018  Length of Stay: 59 days  Attending Physician: Sidra Rivas MD  Primary Care Provider: Efrain Chavez MD        Subjective:     Principal Problem:Debility    HPI:  Patient is 76 yo male with HTN, HLD, and Parkinson's who presents to ED reportedly for HA. Per ED note patient was complaining of HA but he denies this on my interview. Seems there was some confusion in ED and he was unclear why he was here. During interview with me, patient reports being here for NH placement although he does not appear to be very happy about this. He has been with some issues caring for himself. Currently lives with his girlfriend who reports she can no longer care for him. He states he currently gets around with a walker but sometimes has difficulty getting up out of his chair. Per chart review patient is with Parkinson's and sees neurology, Dr. Castellanos, lastly seen on 2/6/18. He was with hallucinations which were noted to be secondary to dopaminergic agents thus having to significantly reduce dose--she recommended he be admitted for placement at that time but patient declined. He has been attempting placement as an outpatient through PCP but without much luck. He otherwise denies recent illness, fever/chills, CP, SOB, abdominal pain, N/V/D, dysuria. On presentation patient with grossly normal labs/vitals. Placed in observation for what appears to be solely placement although this is something that may need to be continued as outpatient as is not indication for hospitalization.    Hospital Course:  Mr. Ortiz was admitted with severe Parkinsons dementia and can no longer care for himself. He was living with his girlfriend, but she can no longer care for the patient. Case management found an accepting facility at [place]; however, POA declined signing the necessary  paperwork for placement. Legal was consulted and judicial guardianship of the patient in process with court date set for April 24, 2018.    Await settling of financials and plan for NH placement next week. Will plan for a few days of resp neb treatments with increased cough and faint exp wheeze that changed from yesterday.    Resp status improved, lowered seroquel at night secondary to hallucinations and increased drowsiness, not awake for meals    Interval History: confused     Review of Systems   Constitutional: Negative for fever.   Respiratory: Negative for shortness of breath.    Cardiovascular: Negative for chest pain.     Objective:     Vital Signs (Most Recent):  Temp: 97.5 °F (36.4 °C) (04/27/18 0733)  Pulse: 76 (04/27/18 0733)  Resp: 16 (04/27/18 0733)  BP: 136/62 (04/27/18 0733)  SpO2: 98 % (04/27/18 0733) Vital Signs (24h Range):  Temp:  [96.9 °F (36.1 °C)-97.9 °F (36.6 °C)] 97.5 °F (36.4 °C)  Pulse:  [68-83] 76  Resp:  [16-20] 16  SpO2:  [93 %-98 %] 98 %  BP: (101-143)/(52-63) 136/62     Weight: 59 kg (130 lb 1.1 oz)  Body mass index is 20.39 kg/m².    Intake/Output Summary (Last 24 hours) at 04/27/18 1214  Last data filed at 04/26/18 2000   Gross per 24 hour   Intake              240 ml   Output                0 ml   Net              240 ml      Physical Exam   Constitutional: He appears well-developed and well-nourished. No distress.   HENT:   Head: Normocephalic and atraumatic.   Eyes: EOM are normal. Pupils are equal, round, and reactive to light.   Neck: Normal range of motion. Neck supple.   Cardiovascular: Normal rate and regular rhythm.    Pulmonary/Chest: Effort normal and breath sounds normal.   Bronchial BS   Abdominal: Soft. Bowel sounds are normal.   Musculoskeletal: Normal range of motion.   Neurological: He is alert.   Oriented x 2, and cooperative   Skin: Skin is warm and dry.   Psychiatric: His affect is blunt. His speech is delayed. Cognition and memory are impaired.       Significant  Labs: All pertinent labs within the past 24 hours have been reviewed.    Significant Imaging: I have reviewed all pertinent imaging results/findings within the past 24 hours.    Assessment/Plan:      * Debility    Pt was living with a significant other who can no longer care for him  His estranged daughter was contacted and was willing to aid in finding NH placement (she had already begun this process as an outpatient) but patient's POA (a business friend) refused to sign consent for NH placement.   Patient has no capacity for decision making as per Psych  Depakote 250mg TID to help with mood stabilization  Guardianship process ongoing with court date for 4/24/18  He is medically cleared for transfer once nursing facility arranged        Lewy body dementia without behavioral disturbance    Seen by Psych   Continue Sinemet  QID and Seroquel 25mg Qhs - hallucinations pronounced x 2 days   Continue Exelon 4.6 mg/24 patch  Divalproex 250 mg to BID  Held trazodone        Essential hypertension    Resume norvasc.        Parkinson disease    Seroquel 50 mg; sinemet  mg  Discontinued taxodione 50 mg  On exelon 4.6 mg in the morning when he was more awake          VTE Risk Mitigation         Ordered     enoxaparin injection 40 mg  Daily      04/09/18 1423     Medium Risk of VTE  Once      02/08/18 0653     Place JOSEFINA hose  Until discontinued      02/08/18 0653     Place sequential compression device  Until discontinued      02/08/18 0653              Sidra Rivas MD  Department of Hospital Medicine   [facility]

## 2018-04-27 NOTE — SUBJECTIVE & OBJECTIVE
Interval History: confused     Review of Systems   Constitutional: Negative for fever.   Respiratory: Negative for shortness of breath.    Cardiovascular: Negative for chest pain.     Objective:     Vital Signs (Most Recent):  Temp: 97.5 °F (36.4 °C) (04/27/18 0733)  Pulse: 76 (04/27/18 0733)  Resp: 16 (04/27/18 0733)  BP: 136/62 (04/27/18 0733)  SpO2: 98 % (04/27/18 0733) Vital Signs (24h Range):  Temp:  [96.9 °F (36.1 °C)-97.9 °F (36.6 °C)] 97.5 °F (36.4 °C)  Pulse:  [68-83] 76  Resp:  [16-20] 16  SpO2:  [93 %-98 %] 98 %  BP: (101-143)/(52-63) 136/62     Weight: 59 kg (130 lb 1.1 oz)  Body mass index is 20.39 kg/m².    Intake/Output Summary (Last 24 hours) at 04/27/18 1214  Last data filed at 04/26/18 2000   Gross per 24 hour   Intake              240 ml   Output                0 ml   Net              240 ml      Physical Exam   Constitutional: He appears well-developed and well-nourished. No distress.   HENT:   Head: Normocephalic and atraumatic.   Eyes: EOM are normal. Pupils are equal, round, and reactive to light.   Neck: Normal range of motion. Neck supple.   Cardiovascular: Normal rate and regular rhythm.    Pulmonary/Chest: Effort normal and breath sounds normal.   Bronchial BS   Abdominal: Soft. Bowel sounds are normal.   Musculoskeletal: Normal range of motion.   Neurological: He is alert.   Oriented x 2, and cooperative   Skin: Skin is warm and dry.   Psychiatric: His affect is blunt. His speech is delayed. Cognition and memory are impaired.       Significant Labs: All pertinent labs within the past 24 hours have been reviewed.    Significant Imaging: I have reviewed all pertinent imaging results/findings within the past 24 hours.

## 2018-04-27 NOTE — PT/OT/SLP PROGRESS
"Physical Therapy Treatment    Patient Name:  Tenzin Ortiz   MRN:  [id number]    Recommendations:     Discharge Recommendations:  nursing facility, basic   Discharge Equipment Recommendations:   NONE   Barriers to discharge: Decreased caregiver support    Assessment:     Tenzin Ortiz is a 77 y.o. male admitted with a medical diagnosis of Debility.  He presents with the following impairments/functional limitations:  weakness, impaired endurance, gait instability, impaired balance, decreased lower extremity function, decreased upper extremity function, decreased coordination, decreased safety awareness, impaired cognition, decreased ROM . Noted with general weakness today ( pt has not got up for the last few days). Limited with gait training 2* weakness and fatigue , pt increased tremors and shakiness upon standing today.    Rehab Prognosis:  fair; patient would benefit from acute skilled PT services to address these deficits and reach maximum level of function.      Recent Surgery: * No surgery found *      Plan:     During this hospitalization, patient to be seen 3 x/week to address the above listed problems via gait training, therapeutic activities, therapeutic exercises  · Plan of Care Expires:  05/09/18   Plan of Care Reviewed with: patient    Subjective     Communicated with nurse Bryson prior to session.  Patient found supine in bed upon PT entry to room, agreeable to treatment.      Chief Complaint: n/a   Patient comments/goals: " I left my shoes in [place] "   Pain/Comfort:  · Pain Rating 1: 0/10  · Pain Rating Post-Intervention 1: 0/10    Patients cultural, spiritual, Church conflicts given the current situation: none    Objective:     Patient found with: bed alarm, telemetry , [employer] monitor     General Precautions: Standard, fall   Orthopedic Precautions:N/A   Braces: N/A     Functional Mobility:  · Bed Mobility:     · Rolling Right: stand by assistance  · Scooting: contact guard " assistance  · Bridging: stand by assistance  · Supine to Sit: contact guard assistance  · Sit to Supine: contact guard assistance and minimum assistance  · Transfers:     · Sit to Stand: x 3 trials from bed  contact guard assistance and minimum assistance with rolling walker. VC's for safety technique and walker management.   Gait:  Ambulated ~ 4-5 side steps x 2 with RW , MIN A . Limited with gait training today 2 * weakness and fatigue , pt increased tremors and shakiness upon standing today. Nurse  notified.   · Balance:  fair      AM-PAC 6 CLICK MOBILITY  Turning over in bed (including adjusting bedclothes, sheets and blankets)?: 4  Sitting down on and standing up from a chair with arms (e.g., wheelchair, bedside commode, etc.): 3  Moving from lying on back to sitting on the side of the bed?: 3  Moving to and from a bed to a chair (including a wheelchair)?: 3  Need to walk in hospital room?: 3  Climbing 3-5 steps with a railing?: 2  Total Score: 18       Therapeutic Activities and Exercises:   pt performed bed mobility, transfer and gait training as above.  Pt performed seated BLE x 15 reps : AP, LAQ, HS, pillow squeezes and hip flexion.  Performed standing using RW CGA/ MIN A BLE x 115 reps heel raises and 10 reps hip flexion LLE .     Patient left HOB elevated with all lines intact, call button in reach, bed alarm on, nurse Adelaide notified and Avasys monitor  present..    GOALS:    Physical Therapy Goals        Problem: Physical Therapy Goal    Goal Priority Disciplines Outcome Goal Variances Interventions   Physical Therapy Goal     PT/OT, PT Ongoing (interventions implemented as appropriate)     Description:  Goals to be met by: 18    Patient will increase functional independence with mobility by performin. Supine to sit with supervision  2. Sit to stand transfer with Stand-by Assistance  3. Gait  x 300 feet with Stand-by Assistance using Rolling Walker  4. Lower extremity exercise program x30  reps per handout, with assistance as needed                       Time Tracking:     PT Received On: 04/27/18  PT Start Time: 1510     PT Stop Time: 1534  PT Total Time (min): 24 min     Billable Minutes: Therapeutic Activity 12 and Therapeutic Exercise 12    Treatment Type: Treatment  PT/PTA: PTA     PTA Visit Number: 4     Tram KADI Pikeh, PTA  04/27/2018

## 2018-04-28 PROCEDURE — 25000003 PHARM REV CODE 250: Performed by: INTERNAL MEDICINE

## 2018-04-28 PROCEDURE — 25000003 PHARM REV CODE 250: Performed by: HOSPITALIST

## 2018-04-28 PROCEDURE — 63600175 PHARM REV CODE 636 W HCPCS: Performed by: HOSPITALIST

## 2018-04-28 PROCEDURE — 21400001 HC TELEMETRY ROOM

## 2018-04-28 RX ADMIN — DOCUSATE SODIUM 100 MG: 100 CAPSULE, LIQUID FILLED ORAL at 08:04

## 2018-04-28 RX ADMIN — CARBIDOPA AND LEVODOPA 2 TABLET: 25; 100 TABLET ORAL at 08:04

## 2018-04-28 RX ADMIN — DIVALPROEX SODIUM 250 MG: 250 TABLET, DELAYED RELEASE ORAL at 09:04

## 2018-04-28 RX ADMIN — DOCUSATE SODIUM AND SENNOSIDES 1 TABLET: 8.6; 5 TABLET, FILM COATED ORAL at 08:04

## 2018-04-28 RX ADMIN — AMOXICILLIN AND CLAVULANATE POTASSIUM 1 TABLET: 875; 125 TABLET, FILM COATED ORAL at 08:04

## 2018-04-28 RX ADMIN — CARBIDOPA AND LEVODOPA 2 TABLET: 25; 100 TABLET ORAL at 06:04

## 2018-04-28 RX ADMIN — QUETIAPINE FUMARATE 25 MG: 25 TABLET ORAL at 09:04

## 2018-04-28 RX ADMIN — DIVALPROEX SODIUM 250 MG: 250 TABLET, DELAYED RELEASE ORAL at 08:04

## 2018-04-28 RX ADMIN — AMOXICILLIN AND CLAVULANATE POTASSIUM 1 TABLET: 875; 125 TABLET, FILM COATED ORAL at 09:04

## 2018-04-28 RX ADMIN — DOCUSATE SODIUM 100 MG: 100 CAPSULE, LIQUID FILLED ORAL at 09:04

## 2018-04-28 RX ADMIN — CARBIDOPA AND LEVODOPA 2 TABLET: 25; 100 TABLET ORAL at 02:04

## 2018-04-28 RX ADMIN — CARBIDOPA AND LEVODOPA 2 TABLET: 25; 100 TABLET ORAL at 11:04

## 2018-04-28 RX ADMIN — ENOXAPARIN SODIUM 40 MG: 100 INJECTION SUBCUTANEOUS at 06:04

## 2018-04-28 RX ADMIN — AMLODIPINE BESYLATE 10 MG: 5 TABLET ORAL at 08:04

## 2018-04-28 RX ADMIN — RIVASTIGMINE TRANSDERMAL SYSTEM 1 PATCH: 4.6 PATCH, EXTENDED RELEASE TRANSDERMAL at 09:04

## 2018-04-28 RX ADMIN — POLYETHYLENE GLYCOL 3350 17 G: 17 POWDER, FOR SOLUTION ORAL at 08:04

## 2018-04-28 NOTE — PLAN OF CARE
Problem: Patient Care Overview  Goal: Plan of Care Review  Outcome: Ongoing (interventions implemented as appropriate)  Patient free of falls or injury. No complains of pain. No c/o SOB but has a non productive cough. Patient is free of any skin break down. Frequent body shifting encouraged. Patient still with confusion and some visual hallucinations. Reoriented. Patient afebrile. Safety precautions intact and maintained.

## 2018-04-29 PROCEDURE — 25000003 PHARM REV CODE 250: Performed by: INTERNAL MEDICINE

## 2018-04-29 PROCEDURE — 25000003 PHARM REV CODE 250: Performed by: HOSPITALIST

## 2018-04-29 PROCEDURE — S5010 5% DEXTROSE AND 0.45% SALINE: HCPCS | Performed by: HOSPITALIST

## 2018-04-29 PROCEDURE — 63600175 PHARM REV CODE 636 W HCPCS: Performed by: HOSPITALIST

## 2018-04-29 PROCEDURE — 21400001 HC TELEMETRY ROOM

## 2018-04-29 RX ORDER — DEXTROSE MONOHYDRATE AND SODIUM CHLORIDE 5; .45 G/100ML; G/100ML
INJECTION, SOLUTION INTRAVENOUS CONTINUOUS
Status: DISCONTINUED | OUTPATIENT
Start: 2018-04-29 | End: 2018-05-03

## 2018-04-29 RX ADMIN — POLYETHYLENE GLYCOL 3350 17 G: 17 POWDER, FOR SOLUTION ORAL at 08:04

## 2018-04-29 RX ADMIN — DEXTROSE AND SODIUM CHLORIDE: 5; .45 INJECTION, SOLUTION INTRAVENOUS at 08:04

## 2018-04-29 RX ADMIN — AMOXICILLIN AND CLAVULANATE POTASSIUM 1 TABLET: 875; 125 TABLET, FILM COATED ORAL at 08:04

## 2018-04-29 RX ADMIN — DOCUSATE SODIUM 100 MG: 100 CAPSULE, LIQUID FILLED ORAL at 08:04

## 2018-04-29 RX ADMIN — ENOXAPARIN SODIUM 40 MG: 100 INJECTION SUBCUTANEOUS at 05:04

## 2018-04-29 RX ADMIN — DIVALPROEX SODIUM 250 MG: 250 TABLET, DELAYED RELEASE ORAL at 08:04

## 2018-04-29 RX ADMIN — AMLODIPINE BESYLATE 10 MG: 5 TABLET ORAL at 08:04

## 2018-04-29 RX ADMIN — CARBIDOPA AND LEVODOPA 2 TABLET: 25; 100 TABLET ORAL at 05:04

## 2018-04-29 RX ADMIN — DEXTROSE AND SODIUM CHLORIDE: 5; .45 INJECTION, SOLUTION INTRAVENOUS at 07:04

## 2018-04-29 RX ADMIN — QUETIAPINE FUMARATE 25 MG: 25 TABLET ORAL at 08:04

## 2018-04-29 RX ADMIN — CARBIDOPA AND LEVODOPA 2 TABLET: 25; 100 TABLET ORAL at 08:04

## 2018-04-29 RX ADMIN — OLANZAPINE 5 MG: 5 TABLET, ORALLY DISINTEGRATING ORAL at 08:04

## 2018-04-29 RX ADMIN — RIVASTIGMINE TRANSDERMAL SYSTEM 1 PATCH: 4.6 PATCH, EXTENDED RELEASE TRANSDERMAL at 09:04

## 2018-04-29 NOTE — PROGRESS NOTES
[facility] Hospital Medicine  Progress Note    Patient Name: Tenzin Ortiz  MRN: [id number]  Patient Class: IP- Inpatient   Admission Date: 2/7/2018  Length of Stay: 60 days  Attending Physician: Sidra Rivas MD  Primary Care Provider: Efrain Chavez MD        Subjective:     Principal Problem:Debility    HPI:  Patient is 76 yo male with HTN, HLD, and Parkinson's who presents to ED reportedly for HA. Per ED note patient was complaining of HA but he denies this on my interview. Seems there was some confusion in ED and he was unclear why he was here. During interview with me, patient reports being here for NH placement although he does not appear to be very happy about this. He has been with some issues caring for himself. Currently lives with his girlfriend who reports she can no longer care for him. He states he currently gets around with a walker but sometimes has difficulty getting up out of his chair. Per chart review patient is with Parkinson's and sees neurology, Dr. Castellanos, lastly seen on 2/6/18. He was with hallucinations which were noted to be secondary to dopaminergic agents thus having to significantly reduce dose--she recommended he be admitted for placement at that time but patient declined. He has been attempting placement as an outpatient through PCP but without much luck. He otherwise denies recent illness, fever/chills, CP, SOB, abdominal pain, N/V/D, dysuria. On presentation patient with grossly normal labs/vitals. Placed in observation for what appears to be solely placement although this is something that may need to be continued as outpatient as is not indication for hospitalization.    Hospital Course:  Mr. Ortiz was admitted with severe Parkinsons dementia and can no longer care for himself. He was living with his girlfriend, but she can no longer care for the patient. Case management found an accepting facility at [place]; however, POA declined signing the necessary  paperwork for placement. Legal was consulted and judicial guardianship of the patient in process with court date set for April 24, 2018.    Await settling of financials and plan for NH placement next week. Will plan for a few days of resp neb treatments with increased cough and faint exp wheeze that changed from yesterday.    Resp status improved, lowered seroquel at night secondary to hallucinations and increased drowsiness, not awake for meals    Interval History: no acute events     Review of Systems   Constitutional: Negative for fever.   Respiratory: Negative for shortness of breath.    Cardiovascular: Negative for chest pain.     Objective:     Vital Signs (Most Recent):  Temp: 98.4 °F (36.9 °C) (04/28/18 1913)  Pulse: 93 (04/28/18 1913)  Resp: 16 (04/28/18 1913)  BP: (!) 155/68 (04/28/18 1913)  SpO2: (!) 93 % (04/28/18 1913) Vital Signs (24h Range):  Temp:  [96.5 °F (35.8 °C)-98.4 °F (36.9 °C)] 98.4 °F (36.9 °C)  Pulse:  [85-93] 93  Resp:  [16-20] 16  SpO2:  [93 %-99 %] 93 %  BP: ()/(53-68) 155/68     Weight: 57.6 kg (126 lb 15.8 oz)  Body mass index is 19.91 kg/m².    Intake/Output Summary (Last 24 hours) at 04/28/18 2048  Last data filed at 04/27/18 2300   Gross per 24 hour   Intake              100 ml   Output              200 ml   Net             -100 ml      Physical Exam   Constitutional: He appears well-developed and well-nourished. No distress.   HENT:   Head: Normocephalic and atraumatic.   Eyes: EOM are normal. Pupils are equal, round, and reactive to light.   Neck: Normal range of motion. Neck supple.   Cardiovascular: Normal rate and regular rhythm.    Pulmonary/Chest: Effort normal and breath sounds normal.   Bronchial BS   Abdominal: Soft. Bowel sounds are normal.   Musculoskeletal: Normal range of motion.   Neurological: He is alert.   Oriented x 2, and cooperative   Skin: Skin is warm and dry.   Psychiatric: His affect is blunt. His speech is delayed. Cognition and memory are impaired.        Significant Labs: All pertinent labs within the past 24 hours have been reviewed.    Significant Imaging: I have reviewed and interpreted all pertinent imaging results/findings within the past 24 hours.    Assessment/Plan:      * Debility    Pt was living with a significant other who can no longer care for him  His estranged daughter was contacted and was willing to aid in finding NH placement (she had already begun this process as an outpatient) but patient's POA (a business friend) refused to sign consent for NH placement.   Patient has no capacity for decision making as per Psych  Depakote 250mg TID to help with mood stabilization  Guardianship process ongoing with court date for 4/24/18  He is medically cleared for transfer once nursing facility arranged        Lewy body dementia without behavioral disturbance    Seen by Psych   Continue Sinemet  QID and Seroquel 25mg Qhs - hallucinations pronounced x 2 days   Continue Exelon 4.6 mg/24 patch  Divalproex 250 mg to BID  Held trazodone        Essential hypertension    Resume norvasc.        Parkinson disease    Seroquel 50 mg; sinemet  mg  Discontinued taxodione 50 mg  On exelon 4.6 mg in the morning when he was more awake          VTE Risk Mitigation         Ordered     enoxaparin injection 40 mg  Daily      04/09/18 1423     Medium Risk of VTE  Once      02/08/18 0653     Place JOSEFINA hose  Until discontinued      02/08/18 0653     Place sequential compression device  Until discontinued      02/08/18 0653              Sidra Rivas MD  Department of Hospital Medicine   [facility]

## 2018-04-29 NOTE — PLAN OF CARE
Problem: Fall Risk (Adult)  Goal: Absence of Falls  Patient will demonstrate the desired outcomes by discharge/transition of care.   Outcome: Ongoing (interventions implemented as appropriate)  Patient free from falls or injury on this shift. Bed kept low and locked with side rails up x 2, call light in reach, and bed alarm on. Avasys device in use in patient's room. Patient educated on fall precautions. Verbalized understanding.     Problem: Confusion, Acute (Adult)  Goal: Cognitive/Functional Impairments Minimized  Patient will demonstrate the desired outcomes by discharge/transition of care.   Outcome: Ongoing (interventions implemented as appropriate)  Patient with periods of increased confusion. Frequent rounding performed. Patient frequently reoriented.     Problem: Sleep Pattern Disturbance (Adult)  Goal: Adequate Sleep/Rest  Patient will demonstrate the desired outcomes by discharge/transition of care.   Outcome: Ongoing (interventions implemented as appropriate)  Patient's medications being administered as ordered. Patient provided with quiet environment with minimal interruptions overnight to decrease disturbances to ensure patient is obtaining adequate rest.

## 2018-04-29 NOTE — SUBJECTIVE & OBJECTIVE
Interval History: pt reports that he will try to eat more today but thinks he did fine yesterday    Review of Systems   Constitutional: Negative for fever.   Respiratory: Negative for shortness of breath.    Cardiovascular: Negative for chest pain.     Objective:     Vital Signs (Most Recent):  Temp: 97.5 °F (36.4 °C) (04/29/18 0747)  Pulse: 74 (04/29/18 0747)  Resp: 18 (04/29/18 0747)  BP: 136/63 (04/29/18 0747)  SpO2: 96 % (04/29/18 0747) Vital Signs (24h Range):  Temp:  [97.5 °F (36.4 °C)-99 °F (37.2 °C)] 97.5 °F (36.4 °C)  Pulse:  [74-96] 74  Resp:  [16-20] 18  SpO2:  [92 %-97 %] 96 %  BP: ()/(53-68) 136/63     Weight: 60.5 kg (133 lb 6.1 oz)  Body mass index is 20.91 kg/m².    Intake/Output Summary (Last 24 hours) at 04/29/18 0943  Last data filed at 04/29/18 0500   Gross per 24 hour   Intake               60 ml   Output              175 ml   Net             -115 ml      Physical Exam   Constitutional: He appears well-developed and well-nourished. No distress.   HENT:   Head: Normocephalic and atraumatic.   Eyes: EOM are normal. Pupils are equal, round, and reactive to light.   Neck: Normal range of motion. Neck supple.   Cardiovascular: Normal rate and regular rhythm.    Pulmonary/Chest: Effort normal and breath sounds normal.   Bronchial BS   Abdominal: Soft. Bowel sounds are normal.   Musculoskeletal: Normal range of motion.   Neurological: He is alert.   Oriented x 2, and cooperative   Skin: Skin is warm and dry.   Psychiatric: His affect is blunt. His speech is delayed. Cognition and memory are impaired.       Significant Labs: All pertinent labs within the past 24 hours have been reviewed.    Significant Imaging: I have reviewed all pertinent imaging results/findings within the past 24 hours.

## 2018-04-29 NOTE — PROGRESS NOTES
Patient with total of 175 cc of concentrated tanvi urine output overnight. Bladder scan showed 38 ml of urine in bladder. Patient only drank 60 cc of water overnight and refused any additional PO intake. Dr. Rivas notified of poor oral intake and low urine output overnight. MD to order IV fluids for patient.

## 2018-04-29 NOTE — PROGRESS NOTES
[facility] Hospital Medicine  Progress Note    Patient Name: Tenzin Ortiz  MRN: [id number]  Patient Class: IP- Inpatient   Admission Date: 2/7/2018  Length of Stay: 61 days  Attending Physician: Sidra Rivas MD  Primary Care Provider: Efrain Chavez MD        Subjective:     Principal Problem:Debility    HPI:  Patient is 78 yo male with HTN, HLD, and Parkinson's who presents to ED reportedly for HA. Per ED note patient was complaining of HA but he denies this on my interview. Seems there was some confusion in ED and he was unclear why he was here. During interview with me, patient reports being here for NH placement although he does not appear to be very happy about this. He has been with some issues caring for himself. Currently lives with his girlfriend who reports she can no longer care for him. He states he currently gets around with a walker but sometimes has difficulty getting up out of his chair. Per chart review patient is with Parkinson's and sees neurology, Dr. Castellanos, lastly seen on 2/6/18. He was with hallucinations which were noted to be secondary to dopaminergic agents thus having to significantly reduce dose--she recommended he be admitted for placement at that time but patient declined. He has been attempting placement as an outpatient through PCP but without much luck. He otherwise denies recent illness, fever/chills, CP, SOB, abdominal pain, N/V/D, dysuria. On presentation patient with grossly normal labs/vitals. Placed in observation for what appears to be solely placement although this is something that may need to be continued as outpatient as is not indication for hospitalization.    Hospital Course:  Mr. Ortiz was admitted with severe Parkinsons dementia and can no longer care for himself. He was living with his girlfriend, but she can no longer care for the patient. Case management found an accepting facility at [place]; however, POA declined signing the necessary  paperwork for placement. Legal was consulted and judicial guardianship of the patient in process with court date set for April 24, 2018.    Await settling of financials and plan for NH placement next week. Will plan for a few days of resp neb treatments with increased cough and faint exp wheeze that changed from yesterday.    Resp status improved, lowered seroquel at night secondary to hallucinations and increased drowsiness, not awake for meals  Drop in UOP, started on IVF, pt has poor oral intake; unlikely to do well at NH     Interval History: pt reports that he will try to eat more today but thinks he did fine yesterday    Review of Systems   Constitutional: Negative for fever.   Respiratory: Negative for shortness of breath.    Cardiovascular: Negative for chest pain.     Objective:     Vital Signs (Most Recent):  Temp: 97.5 °F (36.4 °C) (04/29/18 0747)  Pulse: 74 (04/29/18 0747)  Resp: 18 (04/29/18 0747)  BP: 136/63 (04/29/18 0747)  SpO2: 96 % (04/29/18 0747) Vital Signs (24h Range):  Temp:  [97.5 °F (36.4 °C)-99 °F (37.2 °C)] 97.5 °F (36.4 °C)  Pulse:  [74-96] 74  Resp:  [16-20] 18  SpO2:  [92 %-97 %] 96 %  BP: ()/(53-68) 136/63     Weight: 60.5 kg (133 lb 6.1 oz)  Body mass index is 20.91 kg/m².    Intake/Output Summary (Last 24 hours) at 04/29/18 0943  Last data filed at 04/29/18 0500   Gross per 24 hour   Intake               60 ml   Output              175 ml   Net             -115 ml      Physical Exam   Constitutional: He appears well-developed and well-nourished. No distress.   HENT:   Head: Normocephalic and atraumatic.   Eyes: EOM are normal. Pupils are equal, round, and reactive to light.   Neck: Normal range of motion. Neck supple.   Cardiovascular: Normal rate and regular rhythm.    Pulmonary/Chest: Effort normal and breath sounds normal.   Bronchial BS   Abdominal: Soft. Bowel sounds are normal.   Musculoskeletal: Normal range of motion.   Neurological: He is alert.   Oriented x 2, and  cooperative   Skin: Skin is warm and dry.   Psychiatric: His affect is blunt. His speech is delayed. Cognition and memory are impaired.       Significant Labs: All pertinent labs within the past 24 hours have been reviewed.    Significant Imaging: I have reviewed all pertinent imaging results/findings within the past 24 hours.    Assessment/Plan:      * Debility    Pt was living with a significant other who can no longer care for him  His estranged daughter was contacted and was willing to aid in finding NH placement (she had already begun this process as an outpatient) but patient's POA (a business friend) refused to sign consent for NH placement.   Patient has no capacity for decision making as per Psych  Depakote 250mg TID to help with mood stabilization  Guardianship process ongoing with court date for 4/24/18  He is medically cleared for transfer once nursing facility arranged        Lewy body dementia without behavioral disturbance    Seen by Psych   Continue Sinemet  QID and Seroquel 25mg Qhs - hallucinations pronounced x 2 days   Continue Exelon 4.6 mg/24 patch  Divalproex 250 mg to BID  Held trazodone        Essential hypertension    Resume norvasc.        Parkinson disease    Seroquel 50 mg; sinemet  mg  Discontinued taxodione 50 mg  On exelon 4.6 mg in the morning when he was more awake          VTE Risk Mitigation         Ordered     enoxaparin injection 40 mg  Daily      04/09/18 1423     Medium Risk of VTE  Once      02/08/18 0653     Place JOSEFINA hose  Until discontinued      02/08/18 0653     Place sequential compression device  Until discontinued      02/08/18 0653              Sidra Rivas MD  Department of Hospital Medicine   [facility]

## 2018-04-29 NOTE — SUBJECTIVE & OBJECTIVE
Interval History: no acute events     Review of Systems   Constitutional: Negative for fever.   Respiratory: Negative for shortness of breath.    Cardiovascular: Negative for chest pain.     Objective:     Vital Signs (Most Recent):  Temp: 98.4 °F (36.9 °C) (04/28/18 1913)  Pulse: 93 (04/28/18 1913)  Resp: 16 (04/28/18 1913)  BP: (!) 155/68 (04/28/18 1913)  SpO2: (!) 93 % (04/28/18 1913) Vital Signs (24h Range):  Temp:  [96.5 °F (35.8 °C)-98.4 °F (36.9 °C)] 98.4 °F (36.9 °C)  Pulse:  [85-93] 93  Resp:  [16-20] 16  SpO2:  [93 %-99 %] 93 %  BP: ()/(53-68) 155/68     Weight: 57.6 kg (126 lb 15.8 oz)  Body mass index is 19.91 kg/m².    Intake/Output Summary (Last 24 hours) at 04/28/18 2048  Last data filed at 04/27/18 2300   Gross per 24 hour   Intake              100 ml   Output              200 ml   Net             -100 ml      Physical Exam   Constitutional: He appears well-developed and well-nourished. No distress.   HENT:   Head: Normocephalic and atraumatic.   Eyes: EOM are normal. Pupils are equal, round, and reactive to light.   Neck: Normal range of motion. Neck supple.   Cardiovascular: Normal rate and regular rhythm.    Pulmonary/Chest: Effort normal and breath sounds normal.   Bronchial BS   Abdominal: Soft. Bowel sounds are normal.   Musculoskeletal: Normal range of motion.   Neurological: He is alert.   Oriented x 2, and cooperative   Skin: Skin is warm and dry.   Psychiatric: His affect is blunt. His speech is delayed. Cognition and memory are impaired.       Significant Labs: All pertinent labs within the past 24 hours have been reviewed.    Significant Imaging: I have reviewed and interpreted all pertinent imaging results/findings within the past 24 hours.

## 2018-04-30 PROBLEM — J18.9 PNEUMONIA DUE TO INFECTIOUS ORGANISM: Status: ACTIVE | Noted: 2018-04-30

## 2018-04-30 LAB
ANION GAP SERPL CALC-SCNC: 10 MMOL/L
BUN SERPL-MCNC: 13 MG/DL
CALCIUM SERPL-MCNC: 8.5 MG/DL
CHLORIDE SERPL-SCNC: 107 MMOL/L
CO2 SERPL-SCNC: 22 MMOL/L
CREAT SERPL-MCNC: 0.8 MG/DL
EST. GFR  (AFRICAN AMERICAN): >60 ML/MIN/1.73 M^2
EST. GFR  (NON AFRICAN AMERICAN): >60 ML/MIN/1.73 M^2
GLUCOSE SERPL-MCNC: 90 MG/DL
POTASSIUM SERPL-SCNC: 3.7 MMOL/L
SODIUM SERPL-SCNC: 139 MMOL/L

## 2018-04-30 PROCEDURE — 36415 COLL VENOUS BLD VENIPUNCTURE: CPT

## 2018-04-30 PROCEDURE — 21400001 HC TELEMETRY ROOM

## 2018-04-30 PROCEDURE — 25000003 PHARM REV CODE 250: Performed by: HOSPITALIST

## 2018-04-30 PROCEDURE — 94799 UNLISTED PULMONARY SVC/PX: CPT

## 2018-04-30 PROCEDURE — 94761 N-INVAS EAR/PLS OXIMETRY MLT: CPT

## 2018-04-30 PROCEDURE — S5010 5% DEXTROSE AND 0.45% SALINE: HCPCS | Performed by: HOSPITALIST

## 2018-04-30 PROCEDURE — 63600175 PHARM REV CODE 636 W HCPCS: Performed by: HOSPITALIST

## 2018-04-30 PROCEDURE — 80048 BASIC METABOLIC PNL TOTAL CA: CPT

## 2018-04-30 PROCEDURE — 97110 THERAPEUTIC EXERCISES: CPT

## 2018-04-30 PROCEDURE — 25000003 PHARM REV CODE 250: Performed by: INTERNAL MEDICINE

## 2018-04-30 PROCEDURE — 97530 THERAPEUTIC ACTIVITIES: CPT

## 2018-04-30 RX ADMIN — DOCUSATE SODIUM 100 MG: 100 CAPSULE, LIQUID FILLED ORAL at 09:04

## 2018-04-30 RX ADMIN — DIVALPROEX SODIUM 250 MG: 250 TABLET, DELAYED RELEASE ORAL at 09:04

## 2018-04-30 RX ADMIN — CARBIDOPA AND LEVODOPA 2 TABLET: 25; 100 TABLET ORAL at 12:04

## 2018-04-30 RX ADMIN — CARBIDOPA AND LEVODOPA 2 TABLET: 25; 100 TABLET ORAL at 09:04

## 2018-04-30 RX ADMIN — ENOXAPARIN SODIUM 40 MG: 100 INJECTION SUBCUTANEOUS at 05:04

## 2018-04-30 RX ADMIN — AMLODIPINE BESYLATE 10 MG: 5 TABLET ORAL at 09:04

## 2018-04-30 RX ADMIN — RIVASTIGMINE TRANSDERMAL SYSTEM 1 PATCH: 4.6 PATCH, EXTENDED RELEASE TRANSDERMAL at 09:04

## 2018-04-30 RX ADMIN — AMOXICILLIN AND CLAVULANATE POTASSIUM 1 TABLET: 875; 125 TABLET, FILM COATED ORAL at 09:04

## 2018-04-30 RX ADMIN — POLYETHYLENE GLYCOL 3350 17 G: 17 POWDER, FOR SOLUTION ORAL at 09:04

## 2018-04-30 RX ADMIN — QUETIAPINE FUMARATE 25 MG: 25 TABLET ORAL at 09:04

## 2018-04-30 RX ADMIN — DOCUSATE SODIUM AND SENNOSIDES 1 TABLET: 8.6; 5 TABLET, FILM COATED ORAL at 09:04

## 2018-04-30 RX ADMIN — CARBIDOPA AND LEVODOPA 2 TABLET: 25; 100 TABLET ORAL at 05:04

## 2018-04-30 RX ADMIN — DEXTROSE AND SODIUM CHLORIDE: 5; .45 INJECTION, SOLUTION INTRAVENOUS at 12:04

## 2018-04-30 NOTE — NURSING
Report received; pt resting with eyes closed resp even and unlabored no acute distress noted; iv fluids infusing; no needs voiced at this time

## 2018-04-30 NOTE — PLAN OF CARE
Problem: Physical Therapy Goal  Goal: Physical Therapy Goal  Goals to be met by: 18    Patient will increase functional independence with mobility by performin. Supine to sit with supervision  2. Sit to stand transfer with Stand-by Assistance  3. Gait  x 300 feet with Stand-by Assistance using Rolling Walker  4. Lower extremity exercise program x30 reps per handout, with assistance as needed      Outcome: Ongoing (interventions implemented as appropriate)  Pt will benefit from further skilled therapy in order to get back to PLOF.

## 2018-04-30 NOTE — NURSING
1130- patient sleepy he prefers to sleep encourage interaction he gets anxious and frustrated. Tremors noted in both hands.  Able to feed himself with set up assistance. No acute changes.       1430- patient did not eat his lunch meal I tried to awaken him he refuses to wake up . No acute distress tolerating iv fluids. Side rails up  X 3 call light in reach head of bed elevated avasys monitor in use and bed alarm on.

## 2018-04-30 NOTE — NURSING
0930- good appetite . Head of bed elevated side rails up x 3 call light in reach and bed alarm on avasys monitor at bedside. Frequent cough able to clear his throat . No acute distress.

## 2018-04-30 NOTE — PT/OT/SLP PROGRESS
"Physical Therapy Treatment    Patient Name:  Tenzin Ortiz   MRN:  [id number]    Recommendations:     Discharge Recommendations:  nursing facility, basic   Discharge Equipment Recommendations:   none   Barriers to discharge: Decreased caregiver support    Assessment:     Tenzin Ortiz is a 77 y.o. male admitted with a medical diagnosis of Debility.  He presents with the following impairments/functional limitations:  weakness, impaired endurance, decreased upper extremity function, gait instability, decreased safety awareness, decreased lower extremity function, impaired balance, impaired cognition, impaired functional mobilty, impaired coordination, impaired self care skills .    Rehab Prognosis:  fair; patient would benefit from acute skilled PT services to address these deficits and reach maximum level of function.      Recent Surgery: * No surgery found *      Plan:     During this hospitalization, patient to be seen 3 x/week to address the above listed problems via gait training, therapeutic activities, therapeutic exercises  · Plan of Care Expires:  05/09/18   Plan of Care Reviewed with: patient    Subjective     Communicated with nurse Aguilera prior to session.  Patient found supine in bed upon PT entry to room, agreeable to treatment.      Chief Complaint: stiffness   Patient comments/goals: " I can try"   Pain/Comfort:  · Pain Rating 1: 0/10  · Pain Rating Post-Intervention 1: 0/10    Patients cultural, spiritual, Congregation conflicts given the current situation: none    Objective:     Patient found with: bed alarm, telemetry, peripheral IV , Avasys monitor     General Precautions: Standard, fall   Orthopedic Precautions:N/A   Braces: N/A     Functional Mobility:  · Bed Mobility:     · Rolling Left:  stand by assistance  · Scooting: contact guard assistance  · Supine to Sit: minimum assistance, HOB elevated   · Sit to Supine: contact guard assistance   · Transfers:     · Sit to Stand: x 2 trials from bed minimum " assistance with rolling walker. VC's for safety technique and walker management. Pt with decreased safety awareness .   · Gait: pt ambulated 4-5 steps x 2 ( EOB<>HOB) using RW , min A . Noted with increased tremors and unsteady upon standing/ ambulating. Pt with decreased safety awareness. V/T cues for safety technique and walker management.   · Balance: fair        AM-PAC 6 CLICK MOBILITY  Turning over in bed (including adjusting bedclothes, sheets and blankets)?: 4  Sitting down on and standing up from a chair with arms (e.g., wheelchair, bedside commode, etc.): 3  Moving from lying on back to sitting on the side of the bed?: 3  Moving to and from a bed to a chair (including a wheelchair)?: 3  Need to walk in hospital room?: 3  Climbing 3-5 steps with a railing?: 2  Total Score: 18       Therapeutic Activities and Exercises:   pt performed bed mobility, transfer and gait training as above.  Pt performed self care in sitting, SBA .   Performed seated BLE x 20 reps : AP, LAQ, HS, hip flexion , pillow squeezes and 20 reps marching in place using RW, CGA . Performed seated BUE x 20 reps : elbows flexion / extension and shoulders elevation/depression.     Patient left with bed in chair position with all lines intact, call button in reach, bed alarm off and nurse Ale present..    GOALS:    Physical Therapy Goals        Problem: Physical Therapy Goal    Goal Priority Disciplines Outcome Goal Variances Interventions   Physical Therapy Goal     PT/OT, PT Ongoing (interventions implemented as appropriate)     Description:  Goals to be met by: 18    Patient will increase functional independence with mobility by performin. Supine to sit with supervision  2. Sit to stand transfer with Stand-by Assistance  3. Gait  x 300 feet with Stand-by Assistance using Rolling Walker  4. Lower extremity exercise program x30 reps per handout, with assistance as needed                       Time Tracking:     PT Received On:  04/30/18  PT Start Time: 1538     PT Stop Time: 1604  PT Total Time (min): 26 min     Billable Minutes: Therapeutic Activity 14 and Therapeutic Exercise 12    Treatment Type: Treatment  PT/PTA: PTA     PTA Visit Number: 5     Gina Rodríguez PTA  04/30/2018

## 2018-04-30 NOTE — PROGRESS NOTES
[facility] Hospital Medicine  Progress Note    Patient Name: Tenzin Ortiz  MRN: [id number]  Patient Class: IP- Inpatient   Admission Date: 2/7/2018  Length of Stay: 62 days  Attending Physician: Sidra Rivas MD  Primary Care Provider: Efrain Chavez MD        Subjective:     Principal Problem:Debility    HPI:  Patient is 76 yo male with HTN, HLD, and Parkinson's who presents to ED reportedly for HA. Per ED note patient was complaining of HA but he denies this on my interview. Seems there was some confusion in ED and he was unclear why he was here. During interview with me, patient reports being here for NH placement although he does not appear to be very happy about this. He has been with some issues caring for himself. Currently lives with his girlfriend who reports she can no longer care for him. He states he currently gets around with a walker but sometimes has difficulty getting up out of his chair. Per chart review patient is with Parkinson's and sees neurology, Dr. Castellanos, lastly seen on 2/6/18. He was with hallucinations which were noted to be secondary to dopaminergic agents thus having to significantly reduce dose--she recommended he be admitted for placement at that time but patient declined. He has been attempting placement as an outpatient through PCP but without much luck. He otherwise denies recent illness, fever/chills, CP, SOB, abdominal pain, N/V/D, dysuria. On presentation patient with grossly normal labs/vitals. Placed in observation for what appears to be solely placement although this is something that may need to be continued as outpatient as is not indication for hospitalization.    Hospital Course:  Mr. Ortiz was admitted with severe Parkinsons dementia and can no longer care for himself. He was living with his girlfriend, but she can no longer care for the patient. Case management found an accepting facility at [place]; however, POA declined signing the necessary  paperwork for placement. Legal was consulted and judicial guardianship of the patient in process with court date set for April 24, 2018.    Await settling of financials and plan for NH placement next week. Will plan for a few days of resp neb treatments with increased cough and faint exp wheeze that changed from yesterday.    Resp status improved, lowered seroquel at night secondary to hallucinations and increased drowsiness, not awake for meals  Drop in UOP, started on IVF, pt has poor oral intake; unlikely to do well at NH     Interval History: pt more alert today    Review of Systems   Constitutional: Negative for fever.   Respiratory: Negative for shortness of breath.    Cardiovascular: Negative for chest pain.     Objective:     Vital Signs (Most Recent):  Temp: 98.1 °F (36.7 °C) (04/30/18 0740)  Pulse: 73 (04/30/18 0740)  Resp: 18 (04/30/18 0740)  BP: 137/60 (04/30/18 0740)  SpO2: (!) 94 % (04/30/18 0820) Vital Signs (24h Range):  Temp:  [97.5 °F (36.4 °C)-98.9 °F (37.2 °C)] 98.1 °F (36.7 °C)  Pulse:  [68-81] 73  Resp:  [18] 18  SpO2:  [94 %-96 %] 94 %  BP: (109-165)/(58-65) 137/60     Weight: 60.5 kg (133 lb 6.1 oz)  Body mass index is 20.91 kg/m².    Intake/Output Summary (Last 24 hours) at 04/30/18 1036  Last data filed at 04/30/18 0600   Gross per 24 hour   Intake           2057.5 ml   Output              200 ml   Net           1857.5 ml      Physical Exam   Constitutional: He appears well-developed and well-nourished. No distress.   HENT:   Head: Normocephalic and atraumatic.   Eyes: EOM are normal. Pupils are equal, round, and reactive to light.   Neck: Normal range of motion. Neck supple.   Cardiovascular: Normal rate and regular rhythm.    Pulmonary/Chest: Effort normal and breath sounds normal.   Bronchial BS   Abdominal: Soft. Bowel sounds are normal.   Musculoskeletal: Normal range of motion.   Neurological: He is alert.   Oriented x 2, and cooperative   Skin: Skin is warm and dry.   Psychiatric:  His affect is blunt. His speech is delayed. Cognition and memory are impaired.       Significant Labs: All pertinent labs within the past 24 hours have been reviewed.    Significant Imaging: I have reviewed all pertinent imaging results/findings within the past 24 hours.    Assessment/Plan:      * Debility    Pt was living with a significant other who can no longer care for him  His estranged daughter was contacted and was willing to aid in finding NH placement (she had already begun this process as an outpatient) but patient's POA (a business friend) refused to sign consent for NH placement.   Patient has no capacity for decision making as per Psych  Depakote 250mg TID to help with mood stabilization  Guardianship process ongoing with court date for 4/24/18  He is medically cleared for transfer once nursing facility arranged        Pneumonia due to infectious organism    Completed 7 days augmentin  Improved with nebs         Lewy body dementia without behavioral disturbance    Seen by Psych   Continue Sinemet  QID and Seroquel 25mg Qhs - hallucinations pronounced x 2 days   Continue Exelon 4.6 mg/24 patch  Divalproex 250 mg to BID  Held trazodone        Essential hypertension    Resume norvasc.        Parkinson disease    Seroquel 50 mg; sinemet  mg  Discontinued taxodione 50 mg  On exelon 4.6 mg in the morning when he was more awake          VTE Risk Mitigation         Ordered     enoxaparin injection 40 mg  Daily      04/09/18 1423     Medium Risk of VTE  Once      02/08/18 0653     Place JOSEFINA hose  Until discontinued      02/08/18 0653     Place sequential compression device  Until discontinued      02/08/18 0653              Sidra Rivas MD  Department of Hospital Medicine   [facility]

## 2018-04-30 NOTE — PHYSICIAN QUERY
"PT Name: Tenzin Ortiz  MR #: [id number]    Physician Query Form - Nutrition Clarification     CDS: Carole Bernardo RN, CCDS       Contact information: [email or web address]    This form is a permanent document in the medical record.     Query Date: April 30, 2018    By submitting this query, we are merely seeking further clarification of documentation.. Please utilize your independent clinical judgment when addressing the question(s) below.    The Medical record contains the following:   Indicators  Supporting Clinical Findings Location in Medical Record   X % of Estimated Energy Intake over a time frame from p.o., TF, or TPN Inadequate Energy Intake due to lethargy as evidenced by: Ketones in urine; 0-25% po intake of meals 4/26-RD PN    Weight Status over a time frame     X Subcutaneous Fat and/or Muscle Loss Overall Physical Appearance: loss of muscle mass (lower extremities of note) 4/26-RD PN    Fluid Accumulation or Edema      Reduced  Strength     X            X Wt / BMI / Usual Body Weight Height: 5' 7" (170.2 cm)  Weight (lb): 138.01 lb  Ideal Body Weight (IBW), Male: 148 lb  % Ideal Body Weight, Male (lb): 93.25 lb  BMI (Calculated): 21.7    Height: 5' 6.97" (170.1 cm)  Weight (lb): 130.07 lb  Ideal Body Weight (IBW), Male: 147.82 lb   % Ideal Body Weight, Male (lb): 90.08 lb  BMI (Calculated): 20.9 3/9-RD PN            4/26-RD PN    Delayed Wound Healing / Failure to Thrive     X Acute or Chronic Illness Debility, Pneumonia, Lewy body dementia, HTN, Parkinson disease  4/30-PN    Medication     X Treatment Encourage po intake and supplements  Regular diet with supplements.  Boost Plus TID 4/26-RD PN   X          X Other Lowered seroquel at night secondary to hallucinations and increased drowsiness, not awake for meals.  Drop in UOP, started on IVF, pt has poor oral intake; unlikely to do well at NH.     Patient with decreased intake over past several days (4/21) with increased lethargy. Patient asleep " at time of visit with tray at bedside uneaten. + ketones in UA noted.   Impaired cognitive status/motor control, inability to feed self. 4/30-PN          4/26-RD PN     AND / [facility]EN Clinical Characteristics (October 2011)  A minimum of two characteristics is recommended for diagnosing either moderate or severe malnutrition   Mild Malnutrition Moderate Malnutrition Severe Malnutrition   Energy Intake from p.o., TF or TPN. < 75% intake of estimated energy needs for less than 7 days < 75% intake of estimated energy needs for greater than 7 days < 50% intake of estimated energy needs for > 5 days   Weight Loss 1-2% in 1 month  5% in 3 months  7.5% in 6 months  10% in 1 year 1-2 % in 1 week  5% in 1 month  7.5% in 3 months  10% in 6 months  20% in 1 year > 2% in 1 week  > 5% in 1 month  > 7.5% in 3 months  > 10% in 6 months  > 20% in 1 year   Physical Findings     None *Mild subcutaneous fat and/or muscle loss  *Mild fluid accumulation  *Stage II decubitus  *Surgical wound or non-healing wound *Mod/severe subcutaneous fat and/or muscle loss  *Mod/severe fluid accumulation  *Stage III or IV decubitus  *Non-healing surgical wound     Provider, please specify diagnosis or diagnoses associated with above clinical findings.    [ ] Mild Protein-Calorie Malnutrition  [ ] Moderate Protein-Calorie Malnutrition  [x] Severe Protein-Calorie Malnutrition  [ ] Other Nutritional Diagnosis (please specify): ____________________________________  [ ] Other: ________________________________  [ ] Clinically Undetermined    Please document in your progress notes daily for the duration of treatment until resolved and include in your discharge summary.

## 2018-04-30 NOTE — NURSING
Bedside rounding report received from yeni varghese rn on patients progress and updated handoff report sheet given too. Assessment completed and plan updated on board call light in reach and head of bed elevated side rails up x 3 call light in place and bedside tele sitter on and at bedside.

## 2018-04-30 NOTE — SUBJECTIVE & OBJECTIVE
Interval History: pt more alert today    Review of Systems   Constitutional: Negative for fever.   Respiratory: Negative for shortness of breath.    Cardiovascular: Negative for chest pain.     Objective:     Vital Signs (Most Recent):  Temp: 98.1 °F (36.7 °C) (04/30/18 0740)  Pulse: 73 (04/30/18 0740)  Resp: 18 (04/30/18 0740)  BP: 137/60 (04/30/18 0740)  SpO2: (!) 94 % (04/30/18 0820) Vital Signs (24h Range):  Temp:  [97.5 °F (36.4 °C)-98.9 °F (37.2 °C)] 98.1 °F (36.7 °C)  Pulse:  [68-81] 73  Resp:  [18] 18  SpO2:  [94 %-96 %] 94 %  BP: (109-165)/(58-65) 137/60     Weight: 60.5 kg (133 lb 6.1 oz)  Body mass index is 20.91 kg/m².    Intake/Output Summary (Last 24 hours) at 04/30/18 1036  Last data filed at 04/30/18 0600   Gross per 24 hour   Intake           2057.5 ml   Output              200 ml   Net           1857.5 ml      Physical Exam   Constitutional: He appears well-developed and well-nourished. No distress.   HENT:   Head: Normocephalic and atraumatic.   Eyes: EOM are normal. Pupils are equal, round, and reactive to light.   Neck: Normal range of motion. Neck supple.   Cardiovascular: Normal rate and regular rhythm.    Pulmonary/Chest: Effort normal and breath sounds normal.   Bronchial BS   Abdominal: Soft. Bowel sounds are normal.   Musculoskeletal: Normal range of motion.   Neurological: He is alert.   Oriented x 2, and cooperative   Skin: Skin is warm and dry.   Psychiatric: His affect is blunt. His speech is delayed. Cognition and memory are impaired.       Significant Labs: All pertinent labs within the past 24 hours have been reviewed.    Significant Imaging: I have reviewed all pertinent imaging results/findings within the past 24 hours.

## 2018-04-30 NOTE — NURSING
Patient is not interacting with myself or other staff members he wants to be left alone so he can sleep. No changes on telemetry change. Head of bed elevated side rails up x 3 call light in reach door to room is open and right across from nursing station. avKDWs monitor in use.

## 2018-05-01 PROBLEM — J18.9 PNEUMONIA DUE TO INFECTIOUS ORGANISM: Status: RESOLVED | Noted: 2018-04-30 | Resolved: 2018-05-01

## 2018-05-01 PROCEDURE — 25000003 PHARM REV CODE 250: Performed by: INTERNAL MEDICINE

## 2018-05-01 PROCEDURE — G8997 SWALLOW GOAL STATUS: HCPCS | Mod: CJ

## 2018-05-01 PROCEDURE — 63600175 PHARM REV CODE 636 W HCPCS: Performed by: HOSPITALIST

## 2018-05-01 PROCEDURE — 94640 AIRWAY INHALATION TREATMENT: CPT

## 2018-05-01 PROCEDURE — 94761 N-INVAS EAR/PLS OXIMETRY MLT: CPT

## 2018-05-01 PROCEDURE — 94664 DEMO&/EVAL PT USE INHALER: CPT

## 2018-05-01 PROCEDURE — 25000003 PHARM REV CODE 250: Performed by: HOSPITALIST

## 2018-05-01 PROCEDURE — S5010 5% DEXTROSE AND 0.45% SALINE: HCPCS | Performed by: HOSPITALIST

## 2018-05-01 PROCEDURE — G8996 SWALLOW CURRENT STATUS: HCPCS | Mod: CK

## 2018-05-01 PROCEDURE — 25000242 PHARM REV CODE 250 ALT 637 W/ HCPCS: Performed by: INTERNAL MEDICINE

## 2018-05-01 PROCEDURE — 92610 EVALUATE SWALLOWING FUNCTION: CPT

## 2018-05-01 PROCEDURE — 21400001 HC TELEMETRY ROOM

## 2018-05-01 PROCEDURE — 27000646 HC AEROBIKA DEVICE

## 2018-05-01 RX ORDER — IPRATROPIUM BROMIDE AND ALBUTEROL SULFATE 2.5; .5 MG/3ML; MG/3ML
3 SOLUTION RESPIRATORY (INHALATION) EVERY 4 HOURS PRN
Status: DISCONTINUED | OUTPATIENT
Start: 2018-05-01 | End: 2018-05-05 | Stop reason: HOSPADM

## 2018-05-01 RX ADMIN — DOCUSATE SODIUM 100 MG: 100 CAPSULE, LIQUID FILLED ORAL at 08:05

## 2018-05-01 RX ADMIN — RIVASTIGMINE TRANSDERMAL SYSTEM 1 PATCH: 4.6 PATCH, EXTENDED RELEASE TRANSDERMAL at 09:05

## 2018-05-01 RX ADMIN — DIVALPROEX SODIUM 250 MG: 250 TABLET, DELAYED RELEASE ORAL at 08:05

## 2018-05-01 RX ADMIN — QUETIAPINE FUMARATE 25 MG: 25 TABLET ORAL at 08:05

## 2018-05-01 RX ADMIN — DIVALPROEX SODIUM 250 MG: 250 TABLET, DELAYED RELEASE ORAL at 09:05

## 2018-05-01 RX ADMIN — POLYETHYLENE GLYCOL 3350 17 G: 17 POWDER, FOR SOLUTION ORAL at 09:05

## 2018-05-01 RX ADMIN — CARBIDOPA AND LEVODOPA 2 TABLET: 25; 100 TABLET ORAL at 09:05

## 2018-05-01 RX ADMIN — DOCUSATE SODIUM 100 MG: 100 CAPSULE, LIQUID FILLED ORAL at 09:05

## 2018-05-01 RX ADMIN — DEXTROSE AND SODIUM CHLORIDE: 5; .45 INJECTION, SOLUTION INTRAVENOUS at 05:05

## 2018-05-01 RX ADMIN — AMLODIPINE BESYLATE 10 MG: 5 TABLET ORAL at 09:05

## 2018-05-01 RX ADMIN — CARBIDOPA AND LEVODOPA 2 TABLET: 25; 100 TABLET ORAL at 01:05

## 2018-05-01 RX ADMIN — IPRATROPIUM BROMIDE AND ALBUTEROL SULFATE 3 ML: .5; 2.5 SOLUTION RESPIRATORY (INHALATION) at 03:05

## 2018-05-01 RX ADMIN — CARBIDOPA AND LEVODOPA 2 TABLET: 25; 100 TABLET ORAL at 05:05

## 2018-05-01 RX ADMIN — ENOXAPARIN SODIUM 40 MG: 100 INJECTION SUBCUTANEOUS at 05:05

## 2018-05-01 RX ADMIN — IPRATROPIUM BROMIDE AND ALBUTEROL SULFATE 3 ML: .5; 2.5 SOLUTION RESPIRATORY (INHALATION) at 10:05

## 2018-05-01 RX ADMIN — CARBIDOPA AND LEVODOPA 2 TABLET: 25; 100 TABLET ORAL at 08:05

## 2018-05-01 RX ADMIN — GUAIFENESIN AND DEXTROMETHORPHAN HYDROBROMIDE 1 TABLET: 600; 30 TABLET, EXTENDED RELEASE ORAL at 09:05

## 2018-05-01 RX ADMIN — OLANZAPINE 5 MG: 5 TABLET, ORALLY DISINTEGRATING ORAL at 08:05

## 2018-05-01 NOTE — PLAN OF CARE
Problem: Fall Risk (Adult)  Goal: Absence of Falls  Patient will demonstrate the desired outcomes by discharge/transition of care.   Outcome: Ongoing (interventions implemented as appropriate)   05/01/18 0646   Fall Risk (Adult)   Absence of Falls making progress toward outcome       Problem: Pressure Ulcer Risk (Mike Scale) (Adult,Obstetrics,Pediatric)  Goal: Skin Integrity  Patient will demonstrate the desired outcomes by discharge/transition of care.   Outcome: Ongoing (interventions implemented as appropriate)   05/01/18 0646   Pressure Ulcer Risk (Mike Scale) (Adult,Obstetrics,Pediatric)   Skin Integrity making progress toward outcome       Problem: Infection, Risk/Actual (Adult)  Goal: Infection Prevention/Resolution  Patient will demonstrate the desired outcomes by discharge/transition of care.   Outcome: Ongoing (interventions implemented as appropriate)   05/01/18 0646   Infection, Risk/Actual (Adult)   Infection Prevention/Resolution making progress toward outcome

## 2018-05-01 NOTE — NURSING
1800- patients significant other at bedside visiting . No acute distress or changes on telemetry. Head of bed elevated side rails up x 3 call light in reach avasys monitor in use. No acute distress.     1916- bedside rounding report given to steven robles on patients progress and updated handoff report sheet. No acute events. Patients s/o left. No change on telemetry . Call light in reach head of bed elevated side rails up x 3 avasys in use. Pillows used to support upper body. Patient refusing to eat report he cannot eat just not hungry encouraged po fluids and snacks. Patient has cough productive strong .

## 2018-05-01 NOTE — PT/OT/SLP EVAL
Speech Language Pathology Evaluation  Bedside Swallow    Patient Name:  Tenzin Ortiz   MRN:  [id number]  Admitting Diagnosis: Debility    Recommendations:                 General Recommendations:  Dysphagia therapy  Diet recommendations:  Puree, Thin   Aspiration Precautions: Alternating bites/sips, Close supervision/Assistance with meals, Eliminate distractions, Feed only when awake/alert, HOB to 90 degrees, Meds crushed in puree, Monitor for s/s of aspiration, Remain upright 30 minutes post meal and Strict aspiration precautions   General Precautions: Standard, aspiration, fall  Communication strategies:  provide increased time to answer; use short simple sentences    History:     Past Medical History:   Diagnosis Date    Anticoagulant long-term use     Hypertension     Impulse control disorder 2012    gambling on mirapex; also possible dopamine dysregulation syndrome    PD (Parkinson's disease) 1999    tremor-predominant    PVD (peripheral vascular disease) with claudication     poor circulation both legs       Past Surgical History:   Procedure Laterality Date    CATARACT EXTRACTION Bilateral 1 yr      HPI:  Patient is 78 yo male with HTN, HLD, and Parkinson's who presents to ED reportedly for HA. Per ED note patient was complaining of HA but he denies this on my interview. Seems there was some confusion in ED and he was unclear why he was here. During interview with me, patient reports being here for NH placement although he does not appear to be very happy about this. He has been with some issues caring for himself. Currently lives with his girlfriend who reports she can no longer care for him. He states he currently gets around with a walker but sometimes has difficulty getting up out of his chair. Per chart review patient is with Parkinson's and sees neurology, Dr. Castellanos, lastly seen on 2/6/18. He was with hallucinations which were noted to be secondary to dopaminergic agents thus having to significantly  reduce dose--she recommended he be admitted for placement at that time but patient declined. He has been attempting placement as an outpatient through PCP but without much luck. He otherwise denies recent illness, fever/chills, CP, SOB, abdominal pain, N/V/D, dysuria. On presentation patient with grossly normal labs/vitals. Placed in observation for what appears to be solely placement although this is something that may need to be continued as outpatient as is not indication for hospitalization.     Hospital Course:  Mr. Ortiz was admitted with severe Parkinsons dementia and can no longer care for himself. He was living with his girlfriend, but she can no longer care for the patient. Case management found an accepting facility at [facility]; however, POA declined signing the necessary paperwork for placement. Legal was consulted and judicial guardianship of the patient granted at his court date on April 24, 2018.     Await settling of financials and plan for NH placement. Lowered seroquel at night secondary to hallucinations and increased drowsiness, not awake for meals. Will plan for a few days of resp neb treatments with increased cough and faint exp wheeze. Drop in UOP so he was started on IVF. He has poor oral intake. He has undergone noticeable decline in the past months and is unlikely to do well.     Interval History: Coughing. Drinking Boost but not really eating much else.   ..................................................      Modified Barium Swallow: none on file for review     Chest X-Rays: 4/23/18: Low lung volume, poor inspiratory effort.  Mild increased interstitial lung markings slightly accentuated from the prior study.  Slightly more confluent area of a increased attenuation right suprahilar region, early changes of pneumonia or atelectasis could have this appearance.  No large pleural effusion.  No pneumothorax.  The osseous structures appear normal for age.    Prior diet: Reg/thin    Pt  "previously evaluated by ST during this admission (2/12/18), with the following results/recs:   (Pt) presents with mild oral dysphagia secondary to decreased cognition c/b decreased attention to bolus presentation. Rec at that time for mechanical soft diet with thin liquids; crush meds; assist with meals and eliminate distractions.  No ST f/u recommended at that time.       Subjective   Pt reportedly with gradual decline in overall function this past month.  Pt's care team reports Pt with poor overall PO intake and coughing with/without PO.  Review of recent noted indicated Pt with fluctuating alertness as well as acceptance and tolerance of PO.    Pt generally pleasant and agreeable for evaluation this date, provided frequent verbal cueing and encouragement (to maintain alertness and accept PO).  Pt awake t/o evaluation, however remained with eyes closed. Pt noted with coughing prior to PO presentation.   "I feel fine.", Pt responded when asked how he was doing today.  "No more. "  "That's enough."  "Now that's really enough."  Pt initiated t/o assessment in attempts to decline PO. Pt with poor-fair overall acceptance of PO    Pain/Comfort:  · Pain Rating 1: 0/10    Objective:     Oral Musculature Evaluation  · Oral Musculature: unable to assess due to poor participation/comprehension, general weakness  · Dentition: present and adequate  · Mucosal Quality: good  · Mandibular Strength and Mobility: WFL  · Oral Labial Strength and Mobility: WFL  · Lingual Strength and Mobility: WFL (noted in function; generalized weakness noted)  · Voice Prior to PO Intake: clear/dry with adequate intensity    Bedside Swallow Eval:   Consistencies Assessed:  · Thin liquids 4 oz overall: ice chips, clincian assisted cup edge and straw sips  · Puree tsp x3 fed by clinincian, Pt refused further  · Solids extremely small bites of trudy cracker x 2     Oral Phase:   · Prolonged mastication  · Impaired rotational chew  · Poor oral " acceptance  · Slow oral transit time   · Decreased stripping of PO from spoon  · Reduced mouth opening/aperture for PO acceptance    Pharyngeal Phase:   · no overt clinical signs/symptoms of aspiration    Compensatory Strategies  · Close/attentive supervision with frequent verbal cueing      Assessment:     Tenzin Ortiz is a 77 y.o. male with debility, Parkinson disease, and Lewy Body dementia who presents with mild oral dysphagia c/b poor attention to bolus (most notably regular solid boluses) and prolonged oral prep.  Though Pt noted with coughing prior to PO presentation, provided close attentive supervision and verbal cueing, Pt tolerated all PO presented without overt s/s aspiration at b/s during swallow assessment.  Pt with poor-fair overall willingness to accept PO despite frequent verbal cueing.  Recommend downgrading to puree diet 2/2 poor attention to bolus and feel Pt may increase overall intake with puree vs. regular solids 2/2 less time/effort needed for consumption of puree as Pt appears to fatigue early with PO. Pt at increased risk of aspiration with regular solids 2/2 poor attention to bolus and fluctuating LALA.     Goals:    SLP Goals        Problem: SLP Goal    Goal Priority Disciplines Outcome   SLP Goal     SLP Ongoing (interventions implemented as appropriate)   Description:  Short Term Goals:  1.Pt will tolerate puree diet with thin liquids without overt s/s aspiration and adequate oral clearance at b/s.  2.Provided supervision with meals and  min cueing, Pt will observe swallow safety precautions.  3.Pt will tolerate soft solid trials without overt s/s aspiration, adequate/efficient oral clearance, and adequate attention to bolus, min cues              Multidisciplinary Problems (Resolved)        Problem: SLP Goal    Goal Priority Disciplines Outcome   SLP Goal   (Resolved)    Low SLP Outcome(s) achieved   Description:  STGS  Pt will participate in bedside swallow study (GOAL MET 2/12)                     Plan:     · Patient to be seen:  3 x/week   · Plan of Care expires:  05/15/18  · Plan of Care reviewed with:      · SLP Follow-Up:  Yes       Discharge recommendations:  nursing facility, basic   Barriers to Discharge:  Nutritional status    Time Tracking:     SLP Treatment Date:   05/01/18  Speech Start Time:  1420  Speech Stop Time:  1440     Speech Total Time (min):  20 min    Billable Minutes: Eval Swallow and Oral Function  20 min  and Total Time 20 min    NICANOR Sandoval, CCC-SLP  05/01/2018

## 2018-05-01 NOTE — SUBJECTIVE & OBJECTIVE
Interval History: Coughing. Drinking Boost but not really eating much else.     Review of Systems   Constitutional: Positive for appetite change. Negative for fever.   Respiratory: Positive for cough. Negative for shortness of breath.    Cardiovascular: Negative for chest pain.     Objective:     Vital Signs (Most Recent):  Temp: 97.9 °F (36.6 °C) (05/01/18 1207)  Pulse: 92 (05/01/18 1207)  Resp: 18 (05/01/18 1207)  BP: 130/71 (05/01/18 1207)  SpO2: 97 % (05/01/18 1207) Vital Signs (24h Range):  Temp:  [96.7 °F (35.9 °C)-98.1 °F (36.7 °C)] 97.9 °F (36.6 °C)  Pulse:  [75-92] 92  Resp:  [18] 18  SpO2:  [93 %-97 %] 97 %  BP: (104-135)/(59-71) 130/71     Weight: 60.5 kg (133 lb 6.1 oz)  Body mass index is 20.91 kg/m².    Intake/Output Summary (Last 24 hours) at 05/01/18 1403  Last data filed at 04/30/18 2150   Gross per 24 hour   Intake              240 ml   Output              275 ml   Net              -35 ml      Physical Exam   Constitutional: He appears well-developed and well-nourished. No distress.   Elderly, confused   HENT:   Head: Normocephalic and atraumatic.   Eyes: EOM are normal. Pupils are equal, round, and reactive to light.   Neck: Normal range of motion. Neck supple.   Cardiovascular: Normal rate and regular rhythm.    Pulmonary/Chest: Effort normal and breath sounds normal. No respiratory distress.   Bronchial BS   Abdominal: Soft. Bowel sounds are normal.   Musculoskeletal: Normal range of motion.   Neurological: He is alert.   Oriented x 2, and cooperative   Skin: Skin is warm and dry.   Psychiatric: His affect is blunt. His speech is delayed. Cognition and memory are impaired.       Significant Labs: All pertinent labs within the past 24 hours have been reviewed.    Significant Imaging: I have reviewed all pertinent imaging results/findings within the past 24 hours.

## 2018-05-01 NOTE — PLAN OF CARE
Problem: SLP Goal  Goal: SLP Goal  Short Term Goals:  1.Pt will tolerate puree diet with thin liquids without overt s/s aspiration and adequate oral clearance at b/s.  2.Provided supervision with meals and  min cueing, Pt will observe swallow safety precautions.  3.Pt will tolerate soft solid trials without overt s/s aspiration, adequate/efficient oral clearance, and adequate attention to bolus, min cues  Outcome: Ongoing (interventions implemented as appropriate)  Bedside swallow evaluation completed with SLP. Provided close and attentive supervision, Pt tolerated all PO trials without overt s/s aspiration and adequate oral clearance at b/s. Pt noted with coughing prior to PO presentation, however no overt s/s aspiration during PO trials.   Pt with poor-fair overall attention to bolus (notably with reg solid, trudy cracker trial) and acceptance of PO, despite frequent cueing.  REC downgrade diet to puree/thin liquids 2/2 poor attention to regular solid bolus trials, and increased time/effort noted with regular solid boluses. ST to follow.

## 2018-05-01 NOTE — NURSING
Bedside rounding report received from steven barnett rn on patients progress and updated handoff report sheet received too. Assessment completed patient is sleeping and moans out when I try to gently shake him awake. avasys monitor in use bed alarm on call light in reach and head of bed elevated. Continue to monitor.

## 2018-05-01 NOTE — PROGRESS NOTES
[facility] Hospital Medicine  Progress Note    Patient Name: Tenzin Ortiz  MRN: [id number]  Patient Class: IP- Inpatient   Admission Date: 2/7/2018  Length of Stay: 63 days  Attending Physician: Cami Crow MD  Primary Care Provider: Efrain Chavez MD        Subjective:     Principal Problem:Debility    HPI:  Patient is 78 yo male with HTN, HLD, and Parkinson's who presents to ED reportedly for HA. Per ED note patient was complaining of HA but he denies this on my interview. Seems there was some confusion in ED and he was unclear why he was here. During interview with me, patient reports being here for NH placement although he does not appear to be very happy about this. He has been with some issues caring for himself. Currently lives with his girlfriend who reports she can no longer care for him. He states he currently gets around with a walker but sometimes has difficulty getting up out of his chair. Per chart review patient is with Parkinson's and sees neurology, Dr. Castellanos, lastly seen on 2/6/18. He was with hallucinations which were noted to be secondary to dopaminergic agents thus having to significantly reduce dose--she recommended he be admitted for placement at that time but patient declined. He has been attempting placement as an outpatient through PCP but without much luck. He otherwise denies recent illness, fever/chills, CP, SOB, abdominal pain, N/V/D, dysuria. On presentation patient with grossly normal labs/vitals. Placed in observation for what appears to be solely placement although this is something that may need to be continued as outpatient as is not indication for hospitalization.    Hospital Course:  Mr. Ortiz was admitted with severe Parkinsons dementia and can no longer care for himself. He was living with his girlfriend, but she can no longer care for the patient. Case management found an accepting facility at [place]; however, POA declined signing the necessary  paperwork for placement. Legal was consulted and judicial guardianship of the patient granted at his court date on April 24, 2018.    Await settling of financials and plan for NH placement. Lowered seroquel at night secondary to hallucinations and increased drowsiness, not awake for meals. Will plan for a few days of resp neb treatments with increased cough and faint exp wheeze. Drop in UOP so he was started on IVF. He has poor oral intake. He has undergone noticeable decline in the past months and is unlikely to do well.    Interval History: Coughing. Drinking Boost but not really eating much else.     Review of Systems   Constitutional: Positive for appetite change. Negative for fever.   Respiratory: Positive for cough. Negative for shortness of breath.    Cardiovascular: Negative for chest pain.     Objective:     Vital Signs (Most Recent):  Temp: 97.9 °F (36.6 °C) (05/01/18 1207)  Pulse: 92 (05/01/18 1207)  Resp: 18 (05/01/18 1207)  BP: 130/71 (05/01/18 1207)  SpO2: 97 % (05/01/18 1207) Vital Signs (24h Range):  Temp:  [96.7 °F (35.9 °C)-98.1 °F (36.7 °C)] 97.9 °F (36.6 °C)  Pulse:  [75-92] 92  Resp:  [18] 18  SpO2:  [93 %-97 %] 97 %  BP: (104-135)/(59-71) 130/71     Weight: 60.5 kg (133 lb 6.1 oz)  Body mass index is 20.91 kg/m².    Intake/Output Summary (Last 24 hours) at 05/01/18 1403  Last data filed at 04/30/18 2150   Gross per 24 hour   Intake              240 ml   Output              275 ml   Net              -35 ml      Physical Exam   Constitutional: He appears well-developed and well-nourished. No distress.   Elderly, confused   HENT:   Head: Normocephalic and atraumatic.   Eyes: EOM are normal. Pupils are equal, round, and reactive to light.   Neck: Normal range of motion. Neck supple.   Cardiovascular: Normal rate and regular rhythm.    Pulmonary/Chest: Effort normal and breath sounds normal. No respiratory distress.   Bronchial BS   Abdominal: Soft. Bowel sounds are normal.   Musculoskeletal: Normal  range of motion.   Neurological: He is alert.   Oriented x 2, and cooperative   Skin: Skin is warm and dry.   Psychiatric: His affect is blunt. His speech is delayed. Cognition and memory are impaired.       Significant Labs: All pertinent labs within the past 24 hours have been reviewed.    Significant Imaging: I have reviewed all pertinent imaging results/findings within the past 24 hours.    Assessment/Plan:      * Debility    Pt was living with a significant other who can no longer care for him  His estranged daughter was contacted and was willing to aid in finding NH placement (she had already begun this process as an outpatient) but patient's POA (a business friend) refused to sign consent for NH placement.   Patient has no capacity for decision making as per Psych  Depakote 250mg TID to help with mood stabilization  Guardianship process ongoing with court date for 4/24/18  He is medically cleared for transfer once nursing facility arranged        Parkinson disease    Seroquel 50 mg; sinemet  mg  Discontinued taxodione 50 mg  On exelon 4.6 mg in the morning when he was more awake        Lewy body dementia without behavioral disturbance    Seen by Psych   Continue Sinemet  QID and Seroquel 25mg Qhs - hallucinations pronounced x 2 days   Continue Exelon 4.6 mg/24 patch  Divalproex 250 mg to BID  Held trazodone        Essential hypertension    Resume norvasc.          VTE Risk Mitigation         Ordered     enoxaparin injection 40 mg  Daily      04/09/18 1423     Medium Risk of VTE  Once      02/08/18 0653     Place JOSEFINA hose  Until discontinued      02/08/18 0653     Place sequential compression device  Until discontinued      02/08/18 0653              Cami Crow MD  Department of Hospital Medicine   [facility]

## 2018-05-01 NOTE — NURSING
0930 patient refused to eat breakfast meal but did drink his meal replacement supplement. Propped upright in bed and elevated head of bed and foot of the bed . avasys monitor on and working bed alarm on side rails up x 3 call light in reach . Coughs frequently congested. Dr Lindquist on floor rounding informed her of patients persistant cough and increasing cough after swallowing . New orders received.       1130- patient face and mustache was clipped per patients request . Total bed bath given per pct francisca tolerated very well. Iv site clean dry intact tolerating iv fluids. Continue to monitor bed alarm on call light in bed head of bed elevated side rails up x 3 avasys monitor in use.

## 2018-05-01 NOTE — PLAN OF CARE
Problem: Patient Care Overview  Goal: Plan of Care Review  05/01/2018    Recommendations    Recommendation/Intervention: 1) Pureed diet, thin liquids per Speech Therapy. Continue oral nutritional supplement. 2) Calorie Count x3 days 3) Consider initiating enteral nutriton. Monitor electrolytes (K, Phos, Mg, Ca), replete prior to administration. Initiate Isosource at 10 cc/hr and advance by 10 cc Q6 hours or as tolerated until goal rate of 50 cc/hr is achieved. - provides 1800 calories, 82 grams of protein, 917 cc free water. 150 cc free water flushes Q4 hours or per MD for hydration and patency. Use strict aspiration precautions. HOB > 30 degrees. Check residuals Q4 hours. Hold x4 hours if > 250 cc or symptoms of feeding intolerance occurs (abdominal distention, vomiting) If consistently held due to intolerance recommend a prokinetic if no contraindications. 4) RD to monitor  Goals: 1) Patient to meet >=85% of EEN and EPN x3 days with tolerance  Nutrition Goal Status: new  Communication of ARELY Recs: reviewed with KARINA Trejo, MPH, RD, LDN

## 2018-05-01 NOTE — PROGRESS NOTES
PASSR, 142, MAR, chest xray, Labs and progress notes sent to [facility] for review via [facility].

## 2018-05-01 NOTE — PROGRESS NOTES
[facility]  Adult Nutrition  Progress Note    SUMMARY       Recommendations    Recommendation/Intervention: 1) Pureed diet, thin liquids per Speech Therapy. Continue oral nutritional supplement. 2) Calorie Count x3 days 3) Consider initiating enteral nutriton. Monitor electrolytes (K, Phos, Mg, Ca), replete prior to administration. Initiate Isosource at 10 cc/hr and advance by 10 cc Q6 hours or as tolerated until goal rate of 50 cc/hr is achieved. - provides 1800 calories, 82 grams of protein, 917 cc free water. 150 cc free water flushes Q4 hours or per MD for hydration and patency. Use strict aspiration precautions. HOB > 30 degrees. Check residuals Q4 hours. Hold x4 hours if > 250 cc or symptoms of feeding intolerance occurs (abdominal distention, vomiting) If consistently held due to intolerance recommend a prokinetic if no contraindications. 4) RD to monitor  Goals: 1) Patient to meet >=85% of EEN and EPN x3 days with tolerance  Nutrition Goal Status: new  Communication of RD Recs: reviewed with RN    Reason for Assessment    Reason for Assessment: RD follow-up  Diagnosis:  (Debility)  Relevant Medical History: HTN, Parkinsosn  General Information Comments: Patient oral intake continues to be poor. Patient remains disoriented. Urinalysis not performed since 04/24/2018 revealing trace ketones. No GI symptoms reported. IVF infusing. Speech Therapy evaluated patient, recommends pureed, thin liquids. Feel patient will not receive adequate nutrition. Reviewed with RN to start calorie count x3 days.   Nutrition Discharge Planning: Patient to discharge on a Regular diet.     Nutrition Risk Screen    Nutrition Risk Screen: no indicators present    Nutrition/Diet History    Food Preferences: hal  Do you have any cultural, spiritual, Shinto conflicts, given your current situation?: No cultural, Shinto or ethnic food preferences.   Food Allergies: NKFA  Factors Affecting Nutritional Intake:  "impaired cognitive status/motor control, inability to feed self    Anthropometrics    Temp: 97.9 °F (36.6 °C)  Height Method: Stated  Height: 5' 6.97" (170.1 cm)  Height (inches): 66.97 in  Weight Method: Bed Scale  Weight: 60.5 kg (133 lb 6.1 oz)  Weight (lb): 133.38 lb  Ideal Body Weight (IBW), Male: 147.82 lb  % Ideal Body Weight, Male (lb): 90.08 lb  BMI (Calculated): 20.9  BMI Grade: 18.5-24.9 - normal       Lab/Procedures/Meds    Pertinent Labs Reviewed: reviewed  Pertinent Labs Comments: UA: + ketones (4/24/2018)  Pertinent Medications Reviewed: reviewed  Pertinent Medications Comments: IVF    Physical Findings/Assessment    Overall Physical Appearance: loss of muscle mass (lower extremities of note)  Oral/Mouth Cavity: tooth/teeth missing  Skin: intact (Mike Score 14)    Estimated/Assessed Needs    Weight Used For Calorie Calculations: 62.6 kg (138 lb 0.1 oz)  Energy Calorie Requirements (kcal): 3387-2952 kcal  Energy Need Method: [place]  Protein Requirements: 65-75g  Weight Used For Protein Calculations: 62.6 kg (138 lb 0.1 oz)     Fluid Need Method: RDA Method  RDA Method (mL): 1600  CHO Requirement: N/A      Nutrition Prescription Ordered    Current Diet Order: Regular  Oral Nutrition Supplement: Boost Plus TID    Evaluation of Received Nutrient/Fluid Intake    Other Calories (kcal): 306  IV Fluid (mL): 1800  I/O: 1485/275  Energy Calories Required: not meeting needs  Protein Required: not meeting needs  Fluid Required: meeting needs  Comments: LBM: 4/27  Tolerance:  (missing most meals due to excessive sleeping)  % Intake of Estimated Energy Needs: 0 - 25 %  % Meal Intake: 0 - 25 %    Nutrition Risk    Level of Risk/Frequency of Follow-up:  (F/U 2x weekly)     Assessment and Plan     Nutrition Dx: Inadequate Energy Intake due to lethargy as evidenced by: Ketones in urine; 0-25% po intake of meals  Nutrition Dx Status: continues    Monitor and Evaluation    Food and Nutrient Intake: energy " intake, food and beverage intake  Food and Nutrient Adminstration: diet order, other (specify) (supplement order)  Knowledge/Beliefs/Attitudes: food and nutrition knowledge/skill  Physical Activity and Function: nutrition-related ADLs and IADLs  Anthropometric Measurements: weight, weight change, body mass index  Biochemical Data, Medical Tests and Procedures: electrolyte and renal panel, gastrointestinal profile  Nutrition-Focused Physical Findings: overall appearance     Nutrition Follow-Up    RD Follow-up?: Yes

## 2018-05-01 NOTE — NURSING
"Repositioned patient in bed and set tray up so he could eat. Will continue to monitor. Patient is refusing to eat his meal this evening. Lots of encouragement to eat. Patient reports he ate half of his meal but he in fact did not  Eat his meal as observed. Patient states," leave me alone I don't want it . " patient agitated .     1815- meal tray remains untouched . Patient did drink his soda that was all he touched. avasys monitor in use and call light in bed bed alarm on. Head of bed elevated side rails up x 3.     1917- bedside rounding report given to steven barnett rn on patients progress and updated handoff report sheet. avasys monitor is in use. Bed alarm on call light in use and head of bed elevated side rails up x 3. No acute events . Patient agitated this evening and delusional states ,"you see this thing here I have needles in this thing its up to my waist ; I called the company and they sent me another one you don't believe me I know you don't . " the patient was holding the plastic adhesive that is attached to his diaper to keep it closed I tried to show him what he was holding was part of the diaper and I removed it to check underneath no needle and refastened adhesive tab  So it didn't touch his skin but the patient was convinced he was holding a needle .       "

## 2018-05-02 PROCEDURE — 94799 UNLISTED PULMONARY SVC/PX: CPT

## 2018-05-02 PROCEDURE — 25000003 PHARM REV CODE 250: Performed by: INTERNAL MEDICINE

## 2018-05-02 PROCEDURE — 21400001 HC TELEMETRY ROOM

## 2018-05-02 PROCEDURE — 25000003 PHARM REV CODE 250: Performed by: HOSPITALIST

## 2018-05-02 PROCEDURE — S5010 5% DEXTROSE AND 0.45% SALINE: HCPCS | Performed by: HOSPITALIST

## 2018-05-02 PROCEDURE — 99900035 HC TECH TIME PER 15 MIN (STAT)

## 2018-05-02 PROCEDURE — 63600175 PHARM REV CODE 636 W HCPCS: Performed by: HOSPITALIST

## 2018-05-02 PROCEDURE — 27000646 HC AEROBIKA DEVICE

## 2018-05-02 PROCEDURE — 94664 DEMO&/EVAL PT USE INHALER: CPT

## 2018-05-02 PROCEDURE — 94761 N-INVAS EAR/PLS OXIMETRY MLT: CPT

## 2018-05-02 RX ADMIN — DIVALPROEX SODIUM 250 MG: 250 TABLET, DELAYED RELEASE ORAL at 09:05

## 2018-05-02 RX ADMIN — CARBIDOPA AND LEVODOPA 2 TABLET: 25; 100 TABLET ORAL at 06:05

## 2018-05-02 RX ADMIN — POLYETHYLENE GLYCOL 3350 17 G: 17 POWDER, FOR SOLUTION ORAL at 09:05

## 2018-05-02 RX ADMIN — QUETIAPINE FUMARATE 25 MG: 25 TABLET ORAL at 09:05

## 2018-05-02 RX ADMIN — DEXTROSE AND SODIUM CHLORIDE: 5; .45 INJECTION, SOLUTION INTRAVENOUS at 06:05

## 2018-05-02 RX ADMIN — DOCUSATE SODIUM 100 MG: 100 CAPSULE, LIQUID FILLED ORAL at 09:05

## 2018-05-02 RX ADMIN — DOCUSATE SODIUM AND SENNOSIDES 1 TABLET: 8.6; 5 TABLET, FILM COATED ORAL at 09:05

## 2018-05-02 RX ADMIN — AMLODIPINE BESYLATE 10 MG: 5 TABLET ORAL at 09:05

## 2018-05-02 RX ADMIN — CARBIDOPA AND LEVODOPA 2 TABLET: 25; 100 TABLET ORAL at 12:05

## 2018-05-02 RX ADMIN — DEXTROSE AND SODIUM CHLORIDE: 5; .45 INJECTION, SOLUTION INTRAVENOUS at 12:05

## 2018-05-02 RX ADMIN — CARBIDOPA AND LEVODOPA 2 TABLET: 25; 100 TABLET ORAL at 09:05

## 2018-05-02 RX ADMIN — ENOXAPARIN SODIUM 40 MG: 100 INJECTION SUBCUTANEOUS at 06:05

## 2018-05-02 RX ADMIN — RIVASTIGMINE TRANSDERMAL SYSTEM 1 PATCH: 4.6 PATCH, EXTENDED RELEASE TRANSDERMAL at 09:05

## 2018-05-02 NOTE — NURSING
Report received from KARINA Markham. Patient resting comfortably, no complaints, no acute distress noted.  side rails up x3, bed alarm set, call light in reach, non skid socks in use. Will continue to monitor.

## 2018-05-02 NOTE — CARE UPDATE
Called to discuss status with Cornel. Discussed poor PO intake, requirement for continuous IVF. Discussed recommendation for hospice and DNR status. Cornel plans to discuss with executive board to determine whether to change status now or send patient to NH and see how he does and change status then. He will get back in touch when he has an answer.   Rosita Praaksh MD  [facility]   05/02/2018 4:43 PM

## 2018-05-02 NOTE — SUBJECTIVE & OBJECTIVE
Interval History: Drowsy. Not eating.     Review of Systems   Unable to perform ROS: Dementia     Objective:     Vital Signs (Most Recent):  Temp: 97.6 °F (36.4 °C) (05/02/18 1104)  Pulse: 84 (05/02/18 1115)  Resp: 18 (05/02/18 1115)  BP: 137/62 (05/02/18 1104)  SpO2: 99 % (05/02/18 1115) Vital Signs (24h Range):  Temp:  [96.6 °F (35.9 °C)-97.8 °F (36.6 °C)] 97.6 °F (36.4 °C)  Pulse:  [64-84] 84  Resp:  [16-18] 18  SpO2:  [93 %-99 %] 99 %  BP: (119-152)/(56-63) 137/62     Weight: 60.5 kg (133 lb 6.1 oz)  Body mass index is 20.91 kg/m².    Intake/Output Summary (Last 24 hours) at 05/02/18 1329  Last data filed at 05/02/18 1300   Gross per 24 hour   Intake             1110 ml   Output                0 ml   Net             1110 ml      Physical Exam   Constitutional: He appears well-developed. No distress.   HENT:   Head: Normocephalic and atraumatic.   Mouth/Throat: Oropharynx is clear and moist.   Cardiovascular: Normal rate, regular rhythm, normal heart sounds and intact distal pulses.  Exam reveals no friction rub.    No murmur heard.  Pulmonary/Chest: Effort normal and breath sounds normal. No respiratory distress. He has no wheezes. He has no rales.   Abdominal: Soft. Bowel sounds are normal. He exhibits no distension. There is no tenderness. There is no guarding.   Musculoskeletal: He exhibits no edema.   Neurological:   Drowsy, opens eyes slightly to voice. Oriented to self.    Skin: He is not diaphoretic.       Significant Labs: All pertinent labs within the past 24 hours have been reviewed.    Significant Imaging: I have reviewed and interpreted all pertinent imaging results/findings within the past 24 hours.

## 2018-05-02 NOTE — UM SECONDARY REVIEW
[facility]    IP Extended Stay > 10     Pt has been interdicted. Pt awaiting placement ,most likely at [facility]. IVF     LOS: approved an agreement with D/C plan     Approved per  list

## 2018-05-02 NOTE — PROGRESS NOTES
[facility]  Progress Note    Patient Name: Tenzin Ortiz  MRN: [id number]  Patient Class: IP- Inpatient   Admission Date: 2/7/2018  Length of Stay: 64 days  Attending Physician: Rosita Prakash MD  Primary Care Provider: Efrain Chavez MD        Subjective:     Principal Problem:Debility    HPI:  Patient is 76 yo male with HTN, HLD, and Parkinson's who presents to ED reportedly for HA. Per ED note patient was complaining of HA but he denies this on my interview. Seems there was some confusion in ED and he was unclear why he was here. During interview with me, patient reports being here for NH placement although he does not appear to be very happy about this. He has been with some issues caring for himself. Currently lives with his girlfriend who reports she can no longer care for him. He states he currently gets around with a walker but sometimes has difficulty getting up out of his chair. Per chart review patient is with Parkinson's and sees neurology, Dr. Castellanos, lastly seen on 2/6/18. He was with hallucinations which were noted to be secondary to dopaminergic agents thus having to significantly reduce dose--she recommended he be admitted for placement at that time but patient declined. He has been attempting placement as an outpatient through PCP but without much luck. He otherwise denies recent illness, fever/chills, CP, SOB, abdominal pain, N/V/D, dysuria. On presentation patient with grossly normal labs/vitals. Placed in observation for what appears to be solely placement although this is something that may need to be continued as outpatient as is not indication for hospitalization.    Hospital Course:  Mr. Ortiz was admitted with severe Parkinsons dementia and can no longer care for himself. He was living with his girlfriend, but she can no longer care for the patient. Case management found an accepting facility at [facility]; however, POA declined signing the necessary  paperwork for placement. Legal was consulted and judicial guardianship of the patient granted at his court date on April 24, 2018.    Await settling of financials and plan for NH placement. Lowered seroquel at night secondary to hallucinations and increased drowsiness, not awake for meals. Will plan for a few days of resp neb treatments with increased cough and faint exp wheeze. Drop in UOP so he was started on IVF. He has poor oral intake. He has undergone noticeable decline in the past months and is unlikely to do well in nursing home setting. Attempted to contact guardian Cornel to discuss status; no answer.     Interval History: Drowsy. Not eating.     Review of Systems   Unable to perform ROS: Dementia     Objective:     Vital Signs (Most Recent):  Temp: 97.6 °F (36.4 °C) (05/02/18 1104)  Pulse: 84 (05/02/18 1115)  Resp: 18 (05/02/18 1115)  BP: 137/62 (05/02/18 1104)  SpO2: 99 % (05/02/18 1115) Vital Signs (24h Range):  Temp:  [96.6 °F (35.9 °C)-97.8 °F (36.6 °C)] 97.6 °F (36.4 °C)  Pulse:  [64-84] 84  Resp:  [16-18] 18  SpO2:  [93 %-99 %] 99 %  BP: (119-152)/(56-63) 137/62     Weight: 60.5 kg (133 lb 6.1 oz)  Body mass index is 20.91 kg/m².    Intake/Output Summary (Last 24 hours) at 05/02/18 1329  Last data filed at 05/02/18 1300   Gross per 24 hour   Intake             1110 ml   Output                0 ml   Net             1110 ml      Physical Exam   Constitutional: He appears well-developed. No distress.   HENT:   Head: Normocephalic and atraumatic.   Mouth/Throat: Oropharynx is clear and moist.   Cardiovascular: Normal rate, regular rhythm, normal heart sounds and intact distal pulses.  Exam reveals no friction rub.    No murmur heard.  Pulmonary/Chest: Effort normal and breath sounds normal. No respiratory distress. He has no wheezes. He has no rales.   Abdominal: Soft. Bowel sounds are normal. He exhibits no distension. There is no tenderness. There is no guarding.   Musculoskeletal: He exhibits no edema.    Neurological:   Drowsy, opens eyes slightly to voice. Oriented to self.    Skin: He is not diaphoretic.       Significant Labs: All pertinent labs within the past 24 hours have been reviewed.    Significant Imaging: I have reviewed and interpreted all pertinent imaging results/findings within the past 24 hours.    Assessment/Plan:      * Debility    Pt was living with a significant other who can no longer care for him  His estranged daughter was contacted and was willing to aid in finding NH placement (she had already begun this process as an outpatient) but patient's POA (a business friend) refused to sign consent for NH placement.   Patient has no capacity for decision making as per Psych  Depakote 250mg BID to help with mood stabilization  Guardianship process established (court date was 4/24/18)  Nursing facility arranged, but patient now with worsening mental status and not eating PO  Attempted to contact guardian; unable to reach        Lewy body dementia without behavioral disturbance    Seen by Psych   Continue Sinemet  2tabs QID and Seroquel 25mg Qhs  Worsening hallucinations at night  Continue Exelon 4.6 mg/24 patch  Divalproex 250 mg to BID  Held trazodone        Essential hypertension    Continue amlodipine  Monitor        Mild malnutrition    Poor PO intake  Continue IVF  Need to discuss goals of care with guardian- unable to reach          Parkinson disease    Seroquel 25 mg QHS; rivastigmine 4.6mg patch QD; sinemet  mg 2 tabs QID  Discontinued taxodione 50 mg   See debility  Patient has worsening symptoms of Parkinsons and Lewy Body dementia as well and is unlikely to do well due to decreased PO intake and requirement for continuous IVF to maintain hydration status. Attempted to contact Cornel (guardian) but no response. Patient needs goals of care discussion and likely needs DNR status with no plans for rehospitalization given worsening status that is unlikely to resolve and is due to  Parkinson's that cannot be resolved.           VTE Risk Mitigation         Ordered     enoxaparin injection 40 mg  Daily      04/09/18 1423     Medium Risk of VTE  Once      02/08/18 0653     Place JOSEFINA hose  Until discontinued      02/08/18 0653     Place sequential compression device  Until discontinued      02/08/18 0653              Rosita Prakash MD  Department of Hospital Medicine   [facility]

## 2018-05-02 NOTE — PT/OT/SLP PROGRESS
Speech Language Pathology      Tenzin Ortiz  MRN: [id number]    Patient not seen today secondary to pt refusing PO intake this PM. ST will f/u to ensure safety/efficiency with diet.    Rachel Wagner, SHALA-SLP

## 2018-05-02 NOTE — PROGRESS NOTES
8:35am  KIMO left message for TRIP Unger, in regards to information that needed to be discuss for pt.    11:56am  KIMO spoke with MD who stated that she would like to speak to Guardian before transferring pt due to pt not eating and on continuous fluids. SW voiced understanding.     KIMO informed Ashly Rebolledo.     Pasrr completed and signed by MD  Locet completed.  PASSR faxed to hospitals at:  [phone].   New 142 received.    12:10pm  SW attempted to call TRIP Unger. Left message. KIMO informed MD that she had been unsuccessful in trying to reach guardian. MD stated that she will call Guardian and state if we could place pt under DNR status and transfer with NH with hospice. KIMO provided MD with Cornel contact information.    3:30pm  KIMO notified KIMO Calvert supervisor, that KIMO had been unsuccessful with getting in contact with Cornel. Nehal left message and sent email to Cornel in regards to pt.     4pm  Nehal received call from Cornel who stated that he had not received phone calls from anyone and stated that he will call MD to discuss care. KIMO provided contact information for MD to provide to Cornel.

## 2018-05-02 NOTE — PROGRESS NOTES
Message sent to [facility] via [facility] to follow up on if the information sent on yesterday has been reviewed and if they will be able to accept the pt on today.

## 2018-05-02 NOTE — ASSESSMENT & PLAN NOTE
Seroquel 25 mg QHS; rivastigmine 4.6mg patch QD; sinemet  mg 2 tabs QID  Discontinued taxodione 50 mg   See debility  Patient has worsening symptoms of Parkinsons and Lewy Body dementia as well and is unlikely to do well due to decreased PO intake and requirement for continuous IVF to maintain hydration status. Attempted to contact Cornel (guardian) but no response. Patient needs goals of care discussion and likely needs DNR status with no plans for rehospitalization given worsening status that is unlikely to resolve and is due to Parkinson's that cannot be resolved.

## 2018-05-02 NOTE — ASSESSMENT & PLAN NOTE
Pt was living with a significant other who can no longer care for him  His estranged daughter was contacted and was willing to aid in finding NH placement (she had already begun this process as an outpatient) but patient's POA (a business friend) refused to sign consent for NH placement.   Patient has no capacity for decision making as per Psych  Depakote 250mg BID to help with mood stabilization  Guardianship process established (court date was 4/24/18)  Nursing facility arranged, but patient now with worsening mental status and not eating PO  Attempted to contact guardian; unable to reach

## 2018-05-02 NOTE — CARE UPDATE
Attempted to contact Cornel regarding patient's change in status- not eating, requiring continuous IVF to maintain hydration. No answer. Left message requesting call back.     I am concerned that patient will not do well at nursing home and will require readmission soon due to poor PO intake given that he is currently not eating and requiring continuous IVF. Patient would benefit from LaPOST filled out prior to discharge to determine future goals of care.     Rosita Prakash MD  [facility]  05/02/2018 1:07 PM

## 2018-05-02 NOTE — ASSESSMENT & PLAN NOTE
Seen by Psych   Continue Sinemet  2tabs QID and Seroquel 25mg Qhs  Worsening hallucinations at night  Continue Exelon 4.6 mg/24 patch  Divalproex 250 mg to BID  Held trazodone

## 2018-05-02 NOTE — PLAN OF CARE
Problem: Fall Risk (Adult)  Goal: Identify Related Risk Factors and Signs and Symptoms  Related risk factors and signs and symptoms are identified upon initiation of Human Response Clinical Practice Guideline (CPG)   Outcome: Ongoing (interventions implemented as appropriate)   05/02/18 1347   Fall Risk   Related Risk Factors (Fall Risk) confusion/agitation;gait/mobility problems     Goal: Absence of Falls  Patient will demonstrate the desired outcomes by discharge/transition of care.    05/02/18 1347   Fall Risk (Adult)   Absence of Falls making progress toward outcome

## 2018-05-02 NOTE — PROGRESS NOTES
05/02/18 1115   Patient Assessment/Suction   Level of Consciousness (AVPU) responds to voice   All Lung Fields Breath Sounds diminished   PRE-TX-O2-ETCO2   O2 Device (Oxygen Therapy) room air   SpO2 99 %   Pulse Oximetry Type Intermittent   $ Pulse Oximetry - Multiple Charge Pulse Oximetry - Multiple   Pulse 84   Resp 18   Aerosol Therapy   $ Aerosol Therapy Charges PRN treatment not required   Respiratory Treatment Status PRN treatment not required   Incentive Spirometer   $ Incentive Spirometer Charges unable to perform  (couldnt follow instructions)   Vibratory PEP Therapy   $ Vibratory PEP Charges Aerobika Therapy   $ Vibratory PEP Tech Time Charges 15 min   Type Oscillating PEP Therapy   Administration done with encouragement   Number of Repetitions 10

## 2018-05-02 NOTE — PLAN OF CARE
Problem: Fall Risk (Adult)  Goal: Absence of Falls  Patient will demonstrate the desired outcomes by discharge/transition of care.   Outcome: Ongoing (interventions implemented as appropriate)   05/02/18 0519   Fall Risk (Adult)   Absence of Falls making progress toward outcome       Problem: Pressure Ulcer Risk (Mike Scale) (Adult,Obstetrics,Pediatric)  Goal: Skin Integrity  Patient will demonstrate the desired outcomes by discharge/transition of care.   Outcome: Ongoing (interventions implemented as appropriate)   05/02/18 0519   Pressure Ulcer Risk (Mike Scale) (Adult,Obstetrics,Pediatric)   Skin Integrity making progress toward outcome       Problem: Infection, Risk/Actual (Adult)  Goal: Infection Prevention/Resolution  Patient will demonstrate the desired outcomes by discharge/transition of care.   Outcome: Ongoing (interventions implemented as appropriate)   05/02/18 0519   Infection, Risk/Actual (Adult)   Infection Prevention/Resolution making progress toward outcome

## 2018-05-03 PROCEDURE — 25000003 PHARM REV CODE 250: Performed by: HOSPITALIST

## 2018-05-03 PROCEDURE — 21400001 HC TELEMETRY ROOM

## 2018-05-03 PROCEDURE — S5010 5% DEXTROSE AND 0.45% SALINE: HCPCS | Performed by: HOSPITALIST

## 2018-05-03 PROCEDURE — 94761 N-INVAS EAR/PLS OXIMETRY MLT: CPT

## 2018-05-03 PROCEDURE — 99900035 HC TECH TIME PER 15 MIN (STAT)

## 2018-05-03 PROCEDURE — 63600175 PHARM REV CODE 636 W HCPCS: Performed by: HOSPITALIST

## 2018-05-03 PROCEDURE — 25000003 PHARM REV CODE 250: Performed by: INTERNAL MEDICINE

## 2018-05-03 RX ADMIN — RIVASTIGMINE TRANSDERMAL SYSTEM 1 PATCH: 4.6 PATCH, EXTENDED RELEASE TRANSDERMAL at 09:05

## 2018-05-03 RX ADMIN — POLYETHYLENE GLYCOL 3350 17 G: 17 POWDER, FOR SOLUTION ORAL at 09:05

## 2018-05-03 RX ADMIN — CARBIDOPA AND LEVODOPA 2 TABLET: 25; 100 TABLET ORAL at 03:05

## 2018-05-03 RX ADMIN — DOCUSATE SODIUM 100 MG: 100 CAPSULE, LIQUID FILLED ORAL at 09:05

## 2018-05-03 RX ADMIN — ENOXAPARIN SODIUM 40 MG: 100 INJECTION SUBCUTANEOUS at 05:05

## 2018-05-03 RX ADMIN — CARBIDOPA AND LEVODOPA 2 TABLET: 25; 100 TABLET ORAL at 09:05

## 2018-05-03 RX ADMIN — DIVALPROEX SODIUM 250 MG: 250 TABLET, DELAYED RELEASE ORAL at 09:05

## 2018-05-03 RX ADMIN — DOCUSATE SODIUM 100 MG: 100 CAPSULE, LIQUID FILLED ORAL at 10:05

## 2018-05-03 RX ADMIN — DEXTROSE AND SODIUM CHLORIDE: 5; .45 INJECTION, SOLUTION INTRAVENOUS at 01:05

## 2018-05-03 RX ADMIN — CARBIDOPA AND LEVODOPA 2 TABLET: 25; 100 TABLET ORAL at 05:05

## 2018-05-03 RX ADMIN — DIVALPROEX SODIUM 250 MG: 250 TABLET, DELAYED RELEASE ORAL at 10:05

## 2018-05-03 RX ADMIN — AMLODIPINE BESYLATE 10 MG: 5 TABLET ORAL at 09:05

## 2018-05-03 RX ADMIN — QUETIAPINE FUMARATE 25 MG: 25 TABLET ORAL at 10:05

## 2018-05-03 NOTE — CARE UPDATE
Received call from Cornel regarding code status and goals of care. Cornel has discussed with family and prior POA, all of whom agree upon DNR code status and hospice measures given his deterioration due to Parkinson's disease and Lewy body dementia that cannot be reversed that is now affecting ability to take in adequate nutrition. All agree no PEG tube. Code status changed to DNR. Nursing home orders updated to nursing home with hospice. Cornel will arrive 5/4 between 10-10:30am to sign LAPost document prior to patient's discharge to nursing home. Case management updated.     Rosita Prakash MD  [facility]   05/03/2018 2:57 PM

## 2018-05-03 NOTE — ASSESSMENT & PLAN NOTE
Poor PO intake. Assist with meals  Discontinue IVF  Awaiting decision by guardian regarding code status and goals of care. Advised Cornel against PEG tube placement in setting of dementia.

## 2018-05-03 NOTE — PT/OT/SLP PROGRESS
Speech Language Pathology  Attempted Visit    Tenizn Ortiz  MRN: [id number]    SLP attempted to see pt this PM. However, patient not seen today secondary to pt stated he was not hungry and did not wish to participate in ST services at this time. SLP will follow-up next date. SLP notified KARINA.    DENNY Harrison, -SLP  Speech-Language Pathologist   5/3/2018

## 2018-05-03 NOTE — PLAN OF CARE
[facility]     NURSING HOME ORDERS    05/04/2018     Admit to Nursing Home:  Regular Bed   With Hospice     Diagnoses:  Active Hospital Problems    Diagnosis  POA    *Debility [R53.81]  Yes     Chronic    Lewy body dementia without behavioral disturbance [G31.83, F02.80]  Yes     Chronic    Essential hypertension [I10]  Yes     Chronic    Mild malnutrition [E44.1]  Yes     Chronic    Parkinson disease [G20]  Yes     Chronic     Right tremor-predominant classic type.        Resolved Hospital Problems    Diagnosis Date Resolved POA    Pneumonia due to infectious organism [J18.9] 05/01/2018 No    Dehydration [E86.0] 03/06/2018 Yes       Patient is homebound due to:  Debility     Hospice Qualifying Diagnoses: Parkinson's disease with end stage Lewy Body dementia       Patient has a life expectancy < 6 months due to these conditions.    Allergies:  Review of patient's allergies indicates:   Allergen Reactions    Amantadine analogues      Caused confusion    Mirapex [pramipexole]      Pathologic gambling    Neupro [rotigotine]      Pathologic gambling         Vitals:     Once weekly    Diet: Regular   Supplement:  1 can every three times a day with meals                         Type:  House    Acitivities:     - Up in a chair each morning as tolerated   - Ambulate with assistance to bathroom   - Scheduled walks once each shift (every 8 hours)   - May use walker, cane, or self-propelled wheelchair    LABS: none    Code status: DNR    Nursing Precautions:     - Aspiration precautions:             - Total assistance with meals. Patient is only allowed to eat when supervised.             -  Upright 90 degrees befor during and after meals             -  Suction at bedside          - Fall precautions per nursing home protocol   - Decubitus precautions:        -  for positioning   - Pressure reducing foam mattress   - Turn patient every two hours. Use wedge pillows to anchor patient    Future  Orders:  Hospice Medical Director may dictate new orders for comfortable care measures & sign death certificate.    Medications: Discontinue all previous medication orders, if any. See new list below.     Tenzin Ortiz   Home Medication Instructions MICHAEL:[id number]    Printed on:05/03/18 1021   Medication Information                      amlodipine (NORVASC) 10 MG tablet  Take 1 tablet (10 mg total) by mouth once daily.             carbidopa-levodopa  mg (SINEMET)  mg per tablet  Take 2 tablets by mouth 4 (four) times daily. 6am, 10am, 2pm and 6pm             docusate sodium (COLACE) 100 MG capsule  Take 1 capsule (100 mg total) by mouth 2 (two) times daily.             QUEtiapine (SEROQUEL) 25 MG Tab  Take 1 tablet (25 mg total) by mouth every evening.             rivastigmine (EXELON) 4.6 mg/24 hr PT24  APPLY 1 PATCH EXTERNALLY TO THE SKIN EVERY DAY                   _________________________________  Rosita Prakash MD  05/04/2018

## 2018-05-03 NOTE — SUBJECTIVE & OBJECTIVE
Interval History: Awake, denies complaints.     Review of Systems   Unable to perform ROS: Dementia     Objective:     Vital Signs (Most Recent):  Temp: 98.7 °F (37.1 °C) (05/03/18 1135)  Pulse: 78 (05/03/18 1135)  Resp: 20 (05/03/18 1135)  BP: (!) 114/58 (05/03/18 1135)  SpO2: 97 % (05/03/18 1135) Vital Signs (24h Range):  Temp:  [97.1 °F (36.2 °C)-98.7 °F (37.1 °C)] 98.7 °F (37.1 °C)  Pulse:  [78-95] 78  Resp:  [18-20] 20  SpO2:  [92 %-97 %] 97 %  BP: ()/(50-80) 114/58     Weight: 62.5 kg (137 lb 12.6 oz)  Body mass index is 21.6 kg/m².    Intake/Output Summary (Last 24 hours) at 05/03/18 1319  Last data filed at 05/03/18 1000   Gross per 24 hour   Intake             1380 ml   Output                0 ml   Net             1380 ml      Physical Exam   Constitutional: He appears well-developed. No distress.   HENT:   Head: Normocephalic and atraumatic.   Cardiovascular: Normal rate, regular rhythm, normal heart sounds and intact distal pulses.  Exam reveals no friction rub.    No murmur heard.  Pulmonary/Chest: Effort normal and breath sounds normal. No respiratory distress. He has no wheezes. He has no rales.   Abdominal: Soft. Bowel sounds are normal. He exhibits no distension. There is no tenderness. There is no guarding.   Musculoskeletal: He exhibits no edema.   Neurological:   Awake, answers questions appropriately. Oriented to self.    Skin: He is not diaphoretic.       Significant Labs: All pertinent labs within the past 24 hours have been reviewed.    Significant Imaging: I have reviewed and interpreted all pertinent imaging results/findings within the past 24 hours.

## 2018-05-03 NOTE — PROGRESS NOTES
[facility] Hospital Medicine  Progress Note    Patient Name: Tenzin Ortiz  MRN: [id number]  Patient Class: IP- Inpatient   Admission Date: 2/7/2018  Length of Stay: 65 days  Attending Physician: Rosita Prakash MD  Primary Care Provider: Efrain Chavez MD        Subjective:     Principal Problem:Debility    HPI:  Patient is 78 yo male with HTN, HLD, and Parkinson's who presents to ED reportedly for HA. Per ED note patient was complaining of HA but he denies this on my interview. Seems there was some confusion in ED and he was unclear why he was here. During interview with me, patient reports being here for NH placement although he does not appear to be very happy about this. He has been with some issues caring for himself. Currently lives with his girlfriend who reports she can no longer care for him. He states he currently gets around with a walker but sometimes has difficulty getting up out of his chair. Per chart review patient is with Parkinson's and sees neurology, Dr. Castellanos, lastly seen on 2/6/18. He was with hallucinations which were noted to be secondary to dopaminergic agents thus having to significantly reduce dose--she recommended he be admitted for placement at that time but patient declined. He has been attempting placement as an outpatient through PCP but without much luck. He otherwise denies recent illness, fever/chills, CP, SOB, abdominal pain, N/V/D, dysuria. On presentation patient with grossly normal labs/vitals. Placed in observation for what appears to be solely placement although this is something that may need to be continued as outpatient as is not indication for hospitalization.    Hospital Course:  Mr. Ortiz was admitted with severe Parkinsons dementia and can no longer care for himself. He was living with his girlfriend, but she can no longer care for the patient. Case management found an accepting facility at [place]; however, POA declined signing the necessary  paperwork for placement. Legal was consulted and judicial guardianship of the patient granted at his court date on April 24, 2018.    Await settling of financials and plan for NH placement. Lowered seroquel at night secondary to hallucinations and increased drowsiness, not awake for meals. Will plan for a few days of resp neb treatments with increased cough and faint exp wheeze. Drop in UOP so he was started on IVF. He has poor oral intake. He has undergone noticeable decline in the past months and is unlikely to do well in nursing home setting. Discussed status with Cornel (legal guardian) and recommended changing status to DNR and filling out LaPOST for no readmission for dehydration. Cornel discussing with executive board. Food intake waxes and wanes. Patient requires total assistance with meals. Likely that his food intake and hydration status will continue to wax and wane with his mental status. Discontinued IVF. Stable for discharge to NH when arranged.     Interval History: Awake, denies complaints.     Review of Systems   Unable to perform ROS: Dementia     Objective:     Vital Signs (Most Recent):  Temp: 98.7 °F (37.1 °C) (05/03/18 1135)  Pulse: 78 (05/03/18 1135)  Resp: 20 (05/03/18 1135)  BP: (!) 114/58 (05/03/18 1135)  SpO2: 97 % (05/03/18 1135) Vital Signs (24h Range):  Temp:  [97.1 °F (36.2 °C)-98.7 °F (37.1 °C)] 98.7 °F (37.1 °C)  Pulse:  [78-95] 78  Resp:  [18-20] 20  SpO2:  [92 %-97 %] 97 %  BP: ()/(50-80) 114/58     Weight: 62.5 kg (137 lb 12.6 oz)  Body mass index is 21.6 kg/m².    Intake/Output Summary (Last 24 hours) at 05/03/18 1319  Last data filed at 05/03/18 1000   Gross per 24 hour   Intake             1380 ml   Output                0 ml   Net             1380 ml      Physical Exam   Constitutional: He appears well-developed. No distress.   HENT:   Head: Normocephalic and atraumatic.   Cardiovascular: Normal rate, regular rhythm, normal heart sounds and intact distal pulses.  Exam  reveals no friction rub.    No murmur heard.  Pulmonary/Chest: Effort normal and breath sounds normal. No respiratory distress. He has no wheezes. He has no rales.   Abdominal: Soft. Bowel sounds are normal. He exhibits no distension. There is no tenderness. There is no guarding.   Musculoskeletal: He exhibits no edema.   Neurological:   Awake, answers questions appropriately. Oriented to self.    Skin: He is not diaphoretic.       Significant Labs: All pertinent labs within the past 24 hours have been reviewed.    Significant Imaging: I have reviewed and interpreted all pertinent imaging results/findings within the past 24 hours.    Assessment/Plan:      * Debility    Pt was living with a significant other who can no longer care for him  His estranged daughter was contacted and was willing to aid in finding NH placement (she had already begun this process as an outpatient) but patient's POA (a business friend) refused to sign consent for NH placement.   Patient has no capacity for decision making as per Psych  Depakote 250mg BID to help with mood stabilization  Guardianship process established (court date was 4/24/18)  Nursing facility arranged  Stable for transfer        Lewy body dementia without behavioral disturbance    Seen by Psych   Continue Sinemet  2tabs QID and Seroquel 25mg Qhs  Does have hallucinations at night- seem improved  Continue Exelon 4.6 mg/24 patch  Divalproex 250 mg to BID  Held/discontinued trazodone to improve alertness        Essential hypertension    BP variable   Continue amlodipine  Monitor        Mild malnutrition    Poor PO intake. Assist with meals  Discontinue IVF  Awaiting decision by guardian regarding code status and goals of care. Advised Cornel against PEG tube placement in setting of dementia.           Parkinson disease    Seroquel 25 mg QHS; rivastigmine 4.6mg patch QD; sinemet  mg 2 tabs QID  Discontinued taxodione 50 mg   See debility  Patient has worsening  symptoms of Parkinsons and Lewy Body dementia as well and is unlikely to do well due to decreased PO intake and requirement for continuous IVF to maintain hydration status. PO intake waxes and wanes. Discontinued IVF. Contacted Cornel (guardian)- patient needs goals of care discussion and likely needs DNR status with no plans for rehospitalization given worsening status that is unlikely to resolve and is due to Parkinson's that cannot be resolved.           VTE Risk Mitigation         Ordered     enoxaparin injection 40 mg  Daily      04/09/18 1423     Medium Risk of VTE  Once      02/08/18 0653     Place JOSEFINA hose  Until discontinued      02/08/18 0653     Place sequential compression device  Until discontinued      02/08/18 0653              Rosita Prakash MD  Department of Hospital Medicine   [facility]

## 2018-05-03 NOTE — PROGRESS NOTES
All updated clinicals, PASRR, 142, and PPD sent to Ashly LOZADA for review. KIMO notified Ashly Rebolledo that everything is in [facility].     2:30pm  SW received call from MD Dwain who stated that she spoke with TRIP Unger, who stated that he has spoken with family and the executive board and they stated they are all in agreement with pt becoming a DNR and going to NH with Hospice. Cornel stated that he will ot be able to complete LaPost paperwork until tomorrow. Pt will discharge on tomorrow. KIMO informed Ashly Rebolledo, of the above.

## 2018-05-03 NOTE — ASSESSMENT & PLAN NOTE
Seroquel 25 mg QHS; rivastigmine 4.6mg patch QD; sinemet  mg 2 tabs QID  Discontinued taxodione 50 mg   See debility  Patient has worsening symptoms of Parkinsons and Lewy Body dementia as well and is unlikely to do well due to decreased PO intake and requirement for continuous IVF to maintain hydration status. PO intake waxes and wanes. Discontinued IVF. Contacted Cornel (guardian)- patient needs goals of care discussion and likely needs DNR status with no plans for rehospitalization given worsening status that is unlikely to resolve and is due to Parkinson's that cannot be resolved.

## 2018-05-03 NOTE — PLAN OF CARE
Problem: Confusion, Acute (Adult)  Goal: Identify Related Risk Factors and Signs and Symptoms  Related risk factors and signs and symptoms are identified upon initiation of Human Response Clinical Practice Guideline (CPG)   Outcome: Ongoing (interventions implemented as appropriate)   05/03/18 1844   Confusion, Acute   Related Risk Factors (Acute Confusion) cognitive impairment     Goal: Cognitive/Functional Impairments Minimized  Patient will demonstrate the desired outcomes by discharge/transition of care.   Outcome: Ongoing (interventions implemented as appropriate)   05/03/18 1844   Confusion, Acute (Adult)   Cognitive/Functional Impairments Minimized making progress toward outcome     Goal: Safety  Patient will demonstrate the desired outcomes by discharge/transition of care.   Outcome: Ongoing (interventions implemented as appropriate)   05/03/18 1844   Confusion, Acute (Adult)   Safety making progress toward outcome

## 2018-05-03 NOTE — ASSESSMENT & PLAN NOTE
Seen by Psych   Continue Sinemet  2tabs QID and Seroquel 25mg Qhs  Does have hallucinations at night- seem improved  Continue Exelon 4.6 mg/24 patch  Divalproex 250 mg to BID  Held/discontinued trazodone to improve alertness

## 2018-05-03 NOTE — PROGRESS NOTES
Resumed care. Patient awake resting quietly in bed. Telemetry monitoring in place with alarms on, IV Lt. Hand infusing, site clean & intact. Safety maintained, bed alarm on & in low position, [employer] at bedside, call light in reach. No distress noted.

## 2018-05-03 NOTE — NURSING
Report given to Mignon RN. Patient is resting comfortably, no complaints, no acute distress noted.

## 2018-05-03 NOTE — ASSESSMENT & PLAN NOTE
Pt was living with a significant other who can no longer care for him  His estranged daughter was contacted and was willing to aid in finding NH placement (she had already begun this process as an outpatient) but patient's POA (a business friend) refused to sign consent for NH placement.   Patient has no capacity for decision making as per Psych  Depakote 250mg BID to help with mood stabilization  Guardianship process established (court date was 4/24/18)  Nursing facility arranged  Stable for transfer

## 2018-05-04 VITALS
BODY MASS INDEX: 20.17 KG/M2 | DIASTOLIC BLOOD PRESSURE: 63 MMHG | WEIGHT: 128.5 LBS | HEIGHT: 67 IN | HEART RATE: 79 BPM | SYSTOLIC BLOOD PRESSURE: 136 MMHG | RESPIRATION RATE: 18 BRPM | TEMPERATURE: 99 F | OXYGEN SATURATION: 94 %

## 2018-05-04 PROBLEM — Z71.89 GOALS OF CARE, COUNSELING/DISCUSSION: Status: ACTIVE | Noted: 2018-05-04

## 2018-05-04 PROCEDURE — 63600175 PHARM REV CODE 636 W HCPCS: Performed by: HOSPITALIST

## 2018-05-04 PROCEDURE — 86580 TB INTRADERMAL TEST: CPT | Performed by: HOSPITALIST

## 2018-05-04 PROCEDURE — 94664 DEMO&/EVAL PT USE INHALER: CPT

## 2018-05-04 PROCEDURE — 25000003 PHARM REV CODE 250: Performed by: HOSPITALIST

## 2018-05-04 PROCEDURE — 94761 N-INVAS EAR/PLS OXIMETRY MLT: CPT

## 2018-05-04 PROCEDURE — 99900035 HC TECH TIME PER 15 MIN (STAT)

## 2018-05-04 PROCEDURE — 27000646 HC AEROBIKA DEVICE

## 2018-05-04 PROCEDURE — 25000003 PHARM REV CODE 250: Performed by: INTERNAL MEDICINE

## 2018-05-04 PROCEDURE — G8998 SWALLOW D/C STATUS: HCPCS | Mod: CK

## 2018-05-04 RX ADMIN — DIVALPROEX SODIUM 250 MG: 250 TABLET, DELAYED RELEASE ORAL at 09:05

## 2018-05-04 RX ADMIN — AMLODIPINE BESYLATE 10 MG: 5 TABLET ORAL at 09:05

## 2018-05-04 RX ADMIN — DOCUSATE SODIUM AND SENNOSIDES 1 TABLET: 8.6; 5 TABLET, FILM COATED ORAL at 09:05

## 2018-05-04 RX ADMIN — ENOXAPARIN SODIUM 40 MG: 100 INJECTION SUBCUTANEOUS at 05:05

## 2018-05-04 RX ADMIN — CARBIDOPA AND LEVODOPA 2 TABLET: 25; 100 TABLET ORAL at 02:05

## 2018-05-04 RX ADMIN — DIVALPROEX SODIUM 250 MG: 250 TABLET, DELAYED RELEASE ORAL at 08:05

## 2018-05-04 RX ADMIN — Medication 5 UNITS: at 05:05

## 2018-05-04 RX ADMIN — CARBIDOPA AND LEVODOPA 2 TABLET: 25; 100 TABLET ORAL at 05:05

## 2018-05-04 RX ADMIN — CARBIDOPA AND LEVODOPA 2 TABLET: 25; 100 TABLET ORAL at 08:05

## 2018-05-04 RX ADMIN — POLYETHYLENE GLYCOL 3350 17 G: 17 POWDER, FOR SOLUTION ORAL at 09:05

## 2018-05-04 RX ADMIN — RIVASTIGMINE TRANSDERMAL SYSTEM 1 PATCH: 4.6 PATCH, EXTENDED RELEASE TRANSDERMAL at 09:05

## 2018-05-04 RX ADMIN — QUETIAPINE FUMARATE 25 MG: 25 TABLET ORAL at 08:05

## 2018-05-04 RX ADMIN — DOCUSATE SODIUM 100 MG: 100 CAPSULE, LIQUID FILLED ORAL at 08:05

## 2018-05-04 RX ADMIN — CARBIDOPA AND LEVODOPA 2 TABLET: 25; 100 TABLET ORAL at 09:05

## 2018-05-04 RX ADMIN — DOCUSATE SODIUM 100 MG: 100 CAPSULE, LIQUID FILLED ORAL at 09:05

## 2018-05-04 NOTE — SUBJECTIVE & OBJECTIVE
Interval History: Awake, oriented x3, denies complaints.     Review of Systems   Unable to perform ROS: Dementia     Objective:     Vital Signs (Most Recent):  Temp: 98.6 °F (37 °C) (05/04/18 0732)  Pulse: 79 (05/04/18 0732)  Resp: 18 (05/04/18 0732)  BP: (!) 126/58 (05/04/18 0732)  SpO2: 95 % (05/04/18 0800) Vital Signs (24h Range):  Temp:  [97.4 °F (36.3 °C)-99 °F (37.2 °C)] 98.6 °F (37 °C)  Pulse:  [64-88] 79  Resp:  [16-20] 18  SpO2:  [93 %-96 %] 95 %  BP: (101-140)/(54-68) 126/58     Weight: 58.3 kg (128 lb 8.5 oz)  Body mass index is 20.15 kg/m².    Intake/Output Summary (Last 24 hours) at 05/04/18 1410  Last data filed at 05/04/18 0442   Gross per 24 hour   Intake              200 ml   Output              200 ml   Net                0 ml      Physical Exam   Constitutional: He is oriented to person, place, and time. He appears well-developed. No distress.   HENT:   Head: Normocephalic and atraumatic.   Pulmonary/Chest: Effort normal. No respiratory distress.   Musculoskeletal: He exhibits no edema.   Neurological: He is alert and oriented to person, place, and time.   Awake, answers questions appropriately.    Skin: He is not diaphoretic.       Significant Labs: All pertinent labs within the past 24 hours have been reviewed.    Significant Imaging: I have reviewed and interpreted all pertinent imaging results/findings within the past 24 hours.

## 2018-05-04 NOTE — PROGRESS NOTES
[facility] Hospital Medicine  Progress Note    Patient Name: Tenzin Ortiz  MRN: [id number]  Patient Class: IP- Inpatient   Admission Date: 2/7/2018  Length of Stay: 66 days  Attending Physician: Rosita Prakash MD  Primary Care Provider: Efrain Chavez MD        Subjective:     Principal Problem:Debility    HPI:  Patient is 76 yo male with HTN, HLD, and Parkinson's who presents to ED reportedly for HA. Per ED note patient was complaining of HA but he denies this on my interview. Seems there was some confusion in ED and he was unclear why he was here. During interview with me, patient reports being here for NH placement although he does not appear to be very happy about this. He has been with some issues caring for himself. Currently lives with his girlfriend who reports she can no longer care for him. He states he currently gets around with a walker but sometimes has difficulty getting up out of his chair. Per chart review patient is with Parkinson's and sees neurology, Dr. Castellanos, lastly seen on 2/6/18. He was with hallucinations which were noted to be secondary to dopaminergic agents thus having to significantly reduce dose--she recommended he be admitted for placement at that time but patient declined. He has been attempting placement as an outpatient through PCP but without much luck. He otherwise denies recent illness, fever/chills, CP, SOB, abdominal pain, N/V/D, dysuria. On presentation patient with grossly normal labs/vitals. Placed in observation for what appears to be solely placement although this is something that may need to be continued as outpatient as is not indication for hospitalization.    Hospital Course:  Mr. Ortiz was admitted with severe Parkinsons dementia and can no longer care for himself. He was living with his girlfriend, but she can no longer care for the patient. Case management found an accepting facility at [place]; however, POA declined signing the necessary  paperwork for placement. Legal was consulted and judicial guardianship of the patient granted at his court date on April 24, 2018.    Await settling of financials and plan for NH placement. Lowered seroquel at night secondary to hallucinations and increased drowsiness, not awake for meals. Will plan for a few days of resp neb treatments with increased cough and faint exp wheeze. Drop in UOP so he was started on IVF. He has poor oral intake. He has undergone noticeable decline in the past months and is unlikely to do well in nursing home setting. Discussed status with Cornel (legal guardian) and recommended changing status to DNR and filling out LaPOST for no readmission for dehydration. Food intake waxes and wanes. Patient requiresassistance with meals. Likely that his food intake and hydration status will continue to wax and wane with his mental status. Discontinued IVF. Code status now DNR, LAPOST filled out. Plan for NH with hospice. Stable for discharge to NH when arranged.     Interval History: Awake, oriented x3, denies complaints.     Review of Systems   Unable to perform ROS: Dementia     Objective:     Vital Signs (Most Recent):  Temp: 98.6 °F (37 °C) (05/04/18 0732)  Pulse: 79 (05/04/18 0732)  Resp: 18 (05/04/18 0732)  BP: (!) 126/58 (05/04/18 0732)  SpO2: 95 % (05/04/18 0800) Vital Signs (24h Range):  Temp:  [97.4 °F (36.3 °C)-99 °F (37.2 °C)] 98.6 °F (37 °C)  Pulse:  [64-88] 79  Resp:  [16-20] 18  SpO2:  [93 %-96 %] 95 %  BP: (101-140)/(54-68) 126/58     Weight: 58.3 kg (128 lb 8.5 oz)  Body mass index is 20.15 kg/m².    Intake/Output Summary (Last 24 hours) at 05/04/18 1410  Last data filed at 05/04/18 0442   Gross per 24 hour   Intake              200 ml   Output              200 ml   Net                0 ml      Physical Exam   Constitutional: He is oriented to person, place, and time. He appears well-developed. No distress.   HENT:   Head: Normocephalic and atraumatic.   Pulmonary/Chest: Effort normal.  No respiratory distress.   Musculoskeletal: He exhibits no edema.   Neurological: He is alert and oriented to person, place, and time.   Awake, answers questions appropriately.    Skin: He is not diaphoretic.       Significant Labs: All pertinent labs within the past 24 hours have been reviewed.    Significant Imaging: I have reviewed and interpreted all pertinent imaging results/findings within the past 24 hours.    Assessment/Plan:      * Debility    Pt was living with a significant other who can no longer care for him  His estranged daughter was contacted and was willing to aid in finding NH placement (she had already begun this process as an outpatient) but patient's POA (a business friend) refused to sign consent for NH placement.   Patient has no capacity for decision making as per Psych  Depakote 250mg BID to help with mood stabilization  Guardianship process established (court date was 4/24/18)  Nursing facility arranged  Stable for transfer        Goals of care, counseling/discussion    Patient has worsening symptoms of Parkinsons and Lewy Body dementia as well and is unlikely to do well due to decreased PO intake and requirement for continuous IVF to maintain hydration status. PO intake waxes and wanes. Discontinued IVF. Contacted Cornel (guardian)- patient needs goals of care discussion and likely needs DNR status with no plans for rehospitalization given worsening status that is unlikely to resolve and is due to Parkinson's that cannot be resolved.   DNR placed, hospice at nursing home, MARCO signed        Lewy body dementia without behavioral disturbance    Seen by Psych   Continue Sinemet  2tabs QID and Seroquel 25mg Qhs  Does have hallucinations at night- seem improved  Continue Exelon 4.6 mg/24 patch  Divalproex 250 mg to BID  Held/discontinued trazodone to improve alertness        Essential hypertension    BP variable   Continue amlodipine  Monitor        Severe malnutrition    Poor PO intake.  Assist with meals  Discontinue IVF            Parkinson disease    Seroquel 25 mg QHS; rivastigmine 4.6mg patch QD; sinemet  mg 2 tabs QID  Discontinued taxodione 50 mg   See debility            VTE Risk Mitigation         Ordered     enoxaparin injection 40 mg  Daily      04/09/18 1423     Medium Risk of VTE  Once      02/08/18 0653     Place JOSEFINA hose  Until discontinued      02/08/18 0653     Place sequential compression device  Until discontinued      02/08/18 0653              Rosita Prakash MD  Department of Hospital Medicine   [facility]

## 2018-05-04 NOTE — PROGRESS NOTES
05/03/18 1940   PRE-TX-O2-ETCO2   O2 Device (Oxygen Therapy) room air   SpO2 (!) 94 %   Pulse Oximetry Type Intermittent   Pulse 64   Resp 18   Aerosol Therapy   $ Aerosol Therapy Charges PRN treatment not required   Respiratory Treatment Status PRN treatment not required   Incentive Spirometer   $ Incentive Spirometer Charges other (see comments)  (pt sleeping)   Vibratory PEP Therapy   Administration other (see comments)  (pt sleeping)

## 2018-05-04 NOTE — ASSESSMENT & PLAN NOTE
Seroquel 25 mg QHS; rivastigmine 4.6mg patch QD; sinemet  mg 2 tabs QID  Discontinued taxodione 50 mg   See debility

## 2018-05-04 NOTE — PLAN OF CARE
Problem: Nutrition, Imbalanced: Inadequate Oral Intake (Adult)  Intervention: Explore Physical/Psychosocial/Treatment-related Factors Impacting Oral Intake   05/04/18 0733   Coping/Psychosocial Interventions   Supportive Measures active listening utilized;positive reinforcement provided;relaxation techniques promoted;self-care encouraged;verbalization of feelings encouraged   Psychosocial Support   Family/Support System Care support provided;self-care encouraged     Intervention: Promote/Optimize Nutrition   05/04/18 0733   Nutrition Interventions   Oral Nutrition Promotion physical activity promoted;rest periods promoted;social interaction promoted;medicated         Comments: Patient's nutritional imbalance continues even w/ the initiation of supplemental drinks and foods. Emotional Support & self-encouragement will continue.

## 2018-05-04 NOTE — PLAN OF CARE
Problem: Patient Care Overview  Goal: Plan of Care Review  05/04/2018    Recommendations    Recommendation/Intervention: 1) Continue with pureed diet, thin liquids with oral nutritional supplement with total feeding assistance 2) RD to honor family's wishes to not have a PEG placed secondary to recent DNR status and plan for hospice 3) Monitor oral intake and tolerance 4) Monitor weight changes    Goals: 1) Support family's wishes for no tube feedings 2) Patient to consume >=85% of EEN and EPN with encouragement, pleasure feeds as tolerated x5 days  Nutrition Goal Status: new  Communication of RD Recs: other (comment) (care plan/notes)      Candie Trejo, MPH, RD, LDN

## 2018-05-04 NOTE — DISCHARGE SUMMARY
[facility] Hospital Medicine  Discharge Summary      Patient Name: Tenzin Ortiz  MRN: [id number]  Admission Date: 2/7/2018  Hospital Length of Stay: 66 days  Discharge Date and Time:  05/04/2018 6:38 PM  Attending Physician: Rosita Prakash MD   Discharging Provider: Rosita Prakash MD  Primary Care Provider: Efrain Chavez MD      HPI:   Patient is 78 yo male with HTN, HLD, and Parkinson's who presents to ED reportedly for HA. Per ED note patient was complaining of HA but he denies this on my interview. Seems there was some confusion in ED and he was unclear why he was here. During interview with me, patient reports being here for NH placement although he does not appear to be very happy about this. He has been with some issues caring for himself. Currently lives with his girlfriend who reports she can no longer care for him. He states he currently gets around with a walker but sometimes has difficulty getting up out of his chair. Per chart review patient is with Parkinson's and sees neurology, Dr. Castellanos, lastly seen on 2/6/18. He was with hallucinations which were noted to be secondary to dopaminergic agents thus having to significantly reduce dose--she recommended he be admitted for placement at that time but patient declined. He has been attempting placement as an outpatient through PCP but without much luck. He otherwise denies recent illness, fever/chills, CP, SOB, abdominal pain, N/V/D, dysuria. On presentation patient with grossly normal labs/vitals. Placed in observation for what appears to be solely placement although this is something that may need to be continued as outpatient as is not indication for hospitalization.    * No surgery found *      Hospital Course:   Mr. Ortiz was admitted with severe Parkinsons dementia and can no longer care for himself. He was living with his girlfriend, but she can no longer care for the patient. Case management found an accepting facility at  [facility]; however, POA declined signing the necessary paperwork for placement. Legal was consulted and judicial guardianship of the patient granted at his court date on April 24, 2018.    Await settling of financials and plan for NH placement. Lowered seroquel at night secondary to hallucinations and increased drowsiness, not awake for meals. Will plan for a few days of resp neb treatments with increased cough and faint exp wheeze. Drop in UOP so he was started on IVF. He has poor oral intake. He has undergone noticeable decline in the past months and is unlikely to do well in nursing home setting. Discussed status with Cornel (legal guardian) and recommended changing status to DNR and filling out LaPOST for no readmission for dehydration. Food intake waxes and wanes. Patient requiresassistance with meals. Likely that his food intake and hydration status will continue to wax and wane with his mental status. Discontinued IVF. Code status now DNR, LAPOST filled out. Plan for NH with hospice. Stable for discharge to NH.     Consults:   Consults         Status Ordering Provider     Inpatient consult to Psychiatry  Once     Provider:  Usman Archer MD    Completed KENJI AY     Inpatient consult to Social Work  Once     Provider:  (Not yet assigned)    Completed CHELSEY PALMER          * Debility    Pt was living with a significant other who can no longer care for him  His estranged daughter was contacted and was willing to aid in finding NH placement (she had already begun this process as an outpatient) but patient's POA (a business friend) refused to sign consent for NH placement.   Patient has no capacity for decision making as per Psych  Depakote 250mg BID to help with mood stabilization  Guardianship process established (court date was 4/24/18)  Nursing facility arranged  Stable for transfer        Goals of care, counseling/discussion    Patient has worsening symptoms of Parkinsons and Lewy  Body dementia as well and is unlikely to do well due to decreased PO intake and requirement for continuous IVF to maintain hydration status. PO intake waxes and wanes. Discontinued IVF. Contacted Cornel (guardian)- patient needs goals of care discussion and likely needs DNR status with no plans for rehospitalization given worsening status that is unlikely to resolve and is due to Parkinson's that cannot be resolved.   DNR placed, hospice at nursing home, LAPOST signed        Lewy body dementia without behavioral disturbance    Seen by Psych   Continue Sinemet  2tabs QID and Seroquel 25mg Qhs  Does have hallucinations at night- seem improved  Continue Exelon 4.6 mg/24 patch  Divalproex 250 mg to BID  Held/discontinued trazodone to improve alertness        Essential hypertension    BP variable   Continue amlodipine  Monitor        Severe malnutrition    Poor PO intake. Assist with meals  Discontinue IVF            Parkinson disease    Seroquel 25 mg QHS; rivastigmine 4.6mg patch QD; sinemet  mg 2 tabs QID  Discontinued taxodione 50 mg   See debility            Final Active Diagnoses:    Diagnosis Date Noted POA    PRINCIPAL PROBLEM:  Debility [R53.81] 04/02/2016 Yes     Chronic    Goals of care, counseling/discussion [Z71.89] 05/04/2018 Not Applicable    Lewy body dementia without behavioral disturbance [G31.83, F02.80] 02/12/2018 Yes     Chronic    Essential hypertension [I10] 08/01/2017 Yes     Chronic    Severe malnutrition [E43] 04/02/2016 Yes    Parkinson disease [G20]  Yes     Chronic      Problems Resolved During this Admission:    Diagnosis Date Noted Date Resolved POA    Pneumonia due to infectious organism [J18.9] 04/30/2018 05/01/2018 No    Dehydration [E86.0] 02/08/2018 03/06/2018 Yes       Discharged Condition: stable    Disposition: Home or Self Care    Follow Up:  Follow-up Information     Efrain Chavez MD In 2 weeks.    Specialty:  Family Medicine  Contact information:  [address]  [place] [address]  [phone]                 Patient Instructions:     Diet Adult Regular     Activity as tolerated     Notify your health care provider if you experience any of the following:  temperature >100.4     Notify your health care provider if you experience any of the following:  persistent nausea and vomiting or diarrhea     Notify your health care provider if you experience any of the following:  severe uncontrolled pain     Notify your health care provider if you experience any of the following:  redness, tenderness, or signs of infection (pain, swelling, redness, odor or green/yellow discharge around incision site)     Notify your health care provider if you experience any of the following:  difficulty breathing or increased cough     Notify your health care provider if you experience any of the following:  severe persistent headache     Notify your health care provider if you experience any of the following:  worsening rash     Notify your health care provider if you experience any of the following:  persistent dizziness, light-headedness, or visual disturbances     Notify your health care provider if you experience any of the following:  increased confusion or weakness         Significant Diagnostic Studies: Labs: All labs within the past 24 hours have been reviewed    Pending Diagnostic Studies:     None         Medications:  Reconciled Home Medications:      Medication List      CONTINUE taking these medications    amLODIPine 10 MG tablet  Commonly known as:  NORVASC  Take 1 tablet (10 mg total) by mouth once daily.     carbidopa-levodopa  mg  mg per tablet  Commonly known as:  SINEMET  Take 2 tablets by mouth 4 (four) times daily. 6am, 10am, 2pm and 6pm     docusate sodium 100 MG capsule  Commonly known as:  COLACE  Take 1 capsule (100 mg total) by mouth 2 (two) times daily.     QUEtiapine 25 MG Tab  Commonly known as:  SEROQUEL  Take 1 tablet (25 mg total) by mouth every  evening.     rivastigmine 4.6 mg/24 hr Pt24  Commonly known as:  EXELON  APPLY 1 PATCH EXTERNALLY TO THE SKIN EVERY DAY        STOP taking these medications    atorvastatin 10 MG tablet  Commonly known as:  LIPITOR     ciprofloxacin HCl 500 MG tablet  Commonly known as:  CIPRO     gabapentin 300 MG capsule  Commonly known as:  NEURONTIN     nystatin cream  Commonly known as:  MYCOSTATIN     traZODone 50 MG tablet  Commonly known as:  DESYREL     warfarin 3 MG tablet  Commonly known as:  COUMADIN            Indwelling Lines/Drains at time of discharge:   Lines/Drains/Airways          No matching active lines, drains, or airways          Time spent on the discharge of patient: 45 minutes  Patient was seen and examined on the date of discharge and determined to be suitable for discharge.         Rosita Prakash MD  Department of Hospital Medicine  [facility]

## 2018-05-04 NOTE — NURSING
Report received from KARINA Almanza. Patient resting comfortably, no complaints, no acute distress noted. Plan of care reviewed with patient. Instructed patient to call for assistance before ambulating, side rails up x3, bed alarm set, call light in reach, non skid socks in use. Patient verbalized understanding of instructions.

## 2018-05-04 NOTE — PROGRESS NOTES
TRIP Unger has signed LaPost with MD. KIMO verified with Aniya what hospice company is affiliated with [facility]. Aniya stated that [facility] was affiliated with NH. KIMO called Roopa with [facility] in regards to signing paperwork for pt hospice. Roopa voiced that she will send nurse to hospital to complete. KIMO sent all clinicals vi [facility].    KIMO spoke with Tamiko [facility] who stated that pt looks appropriate and paperwork has been signed. KIMO voiced understanding.    KIMO called [facility] in regards to pt transferring. Spoke with Olivia who stated that she was meeting with Cornel today to complete financial paperwork for pt. KIMO voiced understanding.      All updated clinicals including POC sent to [facility] via [facility]. Awaiting Call Report.     1:30pm  KIMO called Ashly Kwan in regards to pt. Stated that they were waiting on contract with [facility] and that Cornel was completing paperwork. KIMO voiced understanding.    3:18pm  KIMO called Aniya to see what else was needed for pt to transfer. Kat stated they were still waiting on [facility] and Erika was still reviewing pt. KIMO voiced that orders had been sent since yesterday for review and it should been completed. Aniya stated she will call SW back once reviewed and contract is received. KIMO stated will call back within the hour.     4pm  KIMO called Erika in regards to pt. Erika inquired if I spoke with Aniya. Aniya stated that they still need PASRR, 142, PPD, and Updated notes. KIMO stated that everything was sent yesterday and they day before and it seems as its not getting pulled through [facility]. Aniya apologized and said to fax it to a different number. Aniya also stated that pt would not be able to be accepted until Monday. KIMO inquired about reason why. Per Aniya, the DON and  will not be able to accept him until Monday. KIMO voiced that she needed a reason on why he could not be  moved today. KIMO stated that all information has been sent everyday to NH and it seems as only one person has access to [facility]. KIMO stated that they only thing that was needed was a change of date on the POC. KIMO informed Aniya that I would have to bring this to the attention of my  because we have been waiting for a week to send pt due to outside sources.     KIMO called Helene and stated that SW was informed that pt could not be discharged until Monday to the facility and was not given a reason why. Helene stated to have Dominguez to call her so she could see what was the hold up. KIMO spoke with Aniya who stated that as of now pt still can not transfer until Monday but they will be researching where pt's paperwork is. SW stated that all his paperowork is in [facility] and was told that Dominguez has access to it. Aniya voiced understanding.    KIMO notified Helene, UM Manager.     4:17pm  SW received call from [facility] stating that they received the contract from [facility] for hospice.      4:24pm  KIMO spoke with Aniya and Erika in regards to pt. They stated that pt would not be able to be accepted pt and that pt will need a new PPD, an Updated POC and that the  is not able to send final payment til Monday. KIMO informed Helene.    Helene spoke with Dominguez, , who stated that if all records can be sent to NH then pt can discharge to NH. KIMO voiced understanding. MD updated pt's POC. PPD sent. Awaiting Call report.     5:30pm  KIMO received call report for pt. Information given to KARINA Ornelas. Ambulance set up for pt. Transportation for 6:30pm.

## 2018-05-04 NOTE — ASSESSMENT & PLAN NOTE
Patient has worsening symptoms of Parkinsons and Lewy Body dementia as well and is unlikely to do well due to decreased PO intake and requirement for continuous IVF to maintain hydration status. PO intake waxes and wanes. Discontinued IVF. Contacted Cornel (guardian)- patient needs goals of care discussion and likely needs DNR status with no plans for rehospitalization given worsening status that is unlikely to resolve and is due to Parkinson's that cannot be resolved.   DNR placed, hospice at nursing home, MARCO miller

## 2018-05-04 NOTE — ASSESSMENT & PLAN NOTE
Related to (etiology):   Inadequate oral intake     Signs and Symptoms (as evidenced by):   1) Moderate/severe muscle mass loss of patellar and anterior thigh region  2) Patient consume < 75% of estimated energy needs > 1 month     Interventions/Recommendations (treatment strategy):  1) Continue with pureed diet, thin liquids with oral nutritional supplement with total feeding assistance 2) RD to honor family's wishes to not have a PEG placed secondary to recent DNR status and plan for hospice 3) Monitor oral intake and tolerance 4) Monitor weight changes    Nutrition Diagnosis Status:   New

## 2018-05-04 NOTE — PLAN OF CARE
05/04/18 1735   Final Note   Assessment Type Final Discharge Note   Discharge Disposition [facility]   What phone number can be called within the next 1-3 days to see how you are doing after discharge? [phone]   Hospital Follow Up  Appt(s) scheduled? No   Discharge plans and expectations educations in teach back method with documentation complete? Yes   [facility] Referral Info   Post Acute Recommendation Other   Referral Type Nursing Home with Hospice   Facility Name [address] [address]

## 2018-05-04 NOTE — PT/OT/SLP PROGRESS
Speech Language Pathology      Tenzin Ortiz  MRN: [id number]    Patient not seen today secondary to Patient unwilling to participate. Will follow-up Monday if still admitted.     Latricia Hankins, SHALA-SLP

## 2018-05-04 NOTE — PROGRESS NOTES
"   [facility]  Adult Nutrition  Progress Note    SUMMARY       Recommendations    Recommendation/Intervention: 1) Continue with pureed diet, thin liquids with oral nutritional supplement with total feeding assistance 2) RD to honor family's wishes to not have a PEG placed secondary to recent DNR status and plan for hospice 3) Monitor oral intake and tolerance 4) Monitor weight changes    Goals: 1) Support family's wishes for no tube feedings 2) Patient to consume >=85% of EEN and EPN with encouragement, pleasure feeds as tolerated x5 days  Nutrition Goal Status: new  Communication of RD Recs: other (comment) (care plan/notes)    Reason for Assessment    Reason for Assessment: RD follow-up  Diagnosis:  (Debility)  Relevant Medical History: HTN, Parkinson's  General Information Comments: Patient's family agreed to DNR status and hospice. They decided against a PEG. Patient resting comfortably.     Nutrition Discharge Planning: Patient to discharge on Pureed diet (texture per Speech Therapy) with ONS prn with total feeding assistance.     Nutrition Risk Screen    Nutrition Risk Screen: no indicators present    Nutrition/Diet History    Food Preferences: hal  Do you have any cultural, spiritual, Pentecostal conflicts, given your current situation?: No cultural, Pentecostal or ethnic food preferences.   Food Allergies: NKFA  Factors Affecting Nutritional Intake: impaired cognitive status/motor control, inability to feed self    Anthropometrics    Temp: 98.6 °F (37 °C)  Height Method: Stated  Height: 5' 6.97" (170.1 cm)  Height (inches): 66.97 in  Weight Method: Bed Scale  Weight: 58.3 kg (128 lb 8.5 oz)  Weight (lb): 128.53 lb  Ideal Body Weight (IBW), Male: 147.82 lb  % Ideal Body Weight, Male (lb): 86.95 lb  BMI (Calculated): 20.2  BMI Grade: 18.5-24.9 - normal       Lab/Procedures/Meds    Pertinent Labs Reviewed: reviewed  Pertinent Labs Comments: UA: + ketones (4/24/2018)    Lab Results   Component " Value Date    ALBUMIN 3.3 (L) 03/07/2018     Pertinent Medications Reviewed: reviewed  Scheduled Meds:   amLODIPine  10 mg Oral Daily    carbidopa-levodopa  mg  2 tablet Oral QID    divalproex  250 mg Oral Q12H    docusate sodium  100 mg Oral BID    enoxaparin  40 mg Subcutaneous Daily    polyethylene glycol  17 g Oral Daily    QUEtiapine  25 mg Oral QHS    rivastigmine  1 patch Transdermal Daily    senna-docusate 8.6-50 mg  1 tablet Oral Daily     Continuous Infusions:  PRN Meds:.acetaminophen, albuterol-ipratropium 2.5mg-0.5mg/3mL, benzonatate, dextromethorphan-guaifenesin  mg, influenza, olanzapine zydis, ondansetron, pneumoc 13-bryon conj-dip cr(PF)      Physical Findings/Assessment    Overall Physical Appearance: loss of muscle mass (lower extremities of note)  Oral/Mouth Cavity: tooth/teeth missing  Skin: intact (Mike Score 14)    Estimated/Assessed Needs    Weight Used For Calorie Calculations: 62.6 kg (138 lb 0.1 oz) (Usual Body Weight)  (Energy Calorie Requirements (kcal): 0328-8219 kcal  Energy Need Method: [facility]  Protein Requirements: 65-75g  Weight Used For Protein Calculations: 62.6 kg (138 lb 0.1 oz)  Fluid Need Method: RDA Method  RDA Method (mL): 1600  CHO Requirement: 200 g      Nutrition Prescription Ordered    Current Diet Order: Pureed; thin  Oral Nutrition Supplement: Boost Plus TID    Evaluation of Received Nutrient/Fluid Intake    Other Calories (kcal): 0  IV Fluid (mL): 0  I/O: 400/200  Energy Calories Required: not meeting needs  Protein Required: not meeting needs  Fluid Required: not meeting needs  Comments: LBM: 4/27  Tolerance: other (see comments) (hal - 0% intake)  % Intake of Estimated Energy Needs: 0 - 25 %  % Meal Intake: 0 - 25 %    Nutrition Risk    Level of Risk/Frequency of Follow-up:  (f/u 2x weekly)     Assessment and Plan    Severe malnutrition    Related to (etiology):   Inadequate oral intake     Signs and Symptoms (as evidenced by):   1)  Moderate/severe muscle mass loss of patellar and anterior thigh region  2) Patient consume < 75% of estimated energy needs > 1 month     Interventions/Recommendations (treatment strategy):  1) Continue with pureed diet, thin liquids with oral nutritional supplement with total feeding assistance 2) RD to honor family's wishes to not have a PEG placed secondary to recent DNR status and plan for hospice 3) Monitor oral intake and tolerance 4) Monitor weight changes    Nutrition Diagnosis Status:   New               Monitor and Evaluation    Food and Nutrient Intake: energy intake, food and beverage intake  Food and Nutrient Adminstration: diet order, other (specify) (supplement order)  Knowledge/Beliefs/Attitudes: food and nutrition knowledge/skill  Physical Activity and Function: nutrition-related ADLs and IADLs  Anthropometric Measurements: weight, weight change, body mass index  Biochemical Data, Medical Tests and Procedures: electrolyte and renal panel, gastrointestinal profile  Nutrition-Focused Physical Findings: overall appearance, extremities, muscles and bones     Nutrition Follow-Up    RD Follow-up?: Yes

## 2018-05-04 NOTE — PROGRESS NOTES
Resumed care. Patient asleep in bed, no distress noted. Safety maintained, bed alarm on & bed in low position, call light in reach.

## 2018-05-05 NOTE — NURSING
Report given to KARINA Manzano. Patient resting comfortably, no complaints, no acute distress noted. Plan of care reviewed with patient. Instructed patient to call for assistance before ambulating, side rails up x3, bed alarm set, call light in reach, non skid socks in use. Patient verbalized understanding of instructions.

## 2018-05-05 NOTE — NURSING
Report given to KARINA Ortiz at [facility]. Nereyda with [facility] hosp[facility] updated on discharge plan.

## 2018-05-05 NOTE — PLAN OF CARE
Problem: Fall Risk (Adult)  Intervention: Reduce Risk/Promote Restraint Free Environment   18 1559   Safety Interventions   Environmental Safety Modification assistive device/personal items within reach;clutter free environment maintained;lighting adjusted;room near unit station;room organization consistent   Prevent  Drop/Fall   Safety/Security Measures bed alarm set; at bedside     Intervention: Review Medications/Identify Contributors to Fall Risk   18 1559   Safety Interventions   Medication Review/Management medications reviewed     Intervention: Patient Rounds   18 1700   Safety Interventions   Patient Rounds bed in low position;bed wheels locked;call light in reach;clutter free environment maintained;placement of personal items at bedside;ID band on;toileting offered;visualized patient     Intervention: Safety Promotion/Fall Prevention   18 1700   Safety Interventions   Safety Promotion/Fall Prevention assistive device/personal item within reach;bed alarm set;commode/urinal/bedpan at bedside;Fall Risk reviewed with patient/family;lighting adjusted;medications reviewed;nonskid shoes/socks when out of bed;room near unit station;/camera at bedside;upper side rails raised x 2, lower siderails raised x 1 (Peds only);instructed to call staff for mobility       Goal: Identify Related Risk Factors and Signs and Symptoms  Related risk factors and signs and symptoms are identified upon initiation of Human Response Clinical Practice Guideline (CPG)   Outcome: Ongoing (interventions implemented as appropriate)   18 1553 18 1347   Fall Risk   Related Risk Factors (Fall Risk) --  confusion/agitation;gait/mobility problems   Signs and Symptoms (Fall Risk) presence of risk factors --      Goal: Absence of Falls  Patient will demonstrate the desired outcomes by discharge/transition of care.   Outcome: Ongoing (interventions implemented as  appropriate)   05/02/18 1347   Fall Risk (Adult)   Absence of Falls making progress toward outcome

## 2018-05-05 NOTE — PROGRESS NOTES
Spoke with nurse at [facility] and she was informed of pt PM meds were given and of his impending arrival to their facility . Spoke with Nereyda at Hospice service and she was informed of patients d/c  And arrival to [facility].

## 2018-05-05 NOTE — PROGRESS NOTES
PT care assumed, pt lying in supine position with HOB elevated.. Pt aaox3. Pt with tremor noted to rt hand.resp. Are even and unlabored. Pt with no c/o of pain or any discomfort.Avasys camera in place for safety, bed low side rail up x3. Pt informed of md orders and plan for this shift.

## 2018-07-13 ENCOUNTER — ANTI-COAG VISIT (OUTPATIENT)
Dept: CARDIOLOGY | Facility: CLINIC | Age: 78
End: 2018-07-13

## 2018-07-13 DIAGNOSIS — I74.10 THROMBUS OF AORTA: ICD-10-CM

## 2018-07-13 NOTE — PROGRESS NOTES
Per chart review. D/c from hospital 5/4..... Plan for NH with hospice. Stable for discharge to NH. d/c from clinic

## 2018-08-15 NOTE — SUBJECTIVE & OBJECTIVE
Interval History: Pt with no new c/o; no problems overnight.    Review of Systems   Constitutional: Negative for fever.   Respiratory: Negative for shortness of breath.    Cardiovascular: Negative for chest pain.     Objective:     Vital Signs (Most Recent):  Temp: 98.8 °F (37.1 °C) (05/04/18 2206)  Pulse: 79 (05/04/18 2206)  Resp: 18 (05/04/18 2206)  BP: 136/63 (05/04/18 2206)  SpO2: (!) 94 % (05/04/18 2206) Vital Signs (24h Range):        Weight: 58.3 kg (128 lb 8.5 oz)  Body mass index is 20.15 kg/m².  No intake or output data in the 24 hours ending 08/15/18 1436   Physical Exam   Constitutional: He appears well-developed and well-nourished. No distress.   HENT:   Head: Normocephalic and atraumatic.   Eyes: EOM are normal. Pupils are equal, round, and reactive to light.   Neck: Normal range of motion. Neck supple.   Cardiovascular: Normal rate and regular rhythm.   Pulmonary/Chest: Effort normal and breath sounds normal.   Abdominal: Soft. Bowel sounds are normal.   Musculoskeletal: Normal range of motion.   Neurological: He is alert.   Oriented x 2, and cooperative   Skin: Skin is warm and dry.   Psychiatric: His affect is blunt. His speech is delayed. Cognition and memory are impaired.       Significant Labs: None

## 2018-08-15 NOTE — PROGRESS NOTES
[facility] Medicine  Progress Note     Patient Name: Tenzin Ortiz  MRN: [id number]  Patient Class: IP- Inpatient     Admission Date: 2/7/2018  Attending Physician:  Kassandra Alonso MD  Primary Care Provider: Efrain Chavez MD           Subjective:      Principal Problem:Debility     HPI:  Patient is 76 yo male with HTN, HLD, and Parkinson's who presents to ED reportedly for HA. Per ED note patient was complaining of HA but he denies this on my interview. Seems there was some confusion in ED and he was unclear why he was here. During interview with me, patient reports being here for NH placement although he does not appear to be very happy about this. He has been with some issues caring for himself. Currently lives with his girlfriend who reports she can no longer care for him. He states he currently gets around with a walker but sometimes has difficulty getting up out of his chair. Per chart review patient is with Parkinson's and sees neurology, Dr. Castellanos, lastly seen on 2/6/18. He was with hallucinations which were noted to be secondary to dopaminergic agents thus having to significantly reduce dose--she recommended he be admitted for placement at that time but patient declined. He has been attempting placement as an outpatient through PCP but without much luck. He otherwise denies recent illness, fever/chills, CP, SOB, abdominal pain, N/V/D, dysuria. On presentation patient with grossly normal labs/vitals. Placed in observation for what appears to be solely placement although this is something that may need to be continued as outpatient as is not indication for hospitalization.     Hospital Course:  Awake and alert without complaint - severe Parkinsons dementia and can no longer care for himself - neither can his girlfriend.  CM reports patient with acceptance to [facility]; awaiting paperwork to be signed by patient POA. Legal involved. Interdiction process ongoing for  placement.        Interval History: Pt with no new c/o; no problems overnight.     Review of Systems   Constitutional: Negative for fever.   Respiratory: Negative for shortness of breath.    Cardiovascular: Negative for chest pain.      Objective:      Vital signs: Reviewed  Weight: 58.3 kg (128 lb 8.5 oz)  Body mass index is 20.15 kg/m².  Intake or output data in the 24 hours: Reviewed     Physical Exam   Constitutional: He appears well-developed and well-nourished. No distress.   HENT:   Head: Normocephalic and atraumatic.   Eyes: EOM are normal. Pupils are equal, round, and reactive to light.   Neck: Normal range of motion. Neck supple.   Cardiovascular: Normal rate and regular rhythm.   Pulmonary/Chest: Effort normal and breath sounds normal.   Abdominal: Soft. Bowel sounds are normal.   Musculoskeletal: Normal range of motion.   Neurological: He is alert.   Oriented x 2, and cooperative   Skin: Skin is warm and dry.   Psychiatric: His affect is blunt. His speech is delayed. Cognition and memory are impaired.         Significant Labs: None     Assessment/Plan:          * Debility     TN consulted to aid in safe discharge planning. He has been living with a significant other who can no longer care for him and refusing to take patient home. His somewhat estranged daughter has been contacted and is willing to aid in finding NH placement (she had already begun this process as an outpatient) but there is an issue: patient's power of  (a business friend) refused to sign consent for NH placement. Patient unable to make his own decision regarding this as he is with intermittent confusion +/- previous unclear dementia diagnosis per daughter. Psychiatry consulted for capacity, who agrees that he has no capacity for decision making. Also recommended adding depakote 250mg TID to help with mood stabilization. He is medically cleared for transfer once nursing facility arranged. Interdiction process ongoing with  pending placement.             Lewy body dementia without behavioral disturbance     Seen by Psych   Continue Sinemet  2tabs QID and Seroquel 25mg Qhs  Does have hallucinations at night- seem improved  Continue Exelon 4.6 mg/24 patch  Divalproex 250 mg to BID  Held/discontinued trazodone to improve alertness          Essential hypertension     Continue amlodipine  Monitor          Parkinson disease     Seroquel 25 mg QHS; rivastigmine 4.6mg patch QD; sinemet  mg 2 tabs QID  Discontinued taxodione 50 mg                    VTE Risk Mitigation (From admission, onward)         Ordered       Medium Risk of VTE  Once      02/08/18 0672                   Kassandra Alonso MD  Department of Hospital Medicine   [facility]

## 2018-08-15 NOTE — PROGRESS NOTES
[facility] Medicine  Progress Note    Patient Name: Tenzin Ortiz  MRN: [id number]  Patient Class: IP- Inpatient   Admission Date: 2/7/2018  Attending Physician:  Kassandra Alonso MD  Primary Care Provider: Efrain Chavez MD        Subjective:     Principal Problem:Debility    HPI:  Patient is 78 yo male with HTN, HLD, and Parkinson's who presents to ED reportedly for HA. Per ED note patient was complaining of HA but he denies this on my interview. Seems there was some confusion in ED and he was unclear why he was here. During interview with me, patient reports being here for NH placement although he does not appear to be very happy about this. He has been with some issues caring for himself. Currently lives with his girlfriend who reports she can no longer care for him. He states he currently gets around with a walker but sometimes has difficulty getting up out of his chair. Per chart review patient is with Parkinson's and sees neurology, Dr. Castellanos, lastly seen on 2/6/18. He was with hallucinations which were noted to be secondary to dopaminergic agents thus having to significantly reduce dose--she recommended he be admitted for placement at that time but patient declined. He has been attempting placement as an outpatient through PCP but without much luck. He otherwise denies recent illness, fever/chills, CP, SOB, abdominal pain, N/V/D, dysuria. On presentation patient with grossly normal labs/vitals. Placed in observation for what appears to be solely placement although this is something that may need to be continued as outpatient as is not indication for hospitalization.    Hospital Course:  Awake and alert without complaint - severe Parkinsons dementia and can no longer care for himself - neither can his girlfriend.  CM reports patient with acceptance to [facility]; awaiting paperwork to be signed by patient POA. Legal involved. Interdiction process ongoing for placement.      Interval  History: Pt with no new c/o; no problems overnight.    Review of Systems   Constitutional: Negative for fever.   Respiratory: Negative for shortness of breath.    Cardiovascular: Negative for chest pain.     Objective:     Vital signs: Reviewed  Weight: 58.3 kg (128 lb 8.5 oz)  Body mass index is 20.15 kg/m².  Intake or output data in the 24 hours: Reviewed    Physical Exam   Constitutional: He appears well-developed and well-nourished. No distress.   HENT:   Head: Normocephalic and atraumatic.   Eyes: EOM are normal. Pupils are equal, round, and reactive to light.   Neck: Normal range of motion. Neck supple.   Cardiovascular: Normal rate and regular rhythm.   Pulmonary/Chest: Effort normal and breath sounds normal.   Abdominal: Soft. Bowel sounds are normal.   Musculoskeletal: Normal range of motion.   Neurological: He is alert.   Oriented x 2, and cooperative   Skin: Skin is warm and dry.   Psychiatric: His affect is blunt. His speech is delayed. Cognition and memory are impaired.       Significant Labs: None    Assessment/Plan:      * Debility    TN consulted to aid in safe discharge planning. He has been living with a significant other who can no longer care for him and refusing to take patient home. His somewhat estranged daughter has been contacted and is willing to aid in finding NH placement (she had already begun this process as an outpatient) but there is an issue: patient's power of  (a business friend) refused to sign consent for NH placement. Patient unable to make his own decision regarding this as he is with intermittent confusion +/- previous unclear dementia diagnosis per daughter. Psychiatry consulted for capacity, who agrees that he has no capacity for decision making. Also recommended adding depakote 250mg TID to help with mood stabilization. He is medically cleared for transfer once nursing facility arranged. Interdiction process ongoing with pending placement.          Lewy body  dementia without behavioral disturbance    Seen by Psych   Continue Sinemet  2tabs QID and Seroquel 25mg Qhs  Does have hallucinations at night- seem improved  Continue Exelon 4.6 mg/24 patch  Divalproex 250 mg to BID  Held/discontinued trazodone to improve alertness        Essential hypertension    Continue amlodipine  Monitor        Parkinson disease    Seroquel 25 mg QHS; rivastigmine 4.6mg patch QD; sinemet  mg 2 tabs QID  Discontinued taxodione 50 mg           VTE Risk Mitigation (From admission, onward)        Ordered     Medium Risk of VTE  Once      02/08/18 0653              Kassandra Alonso MD  Department of Hospital Medicine   [facility]

## 2019-03-31 NOTE — ASSESSMENT & PLAN NOTE
Seen by ofe who started on Depakote; sleeps during assessment, oxygen saturations 93%, he is easily arousable   non AG  likely sec to GI lost patient has chronic diarrhea  on bicarb 650 tid x 3 days (3/29)

## 2019-07-31 NOTE — PLAN OF CARE
Problem: Physical Therapy Goal  Goal: Physical Therapy Goal  Goals to be met by: 18    Patient will increase functional independence with mobility by performin. Supine to sit with supervision  2. Sit to stand transfer with Stand-by Assistance  3. Gait  x 300 feet with Stand-by Assistance using Rolling Walker  4. Lower extremity exercise program x30 reps per handout, with assistance as needed      Outcome: Ongoing (interventions implemented as appropriate)  Pt increased distance with gait training today. Pt tolerated treatment kelliell.        Remodulin approved through 7/29/2020. Notification sent to [facility].

## 2019-09-03 NOTE — ASSESSMENT & PLAN NOTE
BP well controlled - Continue amlodipine 10 mg daily  Change vitals to every shift       Colon cancer    Hiatal hernia    HLD (hyperlipidemia)    HTN (hypertension)

## 2019-09-24 ENCOUNTER — HOSPITAL ENCOUNTER (EMERGENCY)
Facility: HOSPITAL | Age: 79
Discharge: HOME | End: 2019-09-24
Attending: EMERGENCY MEDICINE
Payer: MEDICARE

## 2019-09-24 VITALS
WEIGHT: 130 LBS | OXYGEN SATURATION: 92 % | RESPIRATION RATE: 22 BRPM | BODY MASS INDEX: 20.4 KG/M2 | SYSTOLIC BLOOD PRESSURE: 145 MMHG | HEIGHT: 67 IN | HEART RATE: 79 BPM | DIASTOLIC BLOOD PRESSURE: 78 MMHG | TEMPERATURE: 98 F

## 2019-09-24 DIAGNOSIS — S09.90XA CLOSED HEAD INJURY, INITIAL ENCOUNTER: Primary | ICD-10-CM

## 2019-09-24 DIAGNOSIS — T14.8XXA HEMATOMA: ICD-10-CM

## 2019-09-24 DIAGNOSIS — R00.0 TACHYCARDIA: ICD-10-CM

## 2019-09-24 PROCEDURE — 99285 EMERGENCY DEPT VISIT HI MDM: CPT | Mod: 25,HCNC

## 2019-09-24 PROCEDURE — 93005 ELECTROCARDIOGRAM TRACING: CPT | Mod: HCNC

## 2019-09-24 PROCEDURE — 63600175 PHARM REV CODE 636 W HCPCS: Mod: HCNC | Performed by: EMERGENCY MEDICINE

## 2019-09-24 PROCEDURE — 90715 TDAP VACCINE 7 YRS/> IM: CPT | Mod: HCNC | Performed by: EMERGENCY MEDICINE

## 2019-09-24 PROCEDURE — 93010 EKG 12-LEAD: ICD-10-PCS | Mod: HCNC,,, | Performed by: INTERNAL MEDICINE

## 2019-09-24 PROCEDURE — 90471 IMMUNIZATION ADMIN: CPT | Mod: HCNC | Performed by: EMERGENCY MEDICINE

## 2019-09-24 PROCEDURE — 93010 ELECTROCARDIOGRAM REPORT: CPT | Mod: HCNC,,, | Performed by: INTERNAL MEDICINE

## 2019-09-24 RX ADMIN — CLOSTRIDIUM TETANI TOXOID ANTIGEN (FORMALDEHYDE INACTIVATED), CORYNEBACTERIUM DIPHTHERIAE TOXOID ANTIGEN (FORMALDEHYDE INACTIVATED), BORDETELLA PERTUSSIS TOXOID ANTIGEN (GLUTARALDEHYDE INACTIVATED), BORDETELLA PERTUSSIS FILAMENTOUS HEMAGGLUTININ ANTIGEN (FORMALDEHYDE INACTIVATED), BORDETELLA PERTUSSIS PERTACTIN ANTIGEN, AND BORDETELLA PERTUSSIS FIMBRIAE 2/3 ANTIGEN 0.5 ML: 5; 2; 2.5; 5; 3; 5 INJECTION, SUSPENSION INTRAMUSCULAR at 05:09

## 2019-09-24 NOTE — ED PROVIDER NOTES
Encounter Date: 9/24/2019       History     Chief Complaint   Patient presents with    Fall     EMS reports patient fell at Replaced by [facility]. Pt has 2cm laceration to posterior head. Pt is at baseline.     78 y.o. male Past Medical History:  No date: Anticoagulant long-term use  No date: Hypertension  2012: Impulse control disorder      Comment:  gambling on mirapex; also possible dopamine                dysregulation syndrome  1999: PD (Parkinson's disease)      Comment:  tremor-predominant  No date: PVD (peripheral vascular disease) with claudication      Comment:  poor circulation both legs     .The patient is not currently on blood thinners as per RN home med list, unwitnessed fall out of wheelchair, unknown loc. Denies head/neck/back pain, noted abrasion and cephalohematoma to posterior head. Pt denies complaints at this time. Pt is at baseline mental status per RN home report, sent here for head ct.        Review of patient's allergies indicates:   Allergen Reactions    Amantadine analogues      Caused confusion    Mirapex [pramipexole]      Pathologic gambling    Neupro [rotigotine]      Pathologic gambling       Past Medical History:   Diagnosis Date    Anticoagulant long-term use     Hypertension     Impulse control disorder 2012    gambling on mirapex; also possible dopamine dysregulation syndrome    PD (Parkinson's disease) 1999    tremor-predominant    PVD (peripheral vascular disease) with claudication     poor circulation both legs     Past Surgical History:   Procedure Laterality Date    CATARACT EXTRACTION Bilateral 1 yr      Family History   Problem Relation Age of Onset    No Known Problems Mother     No Known Problems Father     No Known Problems Sister     No Known Problems Brother     No Known Problems Maternal Aunt     No Known Problems Maternal Uncle     No Known Problems Paternal Aunt     No Known Problems Paternal Uncle     No Known Problems Maternal Grandmother     No  Known Problems Maternal Grandfather     No Known Problems Paternal Grandmother     No Known Problems Paternal Grandfather     Parkinsonism Neg Hx     Amblyopia Neg Hx     Blindness Neg Hx     Cancer Neg Hx     Cataracts Neg Hx     Diabetes Neg Hx     Glaucoma Neg Hx     Hypertension Neg Hx     Macular degeneration Neg Hx     Retinal detachment Neg Hx     Strabismus Neg Hx     Stroke Neg Hx     Thyroid disease Neg Hx      Social History     Tobacco Use    Smoking status: Former Smoker     Years: 10.00    Smokeless tobacco: Never Used    Tobacco comment: Quit 40 years ago   Substance Use Topics    Alcohol use: No    Drug use: No     Review of Systems   Constitutional: Negative for fever.   HENT: Negative for sore throat.    Respiratory: Negative for shortness of breath.    Cardiovascular: Negative for chest pain.   Gastrointestinal: Negative for nausea.   Genitourinary: Negative for dysuria.   Musculoskeletal: Negative for back pain.   Skin: Negative for rash.   Neurological: Negative for weakness.   Hematological: Does not bruise/bleed easily.   All other systems reviewed and are negative.      Physical Exam     Initial Vitals [09/24/19 1725]   BP Pulse Resp Temp SpO2   (!) 150/68 (!) 147 18 98.1 °F (36.7 °C) (!) 94 %      MAP       --         Physical Exam    Nursing note and vitals reviewed.  Constitutional: He appears well-developed and well-nourished.   HENT:   Head: Normocephalic and atraumatic.   Eyes: EOM are normal. Pupils are equal, round, and reactive to light.   Cardiovascular: Normal rate, regular rhythm and normal heart sounds.   Pulmonary/Chest: Effort normal and breath sounds normal.   Abdominal: He exhibits no distension.   Musculoskeletal: He exhibits no edema or tenderness.   Neurological: He is alert and oriented to person, place, and time. No cranial nerve deficit.   Skin: Skin is warm and dry.   Psychiatric: He has a normal mood and affect.       Cephalohematoma with abrasion  to posterior head.  ED Course   Procedures  Labs Reviewed - No data to display  EKG Readings: (Independently Interpreted)   Hr 81, sinus, nl axis, 1st degree av block, no stemi.       Imaging Results    None          Medical Decision Making:   Initial Assessment:   Head ct/cspine ct, ekg                 CT Cervical Spine Without Contrast   Final Result      No evidence of acute cervical spine fracture or dislocation.         Electronically signed by: Justyn Del Real MD   Date:    09/24/2019   Time:    18:05      CT Head Without Contrast   Final Result      No acute intracranial abnormalities identified.         Electronically signed by: Justyn Del Real MD   Date:    09/24/2019   Time:    18:01                Clinical Impression:       ICD-10-CM ICD-9-CM   1. Closed head injury, initial encounter S09.90XA 959.01   2. Tachycardia R00.0 785.0   3. Hematoma T14.8XXA 924.9                                Elise Herrera MD  09/24/19 2722

## 2019-09-24 NOTE — DISCHARGE INSTRUCTIONS
Thank you for coming to our Emergency Department today. It is important to remember that some problems are difficult to diagnose and may not be found during your first visit. Be sure to follow up with your primary care doctor and review any labs/imaging that was performed with them. If you do not have a primary care doctor, you may contact the one listed on your discharge paperwork or you may also call the [facility] Appointment Desk at [phone] to schedule an appointment with one.     All medications may potentially have side effects and it is impossible to predict which medications may give you side effects. If you feel that you are having a negative effect of any medication you should immediately stop taking them and seek medical attention.    Return to the ER with any questions/concerns, new/concerning symptoms, worsening or failure to improve. Do not drive or make any important decisions for 24 hours if you have received any pain medications, sedatives or mood altering drugs during your ER visit.

## 2019-09-24 NOTE — ED TRIAGE NOTES
Pt arrives per ems due to an unwitnessed fall in a bathroom at [facility].  Pt normally uses a wheelchair and has a hx of dementia and parkinsons.  Pt has a small abrasion on back of head.  Pt denies any pain.

## 2021-06-21 NOTE — ASSESSMENT & PLAN NOTE
Patient's blood pressure is poorly-controlled; will continue home regimen of amlodipine, and provide as-needed clonidine.   none

## 2021-09-27 NOTE — TELEPHONE ENCOUNTER
Ask patient to come in for CBC and CMP and ESR/CRP.  Diagnosis: psoriatic arthritis and encounter for high risk medication.  Find out if she is taking 150 or 300 mg with each dose. Okay to order with 1 additional refill.   Notify the patient that his urine culture was negative for UTI.

## 2022-07-05 NOTE — PT/OT/SLP PROGRESS
Physical Therapy      Patient Name:  Tenzin Ortiz   MRN:  [id number]    Patient not seen today secondary to patient sleeping and unable to arouse enough to participate in PT session. Nurse Enoc notified. Will follow-up again tomorrow.    Dominga Esquivel, PT     Speech Therapy      Visit Type: Evaluation  -  Video swallow  Reason for consult: Pt with h/o long haul COVID Dx'd in Jan. 2022 admitted 5/18/22 with sepsis 2/2 klebsiella bacteremia complicated by MAC pneumonia and herpes zoster, Pt intubated 5/19-5/27; Dysphagia with PEG placement. 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  During that hospitalization pt underwent several video swallows.  Pt was  discharged to [facility] on 6/28/22 on Dysphagia 3 diet with honey thick liquids in addition to G-tube feeding.     **NOW re-admitted 7/1/22 with SOB and chest pain- possibly due to fluid overload     PMH includes Lupus nephritis, ARF, ESRD on HD, RA, Sjogren's    Precautions     - Medical precautions:  Patient not wearing mask due to needing to eat and drink for this exam. Therapist wearing procedure mask and face shield for the duration of this session.     Current Diet: easy chew/dysphagia 3 and honey-thick liquids      - Dentition: intact    - Feeding: feeds self    SUBJECTIVE  [id number] agreed to participate in therapy this date.   Patient/family goals: maximize function and return home   Pain at onset of session:     -  0/10     OBJECTIVE     Oral Mechanism   Oral Motor Assessment: intact  Saliva Control: intact      Video Fluoroscopy  Consistencies Trialed:  Thin Liquid (teaspoon)    - Oral Phase: intact    - Pharyngeal Phase: intact      • Residue Amount: none    - Amount of Penetration/Aspiration: none    - Penetration Aspiration Scale: 1 - material does not enter the airway  Thin Liquid (cup)    - Oral Phase: intact    - Pharyngeal Phase: intact      • Residue Amount: none    - Amount of Penetration/Aspiration: trace during the swallow     - Penetration Aspiration Scale: 2 - material enters the airway, remains above the vocal folds and is ejected from the airway  Thin Liquid (straw)    - Oral Phase: intact    - Pharyngeal Phase: intact      • Residue Amount: none     - Amount of Penetration/Aspiration: trace during the swallow    - Penetration Aspiration Scale: 2 - material enters the airway, remains above the vocal folds and is ejected from the airway  Dysphagia 1 / Puree    - Oral Phase: intact    - Pharyngeal Phase: intact      • Residue Amount: none    - Amount of Penetration/Aspiration: none    - Penetration Aspiration Scale: 1 - material does not enter the airway  Dysphagia 2 / Ground Minced    - Oral Phase: intact    - Pharyngeal Phase: intact      • Residue Amount: none    - Amount of Penetration/Aspiration: none    - Penetration Aspiration Scale: 1 - material does not enter the airway  General Consistency    - Oral Phase: intact    - Pharyngeal Phase: intact      • Residue Amount: none    - Amount of Penetration/Aspiration: none    - Penetration Aspiration Scale: 1 - material does not enter the airway  Pharyngeal Findings:    - Base of tongue retraction: intact    - Epiglottic inversion: intact    - Hyolaryngeal elevation: intact    - Anterior movement of the hyoid bone: intact    - Pharyngeal constriction: intact    - Upper esophageal sphincter (UES) opening: intact  Copy of Study Stored In: PACS;  Results Discussed With: medical doctor, nurse, patient and patient's family           ASSESSMENT  Impairments: swallowing  Functional Limitations: eating/drinking  Video swallow shows grossly normal swallow function.  Trace, superficial (just under the tip of the epiglottis), transient penetration noted with thin liquids which cleared fully with the swallow.  There was no deep penetration or aspiration with any consistency, despite intake of 6oz thin liquids over the course of this exam.  Pharyngeal response was strong with no pharyngeal residue noted with any consistency.     May now advance liquids to thin, however, given tendency towards tachypnea, would recommend continuation of Dysphagia 3 solids so as not to provoke further tachypnea or interfere with coordination  between respiration and swallowing.  D/w RN, IM resident and pt's family at bedside. Will follow to ensure diet tolerance.    Progress: Progressing toward goals       • Rehab Potential: good     • Skilled therapy is required to establish safe means of nutrition, hydration and medication administration    Education Provided:   Learning Assessment:  - Primary learner: patient  - Are they ready to learn: yes  - Preferred learning style: verbal  - Barriers to learning: no barriers apparent at this time  Education provided during session:  - Receiving Education: patient  - dysphagia and aspiration precautions  - Results of above outlined education: Verbalizes understanding    Patient at end of session:    - location: in chair    - safety measures: bed rails x2    - hand off to: rehab aide/tech and transportation    PLAN  Frequency: JN 1 7/5; 2/3x per week for minimum of 8 min     PLAN: Assess tolerance of upgraded diet  Interventions:  Assess tolerance of least restrictive oral diet    Plan/Goal Agreement:  Patient agrees with goals and individualized plan of care    RECOMMENDATIONS     -Diet:          *thin liquids and easy chew/dysphagia 3    -Medication Administration:         *via feeding tube    -Feeding Guidelines:          *feeds self and set up tray    -Speech Reviewed Swallow:         *with clinical caregivers, feeding guidelines posted in room and with patient/family    GOALS  Review Date: 7/5/2022  Long Term Goals:   Pt to participate in a video swallow- met    Pt to tolerate thin liquids without clinical s/s of aspiration   Documented in the chart in the following areas: Assessment.      Therapy procedure time and total treatment time can be found documented on the Time Entry flowsheet

## 2022-08-31 NOTE — PROGRESS NOTES
-- DO NOT REPLY / DO NOT REPLY ALL --  -- Message is from Engagement Center Operations (ECO) --    Request Result  Is the patient currently having Emergent symptoms?: No    Which result are you requesting?: Colonoscopy and CTscan    What is the full name of the provider that ordered the lab or test?: Dimitri Monroe    Caller Information       Type Contact Phone/Fax    08/31/2022 11:58 AM CDT Phone (Incoming) Debra Griffin (Emergency Contact) [phone]          Alternative phone number: [phone]    Clinic site name / Account # for ordering provider: [facility]     Can a detailed message be left?: Yes    Message Turnaround:     [place]:    Please give this turnaround time to the caller:   \"This message will be sent to [state Provider's name]. The clinical team will fulfill your request as soon as they review your message.\"    Inform patients: \"Please be aware the return phone call may come from an unidentified phone number.\"   [facility] Medicine  Progress Note    Patient Name: Tenzin Ortiz  MRN: [id number]  Patient Class: IP- Inpatient   Admission Date: 2/7/2018  Length of Stay: 2 days  Attending Physician: Sidra Rivas MD  Primary Care Provider: Efrain Chavez MD        Subjective:     Principal Problem:Debility    HPI:  Patient is 76 yo male with HTN, HLD, and Parkinson's who presents to ED reportedly for HA. Per ED note patient was complaining of HA but he denies this on my interview. Seems there was some confusion in ED and he was unclear why he was here. During interview with me, patient reports being here for NH placement although he does not appear to be very happy about this. He has been with some issues caring for himself. Currently lives with his girlfriend who reports she can no longer care for him. He states he currently gets around with a walker but sometimes has difficulty getting up out of his chair. Per chart review patient is with Parkinson's and sees neurology, Dr. Castellanos, lastly seen on 2/6/18. He was with hallucinations which were noted to be secondary to dopaminergic agents thus having to significantly reduce dose--she recommended he be admitted for placement at that time but patient declined. He has been attempting placement as an outpatient through PCP but without much luck. He otherwise denies recent illness, fever/chills, CP, SOB, abdominal pain, N/V/D, dysuria. On presentation patient with grossly normal labs/vitals. Placed in observation for what appears to be solely placement although this is something that may need to be continued as outpatient as is not indication for hospitalization.    Hospital Course:  Awake and alert without complaint - severe parkinsons disease and can no longer care for himself - neither can his girlfriend.  CM reports patient with acceptance to [facility]; awaiting legals to be signed by patient POA. No new issues - case management following.    3/1-  updated by CM that the  will not sign paperwork for NH placement, hopefully daughter will be able to assist with placement     Interval History: pt reports that he doesn't want to eat, talking about closing the printing company     Review of Systems   Constitutional: Negative for activity change, appetite change, chills, diaphoresis, fatigue and fever.   Respiratory: Negative for cough, chest tightness, shortness of breath and wheezing.    Cardiovascular: Negative for chest pain, palpitations and leg swelling.   Gastrointestinal: Negative for abdominal distention, abdominal pain, constipation, diarrhea, nausea and vomiting.   Genitourinary: Negative for dysuria and hematuria.   Musculoskeletal: Negative for joint swelling and neck stiffness.   Neurological: Positive for tremors. Negative for weakness.   Psychiatric/Behavioral: Positive for confusion.        Intermittent confusion, requires reorienting - mood swings have stabilized - chronic essential tremors - alert to self and place     Objective:     Vital Signs (Most Recent):  Temp: 97.3 °F (36.3 °C) (03/01/18 0734)  Pulse: 80 (03/01/18 0734)  Resp: 18 (03/01/18 0734)  BP: (!) 154/67 (03/01/18 0734)  SpO2: 96 % (03/01/18 0734) Vital Signs (24h Range):  Temp:  [97.3 °F (36.3 °C)-98.3 °F (36.8 °C)] 97.3 °F (36.3 °C)  Pulse:  [80-89] 80  Resp:  [18-20] 18  SpO2:  [95 %-98 %] 96 %  BP: (124-158)/(63-82) 154/67     Weight: 62.5 kg (137 lb 12.6 oz)  Body mass index is 21.58 kg/m².    Intake/Output Summary (Last 24 hours) at 03/01/18 0958  Last data filed at 02/28/18 2300   Gross per 24 hour   Intake              120 ml   Output              650 ml   Net             -530 ml      Physical Exam   Constitutional: No distress.   Deconditioned; Body mass index is 21.99 kg/m².   HENT:   Head: Normocephalic and atraumatic.   Eyes: Pupils are equal, round, and reactive to light.   Wears glasses; at the bedside   Neck: Normal range of motion. Neck supple.   Cardiovascular:  Normal rate.    Pulmonary/Chest: Effort normal. No respiratory distress. He has no wheezes. He has no rales.   Abdominal: Soft. He exhibits no distension. There is no tenderness.   Genitourinary: Rectal exam shows guaiac negative stool.   Musculoskeletal: Normal range of motion. He exhibits no edema or tenderness.   Neurological: He is alert.   Confusion requires reorienting however, alert to self and situation - chronic essential tremors   Skin: Skin is warm and dry. He is not diaphoretic.   Dry skin; poor turgor   Psychiatric:   Intermittent confusions, requires reorienting often       Significant Labs: All pertinent labs within the past 24 hours have been reviewed.    Significant Imaging: I have reviewed all pertinent imaging results/findings within the past 24 hours.    Assessment/Plan:      * Debility    Currently awaiting placement  PT/OT following for ambulation  PPD already placed and read last week 2/7/18  Case management following  Lovenox 40 mg daily      TN consulted to aid in safe discharge planning. Appears patient has been living with a significant other who can no longer care for him and refusing to take patient home. His somewhat estranged daughter has been contacted and is willing to aid in finding NH placement (she had already begun this process as an outpatient). There is an issue, however, in that patient's power of  refused to sign consent for NH placement. Patient unable to make his own decision regarding this as he is with intermittent confusion +/- previous unclear dementia diagnosis per daughter. Psychiatry consulted for capacity. Also recommended adding depakote 250mg TID to help with mood stabilization. He is medically cleared and was initially with plan for home with HH and SW to aid in outpatient placement, however, cannot safely discharge as he does not have a home to return to.           Lewy body dementia without behavioral disturbance    Seen by psyche who has since signed  off - appreciate recs -  Doing well on current regimen  Continue medication regimen as follows:  Sinemet  QID and Seroquel 25 hs  Continue Exelon 4.6 mg/24 patch  Divalproex 250 mg to BID  Continue holding trazadone        Dehydration    Per ED patient admitted for dehydration. His BUN is elevated with normal creatinine--may be mild clinical dehydration. Will provide gentle IVF hydration.           Essential hypertension    BP well controlled - Continue amlodipine 10 mg daily  Change vitals to every shift            Parkinson disease    Seroquel 25 mg; sinemet  mg;   Discontinue taxodione 50 mg  Restart exelon 4.6 mg in the morning when he is more awake          VTE Risk Mitigation         Ordered     enoxaparin injection 40 mg  Daily     Route:  Subcutaneous        02/13/18 1149     Medium Risk of VTE  Once      02/08/18 0653     Place JOSEFINA hose  Until discontinued      02/08/18 0653     Place sequential compression device  Until discontinued      02/08/18 0653              Sidra Rivas MD  Department of Hospital Medicine   [facility]

## 2022-10-24 NOTE — TELEPHONE ENCOUNTER
----- Message from Fabiola Gillis sent at 10/2/2017 12:18 PM CDT -----  Contact: Leatha/Nurse Aid/[phone]  Leatha states that the patient has a crick in his neck that is very painful. She would like to the staff regarding this matter. Thank you.  
Please advise.  
The pt. Was notified.   
Tylenol for the pain and a heating pad to the effect side.  If persists will need consider a muscle relaxer.    Thanks,  Dr. Chavez  
No

## 2024-04-17 NOTE — UM SECONDARY REVIEW
[facility] Medical Affairs    Observation > 48 hours  Patient refusing to eat or take meds today   Continue to work on Placement ,  Legal Issues and   Family Dynamics are the barriers to placement attempting to Work with POA  Vital Signs remain Stable.  Approved continued OBS     Approved via  LLOS/ approved Criteria   show

## 2024-11-14 NOTE — PLAN OF CARE
Problem: Patient Care Overview  Goal: Plan of Care Review  Outcome: Ongoing (interventions implemented as appropriate)   08/07/17 1940   Coping/Psychosocial   Plan Of Care Reviewed With patient;significant other       Problem: Pressure Ulcer Risk (Mike Scale) (Adult,Obstetrics,Pediatric)  Goal: Identify Related Risk Factors and Signs and Symptoms  Related risk factors and signs and symptoms are identified upon initiation of Human Response Clinical Practice Guideline (CPG)   Outcome: Ongoing (interventions implemented as appropriate)   08/07/17 0035   Pressure Ulcer Risk (Mike Scale)   Related Risk Factors (Pressure Ulcer Risk (Mike Scale)) age extremes     Goal: Skin Integrity  Patient will demonstrate the desired outcomes by discharge/transition of care.   Outcome: Ongoing (interventions implemented as appropriate)   08/08/17 0113   Pressure Ulcer Risk (Mike Scale) (Adult,Obstetrics,Pediatric)   Skin Integrity making progress toward outcome       Problem: Nutrition, Imbalanced: Inadequate Oral Intake (Adult)  Goal: Identify Related Risk Factors and Signs and Symptoms  Related risk factors and signs and symptoms are identified upon initiation of Human Response Clinical Practice Guideline (CPG)   Outcome: Ongoing (interventions implemented as appropriate)   08/03/17 0432 08/07/17 0035   Nutrition, Imbalanced: Inadequate Oral Intake   Related Risk Factors (Nutrition Imbalance, Inadequate Oral Intake) --  chronic illness/infection   Signs and Symptoms (Nutrition Imbalance, Inadequate Oral Intake: Signs and Symptoms) irritability/confusion;weakness/lethargy --           Sent